# Patient Record
Sex: FEMALE | Race: ASIAN | NOT HISPANIC OR LATINO | Employment: OTHER | URBAN - METROPOLITAN AREA
[De-identification: names, ages, dates, MRNs, and addresses within clinical notes are randomized per-mention and may not be internally consistent; named-entity substitution may affect disease eponyms.]

---

## 2017-09-10 ENCOUNTER — HOSPITAL ENCOUNTER (OUTPATIENT)
Facility: MEDICAL CENTER | Age: 72
End: 2017-09-11
Attending: EMERGENCY MEDICINE | Admitting: INTERNAL MEDICINE
Payer: MEDICARE

## 2017-09-10 ENCOUNTER — RESOLUTE PROFESSIONAL BILLING HOSPITAL PROF FEE (OUTPATIENT)
Dept: HOSPITALIST | Facility: MEDICAL CENTER | Age: 72
End: 2017-09-10
Payer: MEDICARE

## 2017-09-10 ENCOUNTER — APPOINTMENT (OUTPATIENT)
Dept: RADIOLOGY | Facility: MEDICAL CENTER | Age: 72
End: 2017-09-10
Attending: EMERGENCY MEDICINE
Payer: MEDICARE

## 2017-09-10 DIAGNOSIS — N28.9 RENAL INSUFFICIENCY: ICD-10-CM

## 2017-09-10 DIAGNOSIS — R51.9 HEADACHE, UNSPECIFIED HEADACHE TYPE: ICD-10-CM

## 2017-09-10 PROBLEM — I69.359 HISTORY OF HEMORRHAGIC STROKE WITH RESIDUAL HEMIPARESIS (HCC): Status: ACTIVE | Noted: 2017-09-10

## 2017-09-10 PROBLEM — R51 HEADACHE(784.0): Status: ACTIVE | Noted: 2017-09-10

## 2017-09-10 LAB
ALBUMIN SERPL BCP-MCNC: 4.1 G/DL (ref 3.2–4.9)
ALBUMIN/GLOB SERPL: 1.3 G/DL
ALP SERPL-CCNC: 55 U/L (ref 30–99)
ALT SERPL-CCNC: 9 U/L (ref 2–50)
ANION GAP SERPL CALC-SCNC: 12 MMOL/L (ref 0–11.9)
AST SERPL-CCNC: 14 U/L (ref 12–45)
BASOPHILS # BLD AUTO: 1 % (ref 0–1.8)
BASOPHILS # BLD: 0.09 K/UL (ref 0–0.12)
BILIRUB SERPL-MCNC: 0.6 MG/DL (ref 0.1–1.5)
BUN SERPL-MCNC: 37 MG/DL (ref 8–22)
CALCIUM SERPL-MCNC: 9.6 MG/DL (ref 8.5–10.5)
CHLORIDE SERPL-SCNC: 106 MMOL/L (ref 96–112)
CO2 SERPL-SCNC: 17 MMOL/L (ref 20–33)
CREAT SERPL-MCNC: 1.65 MG/DL (ref 0.5–1.4)
EOSINOPHIL # BLD AUTO: 0.36 K/UL (ref 0–0.51)
EOSINOPHIL NFR BLD: 3.8 % (ref 0–6.9)
ERYTHROCYTE [DISTWIDTH] IN BLOOD BY AUTOMATED COUNT: 46.9 FL (ref 35.9–50)
GFR SERPL CREATININE-BSD FRML MDRD: 31 ML/MIN/1.73 M 2
GLOBULIN SER CALC-MCNC: 3.1 G/DL (ref 1.9–3.5)
GLUCOSE SERPL-MCNC: 122 MG/DL (ref 65–99)
HCT VFR BLD AUTO: 35.6 % (ref 37–47)
HGB BLD-MCNC: 12.1 G/DL (ref 12–16)
IMM GRANULOCYTES # BLD AUTO: 0.06 K/UL (ref 0–0.11)
IMM GRANULOCYTES NFR BLD AUTO: 0.6 % (ref 0–0.9)
LYMPHOCYTES # BLD AUTO: 2.93 K/UL (ref 1–4.8)
LYMPHOCYTES NFR BLD: 31.1 % (ref 22–41)
MCH RBC QN AUTO: 33.4 PG (ref 27–33)
MCHC RBC AUTO-ENTMCNC: 34 G/DL (ref 33.6–35)
MCV RBC AUTO: 98.3 FL (ref 81.4–97.8)
MONOCYTES # BLD AUTO: 1.04 K/UL (ref 0–0.85)
MONOCYTES NFR BLD AUTO: 11 % (ref 0–13.4)
NEUTROPHILS # BLD AUTO: 4.95 K/UL (ref 2–7.15)
NEUTROPHILS NFR BLD: 52.5 % (ref 44–72)
NRBC # BLD AUTO: 0 K/UL
NRBC BLD AUTO-RTO: 0 /100 WBC
PLATELET # BLD AUTO: 211 K/UL (ref 164–446)
PMV BLD AUTO: 10.2 FL (ref 9–12.9)
POTASSIUM SERPL-SCNC: 4.1 MMOL/L (ref 3.6–5.5)
PROT SERPL-MCNC: 7.2 G/DL (ref 6–8.2)
RBC # BLD AUTO: 3.62 M/UL (ref 4.2–5.4)
SODIUM SERPL-SCNC: 135 MMOL/L (ref 135–145)
TROPONIN I SERPL-MCNC: <0.01 NG/ML (ref 0–0.04)
WBC # BLD AUTO: 9.4 K/UL (ref 4.8–10.8)

## 2017-09-10 PROCEDURE — 84484 ASSAY OF TROPONIN QUANT: CPT

## 2017-09-10 PROCEDURE — 96361 HYDRATE IV INFUSION ADD-ON: CPT

## 2017-09-10 PROCEDURE — 70450 CT HEAD/BRAIN W/O DYE: CPT

## 2017-09-10 PROCEDURE — 700111 HCHG RX REV CODE 636 W/ 250 OVERRIDE (IP): Performed by: EMERGENCY MEDICINE

## 2017-09-10 PROCEDURE — 96375 TX/PRO/DX INJ NEW DRUG ADDON: CPT

## 2017-09-10 PROCEDURE — 80053 COMPREHEN METABOLIC PANEL: CPT

## 2017-09-10 PROCEDURE — 36415 COLL VENOUS BLD VENIPUNCTURE: CPT

## 2017-09-10 PROCEDURE — 94760 N-INVAS EAR/PLS OXIMETRY 1: CPT

## 2017-09-10 PROCEDURE — 99285 EMERGENCY DEPT VISIT HI MDM: CPT

## 2017-09-10 PROCEDURE — G0378 HOSPITAL OBSERVATION PER HR: HCPCS

## 2017-09-10 PROCEDURE — 99220 PR INITIAL OBSERVATION CARE,LEVL III: CPT | Performed by: INTERNAL MEDICINE

## 2017-09-10 PROCEDURE — 85025 COMPLETE CBC W/AUTO DIFF WBC: CPT

## 2017-09-10 PROCEDURE — 700105 HCHG RX REV CODE 258: Performed by: EMERGENCY MEDICINE

## 2017-09-10 PROCEDURE — 96376 TX/PRO/DX INJ SAME DRUG ADON: CPT

## 2017-09-10 PROCEDURE — 96374 THER/PROPH/DIAG INJ IV PUSH: CPT

## 2017-09-10 RX ORDER — OMEPRAZOLE 20 MG/1
20 CAPSULE, DELAYED RELEASE ORAL DAILY
COMMUNITY

## 2017-09-10 RX ORDER — ISOSORBIDE DINITRATE 30 MG/1
30 TABLET ORAL 2 TIMES DAILY
COMMUNITY
End: 2018-01-22 | Stop reason: SDUPTHER

## 2017-09-10 RX ORDER — ONDANSETRON 2 MG/ML
4 INJECTION INTRAMUSCULAR; INTRAVENOUS ONCE
Status: COMPLETED | OUTPATIENT
Start: 2017-09-10 | End: 2017-09-10

## 2017-09-10 RX ORDER — SODIUM CHLORIDE 9 MG/ML
1000 INJECTION, SOLUTION INTRAVENOUS ONCE
Status: COMPLETED | OUTPATIENT
Start: 2017-09-10 | End: 2017-09-11

## 2017-09-10 RX ADMIN — FENTANYL CITRATE 50 MCG: 50 INJECTION, SOLUTION INTRAMUSCULAR; INTRAVENOUS at 23:11

## 2017-09-10 RX ADMIN — SODIUM CHLORIDE 1000 ML: 9 INJECTION, SOLUTION INTRAVENOUS at 23:00

## 2017-09-10 RX ADMIN — ONDANSETRON 4 MG: 2 INJECTION INTRAMUSCULAR; INTRAVENOUS at 21:52

## 2017-09-10 RX ADMIN — FENTANYL CITRATE 50 MCG: 50 INJECTION, SOLUTION INTRAMUSCULAR; INTRAVENOUS at 21:52

## 2017-09-10 ASSESSMENT — LIFESTYLE VARIABLES
EVER HAD A DRINK FIRST THING IN THE MORNING TO STEADY YOUR NERVES TO GET RID OF A HANGOVER: NO
TOTAL SCORE: 0
EVER_SMOKED: NEVER
ALCOHOL_USE: NO
HAVE PEOPLE ANNOYED YOU BY CRITICIZING YOUR DRINKING: NO
HAVE YOU EVER FELT YOU SHOULD CUT DOWN ON YOUR DRINKING: NO
CONSUMPTION TOTAL: NEGATIVE
AVERAGE NUMBER OF DAYS PER WEEK YOU HAVE A DRINK CONTAINING ALCOHOL: 0
EVER FELT BAD OR GUILTY ABOUT YOUR DRINKING: NO
TOTAL SCORE: 0
HOW MANY TIMES IN THE PAST YEAR HAVE YOU HAD 5 OR MORE DRINKS IN A DAY: 0
ON A TYPICAL DAY WHEN YOU DRINK ALCOHOL HOW MANY DRINKS DO YOU HAVE: 0
TOTAL SCORE: 0

## 2017-09-10 ASSESSMENT — PATIENT HEALTH QUESTIONNAIRE - PHQ9
SUM OF ALL RESPONSES TO PHQ9 QUESTIONS 1 AND 2: 0
SUM OF ALL RESPONSES TO PHQ QUESTIONS 1-9: 0
1. LITTLE INTEREST OR PLEASURE IN DOING THINGS: NOT AT ALL
2. FEELING DOWN, DEPRESSED, IRRITABLE, OR HOPELESS: NOT AT ALL

## 2017-09-10 ASSESSMENT — PAIN SCALES - GENERAL: PAINLEVEL_OUTOF10: 10

## 2017-09-11 ENCOUNTER — PATIENT OUTREACH (OUTPATIENT)
Dept: HEALTH INFORMATION MANAGEMENT | Facility: OTHER | Age: 72
End: 2017-09-11

## 2017-09-11 ENCOUNTER — APPOINTMENT (OUTPATIENT)
Dept: RADIOLOGY | Facility: MEDICAL CENTER | Age: 72
End: 2017-09-11
Attending: INTERNAL MEDICINE
Payer: MEDICARE

## 2017-09-11 VITALS
HEART RATE: 77 BPM | OXYGEN SATURATION: 92 % | SYSTOLIC BLOOD PRESSURE: 140 MMHG | DIASTOLIC BLOOD PRESSURE: 67 MMHG | TEMPERATURE: 98.1 F | WEIGHT: 150.02 LBS | RESPIRATION RATE: 16 BRPM | HEIGHT: 62 IN | BODY MASS INDEX: 27.61 KG/M2

## 2017-09-11 PROBLEM — R51 HEADACHE(784.0): Status: RESOLVED | Noted: 2017-09-10 | Resolved: 2017-09-11

## 2017-09-11 LAB
ANION GAP SERPL CALC-SCNC: 10 MMOL/L (ref 0–11.9)
BASOPHILS # BLD AUTO: 1.1 % (ref 0–1.8)
BASOPHILS # BLD: 0.08 K/UL (ref 0–0.12)
BUN SERPL-MCNC: 36 MG/DL (ref 8–22)
CALCIUM SERPL-MCNC: 8.8 MG/DL (ref 8.5–10.5)
CHLORIDE SERPL-SCNC: 109 MMOL/L (ref 96–112)
CO2 SERPL-SCNC: 19 MMOL/L (ref 20–33)
CREAT SERPL-MCNC: 1.52 MG/DL (ref 0.5–1.4)
EOSINOPHIL # BLD AUTO: 0.32 K/UL (ref 0–0.51)
EOSINOPHIL NFR BLD: 4.2 % (ref 0–6.9)
ERYTHROCYTE [DISTWIDTH] IN BLOOD BY AUTOMATED COUNT: 47.9 FL (ref 35.9–50)
GFR SERPL CREATININE-BSD FRML MDRD: 34 ML/MIN/1.73 M 2
GLUCOSE BLD-MCNC: 121 MG/DL (ref 65–99)
GLUCOSE BLD-MCNC: 133 MG/DL (ref 65–99)
GLUCOSE BLD-MCNC: 153 MG/DL (ref 65–99)
GLUCOSE BLD-MCNC: 183 MG/DL (ref 65–99)
GLUCOSE SERPL-MCNC: 163 MG/DL (ref 65–99)
HCT VFR BLD AUTO: 32.4 % (ref 37–47)
HGB BLD-MCNC: 10.8 G/DL (ref 12–16)
IMM GRANULOCYTES # BLD AUTO: 0.05 K/UL (ref 0–0.11)
IMM GRANULOCYTES NFR BLD AUTO: 0.7 % (ref 0–0.9)
LYMPHOCYTES # BLD AUTO: 2.31 K/UL (ref 1–4.8)
LYMPHOCYTES NFR BLD: 30.4 % (ref 22–41)
MCH RBC QN AUTO: 33 PG (ref 27–33)
MCHC RBC AUTO-ENTMCNC: 33.3 G/DL (ref 33.6–35)
MCV RBC AUTO: 99.1 FL (ref 81.4–97.8)
MONOCYTES # BLD AUTO: 0.79 K/UL (ref 0–0.85)
MONOCYTES NFR BLD AUTO: 10.4 % (ref 0–13.4)
NEUTROPHILS # BLD AUTO: 4.04 K/UL (ref 2–7.15)
NEUTROPHILS NFR BLD: 53.2 % (ref 44–72)
NRBC # BLD AUTO: 0 K/UL
NRBC BLD AUTO-RTO: 0 /100 WBC
PLATELET # BLD AUTO: 188 K/UL (ref 164–446)
PMV BLD AUTO: 10.1 FL (ref 9–12.9)
POTASSIUM SERPL-SCNC: 4.1 MMOL/L (ref 3.6–5.5)
RBC # BLD AUTO: 3.27 M/UL (ref 4.2–5.4)
SODIUM SERPL-SCNC: 138 MMOL/L (ref 135–145)
WBC # BLD AUTO: 7.6 K/UL (ref 4.8–10.8)

## 2017-09-11 PROCEDURE — G8980 MOBILITY D/C STATUS: HCPCS | Mod: CM

## 2017-09-11 PROCEDURE — G8979 MOBILITY GOAL STATUS: HCPCS | Mod: CM

## 2017-09-11 PROCEDURE — 85025 COMPLETE CBC W/AUTO DIFF WBC: CPT

## 2017-09-11 PROCEDURE — 96361 HYDRATE IV INFUSION ADD-ON: CPT

## 2017-09-11 PROCEDURE — 99217 PR OBSERVATION CARE DISCHARGE: CPT | Performed by: HOSPITALIST

## 2017-09-11 PROCEDURE — 700105 HCHG RX REV CODE 258: Performed by: INTERNAL MEDICINE

## 2017-09-11 PROCEDURE — 96372 THER/PROPH/DIAG INJ SC/IM: CPT

## 2017-09-11 PROCEDURE — 36415 COLL VENOUS BLD VENIPUNCTURE: CPT

## 2017-09-11 PROCEDURE — G0378 HOSPITAL OBSERVATION PER HR: HCPCS

## 2017-09-11 PROCEDURE — 80048 BASIC METABOLIC PNL TOTAL CA: CPT

## 2017-09-11 PROCEDURE — 306588 SLEEVE,VASO CALF MED: Performed by: INTERNAL MEDICINE

## 2017-09-11 PROCEDURE — G8978 MOBILITY CURRENT STATUS: HCPCS | Mod: CM

## 2017-09-11 PROCEDURE — 97161 PT EVAL LOW COMPLEX 20 MIN: CPT

## 2017-09-11 PROCEDURE — 82962 GLUCOSE BLOOD TEST: CPT

## 2017-09-11 PROCEDURE — 302255 BARRIER CREAM MOISTURE BAZA PROTECT (ZINC) 5OZ: Performed by: INTERNAL MEDICINE

## 2017-09-11 PROCEDURE — A9270 NON-COVERED ITEM OR SERVICE: HCPCS | Performed by: INTERNAL MEDICINE

## 2017-09-11 PROCEDURE — 70551 MRI BRAIN STEM W/O DYE: CPT

## 2017-09-11 PROCEDURE — 700102 HCHG RX REV CODE 250 W/ 637 OVERRIDE(OP): Performed by: INTERNAL MEDICINE

## 2017-09-11 RX ORDER — ALLOPURINOL 100 MG/1
100 TABLET ORAL DAILY
Status: DISCONTINUED | OUTPATIENT
Start: 2017-09-11 | End: 2017-09-11 | Stop reason: HOSPADM

## 2017-09-11 RX ORDER — POLYETHYLENE GLYCOL 3350 17 G/17G
1 POWDER, FOR SOLUTION ORAL
Status: DISCONTINUED | OUTPATIENT
Start: 2017-09-11 | End: 2017-09-11 | Stop reason: HOSPADM

## 2017-09-11 RX ORDER — DOXAZOSIN 2 MG/1
4 TABLET ORAL
Status: DISCONTINUED | OUTPATIENT
Start: 2017-09-11 | End: 2017-09-11 | Stop reason: HOSPADM

## 2017-09-11 RX ORDER — AMOXICILLIN 250 MG
2 CAPSULE ORAL 2 TIMES DAILY
Status: DISCONTINUED | OUTPATIENT
Start: 2017-09-11 | End: 2017-09-11 | Stop reason: HOSPADM

## 2017-09-11 RX ORDER — BISACODYL 10 MG
10 SUPPOSITORY, RECTAL RECTAL
Status: DISCONTINUED | OUTPATIENT
Start: 2017-09-11 | End: 2017-09-11 | Stop reason: HOSPADM

## 2017-09-11 RX ORDER — MORPHINE SULFATE 4 MG/ML
2 INJECTION, SOLUTION INTRAMUSCULAR; INTRAVENOUS
Status: DISCONTINUED | OUTPATIENT
Start: 2017-09-11 | End: 2017-09-11 | Stop reason: HOSPADM

## 2017-09-11 RX ORDER — SODIUM CHLORIDE 9 MG/ML
1000 INJECTION, SOLUTION INTRAVENOUS CONTINUOUS
Status: DISCONTINUED | OUTPATIENT
Start: 2017-09-11 | End: 2017-09-11 | Stop reason: HOSPADM

## 2017-09-11 RX ORDER — ACETAMINOPHEN 325 MG/1
650 TABLET ORAL EVERY 6 HOURS PRN
Status: DISCONTINUED | OUTPATIENT
Start: 2017-09-11 | End: 2017-09-11 | Stop reason: HOSPADM

## 2017-09-11 RX ORDER — ONDANSETRON 2 MG/ML
4 INJECTION INTRAMUSCULAR; INTRAVENOUS EVERY 4 HOURS PRN
Status: DISCONTINUED | OUTPATIENT
Start: 2017-09-11 | End: 2017-09-11 | Stop reason: HOSPADM

## 2017-09-11 RX ORDER — LABETALOL HYDROCHLORIDE 5 MG/ML
5 INJECTION, SOLUTION INTRAVENOUS EVERY 4 HOURS PRN
Status: DISCONTINUED | OUTPATIENT
Start: 2017-09-11 | End: 2017-09-11 | Stop reason: HOSPADM

## 2017-09-11 RX ORDER — DEXTROSE MONOHYDRATE 25 G/50ML
25 INJECTION, SOLUTION INTRAVENOUS
Status: DISCONTINUED | OUTPATIENT
Start: 2017-09-11 | End: 2017-09-11 | Stop reason: HOSPADM

## 2017-09-11 RX ORDER — OXYCODONE HYDROCHLORIDE 5 MG/1
2.5 TABLET ORAL
Status: DISCONTINUED | OUTPATIENT
Start: 2017-09-11 | End: 2017-09-11 | Stop reason: HOSPADM

## 2017-09-11 RX ORDER — BUTALBITAL, ACETAMINOPHEN AND CAFFEINE 50; 325; 40 MG/1; MG/1; MG/1
1 TABLET ORAL EVERY 6 HOURS PRN
Qty: 30 TAB | Refills: 0 | Status: SHIPPED | OUTPATIENT
Start: 2017-09-11 | End: 2019-01-01

## 2017-09-11 RX ORDER — INSULIN GLARGINE 100 [IU]/ML
16 INJECTION, SOLUTION SUBCUTANEOUS 2 TIMES DAILY
Status: DISCONTINUED | OUTPATIENT
Start: 2017-09-11 | End: 2017-09-11 | Stop reason: HOSPADM

## 2017-09-11 RX ORDER — HYDRALAZINE HYDROCHLORIDE 25 MG/1
50 TABLET, FILM COATED ORAL 3 TIMES DAILY PRN
Status: DISCONTINUED | OUTPATIENT
Start: 2017-09-11 | End: 2017-09-11 | Stop reason: HOSPADM

## 2017-09-11 RX ORDER — GEMFIBROZIL 600 MG/1
600 TABLET, FILM COATED ORAL 2 TIMES DAILY
Status: DISCONTINUED | OUTPATIENT
Start: 2017-09-11 | End: 2017-09-11

## 2017-09-11 RX ORDER — LABETALOL 200 MG/1
200 TABLET, FILM COATED ORAL 2 TIMES DAILY
Status: DISCONTINUED | OUTPATIENT
Start: 2017-09-11 | End: 2017-09-11 | Stop reason: HOSPADM

## 2017-09-11 RX ORDER — ISOSORBIDE DINITRATE 30 MG/1
30 TABLET ORAL 2 TIMES DAILY
Status: DISCONTINUED | OUTPATIENT
Start: 2017-09-11 | End: 2017-09-11 | Stop reason: HOSPADM

## 2017-09-11 RX ORDER — ACETAMINOPHEN 325 MG/1
650 TABLET ORAL EVERY 6 HOURS PRN
Qty: 30 TAB | Refills: 0 | COMMUNITY
Start: 2017-09-11

## 2017-09-11 RX ORDER — ONDANSETRON 4 MG/1
4 TABLET, ORALLY DISINTEGRATING ORAL EVERY 4 HOURS PRN
Status: DISCONTINUED | OUTPATIENT
Start: 2017-09-11 | End: 2017-09-11 | Stop reason: HOSPADM

## 2017-09-11 RX ORDER — OMEPRAZOLE 20 MG/1
20 CAPSULE, DELAYED RELEASE ORAL DAILY
Status: DISCONTINUED | OUTPATIENT
Start: 2017-09-11 | End: 2017-09-11 | Stop reason: HOSPADM

## 2017-09-11 RX ORDER — OXYCODONE HYDROCHLORIDE 5 MG/1
5 TABLET ORAL
Status: DISCONTINUED | OUTPATIENT
Start: 2017-09-11 | End: 2017-09-11 | Stop reason: HOSPADM

## 2017-09-11 RX ORDER — BUTALBITAL, ACETAMINOPHEN AND CAFFEINE 50; 325; 40 MG/1; MG/1; MG/1
1 TABLET ORAL EVERY 6 HOURS PRN
Status: DISCONTINUED | OUTPATIENT
Start: 2017-09-11 | End: 2017-09-11 | Stop reason: HOSPADM

## 2017-09-11 RX ADMIN — BUTALBITAL, ACETAMINOPHEN, AND CAFFEINE 1 TABLET: 50; 325; 40 TABLET ORAL at 02:41

## 2017-09-11 RX ADMIN — ALLOPURINOL 100 MG: 100 TABLET ORAL at 10:00

## 2017-09-11 RX ADMIN — ISOSORBIDE DINITRATE 30 MG: 30 TABLET ORAL at 08:40

## 2017-09-11 RX ADMIN — DOXAZOSIN 4 MG: 2 TABLET ORAL at 02:41

## 2017-09-11 RX ADMIN — LABETALOL HCL 200 MG: 200 TABLET, FILM COATED ORAL at 08:40

## 2017-09-11 RX ADMIN — INSULIN GLARGINE 16 UNITS: 100 INJECTION, SOLUTION SUBCUTANEOUS at 10:01

## 2017-09-11 RX ADMIN — INSULIN LISPRO 2 UNITS: 100 INJECTION, SOLUTION INTRAVENOUS; SUBCUTANEOUS at 12:53

## 2017-09-11 RX ADMIN — OMEPRAZOLE 20 MG: 20 CAPSULE, DELAYED RELEASE ORAL at 09:00

## 2017-09-11 RX ADMIN — SODIUM CHLORIDE 1000 ML: 9 INJECTION, SOLUTION INTRAVENOUS at 03:38

## 2017-09-11 RX ADMIN — OXYCODONE HYDROCHLORIDE 2.5 MG: 5 TABLET ORAL at 00:51

## 2017-09-11 ASSESSMENT — COGNITIVE AND FUNCTIONAL STATUS - GENERAL
TURNING FROM BACK TO SIDE WHILE IN FLAT BAD: A LOT
CLIMB 3 TO 5 STEPS WITH RAILING: TOTAL
MOVING TO AND FROM BED TO CHAIR: A LOT
WALKING IN HOSPITAL ROOM: TOTAL
SUGGESTED CMS G CODE MODIFIER MOBILITY: CM
MOVING FROM LYING ON BACK TO SITTING ON SIDE OF FLAT BED: UNABLE
STANDING UP FROM CHAIR USING ARMS: TOTAL
MOBILITY SCORE: 8

## 2017-09-11 ASSESSMENT — LIFESTYLE VARIABLES
DO YOU DRINK ALCOHOL: YES
TOTAL SCORE: 0
EVER FELT BAD OR GUILTY ABOUT YOUR DRINKING: NO
HOW MANY TIMES IN THE PAST YEAR HAVE YOU HAD 5 OR MORE DRINKS IN A DAY: 0
EVER HAD A DRINK FIRST THING IN THE MORNING TO STEADY YOUR NERVES TO GET RID OF A HANGOVER: NO
CONSUMPTION TOTAL: NEGATIVE
TOTAL SCORE: 0
AVERAGE NUMBER OF DAYS PER WEEK YOU HAVE A DRINK CONTAINING ALCOHOL: 0
EVER_SMOKED: NEVER
ON A TYPICAL DAY WHEN YOU DRINK ALCOHOL HOW MANY DRINKS DO YOU HAVE: 0
HAVE YOU EVER FELT YOU SHOULD CUT DOWN ON YOUR DRINKING: NO
TOTAL SCORE: 0
HAVE PEOPLE ANNOYED YOU BY CRITICIZING YOUR DRINKING: NO

## 2017-09-11 ASSESSMENT — PAIN SCALES - GENERAL: PAINLEVEL_OUTOF10: 0

## 2017-09-11 ASSESSMENT — GAIT ASSESSMENTS: GAIT LEVEL OF ASSIST: UNABLE TO PARTICIPATE

## 2017-09-11 NOTE — DISCHARGE SUMMARY
Hospital Medicine Discharge Note     Patient ID:  Cassandra Guzmán  [id number]  72 y.o.female  1945    Admit Date:  9/10/2017       Discharge Date:   9/11/2017    Primary Care Provider: ALONSO Maciel M.D.    Admitting Physician: Holly Turcios M.D.  Discharging Physician: Saad Calhoun MD    Chief Complaint: headache    Discharge Diagnoses:     Headache, resolved    Chronic Medical Problems:  Past Medical History:   Diagnosis Date   • UTI (urinary tract infection) 6/1/2016   • ICH (intracerebral hemorrhage) (CMS-[facility]) 4/6/2016   • DM (diabetes mellitus) type II uncontrolled with renal manifestation 4/26/2014   • Arthritis    • Breath shortness    • Diabetes    • GERD (gastroesophageal reflux disease)    • GOUT    • Heart burn    • Hypertension    • Indigestion    • Other and unspecified angina pectoris     hospitalized 8/13 chest pain   • Unspecified cataract     right eye        CKD    Code Status: Full Code    Hospital Summary:       Please refer to H&P by Dr Turcios on 9/10/2017 for full details.      In brief, Cassandra Guzmán is a 72 y.o. female who was admitted 9/10/2017 for Headache, with history of headaches are usually relieved by over-the-counter Tylenol or sinus medications. However, the patient has had a headache over the last few days, like her previous ones, though they do not resolve with OTC medications. Described as frontal 6-8 out of 10, intermittent, sharp and some tingling pain. She denied vision changes, sensory deficits, weakness beyond her baseline of residual CVA deficits. The patient was placed under observation status.    The patient was observed overnight on telemetry without reported event. The patient underwent serial neurologic checks, that remains stable. She does have residual left-sided weakness secondary to her prior stroke. The patient's pain was treated, with resolution of her headache. With her headache she denied light noise sensitivity, nausea or vomiting, or vision  changes. The patient underwent neurologic workup with MRI, that was stable from prior examination. She was seen by PT, with no needs identified.    Today, the patient denies headache. She is neurologically at baseline, with residual left-sided weakness. She is functioning at her baseline. She denies sensory changes, vision changes, continued headache, or other neurologic complaints. The patient's pain has been controlled with Fioricet, that she has been given a prescription for outpatient use. She will follow-up with her PCP.    Therefore, she is discharged in good and stable condition with close outpatient follow-up.    Consultants:      None    Studies:    Imaging/ Testing:      MR-BRAIN-W/O   Final Result      1.  No acute abnormality.   2.  Chronic hemorrhage in the right basal ganglia extending into the thalamus and right cerebral peduncle.   3.  Wallerian degeneration along the right corticospinal tract.   4.  Chronic microhemorrhages in the left basal ganglia.   5.  Minimal chronic ventricular hemorrhage.   6.  Moderate chronic microvascular ischemic disease.   7.  Moderate cerebral atrophy.   8.  There has been no significant interval change.      CT-HEAD W/O   Final Result      1.  No acute intracranial findings.      2.  Diffuse atrophy and periventricular white matter changes are consistent with chronic small vessel disease.      3.  Encephalomalacia in the right thalamus               Laboratory:   Recent Labs      09/10/17   2155  09/11/17   0215   WBC  9.4  7.6   RBC  3.62*  3.27*   HEMOGLOBIN  12.1  10.8*   HEMATOCRIT  35.6*  32.4*   MCV  98.3*  99.1*   MCH  33.4*  33.0   MCHC  34.0  33.3*   RDW  46.9  47.9   PLATELETCT  211  188   MPV  10.2  10.1       Recent Labs      09/10/17   2155  09/11/17   0215   SODIUM  135  138   POTASSIUM  4.1  4.1   CHLORIDE  106  109   CO2  17*  19*   GLUCOSE  122*  163*   BUN  37*  36*   CREATININE  1.65*  1.52*   CALCIUM  9.6  8.8       Recent Labs      09/10/17   2155   09/11/17   0215   ALTSGPT  9   --    ASTSGOT  14   --    ALKPHOSPHAT  55   --    TBILIRUBIN  0.6   --    GLUCOSE  122*  163*                    Recent Labs      09/10/17   2155   TROPONINI  <0.01       Procedures/Surgeries:        None    Disposition:  Discharge home    Condition:  Stable    Instructions:   Activity: As tolerated.  Diet: regular  Followup:   -PCP 1 week  Instructions:  -Given instructions to return to the ER if any new or worsening symptoms, worsening condition, or failure to improve  -Call PCP for followup  -No smoking, no alcohol, no caffeine  -Encourage risk factor reduction with tobacco and alcohol abstinence, diet changes, weight loss, and exercise.     Follow-Up  ALONSO Maciel M.D.  [address] [place] [id number]  [phone]    Go on 9/27/2017  Please arrive at 11:15am for your appointment. Thank you      Discharge Medications:           Medication List      START taking these medications      Instructions   acetaminophen 325 MG Tabs  Commonly known as:  TYLENOL   Take 2 Tabs by mouth every 6 hours as needed (Mild Pain; (Pain scale 1-3); Temp greater than 100.5 F).  Dose:  650 mg     acetaminophen/caffeine/butalbital 325-40-50 mg -40 MG Tabs  Commonly known as:  FIORICET   Take 1 Tab by mouth every 6 hours as needed for Headache or Migraine.  Dose:  1 Tab        CONTINUE taking these medications      Instructions   allopurinol 100 MG Tabs  Commonly known as:  ZYLOPRIM   Take 100 mg by mouth every day.  Dose:  100 mg     docusate sodium 100 MG Caps  Commonly known as:  COLACE   Take 100 mg by mouth 2 times a day.  Dose:  100 mg     doxazosin 4 MG Tabs  Commonly known as:  CARDURA   Take 4 mg by mouth every bedtime.  Dose:  4 mg     Esomeprazole Magnesium 20 MG Tbec   Take 20 mg by mouth every morning.  Dose:  20 mg     gemfibrozil 600 MG Tabs  Commonly known as:  LOPID   Take 600 mg by mouth 2 times a day.  Dose:  600 mg     hydrALAZINE 50 MG Tabs  Commonly known as:   APRESOLINE   Doctor's comments:  Clarified with Kamilah on 10/24  Take 50 mg by mouth 3 times a day. give 1 tablet 2-3 times daily  Dose:  50 mg     insulin detemir 100 UNIT/ML Soln  Commonly known as:  LEVEMIR   Inject 16 Units as instructed 2 times a day.  Dose:  16 Units     insulin lispro 100 UNIT/ML Soln  Commonly known as:  HUMALOG   Doctor's comments:  Sliding scale: 150-200 = 2u, 201-250 = 4u, 251-300 = 6u, 301-350 = 8, 351-400 = 10u, 400+ call doctor  Inject 0-10 Units as instructed 3 times a day before meals.  Dose:  0-10 Units     isosorbide dinitrate 30 MG Tabs  Commonly known as:  ISORDIL   Take 30 mg by mouth 2 Times a Day.  Dose:  30 mg     labetalol 200 MG Tabs  Commonly known as:  NORMODYNE   Take 1 Tab by mouth 2 times a day.  Dose:  200 mg     omeprazole 20 MG delayed-release capsule  Commonly known as:  PRILOSEC   Take 20 mg by mouth every day.  Dose:  20 mg     potassium chloride SA 20 MEQ Tbcr  Commonly known as:  Kdur   Take 1 Tab by mouth every day.  Dose:  20 mEq     vitamin D3 5000 units Caps   Take 1 Cap by mouth every day.  Dose:  1 Cap             Opioid prescription history checked: yes    Please CC the above physicians    MIKE Padilla  9/11/2017  11:39 AM

## 2017-09-11 NOTE — ED PROVIDER NOTES
"ED Provider Note    CHIEF COMPLAINT  Chief Complaint   Patient presents with   • Headache     Intermittently X 4 days. Pt describes a 10/10 \"heavey pressure\" like pain above pt's left orbit.       LORENE Guzmán is a 72 y.o. female here for evaluation of headache, and dizziness.  The pt history is provided from her daughter, who states the pt has a long history of headaches, that are usually relieved with tylenol or sinus medications.  She has had a headache over the last few days, much like her previous ones.  However, this headache is lasting longer than usual.  She has no fever, no vomiting, and baseline weakness to the left side of her body.  She has no cp, no sob.     PAST MEDICAL HISTORY   has a past medical history of Arthritis; Breath shortness; Diabetes; DM (diabetes mellitus) type II uncontrolled with renal manifestation (4/26/2014); GERD (gastroesophageal reflux disease); GOUT; Heart burn; Hypertension; ICH (intracerebral hemorrhage) (CMS-HCC) (4/6/2016); Indigestion; Other and unspecified angina pectoris; Unspecified cataract; and UTI (urinary tract infection) (6/1/2016).    SOCIAL HISTORY  Social History     Social History Main Topics   • Smoking status: Never Smoker   • Smokeless tobacco: Never Used   • Alcohol use Yes      Comment: Rare   • Drug use: No   • Sexual activity: Not on file       SURGICAL HISTORY   has a past surgical history that includes tubal ligation (1977); cristin by laparoscopy (10/5/2013); cataract extraction with iol; ventral hernia repair (4/9/2014); and gastroscopy-endo (6/14/2016).    CURRENT MEDICATIONS  Home Medications     Reviewed by Olivier Noble R.N. (Registered Nurse) on 09/10/17 at 9508  Med List Status: Complete   Medication Last Dose Status   allopurinol (ZYLOPRIM) 100 MG Tab 9/10/2017 Active   Cholecalciferol (VITAMIN D3) 5000 units Cap 9/10/2017 Active   docusate sodium (COLACE) 100 MG Cap prn Active   doxazosin (CARDURA) 4 MG Tab 9/10/2017 Active " "  Esomeprazole Magnesium 20 MG Tablet Delayed Response Unknown Active   gemfibrozil (LOPID) 600 MG Tab 9/10/2017 Active   hydrALAZINE (APRESOLINE) 50 MG Tab PRN Active   insulin detemir (LEVEMIR) 100 UNIT/ML Solution 9/10/2017 Active   insulin lispro (HUMALOG) 100 UNIT/ML Solution 9/10/2017 Active   isosorbide dinitrate (ISORDIL) 30 MG Tab 9/10/2017 Active   labetalol (NORMODYNE) 200 MG Tab 9/10/2017 Active   loratadine (CLARITIN) 10 MG Tab prn Active   morphine (ROXANOL) 20 MG/ML Solution prn Active   omeprazole (PRILOSEC OTC) 20 MG tablet 9/10/2017 Active   potassium chloride SA (K-DUR) 20 MEQ Tab CR 9/10/2017 Active                ALLERGIES  Allergies   Allergen Reactions   • Amlodipine Cough       REVIEW OF SYSTEMS  See HPI for further details. Review of systems as above, otherwise all other systems are negative.     PHYSICAL EXAM  VITAL SIGNS: /72   Pulse 82   Temp 36.9 °C (98.4 °F)   Resp 16   Ht 1.575 m (5' 2\")   Wt 68 kg (150 lb)   SpO2 97%   BMI 27.44 kg/m²     Constitutional: Well appearing patient.  Not toxic, nor ill in appearance.  HEENT: NC/AT.  Extra Ocular Muscles Intact. Posterior pharynx clear, and without exudate. No uvula edema.  Neck: Full range of motion; non tender.   Cardiovascular: Regular heart rate and rhythm.  No murmurs, rubs, nor gallop appreciated.   Back:  Non tender midline.  No obvious step off or deformity.  Thorax & Lungs: Lungs are clear to auscultation with good air movement bilaterally.  No wheeze, rhonchi, nor rales.   Abdomen: Soft, with no tenderness, rebound nor guarding.  No mass, pulsatile mass, nor hepatosplenomegaly appreciated.  Skin: No purpura nor petechia noted.  No rash.  Extremities/Musculoskeletal: No sign of trauma.    Musculoskeletal: Range of motion is intact in all major joints.  Neurologic: Alert & oriented x 3.  CN II-XII grossly intact.   Strength and sensation is intact. Left side deficits at baseline.   appropriate, " cooperative.  Psychiatric: Normal affect appropriate for the clinical situation.    Results for orders placed or performed during the hospital encounter of 09/10/17   CBC WITH DIFFERENTIAL   Result Value Ref Range    WBC 9.4 4.8 - 10.8 K/uL    RBC 3.62 (L) 4.20 - 5.40 M/uL    Hemoglobin 12.1 12.0 - 16.0 g/dL    Hematocrit 35.6 (L) 37.0 - 47.0 %    MCV 98.3 (H) 81.4 - 97.8 fL    MCH 33.4 (H) 27.0 - 33.0 pg    MCHC 34.0 33.6 - 35.0 g/dL    RDW 46.9 35.9 - 50.0 fL    Platelet Count 211 164 - 446 K/uL    MPV 10.2 9.0 - 12.9 fL    Neutrophils-Polys 52.50 44.00 - 72.00 %    Lymphocytes 31.10 22.00 - 41.00 %    Monocytes 11.00 0.00 - 13.40 %    Eosinophils 3.80 0.00 - 6.90 %    Basophils 1.00 0.00 - 1.80 %    Immature Granulocytes 0.60 0.00 - 0.90 %    Nucleated RBC 0.00 /100 WBC    Neutrophils (Absolute) 4.95 2.00 - 7.15 K/uL    Lymphs (Absolute) 2.93 1.00 - 4.80 K/uL    Monos (Absolute) 1.04 (H) 0.00 - 0.85 K/uL    Eos (Absolute) 0.36 0.00 - 0.51 K/uL    Baso (Absolute) 0.09 0.00 - 0.12 K/uL    Immature Granulocytes (abs) 0.06 0.00 - 0.11 K/uL    NRBC (Absolute) 0.00 K/uL   COMP METABOLIC PANEL   Result Value Ref Range    Sodium 135 135 - 145 mmol/L    Potassium 4.1 3.6 - 5.5 mmol/L    Chloride 106 96 - 112 mmol/L    Co2 17 (L) 20 - 33 mmol/L    Anion Gap 12.0 (H) 0.0 - 11.9    Glucose 122 (H) 65 - 99 mg/dL    Bun 37 (H) 8 - 22 mg/dL    Creatinine 1.65 (H) 0.50 - 1.40 mg/dL    Calcium 9.6 8.5 - 10.5 mg/dL    AST(SGOT) 14 12 - 45 U/L    ALT(SGPT) 9 2 - 50 U/L    Alkaline Phosphatase 55 30 - 99 U/L    Total Bilirubin 0.6 0.1 - 1.5 mg/dL    Albumin 4.1 3.2 - 4.9 g/dL    Total Protein 7.2 6.0 - 8.2 g/dL    Globulin 3.1 1.9 - 3.5 g/dL    A-G Ratio 1.3 g/dL   TROPONIN   Result Value Ref Range    Troponin I <0.01 0.00 - 0.04 ng/mL   ESTIMATED GFR   Result Value Ref Range    GFR If  37 (A) >60 mL/min/1.73 m 2    GFR If Non  31 (A) >60 mL/min/1.73 m 2     CT-HEAD W/O   Final Result      1.  No  acute intracranial findings.      2.  Diffuse atrophy and periventricular white matter changes are consistent with chronic small vessel disease.      3.  Encephalomalacia in the right thalamus               PROCEDURES     MEDICAL RECORD  I have reviewed patient's medical record and pertinent results are listed above.    COURSE & MEDICAL DECISION MAKING  I have reviewed any medical record information, laboratory studies and radiographic results as noted above.    11:15 PM  The pt has an elevated creat, and is mildly dehydrated.  She had some relief of her headache after fentanyl, but it is returning.  She has had an extensive workup in the past, with CT scans and MRI.  I will admit her for iv fluids, and pain control.   Dr. Turcios will admit.       FINAL IMPRESSION  1. Headache, unspecified headache type    2. Renal insufficiency            Electronically signed by: Hardik Patino, 9/10/2017 11:12 PM

## 2017-09-11 NOTE — PROGRESS NOTES
Pt dc'd home. IV and monitor removed. Pt left unit via wheelchair with Family, refused hospital escort. Personal belongings with pt when leaving unit. Pt given discharge instructions prior to leaving unit including prescription and when to visit with physician; verbalizes understanding. Copy of discharge instructions with pt and in the chart.

## 2017-09-11 NOTE — ED NOTES
Unable to give urine sample at this time. All labs and CT scan resulted. Chart up for MD to re-eval.

## 2017-09-11 NOTE — H&P
HOSPITAL MEDICINE HISTORY/ PHYSICAL    Date & Time note created:    9/10/2017   11:42 PM       Date & Time Patient was seen:   9/10/2019    Patient ID:   Name: Cassandra Guzmán  . YOB: 1945. Age: 72 y.o. female. MRN: [id number]    Admitting Attending:  Holly Turcios     PCP : ALONSO Maciel M.D.        Chief Complaint:       Headache    History of Present Illness:    Roberto Pond is a 72 y.o. female w/h/o intracranial hemorrhage, diabetes, chronic headache, GERD, gout, hypertension, who presents with complaint of uncontrolled headache. The pt history is provided from her daughter, who states the pt has a long history of headaches, that are usually relieved with tylenol or sinus medications.  She has had a headache over the last few days, much like her previous ones.  However, this headache is lasting longer than usual. Patient's pain is local, 6-8/10, intermittent and does not radiate to other location, sharp and with some tingling. Can be controlled by pain meds. Patient otherwise denies fever, chills, nausea, vomiting, adb pain, SOB, CP, constipation, diarrhea, cough, or sputum.    Review of Systems:      per HPI otherwise 14 points reviewed 12 systems neg per AMA/CMS criteria.        Past Medical/ Family / Social history (PFSH):   Past Medical History:   Diagnosis Date   • UTI (urinary tract infection) 6/1/2016   • ICH (intracerebral hemorrhage) (CMS-HCC) 4/6/2016   • DM (diabetes mellitus) type II uncontrolled with renal manifestation 4/26/2014   • Arthritis    • Breath shortness    • Diabetes    • GERD (gastroesophageal reflux disease)    • GOUT    • Heart burn    • Hypertension    • Indigestion    • Other and unspecified angina pectoris     hospitalized 8/13 chest pain   • Unspecified cataract     right eye     Past Surgical History:   Procedure Laterality Date   • GASTROSCOPY-ENDO  6/14/2016    Procedure: GASTROSCOPY-ENDO;  Surgeon: Oliver Macedo M.D.;  [facility]: ENDOSCOPY [facility];   Service:    • VENTRAL HERNIA REPAIR  4/9/2014    Performed by Trinity Bryan M.D. at SURGERY [facility]   • VITA BY LAPAROSCOPY  10/5/2013    Performed by Trinity Bryan M.D. at SURGERY [facility]   • TUBAL LIGATION  1977   • CATARACT EXTRACTION WITH IOL      left eye     Current Outpatient Medications:  No current facility-administered medications on file prior to encounter.      Current Outpatient Prescriptions on File Prior to Encounter   Medication Sig Dispense Refill   • loratadine (CLARITIN) 10 MG Tab Take 10 mg by mouth every day.     • hydrALAZINE (APRESOLINE) 50 MG Tab Take 50 mg by mouth 3 times a day. give 1 tablet 2-3 times daily     • omeprazole (PRILOSEC OTC) 20 MG tablet Take 20 mg by mouth every day.     • Esomeprazole Magnesium 20 MG Tablet Delayed Response Take 20 mg by mouth every morning.     • docusate sodium (COLACE) 100 MG Cap Take 100 mg by mouth 2 times a day.     • morphine (ROXANOL) 20 MG/ML Solution Take 5 mg by mouth every four hours as needed. 5-10mg every 4 hours as needed for pain: ordered from rehab     • insulin lispro (HUMALOG) 100 UNIT/ML Solution Inject 0-10 Units as instructed 3 times a day before meals. 10 mL 0   • insulin detemir (LEVEMIR) 100 UNIT/ML Solution Inject 16 Units as instructed 2 times a day. 10 mL 0   • potassium chloride SA (K-DUR) 20 MEQ Tab CR Take 1 Tab by mouth every day. 30 Tab 0   • labetalol (NORMODYNE) 200 MG Tab Take 1 Tab by mouth 2 times a day.     • allopurinol (ZYLOPRIM) 100 MG Tab Take 100 mg by mouth every day.     • doxazosin (CARDURA) 4 MG Tab Take 4 mg by mouth every bedtime.     • gemfibrozil (LOPID) 600 MG Tab Take 600 mg by mouth 2 times a day.       Medication Allergy/Sensitivities:  Allergies   Allergen Reactions   • Amlodipine Cough     Family History:  Family History   Problem Relation Age of Onset   • Diabetes Mother    • Diabetes Father       reviewed and felt non pertinent to this encounter     Social History:  Social History  "  Substance Use Topics   • Smoking status: Never Smoker   • Smokeless tobacco: Never Used   • Alcohol use Yes      Comment: Rare     #################################################################  Physical Exam:   Vitals/ General Appearance:   Weight/BMI: Body mass index is 27.44 kg/m².  Blood pressure 155/72, pulse 82, temperature 36.9 °C (98.4 °F), resp. rate 16, height 1.575 m (5' 2\"), weight 68 kg (150 lb), SpO2 97 %.   Vitals:    09/10/17 2130 09/10/17 2200 09/10/17 2230 09/10/17 2300   BP:       Pulse: 90 87 82 82   Resp:       Temp:       SpO2: 94% 97% 96% 97%   Weight:       Height:        Oxygen Therapy:  Pulse Oximetry: 97 %    Constitutional:  well developed, well nourished, non-toxic, no acute distress  HENMT: Normocephalic, atraumatic, b/l ears normal, nose normal  Eyes:  EOMI, conjunctiva normal, no discharge  Neck: no tracheal deviation, supple  Cardiovascular: normal heart rate, normal rhythm, no murmurs, no rubs or gallops; no cyanosis, clubbing or edema  Lungs: Respiratory effort is normal, normal breath sounds, breath sounds clear to auscultation b/l, no rales, rhonchi or wheezing  Abdomen: soft, non-tender, no guarding or rebound, active BS, no mass  Skin: warm, dry, no erythema, no rash  Neurologic: Alert and oriented, strength 3/5 on the left side and 4/5 on the right side, CN II-XII normal  Psychiatric: No anxiety or depression  Lymph node: No lymphadenopathy appreciated in the neck groin and axillary area.   Extremities: Bilateral lower extremities no pitting edema, bilateral pulses symmetric  #################################################################  Lab Data Review:    Objective   Recent Results (from the past 24 hour(s))   CBC WITH DIFFERENTIAL    Collection Time: 09/10/17  9:55 PM   Result Value Ref Range    WBC 9.4 4.8 - 10.8 K/uL    RBC 3.62 (L) 4.20 - 5.40 M/uL    Hemoglobin 12.1 12.0 - 16.0 g/dL    Hematocrit 35.6 (L) 37.0 - 47.0 %    MCV 98.3 (H) 81.4 - 97.8 fL    MCH 33.4 " (H) 27.0 - 33.0 pg    MCHC 34.0 33.6 - 35.0 g/dL    RDW 46.9 35.9 - 50.0 fL    Platelet Count 211 164 - 446 K/uL    MPV 10.2 9.0 - 12.9 fL    Neutrophils-Polys 52.50 44.00 - 72.00 %    Lymphocytes 31.10 22.00 - 41.00 %    Monocytes 11.00 0.00 - 13.40 %    Eosinophils 3.80 0.00 - 6.90 %    Basophils 1.00 0.00 - 1.80 %    Immature Granulocytes 0.60 0.00 - 0.90 %    Nucleated RBC 0.00 /100 WBC    Neutrophils (Absolute) 4.95 2.00 - 7.15 K/uL    Lymphs (Absolute) 2.93 1.00 - 4.80 K/uL    Monos (Absolute) 1.04 (H) 0.00 - 0.85 K/uL    Eos (Absolute) 0.36 0.00 - 0.51 K/uL    Baso (Absolute) 0.09 0.00 - 0.12 K/uL    Immature Granulocytes (abs) 0.06 0.00 - 0.11 K/uL    NRBC (Absolute) 0.00 K/uL   COMP METABOLIC PANEL    Collection Time: 09/10/17  9:55 PM   Result Value Ref Range    Sodium 135 135 - 145 mmol/L    Potassium 4.1 3.6 - 5.5 mmol/L    Chloride 106 96 - 112 mmol/L    Co2 17 (L) 20 - 33 mmol/L    Anion Gap 12.0 (H) 0.0 - 11.9    Glucose 122 (H) 65 - 99 mg/dL    Bun 37 (H) 8 - 22 mg/dL    Creatinine 1.65 (H) 0.50 - 1.40 mg/dL    Calcium 9.6 8.5 - 10.5 mg/dL    AST(SGOT) 14 12 - 45 U/L    ALT(SGPT) 9 2 - 50 U/L    Alkaline Phosphatase 55 30 - 99 U/L    Total Bilirubin 0.6 0.1 - 1.5 mg/dL    Albumin 4.1 3.2 - 4.9 g/dL    Total Protein 7.2 6.0 - 8.2 g/dL    Globulin 3.1 1.9 - 3.5 g/dL    A-G Ratio 1.3 g/dL   TROPONIN    Collection Time: 09/10/17  9:55 PM   Result Value Ref Range    Troponin I <0.01 0.00 - 0.04 ng/mL   ESTIMATED GFR    Collection Time: 09/10/17  9:55 PM   Result Value Ref Range    GFR If  37 (A) >60 mL/min/1.73 m 2    GFR If Non  31 (A) >60 mL/min/1.73 m 2       (click the triangle to expand results)    Imaging/Procedures Review:    CT-HEAD W/O   Final Result      1.  No acute intracranial findings.      2.  Diffuse atrophy and periventricular white matter changes are consistent with chronic small vessel disease.      3.  Encephalomalacia in the right thalamus              Assessment and Plan:      * Headache- (present on admission)   Assessment & Plan    Chronic  Can not controlled by tylenol currenlty  CT (-)  Need MRI  obs  Neuro check        HTN (hypertension)- (present on admission)   Assessment & Plan    Blood pressure controlled  Continue home medications  When necessary labetalol        History of hemorrhagic stroke with residual hemiparesis (CMS-HCC)- (present on admission)   Assessment & Plan    No new deficit  CT head (-)  Admit to neuro floor for close monitoring        Gout- (present on admission)   Assessment & Plan    Stable  Cont allopurinol        Obesity (BMI 30-39.9)- (present on admission)   Assessment & Plan    Weight reduction medication provided        Controlled type 2 diabetes mellitus with chronic kidney disease (CMS-[facility])- (present on admission)   Assessment & Plan    Continue Lantus  Diabetic diet  Using sliding scale to cover            1. Prophylaxis: SCDs  2. Code: Full code per patient   3. Dispo: she will be admitted to observation for management that is expected to take less than 2 midnights   I have discussed patient admission status with  in the ER.    I spent 73 minutes evaluating Ms. Guzmán, reviewing the chart, vitals, labs and imaging, discussing the case with ED physician, medication reconciliation, placing orders and enacting the plan above.    This dictation was created using voice recognition software. The accuracy of the dictation is limited to the abilities of the software. Although every efforts have been used to decrease the error, I expect there may be some errors of grammar and possibly content.

## 2017-09-11 NOTE — ED NOTES
"Cassandra [place]ond  72 y.o. female  Chief Complaint   Patient presents with   • Headache     Intermittently X 4 days. Pt describes a 10/10 \"heavey pressure\" like pain above pt's left orbit.       Pt to triage via wheelchair for above complaint. 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 Pt denies blurred vision but c/o of associated dizziness.   Pt is alert and oriented, speaking in full sentences, follows commands and responds appropriately to questions. NAD. Resp are even and unlabored.  Pt placed in lobby. Pt educated on triage process. Pt encouraged to alert staff for any changes.  '  "

## 2017-09-11 NOTE — PROGRESS NOTES
Bedside report received 2358. POC discussed with pt and family; Pt declines HA at this time, No overnight events; Pending MRI results;  all questions answered at this time.

## 2017-09-11 NOTE — DISCHARGE INSTRUCTIONS
Discharge Instructions    Discharged to home by car with relative. Discharged via wheelchair, hospital escort: Yes.  Special equipment needed: Not Applicable    Be sure to schedule a follow-up appointment with your primary care doctor or any specialists as instructed.     Discharge Plan:   Activity Level: Discussed  Confirmed Follow up Appointment: Patient to Call and Schedule Appointment  Medication Reconciliation Updated: Yes  Influenza Vaccine Indication: Patient Refuses    I understand that a diet low in cholesterol, fat, and sodium is recommended for good health. Unless I have been given specific instructions below for another diet, I accept this instruction as my diet prescription.   Other diet: Heart Healthy    Special Instructions: None    · Is patient discharged on Warfarin / Coumadin?   No     · Is patient Post Blood Transfusion?  No    Depression / Suicide Risk    As you are discharged from this [facility] facility, it is important to learn how to keep safe from harming yourself.    Recognize the warning signs:  · Abrupt changes in personality, positive or negative- including increase in energy   · Giving away possessions  · Change in eating patterns- significant weight changes-  positive or negative  · Change in sleeping patterns- unable to sleep or sleeping all the time   · Unwillingness or inability to communicate  · Depression  · Unusual sadness, discouragement and loneliness  · Talk of wanting to die  · Neglect of personal appearance   · Rebelliousness- reckless behavior  · Withdrawal from people/activities they love  · Confusion- inability to concentrate     If you or a loved one observes any of these behaviors or has concerns about self-harm, here's what you can do:  · Talk about it- your feelings and reasons for harming yourself  · Remove any means that you might use to hurt yourself (examples: pills, rope, extension cords, firearm)  · Get professional help from the community (Mental Health,  "Substance Abuse, psychological counseling)  · Do not be alone:Call your Safe Contact- someone whom you trust who will be there for you.  · Call your local CRISIS HOTLINE 111-5994 or 849-951-1977  · Call your local Children's Mobile Crisis Response Team [place] (704) 568-8744 or www.PrismaStar  · Call the toll free National Suicide Prevention Hotlines   · National Suicide Prevention Lifeline 459-273-QYQG (9292)  · [place] Hope Line Network 800-SUICIDE (596-7035)    Headaches, Frequently Asked Questions  MIGRAINE HEADACHES  Q: What is migraine? What causes it? How can I treat it?  A: Generally, migraine headaches begin as a dull ache. Then they develop into a constant, throbbing, and pulsating pain. You may experience pain at the temples. You may experience pain at the front or back of one or both sides of the head. The pain is usually accompanied by a combination of:  · Nausea.   · Vomiting.   · Sensitivity to light and noise.   Some people (about 15%) experience an aura (see below) before an attack. The cause of migraine is believed to be chemical reactions in the brain. Treatment for migraine may include over-the-counter or prescription medications. It may also include self-help techniques. These include relaxation training and biofeedback.   Q: What is an aura?  A: About 15% of people with migraine get an \"aura\". This is a sign of neurological symptoms that occur before a migraine headache. You may see wavy or jagged lines, dots, or flashing lights. You might experience tunnel vision or blind spots in one or both eyes. The aura can include visual or auditory hallucinations (something imagined). It may include disruptions in smell (such as strange odors), taste or touch. Other symptoms include:  · Numbness.   · A \"pins and needles\" sensation.   · Difficulty in recalling or speaking the correct word.   These neurological events may last as long as 60 minutes. These symptoms will fade as the headache " "begins.  Q: What is a trigger?  A: Certain physical or environmental factors can lead to or \"trigger\" a migraine. These include:  · Foods.   · Hormonal changes.   · Weather.   · Stress.   It is important to remember that triggers are different for everyone. To help prevent migraine attacks, you need to figure out which triggers affect you. Keep a headache diary. This is a good way to track triggers. The diary will help you talk to your healthcare professional about your condition.  Q: Does weather affect migraines?  A: Bright sunshine, hot, humid conditions, and drastic changes in barometric pressure may lead to, or \"trigger,\" a migraine attack in some people. But studies have shown that weather does not act as a trigger for everyone with migraines.  Q: What is the link between migraine and hormones?  A: Hormones start and regulate many of your body's functions. Hormones keep your body in balance within a constantly changing environment. The levels of hormones in your body are unbalanced at times. Examples are during menstruation, pregnancy, or menopause. That can lead to a migraine attack. In fact, about three quarters of all women with migraine report that their attacks are related to the menstrual cycle.   Q: Is there an increased risk of stroke for migraine sufferers?  A: The likelihood of a migraine attack causing a stroke is very remote. That is not to say that migraine sufferers cannot have a stroke associated with their migraines. In persons under age 40, the most common associated factor for stroke is migraine headache. But over the course of a person's normal life span, the occurrence of migraine headache may actually be associated with a reduced risk of dying from cerebrovascular disease due to stroke.   Q: What are acute medications for migraine?  A: Acute medications are used to treat the pain of the headache after it has started. Examples over-the-counter medications, NSAIDs, ergots, and triptans.   Q: " "What are the triptans?  A: Triptans are the newest class of abortive medications. They are specifically targeted to treat migraine. Triptans are vasoconstrictors. They moderate some chemical reactions in the brain. The triptans work on receptors in your brain. Triptans help to restore the balance of a neurotransmitter called serotonin. Fluctuations in levels of serotonin are thought to be a main cause of migraine.   Q: Are over-the-counter medications for migraine effective?  A: Over-the-counter, or \"OTC,\" medications may be effective in relieving mild to moderate pain and associated symptoms of migraine. But you should see your caregiver before beginning any treatment regimen for migraine.   Q: What are preventive medications for migraine?  A: Preventive medications for migraine are sometimes referred to as \"prophylactic\" treatments. They are used to reduce the frequency, severity, and length of migraine attacks. Examples of preventive medications include antiepileptic medications, antidepressants, beta-blockers, calcium channel blockers, and NSAIDs (nonsteroidal anti-inflammatory drugs).  Q: Why are anticonvulsants used to treat migraine?  A: During the past few years, there has been an increased interest in antiepileptic drugs for the prevention of migraine. They are sometimes referred to as \"anticonvulsants\". Both epilepsy and migraine may be caused by similar reactions in the brain.   Q: Why are antidepressants used to treat migraine?  A: Antidepressants are typically used to treat people with depression. They may reduce migraine frequency by regulating chemical levels, such as serotonin, in the brain.   Q: What alternative therapies are used to treat migraine?  A: The term \"alternative therapies\" is often used to describe treatments considered outside the scope of conventional Western medicine. Examples of alternative therapy include acupuncture, acupressure, and yoga. Another common alternative treatment is " "herbal therapy. Some herbs are believed to relieve headache pain. Always discuss alternative therapies with your caregiver before proceeding. Some herbal products contain arsenic and other toxins.  TENSION HEADACHES  Q: What is a tension-type headache? What causes it? How can I treat it?  A: Tension-type headaches occur randomly. They are often the result of temporary stress, anxiety, fatigue, or anger. Symptoms include soreness in your temples, a tightening band-like sensation around your head (a \"vice-like\" ache). Symptoms can also include a pulling feeling, pressure sensations, and barry head and neck muscles. The headache begins in your forehead, temples, or the back of your head and neck. Treatment for tension-type headache may include over-the-counter or prescription medications. Treatment may also include self-help techniques such as relaxation training and biofeedback.  CLUSTER HEADACHES  Q: What is a cluster headache? What causes it? How can I treat it?  A: Cluster headache gets its name because the attacks come in groups. The pain arrives with little, if any, warning. It is usually on one side of the head. A tearing or bloodshot eye and a runny nose on the same side of the headache may also accompany the pain. Cluster headaches are believed to be caused by chemical reactions in the brain. They have been described as the most severe and intense of any headache type. Treatment for cluster headache includes prescription medication and oxygen.  SINUS HEADACHES  Q: What is a sinus headache? What causes it? How can I treat it?  A: When a cavity in the bones of the face and skull (a sinus) becomes inflamed, the inflammation will cause localized pain. This condition is usually the result of an allergic reaction, a tumor, or an infection. If your headache is caused by a sinus blockage, such as an infection, you will probably have a fever. An x-ray will confirm a sinus blockage. Your caregiver's treatment " "might include antibiotics for the infection, as well as antihistamines or decongestants.   REBOUND HEADACHES  Q: What is a rebound headache? What causes it? How can I treat it?  A: A pattern of taking acute headache medications too often can lead to a condition known as \"rebound headache.\" A pattern of taking too much headache medication includes taking it more than 2 days per week or in excessive amounts. That means more than the label or a caregiver advises. With rebound headaches, your medications not only stop relieving pain, they actually begin to cause headaches. Doctors treat rebound headache by tapering the medication that is being overused. Sometimes your caregiver will gradually substitute a different type of treatment or medication. Stopping may be a challenge. Regularly overusing a medication increases the potential for serious side effects. Consult a caregiver if you regularly use headache medications more than 2 days per week or more than the label advises.  ADDITIONAL QUESTIONS AND ANSWERS  Q: What is biofeedback?  A: Biofeedback is a self-help treatment. Biofeedback uses special equipment to monitor your body's involuntary physical responses. Biofeedback monitors:  · Breathing.   · Pulse.   · Heart rate.   · Temperature.   · Muscle tension.   · Brain activity.   Biofeedback helps you refine and perfect your relaxation exercises. You learn to control the physical responses that are related to stress. Once the technique has been mastered, you do not need the equipment any more.  Q: Are headaches hereditary?  A: Four out of five (80%) of people that suffer report a family history of migraine. Scientists are not sure if this is genetic or a family predisposition. Despite the uncertainty, a child has a 50% chance of having migraine if one parent suffers. The child has a 75% chance if both parents suffer.   Q: Can children get headaches?  A: By the time they reach high school, most young people have " experienced some type of headache. Many safe and effective approaches or medications can prevent a headache from occurring or stop it after it has begun.   Q: What type of doctor should I see to diagnose and treat my headache?  A: Start with your primary caregiver. Discuss his or her experience and approach to headaches. Discuss methods of classification, diagnosis, and treatment. Your caregiver may decide to recommend you to a headache specialist, depending upon your symptoms or other physical conditions. Having diabetes, allergies, etc., may require a more comprehensive and inclusive approach to your headache. The National Headache Foundation will provide, upon request, a list of NHF physician members in your state.  Document Released: 03/09/2005 Document Revised: 03/11/2013 Document Reviewed: 08/17/2009  [employer]® Patient Information ©2013 [employer].      Migraine Headache  A migraine headache is an intense, throbbing pain on one or both sides of your head. A migraine can last for 30 minutes to several hours.  CAUSES   The exact cause of a migraine headache is not always known. However, a migraine may be caused when nerves in the brain become irritated and release chemicals that cause inflammation. This causes pain.  Certain things may also trigger migraines, such as:  · Alcohol.  · Smoking.  · Stress.  · Menstruation.  · Aged cheeses.  · Foods or drinks that contain nitrates, glutamate, aspartame, or tyramine.  · Lack of sleep.  · Chocolate.  · Caffeine.  · Hunger.  · Physical exertion.  · Fatigue.  · Medicines used to treat chest pain (nitroglycerine), birth control pills, estrogen, and some blood pressure medicines.  SIGNS AND SYMPTOMS  · Pain on one or both sides of your head.  · Pulsating or throbbing pain.  · Severe pain that prevents daily activities.  · Pain that is aggravated by any physical activity.  · Nausea, vomiting, or both.  · Dizziness.  · Pain with exposure to bright lights, loud noises, or  activity.  · General sensitivity to bright lights, loud noises, or smells.  Before you get a migraine, you may get warning signs that a migraine is coming (aura). An aura may include:  · Seeing flashing lights.  · Seeing bright spots, halos, or zigzag lines.  · Having tunnel vision or blurred vision.  · Having feelings of numbness or tingling.  · Having trouble talking.  · Having muscle weakness.  DIAGNOSIS   A migraine headache is often diagnosed based on:  · Symptoms.  · Physical exam.  · A CT scan or MRI of your head. These imaging tests cannot diagnose migraines, but they can help rule out other causes of headaches.  TREATMENT  Medicines may be given for pain and nausea. Medicines can also be given to help prevent recurrent migraines.   HOME CARE INSTRUCTIONS  · Only take over-the-counter or prescription medicines for pain or discomfort as directed by your health care provider. The use of long-term narcotics is not recommended.  · Lie down in a dark, quiet room when you have a migraine.  · Keep a journal to find out what may trigger your migraine headaches. For example, write down:  ¨ What you eat and drink.  ¨ How much sleep you get.  ¨ Any change to your diet or medicines.  · Limit alcohol consumption.  · Quit smoking if you smoke.  · Get 7-9 hours of sleep, or as recommended by your health care provider.  · Limit stress.  · Keep lights dim if bright lights bother you and make your migraines worse.  SEEK IMMEDIATE MEDICAL CARE IF:   · Your migraine becomes severe.  · You have a fever.  · You have a stiff neck.  · You have vision loss.  · You have muscular weakness or loss of muscle control.  · You start losing your balance or have trouble walking.  · You feel faint or pass out.  · You have severe symptoms that are different from your first symptoms.  MAKE SURE YOU:   · Understand these instructions.  · Will watch your condition.  · Will get help right away if you are not doing well or get worse.     This  information is not intended to replace advice given to you by your health care provider. Make sure you discuss any questions you have with your health care provider.     Document Released: 12/18/2006 Document Revised: 01/08/2016 Document Reviewed: 08/25/2014  [employer] Interactive Patient Education ©2016 [employer]

## 2017-09-11 NOTE — PROGRESS NOTES
Resting quietly on bed with eyes closed and even, unlabored respirations.  NAD noted at this time.  Family at bedside

## 2017-09-11 NOTE — ED NOTES
Assessment made. Chart up for MD to see. Patient c/o headache ( left side ) x 4 days. Hx of CVA ( left side weakness / paralyze ).

## 2017-09-11 NOTE — THERAPY
"73 y/o female adm for c/o HA, hx of right cva with left hemiplegia. Pt reports that she has been requiring assist form her dtr and son with bed mobility and transfers.  Pt at baseline at this present time. No further acute PT services required at this time.    Physical Therapy Evaluation completed.   Bed Mobility:  Supine to Sit: Maximal Assist  Transfers: Sit to Stand: Unable to Participate  Gait: Level Of Assist: Unable to Participate with No Equipment Needed       Plan of Care: Patient with no further skilled PT needs in the acute care setting at this time  Discharge Recommendations: Equipment: No Equipment Needed. Post-acute therapy Currently anticipate no further skilled therapy needs once patient is discharged from the inpatient setting.    See \"Rehab Therapy-Acute\" Patient Summary Report for complete documentation.     "

## 2017-09-11 NOTE — PROGRESS NOTES
Checked the pt diaper, did suleman care, changed her diaper with urine incontinence, and put Miranda cream on.

## 2017-09-11 NOTE — ED NOTES
Med rec updated and complete.  Allergies reviewed.  All AM doses taken.  No antibiotic use in last 30 days.

## 2017-09-12 ENCOUNTER — PATIENT OUTREACH (OUTPATIENT)
Dept: HEALTH INFORMATION MANAGEMENT | Facility: OTHER | Age: 72
End: 2017-09-12

## 2017-10-04 PROBLEM — R51.9 HEADACHE: Status: RESOLVED | Noted: 2017-09-10 | Resolved: 2017-09-11

## 2018-04-24 PROBLEM — R10.12 INTERMITTENT LEFT UPPER QUADRANT ABDOMINAL PAIN: Status: ACTIVE | Noted: 2018-04-24

## 2018-06-29 PROBLEM — R10.12 INTERMITTENT LEFT UPPER QUADRANT ABDOMINAL PAIN: Status: RESOLVED | Noted: 2018-04-24 | Resolved: 2018-06-29

## 2018-06-29 PROBLEM — N18.9 ACUTE KIDNEY INJURY SUPERIMPOSED ON CHRONIC KIDNEY DISEASE (HCC): Status: ACTIVE | Noted: 2018-06-29

## 2018-06-29 PROBLEM — N30.00 ACUTE CYSTITIS: Status: ACTIVE | Noted: 2018-06-29

## 2018-06-29 PROBLEM — N30.00 ACUTE CYSTITIS: Status: RESOLVED | Noted: 2018-06-29 | Resolved: 2018-06-29

## 2018-06-29 PROBLEM — R47.81 SLURRED SPEECH: Status: ACTIVE | Noted: 2018-06-29

## 2018-06-29 PROBLEM — N17.9 ACUTE KIDNEY INJURY SUPERIMPOSED ON CHRONIC KIDNEY DISEASE (HCC): Status: ACTIVE | Noted: 2018-06-29

## 2018-07-01 PROBLEM — R47.81 SLURRED SPEECH: Status: RESOLVED | Noted: 2018-06-29 | Resolved: 2018-07-01

## 2018-07-10 PROBLEM — Z74.09 IMPAIRED MOBILITY: Status: ACTIVE | Noted: 2018-07-10

## 2018-07-10 PROBLEM — R26.2 DIFFICULTY WALKING: Status: ACTIVE | Noted: 2018-07-10

## 2018-07-23 PROBLEM — I69.322 DYSARTHRIA AS LATE EFFECT OF CEREBROVASCULAR ACCIDENT (CVA): Status: ACTIVE | Noted: 2018-07-23

## 2019-09-18 PROBLEM — R49.0 DYSPHONIA: Status: ACTIVE | Noted: 2019-01-01

## 2019-10-17 PROBLEM — R10.32 LLQ ABDOMINAL PAIN: Status: ACTIVE | Noted: 2019-01-01

## 2019-10-17 PROBLEM — K59.00 CONSTIPATION: Status: ACTIVE | Noted: 2019-01-01

## 2019-11-14 PROBLEM — N18.9 ACUTE KIDNEY INJURY SUPERIMPOSED ON CHRONIC KIDNEY DISEASE (HCC): Status: RESOLVED | Noted: 2018-06-29 | Resolved: 2019-01-01

## 2019-11-14 PROBLEM — N17.9 ACUTE KIDNEY INJURY SUPERIMPOSED ON CHRONIC KIDNEY DISEASE (HCC): Status: RESOLVED | Noted: 2018-06-29 | Resolved: 2019-01-01

## 2020-01-01 ENCOUNTER — APPOINTMENT (OUTPATIENT)
Dept: RADIOLOGY | Facility: MEDICAL CENTER | Age: 75
DRG: 004 | End: 2020-01-01
Attending: INTERNAL MEDICINE
Payer: MEDICARE

## 2020-01-01 ENCOUNTER — APPOINTMENT (OUTPATIENT)
Dept: RADIOLOGY | Facility: MEDICAL CENTER | Age: 75
DRG: 004 | End: 2020-01-01
Attending: HOSPITALIST
Payer: MEDICARE

## 2020-01-01 ENCOUNTER — HOSPITAL ENCOUNTER (OUTPATIENT)
Dept: RADIOLOGY | Facility: MEDICAL CENTER | Age: 75
End: 2020-01-16

## 2020-01-01 ENCOUNTER — APPOINTMENT (OUTPATIENT)
Dept: RADIOLOGY | Facility: MEDICAL CENTER | Age: 75
DRG: 004 | End: 2020-01-01
Attending: NURSE PRACTITIONER
Payer: MEDICARE

## 2020-01-01 ENCOUNTER — ANESTHESIA EVENT (OUTPATIENT)
Dept: SURGERY | Facility: MEDICAL CENTER | Age: 75
DRG: 004 | End: 2020-01-01
Payer: MEDICARE

## 2020-01-01 ENCOUNTER — HOSPITAL ENCOUNTER (OUTPATIENT)
Dept: RADIOLOGY | Facility: MEDICAL CENTER | Age: 75
End: 2020-01-17

## 2020-01-01 ENCOUNTER — HOSPITAL ENCOUNTER (INPATIENT)
Facility: MEDICAL CENTER | Age: 75
LOS: 83 days | DRG: 004 | End: 2020-04-08
Attending: INTERNAL MEDICINE | Admitting: HOSPITALIST
Payer: MEDICARE

## 2020-01-01 ENCOUNTER — HOSPITAL ENCOUNTER (OUTPATIENT)
Dept: RADIOLOGY | Facility: MEDICAL CENTER | Age: 75
End: 2020-02-01
Attending: INTERNAL MEDICINE

## 2020-01-01 ENCOUNTER — APPOINTMENT (OUTPATIENT)
Dept: RADIOLOGY | Facility: MEDICAL CENTER | Age: 75
DRG: 004 | End: 2020-01-01
Attending: PSYCHIATRY & NEUROLOGY
Payer: MEDICARE

## 2020-01-01 ENCOUNTER — HOSPITAL ENCOUNTER (OUTPATIENT)
Facility: MEDICAL CENTER | Age: 75
End: 2020-01-01
Payer: MEDICARE

## 2020-01-01 ENCOUNTER — HOSPITAL ENCOUNTER (OUTPATIENT)
Facility: MEDICAL CENTER | Age: 75
DRG: 004 | End: 2020-01-01
Admitting: INTERNAL MEDICINE
Payer: MEDICARE

## 2020-01-01 ENCOUNTER — APPOINTMENT (OUTPATIENT)
Dept: CARDIOLOGY | Facility: MEDICAL CENTER | Age: 75
DRG: 004 | End: 2020-01-01
Attending: INTERNAL MEDICINE
Payer: MEDICARE

## 2020-01-01 ENCOUNTER — ANESTHESIA (OUTPATIENT)
Dept: SURGERY | Facility: MEDICAL CENTER | Age: 75
DRG: 004 | End: 2020-01-01
Payer: MEDICARE

## 2020-01-01 VITALS
DIASTOLIC BLOOD PRESSURE: 39 MMHG | WEIGHT: 120.15 LBS | TEMPERATURE: 97.7 F | SYSTOLIC BLOOD PRESSURE: 122 MMHG | BODY MASS INDEX: 20.02 KG/M2 | HEIGHT: 65 IN

## 2020-01-01 DIAGNOSIS — Z71.89 GOALS OF CARE, COUNSELING/DISCUSSION: ICD-10-CM

## 2020-01-01 DIAGNOSIS — G40.901 STATUS EPILEPTICUS (HCC): ICD-10-CM

## 2020-01-01 DIAGNOSIS — R57.9 SHOCK (HCC): ICD-10-CM

## 2020-01-01 DIAGNOSIS — Z99.2 END STAGE RENAL FAILURE ON DIALYSIS (HCC): ICD-10-CM

## 2020-01-01 DIAGNOSIS — N18.6 END STAGE RENAL FAILURE ON DIALYSIS (HCC): ICD-10-CM

## 2020-01-01 DIAGNOSIS — J96.01 ACUTE RESPIRATORY FAILURE WITH HYPOXIA (HCC): ICD-10-CM

## 2020-01-01 DIAGNOSIS — E43 SEVERE PROTEIN-CALORIE MALNUTRITION (HCC): ICD-10-CM

## 2020-01-01 LAB
25(OH)D3 SERPL-MCNC: 53 NG/ML (ref 30–100)
ABO GROUP BLD: NORMAL
ACHR BIND AB SER-SCNC: 0 NMOL/L (ref 0–0.4)
ACHR BLOCK AB/ACHR TOTAL SFR SER: 0 % (ref 0–26)
ACTION RANGE TRIGGERED IACRT: NO
ALBUMIN SERPL BCP-MCNC: 2 G/DL (ref 3.2–4.9)
ALBUMIN SERPL BCP-MCNC: 2.1 G/DL (ref 3.2–4.9)
ALBUMIN SERPL BCP-MCNC: 2.2 G/DL (ref 3.2–4.9)
ALBUMIN SERPL BCP-MCNC: 2.4 G/DL (ref 3.2–4.9)
ALBUMIN SERPL BCP-MCNC: 2.5 G/DL (ref 3.2–4.9)
ALBUMIN SERPL BCP-MCNC: 2.6 G/DL (ref 3.2–4.9)
ALBUMIN SERPL BCP-MCNC: 2.7 G/DL (ref 3.2–4.9)
ALBUMIN SERPL BCP-MCNC: 2.9 G/DL (ref 3.2–4.9)
ALBUMIN SERPL BCP-MCNC: 2.9 G/DL (ref 3.2–4.9)
ALBUMIN SERPL BCP-MCNC: 3 G/DL (ref 3.2–4.9)
ALBUMIN SERPL BCP-MCNC: 3.2 G/DL (ref 3.2–4.9)
ALBUMIN SERPL BCP-MCNC: 3.7 G/DL (ref 3.2–4.9)
ALBUMIN/GLOB SERPL: 0.7 G/DL
ALBUMIN/GLOB SERPL: 0.8 G/DL
ALBUMIN/GLOB SERPL: 0.9 G/DL
ALBUMIN/GLOB SERPL: 1 G/DL
ALBUMIN/GLOB SERPL: 1.1 G/DL
ALP SERPL-CCNC: 124 U/L (ref 30–99)
ALP SERPL-CCNC: 136 U/L (ref 30–99)
ALP SERPL-CCNC: 36 U/L (ref 30–99)
ALP SERPL-CCNC: 49 U/L (ref 30–99)
ALP SERPL-CCNC: 54 U/L (ref 30–99)
ALP SERPL-CCNC: 55 U/L (ref 30–99)
ALP SERPL-CCNC: 60 U/L (ref 30–99)
ALP SERPL-CCNC: 62 U/L (ref 30–99)
ALP SERPL-CCNC: 70 U/L (ref 30–99)
ALP SERPL-CCNC: 72 U/L (ref 30–99)
ALP SERPL-CCNC: 77 U/L (ref 30–99)
ALP SERPL-CCNC: 86 U/L (ref 30–99)
ALP SERPL-CCNC: 89 U/L (ref 30–99)
ALP SERPL-CCNC: 93 U/L (ref 30–99)
ALP SERPL-CCNC: 94 U/L (ref 30–99)
ALT SERPL-CCNC: 12 U/L (ref 2–50)
ALT SERPL-CCNC: 14 U/L (ref 2–50)
ALT SERPL-CCNC: 15 U/L (ref 2–50)
ALT SERPL-CCNC: 17 U/L (ref 2–50)
ALT SERPL-CCNC: 21 U/L (ref 2–50)
ALT SERPL-CCNC: 22 U/L (ref 2–50)
ALT SERPL-CCNC: 24 U/L (ref 2–50)
ALT SERPL-CCNC: 25 U/L (ref 2–50)
ALT SERPL-CCNC: 29 U/L (ref 2–50)
ALT SERPL-CCNC: 31 U/L (ref 2–50)
ALT SERPL-CCNC: 33 U/L (ref 2–50)
ALT SERPL-CCNC: 38 U/L (ref 2–50)
ALT SERPL-CCNC: 7 U/L (ref 2–50)
AMMONIA PLAS-SCNC: 34 UMOL/L (ref 11–45)
AMMONIA PLAS-SCNC: 39 UMOL/L (ref 11–45)
ANION GAP SERPL CALC-SCNC: 10 MMOL/L (ref 0–11.9)
ANION GAP SERPL CALC-SCNC: 10 MMOL/L (ref 7–16)
ANION GAP SERPL CALC-SCNC: 11 MMOL/L (ref 0–11.9)
ANION GAP SERPL CALC-SCNC: 12 MMOL/L (ref 0–11.9)
ANION GAP SERPL CALC-SCNC: 12 MMOL/L (ref 0–11.9)
ANION GAP SERPL CALC-SCNC: 13 MMOL/L (ref 7–16)
ANION GAP SERPL CALC-SCNC: 14 MMOL/L (ref 7–16)
ANION GAP SERPL CALC-SCNC: 14 MMOL/L (ref 7–16)
ANION GAP SERPL CALC-SCNC: 3 MMOL/L (ref 0–11.9)
ANION GAP SERPL CALC-SCNC: 3 MMOL/L (ref 0–11.9)
ANION GAP SERPL CALC-SCNC: 4 MMOL/L (ref 0–11.9)
ANION GAP SERPL CALC-SCNC: 4 MMOL/L (ref 0–11.9)
ANION GAP SERPL CALC-SCNC: 5 MMOL/L (ref 0–11.9)
ANION GAP SERPL CALC-SCNC: 6 MMOL/L (ref 0–11.9)
ANION GAP SERPL CALC-SCNC: 7 MMOL/L (ref 0–11.9)
ANION GAP SERPL CALC-SCNC: 8 MMOL/L (ref 0–11.9)
ANION GAP SERPL CALC-SCNC: 9 MMOL/L (ref 0–11.9)
ANISOCYTOSIS BLD QL SMEAR: ABNORMAL
APPEARANCE UR: ABNORMAL
AST SERPL-CCNC: 21 U/L (ref 12–45)
AST SERPL-CCNC: 23 U/L (ref 12–45)
AST SERPL-CCNC: 27 U/L (ref 12–45)
AST SERPL-CCNC: 32 U/L (ref 12–45)
AST SERPL-CCNC: 32 U/L (ref 12–45)
AST SERPL-CCNC: 33 U/L (ref 12–45)
AST SERPL-CCNC: 33 U/L (ref 12–45)
AST SERPL-CCNC: 39 U/L (ref 12–45)
AST SERPL-CCNC: 42 U/L (ref 12–45)
AST SERPL-CCNC: 42 U/L (ref 12–45)
AST SERPL-CCNC: 43 U/L (ref 12–45)
AST SERPL-CCNC: 44 U/L (ref 12–45)
AST SERPL-CCNC: 45 U/L (ref 12–45)
AST SERPL-CCNC: 51 U/L (ref 12–45)
AST SERPL-CCNC: 55 U/L (ref 12–45)
BACTERIA #/AREA URNS HPF: ABNORMAL /HPF
BACTERIA CSF CULT: NORMAL
BACTERIA FLD AEROBE CULT: NORMAL
BACTERIA WND AEROBE CULT: ABNORMAL
BACTERIA WND AEROBE CULT: ABNORMAL
BARCODED ABORH UBTYP: 6200
BARCODED PRD CODE UBPRD: NORMAL
BARCODED UNIT NUM UBUNT: NORMAL
BASE EXCESS BLDA CALC-SCNC: -1 MMOL/L (ref -4–3)
BASE EXCESS BLDA CALC-SCNC: -2 MMOL/L (ref -4–3)
BASE EXCESS BLDA CALC-SCNC: -3 MMOL/L (ref -4–3)
BASE EXCESS BLDA CALC-SCNC: -4 MMOL/L (ref -4–3)
BASE EXCESS BLDA CALC-SCNC: 3 MMOL/L (ref -4–3)
BASE EXCESS BLDA CALC-SCNC: 4 MMOL/L (ref -4–3)
BASE EXCESS BLDA CALC-SCNC: 4 MMOL/L (ref -4–3)
BASOPHILS # BLD AUTO: 0.1 % (ref 0–1.8)
BASOPHILS # BLD AUTO: 0.3 % (ref 0–1.8)
BASOPHILS # BLD AUTO: 0.4 % (ref 0–1.8)
BASOPHILS # BLD AUTO: 0.5 % (ref 0–1.8)
BASOPHILS # BLD AUTO: 0.6 % (ref 0–1.8)
BASOPHILS # BLD AUTO: 0.7 % (ref 0–1.8)
BASOPHILS # BLD AUTO: 0.7 % (ref 0–1.8)
BASOPHILS # BLD AUTO: 0.8 % (ref 0–1.8)
BASOPHILS # BLD AUTO: 0.9 % (ref 0–1.8)
BASOPHILS # BLD AUTO: 1 % (ref 0–1.8)
BASOPHILS # BLD AUTO: 1.8 % (ref 0–1.8)
BASOPHILS # BLD: 0.01 K/UL (ref 0–0.12)
BASOPHILS # BLD: 0.03 K/UL (ref 0–0.12)
BASOPHILS # BLD: 0.04 K/UL (ref 0–0.12)
BASOPHILS # BLD: 0.05 K/UL (ref 0–0.12)
BASOPHILS # BLD: 0.06 K/UL (ref 0–0.12)
BASOPHILS # BLD: 0.07 K/UL (ref 0–0.12)
BASOPHILS # BLD: 0.08 K/UL (ref 0–0.12)
BASOPHILS # BLD: 0.09 K/UL (ref 0–0.12)
BASOPHILS # BLD: 0.09 K/UL (ref 0–0.12)
BASOPHILS # BLD: 0.1 K/UL (ref 0–0.12)
BASOPHILS # BLD: 0.11 K/UL (ref 0–0.12)
BASOPHILS # BLD: 0.12 K/UL (ref 0–0.12)
BASOPHILS # BLD: 0.14 K/UL (ref 0–0.12)
BILIRUB CONJ SERPL-MCNC: 0.2 MG/DL (ref 0.1–0.5)
BILIRUB INDIRECT SERPL-MCNC: 0.2 MG/DL (ref 0–1)
BILIRUB SERPL-MCNC: 0.2 MG/DL (ref 0.1–1.5)
BILIRUB SERPL-MCNC: 0.2 MG/DL (ref 0.1–1.5)
BILIRUB SERPL-MCNC: 0.3 MG/DL (ref 0.1–1.5)
BILIRUB SERPL-MCNC: 0.4 MG/DL (ref 0.1–1.5)
BILIRUB SERPL-MCNC: 0.7 MG/DL (ref 0.1–1.5)
BILIRUB UR QL STRIP.AUTO: NEGATIVE
BLD GP AB SCN SERPL QL: NORMAL
BODY TEMPERATURE: ABNORMAL DEGREES
BUN SERPL-MCNC: 10 MG/DL (ref 8–22)
BUN SERPL-MCNC: 106 MG/DL (ref 8–22)
BUN SERPL-MCNC: 108 MG/DL (ref 8–22)
BUN SERPL-MCNC: 108 MG/DL (ref 8–22)
BUN SERPL-MCNC: 13 MG/DL (ref 8–22)
BUN SERPL-MCNC: 15 MG/DL (ref 8–22)
BUN SERPL-MCNC: 16 MG/DL (ref 8–22)
BUN SERPL-MCNC: 17 MG/DL (ref 8–22)
BUN SERPL-MCNC: 17 MG/DL (ref 8–22)
BUN SERPL-MCNC: 20 MG/DL (ref 8–22)
BUN SERPL-MCNC: 21 MG/DL (ref 8–22)
BUN SERPL-MCNC: 21 MG/DL (ref 8–22)
BUN SERPL-MCNC: 23 MG/DL (ref 8–22)
BUN SERPL-MCNC: 27 MG/DL (ref 8–22)
BUN SERPL-MCNC: 27 MG/DL (ref 8–22)
BUN SERPL-MCNC: 28 MG/DL (ref 8–22)
BUN SERPL-MCNC: 30 MG/DL (ref 8–22)
BUN SERPL-MCNC: 35 MG/DL (ref 8–22)
BUN SERPL-MCNC: 38 MG/DL (ref 8–22)
BUN SERPL-MCNC: 38 MG/DL (ref 8–22)
BUN SERPL-MCNC: 41 MG/DL (ref 8–22)
BUN SERPL-MCNC: 41 MG/DL (ref 8–22)
BUN SERPL-MCNC: 43 MG/DL (ref 8–22)
BUN SERPL-MCNC: 44 MG/DL (ref 8–22)
BUN SERPL-MCNC: 46 MG/DL (ref 8–22)
BUN SERPL-MCNC: 48 MG/DL (ref 8–22)
BUN SERPL-MCNC: 51 MG/DL (ref 8–22)
BUN SERPL-MCNC: 53 MG/DL (ref 8–22)
BUN SERPL-MCNC: 55 MG/DL (ref 8–22)
BUN SERPL-MCNC: 60 MG/DL (ref 8–22)
BUN SERPL-MCNC: 64 MG/DL (ref 8–22)
BUN SERPL-MCNC: 65 MG/DL (ref 8–22)
BUN SERPL-MCNC: 67 MG/DL (ref 8–22)
BUN SERPL-MCNC: 67 MG/DL (ref 8–22)
BUN SERPL-MCNC: 69 MG/DL (ref 8–22)
BUN SERPL-MCNC: 73 MG/DL (ref 8–22)
BUN SERPL-MCNC: 73 MG/DL (ref 8–22)
BUN SERPL-MCNC: 76 MG/DL (ref 8–22)
BUN SERPL-MCNC: 77 MG/DL (ref 8–22)
BUN SERPL-MCNC: 77 MG/DL (ref 8–22)
BUN SERPL-MCNC: 78 MG/DL (ref 8–22)
BUN SERPL-MCNC: 79 MG/DL (ref 8–22)
BUN SERPL-MCNC: 80 MG/DL (ref 8–22)
BUN SERPL-MCNC: 86 MG/DL (ref 8–22)
BUN SERPL-MCNC: 90 MG/DL (ref 8–22)
BUN SERPL-MCNC: 93 MG/DL (ref 8–22)
BUN SERPL-MCNC: 94 MG/DL (ref 8–22)
BUN SERPL-MCNC: 97 MG/DL (ref 8–22)
BURR CELLS BLD QL SMEAR: NORMAL
BURR CELLS/RBC NFR CSF MANUAL: 0 %
C DIFF DNA SPEC QL NAA+PROBE: NEGATIVE
C DIFF TOX GENS STL QL NAA+PROBE: NEGATIVE
C GATTII+NEOFOR DNA CSF QL NAA+NON-PROBE: NOT DETECTED
CA-I SERPL-SCNC: 1 MMOL/L (ref 1.1–1.3)
CA-I SERPL-SCNC: 1.2 MMOL/L (ref 1.1–1.3)
CALCIUM SERPL-MCNC: 7.1 MG/DL (ref 8.5–10.5)
CALCIUM SERPL-MCNC: 7.2 MG/DL (ref 8.5–10.5)
CALCIUM SERPL-MCNC: 7.3 MG/DL (ref 8.5–10.5)
CALCIUM SERPL-MCNC: 7.3 MG/DL (ref 8.5–10.5)
CALCIUM SERPL-MCNC: 7.4 MG/DL (ref 8.5–10.5)
CALCIUM SERPL-MCNC: 7.7 MG/DL (ref 8.5–10.5)
CALCIUM SERPL-MCNC: 8 MG/DL (ref 8.5–10.5)
CALCIUM SERPL-MCNC: 8.1 MG/DL (ref 8.5–10.5)
CALCIUM SERPL-MCNC: 8.3 MG/DL (ref 8.5–10.5)
CALCIUM SERPL-MCNC: 8.4 MG/DL (ref 8.5–10.5)
CALCIUM SERPL-MCNC: 8.5 MG/DL (ref 8.5–10.5)
CALCIUM SERPL-MCNC: 8.6 MG/DL (ref 8.5–10.5)
CALCIUM SERPL-MCNC: 8.7 MG/DL (ref 8.5–10.5)
CALCIUM SERPL-MCNC: 8.7 MG/DL (ref 8.5–10.5)
CALCIUM SERPL-MCNC: 8.8 MG/DL (ref 8.5–10.5)
CALCIUM SERPL-MCNC: 8.8 MG/DL (ref 8.5–10.5)
CALCIUM SERPL-MCNC: 8.9 MG/DL (ref 8.5–10.5)
CALCIUM SERPL-MCNC: 9 MG/DL (ref 8.5–10.5)
CALCIUM SERPL-MCNC: 9.1 MG/DL (ref 8.5–10.5)
CALCIUM SERPL-MCNC: 9.2 MG/DL (ref 8.5–10.5)
CALCIUM SERPL-MCNC: 9.3 MG/DL (ref 8.5–10.5)
CALCIUM SERPL-MCNC: 9.3 MG/DL (ref 8.5–10.5)
CALCIUM SERPL-MCNC: 9.5 MG/DL (ref 8.5–10.5)
CALCIUM SERPL-MCNC: 9.5 MG/DL (ref 8.5–10.5)
CALCIUM SERPL-MCNC: 9.6 MG/DL (ref 8.5–10.5)
CFT BLD TEG: 6.1 MIN (ref 5–10)
CFT BLD TEG: 8.2 MIN (ref 5–10)
CFT P HPASE BLD TEG: 7.2 MIN (ref 5–10)
CHLORIDE SERPL-SCNC: 100 MMOL/L (ref 96–112)
CHLORIDE SERPL-SCNC: 101 MMOL/L (ref 96–112)
CHLORIDE SERPL-SCNC: 102 MMOL/L (ref 96–112)
CHLORIDE SERPL-SCNC: 103 MMOL/L (ref 96–112)
CHLORIDE SERPL-SCNC: 103 MMOL/L (ref 96–112)
CHLORIDE SERPL-SCNC: 104 MMOL/L (ref 96–112)
CHLORIDE SERPL-SCNC: 105 MMOL/L (ref 96–112)
CHLORIDE SERPL-SCNC: 106 MMOL/L (ref 96–112)
CHLORIDE SERPL-SCNC: 90 MMOL/L (ref 96–112)
CHLORIDE SERPL-SCNC: 90 MMOL/L (ref 96–112)
CHLORIDE SERPL-SCNC: 92 MMOL/L (ref 96–112)
CHLORIDE SERPL-SCNC: 92 MMOL/L (ref 96–112)
CHLORIDE SERPL-SCNC: 93 MMOL/L (ref 96–112)
CHLORIDE SERPL-SCNC: 93 MMOL/L (ref 96–112)
CHLORIDE SERPL-SCNC: 94 MMOL/L (ref 96–112)
CHLORIDE SERPL-SCNC: 96 MMOL/L (ref 96–112)
CHLORIDE SERPL-SCNC: 96 MMOL/L (ref 96–112)
CHLORIDE SERPL-SCNC: 97 MMOL/L (ref 96–112)
CHLORIDE SERPL-SCNC: 98 MMOL/L (ref 96–112)
CHLORIDE SERPL-SCNC: 99 MMOL/L (ref 96–112)
CLARITY CSF: ABNORMAL
CLOT ANGLE BLD TEG: 58.4 DEGREES (ref 53–72)
CLOT ANGLE BLD TEG: 72.7 DEGREES (ref 53–72)
CLOT ANGLE P HPASE BLD TEG: 62 DEGREES (ref 53–72)
CLOT INIT P HPASE BLD TEG: 1.8 MIN (ref 1–3)
CLOT LYSIS 30M P MA LENFR BLD TEG: 0 % (ref 0–8)
CLOT LYSIS 30M P MA LENFR BLD TEG: 1.2 % (ref 0–8)
CLOT LYSIS 30M P MA LENFR BLD TEG: 2.1 % (ref 0–8)
CMV DNA CSF QL NAA+NON-PROBE: NOT DETECTED
CO2 BLDA-SCNC: 23 MMOL/L (ref 20–33)
CO2 BLDA-SCNC: 24 MMOL/L (ref 20–33)
CO2 BLDA-SCNC: 28 MMOL/L (ref 20–33)
CO2 BLDA-SCNC: 29 MMOL/L (ref 20–33)
CO2 BLDA-SCNC: 29 MMOL/L (ref 20–33)
CO2 SERPL-SCNC: 20 MMOL/L (ref 20–33)
CO2 SERPL-SCNC: 22 MMOL/L (ref 20–33)
CO2 SERPL-SCNC: 24 MMOL/L (ref 20–33)
CO2 SERPL-SCNC: 24 MMOL/L (ref 20–33)
CO2 SERPL-SCNC: 25 MMOL/L (ref 20–33)
CO2 SERPL-SCNC: 25 MMOL/L (ref 20–33)
CO2 SERPL-SCNC: 26 MMOL/L (ref 20–33)
CO2 SERPL-SCNC: 27 MMOL/L (ref 20–33)
CO2 SERPL-SCNC: 28 MMOL/L (ref 20–33)
CO2 SERPL-SCNC: 29 MMOL/L (ref 20–33)
CO2 SERPL-SCNC: 30 MMOL/L (ref 20–33)
CO2 SERPL-SCNC: 30 MMOL/L (ref 20–33)
CO2 SERPL-SCNC: 31 MMOL/L (ref 20–33)
COLLECT DURATION TIME UR: 24 HR
COLLECT DURATION TIME UR: 24 HR
COLOR CSF: ABNORMAL
COLOR SPUN CSF: COLORLESS
COLOR UR: YELLOW
COMMENT 1642: NORMAL
COMPONENT R 8504R: NORMAL
CREAT CL/1.73 SQ M ?TM UR+SERPL-ARVRAT: 4 ML/MIN (ref 88–128)
CREAT CL/1.73 SQ M ?TM UR+SERPL-ARVRAT: 6 ML/MIN (ref 88–128)
CREAT SERPL-MCNC: 0.69 MG/DL (ref 0.5–1.4)
CREAT SERPL-MCNC: 0.72 MG/DL (ref 0.5–1.4)
CREAT SERPL-MCNC: 0.73 MG/DL (ref 0.5–1.4)
CREAT SERPL-MCNC: 0.76 MG/DL (ref 0.5–1.4)
CREAT SERPL-MCNC: 0.82 MG/DL (ref 0.5–1.4)
CREAT SERPL-MCNC: 0.89 MG/DL (ref 0.5–1.4)
CREAT SERPL-MCNC: 0.92 MG/DL (ref 0.5–1.4)
CREAT SERPL-MCNC: 0.92 MG/DL (ref 0.5–1.4)
CREAT SERPL-MCNC: 0.94 MG/DL (ref 0.5–1.4)
CREAT SERPL-MCNC: 0.97 MG/DL (ref 0.5–1.4)
CREAT SERPL-MCNC: 1.01 MG/DL (ref 0.5–1.4)
CREAT SERPL-MCNC: 1.03 MG/DL (ref 0.5–1.4)
CREAT SERPL-MCNC: 1.04 MG/DL (ref 0.5–1.4)
CREAT SERPL-MCNC: 1.06 MG/DL (ref 0.5–1.4)
CREAT SERPL-MCNC: 1.08 MG/DL (ref 0.5–1.4)
CREAT SERPL-MCNC: 1.08 MG/DL (ref 0.5–1.4)
CREAT SERPL-MCNC: 1.1 MG/DL (ref 0.5–1.4)
CREAT SERPL-MCNC: 1.1 MG/DL (ref 0.5–1.4)
CREAT SERPL-MCNC: 1.11 MG/DL (ref 0.5–1.4)
CREAT SERPL-MCNC: 1.12 MG/DL (ref 0.5–1.4)
CREAT SERPL-MCNC: 1.13 MG/DL (ref 0.5–1.4)
CREAT SERPL-MCNC: 1.15 MG/DL (ref 0.5–1.4)
CREAT SERPL-MCNC: 1.18 MG/DL (ref 0.5–1.4)
CREAT SERPL-MCNC: 1.22 MG/DL (ref 0.5–1.4)
CREAT SERPL-MCNC: 1.24 MG/DL (ref 0.5–1.4)
CREAT SERPL-MCNC: 1.26 MG/DL (ref 0.5–1.4)
CREAT SERPL-MCNC: 1.26 MG/DL (ref 0.5–1.4)
CREAT SERPL-MCNC: 1.27 MG/DL (ref 0.5–1.4)
CREAT SERPL-MCNC: 1.28 MG/DL (ref 0.5–1.4)
CREAT SERPL-MCNC: 1.28 MG/DL (ref 0.5–1.4)
CREAT SERPL-MCNC: 1.29 MG/DL (ref 0.5–1.4)
CREAT SERPL-MCNC: 1.35 MG/DL (ref 0.5–1.4)
CREAT SERPL-MCNC: 1.36 MG/DL (ref 0.5–1.4)
CREAT SERPL-MCNC: 1.37 MG/DL (ref 0.5–1.4)
CREAT SERPL-MCNC: 1.39 MG/DL (ref 0.5–1.4)
CREAT SERPL-MCNC: 1.48 MG/DL (ref 0.5–1.4)
CREAT SERPL-MCNC: 1.55 MG/DL (ref 0.5–1.4)
CREAT SERPL-MCNC: 1.56 MG/DL (ref 0.5–1.4)
CREAT SERPL-MCNC: 1.57 MG/DL (ref 0.5–1.4)
CREAT SERPL-MCNC: 1.58 MG/DL (ref 0.5–1.4)
CREAT SERPL-MCNC: 1.6 MG/DL (ref 0.5–1.4)
CREAT SERPL-MCNC: 1.65 MG/DL (ref 0.5–1.4)
CREAT SERPL-MCNC: 1.67 MG/DL (ref 0.5–1.4)
CREAT SERPL-MCNC: 1.69 MG/DL (ref 0.5–1.4)
CREAT SERPL-MCNC: 1.73 MG/DL (ref 0.5–1.4)
CREAT SERPL-MCNC: 1.77 MG/DL (ref 0.5–1.4)
CREAT SERPL-MCNC: 1.8 MG/DL (ref 0.5–1.4)
CREAT SERPL-MCNC: 1.82 MG/DL (ref 0.5–1.4)
CREAT SERPL-MCNC: 1.84 MG/DL (ref 0.5–1.4)
CREAT SERPL-MCNC: 1.98 MG/DL (ref 0.5–1.4)
CREAT UR-MCNC: 44.4 MG/DL
CREAT UR-MCNC: 48.1 MG/DL
CRP SERPL HS-MCNC: 3.4 MG/DL (ref 0–0.75)
CRP SERPL HS-MCNC: 4.13 MG/DL (ref 0–0.75)
CRP SERPL HS-MCNC: 4.17 MG/DL (ref 0–0.75)
CRP SERPL HS-MCNC: 4.28 MG/DL (ref 0–0.75)
CRP SERPL HS-MCNC: 4.86 MG/DL (ref 0–0.75)
CRP SERPL HS-MCNC: 7.23 MG/DL (ref 0–0.75)
CRP SERPL HS-MCNC: 7.65 MG/DL (ref 0–0.75)
CRP SERPL HS-MCNC: 7.77 MG/DL (ref 0–0.75)
CRP SERPL HS-MCNC: 9.13 MG/DL (ref 0–0.75)
CRP SERPL HS-MCNC: 9.17 MG/DL (ref 0–0.75)
CSF COMMENTS 1658: ABNORMAL
CT.EXTRINSIC BLD ROTEM: 1.2 MIN (ref 1–3)
CT.EXTRINSIC BLD ROTEM: 2.2 MIN (ref 1–3)
CYTOLOGY REG CYTOL: NORMAL
CYTOLOGY REG CYTOL: NORMAL
DACRYOCYTES BLD QL SMEAR: NORMAL
DIAGNOSTIC IMP SPEC-IMP: NOT DETECTED
E COLI K1 DNA CSF QL NAA+NON-PROBE: NOT DETECTED
EBV DNA # SPEC NAA+PROBE: <390 CPY/ML
EBV DNA SPEC NAA+PROBE-LOG#: <2.6 LOG
EKG IMPRESSION: NORMAL
EOSINOPHIL # BLD AUTO: 0 K/UL (ref 0–0.51)
EOSINOPHIL # BLD AUTO: 0.11 K/UL (ref 0–0.51)
EOSINOPHIL # BLD AUTO: 0.13 K/UL (ref 0–0.51)
EOSINOPHIL # BLD AUTO: 0.14 K/UL (ref 0–0.51)
EOSINOPHIL # BLD AUTO: 0.14 K/UL (ref 0–0.51)
EOSINOPHIL # BLD AUTO: 0.15 K/UL (ref 0–0.51)
EOSINOPHIL # BLD AUTO: 0.16 K/UL (ref 0–0.51)
EOSINOPHIL # BLD AUTO: 0.17 K/UL (ref 0–0.51)
EOSINOPHIL # BLD AUTO: 0.18 K/UL (ref 0–0.51)
EOSINOPHIL # BLD AUTO: 0.19 K/UL (ref 0–0.51)
EOSINOPHIL # BLD AUTO: 0.19 K/UL (ref 0–0.51)
EOSINOPHIL # BLD AUTO: 0.2 K/UL (ref 0–0.51)
EOSINOPHIL # BLD AUTO: 0.22 K/UL (ref 0–0.51)
EOSINOPHIL # BLD AUTO: 0.22 K/UL (ref 0–0.51)
EOSINOPHIL # BLD AUTO: 0.23 K/UL (ref 0–0.51)
EOSINOPHIL # BLD AUTO: 0.23 K/UL (ref 0–0.51)
EOSINOPHIL # BLD AUTO: 0.25 K/UL (ref 0–0.51)
EOSINOPHIL # BLD AUTO: 0.25 K/UL (ref 0–0.51)
EOSINOPHIL # BLD AUTO: 0.27 K/UL (ref 0–0.51)
EOSINOPHIL # BLD AUTO: 0.28 K/UL (ref 0–0.51)
EOSINOPHIL # BLD AUTO: 0.29 K/UL (ref 0–0.51)
EOSINOPHIL # BLD AUTO: 0.29 K/UL (ref 0–0.51)
EOSINOPHIL # BLD AUTO: 0.3 K/UL (ref 0–0.51)
EOSINOPHIL # BLD AUTO: 0.3 K/UL (ref 0–0.51)
EOSINOPHIL # BLD AUTO: 0.32 K/UL (ref 0–0.51)
EOSINOPHIL # BLD AUTO: 0.32 K/UL (ref 0–0.51)
EOSINOPHIL # BLD AUTO: 0.33 K/UL (ref 0–0.51)
EOSINOPHIL # BLD AUTO: 0.37 K/UL (ref 0–0.51)
EOSINOPHIL # BLD AUTO: 0.39 K/UL (ref 0–0.51)
EOSINOPHIL # BLD AUTO: 0.4 K/UL (ref 0–0.51)
EOSINOPHIL # BLD AUTO: 0.41 K/UL (ref 0–0.51)
EOSINOPHIL # BLD AUTO: 0.41 K/UL (ref 0–0.51)
EOSINOPHIL # BLD AUTO: 0.44 K/UL (ref 0–0.51)
EOSINOPHIL # BLD AUTO: 0.46 K/UL (ref 0–0.51)
EOSINOPHIL # BLD AUTO: 0.48 K/UL (ref 0–0.51)
EOSINOPHIL # BLD AUTO: 0.5 K/UL (ref 0–0.51)
EOSINOPHIL NFR BLD: 0 % (ref 0–6.9)
EOSINOPHIL NFR BLD: 0.9 % (ref 0–6.9)
EOSINOPHIL NFR BLD: 1.1 % (ref 0–6.9)
EOSINOPHIL NFR BLD: 1.1 % (ref 0–6.9)
EOSINOPHIL NFR BLD: 1.3 % (ref 0–6.9)
EOSINOPHIL NFR BLD: 1.3 % (ref 0–6.9)
EOSINOPHIL NFR BLD: 1.4 % (ref 0–6.9)
EOSINOPHIL NFR BLD: 1.4 % (ref 0–6.9)
EOSINOPHIL NFR BLD: 1.5 % (ref 0–6.9)
EOSINOPHIL NFR BLD: 1.6 % (ref 0–6.9)
EOSINOPHIL NFR BLD: 1.7 % (ref 0–6.9)
EOSINOPHIL NFR BLD: 1.8 % (ref 0–6.9)
EOSINOPHIL NFR BLD: 1.8 % (ref 0–6.9)
EOSINOPHIL NFR BLD: 1.9 % (ref 0–6.9)
EOSINOPHIL NFR BLD: 2 % (ref 0–6.9)
EOSINOPHIL NFR BLD: 2.1 % (ref 0–6.9)
EOSINOPHIL NFR BLD: 2.2 % (ref 0–6.9)
EOSINOPHIL NFR BLD: 2.2 % (ref 0–6.9)
EOSINOPHIL NFR BLD: 2.3 % (ref 0–6.9)
EOSINOPHIL NFR BLD: 2.7 % (ref 0–6.9)
EOSINOPHIL NFR BLD: 2.7 % (ref 0–6.9)
EOSINOPHIL NFR BLD: 2.8 % (ref 0–6.9)
EOSINOPHIL NFR BLD: 2.9 % (ref 0–6.9)
EOSINOPHIL NFR BLD: 3 % (ref 0–6.9)
EOSINOPHIL NFR BLD: 3.2 % (ref 0–6.9)
EOSINOPHIL NFR BLD: 3.6 % (ref 0–6.9)
EOSINOPHIL NFR BLD: 3.8 % (ref 0–6.9)
EOSINOPHIL NFR BLD: 3.8 % (ref 0–6.9)
EPI CELLS #/AREA URNS HPF: ABNORMAL /HPF
ERYTHROCYTE [DISTWIDTH] IN BLOOD BY AUTOMATED COUNT: 57.1 FL (ref 35.9–50)
ERYTHROCYTE [DISTWIDTH] IN BLOOD BY AUTOMATED COUNT: 57.3 FL (ref 35.9–50)
ERYTHROCYTE [DISTWIDTH] IN BLOOD BY AUTOMATED COUNT: 57.7 FL (ref 35.9–50)
ERYTHROCYTE [DISTWIDTH] IN BLOOD BY AUTOMATED COUNT: 58.7 FL (ref 35.9–50)
ERYTHROCYTE [DISTWIDTH] IN BLOOD BY AUTOMATED COUNT: 59.3 FL (ref 35.9–50)
ERYTHROCYTE [DISTWIDTH] IN BLOOD BY AUTOMATED COUNT: 59.4 FL (ref 35.9–50)
ERYTHROCYTE [DISTWIDTH] IN BLOOD BY AUTOMATED COUNT: 59.5 FL (ref 35.9–50)
ERYTHROCYTE [DISTWIDTH] IN BLOOD BY AUTOMATED COUNT: 59.7 FL (ref 35.9–50)
ERYTHROCYTE [DISTWIDTH] IN BLOOD BY AUTOMATED COUNT: 59.7 FL (ref 35.9–50)
ERYTHROCYTE [DISTWIDTH] IN BLOOD BY AUTOMATED COUNT: 59.8 FL (ref 35.9–50)
ERYTHROCYTE [DISTWIDTH] IN BLOOD BY AUTOMATED COUNT: 60.2 FL (ref 35.9–50)
ERYTHROCYTE [DISTWIDTH] IN BLOOD BY AUTOMATED COUNT: 60.3 FL (ref 35.9–50)
ERYTHROCYTE [DISTWIDTH] IN BLOOD BY AUTOMATED COUNT: 60.5 FL (ref 35.9–50)
ERYTHROCYTE [DISTWIDTH] IN BLOOD BY AUTOMATED COUNT: 61 FL (ref 35.9–50)
ERYTHROCYTE [DISTWIDTH] IN BLOOD BY AUTOMATED COUNT: 61.2 FL (ref 35.9–50)
ERYTHROCYTE [DISTWIDTH] IN BLOOD BY AUTOMATED COUNT: 61.3 FL (ref 35.9–50)
ERYTHROCYTE [DISTWIDTH] IN BLOOD BY AUTOMATED COUNT: 61.4 FL (ref 35.9–50)
ERYTHROCYTE [DISTWIDTH] IN BLOOD BY AUTOMATED COUNT: 61.5 FL (ref 35.9–50)
ERYTHROCYTE [DISTWIDTH] IN BLOOD BY AUTOMATED COUNT: 61.9 FL (ref 35.9–50)
ERYTHROCYTE [DISTWIDTH] IN BLOOD BY AUTOMATED COUNT: 62.3 FL (ref 35.9–50)
ERYTHROCYTE [DISTWIDTH] IN BLOOD BY AUTOMATED COUNT: 62.7 FL (ref 35.9–50)
ERYTHROCYTE [DISTWIDTH] IN BLOOD BY AUTOMATED COUNT: 62.8 FL (ref 35.9–50)
ERYTHROCYTE [DISTWIDTH] IN BLOOD BY AUTOMATED COUNT: 63.4 FL (ref 35.9–50)
ERYTHROCYTE [DISTWIDTH] IN BLOOD BY AUTOMATED COUNT: 63.5 FL (ref 35.9–50)
ERYTHROCYTE [DISTWIDTH] IN BLOOD BY AUTOMATED COUNT: 63.5 FL (ref 35.9–50)
ERYTHROCYTE [DISTWIDTH] IN BLOOD BY AUTOMATED COUNT: 64.5 FL (ref 35.9–50)
ERYTHROCYTE [DISTWIDTH] IN BLOOD BY AUTOMATED COUNT: 65.7 FL (ref 35.9–50)
ERYTHROCYTE [DISTWIDTH] IN BLOOD BY AUTOMATED COUNT: 65.7 FL (ref 35.9–50)
ERYTHROCYTE [DISTWIDTH] IN BLOOD BY AUTOMATED COUNT: 65.8 FL (ref 35.9–50)
ERYTHROCYTE [DISTWIDTH] IN BLOOD BY AUTOMATED COUNT: 66.1 FL (ref 35.9–50)
ERYTHROCYTE [DISTWIDTH] IN BLOOD BY AUTOMATED COUNT: 66.1 FL (ref 35.9–50)
ERYTHROCYTE [DISTWIDTH] IN BLOOD BY AUTOMATED COUNT: 66.5 FL (ref 35.9–50)
ERYTHROCYTE [DISTWIDTH] IN BLOOD BY AUTOMATED COUNT: 66.9 FL (ref 35.9–50)
ERYTHROCYTE [DISTWIDTH] IN BLOOD BY AUTOMATED COUNT: 66.9 FL (ref 35.9–50)
ERYTHROCYTE [DISTWIDTH] IN BLOOD BY AUTOMATED COUNT: 67 FL (ref 35.9–50)
ERYTHROCYTE [DISTWIDTH] IN BLOOD BY AUTOMATED COUNT: 67.3 FL (ref 35.9–50)
ERYTHROCYTE [DISTWIDTH] IN BLOOD BY AUTOMATED COUNT: 67.3 FL (ref 35.9–50)
ERYTHROCYTE [DISTWIDTH] IN BLOOD BY AUTOMATED COUNT: 67.8 FL (ref 35.9–50)
ERYTHROCYTE [DISTWIDTH] IN BLOOD BY AUTOMATED COUNT: 68.2 FL (ref 35.9–50)
ERYTHROCYTE [DISTWIDTH] IN BLOOD BY AUTOMATED COUNT: 68.2 FL (ref 35.9–50)
ERYTHROCYTE [DISTWIDTH] IN BLOOD BY AUTOMATED COUNT: 68.3 FL (ref 35.9–50)
ERYTHROCYTE [DISTWIDTH] IN BLOOD BY AUTOMATED COUNT: 68.3 FL (ref 35.9–50)
ERYTHROCYTE [DISTWIDTH] IN BLOOD BY AUTOMATED COUNT: 68.5 FL (ref 35.9–50)
ERYTHROCYTE [DISTWIDTH] IN BLOOD BY AUTOMATED COUNT: 68.6 FL (ref 35.9–50)
ERYTHROCYTE [DISTWIDTH] IN BLOOD BY AUTOMATED COUNT: 69 FL (ref 35.9–50)
ERYTHROCYTE [DISTWIDTH] IN BLOOD BY AUTOMATED COUNT: 69.3 FL (ref 35.9–50)
ERYTHROCYTE [DISTWIDTH] IN BLOOD BY AUTOMATED COUNT: 69.8 FL (ref 35.9–50)
ERYTHROCYTE [DISTWIDTH] IN BLOOD BY AUTOMATED COUNT: 70.2 FL (ref 35.9–50)
ERYTHROCYTE [DISTWIDTH] IN BLOOD BY AUTOMATED COUNT: 73.8 FL (ref 35.9–50)
EV RNA CSF QL NAA+NON-PROBE: NOT DETECTED
FIBRINOGEN PPP-MCNC: 585 MG/DL (ref 215–460)
FUNGUS SPEC CULT: NORMAL
GAMMA INTERFERON BACKGROUND BLD IA-ACNC: 0.07 IU/ML
GLOBULIN SER CALC-MCNC: 2.6 G/DL (ref 1.9–3.5)
GLOBULIN SER CALC-MCNC: 2.9 G/DL (ref 1.9–3.5)
GLOBULIN SER CALC-MCNC: 3 G/DL (ref 1.9–3.5)
GLOBULIN SER CALC-MCNC: 3.1 G/DL (ref 1.9–3.5)
GLOBULIN SER CALC-MCNC: 3.1 G/DL (ref 1.9–3.5)
GLOBULIN SER CALC-MCNC: 3.2 G/DL (ref 1.9–3.5)
GLOBULIN SER CALC-MCNC: 3.3 G/DL (ref 1.9–3.5)
GLOBULIN SER CALC-MCNC: 3.4 G/DL (ref 1.9–3.5)
GLOBULIN SER CALC-MCNC: 3.8 G/DL (ref 1.9–3.5)
GLOBULIN SER CALC-MCNC: 3.8 G/DL (ref 1.9–3.5)
GLUCOSE BLD-MCNC: 100 MG/DL (ref 65–99)
GLUCOSE BLD-MCNC: 100 MG/DL (ref 65–99)
GLUCOSE BLD-MCNC: 102 MG/DL (ref 65–99)
GLUCOSE BLD-MCNC: 103 MG/DL (ref 65–99)
GLUCOSE BLD-MCNC: 107 MG/DL (ref 65–99)
GLUCOSE BLD-MCNC: 108 MG/DL (ref 65–99)
GLUCOSE BLD-MCNC: 108 MG/DL (ref 65–99)
GLUCOSE BLD-MCNC: 110 MG/DL (ref 65–99)
GLUCOSE BLD-MCNC: 111 MG/DL (ref 65–99)
GLUCOSE BLD-MCNC: 113 MG/DL (ref 65–99)
GLUCOSE BLD-MCNC: 115 MG/DL (ref 65–99)
GLUCOSE BLD-MCNC: 118 MG/DL (ref 65–99)
GLUCOSE BLD-MCNC: 118 MG/DL (ref 65–99)
GLUCOSE BLD-MCNC: 119 MG/DL (ref 65–99)
GLUCOSE BLD-MCNC: 119 MG/DL (ref 65–99)
GLUCOSE BLD-MCNC: 121 MG/DL (ref 65–99)
GLUCOSE BLD-MCNC: 122 MG/DL (ref 65–99)
GLUCOSE BLD-MCNC: 123 MG/DL (ref 65–99)
GLUCOSE BLD-MCNC: 125 MG/DL (ref 65–99)
GLUCOSE BLD-MCNC: 127 MG/DL (ref 65–99)
GLUCOSE BLD-MCNC: 127 MG/DL (ref 65–99)
GLUCOSE BLD-MCNC: 129 MG/DL (ref 65–99)
GLUCOSE BLD-MCNC: 130 MG/DL (ref 65–99)
GLUCOSE BLD-MCNC: 133 MG/DL (ref 65–99)
GLUCOSE BLD-MCNC: 134 MG/DL (ref 65–99)
GLUCOSE BLD-MCNC: 136 MG/DL (ref 65–99)
GLUCOSE BLD-MCNC: 137 MG/DL (ref 65–99)
GLUCOSE BLD-MCNC: 137 MG/DL (ref 65–99)
GLUCOSE BLD-MCNC: 139 MG/DL (ref 65–99)
GLUCOSE BLD-MCNC: 140 MG/DL (ref 65–99)
GLUCOSE BLD-MCNC: 140 MG/DL (ref 65–99)
GLUCOSE BLD-MCNC: 143 MG/DL (ref 65–99)
GLUCOSE BLD-MCNC: 144 MG/DL (ref 65–99)
GLUCOSE BLD-MCNC: 144 MG/DL (ref 65–99)
GLUCOSE BLD-MCNC: 146 MG/DL (ref 65–99)
GLUCOSE BLD-MCNC: 146 MG/DL (ref 65–99)
GLUCOSE BLD-MCNC: 148 MG/DL (ref 65–99)
GLUCOSE BLD-MCNC: 148 MG/DL (ref 65–99)
GLUCOSE BLD-MCNC: 149 MG/DL (ref 65–99)
GLUCOSE BLD-MCNC: 149 MG/DL (ref 65–99)
GLUCOSE BLD-MCNC: 150 MG/DL (ref 65–99)
GLUCOSE BLD-MCNC: 152 MG/DL (ref 65–99)
GLUCOSE BLD-MCNC: 153 MG/DL (ref 65–99)
GLUCOSE BLD-MCNC: 154 MG/DL (ref 65–99)
GLUCOSE BLD-MCNC: 155 MG/DL (ref 65–99)
GLUCOSE BLD-MCNC: 156 MG/DL (ref 65–99)
GLUCOSE BLD-MCNC: 156 MG/DL (ref 65–99)
GLUCOSE BLD-MCNC: 157 MG/DL (ref 65–99)
GLUCOSE BLD-MCNC: 158 MG/DL (ref 65–99)
GLUCOSE BLD-MCNC: 158 MG/DL (ref 65–99)
GLUCOSE BLD-MCNC: 159 MG/DL (ref 65–99)
GLUCOSE BLD-MCNC: 162 MG/DL (ref 65–99)
GLUCOSE BLD-MCNC: 163 MG/DL (ref 65–99)
GLUCOSE BLD-MCNC: 164 MG/DL (ref 65–99)
GLUCOSE BLD-MCNC: 165 MG/DL (ref 65–99)
GLUCOSE BLD-MCNC: 166 MG/DL (ref 65–99)
GLUCOSE BLD-MCNC: 168 MG/DL (ref 65–99)
GLUCOSE BLD-MCNC: 169 MG/DL (ref 65–99)
GLUCOSE BLD-MCNC: 169 MG/DL (ref 65–99)
GLUCOSE BLD-MCNC: 170 MG/DL (ref 65–99)
GLUCOSE BLD-MCNC: 171 MG/DL (ref 65–99)
GLUCOSE BLD-MCNC: 171 MG/DL (ref 65–99)
GLUCOSE BLD-MCNC: 172 MG/DL (ref 65–99)
GLUCOSE BLD-MCNC: 173 MG/DL (ref 65–99)
GLUCOSE BLD-MCNC: 174 MG/DL (ref 65–99)
GLUCOSE BLD-MCNC: 175 MG/DL (ref 65–99)
GLUCOSE BLD-MCNC: 175 MG/DL (ref 65–99)
GLUCOSE BLD-MCNC: 176 MG/DL (ref 65–99)
GLUCOSE BLD-MCNC: 176 MG/DL (ref 65–99)
GLUCOSE BLD-MCNC: 177 MG/DL (ref 65–99)
GLUCOSE BLD-MCNC: 177 MG/DL (ref 65–99)
GLUCOSE BLD-MCNC: 180 MG/DL (ref 65–99)
GLUCOSE BLD-MCNC: 181 MG/DL (ref 65–99)
GLUCOSE BLD-MCNC: 182 MG/DL (ref 65–99)
GLUCOSE BLD-MCNC: 182 MG/DL (ref 65–99)
GLUCOSE BLD-MCNC: 184 MG/DL (ref 65–99)
GLUCOSE BLD-MCNC: 184 MG/DL (ref 65–99)
GLUCOSE BLD-MCNC: 185 MG/DL (ref 65–99)
GLUCOSE BLD-MCNC: 186 MG/DL (ref 65–99)
GLUCOSE BLD-MCNC: 187 MG/DL (ref 65–99)
GLUCOSE BLD-MCNC: 189 MG/DL (ref 65–99)
GLUCOSE BLD-MCNC: 191 MG/DL (ref 65–99)
GLUCOSE BLD-MCNC: 191 MG/DL (ref 65–99)
GLUCOSE BLD-MCNC: 192 MG/DL (ref 65–99)
GLUCOSE BLD-MCNC: 192 MG/DL (ref 65–99)
GLUCOSE BLD-MCNC: 193 MG/DL (ref 65–99)
GLUCOSE BLD-MCNC: 194 MG/DL (ref 65–99)
GLUCOSE BLD-MCNC: 195 MG/DL (ref 65–99)
GLUCOSE BLD-MCNC: 196 MG/DL (ref 65–99)
GLUCOSE BLD-MCNC: 196 MG/DL (ref 65–99)
GLUCOSE BLD-MCNC: 197 MG/DL (ref 65–99)
GLUCOSE BLD-MCNC: 200 MG/DL (ref 65–99)
GLUCOSE BLD-MCNC: 201 MG/DL (ref 65–99)
GLUCOSE BLD-MCNC: 202 MG/DL (ref 65–99)
GLUCOSE BLD-MCNC: 202 MG/DL (ref 65–99)
GLUCOSE BLD-MCNC: 205 MG/DL (ref 65–99)
GLUCOSE BLD-MCNC: 207 MG/DL (ref 65–99)
GLUCOSE BLD-MCNC: 212 MG/DL (ref 65–99)
GLUCOSE BLD-MCNC: 213 MG/DL (ref 65–99)
GLUCOSE BLD-MCNC: 216 MG/DL (ref 65–99)
GLUCOSE BLD-MCNC: 220 MG/DL (ref 65–99)
GLUCOSE BLD-MCNC: 221 MG/DL (ref 65–99)
GLUCOSE BLD-MCNC: 221 MG/DL (ref 65–99)
GLUCOSE BLD-MCNC: 230 MG/DL (ref 65–99)
GLUCOSE BLD-MCNC: 233 MG/DL (ref 65–99)
GLUCOSE BLD-MCNC: 234 MG/DL (ref 65–99)
GLUCOSE BLD-MCNC: 53 MG/DL (ref 65–99)
GLUCOSE BLD-MCNC: 58 MG/DL (ref 65–99)
GLUCOSE BLD-MCNC: 63 MG/DL (ref 65–99)
GLUCOSE BLD-MCNC: 90 MG/DL (ref 65–99)
GLUCOSE BLD-MCNC: 94 MG/DL (ref 65–99)
GLUCOSE BLD-MCNC: 95 MG/DL (ref 65–99)
GLUCOSE CSF-MCNC: 73 MG/DL (ref 40–80)
GLUCOSE SERPL-MCNC: 103 MG/DL (ref 65–99)
GLUCOSE SERPL-MCNC: 106 MG/DL (ref 65–99)
GLUCOSE SERPL-MCNC: 108 MG/DL (ref 65–99)
GLUCOSE SERPL-MCNC: 109 MG/DL (ref 65–99)
GLUCOSE SERPL-MCNC: 115 MG/DL (ref 65–99)
GLUCOSE SERPL-MCNC: 116 MG/DL (ref 65–99)
GLUCOSE SERPL-MCNC: 126 MG/DL (ref 65–99)
GLUCOSE SERPL-MCNC: 127 MG/DL (ref 65–99)
GLUCOSE SERPL-MCNC: 129 MG/DL (ref 65–99)
GLUCOSE SERPL-MCNC: 133 MG/DL (ref 65–99)
GLUCOSE SERPL-MCNC: 136 MG/DL (ref 65–99)
GLUCOSE SERPL-MCNC: 137 MG/DL (ref 65–99)
GLUCOSE SERPL-MCNC: 140 MG/DL (ref 65–99)
GLUCOSE SERPL-MCNC: 141 MG/DL (ref 65–99)
GLUCOSE SERPL-MCNC: 144 MG/DL (ref 65–99)
GLUCOSE SERPL-MCNC: 147 MG/DL (ref 65–99)
GLUCOSE SERPL-MCNC: 151 MG/DL (ref 65–99)
GLUCOSE SERPL-MCNC: 152 MG/DL (ref 65–99)
GLUCOSE SERPL-MCNC: 153 MG/DL (ref 65–99)
GLUCOSE SERPL-MCNC: 157 MG/DL (ref 65–99)
GLUCOSE SERPL-MCNC: 159 MG/DL (ref 65–99)
GLUCOSE SERPL-MCNC: 160 MG/DL (ref 65–99)
GLUCOSE SERPL-MCNC: 161 MG/DL (ref 65–99)
GLUCOSE SERPL-MCNC: 162 MG/DL (ref 65–99)
GLUCOSE SERPL-MCNC: 162 MG/DL (ref 65–99)
GLUCOSE SERPL-MCNC: 169 MG/DL (ref 65–99)
GLUCOSE SERPL-MCNC: 171 MG/DL (ref 65–99)
GLUCOSE SERPL-MCNC: 176 MG/DL (ref 65–99)
GLUCOSE SERPL-MCNC: 179 MG/DL (ref 65–99)
GLUCOSE SERPL-MCNC: 180 MG/DL (ref 65–99)
GLUCOSE SERPL-MCNC: 181 MG/DL (ref 65–99)
GLUCOSE SERPL-MCNC: 183 MG/DL (ref 65–99)
GLUCOSE SERPL-MCNC: 184 MG/DL (ref 65–99)
GLUCOSE SERPL-MCNC: 191 MG/DL (ref 65–99)
GLUCOSE SERPL-MCNC: 192 MG/DL (ref 65–99)
GLUCOSE SERPL-MCNC: 197 MG/DL (ref 65–99)
GLUCOSE SERPL-MCNC: 200 MG/DL (ref 65–99)
GLUCOSE SERPL-MCNC: 208 MG/DL (ref 65–99)
GLUCOSE SERPL-MCNC: 211 MG/DL (ref 65–99)
GLUCOSE SERPL-MCNC: 218 MG/DL (ref 65–99)
GLUCOSE SERPL-MCNC: 218 MG/DL (ref 65–99)
GLUCOSE SERPL-MCNC: 224 MG/DL (ref 65–99)
GLUCOSE SERPL-MCNC: 236 MG/DL (ref 65–99)
GLUCOSE SERPL-MCNC: 246 MG/DL (ref 65–99)
GLUCOSE SERPL-MCNC: 78 MG/DL (ref 65–99)
GLUCOSE SERPL-MCNC: 89 MG/DL (ref 65–99)
GLUCOSE SERPL-MCNC: 95 MG/DL (ref 65–99)
GLUCOSE SERPL-MCNC: 99 MG/DL (ref 65–99)
GLUCOSE UR STRIP.AUTO-MCNC: NEGATIVE MG/DL
GP B STREP DNA CSF QL NAA+NON-PROBE: NOT DETECTED
GRAM STN SPEC: ABNORMAL
GRAM STN SPEC: NORMAL
HAEM INFLU DNA CSF QL NAA+NON-PROBE: NOT DETECTED
HAV IGM SERPL QL IA: NEGATIVE
HAV IGM SERPL QL IA: NEGATIVE
HBV CORE IGM SER QL: NEGATIVE
HBV CORE IGM SER QL: NEGATIVE
HBV SURFACE AB SERPL IA-ACNC: 14.02 MIU/ML (ref 0–10)
HBV SURFACE AB SERPL IA-ACNC: 6.5 MIU/ML (ref 0–10)
HBV SURFACE AG SER QL: NEGATIVE
HBV SURFACE AG SER QL: NEGATIVE
HCO3 BLDA-SCNC: 21.6 MMOL/L (ref 17–25)
HCO3 BLDA-SCNC: 22.5 MMOL/L (ref 17–25)
HCO3 BLDA-SCNC: 22.5 MMOL/L (ref 17–25)
HCO3 BLDA-SCNC: 23.3 MMOL/L (ref 17–25)
HCO3 BLDA-SCNC: 26.8 MMOL/L (ref 17–25)
HCO3 BLDA-SCNC: 27.7 MMOL/L (ref 17–25)
HCO3 BLDA-SCNC: 27.7 MMOL/L (ref 17–25)
HCT VFR BLD AUTO: 22 % (ref 37–47)
HCT VFR BLD AUTO: 22.5 % (ref 37–47)
HCT VFR BLD AUTO: 22.6 % (ref 37–47)
HCT VFR BLD AUTO: 23.1 % (ref 37–47)
HCT VFR BLD AUTO: 24 % (ref 37–47)
HCT VFR BLD AUTO: 24.1 % (ref 37–47)
HCT VFR BLD AUTO: 24.7 % (ref 37–47)
HCT VFR BLD AUTO: 24.7 % (ref 37–47)
HCT VFR BLD AUTO: 25 % (ref 37–47)
HCT VFR BLD AUTO: 25.2 % (ref 37–47)
HCT VFR BLD AUTO: 25.7 % (ref 37–47)
HCT VFR BLD AUTO: 25.7 % (ref 37–47)
HCT VFR BLD AUTO: 25.8 % (ref 37–47)
HCT VFR BLD AUTO: 25.8 % (ref 37–47)
HCT VFR BLD AUTO: 26.2 % (ref 37–47)
HCT VFR BLD AUTO: 26.2 % (ref 37–47)
HCT VFR BLD AUTO: 26.3 % (ref 37–47)
HCT VFR BLD AUTO: 26.5 % (ref 37–47)
HCT VFR BLD AUTO: 26.6 % (ref 37–47)
HCT VFR BLD AUTO: 26.8 % (ref 37–47)
HCT VFR BLD AUTO: 26.9 % (ref 37–47)
HCT VFR BLD AUTO: 27 % (ref 37–47)
HCT VFR BLD AUTO: 27.1 % (ref 37–47)
HCT VFR BLD AUTO: 27.5 % (ref 37–47)
HCT VFR BLD AUTO: 27.6 % (ref 37–47)
HCT VFR BLD AUTO: 27.7 % (ref 37–47)
HCT VFR BLD AUTO: 27.8 % (ref 37–47)
HCT VFR BLD AUTO: 27.9 % (ref 37–47)
HCT VFR BLD AUTO: 27.9 % (ref 37–47)
HCT VFR BLD AUTO: 28.1 % (ref 37–47)
HCT VFR BLD AUTO: 28.1 % (ref 37–47)
HCT VFR BLD AUTO: 28.2 % (ref 37–47)
HCT VFR BLD AUTO: 28.3 % (ref 37–47)
HCT VFR BLD AUTO: 28.3 % (ref 37–47)
HCT VFR BLD AUTO: 28.7 % (ref 37–47)
HCT VFR BLD AUTO: 28.9 % (ref 37–47)
HCT VFR BLD AUTO: 28.9 % (ref 37–47)
HCT VFR BLD AUTO: 29 % (ref 37–47)
HCT VFR BLD AUTO: 29.1 % (ref 37–47)
HCT VFR BLD AUTO: 29.1 % (ref 37–47)
HCT VFR BLD AUTO: 29.4 % (ref 37–47)
HCT VFR BLD AUTO: 29.7 % (ref 37–47)
HCT VFR BLD AUTO: 29.8 % (ref 37–47)
HCT VFR BLD AUTO: 29.9 % (ref 37–47)
HCT VFR BLD AUTO: 31.1 % (ref 37–47)
HCT VFR BLD AUTO: 32.1 % (ref 37–47)
HCV AB SER QL: NEGATIVE
HCV AB SER QL: NEGATIVE
HEMOCCULT STL QL: NEGATIVE
HGB BLD-MCNC: 6.8 G/DL (ref 12–16)
HGB BLD-MCNC: 7.1 G/DL (ref 12–16)
HGB BLD-MCNC: 7.2 G/DL (ref 12–16)
HGB BLD-MCNC: 7.3 G/DL (ref 12–16)
HGB BLD-MCNC: 7.5 G/DL (ref 12–16)
HGB BLD-MCNC: 7.6 G/DL (ref 12–16)
HGB BLD-MCNC: 7.8 G/DL (ref 12–16)
HGB BLD-MCNC: 7.9 G/DL (ref 12–16)
HGB BLD-MCNC: 8 G/DL (ref 12–16)
HGB BLD-MCNC: 8.1 G/DL (ref 12–16)
HGB BLD-MCNC: 8.2 G/DL (ref 12–16)
HGB BLD-MCNC: 8.3 G/DL (ref 12–16)
HGB BLD-MCNC: 8.4 G/DL (ref 12–16)
HGB BLD-MCNC: 8.5 G/DL (ref 12–16)
HGB BLD-MCNC: 8.6 G/DL (ref 12–16)
HGB BLD-MCNC: 8.6 G/DL (ref 12–16)
HGB BLD-MCNC: 8.7 G/DL (ref 12–16)
HGB BLD-MCNC: 8.7 G/DL (ref 12–16)
HGB BLD-MCNC: 8.8 G/DL (ref 12–16)
HGB BLD-MCNC: 8.9 G/DL (ref 12–16)
HGB BLD-MCNC: 9 G/DL (ref 12–16)
HGB BLD-MCNC: 9.1 G/DL (ref 12–16)
HGB BLD-MCNC: 9.2 G/DL (ref 12–16)
HGB BLD-MCNC: 9.3 G/DL (ref 12–16)
HGB BLD-MCNC: 9.7 G/DL (ref 12–16)
HGB BLD-MCNC: 9.8 G/DL (ref 12–16)
HHV6 DNA CSF QL NAA+NON-PROBE: NOT DETECTED
HIV 1+2 AB+HIV1 P24 AG SERPL QL IA: NON REACTIVE
HOROWITZ INDEX BLDA+IHG-RTO: 293 MM[HG]
HOROWITZ INDEX BLDA+IHG-RTO: 310 MM[HG]
HOROWITZ INDEX BLDA+IHG-RTO: 333 MM[HG]
HOROWITZ INDEX BLDA+IHG-RTO: 333 MM[HG]
HOROWITZ INDEX BLDA+IHG-RTO: 337 MM[HG]
HOROWITZ INDEX BLDA+IHG-RTO: 343 MM[HG]
HOROWITZ INDEX BLDA+IHG-RTO: 383 MM[HG]
HSV1 DNA CSF QL NAA+NON-PROBE: NOT DETECTED
HSV1 GG IGG CSF-ACNC: 0.52 IV
HSV2 DNA CSF QL NAA+NON-PROBE: NOT DETECTED
HSV2 GG IGG CSF-ACNC: 0.29 IV
HYALINE CASTS #/AREA URNS LPF: ABNORMAL /LPF
HYPOCHROMIA BLD QL SMEAR: ABNORMAL
IMM GRANULOCYTES # BLD AUTO: 0.03 K/UL (ref 0–0.11)
IMM GRANULOCYTES # BLD AUTO: 0.04 K/UL (ref 0–0.11)
IMM GRANULOCYTES # BLD AUTO: 0.05 K/UL (ref 0–0.11)
IMM GRANULOCYTES # BLD AUTO: 0.06 K/UL (ref 0–0.11)
IMM GRANULOCYTES # BLD AUTO: 0.07 K/UL (ref 0–0.11)
IMM GRANULOCYTES # BLD AUTO: 0.1 K/UL (ref 0–0.11)
IMM GRANULOCYTES # BLD AUTO: 0.11 K/UL (ref 0–0.11)
IMM GRANULOCYTES # BLD AUTO: 0.12 K/UL (ref 0–0.11)
IMM GRANULOCYTES # BLD AUTO: 0.12 K/UL (ref 0–0.11)
IMM GRANULOCYTES # BLD AUTO: 0.16 K/UL (ref 0–0.11)
IMM GRANULOCYTES # BLD AUTO: 0.16 K/UL (ref 0–0.11)
IMM GRANULOCYTES # BLD AUTO: 0.17 K/UL (ref 0–0.11)
IMM GRANULOCYTES # BLD AUTO: 0.19 K/UL (ref 0–0.11)
IMM GRANULOCYTES # BLD AUTO: 0.19 K/UL (ref 0–0.11)
IMM GRANULOCYTES # BLD AUTO: 0.2 K/UL (ref 0–0.11)
IMM GRANULOCYTES # BLD AUTO: 0.21 K/UL (ref 0–0.11)
IMM GRANULOCYTES # BLD AUTO: 0.22 K/UL (ref 0–0.11)
IMM GRANULOCYTES # BLD AUTO: 0.23 K/UL (ref 0–0.11)
IMM GRANULOCYTES # BLD AUTO: 0.24 K/UL (ref 0–0.11)
IMM GRANULOCYTES # BLD AUTO: 0.25 K/UL (ref 0–0.11)
IMM GRANULOCYTES # BLD AUTO: 0.25 K/UL (ref 0–0.11)
IMM GRANULOCYTES # BLD AUTO: 0.27 K/UL (ref 0–0.11)
IMM GRANULOCYTES # BLD AUTO: 0.29 K/UL (ref 0–0.11)
IMM GRANULOCYTES # BLD AUTO: 0.31 K/UL (ref 0–0.11)
IMM GRANULOCYTES # BLD AUTO: 0.31 K/UL (ref 0–0.11)
IMM GRANULOCYTES # BLD AUTO: 0.33 K/UL (ref 0–0.11)
IMM GRANULOCYTES # BLD AUTO: 0.33 K/UL (ref 0–0.11)
IMM GRANULOCYTES # BLD AUTO: 0.38 K/UL (ref 0–0.11)
IMM GRANULOCYTES # BLD AUTO: 0.4 K/UL (ref 0–0.11)
IMM GRANULOCYTES NFR BLD AUTO: 0.4 % (ref 0–0.9)
IMM GRANULOCYTES NFR BLD AUTO: 0.4 % (ref 0–0.9)
IMM GRANULOCYTES NFR BLD AUTO: 0.5 % (ref 0–0.9)
IMM GRANULOCYTES NFR BLD AUTO: 0.6 % (ref 0–0.9)
IMM GRANULOCYTES NFR BLD AUTO: 0.6 % (ref 0–0.9)
IMM GRANULOCYTES NFR BLD AUTO: 0.7 % (ref 0–0.9)
IMM GRANULOCYTES NFR BLD AUTO: 0.8 % (ref 0–0.9)
IMM GRANULOCYTES NFR BLD AUTO: 1 % (ref 0–0.9)
IMM GRANULOCYTES NFR BLD AUTO: 1.1 % (ref 0–0.9)
IMM GRANULOCYTES NFR BLD AUTO: 1.2 % (ref 0–0.9)
IMM GRANULOCYTES NFR BLD AUTO: 1.3 % (ref 0–0.9)
IMM GRANULOCYTES NFR BLD AUTO: 1.4 % (ref 0–0.9)
IMM GRANULOCYTES NFR BLD AUTO: 1.5 % (ref 0–0.9)
IMM GRANULOCYTES NFR BLD AUTO: 1.5 % (ref 0–0.9)
IMM GRANULOCYTES NFR BLD AUTO: 1.6 % (ref 0–0.9)
IMM GRANULOCYTES NFR BLD AUTO: 1.6 % (ref 0–0.9)
IMM GRANULOCYTES NFR BLD AUTO: 1.7 % (ref 0–0.9)
IMM GRANULOCYTES NFR BLD AUTO: 2.1 % (ref 0–0.9)
IMM GRANULOCYTES NFR BLD AUTO: 2.2 % (ref 0–0.9)
IMM GRANULOCYTES NFR BLD AUTO: 2.5 % (ref 0–0.9)
IMM GRANULOCYTES NFR BLD AUTO: 2.6 % (ref 0–0.9)
IMM GRANULOCYTES NFR BLD AUTO: 3 % (ref 0–0.9)
IMM GRANULOCYTES NFR BLD AUTO: 3.1 % (ref 0–0.9)
INDIA INK PREP CSF: NORMAL
INR PPP: 1.11 (ref 0.87–1.13)
INR PPP: 1.2 (ref 0.87–1.13)
INST. QUALIFIED PATIENT IIQPT: YES
KETONES UR STRIP.AUTO-MCNC: NEGATIVE MG/DL
L MONOCYTOG DNA CSF QL NAA+NON-PROBE: NOT DETECTED
LDH SERPL L TO P-CCNC: 118 U/L (ref 107–266)
LEUKOCYTE ESTERASE UR QL STRIP.AUTO: ABNORMAL
LIPASE SERPL-CCNC: 24 U/L (ref 11–82)
LV EJECT FRACT  99904: 55
LV EJECT FRACT MOD 2C 99903: 58.41
LV EJECT FRACT MOD 4C 99902: 47.1
LV EJECT FRACT MOD BP 99901: 52.81
LYMPHOCYTES # BLD AUTO: 1.37 K/UL (ref 1–4.8)
LYMPHOCYTES # BLD AUTO: 1.61 K/UL (ref 1–4.8)
LYMPHOCYTES # BLD AUTO: 2.16 K/UL (ref 1–4.8)
LYMPHOCYTES # BLD AUTO: 2.17 K/UL (ref 1–4.8)
LYMPHOCYTES # BLD AUTO: 2.2 K/UL (ref 1–4.8)
LYMPHOCYTES # BLD AUTO: 2.29 K/UL (ref 1–4.8)
LYMPHOCYTES # BLD AUTO: 2.29 K/UL (ref 1–4.8)
LYMPHOCYTES # BLD AUTO: 2.3 K/UL (ref 1–4.8)
LYMPHOCYTES # BLD AUTO: 2.31 K/UL (ref 1–4.8)
LYMPHOCYTES # BLD AUTO: 2.4 K/UL (ref 1–4.8)
LYMPHOCYTES # BLD AUTO: 2.42 K/UL (ref 1–4.8)
LYMPHOCYTES # BLD AUTO: 2.5 K/UL (ref 1–4.8)
LYMPHOCYTES # BLD AUTO: 2.5 K/UL (ref 1–4.8)
LYMPHOCYTES # BLD AUTO: 2.56 K/UL (ref 1–4.8)
LYMPHOCYTES # BLD AUTO: 2.56 K/UL (ref 1–4.8)
LYMPHOCYTES # BLD AUTO: 2.61 K/UL (ref 1–4.8)
LYMPHOCYTES # BLD AUTO: 2.64 K/UL (ref 1–4.8)
LYMPHOCYTES # BLD AUTO: 2.7 K/UL (ref 1–4.8)
LYMPHOCYTES # BLD AUTO: 2.72 K/UL (ref 1–4.8)
LYMPHOCYTES # BLD AUTO: 2.78 K/UL (ref 1–4.8)
LYMPHOCYTES # BLD AUTO: 2.8 K/UL (ref 1–4.8)
LYMPHOCYTES # BLD AUTO: 2.84 K/UL (ref 1–4.8)
LYMPHOCYTES # BLD AUTO: 2.92 K/UL (ref 1–4.8)
LYMPHOCYTES # BLD AUTO: 2.93 K/UL (ref 1–4.8)
LYMPHOCYTES # BLD AUTO: 2.93 K/UL (ref 1–4.8)
LYMPHOCYTES # BLD AUTO: 2.99 K/UL (ref 1–4.8)
LYMPHOCYTES # BLD AUTO: 3.08 K/UL (ref 1–4.8)
LYMPHOCYTES # BLD AUTO: 3.15 K/UL (ref 1–4.8)
LYMPHOCYTES # BLD AUTO: 3.17 K/UL (ref 1–4.8)
LYMPHOCYTES # BLD AUTO: 3.25 K/UL (ref 1–4.8)
LYMPHOCYTES # BLD AUTO: 3.27 K/UL (ref 1–4.8)
LYMPHOCYTES # BLD AUTO: 3.27 K/UL (ref 1–4.8)
LYMPHOCYTES # BLD AUTO: 3.28 K/UL (ref 1–4.8)
LYMPHOCYTES # BLD AUTO: 3.57 K/UL (ref 1–4.8)
LYMPHOCYTES # BLD AUTO: 3.62 K/UL (ref 1–4.8)
LYMPHOCYTES # BLD AUTO: 3.64 K/UL (ref 1–4.8)
LYMPHOCYTES # BLD AUTO: 3.7 K/UL (ref 1–4.8)
LYMPHOCYTES # BLD AUTO: 3.77 K/UL (ref 1–4.8)
LYMPHOCYTES NFR BLD: 13 % (ref 22–41)
LYMPHOCYTES NFR BLD: 13.7 % (ref 22–41)
LYMPHOCYTES NFR BLD: 14.4 % (ref 22–41)
LYMPHOCYTES NFR BLD: 14.8 % (ref 22–41)
LYMPHOCYTES NFR BLD: 14.9 % (ref 22–41)
LYMPHOCYTES NFR BLD: 15.6 % (ref 22–41)
LYMPHOCYTES NFR BLD: 16.1 % (ref 22–41)
LYMPHOCYTES NFR BLD: 17 % (ref 22–41)
LYMPHOCYTES NFR BLD: 17.2 % (ref 22–41)
LYMPHOCYTES NFR BLD: 17.3 % (ref 22–41)
LYMPHOCYTES NFR BLD: 17.6 % (ref 22–41)
LYMPHOCYTES NFR BLD: 18.2 % (ref 22–41)
LYMPHOCYTES NFR BLD: 18.9 % (ref 22–41)
LYMPHOCYTES NFR BLD: 19.5 % (ref 22–41)
LYMPHOCYTES NFR BLD: 19.6 % (ref 22–41)
LYMPHOCYTES NFR BLD: 20.1 % (ref 22–41)
LYMPHOCYTES NFR BLD: 20.7 % (ref 22–41)
LYMPHOCYTES NFR BLD: 21.4 % (ref 22–41)
LYMPHOCYTES NFR BLD: 22 % (ref 22–41)
LYMPHOCYTES NFR BLD: 22.2 % (ref 22–41)
LYMPHOCYTES NFR BLD: 22.7 % (ref 22–41)
LYMPHOCYTES NFR BLD: 23.6 % (ref 22–41)
LYMPHOCYTES NFR BLD: 23.7 % (ref 22–41)
LYMPHOCYTES NFR BLD: 23.8 % (ref 22–41)
LYMPHOCYTES NFR BLD: 24.7 % (ref 22–41)
LYMPHOCYTES NFR BLD: 25.3 % (ref 22–41)
LYMPHOCYTES NFR BLD: 25.6 % (ref 22–41)
LYMPHOCYTES NFR BLD: 25.8 % (ref 22–41)
LYMPHOCYTES NFR BLD: 26.1 % (ref 22–41)
LYMPHOCYTES NFR BLD: 26.5 % (ref 22–41)
LYMPHOCYTES NFR BLD: 26.6 % (ref 22–41)
LYMPHOCYTES NFR BLD: 26.6 % (ref 22–41)
LYMPHOCYTES NFR BLD: 26.9 % (ref 22–41)
LYMPHOCYTES NFR BLD: 28.1 % (ref 22–41)
LYMPHOCYTES NFR BLD: 28.3 % (ref 22–41)
LYMPHOCYTES NFR BLD: 28.7 % (ref 22–41)
LYMPHOCYTES NFR BLD: 30.6 % (ref 22–41)
LYMPHOCYTES NFR BLD: 30.7 % (ref 22–41)
LYMPHOCYTES NFR BLD: 31.1 % (ref 22–41)
LYMPHOCYTES NFR BLD: 34.6 % (ref 22–41)
LYMPHOCYTES NFR CSF: 17 %
M TB IFN-G BLD-IMP: POSITIVE
M TB IFN-G CD4+ BCKGRND COR BLD-ACNC: 6.69 IU/ML
MACROCYTES BLD QL SMEAR: ABNORMAL
MAGNESIUM SERPL-MCNC: 1.6 MG/DL (ref 1.5–2.5)
MAGNESIUM SERPL-MCNC: 1.6 MG/DL (ref 1.5–2.5)
MAGNESIUM SERPL-MCNC: 1.7 MG/DL (ref 1.5–2.5)
MAGNESIUM SERPL-MCNC: 1.8 MG/DL (ref 1.5–2.5)
MAGNESIUM SERPL-MCNC: 1.9 MG/DL (ref 1.5–2.5)
MAGNESIUM SERPL-MCNC: 2.2 MG/DL (ref 1.5–2.5)
MANUAL DIFF BLD: NORMAL
MCF BLD TEG: 56.3 MM (ref 50–70)
MCF BLD TEG: 68.2 MM (ref 50–70)
MCF P HPASE BLD TEG: 59.6 MM (ref 50–70)
MCH RBC QN AUTO: 30.9 PG (ref 27–33)
MCH RBC QN AUTO: 31.2 PG (ref 27–33)
MCH RBC QN AUTO: 31.3 PG (ref 27–33)
MCH RBC QN AUTO: 31.5 PG (ref 27–33)
MCH RBC QN AUTO: 31.6 PG (ref 27–33)
MCH RBC QN AUTO: 31.7 PG (ref 27–33)
MCH RBC QN AUTO: 31.8 PG (ref 27–33)
MCH RBC QN AUTO: 31.9 PG (ref 27–33)
MCH RBC QN AUTO: 31.9 PG (ref 27–33)
MCH RBC QN AUTO: 32.5 PG (ref 27–33)
MCH RBC QN AUTO: 32.5 PG (ref 27–33)
MCH RBC QN AUTO: 32.6 PG (ref 27–33)
MCH RBC QN AUTO: 32.7 PG (ref 27–33)
MCH RBC QN AUTO: 32.7 PG (ref 27–33)
MCH RBC QN AUTO: 32.9 PG (ref 27–33)
MCH RBC QN AUTO: 33.2 PG (ref 27–33)
MCH RBC QN AUTO: 33.3 PG (ref 27–33)
MCH RBC QN AUTO: 33.5 PG (ref 27–33)
MCH RBC QN AUTO: 33.6 PG (ref 27–33)
MCH RBC QN AUTO: 33.6 PG (ref 27–33)
MCH RBC QN AUTO: 33.8 PG (ref 27–33)
MCH RBC QN AUTO: 34 PG (ref 27–33)
MCH RBC QN AUTO: 34.1 PG (ref 27–33)
MCH RBC QN AUTO: 34.1 PG (ref 27–33)
MCH RBC QN AUTO: 34.2 PG (ref 27–33)
MCH RBC QN AUTO: 34.3 PG (ref 27–33)
MCH RBC QN AUTO: 34.3 PG (ref 27–33)
MCH RBC QN AUTO: 34.4 PG (ref 27–33)
MCH RBC QN AUTO: 34.5 PG (ref 27–33)
MCHC RBC AUTO-ENTMCNC: 30.1 G/DL (ref 33.6–35)
MCHC RBC AUTO-ENTMCNC: 30.3 G/DL (ref 33.6–35)
MCHC RBC AUTO-ENTMCNC: 30.5 G/DL (ref 33.6–35)
MCHC RBC AUTO-ENTMCNC: 30.7 G/DL (ref 33.6–35)
MCHC RBC AUTO-ENTMCNC: 30.8 G/DL (ref 33.6–35)
MCHC RBC AUTO-ENTMCNC: 30.9 G/DL (ref 33.6–35)
MCHC RBC AUTO-ENTMCNC: 31.1 G/DL (ref 33.6–35)
MCHC RBC AUTO-ENTMCNC: 31.2 G/DL (ref 33.6–35)
MCHC RBC AUTO-ENTMCNC: 31.3 G/DL (ref 33.6–35)
MCHC RBC AUTO-ENTMCNC: 31.4 G/DL (ref 33.6–35)
MCHC RBC AUTO-ENTMCNC: 31.5 G/DL (ref 33.6–35)
MCHC RBC AUTO-ENTMCNC: 31.5 G/DL (ref 33.6–35)
MCHC RBC AUTO-ENTMCNC: 31.6 G/DL (ref 33.6–35)
MCHC RBC AUTO-ENTMCNC: 31.7 G/DL (ref 33.6–35)
MCHC RBC AUTO-ENTMCNC: 31.7 G/DL (ref 33.6–35)
MCHC RBC AUTO-ENTMCNC: 31.8 G/DL (ref 33.6–35)
MCHC RBC AUTO-ENTMCNC: 31.8 G/DL (ref 33.6–35)
MCHC RBC AUTO-ENTMCNC: 31.9 G/DL (ref 33.6–35)
MCHC RBC AUTO-ENTMCNC: 31.9 G/DL (ref 33.6–35)
MCHC RBC AUTO-ENTMCNC: 32 G/DL (ref 33.6–35)
MCHC RBC AUTO-ENTMCNC: 32 G/DL (ref 33.6–35)
MCHC RBC AUTO-ENTMCNC: 32.1 G/DL (ref 33.6–35)
MCHC RBC AUTO-ENTMCNC: 32.2 G/DL (ref 33.6–35)
MCHC RBC AUTO-ENTMCNC: 32.5 G/DL (ref 33.6–35)
MCHC RBC AUTO-ENTMCNC: 32.5 G/DL (ref 33.6–35)
MCHC RBC AUTO-ENTMCNC: 32.6 G/DL (ref 33.6–35)
MCV RBC AUTO: 100 FL (ref 81.4–97.8)
MCV RBC AUTO: 100.4 FL (ref 81.4–97.8)
MCV RBC AUTO: 100.7 FL (ref 81.4–97.8)
MCV RBC AUTO: 101.2 FL (ref 81.4–97.8)
MCV RBC AUTO: 101.5 FL (ref 81.4–97.8)
MCV RBC AUTO: 101.7 FL (ref 81.4–97.8)
MCV RBC AUTO: 101.9 FL (ref 81.4–97.8)
MCV RBC AUTO: 103.7 FL (ref 81.4–97.8)
MCV RBC AUTO: 103.8 FL (ref 81.4–97.8)
MCV RBC AUTO: 104.1 FL (ref 81.4–97.8)
MCV RBC AUTO: 104.4 FL (ref 81.4–97.8)
MCV RBC AUTO: 105.6 FL (ref 81.4–97.8)
MCV RBC AUTO: 105.9 FL (ref 81.4–97.8)
MCV RBC AUTO: 105.9 FL (ref 81.4–97.8)
MCV RBC AUTO: 106.4 FL (ref 81.4–97.8)
MCV RBC AUTO: 106.5 FL (ref 81.4–97.8)
MCV RBC AUTO: 106.7 FL (ref 81.4–97.8)
MCV RBC AUTO: 106.9 FL (ref 81.4–97.8)
MCV RBC AUTO: 107 FL (ref 81.4–97.8)
MCV RBC AUTO: 107.1 FL (ref 81.4–97.8)
MCV RBC AUTO: 107.3 FL (ref 81.4–97.8)
MCV RBC AUTO: 107.3 FL (ref 81.4–97.8)
MCV RBC AUTO: 107.7 FL (ref 81.4–97.8)
MCV RBC AUTO: 107.8 FL (ref 81.4–97.8)
MCV RBC AUTO: 107.8 FL (ref 81.4–97.8)
MCV RBC AUTO: 107.9 FL (ref 81.4–97.8)
MCV RBC AUTO: 108.1 FL (ref 81.4–97.8)
MCV RBC AUTO: 108.2 FL (ref 81.4–97.8)
MCV RBC AUTO: 108.3 FL (ref 81.4–97.8)
MCV RBC AUTO: 108.3 FL (ref 81.4–97.8)
MCV RBC AUTO: 108.4 FL (ref 81.4–97.8)
MCV RBC AUTO: 108.8 FL (ref 81.4–97.8)
MCV RBC AUTO: 108.9 FL (ref 81.4–97.8)
MCV RBC AUTO: 98.6 FL (ref 81.4–97.8)
MCV RBC AUTO: 99.2 FL (ref 81.4–97.8)
MICRO URNS: ABNORMAL
MICROCYTES BLD QL SMEAR: ABNORMAL
MITOGEN IGNF BCKGRD COR BLD-ACNC: >10 IU/ML
MONOCYTES # BLD AUTO: 0.42 K/UL (ref 0–0.85)
MONOCYTES # BLD AUTO: 0.45 K/UL (ref 0–0.85)
MONOCYTES # BLD AUTO: 0.7 K/UL (ref 0–0.85)
MONOCYTES # BLD AUTO: 0.74 K/UL (ref 0–0.85)
MONOCYTES # BLD AUTO: 0.92 K/UL (ref 0–0.85)
MONOCYTES # BLD AUTO: 0.97 K/UL (ref 0–0.85)
MONOCYTES # BLD AUTO: 1.02 K/UL (ref 0–0.85)
MONOCYTES # BLD AUTO: 1.07 K/UL (ref 0–0.85)
MONOCYTES # BLD AUTO: 1.12 K/UL (ref 0–0.85)
MONOCYTES # BLD AUTO: 1.18 K/UL (ref 0–0.85)
MONOCYTES # BLD AUTO: 1.18 K/UL (ref 0–0.85)
MONOCYTES # BLD AUTO: 1.36 K/UL (ref 0–0.85)
MONOCYTES # BLD AUTO: 1.39 K/UL (ref 0–0.85)
MONOCYTES # BLD AUTO: 1.44 K/UL (ref 0–0.85)
MONOCYTES # BLD AUTO: 1.45 K/UL (ref 0–0.85)
MONOCYTES # BLD AUTO: 1.53 K/UL (ref 0–0.85)
MONOCYTES # BLD AUTO: 1.58 K/UL (ref 0–0.85)
MONOCYTES # BLD AUTO: 1.61 K/UL (ref 0–0.85)
MONOCYTES # BLD AUTO: 1.62 K/UL (ref 0–0.85)
MONOCYTES # BLD AUTO: 1.63 K/UL (ref 0–0.85)
MONOCYTES # BLD AUTO: 1.63 K/UL (ref 0–0.85)
MONOCYTES # BLD AUTO: 1.64 K/UL (ref 0–0.85)
MONOCYTES # BLD AUTO: 1.67 K/UL (ref 0–0.85)
MONOCYTES # BLD AUTO: 1.68 K/UL (ref 0–0.85)
MONOCYTES # BLD AUTO: 1.7 K/UL (ref 0–0.85)
MONOCYTES # BLD AUTO: 1.72 K/UL (ref 0–0.85)
MONOCYTES # BLD AUTO: 1.73 K/UL (ref 0–0.85)
MONOCYTES # BLD AUTO: 1.74 K/UL (ref 0–0.85)
MONOCYTES # BLD AUTO: 1.77 K/UL (ref 0–0.85)
MONOCYTES # BLD AUTO: 1.82 K/UL (ref 0–0.85)
MONOCYTES # BLD AUTO: 1.83 K/UL (ref 0–0.85)
MONOCYTES # BLD AUTO: 1.89 K/UL (ref 0–0.85)
MONOCYTES # BLD AUTO: 1.89 K/UL (ref 0–0.85)
MONOCYTES # BLD AUTO: 1.91 K/UL (ref 0–0.85)
MONOCYTES # BLD AUTO: 2 K/UL (ref 0–0.85)
MONOCYTES # BLD AUTO: 2.06 K/UL (ref 0–0.85)
MONOCYTES # BLD AUTO: 2.1 K/UL (ref 0–0.85)
MONOCYTES # BLD AUTO: 2.13 K/UL (ref 0–0.85)
MONOCYTES # BLD AUTO: 2.29 K/UL (ref 0–0.85)
MONOCYTES # BLD AUTO: 2.31 K/UL (ref 0–0.85)
MONOCYTES NFR BLD AUTO: 10 % (ref 0–13.4)
MONOCYTES NFR BLD AUTO: 10.1 % (ref 0–13.4)
MONOCYTES NFR BLD AUTO: 10.6 % (ref 0–13.4)
MONOCYTES NFR BLD AUTO: 11.1 % (ref 0–13.4)
MONOCYTES NFR BLD AUTO: 11.2 % (ref 0–13.4)
MONOCYTES NFR BLD AUTO: 11.4 % (ref 0–13.4)
MONOCYTES NFR BLD AUTO: 11.5 % (ref 0–13.4)
MONOCYTES NFR BLD AUTO: 11.6 % (ref 0–13.4)
MONOCYTES NFR BLD AUTO: 11.7 % (ref 0–13.4)
MONOCYTES NFR BLD AUTO: 12 % (ref 0–13.4)
MONOCYTES NFR BLD AUTO: 12.1 % (ref 0–13.4)
MONOCYTES NFR BLD AUTO: 12.1 % (ref 0–13.4)
MONOCYTES NFR BLD AUTO: 12.2 % (ref 0–13.4)
MONOCYTES NFR BLD AUTO: 12.3 % (ref 0–13.4)
MONOCYTES NFR BLD AUTO: 12.3 % (ref 0–13.4)
MONOCYTES NFR BLD AUTO: 12.9 % (ref 0–13.4)
MONOCYTES NFR BLD AUTO: 13 % (ref 0–13.4)
MONOCYTES NFR BLD AUTO: 13.2 % (ref 0–13.4)
MONOCYTES NFR BLD AUTO: 13.6 % (ref 0–13.4)
MONOCYTES NFR BLD AUTO: 13.6 % (ref 0–13.4)
MONOCYTES NFR BLD AUTO: 13.8 % (ref 0–13.4)
MONOCYTES NFR BLD AUTO: 13.9 % (ref 0–13.4)
MONOCYTES NFR BLD AUTO: 13.9 % (ref 0–13.4)
MONOCYTES NFR BLD AUTO: 14.4 % (ref 0–13.4)
MONOCYTES NFR BLD AUTO: 14.9 % (ref 0–13.4)
MONOCYTES NFR BLD AUTO: 15 % (ref 0–13.4)
MONOCYTES NFR BLD AUTO: 15.2 % (ref 0–13.4)
MONOCYTES NFR BLD AUTO: 15.3 % (ref 0–13.4)
MONOCYTES NFR BLD AUTO: 15.7 % (ref 0–13.4)
MONOCYTES NFR BLD AUTO: 16.4 % (ref 0–13.4)
MONOCYTES NFR BLD AUTO: 3.5 % (ref 0–13.4)
MONOCYTES NFR BLD AUTO: 5.4 % (ref 0–13.4)
MONOCYTES NFR BLD AUTO: 7 % (ref 0–13.4)
MONOCYTES NFR BLD AUTO: 7.8 % (ref 0–13.4)
MONOCYTES NFR BLD AUTO: 8.2 % (ref 0–13.4)
MONOCYTES NFR BLD AUTO: 9.5 % (ref 0–13.4)
MONOCYTES NFR BLD AUTO: 9.7 % (ref 0–13.4)
MONOCYTES NFR BLD AUTO: 9.7 % (ref 0–13.4)
MONONUC CELLS NFR CSF: 7 %
MORPHOLOGY BLD-IMP: NORMAL
MYCOBACTERIUM SPEC CULT: NORMAL
MYELOCYTES NFR BLD MANUAL: 0.9 %
MYELOCYTES NFR BLD MANUAL: 1.8 %
N MEN DNA CSF QL NAA+NON-PROBE: NOT DETECTED
NEUTROPHILS # BLD AUTO: 10.09 K/UL (ref 2–7.15)
NEUTROPHILS # BLD AUTO: 10.89 K/UL (ref 2–7.15)
NEUTROPHILS # BLD AUTO: 11.13 K/UL (ref 2–7.15)
NEUTROPHILS # BLD AUTO: 11.5 K/UL (ref 2–7.15)
NEUTROPHILS # BLD AUTO: 11.85 K/UL (ref 2–7.15)
NEUTROPHILS # BLD AUTO: 11.98 K/UL (ref 2–7.15)
NEUTROPHILS # BLD AUTO: 12.17 K/UL (ref 2–7.15)
NEUTROPHILS # BLD AUTO: 12.22 K/UL (ref 2–7.15)
NEUTROPHILS # BLD AUTO: 3.88 K/UL (ref 2–7.15)
NEUTROPHILS # BLD AUTO: 4.08 K/UL (ref 2–7.15)
NEUTROPHILS # BLD AUTO: 4.18 K/UL (ref 2–7.15)
NEUTROPHILS # BLD AUTO: 4.82 K/UL (ref 2–7.15)
NEUTROPHILS # BLD AUTO: 5.34 K/UL (ref 2–7.15)
NEUTROPHILS # BLD AUTO: 5.5 K/UL (ref 2–7.15)
NEUTROPHILS # BLD AUTO: 5.63 K/UL (ref 2–7.15)
NEUTROPHILS # BLD AUTO: 6.09 K/UL (ref 2–7.15)
NEUTROPHILS # BLD AUTO: 6.36 K/UL (ref 2–7.15)
NEUTROPHILS # BLD AUTO: 6.63 K/UL (ref 2–7.15)
NEUTROPHILS # BLD AUTO: 6.85 K/UL (ref 2–7.15)
NEUTROPHILS # BLD AUTO: 7.18 K/UL (ref 2–7.15)
NEUTROPHILS # BLD AUTO: 7.24 K/UL (ref 2–7.15)
NEUTROPHILS # BLD AUTO: 7.24 K/UL (ref 2–7.15)
NEUTROPHILS # BLD AUTO: 7.27 K/UL (ref 2–7.15)
NEUTROPHILS # BLD AUTO: 7.33 K/UL (ref 2–7.15)
NEUTROPHILS # BLD AUTO: 7.49 K/UL (ref 2–7.15)
NEUTROPHILS # BLD AUTO: 7.52 K/UL (ref 2–7.15)
NEUTROPHILS # BLD AUTO: 7.55 K/UL (ref 2–7.15)
NEUTROPHILS # BLD AUTO: 7.62 K/UL (ref 2–7.15)
NEUTROPHILS # BLD AUTO: 7.95 K/UL (ref 2–7.15)
NEUTROPHILS # BLD AUTO: 8.02 K/UL (ref 2–7.15)
NEUTROPHILS # BLD AUTO: 8.13 K/UL (ref 2–7.15)
NEUTROPHILS # BLD AUTO: 8.25 K/UL (ref 2–7.15)
NEUTROPHILS # BLD AUTO: 8.29 K/UL (ref 2–7.15)
NEUTROPHILS # BLD AUTO: 8.29 K/UL (ref 2–7.15)
NEUTROPHILS # BLD AUTO: 8.31 K/UL (ref 2–7.15)
NEUTROPHILS # BLD AUTO: 8.37 K/UL (ref 2–7.15)
NEUTROPHILS # BLD AUTO: 8.55 K/UL (ref 2–7.15)
NEUTROPHILS # BLD AUTO: 8.63 K/UL (ref 2–7.15)
NEUTROPHILS # BLD AUTO: 8.98 K/UL (ref 2–7.15)
NEUTROPHILS # BLD AUTO: 9.39 K/UL (ref 2–7.15)
NEUTROPHILS NFR BLD: 49.4 % (ref 44–72)
NEUTROPHILS NFR BLD: 50.5 % (ref 44–72)
NEUTROPHILS NFR BLD: 51.6 % (ref 44–72)
NEUTROPHILS NFR BLD: 51.7 % (ref 44–72)
NEUTROPHILS NFR BLD: 53.7 % (ref 44–72)
NEUTROPHILS NFR BLD: 54 % (ref 44–72)
NEUTROPHILS NFR BLD: 54.2 % (ref 44–72)
NEUTROPHILS NFR BLD: 54.3 % (ref 44–72)
NEUTROPHILS NFR BLD: 56.1 % (ref 44–72)
NEUTROPHILS NFR BLD: 56.4 % (ref 44–72)
NEUTROPHILS NFR BLD: 56.8 % (ref 44–72)
NEUTROPHILS NFR BLD: 56.8 % (ref 44–72)
NEUTROPHILS NFR BLD: 57.1 % (ref 44–72)
NEUTROPHILS NFR BLD: 57.1 % (ref 44–72)
NEUTROPHILS NFR BLD: 57.5 % (ref 44–72)
NEUTROPHILS NFR BLD: 58.1 % (ref 44–72)
NEUTROPHILS NFR BLD: 59.4 % (ref 44–72)
NEUTROPHILS NFR BLD: 59.7 % (ref 44–72)
NEUTROPHILS NFR BLD: 60 % (ref 44–72)
NEUTROPHILS NFR BLD: 61.1 % (ref 44–72)
NEUTROPHILS NFR BLD: 62.4 % (ref 44–72)
NEUTROPHILS NFR BLD: 62.8 % (ref 44–72)
NEUTROPHILS NFR BLD: 62.9 % (ref 44–72)
NEUTROPHILS NFR BLD: 63.4 % (ref 44–72)
NEUTROPHILS NFR BLD: 63.5 % (ref 44–72)
NEUTROPHILS NFR BLD: 64.3 % (ref 44–72)
NEUTROPHILS NFR BLD: 64.4 % (ref 44–72)
NEUTROPHILS NFR BLD: 65.4 % (ref 44–72)
NEUTROPHILS NFR BLD: 65.9 % (ref 44–72)
NEUTROPHILS NFR BLD: 67.8 % (ref 44–72)
NEUTROPHILS NFR BLD: 67.8 % (ref 44–72)
NEUTROPHILS NFR BLD: 68.5 % (ref 44–72)
NEUTROPHILS NFR BLD: 69.4 % (ref 44–72)
NEUTROPHILS NFR BLD: 69.9 % (ref 44–72)
NEUTROPHILS NFR BLD: 70.3 % (ref 44–72)
NEUTROPHILS NFR BLD: 70.7 % (ref 44–72)
NEUTROPHILS NFR BLD: 71.4 % (ref 44–72)
NEUTROPHILS NFR BLD: 71.5 % (ref 44–72)
NEUTROPHILS NFR BLD: 72.2 % (ref 44–72)
NEUTROPHILS NFR BLD: 77.4 % (ref 44–72)
NEUTROPHILS NFR CSF: 76 %
NITRITE UR QL STRIP.AUTO: NEGATIVE
NRBC # BLD AUTO: 0 K/UL
NRBC # BLD AUTO: 0.02 K/UL
NRBC BLD-RTO: 0 /100 WBC
NRBC BLD-RTO: 0.2 /100 WBC
O2/TOTAL GAS SETTING VFR VENT: 30 %
OVALOCYTES BLD QL SMEAR: NORMAL
PA AA BLD-ACNC: 0 %
PA AA BLD-ACNC: 7.2 %
PA ADP BLD-ACNC: 0 %
PA ADP BLD-ACNC: 1.6 %
PARECHOVIRUS A RNA CSF QL NAA+NON-PROBE: NOT DETECTED
PCO2 BLDA: 35 MMHG (ref 26–37)
PCO2 BLDA: 36.4 MMHG (ref 26–37)
PCO2 BLDA: 36.9 MMHG (ref 26–37)
PCO2 BLDA: 37.8 MMHG (ref 26–37)
PCO2 BLDA: 38.7 MMHG (ref 26–37)
PCO2 BLDA: 39.4 MMHG (ref 26–37)
PCO2 BLDA: 39.8 MMHG (ref 26–37)
PCO2 TEMP ADJ BLDA: 36.7 MMHG (ref 26–37)
PCO2 TEMP ADJ BLDA: 37.1 MMHG (ref 26–37)
PCO2 TEMP ADJ BLDA: 37.3 MMHG (ref 26–37)
PCO2 TEMP ADJ BLDA: 38.3 MMHG (ref 26–37)
PCO2 TEMP ADJ BLDA: 39.6 MMHG (ref 26–37)
PH BLDA: 7.35 [PH] (ref 7.4–7.5)
PH BLDA: 7.36 [PH] (ref 7.4–7.5)
PH BLDA: 7.41 [PH] (ref 7.4–7.5)
PH BLDA: 7.42 [PH] (ref 7.4–7.5)
PH BLDA: 7.45 [PH] (ref 7.4–7.5)
PH BLDA: 7.47 [PH] (ref 7.4–7.5)
PH BLDA: 7.48 [PH] (ref 7.4–7.5)
PH TEMP ADJ BLDA: 7.36 [PH] (ref 7.4–7.5)
PH TEMP ADJ BLDA: 7.37 [PH] (ref 7.4–7.5)
PH TEMP ADJ BLDA: 7.45 [PH] (ref 7.4–7.5)
PH TEMP ADJ BLDA: 7.48 [PH] (ref 7.4–7.5)
PH TEMP ADJ BLDA: 7.49 [PH] (ref 7.4–7.5)
PH UR STRIP.AUTO: 8.5 [PH] (ref 5–8)
PHENYTOIN SERPL-MCNC: 6.9 UG/ML (ref 10–20)
PHOSPHATE SERPL-MCNC: 1.2 MG/DL (ref 2.5–4.5)
PHOSPHATE SERPL-MCNC: 1.5 MG/DL (ref 2.5–4.5)
PHOSPHATE SERPL-MCNC: 1.7 MG/DL (ref 2.5–4.5)
PHOSPHATE SERPL-MCNC: 1.9 MG/DL (ref 2.5–4.5)
PHOSPHATE SERPL-MCNC: 2.2 MG/DL (ref 2.5–4.5)
PHOSPHATE SERPL-MCNC: 2.3 MG/DL (ref 2.5–4.5)
PHOSPHATE SERPL-MCNC: 2.4 MG/DL (ref 2.5–4.5)
PHOSPHATE SERPL-MCNC: 2.5 MG/DL (ref 2.5–4.5)
PHOSPHATE SERPL-MCNC: 2.5 MG/DL (ref 2.5–4.5)
PHOSPHATE SERPL-MCNC: 2.8 MG/DL (ref 2.5–4.5)
PHOSPHATE SERPL-MCNC: 2.9 MG/DL (ref 2.5–4.5)
PHOSPHATE SERPL-MCNC: 3 MG/DL (ref 2.5–4.5)
PHOSPHATE SERPL-MCNC: 3.1 MG/DL (ref 2.5–4.5)
PHOSPHATE SERPL-MCNC: 3.2 MG/DL (ref 2.5–4.5)
PHOSPHATE SERPL-MCNC: 3.6 MG/DL (ref 2.5–4.5)
PHOSPHATE SERPL-MCNC: 3.7 MG/DL (ref 2.5–4.5)
PHOSPHATE SERPL-MCNC: 3.8 MG/DL (ref 2.5–4.5)
PHOSPHATE SERPL-MCNC: 3.8 MG/DL (ref 2.5–4.5)
PHOSPHATE SERPL-MCNC: 3.9 MG/DL (ref 2.5–4.5)
PHOSPHATE SERPL-MCNC: 4 MG/DL (ref 2.5–4.5)
PHOSPHATE SERPL-MCNC: 4.3 MG/DL (ref 2.5–4.5)
PHOSPHATE SERPL-MCNC: 4.5 MG/DL (ref 2.5–4.5)
PLATELET # BLD AUTO: 109 K/UL (ref 164–446)
PLATELET # BLD AUTO: 119 K/UL (ref 164–446)
PLATELET # BLD AUTO: 127 K/UL (ref 164–446)
PLATELET # BLD AUTO: 130 K/UL (ref 164–446)
PLATELET # BLD AUTO: 136 K/UL (ref 164–446)
PLATELET # BLD AUTO: 137 K/UL (ref 164–446)
PLATELET # BLD AUTO: 139 K/UL (ref 164–446)
PLATELET # BLD AUTO: 143 K/UL (ref 164–446)
PLATELET # BLD AUTO: 144 K/UL (ref 164–446)
PLATELET # BLD AUTO: 145 K/UL (ref 164–446)
PLATELET # BLD AUTO: 145 K/UL (ref 164–446)
PLATELET # BLD AUTO: 148 K/UL (ref 164–446)
PLATELET # BLD AUTO: 150 K/UL (ref 164–446)
PLATELET # BLD AUTO: 154 K/UL (ref 164–446)
PLATELET # BLD AUTO: 157 K/UL (ref 164–446)
PLATELET # BLD AUTO: 158 K/UL (ref 164–446)
PLATELET # BLD AUTO: 160 K/UL (ref 164–446)
PLATELET # BLD AUTO: 164 K/UL (ref 164–446)
PLATELET # BLD AUTO: 165 K/UL (ref 164–446)
PLATELET # BLD AUTO: 167 K/UL (ref 164–446)
PLATELET # BLD AUTO: 177 K/UL (ref 164–446)
PLATELET # BLD AUTO: 178 K/UL (ref 164–446)
PLATELET # BLD AUTO: 187 K/UL (ref 164–446)
PLATELET # BLD AUTO: 188 K/UL (ref 164–446)
PLATELET # BLD AUTO: 193 K/UL (ref 164–446)
PLATELET # BLD AUTO: 193 K/UL (ref 164–446)
PLATELET # BLD AUTO: 195 K/UL (ref 164–446)
PLATELET # BLD AUTO: 197 K/UL (ref 164–446)
PLATELET # BLD AUTO: 198 K/UL (ref 164–446)
PLATELET # BLD AUTO: 199 K/UL (ref 164–446)
PLATELET # BLD AUTO: 202 K/UL (ref 164–446)
PLATELET # BLD AUTO: 203 K/UL (ref 164–446)
PLATELET # BLD AUTO: 208 K/UL (ref 164–446)
PLATELET # BLD AUTO: 210 K/UL (ref 164–446)
PLATELET # BLD AUTO: 217 K/UL (ref 164–446)
PLATELET # BLD AUTO: 217 K/UL (ref 164–446)
PLATELET # BLD AUTO: 225 K/UL (ref 164–446)
PLATELET # BLD AUTO: 232 K/UL (ref 164–446)
PLATELET # BLD AUTO: 233 K/UL (ref 164–446)
PLATELET # BLD AUTO: 238 K/UL (ref 164–446)
PLATELET # BLD AUTO: 252 K/UL (ref 164–446)
PLATELET # BLD AUTO: 265 K/UL (ref 164–446)
PLATELET # BLD AUTO: 277 K/UL (ref 164–446)
PLATELET # BLD AUTO: 287 K/UL (ref 164–446)
PLATELET # BLD AUTO: 293 K/UL (ref 164–446)
PLATELET # BLD AUTO: 296 K/UL (ref 164–446)
PLATELET # BLD AUTO: 315 K/UL (ref 164–446)
PLATELET BLD QL SMEAR: NORMAL
PMV BLD AUTO: 10 FL (ref 9–12.9)
PMV BLD AUTO: 10.1 FL (ref 9–12.9)
PMV BLD AUTO: 10.6 FL (ref 9–12.9)
PMV BLD AUTO: 10.8 FL (ref 9–12.9)
PMV BLD AUTO: 8.9 FL (ref 9–12.9)
PMV BLD AUTO: 9 FL (ref 9–12.9)
PMV BLD AUTO: 9.1 FL (ref 9–12.9)
PMV BLD AUTO: 9.1 FL (ref 9–12.9)
PMV BLD AUTO: 9.2 FL (ref 9–12.9)
PMV BLD AUTO: 9.3 FL (ref 9–12.9)
PMV BLD AUTO: 9.4 FL (ref 9–12.9)
PMV BLD AUTO: 9.4 FL (ref 9–12.9)
PMV BLD AUTO: 9.5 FL (ref 9–12.9)
PMV BLD AUTO: 9.6 FL (ref 9–12.9)
PMV BLD AUTO: 9.7 FL (ref 9–12.9)
PMV BLD AUTO: 9.8 FL (ref 9–12.9)
PMV BLD AUTO: 9.9 FL (ref 9–12.9)
PO2 BLDA: 100 MMHG (ref 64–87)
PO2 BLDA: 100 MMHG (ref 64–87)
PO2 BLDA: 101 MMHG (ref 64–87)
PO2 BLDA: 103 MMHG (ref 64–87)
PO2 BLDA: 115 MMHG (ref 64–87)
PO2 BLDA: 88 MMHG (ref 64–87)
PO2 BLDA: 93 MMHG (ref 64–87)
PO2 TEMP ADJ BLDA: 100 MMHG (ref 64–87)
PO2 TEMP ADJ BLDA: 87 MMHG (ref 64–87)
PO2 TEMP ADJ BLDA: 96 MMHG (ref 64–87)
PO2 TEMP ADJ BLDA: 98 MMHG (ref 64–87)
PO2 TEMP ADJ BLDA: 99 MMHG (ref 64–87)
POIKILOCYTOSIS BLD QL SMEAR: NORMAL
POLYCHROMASIA BLD QL SMEAR: NORMAL
POTASSIUM SERPL-SCNC: 3.2 MMOL/L (ref 3.6–5.5)
POTASSIUM SERPL-SCNC: 3.3 MMOL/L (ref 3.6–5.5)
POTASSIUM SERPL-SCNC: 3.5 MMOL/L (ref 3.6–5.5)
POTASSIUM SERPL-SCNC: 3.6 MMOL/L (ref 3.6–5.5)
POTASSIUM SERPL-SCNC: 3.6 MMOL/L (ref 3.6–5.5)
POTASSIUM SERPL-SCNC: 3.7 MMOL/L (ref 3.6–5.5)
POTASSIUM SERPL-SCNC: 3.8 MMOL/L (ref 3.6–5.5)
POTASSIUM SERPL-SCNC: 3.9 MMOL/L (ref 3.6–5.5)
POTASSIUM SERPL-SCNC: 4 MMOL/L (ref 3.6–5.5)
POTASSIUM SERPL-SCNC: 4 MMOL/L (ref 3.6–5.5)
POTASSIUM SERPL-SCNC: 4.1 MMOL/L (ref 3.6–5.5)
POTASSIUM SERPL-SCNC: 4.2 MMOL/L (ref 3.6–5.5)
POTASSIUM SERPL-SCNC: 4.3 MMOL/L (ref 3.6–5.5)
POTASSIUM SERPL-SCNC: 4.3 MMOL/L (ref 3.6–5.5)
POTASSIUM SERPL-SCNC: 4.4 MMOL/L (ref 3.6–5.5)
POTASSIUM SERPL-SCNC: 4.5 MMOL/L (ref 3.6–5.5)
POTASSIUM SERPL-SCNC: 4.5 MMOL/L (ref 3.6–5.5)
POTASSIUM SERPL-SCNC: 4.6 MMOL/L (ref 3.6–5.5)
POTASSIUM SERPL-SCNC: 4.6 MMOL/L (ref 3.6–5.5)
POTASSIUM SERPL-SCNC: 4.7 MMOL/L (ref 3.6–5.5)
POTASSIUM SERPL-SCNC: 4.8 MMOL/L (ref 3.6–5.5)
POTASSIUM SERPL-SCNC: 5.1 MMOL/L (ref 3.6–5.5)
PREALB SERPL-MCNC: 11 MG/DL (ref 18–38)
PREALB SERPL-MCNC: 13 MG/DL (ref 18–38)
PREALB SERPL-MCNC: 14 MG/DL (ref 18–38)
PREALB SERPL-MCNC: 15 MG/DL (ref 18–38)
PREALB SERPL-MCNC: 15 MG/DL (ref 18–38)
PREALB SERPL-MCNC: 16 MG/DL (ref 18–38)
PREALB SERPL-MCNC: 17 MG/DL (ref 18–38)
PREALB SERPL-MCNC: 18.7 MG/DL (ref 18–38)
PREALB SERPL-MCNC: 19.2 MG/DL (ref 18–38)
PREALB SERPL-MCNC: 20.2 MG/DL (ref 18–38)
PREALB SERPL-MCNC: 8 MG/DL (ref 18–38)
PROCALCITONIN SERPL-MCNC: 0.35 NG/ML
PRODUCT TYPE UPROD: NORMAL
PROT CSF-MCNC: 107 MG/DL (ref 15–45)
PROT SERPL-MCNC: 4.6 G/DL (ref 6–8.2)
PROT SERPL-MCNC: 5.5 G/DL (ref 6–8.2)
PROT SERPL-MCNC: 5.5 G/DL (ref 6–8.2)
PROT SERPL-MCNC: 5.6 G/DL (ref 6–8.2)
PROT SERPL-MCNC: 5.7 G/DL (ref 6–8.2)
PROT SERPL-MCNC: 5.9 G/DL (ref 6–8.2)
PROT SERPL-MCNC: 6 G/DL (ref 6–8.2)
PROT SERPL-MCNC: 6.1 G/DL (ref 6–8.2)
PROT SERPL-MCNC: 6.1 G/DL (ref 6–8.2)
PROT SERPL-MCNC: 6.2 G/DL (ref 6–8.2)
PROT SERPL-MCNC: 6.3 G/DL (ref 6–8.2)
PROT SERPL-MCNC: 6.4 G/DL (ref 6–8.2)
PROT SERPL-MCNC: 6.9 G/DL (ref 6–8.2)
PROT UR QL STRIP: 100 MG/DL
PROTHROMBIN TIME: 14.5 SEC (ref 12–14.6)
PROTHROMBIN TIME: 15.5 SEC (ref 12–14.6)
PTH-INTACT SERPL-MCNC: 26.2 PG/ML (ref 14–72)
QFT TB2 - NIL TBQ2: 6.25 IU/ML
RBC # BLD AUTO: 2.03 M/UL (ref 4.2–5.4)
RBC # BLD AUTO: 2.09 M/UL (ref 4.2–5.4)
RBC # BLD AUTO: 2.09 M/UL (ref 4.2–5.4)
RBC # BLD AUTO: 2.14 M/UL (ref 4.2–5.4)
RBC # BLD AUTO: 2.25 M/UL (ref 4.2–5.4)
RBC # BLD AUTO: 2.25 M/UL (ref 4.2–5.4)
RBC # BLD AUTO: 2.27 M/UL (ref 4.2–5.4)
RBC # BLD AUTO: 2.28 M/UL (ref 4.2–5.4)
RBC # BLD AUTO: 2.33 M/UL (ref 4.2–5.4)
RBC # BLD AUTO: 2.36 M/UL (ref 4.2–5.4)
RBC # BLD AUTO: 2.38 M/UL (ref 4.2–5.4)
RBC # BLD AUTO: 2.45 M/UL (ref 4.2–5.4)
RBC # BLD AUTO: 2.45 M/UL (ref 4.2–5.4)
RBC # BLD AUTO: 2.47 M/UL (ref 4.2–5.4)
RBC # BLD AUTO: 2.55 M/UL (ref 4.2–5.4)
RBC # BLD AUTO: 2.56 M/UL (ref 4.2–5.4)
RBC # BLD AUTO: 2.57 M/UL (ref 4.2–5.4)
RBC # BLD AUTO: 2.58 M/UL (ref 4.2–5.4)
RBC # BLD AUTO: 2.58 M/UL (ref 4.2–5.4)
RBC # BLD AUTO: 2.6 M/UL (ref 4.2–5.4)
RBC # BLD AUTO: 2.61 M/UL (ref 4.2–5.4)
RBC # BLD AUTO: 2.63 M/UL (ref 4.2–5.4)
RBC # BLD AUTO: 2.63 M/UL (ref 4.2–5.4)
RBC # BLD AUTO: 2.64 M/UL (ref 4.2–5.4)
RBC # BLD AUTO: 2.65 M/UL (ref 4.2–5.4)
RBC # BLD AUTO: 2.67 M/UL (ref 4.2–5.4)
RBC # BLD AUTO: 2.68 M/UL (ref 4.2–5.4)
RBC # BLD AUTO: 2.7 M/UL (ref 4.2–5.4)
RBC # BLD AUTO: 2.71 M/UL (ref 4.2–5.4)
RBC # BLD AUTO: 2.73 M/UL (ref 4.2–5.4)
RBC # BLD AUTO: 2.73 M/UL (ref 4.2–5.4)
RBC # BLD AUTO: 2.75 M/UL (ref 4.2–5.4)
RBC # BLD AUTO: 2.75 M/UL (ref 4.2–5.4)
RBC # BLD AUTO: 2.76 M/UL (ref 4.2–5.4)
RBC # BLD AUTO: 2.77 M/UL (ref 4.2–5.4)
RBC # BLD AUTO: 2.8 M/UL (ref 4.2–5.4)
RBC # BLD AUTO: 2.82 M/UL (ref 4.2–5.4)
RBC # BLD AUTO: 2.86 M/UL (ref 4.2–5.4)
RBC # BLD AUTO: 2.88 M/UL (ref 4.2–5.4)
RBC # BLD AUTO: 2.89 M/UL (ref 4.2–5.4)
RBC # BLD AUTO: 2.89 M/UL (ref 4.2–5.4)
RBC # BLD AUTO: 2.92 M/UL (ref 4.2–5.4)
RBC # BLD AUTO: 3 M/UL (ref 4.2–5.4)
RBC # CSF: ABNORMAL CELLS/UL
RBC # URNS HPF: ABNORMAL /HPF
RBC BLD AUTO: PRESENT
RBC UR QL AUTO: ABNORMAL
RH BLD: NORMAL
RHODAMINE-AURAMINE STN SPEC: NORMAL
S PNEUM DNA CSF QL NAA+NON-PROBE: NOT DETECTED
SAO2 % BLDA: 96 % (ref 93–99)
SAO2 % BLDA: 97 % (ref 93–99)
SAO2 % BLDA: 98 % (ref 93–99)
SAO2 % BLDA: 99 % (ref 93–99)
SIGNIFICANT IND 70042: ABNORMAL
SIGNIFICANT IND 70042: NORMAL
SITE SITE: ABNORMAL
SITE SITE: NORMAL
SODIUM SERPL-SCNC: 129 MMOL/L (ref 135–145)
SODIUM SERPL-SCNC: 129 MMOL/L (ref 135–145)
SODIUM SERPL-SCNC: 130 MMOL/L (ref 135–145)
SODIUM SERPL-SCNC: 131 MMOL/L (ref 135–145)
SODIUM SERPL-SCNC: 131 MMOL/L (ref 135–145)
SODIUM SERPL-SCNC: 132 MMOL/L (ref 135–145)
SODIUM SERPL-SCNC: 133 MMOL/L (ref 135–145)
SODIUM SERPL-SCNC: 134 MMOL/L (ref 135–145)
SODIUM SERPL-SCNC: 135 MMOL/L (ref 135–145)
SODIUM SERPL-SCNC: 136 MMOL/L (ref 135–145)
SODIUM SERPL-SCNC: 137 MMOL/L (ref 135–145)
SODIUM SERPL-SCNC: 138 MMOL/L (ref 135–145)
SODIUM SERPL-SCNC: 139 MMOL/L (ref 135–145)
SOURCE SOURCE: ABNORMAL
SOURCE SOURCE: NORMAL
SP GR UR STRIP.AUTO: 1.01
SPECIMEN DRAWN FROM PATIENT: ABNORMAL
SPECIMEN SOURCE: NORMAL
SPECIMEN VOL CSF: 15.5 ML
SPECIMEN VOL UR: 200 ML
SPECIMEN VOL UR: 212 ML
SPHEROCYTES BLD QL SMEAR: NORMAL
STOMATOCYTES BLD QL SMEAR: NORMAL
TEG ALGORITHM TGALG: ABNORMAL
TEG ALGORITHM TGALG: NORMAL
TEST NAME 95000: NORMAL
TREPONEMA PALLIDUM IGG+IGM AB [PRESENCE] IN SERUM OR PLASMA BY IMMUNOASSAY: NON REACTIVE
TRIGL SERPL-MCNC: 108 MG/DL (ref 0–149)
TRIGL SERPL-MCNC: 124 MG/DL (ref 0–149)
TRIGL SERPL-MCNC: 127 MG/DL (ref 0–149)
TRIGL SERPL-MCNC: 95 MG/DL (ref 0–149)
TRIGL SERPL-MCNC: 98 MG/DL (ref 0–149)
TSH SERPL DL<=0.005 MIU/L-ACNC: 3.24 UIU/ML (ref 0.38–5.33)
TSH SERPL DL<=0.005 MIU/L-ACNC: 7.17 UIU/ML (ref 0.38–5.33)
TUBE # CSF: 3
TUBE # CSF: 4
UNIT STATUS USTAT: NORMAL
URINE CREATININE EXCRETED 1125: 102 MG/24 HR
URINE CREATININE EXCRETED 1125: 89 MG/24 HR
UROBILINOGEN UR STRIP.AUTO-MCNC: 0.2 MG/DL
VDRL CSF QL: NON REACTIVE
VIT B12 SERPL-MCNC: 600 PG/ML (ref 211–911)
VZV DNA CSF QL NAA+NON-PROBE: NOT DETECTED
WBC # BLD AUTO: 10.1 K/UL (ref 4.8–10.8)
WBC # BLD AUTO: 10.5 K/UL (ref 4.8–10.8)
WBC # BLD AUTO: 10.6 K/UL (ref 4.8–10.8)
WBC # BLD AUTO: 11 K/UL (ref 4.8–10.8)
WBC # BLD AUTO: 11.2 K/UL (ref 4.8–10.8)
WBC # BLD AUTO: 11.5 K/UL (ref 4.8–10.8)
WBC # BLD AUTO: 11.8 K/UL (ref 4.8–10.8)
WBC # BLD AUTO: 11.8 K/UL (ref 4.8–10.8)
WBC # BLD AUTO: 12 K/UL (ref 4.8–10.8)
WBC # BLD AUTO: 12.2 K/UL (ref 4.8–10.8)
WBC # BLD AUTO: 12.3 K/UL (ref 4.8–10.8)
WBC # BLD AUTO: 12.7 K/UL (ref 4.8–10.8)
WBC # BLD AUTO: 12.8 K/UL (ref 4.8–10.8)
WBC # BLD AUTO: 12.9 K/UL (ref 4.8–10.8)
WBC # BLD AUTO: 13 K/UL (ref 4.8–10.8)
WBC # BLD AUTO: 13.1 K/UL (ref 4.8–10.8)
WBC # BLD AUTO: 13.2 K/UL (ref 4.8–10.8)
WBC # BLD AUTO: 13.3 K/UL (ref 4.8–10.8)
WBC # BLD AUTO: 13.4 K/UL (ref 4.8–10.8)
WBC # BLD AUTO: 13.5 K/UL (ref 4.8–10.8)
WBC # BLD AUTO: 13.7 K/UL (ref 4.8–10.8)
WBC # BLD AUTO: 13.7 K/UL (ref 4.8–10.8)
WBC # BLD AUTO: 13.8 K/UL (ref 4.8–10.8)
WBC # BLD AUTO: 13.8 K/UL (ref 4.8–10.8)
WBC # BLD AUTO: 14 K/UL (ref 4.8–10.8)
WBC # BLD AUTO: 14 K/UL (ref 4.8–10.8)
WBC # BLD AUTO: 14.2 K/UL (ref 4.8–10.8)
WBC # BLD AUTO: 14.4 K/UL (ref 4.8–10.8)
WBC # BLD AUTO: 14.5 K/UL (ref 4.8–10.8)
WBC # BLD AUTO: 14.5 K/UL (ref 4.8–10.8)
WBC # BLD AUTO: 14.7 K/UL (ref 4.8–10.8)
WBC # BLD AUTO: 14.9 K/UL (ref 4.8–10.8)
WBC # BLD AUTO: 15.6 K/UL (ref 4.8–10.8)
WBC # BLD AUTO: 15.9 K/UL (ref 4.8–10.8)
WBC # BLD AUTO: 16.1 K/UL (ref 4.8–10.8)
WBC # BLD AUTO: 16.8 K/UL (ref 4.8–10.8)
WBC # BLD AUTO: 16.9 K/UL (ref 4.8–10.8)
WBC # BLD AUTO: 17.1 K/UL (ref 4.8–10.8)
WBC # BLD AUTO: 17.2 K/UL (ref 4.8–10.8)
WBC # BLD AUTO: 17.9 K/UL (ref 4.8–10.8)
WBC # BLD AUTO: 7.4 K/UL (ref 4.8–10.8)
WBC # BLD AUTO: 7.7 K/UL (ref 4.8–10.8)
WBC # BLD AUTO: 7.8 K/UL (ref 4.8–10.8)
WBC # BLD AUTO: 7.9 K/UL (ref 4.8–10.8)
WBC # BLD AUTO: 8.5 K/UL (ref 4.8–10.8)
WBC # CSF: 39 CELLS/UL (ref 0–10)
WBC #/AREA URNS HPF: ABNORMAL /HPF

## 2020-01-01 PROCEDURE — 700101 HCHG RX REV CODE 250: Performed by: PSYCHIATRY & NEUROLOGY

## 2020-01-01 PROCEDURE — 302098 PASTE RING (FLAT): Performed by: HOSPITALIST

## 2020-01-01 PROCEDURE — 700102 HCHG RX REV CODE 250 W/ 637 OVERRIDE(OP): Performed by: HOSPITALIST

## 2020-01-01 PROCEDURE — A9270 NON-COVERED ITEM OR SERVICE: HCPCS | Performed by: HOSPITALIST

## 2020-01-01 PROCEDURE — 700101 HCHG RX REV CODE 250: Performed by: HOSPITALIST

## 2020-01-01 PROCEDURE — 700111 HCHG RX REV CODE 636 W/ 250 OVERRIDE (IP): Mod: JG

## 2020-01-01 PROCEDURE — 99232 SBSQ HOSP IP/OBS MODERATE 35: CPT | Performed by: HOSPITALIST

## 2020-01-01 PROCEDURE — 700111 HCHG RX REV CODE 636 W/ 250 OVERRIDE (IP): Mod: JG | Performed by: INTERNAL MEDICINE

## 2020-01-01 PROCEDURE — 93306 TTE W/DOPPLER COMPLETE: CPT

## 2020-01-01 PROCEDURE — 160202 HCHG ENDO MINUTES - 1ST 30 MINS LEVEL 3: Performed by: INTERNAL MEDICINE

## 2020-01-01 PROCEDURE — 99233 SBSQ HOSP IP/OBS HIGH 50: CPT | Mod: 25 | Performed by: NURSE PRACTITIONER

## 2020-01-01 PROCEDURE — 84478 ASSAY OF TRIGLYCERIDES: CPT

## 2020-01-01 PROCEDURE — 97605 NEG PRS WND THER DME<=50SQCM: CPT

## 2020-01-01 PROCEDURE — 94640 AIRWAY INHALATION TREATMENT: CPT

## 2020-01-01 PROCEDURE — 700102 HCHG RX REV CODE 250 W/ 637 OVERRIDE(OP): Performed by: INTERNAL MEDICINE

## 2020-01-01 PROCEDURE — 700102 HCHG RX REV CODE 250 W/ 637 OVERRIDE(OP): Performed by: PSYCHIATRY & NEUROLOGY

## 2020-01-01 PROCEDURE — B548ZZA ULTRASONOGRAPHY OF SUPERIOR VENA CAVA, GUIDANCE: ICD-10-PCS | Performed by: INTERNAL MEDICINE

## 2020-01-01 PROCEDURE — 31600 PLANNED TRACHEOSTOMY: CPT

## 2020-01-01 PROCEDURE — 80048 BASIC METABOLIC PNL TOTAL CA: CPT

## 2020-01-01 PROCEDURE — 99232 SBSQ HOSP IP/OBS MODERATE 35: CPT | Performed by: INTERNAL MEDICINE

## 2020-01-01 PROCEDURE — 700111 HCHG RX REV CODE 636 W/ 250 OVERRIDE (IP): Performed by: PSYCHIATRY & NEUROLOGY

## 2020-01-01 PROCEDURE — A9270 NON-COVERED ITEM OR SERVICE: HCPCS | Performed by: INTERNAL MEDICINE

## 2020-01-01 PROCEDURE — 99232 SBSQ HOSP IP/OBS MODERATE 35: CPT | Mod: 25 | Performed by: HOSPITALIST

## 2020-01-01 PROCEDURE — C9254 INJECTION, LACOSAMIDE: HCPCS | Performed by: PSYCHIATRY & NEUROLOGY

## 2020-01-01 PROCEDURE — 85025 COMPLETE CBC W/AUTO DIFF WBC: CPT

## 2020-01-01 PROCEDURE — 86140 C-REACTIVE PROTEIN: CPT

## 2020-01-01 PROCEDURE — 770001 HCHG ROOM/CARE - MED/SURG/GYN PRIV*

## 2020-01-01 PROCEDURE — 700111 HCHG RX REV CODE 636 W/ 250 OVERRIDE (IP): Performed by: INTERNAL MEDICINE

## 2020-01-01 PROCEDURE — P9047 ALBUMIN (HUMAN), 25%, 50ML: HCPCS | Mod: JG | Performed by: INTERNAL MEDICINE

## 2020-01-01 PROCEDURE — A9270 NON-COVERED ITEM OR SERVICE: HCPCS | Performed by: PSYCHIATRY & NEUROLOGY

## 2020-01-01 PROCEDURE — 770006 HCHG ROOM/CARE - MED/SURG/GYN SEMI*

## 2020-01-01 PROCEDURE — 70553 MRI BRAIN STEM W/O & W/DYE: CPT

## 2020-01-01 PROCEDURE — 82962 GLUCOSE BLOOD TEST: CPT

## 2020-01-01 PROCEDURE — 71045 X-RAY EXAM CHEST 1 VIEW: CPT

## 2020-01-01 PROCEDURE — 36600 WITHDRAWAL OF ARTERIAL BLOOD: CPT

## 2020-01-01 PROCEDURE — 160002 HCHG RECOVERY MINUTES (STAT): Performed by: INTERNAL MEDICINE

## 2020-01-01 PROCEDURE — 97606 NEG PRS WND THER DME>50 SQCM: CPT

## 2020-01-01 PROCEDURE — 94003 VENT MGMT INPAT SUBQ DAY: CPT

## 2020-01-01 PROCEDURE — 700111 HCHG RX REV CODE 636 W/ 250 OVERRIDE (IP)

## 2020-01-01 PROCEDURE — 5A1D70Z PERFORMANCE OF URINARY FILTRATION, INTERMITTENT, LESS THAN 6 HOURS PER DAY: ICD-10-PCS | Performed by: INTERNAL MEDICINE

## 2020-01-01 PROCEDURE — 700102 HCHG RX REV CODE 250 W/ 637 OVERRIDE(OP): Performed by: NURSE PRACTITIONER

## 2020-01-01 PROCEDURE — 700105 HCHG RX REV CODE 258: Performed by: ANESTHESIOLOGY

## 2020-01-01 PROCEDURE — 700112 HCHG RX REV CODE 229: Performed by: HOSPITALIST

## 2020-01-01 PROCEDURE — 99232 SBSQ HOSP IP/OBS MODERATE 35: CPT | Performed by: FAMILY MEDICINE

## 2020-01-01 PROCEDURE — 95714 VEEG EA 12-26 HR UNMNTR: CPT | Performed by: PSYCHIATRY & NEUROLOGY

## 2020-01-01 PROCEDURE — 99291 CRITICAL CARE FIRST HOUR: CPT | Performed by: INTERNAL MEDICINE

## 2020-01-01 PROCEDURE — 83735 ASSAY OF MAGNESIUM: CPT

## 2020-01-01 PROCEDURE — 90935 HEMODIALYSIS ONE EVALUATION: CPT

## 2020-01-01 PROCEDURE — 84134 ASSAY OF PREALBUMIN: CPT

## 2020-01-01 PROCEDURE — 80053 COMPREHEN METABOLIC PANEL: CPT

## 2020-01-01 PROCEDURE — 99231 SBSQ HOSP IP/OBS SF/LOW 25: CPT | Performed by: FAMILY MEDICINE

## 2020-01-01 PROCEDURE — 700101 HCHG RX REV CODE 250

## 2020-01-01 PROCEDURE — 770022 HCHG ROOM/CARE - ICU (200)

## 2020-01-01 PROCEDURE — 0B968ZX DRAINAGE OF RIGHT LOWER LOBE BRONCHUS, VIA NATURAL OR ARTIFICIAL OPENING ENDOSCOPIC, DIAGNOSTIC: ICD-10-PCS | Performed by: INTERNAL MEDICINE

## 2020-01-01 PROCEDURE — 84100 ASSAY OF PHOSPHORUS: CPT

## 2020-01-01 PROCEDURE — 82962 GLUCOSE BLOOD TEST: CPT | Mod: 91

## 2020-01-01 PROCEDURE — C1751 CATH, INF, PER/CENT/MIDLINE: HCPCS

## 2020-01-01 PROCEDURE — 99232 SBSQ HOSP IP/OBS MODERATE 35: CPT | Mod: 25 | Performed by: PSYCHIATRY & NEUROLOGY

## 2020-01-01 PROCEDURE — 82803 BLOOD GASES ANY COMBINATION: CPT

## 2020-01-01 PROCEDURE — 4A10X4Z MONITORING OF CENTRAL NERVOUS ELECTRICAL ACTIVITY, EXTERNAL APPROACH: ICD-10-PCS | Performed by: PSYCHIATRY & NEUROLOGY

## 2020-01-01 PROCEDURE — 83690 ASSAY OF LIPASE: CPT

## 2020-01-01 PROCEDURE — 82575 CREATININE CLEARANCE TEST: CPT

## 2020-01-01 PROCEDURE — 87070 CULTURE OTHR SPECIMN AEROBIC: CPT

## 2020-01-01 PROCEDURE — 700105 HCHG RX REV CODE 258: Performed by: INTERNAL MEDICINE

## 2020-01-01 PROCEDURE — 99233 SBSQ HOSP IP/OBS HIGH 50: CPT | Mod: 25 | Performed by: PSYCHIATRY & NEUROLOGY

## 2020-01-01 PROCEDURE — 87015 SPECIMEN INFECT AGNT CONCNTJ: CPT

## 2020-01-01 PROCEDURE — 700101 HCHG RX REV CODE 250: Performed by: INTERNAL MEDICINE

## 2020-01-01 PROCEDURE — 87116 MYCOBACTERIA CULTURE: CPT | Mod: 91

## 2020-01-01 PROCEDURE — 0DH68UZ INSERTION OF FEEDING DEVICE INTO STOMACH, VIA NATURAL OR ARTIFICIAL OPENING ENDOSCOPIC: ICD-10-PCS | Performed by: INTERNAL MEDICINE

## 2020-01-01 PROCEDURE — 94770 HCHG CO2 EXPIRED GAS DETERMINATION: CPT

## 2020-01-01 PROCEDURE — 4410569 US-THORACENTESIS PUNCTURE

## 2020-01-01 PROCEDURE — 99231 SBSQ HOSP IP/OBS SF/LOW 25: CPT | Performed by: INTERNAL MEDICINE

## 2020-01-01 PROCEDURE — 94760 N-INVAS EAR/PLS OXIMETRY 1: CPT

## 2020-01-01 PROCEDURE — 700102 HCHG RX REV CODE 250 W/ 637 OVERRIDE(OP): Performed by: FAMILY MEDICINE

## 2020-01-01 PROCEDURE — A9270 NON-COVERED ITEM OR SERVICE: HCPCS | Performed by: FAMILY MEDICINE

## 2020-01-01 PROCEDURE — 86592 SYPHILIS TEST NON-TREP QUAL: CPT

## 2020-01-01 PROCEDURE — 82945 GLUCOSE OTHER FLUID: CPT

## 2020-01-01 PROCEDURE — 700111 HCHG RX REV CODE 636 W/ 250 OVERRIDE (IP): Performed by: HOSPITALIST

## 2020-01-01 PROCEDURE — 700111 HCHG RX REV CODE 636 W/ 250 OVERRIDE (IP): Mod: JG | Performed by: HOSPITALIST

## 2020-01-01 PROCEDURE — 95720 EEG PHY/QHP EA INCR W/VEEG: CPT | Performed by: PSYCHIATRY & NEUROLOGY

## 2020-01-01 PROCEDURE — 86696 HERPES SIMPLEX TYPE 2 TEST: CPT

## 2020-01-01 PROCEDURE — 99292 CRITICAL CARE ADDL 30 MIN: CPT | Performed by: INTERNAL MEDICINE

## 2020-01-01 PROCEDURE — 87205 SMEAR GRAM STAIN: CPT

## 2020-01-01 PROCEDURE — A9270 NON-COVERED ITEM OR SERVICE: HCPCS | Performed by: NURSE PRACTITIONER

## 2020-01-01 PROCEDURE — 5A1955Z RESPIRATORY VENTILATION, GREATER THAN 96 CONSECUTIVE HOURS: ICD-10-PCS | Performed by: INTERNAL MEDICINE

## 2020-01-01 PROCEDURE — 700105 HCHG RX REV CODE 258: Performed by: PSYCHIATRY & NEUROLOGY

## 2020-01-01 PROCEDURE — 83615 LACTATE (LD) (LDH) ENZYME: CPT

## 2020-01-01 PROCEDURE — 99291 CRITICAL CARE FIRST HOUR: CPT | Mod: 25 | Performed by: INTERNAL MEDICINE

## 2020-01-01 PROCEDURE — 86706 HEP B SURFACE ANTIBODY: CPT

## 2020-01-01 PROCEDURE — 94150 VITAL CAPACITY TEST: CPT

## 2020-01-01 PROCEDURE — 82330 ASSAY OF CALCIUM: CPT

## 2020-01-01 PROCEDURE — 160035 HCHG PACU - 1ST 60 MINS PHASE I: Performed by: INTERNAL MEDICINE

## 2020-01-01 PROCEDURE — 770021 HCHG ROOM/CARE - ISO PRIVATE

## 2020-01-01 PROCEDURE — 306802 SYSTEM FECAL MANAGEMENT CATHETER KIT FLEXI - SEAL: Performed by: HOSPITALIST

## 2020-01-01 PROCEDURE — 97597 DBRDMT OPN WND 1ST 20 CM/<: CPT

## 2020-01-01 PROCEDURE — 85610 PROTHROMBIN TIME: CPT

## 2020-01-01 PROCEDURE — 93306 TTE W/DOPPLER COMPLETE: CPT | Mod: 26 | Performed by: INTERNAL MEDICINE

## 2020-01-01 PROCEDURE — 02HV33Z INSERTION OF INFUSION DEVICE INTO SUPERIOR VENA CAVA, PERCUTANEOUS APPROACH: ICD-10-PCS | Performed by: INTERNAL MEDICINE

## 2020-01-01 PROCEDURE — 05H633Z INSERTION OF INFUSION DEVICE INTO LEFT SUBCLAVIAN VEIN, PERCUTANEOUS APPROACH: ICD-10-PCS | Performed by: INTERNAL MEDICINE

## 2020-01-01 PROCEDURE — 0W9B3ZZ DRAINAGE OF LEFT PLEURAL CAVITY, PERCUTANEOUS APPROACH: ICD-10-PCS | Performed by: RADIOLOGY

## 2020-01-01 PROCEDURE — 85347 COAGULATION TIME ACTIVATED: CPT

## 2020-01-01 PROCEDURE — 302307 BAG FECAL MANAGEMENT TEMPORARY COLLECTION WITH FILTER - SEAL SIGNAL FMS: Performed by: HOSPITALIST

## 2020-01-01 PROCEDURE — 85027 COMPLETE CBC AUTOMATED: CPT

## 2020-01-01 PROCEDURE — 87483 CNS DNA AMP PROBE TYPE 12-25: CPT

## 2020-01-01 PROCEDURE — A6213 FOAM DRG >16<=48 SQ IN W/BDR: HCPCS | Performed by: HOSPITALIST

## 2020-01-01 PROCEDURE — 99233 SBSQ HOSP IP/OBS HIGH 50: CPT | Performed by: HOSPITALIST

## 2020-01-01 PROCEDURE — 94690 O2 UPTK REST INDIRECT: CPT

## 2020-01-01 PROCEDURE — 87206 SMEAR FLUORESCENT/ACID STAI: CPT

## 2020-01-01 PROCEDURE — 307059 PAD,EAR PROTECTOR: Performed by: INTERNAL MEDICINE

## 2020-01-01 PROCEDURE — 306372 DRESSING,VAC SIMPLACE MED: Performed by: FAMILY MEDICINE

## 2020-01-01 PROCEDURE — 99152 MOD SED SAME PHYS/QHP 5/>YRS: CPT

## 2020-01-01 PROCEDURE — 82306 VITAMIN D 25 HYDROXY: CPT

## 2020-01-01 PROCEDURE — 99231 SBSQ HOSP IP/OBS SF/LOW 25: CPT | Performed by: HOSPITALIST

## 2020-01-01 PROCEDURE — 85007 BL SMEAR W/DIFF WBC COUNT: CPT

## 2020-01-01 PROCEDURE — 83519 RIA NONANTIBODY: CPT

## 2020-01-01 PROCEDURE — 85018 HEMOGLOBIN: CPT

## 2020-01-01 PROCEDURE — 700105 HCHG RX REV CODE 258: Performed by: HOSPITALIST

## 2020-01-01 PROCEDURE — 80069 RENAL FUNCTION PANEL: CPT

## 2020-01-01 PROCEDURE — 87799 DETECT AGENT NOS DNA QUANT: CPT

## 2020-01-01 PROCEDURE — 306263 VAC CANNISTER W/GEL 500ML: Performed by: HOSPITALIST

## 2020-01-01 PROCEDURE — C9113 INJ PANTOPRAZOLE SODIUM, VIA: HCPCS | Performed by: INTERNAL MEDICINE

## 2020-01-01 PROCEDURE — 80185 ASSAY OF PHENYTOIN TOTAL: CPT

## 2020-01-01 PROCEDURE — 99497 ADVNCD CARE PLAN 30 MIN: CPT | Performed by: INTERNAL MEDICINE

## 2020-01-01 PROCEDURE — 85576 BLOOD PLATELET AGGREGATION: CPT | Mod: 91

## 2020-01-01 PROCEDURE — C9254 INJECTION, LACOSAMIDE: HCPCS | Performed by: INTERNAL MEDICINE

## 2020-01-01 PROCEDURE — 82607 VITAMIN B-12: CPT

## 2020-01-01 PROCEDURE — 503421 DRESSING VAC VERAFLO CLEANSE CHOICE: Performed by: HOSPITALIST

## 2020-01-01 PROCEDURE — 74018 RADEX ABDOMEN 1 VIEW: CPT

## 2020-01-01 PROCEDURE — P9047 ALBUMIN (HUMAN), 25%, 50ML: HCPCS | Mod: JG | Performed by: HOSPITALIST

## 2020-01-01 PROCEDURE — 88305 TISSUE EXAM BY PATHOLOGIST: CPT

## 2020-01-01 PROCEDURE — 99232 SBSQ HOSP IP/OBS MODERATE 35: CPT | Performed by: NURSE PRACTITIONER

## 2020-01-01 PROCEDURE — 31502 CHANGE OF WINDPIPE AIRWAY: CPT

## 2020-01-01 PROCEDURE — A4314 CATH W/DRAINAGE 2-WAY LATEX: HCPCS | Performed by: INTERNAL MEDICINE

## 2020-01-01 PROCEDURE — 307059 PAD,EAR PROTECTOR: Performed by: HOSPITALIST

## 2020-01-01 PROCEDURE — 88112 CYTOPATH CELL ENHANCE TECH: CPT

## 2020-01-01 PROCEDURE — 36556 INSERT NON-TUNNEL CV CATH: CPT

## 2020-01-01 PROCEDURE — 99233 SBSQ HOSP IP/OBS HIGH 50: CPT | Performed by: INTERNAL MEDICINE

## 2020-01-01 PROCEDURE — P9016 RBC LEUKOCYTES REDUCED: HCPCS

## 2020-01-01 PROCEDURE — 770027 HCHG ROOM/CARE - NEGATIVE PRESSURE ISOLATION

## 2020-01-01 PROCEDURE — 99223 1ST HOSP IP/OBS HIGH 75: CPT | Performed by: INTERNAL MEDICINE

## 2020-01-01 PROCEDURE — 84443 ASSAY THYROID STIM HORMONE: CPT

## 2020-01-01 PROCEDURE — 86901 BLOOD TYPING SEROLOGIC RH(D): CPT

## 2020-01-01 PROCEDURE — 87015 SPECIMEN INFECT AGNT CONCNTJ: CPT | Mod: 91

## 2020-01-01 PROCEDURE — 36556 INSERT NON-TUNNEL CV CATH: CPT | Mod: LT | Performed by: INTERNAL MEDICINE

## 2020-01-01 PROCEDURE — 84145 PROCALCITONIN (PCT): CPT

## 2020-01-01 PROCEDURE — P9047 ALBUMIN (HUMAN), 25%, 50ML: HCPCS | Mod: JG

## 2020-01-01 PROCEDURE — 306802 SYSTEM FECAL MANAGEMENT CATHETER KIT FLEXI - SEAL: Performed by: INTERNAL MEDICINE

## 2020-01-01 PROCEDURE — 009U3ZX DRAINAGE OF SPINAL CANAL, PERCUTANEOUS APPROACH, DIAGNOSTIC: ICD-10-PCS | Performed by: INTERNAL MEDICINE

## 2020-01-01 PROCEDURE — 86780 TREPONEMA PALLIDUM: CPT

## 2020-01-01 PROCEDURE — 302234 TRACHEOTOMY TRAY (STANDARD): Performed by: HOSPITALIST

## 2020-01-01 PROCEDURE — 89051 BODY FLUID CELL COUNT: CPT

## 2020-01-01 PROCEDURE — 500066 HCHG BITE BLOCK, ECT: Performed by: INTERNAL MEDICINE

## 2020-01-01 PROCEDURE — 99497 ADVNCD CARE PLAN 30 MIN: CPT | Performed by: HOSPITALIST

## 2020-01-01 PROCEDURE — 94002 VENT MGMT INPAT INIT DAY: CPT

## 2020-01-01 PROCEDURE — 700111 HCHG RX REV CODE 636 W/ 250 OVERRIDE (IP): Mod: TB | Performed by: HOSPITALIST

## 2020-01-01 PROCEDURE — 87206 SMEAR FLUORESCENT/ACID STAI: CPT | Mod: 91

## 2020-01-01 PROCEDURE — 80076 HEPATIC FUNCTION PANEL: CPT

## 2020-01-01 PROCEDURE — 31600 PLANNED TRACHEOSTOMY: CPT | Performed by: SURGERY

## 2020-01-01 PROCEDURE — 86480 TB TEST CELL IMMUN MEASURE: CPT

## 2020-01-01 PROCEDURE — 82272 OCCULT BLD FECES 1-3 TESTS: CPT

## 2020-01-01 PROCEDURE — 87116 MYCOBACTERIA CULTURE: CPT

## 2020-01-01 PROCEDURE — 83520 IMMUNOASSAY QUANT NOS NONAB: CPT

## 2020-01-01 PROCEDURE — 700101 HCHG RX REV CODE 250: Performed by: SURGERY

## 2020-01-01 PROCEDURE — 83516 IMMUNOASSAY NONANTIBODY: CPT

## 2020-01-01 PROCEDURE — 700111 HCHG RX REV CODE 636 W/ 250 OVERRIDE (IP): Performed by: SURGERY

## 2020-01-01 PROCEDURE — 83970 ASSAY OF PARATHORMONE: CPT

## 2020-01-01 PROCEDURE — 302146: Performed by: HOSPITALIST

## 2020-01-01 PROCEDURE — 0B9B8ZX DRAINAGE OF LEFT LOWER LOBE BRONCHUS, VIA NATURAL OR ARTIFICIAL OPENING ENDOSCOPIC, DIAGNOSTIC: ICD-10-PCS | Performed by: INTERNAL MEDICINE

## 2020-01-01 PROCEDURE — 99231 SBSQ HOSP IP/OBS SF/LOW 25: CPT | Mod: GC | Performed by: INTERNAL MEDICINE

## 2020-01-01 PROCEDURE — 160048 HCHG OR STATISTICAL LEVEL 1-5: Performed by: INTERNAL MEDICINE

## 2020-01-01 PROCEDURE — 86695 HERPES SIMPLEX TYPE 1 TEST: CPT

## 2020-01-01 PROCEDURE — 99223 1ST HOSP IP/OBS HIGH 75: CPT | Mod: 25 | Performed by: PSYCHIATRY & NEUROLOGY

## 2020-01-01 PROCEDURE — 36415 COLL VENOUS BLD VENIPUNCTURE: CPT

## 2020-01-01 PROCEDURE — 80074 ACUTE HEPATITIS PANEL: CPT

## 2020-01-01 PROCEDURE — 302307 BAG FECAL MANAGEMENT TEMPORARY COLLECTION WITH FILTER - SEAL SIGNAL FMS: Performed by: FAMILY MEDICINE

## 2020-01-01 PROCEDURE — 95700 EEG CONT REC W/VID EEG TECH: CPT | Performed by: PSYCHIATRY & NEUROLOGY

## 2020-01-01 PROCEDURE — 86850 RBC ANTIBODY SCREEN: CPT

## 2020-01-01 PROCEDURE — 84157 ASSAY OF PROTEIN OTHER: CPT

## 2020-01-01 PROCEDURE — 0B114F4 BYPASS TRACHEA TO CUTANEOUS WITH TRACHEOSTOMY DEVICE, PERCUTANEOUS ENDOSCOPIC APPROACH: ICD-10-PCS | Performed by: SURGERY

## 2020-01-01 PROCEDURE — 62270 DX LMBR SPI PNXR: CPT

## 2020-01-01 PROCEDURE — 93010 ELECTROCARDIOGRAM REPORT: CPT | Performed by: INTERNAL MEDICINE

## 2020-01-01 PROCEDURE — 31645 BRNCHSC W/THER ASPIR 1ST: CPT | Performed by: INTERNAL MEDICINE

## 2020-01-01 PROCEDURE — G0475 HIV COMBINATION ASSAY: HCPCS

## 2020-01-01 PROCEDURE — 81001 URINALYSIS AUTO W/SCOPE: CPT

## 2020-01-01 PROCEDURE — 97535 SELF CARE MNGMENT TRAINING: CPT

## 2020-01-01 PROCEDURE — A9576 INJ PROHANCE MULTIPACK: HCPCS | Performed by: PSYCHIATRY & NEUROLOGY

## 2020-01-01 PROCEDURE — 86256 FLUORESCENT ANTIBODY TITER: CPT

## 2020-01-01 PROCEDURE — 85384 FIBRINOGEN ACTIVITY: CPT

## 2020-01-01 PROCEDURE — 0035U NEURO CSF PRION PRTN QUAL: CPT

## 2020-01-01 PROCEDURE — 31624 DX BRONCHOSCOPE/LAVAGE: CPT | Performed by: INTERNAL MEDICINE

## 2020-01-01 PROCEDURE — 99223 1ST HOSP IP/OBS HIGH 75: CPT | Mod: AI | Performed by: HOSPITALIST

## 2020-01-01 PROCEDURE — 76705 ECHO EXAM OF ABDOMEN: CPT

## 2020-01-01 PROCEDURE — 110372 HCHG SHELL REV 278: Performed by: INTERNAL MEDICINE

## 2020-01-01 PROCEDURE — 71250 CT THORAX DX C-: CPT

## 2020-01-01 PROCEDURE — 99233 SBSQ HOSP IP/OBS HIGH 50: CPT | Performed by: PSYCHIATRY & NEUROLOGY

## 2020-01-01 PROCEDURE — 88108 CYTOPATH CONCENTRATE TECH: CPT

## 2020-01-01 PROCEDURE — 87493 C DIFF AMPLIFIED PROBE: CPT

## 2020-01-01 PROCEDURE — 86923 COMPATIBILITY TEST ELECTRIC: CPT

## 2020-01-01 PROCEDURE — 62270 DX LMBR SPI PNXR: CPT | Performed by: INTERNAL MEDICINE

## 2020-01-01 PROCEDURE — 95718 EEG PHYS/QHP 2-12 HR W/VEEG: CPT | Performed by: PSYCHIATRY & NEUROLOGY

## 2020-01-01 PROCEDURE — 85576 BLOOD PLATELET AGGREGATION: CPT

## 2020-01-01 PROCEDURE — 160009 HCHG ANES TIME/MIN: Performed by: INTERNAL MEDICINE

## 2020-01-01 PROCEDURE — 700117 HCHG RX CONTRAST REV CODE 255: Performed by: PSYCHIATRY & NEUROLOGY

## 2020-01-01 PROCEDURE — 82140 ASSAY OF AMMONIA: CPT

## 2020-01-01 PROCEDURE — 86900 BLOOD TYPING SEROLOGIC ABO: CPT

## 2020-01-01 PROCEDURE — 87102 FUNGUS ISOLATION CULTURE: CPT

## 2020-01-01 PROCEDURE — 700111 HCHG RX REV CODE 636 W/ 250 OVERRIDE (IP): Performed by: ANESTHESIOLOGY

## 2020-01-01 PROCEDURE — 36430 TRANSFUSION BLD/BLD COMPNT: CPT

## 2020-01-01 PROCEDURE — 302978 HCHG BRONCHOSCOPY-DIAGNOSTIC

## 2020-01-01 PROCEDURE — 93005 ELECTROCARDIOGRAM TRACING: CPT | Performed by: INTERNAL MEDICINE

## 2020-01-01 DEVICE — KIT 20 FRENCH PULL SAFETY PEG: Type: IMPLANTABLE DEVICE | Status: FUNCTIONAL

## 2020-01-01 RX ORDER — CLONAZEPAM 0.5 MG/1
1 TABLET ORAL 2 TIMES DAILY
Status: DISCONTINUED | OUTPATIENT
Start: 2020-01-01 | End: 2020-01-01

## 2020-01-01 RX ORDER — HEPARIN SODIUM 1000 [USP'U]/ML
INJECTION, SOLUTION INTRAVENOUS; SUBCUTANEOUS
Status: COMPLETED
Start: 2020-01-01 | End: 2020-01-01

## 2020-01-01 RX ORDER — CARVEDILOL 6.25 MG/1
25 TABLET ORAL 2 TIMES DAILY
Status: DISCONTINUED | OUTPATIENT
Start: 2020-01-01 | End: 2020-01-01

## 2020-01-01 RX ORDER — SODIUM CHLORIDE 9 MG/ML
INJECTION, SOLUTION INTRAVENOUS
Status: DISCONTINUED | OUTPATIENT
Start: 2020-01-01 | End: 2020-01-01 | Stop reason: SURG

## 2020-01-01 RX ORDER — AMOXICILLIN 250 MG
2 CAPSULE ORAL 2 TIMES DAILY
Status: DISCONTINUED | OUTPATIENT
Start: 2020-01-01 | End: 2020-01-01

## 2020-01-01 RX ORDER — ALBUMIN (HUMAN) 12.5 G/50ML
SOLUTION INTRAVENOUS
Status: COMPLETED
Start: 2020-01-01 | End: 2020-01-01

## 2020-01-01 RX ORDER — DOXAZOSIN 2 MG/1
8 TABLET ORAL
Status: DISCONTINUED | OUTPATIENT
Start: 2020-01-01 | End: 2020-01-01

## 2020-01-01 RX ORDER — FAMOTIDINE 20 MG/1
20 TABLET, FILM COATED ORAL DAILY
Status: DISCONTINUED | OUTPATIENT
Start: 2020-01-01 | End: 2020-01-01

## 2020-01-01 RX ORDER — POLYETHYLENE GLYCOL 3350 17 G/17G
1 POWDER, FOR SOLUTION ORAL
Status: DISCONTINUED | OUTPATIENT
Start: 2020-01-01 | End: 2020-01-01

## 2020-01-01 RX ORDER — MIDAZOLAM HYDROCHLORIDE 1 MG/ML
INJECTION INTRAMUSCULAR; INTRAVENOUS
Status: COMPLETED
Start: 2020-01-01 | End: 2020-01-01

## 2020-01-01 RX ORDER — ASPIRIN 81 MG/1
81 TABLET, CHEWABLE ORAL DAILY
Status: DISCONTINUED | OUTPATIENT
Start: 2020-01-01 | End: 2020-01-01

## 2020-01-01 RX ORDER — ONDANSETRON 2 MG/ML
4 INJECTION INTRAMUSCULAR; INTRAVENOUS
Status: DISCONTINUED | OUTPATIENT
Start: 2020-01-01 | End: 2020-01-01 | Stop reason: HOSPADM

## 2020-01-01 RX ORDER — ALBUMIN (HUMAN) 12.5 G/50ML
12.5 SOLUTION INTRAVENOUS
Status: COMPLETED | OUTPATIENT
Start: 2020-01-01 | End: 2020-01-01

## 2020-01-01 RX ORDER — TOPIRAMATE 100 MG/1
200 TABLET, FILM COATED ORAL EVERY 12 HOURS
Status: DISCONTINUED | OUTPATIENT
Start: 2020-01-01 | End: 2020-01-01

## 2020-01-01 RX ORDER — IPRATROPIUM BROMIDE AND ALBUTEROL SULFATE 2.5; .5 MG/3ML; MG/3ML
3 SOLUTION RESPIRATORY (INHALATION)
Status: DISCONTINUED | OUTPATIENT
Start: 2020-01-01 | End: 2020-01-01

## 2020-01-01 RX ORDER — SODIUM CHLORIDE AND POTASSIUM CHLORIDE 150; 900 MG/100ML; MG/100ML
INJECTION, SOLUTION INTRAVENOUS CONTINUOUS
Status: DISCONTINUED | OUTPATIENT
Start: 2020-01-01 | End: 2020-01-01

## 2020-01-01 RX ORDER — LEVETIRACETAM 500 MG/1
1000 TABLET ORAL 2 TIMES DAILY
Status: DISCONTINUED | OUTPATIENT
Start: 2020-01-01 | End: 2020-01-01

## 2020-01-01 RX ORDER — LACOSAMIDE 100 MG/1
300 TABLET ORAL 2 TIMES DAILY
Status: DISCONTINUED | OUTPATIENT
Start: 2020-01-01 | End: 2020-01-01

## 2020-01-01 RX ORDER — POTASSIUM CHLORIDE 20 MEQ/1
40 TABLET, EXTENDED RELEASE ORAL ONCE
Status: COMPLETED | OUTPATIENT
Start: 2020-01-01 | End: 2020-01-01

## 2020-01-01 RX ORDER — TOPIRAMATE 100 MG/1
200 TABLET, FILM COATED ORAL EVERY 12 HOURS
Status: DISCONTINUED | OUTPATIENT
Start: 2020-01-01 | End: 2020-01-01 | Stop reason: HOSPADM

## 2020-01-01 RX ORDER — SCOLOPAMINE TRANSDERMAL SYSTEM 1 MG/1
1 PATCH, EXTENDED RELEASE TRANSDERMAL
Status: DISCONTINUED | OUTPATIENT
Start: 2020-01-01 | End: 2020-01-01 | Stop reason: HOSPADM

## 2020-01-01 RX ORDER — ALLOPURINOL 100 MG/1
100 TABLET ORAL DAILY
Status: DISCONTINUED | OUTPATIENT
Start: 2020-01-01 | End: 2020-01-01

## 2020-01-01 RX ORDER — ATROPINE SULFATE 10 MG/ML
2 SOLUTION/ DROPS OPHTHALMIC EVERY 4 HOURS PRN
Status: DISCONTINUED | OUTPATIENT
Start: 2020-01-01 | End: 2020-01-01 | Stop reason: HOSPADM

## 2020-01-01 RX ORDER — POTASSIUM CHLORIDE 20 MEQ/1
20 TABLET, EXTENDED RELEASE ORAL ONCE
Status: DISCONTINUED | OUTPATIENT
Start: 2020-01-01 | End: 2020-01-01

## 2020-01-01 RX ORDER — HEPARIN SODIUM 1000 [USP'U]/ML
INJECTION, SOLUTION INTRAVENOUS; SUBCUTANEOUS
Status: DISPENSED
Start: 2020-01-01 | End: 2020-01-01

## 2020-01-01 RX ORDER — ONDANSETRON 4 MG/1
4 TABLET, ORALLY DISINTEGRATING ORAL EVERY 4 HOURS PRN
Status: DISCONTINUED | OUTPATIENT
Start: 2020-01-01 | End: 2020-01-01

## 2020-01-01 RX ORDER — NYSTATIN 100000 [USP'U]/G
POWDER TOPICAL 2 TIMES DAILY
Status: DISCONTINUED | OUTPATIENT
Start: 2020-01-01 | End: 2020-01-01

## 2020-01-01 RX ORDER — SODIUM CHLORIDE 9 MG/ML
INJECTION, SOLUTION INTRAVENOUS CONTINUOUS
Status: DISCONTINUED | OUTPATIENT
Start: 2020-01-01 | End: 2020-01-01

## 2020-01-01 RX ORDER — FAMOTIDINE 20 MG/1
20 TABLET, FILM COATED ORAL EVERY 12 HOURS
Status: DISCONTINUED | OUTPATIENT
Start: 2020-01-01 | End: 2020-01-01

## 2020-01-01 RX ORDER — ROCURONIUM BROMIDE 10 MG/ML
50 INJECTION, SOLUTION INTRAVENOUS ONCE
Status: DISCONTINUED | OUTPATIENT
Start: 2020-01-01 | End: 2020-01-01

## 2020-01-01 RX ORDER — MIDODRINE HYDROCHLORIDE 5 MG/1
10 TABLET ORAL PRN
Status: DISCONTINUED | OUTPATIENT
Start: 2020-01-01 | End: 2020-01-01

## 2020-01-01 RX ORDER — LEVETIRACETAM 500 MG/1
750 TABLET ORAL 2 TIMES DAILY
Status: DISCONTINUED | OUTPATIENT
Start: 2020-01-01 | End: 2020-01-01

## 2020-01-01 RX ORDER — FUROSEMIDE 10 MG/ML
20 INJECTION INTRAMUSCULAR; INTRAVENOUS EVERY 12 HOURS
Status: DISCONTINUED | OUTPATIENT
Start: 2020-01-01 | End: 2020-01-01

## 2020-01-01 RX ORDER — ALBUMIN (HUMAN) 12.5 G/50ML
25 SOLUTION INTRAVENOUS
Status: DISCONTINUED | OUTPATIENT
Start: 2020-01-01 | End: 2020-01-01

## 2020-01-01 RX ORDER — OXYCODONE HCL 5 MG/5 ML
5 SOLUTION, ORAL ORAL
Status: DISCONTINUED | OUTPATIENT
Start: 2020-01-01 | End: 2020-01-01 | Stop reason: HOSPADM

## 2020-01-01 RX ORDER — BISACODYL 10 MG
10 SUPPOSITORY, RECTAL RECTAL
Status: DISCONTINUED | OUTPATIENT
Start: 2020-01-01 | End: 2020-01-01

## 2020-01-01 RX ORDER — ALBUMIN (HUMAN) 12.5 G/50ML
25 SOLUTION INTRAVENOUS
Status: COMPLETED | OUTPATIENT
Start: 2020-01-01 | End: 2020-01-01

## 2020-01-01 RX ORDER — HALOPERIDOL 5 MG/ML
1 INJECTION INTRAMUSCULAR
Status: DISCONTINUED | OUTPATIENT
Start: 2020-01-01 | End: 2020-01-01 | Stop reason: HOSPADM

## 2020-01-01 RX ORDER — POTASSIUM CHLORIDE 20 MEQ/1
20 TABLET, EXTENDED RELEASE ORAL ONCE
Status: COMPLETED | OUTPATIENT
Start: 2020-01-01 | End: 2020-01-01

## 2020-01-01 RX ORDER — CARVEDILOL 25 MG/1
25 TABLET ORAL 2 TIMES DAILY
Status: DISCONTINUED | OUTPATIENT
Start: 2020-01-01 | End: 2020-01-01

## 2020-01-01 RX ORDER — M-VIT,TX,IRON,MINS/CALC/FOLIC 27MG-0.4MG
1 TABLET ORAL DAILY
Status: DISCONTINUED | OUTPATIENT
Start: 2020-01-01 | End: 2020-01-01

## 2020-01-01 RX ORDER — HYDRALAZINE HYDROCHLORIDE 50 MG/1
100 TABLET, FILM COATED ORAL 3 TIMES DAILY
Status: DISCONTINUED | OUTPATIENT
Start: 2020-01-01 | End: 2020-01-01

## 2020-01-01 RX ORDER — HYDRALAZINE HYDROCHLORIDE 20 MG/ML
10 INJECTION INTRAMUSCULAR; INTRAVENOUS EVERY 4 HOURS PRN
Status: DISCONTINUED | OUTPATIENT
Start: 2020-01-01 | End: 2020-01-01

## 2020-01-01 RX ORDER — ATORVASTATIN CALCIUM 10 MG/1
10 TABLET, FILM COATED ORAL DAILY
Status: DISCONTINUED | OUTPATIENT
Start: 2020-01-01 | End: 2020-01-01

## 2020-01-01 RX ORDER — DEXTROSE MONOHYDRATE 100 MG/ML
INJECTION, SOLUTION INTRAVENOUS CONTINUOUS
Status: DISCONTINUED | OUTPATIENT
Start: 2020-01-01 | End: 2020-01-01

## 2020-01-01 RX ORDER — MICONAZOLE NITRATE 20 MG/G
CREAM TOPICAL 2 TIMES DAILY
Status: COMPLETED | OUTPATIENT
Start: 2020-01-01 | End: 2020-01-01

## 2020-01-01 RX ORDER — HEPARIN SODIUM 5000 [USP'U]/ML
5000 INJECTION, SOLUTION INTRAVENOUS; SUBCUTANEOUS EVERY 8 HOURS
Status: DISCONTINUED | OUTPATIENT
Start: 2020-01-01 | End: 2020-01-01

## 2020-01-01 RX ORDER — ASPIRIN 325 MG
325 TABLET ORAL DAILY
Status: DISCONTINUED | OUTPATIENT
Start: 2020-01-01 | End: 2020-01-01

## 2020-01-01 RX ORDER — LORAZEPAM 2 MG/ML
1 INJECTION INTRAMUSCULAR
Status: DISCONTINUED | OUTPATIENT
Start: 2020-01-01 | End: 2020-01-01

## 2020-01-01 RX ORDER — FUROSEMIDE 10 MG/ML
80 INJECTION INTRAMUSCULAR; INTRAVENOUS ONCE
Status: COMPLETED | OUTPATIENT
Start: 2020-01-01 | End: 2020-01-01

## 2020-01-01 RX ORDER — MIDODRINE HYDROCHLORIDE 5 MG/1
5 TABLET ORAL ONCE
Status: COMPLETED | OUTPATIENT
Start: 2020-01-01 | End: 2020-01-01

## 2020-01-01 RX ORDER — HEPARIN SODIUM 5000 [USP'U]/ML
5000 INJECTION, SOLUTION INTRAVENOUS; SUBCUTANEOUS EVERY 12 HOURS
Status: DISCONTINUED | OUTPATIENT
Start: 2020-01-01 | End: 2020-01-01

## 2020-01-01 RX ORDER — MAGNESIUM SULFATE HEPTAHYDRATE 40 MG/ML
2 INJECTION, SOLUTION INTRAVENOUS ONCE
Status: COMPLETED | OUTPATIENT
Start: 2020-01-01 | End: 2020-01-01

## 2020-01-01 RX ORDER — ALLOPURINOL 100 MG/1
200 TABLET ORAL DAILY
Status: DISCONTINUED | OUTPATIENT
Start: 2020-01-01 | End: 2020-01-01

## 2020-01-01 RX ORDER — CARBOXYMETHYLCELLULOSE SODIUM 5 MG/ML
1 SOLUTION/ DROPS OPHTHALMIC
Status: DISCONTINUED | OUTPATIENT
Start: 2020-01-01 | End: 2020-01-01 | Stop reason: HOSPADM

## 2020-01-01 RX ORDER — CARVEDILOL 6.25 MG/1
25 TABLET ORAL 2 TIMES DAILY
COMMUNITY

## 2020-01-01 RX ORDER — VITAMIN C
1 TAB ORAL DAILY
Status: DISCONTINUED | OUTPATIENT
Start: 2020-01-01 | End: 2020-01-01

## 2020-01-01 RX ORDER — HEPARIN SODIUM 1000 [USP'U]/ML
3300 INJECTION, SOLUTION INTRAVENOUS; SUBCUTANEOUS PRN
Status: DISCONTINUED | OUTPATIENT
Start: 2020-01-01 | End: 2020-01-01

## 2020-01-01 RX ORDER — MAGNESIUM SULFATE HEPTAHYDRATE 40 MG/ML
2 INJECTION, SOLUTION INTRAVENOUS ONCE
Status: DISCONTINUED | OUTPATIENT
Start: 2020-01-01 | End: 2020-01-01

## 2020-01-01 RX ORDER — POLYVINYL ALCOHOL 14 MG/ML
2 SOLUTION/ DROPS OPHTHALMIC EVERY 6 HOURS PRN
Status: DISCONTINUED | OUTPATIENT
Start: 2020-01-01 | End: 2020-01-01 | Stop reason: HOSPADM

## 2020-01-01 RX ORDER — MIDAZOLAM HYDROCHLORIDE 1 MG/ML
5 INJECTION INTRAMUSCULAR; INTRAVENOUS ONCE
Status: COMPLETED | OUTPATIENT
Start: 2020-01-01 | End: 2020-01-01

## 2020-01-01 RX ORDER — SODIUM CHLORIDE 9 MG/ML
INJECTION, SOLUTION INTRAVENOUS CONTINUOUS
Status: DISCONTINUED | OUTPATIENT
Start: 2020-01-01 | End: 2020-01-01 | Stop reason: HOSPADM

## 2020-01-01 RX ORDER — DIVALPROEX SODIUM 125 MG/1
500 CAPSULE, COATED PELLETS ORAL EVERY 12 HOURS
Status: DISCONTINUED | OUTPATIENT
Start: 2020-01-01 | End: 2020-01-01

## 2020-01-01 RX ORDER — ONDANSETRON 4 MG/1
8 TABLET, ORALLY DISINTEGRATING ORAL EVERY 8 HOURS PRN
Status: DISCONTINUED | OUTPATIENT
Start: 2020-01-01 | End: 2020-01-01 | Stop reason: HOSPADM

## 2020-01-01 RX ORDER — MIDODRINE HYDROCHLORIDE 5 MG/1
5 TABLET ORAL PRN
Status: DISCONTINUED | OUTPATIENT
Start: 2020-01-01 | End: 2020-01-01

## 2020-01-01 RX ORDER — ACETAMINOPHEN 325 MG/1
650 TABLET ORAL EVERY 6 HOURS PRN
Status: DISCONTINUED | OUTPATIENT
Start: 2020-01-01 | End: 2020-01-01

## 2020-01-01 RX ORDER — OXYCODONE HCL 20 MG/ML
5 CONCENTRATE, ORAL ORAL
Status: DISCONTINUED | OUTPATIENT
Start: 2020-01-01 | End: 2020-01-01

## 2020-01-01 RX ORDER — DOCUSATE SODIUM 100 MG/1
100 CAPSULE, LIQUID FILLED ORAL EVERY 12 HOURS
Status: DISCONTINUED | OUTPATIENT
Start: 2020-01-01 | End: 2020-01-01

## 2020-01-01 RX ORDER — HYDRALAZINE HYDROCHLORIDE 25 MG/1
100 TABLET, FILM COATED ORAL 3 TIMES DAILY
Status: DISCONTINUED | OUTPATIENT
Start: 2020-01-01 | End: 2020-01-01

## 2020-01-01 RX ORDER — LACOSAMIDE 100 MG/1
300 TABLET ORAL 2 TIMES DAILY
Status: DISCONTINUED | OUTPATIENT
Start: 2020-01-01 | End: 2020-01-01 | Stop reason: HOSPADM

## 2020-01-01 RX ORDER — LISINOPRIL 5 MG/1
5 TABLET ORAL
Status: DISCONTINUED | OUTPATIENT
Start: 2020-01-01 | End: 2020-01-01

## 2020-01-01 RX ORDER — CLONAZEPAM 0.5 MG/1
0.5 TABLET ORAL 2 TIMES DAILY
Status: DISCONTINUED | OUTPATIENT
Start: 2020-01-01 | End: 2020-01-01

## 2020-01-01 RX ORDER — DOCUSATE SODIUM 50 MG/5ML
100 LIQUID ORAL 2 TIMES DAILY
Status: DISCONTINUED | OUTPATIENT
Start: 2020-01-01 | End: 2020-01-01 | Stop reason: HOSPADM

## 2020-01-01 RX ORDER — TOPIRAMATE 100 MG/1
100 TABLET, FILM COATED ORAL EVERY 12 HOURS
Status: DISCONTINUED | OUTPATIENT
Start: 2020-01-01 | End: 2020-01-01

## 2020-01-01 RX ORDER — ALBUMIN (HUMAN) 12.5 G/50ML
SOLUTION INTRAVENOUS
Status: DISPENSED
Start: 2020-01-01 | End: 2020-01-01

## 2020-01-01 RX ORDER — ROCURONIUM BROMIDE 10 MG/ML
50 INJECTION, SOLUTION INTRAVENOUS ONCE
Status: COMPLETED | OUTPATIENT
Start: 2020-01-01 | End: 2020-01-01

## 2020-01-01 RX ORDER — ISOSORBIDE DINITRATE 10 MG/1
10 TABLET ORAL 3 TIMES DAILY
Status: DISCONTINUED | OUTPATIENT
Start: 2020-01-01 | End: 2020-01-01

## 2020-01-01 RX ORDER — LORAZEPAM 2 MG/ML
1 INJECTION INTRAMUSCULAR
Status: DISCONTINUED | OUTPATIENT
Start: 2020-01-01 | End: 2020-01-01 | Stop reason: HOSPADM

## 2020-01-01 RX ORDER — ASCORBIC ACID 500 MG
500 TABLET ORAL DAILY
Status: DISCONTINUED | OUTPATIENT
Start: 2020-01-01 | End: 2020-01-01

## 2020-01-01 RX ORDER — LEVETIRACETAM 500 MG/1
500 TABLET ORAL 2 TIMES DAILY
Status: DISCONTINUED | OUTPATIENT
Start: 2020-01-01 | End: 2020-01-01

## 2020-01-01 RX ORDER — ATORVASTATIN CALCIUM 40 MG/1
40 TABLET, FILM COATED ORAL DAILY
Status: DISCONTINUED | OUTPATIENT
Start: 2020-01-01 | End: 2020-01-01

## 2020-01-01 RX ORDER — ONDANSETRON 2 MG/ML
4 INJECTION INTRAMUSCULAR; INTRAVENOUS EVERY 4 HOURS PRN
Status: DISCONTINUED | OUTPATIENT
Start: 2020-01-01 | End: 2020-01-01

## 2020-01-01 RX ORDER — MORPHINE SULFATE 100 MG/5ML
10 SOLUTION ORAL
Status: DISCONTINUED | OUTPATIENT
Start: 2020-01-01 | End: 2020-01-01 | Stop reason: HOSPADM

## 2020-01-01 RX ORDER — FENOFIBRATE 134 MG/1
134 CAPSULE ORAL DAILY
Status: DISCONTINUED | OUTPATIENT
Start: 2020-01-01 | End: 2020-01-01

## 2020-01-01 RX ORDER — MORPHINE SULFATE 10 MG/ML
10 INJECTION, SOLUTION INTRAMUSCULAR; INTRAVENOUS
Status: DISCONTINUED | OUTPATIENT
Start: 2020-01-01 | End: 2020-01-01 | Stop reason: HOSPADM

## 2020-01-01 RX ORDER — DIVALPROEX SODIUM 125 MG/1
500 CAPSULE, COATED PELLETS ORAL EVERY 12 HOURS
Status: DISCONTINUED | OUTPATIENT
Start: 2020-01-01 | End: 2020-01-01 | Stop reason: HOSPADM

## 2020-01-01 RX ORDER — SODIUM HYPOCHLORITE 1.25 MG/ML
SOLUTION TOPICAL 2 TIMES DAILY
Status: DISCONTINUED | OUTPATIENT
Start: 2020-01-01 | End: 2020-01-01 | Stop reason: ALTCHOICE

## 2020-01-01 RX ORDER — MICONAZOLE NITRATE 20 MG/G
CREAM TOPICAL 2 TIMES DAILY
Status: DISPENSED | OUTPATIENT
Start: 2020-01-01 | End: 2020-01-01

## 2020-01-01 RX ORDER — ETOMIDATE 2 MG/ML
20 INJECTION INTRAVENOUS ONCE
Status: DISCONTINUED | OUTPATIENT
Start: 2020-01-01 | End: 2020-01-01

## 2020-01-01 RX ORDER — HYDRALAZINE HYDROCHLORIDE 50 MG/1
25 TABLET, FILM COATED ORAL 3 TIMES DAILY
Status: DISCONTINUED | OUTPATIENT
Start: 2020-01-01 | End: 2020-01-01 | Stop reason: HOSPADM

## 2020-01-01 RX ORDER — ONDANSETRON 2 MG/ML
8 INJECTION INTRAMUSCULAR; INTRAVENOUS EVERY 8 HOURS PRN
Status: DISCONTINUED | OUTPATIENT
Start: 2020-01-01 | End: 2020-01-01 | Stop reason: HOSPADM

## 2020-01-01 RX ORDER — PANTOPRAZOLE SODIUM 40 MG/10ML
40 INJECTION, POWDER, LYOPHILIZED, FOR SOLUTION INTRAVENOUS 2 TIMES DAILY
Status: DISCONTINUED | OUTPATIENT
Start: 2020-01-01 | End: 2020-01-01

## 2020-01-01 RX ORDER — FOLIC ACID 1 MG/1
1 TABLET ORAL DAILY
Status: DISCONTINUED | OUTPATIENT
Start: 2020-01-01 | End: 2020-01-01

## 2020-01-01 RX ORDER — CEFAZOLIN SODIUM 1 G/3ML
INJECTION, POWDER, FOR SOLUTION INTRAMUSCULAR; INTRAVENOUS PRN
Status: DISCONTINUED | OUTPATIENT
Start: 2020-01-01 | End: 2020-01-01 | Stop reason: SURG

## 2020-01-01 RX ORDER — ACETAMINOPHEN 325 MG/1
650 TABLET ORAL EVERY 6 HOURS PRN
Status: DISCONTINUED | OUTPATIENT
Start: 2020-01-01 | End: 2020-01-01 | Stop reason: HOSPADM

## 2020-01-01 RX ORDER — SODIUM CHLORIDE 9 MG/ML
500 INJECTION, SOLUTION INTRAVENOUS ONCE
Status: COMPLETED | OUTPATIENT
Start: 2020-01-01 | End: 2020-01-01

## 2020-01-01 RX ORDER — DOXAZOSIN 2 MG/1
6 TABLET ORAL
Status: DISCONTINUED | OUTPATIENT
Start: 2020-01-01 | End: 2020-01-01

## 2020-01-01 RX ORDER — PHENYLEPHRINE HCL IN 0.9% NACL 0.5 MG/5ML
SYRINGE (ML) INTRAVENOUS
Status: ACTIVE
Start: 2020-01-01 | End: 2020-01-01

## 2020-01-01 RX ORDER — LABETALOL HYDROCHLORIDE 5 MG/ML
10 INJECTION, SOLUTION INTRAVENOUS EVERY 4 HOURS PRN
Status: DISCONTINUED | OUTPATIENT
Start: 2020-01-01 | End: 2020-01-01

## 2020-01-01 RX ORDER — NOREPINEPHRINE BITARTRATE 1 MG/ML
INJECTION, SOLUTION INTRAVENOUS
Status: COMPLETED
Start: 2020-01-01 | End: 2020-01-01

## 2020-01-01 RX ORDER — LABETALOL 300 MG/1
300 TABLET, FILM COATED ORAL 2 TIMES DAILY
Status: DISCONTINUED | OUTPATIENT
Start: 2020-01-01 | End: 2020-01-01

## 2020-01-01 RX ORDER — LORAZEPAM 2 MG/ML
1 CONCENTRATE ORAL
Status: DISCONTINUED | OUTPATIENT
Start: 2020-01-01 | End: 2020-01-01 | Stop reason: HOSPADM

## 2020-01-01 RX ADMIN — LACOSAMIDE 300 MG: 100 TABLET, FILM COATED ORAL at 17:24

## 2020-01-01 RX ADMIN — LACOSAMIDE 300 MG: 100 TABLET, FILM COATED ORAL at 04:25

## 2020-01-01 RX ADMIN — CARVEDILOL 25 MG: 25 TABLET, FILM COATED ORAL at 05:39

## 2020-01-01 RX ADMIN — HEPARIN SODIUM 3300 UNITS: 1000 INJECTION, SOLUTION INTRAVENOUS; SUBCUTANEOUS at 10:49

## 2020-01-01 RX ADMIN — VALPROIC ACID 500 MG: 250 SOLUTION ORAL at 18:30

## 2020-01-01 RX ADMIN — OMEPRAZOLE 40 MG: KIT at 06:35

## 2020-01-01 RX ADMIN — ASPIRIN 81 MG 81 MG: 81 TABLET ORAL at 04:14

## 2020-01-01 RX ADMIN — HEPARIN SODIUM 5000 UNITS: 5000 INJECTION, SOLUTION INTRAVENOUS; SUBCUTANEOUS at 05:01

## 2020-01-01 RX ADMIN — OMEPRAZOLE 40 MG: KIT at 05:45

## 2020-01-01 RX ADMIN — INSULIN HUMAN 1 UNITS: 100 INJECTION, SOLUTION PARENTERAL at 06:02

## 2020-01-01 RX ADMIN — EPOETIN ALFA 4000 UNITS: 4000 SOLUTION INTRAVENOUS; SUBCUTANEOUS at 12:16

## 2020-01-01 RX ADMIN — ASPIRIN 81 MG 81 MG: 81 TABLET ORAL at 04:27

## 2020-01-01 RX ADMIN — MULTIPLE VITAMINS W/ MINERALS TAB 1 TABLET: TAB at 04:20

## 2020-01-01 RX ADMIN — INSULIN HUMAN 1 UNITS: 100 INJECTION, SOLUTION PARENTERAL at 00:02

## 2020-01-01 RX ADMIN — VITAMIN C 1 TABLET: TAB at 06:02

## 2020-01-01 RX ADMIN — NYSTATIN: 100000 POWDER TOPICAL at 16:02

## 2020-01-01 RX ADMIN — FAMOTIDINE 20 MG: 20 TABLET ORAL at 05:48

## 2020-01-01 RX ADMIN — FUROSEMIDE 80 MG: 10 INJECTION, SOLUTION INTRAMUSCULAR; INTRAVENOUS at 11:37

## 2020-01-01 RX ADMIN — NYSTATIN: 100000 POWDER TOPICAL at 18:01

## 2020-01-01 RX ADMIN — FOLIC ACID 1 MG: 1 TABLET ORAL at 05:38

## 2020-01-01 RX ADMIN — VALPROIC ACID 500 MG: 250 SOLUTION ORAL at 18:52

## 2020-01-01 RX ADMIN — FOLIC ACID 1 MG: 1 TABLET ORAL at 04:21

## 2020-01-01 RX ADMIN — DOXAZOSIN 6 MG: 2 TABLET ORAL at 21:31

## 2020-01-01 RX ADMIN — CARVEDILOL 25 MG: 25 TABLET, FILM COATED ORAL at 04:38

## 2020-01-01 RX ADMIN — OMEPRAZOLE 40 MG: KIT at 18:24

## 2020-01-01 RX ADMIN — INSULIN HUMAN 2 UNITS: 100 INJECTION, SOLUTION PARENTERAL at 23:46

## 2020-01-01 RX ADMIN — HEPARIN SODIUM 5000 UNITS: 5000 INJECTION, SOLUTION INTRAVENOUS; SUBCUTANEOUS at 23:40

## 2020-01-01 RX ADMIN — INSULIN HUMAN 2 UNITS: 100 INJECTION, SOLUTION PARENTERAL at 17:04

## 2020-01-01 RX ADMIN — INSULIN HUMAN 1 UNITS: 100 INJECTION, SOLUTION PARENTERAL at 00:24

## 2020-01-01 RX ADMIN — TOPIRAMATE 200 MG: 100 TABLET, FILM COATED ORAL at 04:16

## 2020-01-01 RX ADMIN — HEPARIN SODIUM 3300 UNITS: 1000 INJECTION, SOLUTION INTRAVENOUS; SUBCUTANEOUS at 13:16

## 2020-01-01 RX ADMIN — DIVALPROEX SODIUM 500 MG: 125 CAPSULE, COATED PELLETS ORAL at 17:34

## 2020-01-01 RX ADMIN — NYSTATIN: 100000 POWDER TOPICAL at 17:21

## 2020-01-01 RX ADMIN — HEPARIN SODIUM 3300 UNITS: 1000 INJECTION, SOLUTION INTRAVENOUS; SUBCUTANEOUS at 20:16

## 2020-01-01 RX ADMIN — VALPROIC ACID 500 MG: 250 SOLUTION ORAL at 17:46

## 2020-01-01 RX ADMIN — HYDRALAZINE HYDROCHLORIDE 100 MG: 25 TABLET, FILM COATED ORAL at 05:02

## 2020-01-01 RX ADMIN — TOPIRAMATE 200 MG: 100 TABLET, FILM COATED ORAL at 17:21

## 2020-01-01 RX ADMIN — TOPIRAMATE 200 MG: 100 TABLET, FILM COATED ORAL at 04:33

## 2020-01-01 RX ADMIN — ALLOPURINOL 200 MG: 100 TABLET ORAL at 05:50

## 2020-01-01 RX ADMIN — OMEPRAZOLE 40 MG: KIT at 18:25

## 2020-01-01 RX ADMIN — FOLIC ACID 1 MG: 1 TABLET ORAL at 04:14

## 2020-01-01 RX ADMIN — FENTANYL CITRATE 100 MCG: 0.05 INJECTION, SOLUTION INTRAMUSCULAR; INTRAVENOUS at 14:36

## 2020-01-01 RX ADMIN — NYSTATIN: 100000 POWDER TOPICAL at 17:33

## 2020-01-01 RX ADMIN — NOREPINEPHRINE BITARTRATE 2 MCG/MIN: 1 INJECTION INTRAVENOUS at 14:30

## 2020-01-01 RX ADMIN — HYDRALAZINE HYDROCHLORIDE 100 MG: 25 TABLET, FILM COATED ORAL at 17:31

## 2020-01-01 RX ADMIN — DIVALPROEX SODIUM 500 MG: 125 CAPSULE, COATED PELLETS ORAL at 04:40

## 2020-01-01 RX ADMIN — TOPIRAMATE 200 MG: 100 TABLET, FILM COATED ORAL at 04:11

## 2020-01-01 RX ADMIN — HEPARIN SODIUM 3300 UNITS: 1000 INJECTION, SOLUTION INTRAVENOUS; SUBCUTANEOUS at 12:01

## 2020-01-01 RX ADMIN — ALLOPURINOL 100 MG: 100 TABLET ORAL at 04:23

## 2020-01-01 RX ADMIN — HEPARIN SODIUM 5000 UNITS: 5000 INJECTION, SOLUTION INTRAVENOUS; SUBCUTANEOUS at 05:29

## 2020-01-01 RX ADMIN — VALPROIC ACID 500 MG: 250 SOLUTION ORAL at 05:19

## 2020-01-01 RX ADMIN — PROPOFOL 30 MCG/KG/MIN: 10 INJECTION, EMULSION INTRAVENOUS at 00:02

## 2020-01-01 RX ADMIN — TOPIRAMATE 200 MG: 100 TABLET, FILM COATED ORAL at 04:27

## 2020-01-01 RX ADMIN — HEPARIN SODIUM 3300 UNITS: 1000 INJECTION, SOLUTION INTRAVENOUS; SUBCUTANEOUS at 16:16

## 2020-01-01 RX ADMIN — LABETALOL HYDROCHLORIDE 10 MG: 5 INJECTION INTRAVENOUS at 11:17

## 2020-01-01 RX ADMIN — ATORVASTATIN CALCIUM 40 MG: 40 TABLET, FILM COATED ORAL at 04:22

## 2020-01-01 RX ADMIN — ALLOPURINOL 100 MG: 100 TABLET ORAL at 04:37

## 2020-01-01 RX ADMIN — VITAMIN C 1 TABLET: TAB at 04:36

## 2020-01-01 RX ADMIN — DOCUSATE SODIUM 100 MG: 50 LIQUID ORAL at 04:16

## 2020-01-01 RX ADMIN — OMEPRAZOLE 40 MG: KIT at 04:28

## 2020-01-01 RX ADMIN — LACOSAMIDE 300 MG: 100 TABLET, FILM COATED ORAL at 23:10

## 2020-01-01 RX ADMIN — ALLOPURINOL 100 MG: 100 TABLET ORAL at 04:12

## 2020-01-01 RX ADMIN — DIVALPROEX SODIUM 500 MG: 125 CAPSULE, COATED PELLETS ORAL at 05:37

## 2020-01-01 RX ADMIN — TOPIRAMATE 200 MG: 100 TABLET, FILM COATED ORAL at 04:26

## 2020-01-01 RX ADMIN — LACOSAMIDE 300 MG: 100 TABLET, FILM COATED ORAL at 21:27

## 2020-01-01 RX ADMIN — VITAMIN C 1 TABLET: TAB at 06:34

## 2020-01-01 RX ADMIN — TOPIRAMATE 200 MG: 100 TABLET, FILM COATED ORAL at 18:25

## 2020-01-01 RX ADMIN — PROPOFOL 50 MCG/KG/MIN: 10 INJECTION, EMULSION INTRAVENOUS at 17:19

## 2020-01-01 RX ADMIN — PROPOFOL 50 MCG/KG/MIN: 10 INJECTION, EMULSION INTRAVENOUS at 13:25

## 2020-01-01 RX ADMIN — MULTIPLE VITAMINS W/ MINERALS TAB 1 TABLET: TAB at 05:02

## 2020-01-01 RX ADMIN — HEPARIN SODIUM 5000 UNITS: 5000 INJECTION, SOLUTION INTRAVENOUS; SUBCUTANEOUS at 05:21

## 2020-01-01 RX ADMIN — VALPROIC ACID 500 MG: 250 SOLUTION ORAL at 05:01

## 2020-01-01 RX ADMIN — FOLIC ACID 1 MG: 1 TABLET ORAL at 04:52

## 2020-01-01 RX ADMIN — TOPIRAMATE 200 MG: 100 TABLET, FILM COATED ORAL at 17:42

## 2020-01-01 RX ADMIN — PROPOFOL 50 MCG/KG/MIN: 10 INJECTION, EMULSION INTRAVENOUS at 03:43

## 2020-01-01 RX ADMIN — ATORVASTATIN CALCIUM 40 MG: 40 TABLET, FILM COATED ORAL at 04:27

## 2020-01-01 RX ADMIN — DIVALPROEX SODIUM 500 MG: 125 CAPSULE, COATED PELLETS ORAL at 17:58

## 2020-01-01 RX ADMIN — LACOSAMIDE 300 MG: 100 TABLET, FILM COATED ORAL at 16:01

## 2020-01-01 RX ADMIN — NYSTATIN: 100000 POWDER TOPICAL at 18:32

## 2020-01-01 RX ADMIN — VITAMIN C 1 TABLET: TAB at 04:11

## 2020-01-01 RX ADMIN — DOCUSATE SODIUM 100 MG: 50 LIQUID ORAL at 06:07

## 2020-01-01 RX ADMIN — CARVEDILOL 25 MG: 25 TABLET, FILM COATED ORAL at 04:14

## 2020-01-01 RX ADMIN — LACOSAMIDE 300 MG: 100 TABLET, FILM COATED ORAL at 07:50

## 2020-01-01 RX ADMIN — ALBUMIN (HUMAN) 12.5 G: 5 SOLUTION INTRAVENOUS at 14:45

## 2020-01-01 RX ADMIN — HYDRALAZINE HYDROCHLORIDE 100 MG: 25 TABLET, FILM COATED ORAL at 18:00

## 2020-01-01 RX ADMIN — CARVEDILOL 25 MG: 25 TABLET, FILM COATED ORAL at 04:13

## 2020-01-01 RX ADMIN — ALLOPURINOL 100 MG: 100 TABLET ORAL at 05:28

## 2020-01-01 RX ADMIN — MULTIPLE VITAMINS W/ MINERALS TAB 1 TABLET: TAB at 06:05

## 2020-01-01 RX ADMIN — HYDRALAZINE HYDROCHLORIDE 100 MG: 25 TABLET, FILM COATED ORAL at 04:41

## 2020-01-01 RX ADMIN — ASPIRIN 81 MG 81 MG: 81 TABLET ORAL at 05:33

## 2020-01-01 RX ADMIN — NYSTATIN: 100000 POWDER TOPICAL at 16:53

## 2020-01-01 RX ADMIN — VALPROIC ACID 500 MG: 250 SOLUTION ORAL at 06:07

## 2020-01-01 RX ADMIN — INSULIN HUMAN 1 UNITS: 100 INJECTION, SOLUTION PARENTERAL at 05:33

## 2020-01-01 RX ADMIN — SENNOSIDES AND DOCUSATE SODIUM 2 TABLET: 8.6; 5 TABLET ORAL at 17:39

## 2020-01-01 RX ADMIN — TOPIRAMATE 200 MG: 100 TABLET, FILM COATED ORAL at 17:32

## 2020-01-01 RX ADMIN — TOPIRAMATE 200 MG: 100 TABLET, FILM COATED ORAL at 17:06

## 2020-01-01 RX ADMIN — OMEPRAZOLE 40 MG: KIT at 17:58

## 2020-01-01 RX ADMIN — HEPARIN SODIUM 5000 UNITS: 5000 INJECTION, SOLUTION INTRAVENOUS; SUBCUTANEOUS at 16:02

## 2020-01-01 RX ADMIN — HEPARIN SODIUM 3300 UNITS: 1000 INJECTION, SOLUTION INTRAVENOUS; SUBCUTANEOUS at 16:15

## 2020-01-01 RX ADMIN — HYDRALAZINE HYDROCHLORIDE 100 MG: 25 TABLET, FILM COATED ORAL at 17:01

## 2020-01-01 RX ADMIN — TOPIRAMATE 200 MG: 100 TABLET, FILM COATED ORAL at 18:33

## 2020-01-01 RX ADMIN — MIDAZOLAM 8 MG/HR: 5 INJECTION INTRAMUSCULAR; INTRAVENOUS at 08:54

## 2020-01-01 RX ADMIN — SODIUM CHLORIDE 500 MG: 9 INJECTION, SOLUTION INTRAVENOUS at 18:23

## 2020-01-01 RX ADMIN — MULTIPLE VITAMINS W/ MINERALS TAB 1 TABLET: TAB at 04:18

## 2020-01-01 RX ADMIN — VALPROIC ACID 500 MG: 250 SOLUTION ORAL at 04:43

## 2020-01-01 RX ADMIN — DIVALPROEX SODIUM 500 MG: 125 CAPSULE, COATED PELLETS ORAL at 05:45

## 2020-01-01 RX ADMIN — LACOSAMIDE 300 MG: 100 TABLET, FILM COATED ORAL at 18:06

## 2020-01-01 RX ADMIN — FOLIC ACID 1 MG: 1 TABLET ORAL at 04:29

## 2020-01-01 RX ADMIN — HEPARIN SODIUM 5000 UNITS: 5000 INJECTION, SOLUTION INTRAVENOUS; SUBCUTANEOUS at 14:01

## 2020-01-01 RX ADMIN — ASPIRIN 81 MG 81 MG: 81 TABLET ORAL at 05:29

## 2020-01-01 RX ADMIN — FOLIC ACID 1 MG: 1 TABLET ORAL at 04:27

## 2020-01-01 RX ADMIN — HEPARIN SODIUM 5000 UNITS: 5000 INJECTION, SOLUTION INTRAVENOUS; SUBCUTANEOUS at 17:21

## 2020-01-01 RX ADMIN — ATORVASTATIN CALCIUM 40 MG: 40 TABLET, FILM COATED ORAL at 05:38

## 2020-01-01 RX ADMIN — ATORVASTATIN CALCIUM 40 MG: 40 TABLET, FILM COATED ORAL at 04:28

## 2020-01-01 RX ADMIN — DIVALPROEX SODIUM 500 MG: 125 CAPSULE, COATED PELLETS ORAL at 22:52

## 2020-01-01 RX ADMIN — EPOETIN ALFA 4000 UNITS: 4000 SOLUTION INTRAVENOUS; SUBCUTANEOUS at 09:51

## 2020-01-01 RX ADMIN — SODIUM HYPOCHLORITE 1 ML: 1.25 SOLUTION TOPICAL at 18:15

## 2020-01-01 RX ADMIN — ATORVASTATIN CALCIUM 40 MG: 40 TABLET, FILM COATED ORAL at 05:25

## 2020-01-01 RX ADMIN — ALBUMIN (HUMAN) 25 G: 5 SOLUTION INTRAVENOUS at 09:09

## 2020-01-01 RX ADMIN — VITAMIN C 1 TABLET: TAB at 05:40

## 2020-01-01 RX ADMIN — LACOSAMIDE 300 MG: 100 TABLET, FILM COATED ORAL at 04:15

## 2020-01-01 RX ADMIN — FOLIC ACID 1 MG: 1 TABLET ORAL at 05:37

## 2020-01-01 RX ADMIN — FENOFIBRATE 134 MG: 134 CAPSULE ORAL at 05:34

## 2020-01-01 RX ADMIN — OMEPRAZOLE 40 MG: KIT at 06:16

## 2020-01-01 RX ADMIN — LORAZEPAM 1 MG: 2 INJECTION INTRAMUSCULAR; INTRAVENOUS at 17:40

## 2020-01-01 RX ADMIN — LACOSAMIDE 300 MG: 100 TABLET, FILM COATED ORAL at 05:29

## 2020-01-01 RX ADMIN — SODIUM CHLORIDE 100 MG PE: 9 INJECTION, SOLUTION INTRAVENOUS at 05:29

## 2020-01-01 RX ADMIN — MICONAZOLE NITRATE: 20 CREAM TOPICAL at 04:37

## 2020-01-01 RX ADMIN — OMEPRAZOLE 40 MG: KIT at 04:29

## 2020-01-01 RX ADMIN — SENNOSIDES AND DOCUSATE SODIUM 2 TABLET: 8.6; 5 TABLET ORAL at 21:13

## 2020-01-01 RX ADMIN — ALBUMIN (HUMAN) 25 G: 5 SOLUTION INTRAVENOUS at 12:45

## 2020-01-01 RX ADMIN — DIVALPROEX SODIUM 500 MG: 125 CAPSULE, COATED PELLETS ORAL at 17:16

## 2020-01-01 RX ADMIN — ACETAMINOPHEN 650 MG: 325 TABLET, FILM COATED ORAL at 12:57

## 2020-01-01 RX ADMIN — HEPARIN SODIUM 5000 UNITS: 5000 INJECTION, SOLUTION INTRAVENOUS; SUBCUTANEOUS at 18:00

## 2020-01-01 RX ADMIN — DIVALPROEX SODIUM 500 MG: 125 CAPSULE, COATED PELLETS ORAL at 17:02

## 2020-01-01 RX ADMIN — CARBOXYMETHYLCELLULOSE SODIUM 1 DROP: 5 SOLUTION/ DROPS OPHTHALMIC at 05:46

## 2020-01-01 RX ADMIN — ASPIRIN 81 MG 81 MG: 81 TABLET ORAL at 04:41

## 2020-01-01 RX ADMIN — TOPIRAMATE 200 MG: 100 TABLET, FILM COATED ORAL at 05:28

## 2020-01-01 RX ADMIN — HEPARIN SODIUM 5000 UNITS: 5000 INJECTION, SOLUTION INTRAVENOUS; SUBCUTANEOUS at 06:35

## 2020-01-01 RX ADMIN — OMEPRAZOLE 40 MG: KIT at 06:01

## 2020-01-01 RX ADMIN — HYDRALAZINE HYDROCHLORIDE 10 MG: 20 INJECTION INTRAMUSCULAR; INTRAVENOUS at 14:06

## 2020-01-01 RX ADMIN — FOLIC ACID 1 MG: 1 TABLET ORAL at 05:39

## 2020-01-01 RX ADMIN — LACOSAMIDE 300 MG: 100 TABLET, FILM COATED ORAL at 18:19

## 2020-01-01 RX ADMIN — FOLIC ACID 1 MG: 1 TABLET ORAL at 04:28

## 2020-01-01 RX ADMIN — INSULIN HUMAN 1 UNITS: 100 INJECTION, SOLUTION PARENTERAL at 11:43

## 2020-01-01 RX ADMIN — HYDRALAZINE HYDROCHLORIDE 100 MG: 25 TABLET, FILM COATED ORAL at 04:22

## 2020-01-01 RX ADMIN — LACOSAMIDE 300 MG: 100 TABLET, FILM COATED ORAL at 18:27

## 2020-01-01 RX ADMIN — SENNOSIDES AND DOCUSATE SODIUM 2 TABLET: 8.6; 5 TABLET ORAL at 17:26

## 2020-01-01 RX ADMIN — DIVALPROEX SODIUM 500 MG: 125 CAPSULE ORAL at 04:56

## 2020-01-01 RX ADMIN — LACOSAMIDE 300 MG: 100 TABLET, FILM COATED ORAL at 04:40

## 2020-01-01 RX ADMIN — TOPIRAMATE 200 MG: 100 TABLET, FILM COATED ORAL at 05:56

## 2020-01-01 RX ADMIN — TOPIRAMATE 200 MG: 100 TABLET, FILM COATED ORAL at 05:02

## 2020-01-01 RX ADMIN — VALPROIC ACID 500 MG: 250 SOLUTION ORAL at 05:25

## 2020-01-01 RX ADMIN — NYSTATIN: 100000 POWDER TOPICAL at 05:43

## 2020-01-01 RX ADMIN — FAMOTIDINE 20 MG: 20 TABLET ORAL at 05:12

## 2020-01-01 RX ADMIN — FENOFIBRATE 134 MG: 134 CAPSULE ORAL at 05:29

## 2020-01-01 RX ADMIN — HEPARIN SODIUM 5000 UNITS: 5000 INJECTION, SOLUTION INTRAVENOUS; SUBCUTANEOUS at 05:30

## 2020-01-01 RX ADMIN — ATORVASTATIN CALCIUM 40 MG: 40 TABLET, FILM COATED ORAL at 06:04

## 2020-01-01 RX ADMIN — TOPIRAMATE 100 MG: 100 TABLET, FILM COATED ORAL at 09:45

## 2020-01-01 RX ADMIN — MULTIPLE VITAMINS W/ MINERALS TAB 1 TABLET: TAB at 05:45

## 2020-01-01 RX ADMIN — ASPIRIN 81 MG 81 MG: 81 TABLET ORAL at 05:28

## 2020-01-01 RX ADMIN — ASPIRIN 81 MG 81 MG: 81 TABLET ORAL at 05:21

## 2020-01-01 RX ADMIN — INSULIN HUMAN 1 UNITS: 100 INJECTION, SOLUTION PARENTERAL at 17:52

## 2020-01-01 RX ADMIN — TOPIRAMATE 200 MG: 100 TABLET, FILM COATED ORAL at 17:50

## 2020-01-01 RX ADMIN — INSULIN HUMAN 1 UNITS: 100 INJECTION, SOLUTION PARENTERAL at 12:17

## 2020-01-01 RX ADMIN — HEPARIN SODIUM 5000 UNITS: 5000 INJECTION, SOLUTION INTRAVENOUS; SUBCUTANEOUS at 17:57

## 2020-01-01 RX ADMIN — FOLIC ACID 1 MG: 1 TABLET ORAL at 04:38

## 2020-01-01 RX ADMIN — PANTOPRAZOLE SODIUM 40 MG: 40 INJECTION, POWDER, LYOPHILIZED, FOR SOLUTION INTRAVENOUS at 05:42

## 2020-01-01 RX ADMIN — HEPARIN SODIUM 5000 UNITS: 5000 INJECTION, SOLUTION INTRAVENOUS; SUBCUTANEOUS at 04:46

## 2020-01-01 RX ADMIN — VALPROIC ACID 500 MG: 250 SOLUTION ORAL at 21:13

## 2020-01-01 RX ADMIN — MULTIPLE VITAMINS W/ MINERALS TAB 1 TABLET: TAB at 13:27

## 2020-01-01 RX ADMIN — HYDRALAZINE HYDROCHLORIDE 100 MG: 25 TABLET, FILM COATED ORAL at 12:35

## 2020-01-01 RX ADMIN — SODIUM CHLORIDE 300 MG: 9 INJECTION, SOLUTION INTRAVENOUS at 05:04

## 2020-01-01 RX ADMIN — CARVEDILOL 25 MG: 25 TABLET, FILM COATED ORAL at 04:46

## 2020-01-01 RX ADMIN — VALPROIC ACID 500 MG: 250 SOLUTION ORAL at 05:28

## 2020-01-01 RX ADMIN — HEPARIN SODIUM 5000 UNITS: 5000 INJECTION, SOLUTION INTRAVENOUS; SUBCUTANEOUS at 05:45

## 2020-01-01 RX ADMIN — SENNOSIDES AND DOCUSATE SODIUM 2 TABLET: 8.6; 5 TABLET ORAL at 05:12

## 2020-01-01 RX ADMIN — FOLIC ACID 1 MG: 1 TABLET ORAL at 14:30

## 2020-01-01 RX ADMIN — TOPIRAMATE 200 MG: 100 TABLET, FILM COATED ORAL at 05:25

## 2020-01-01 RX ADMIN — ACETAMINOPHEN 650 MG: 325 TABLET, FILM COATED ORAL at 06:03

## 2020-01-01 RX ADMIN — TOPIRAMATE 200 MG: 100 TABLET, FILM COATED ORAL at 17:02

## 2020-01-01 RX ADMIN — ALLOPURINOL 100 MG: 100 TABLET ORAL at 04:41

## 2020-01-01 RX ADMIN — TOPIRAMATE 200 MG: 100 TABLET, FILM COATED ORAL at 05:54

## 2020-01-01 RX ADMIN — EPOETIN ALFA 4000 UNITS: 4000 SOLUTION INTRAVENOUS; SUBCUTANEOUS at 08:52

## 2020-01-01 RX ADMIN — TOPIRAMATE 200 MG: 100 TABLET, FILM COATED ORAL at 06:02

## 2020-01-01 RX ADMIN — OMEPRAZOLE 40 MG: KIT at 17:55

## 2020-01-01 RX ADMIN — ATORVASTATIN CALCIUM 40 MG: 40 TABLET, FILM COATED ORAL at 04:40

## 2020-01-01 RX ADMIN — OMEPRAZOLE 40 MG: KIT at 17:33

## 2020-01-01 RX ADMIN — HEPARIN SODIUM 3300 UNITS: 1000 INJECTION, SOLUTION INTRAVENOUS; SUBCUTANEOUS at 12:41

## 2020-01-01 RX ADMIN — LACOSAMIDE 300 MG: 100 TABLET, FILM COATED ORAL at 17:20

## 2020-01-01 RX ADMIN — TOPIRAMATE 200 MG: 100 TABLET, FILM COATED ORAL at 05:39

## 2020-01-01 RX ADMIN — NYSTATIN: 100000 POWDER TOPICAL at 17:57

## 2020-01-01 RX ADMIN — DOXAZOSIN 6 MG: 2 TABLET ORAL at 20:33

## 2020-01-01 RX ADMIN — NYSTATIN: 100000 POWDER TOPICAL at 04:20

## 2020-01-01 RX ADMIN — FENTANYL CITRATE 100 MCG: 0.05 INJECTION, SOLUTION INTRAMUSCULAR; INTRAVENOUS at 01:16

## 2020-01-01 RX ADMIN — TOPIRAMATE 200 MG: 100 TABLET, FILM COATED ORAL at 04:25

## 2020-01-01 RX ADMIN — NYSTATIN: 100000 POWDER TOPICAL at 04:24

## 2020-01-01 RX ADMIN — MICONAZOLE NITRATE: 20 CREAM TOPICAL at 05:37

## 2020-01-01 RX ADMIN — HYDRALAZINE HYDROCHLORIDE 25 MG: 50 TABLET ORAL at 04:20

## 2020-01-01 RX ADMIN — LABETALOL HYDROCHLORIDE 10 MG: 5 INJECTION INTRAVENOUS at 10:27

## 2020-01-01 RX ADMIN — ASPIRIN 81 MG 81 MG: 81 TABLET ORAL at 04:58

## 2020-01-01 RX ADMIN — OMEPRAZOLE 40 MG: KIT at 17:25

## 2020-01-01 RX ADMIN — DIVALPROEX SODIUM 500 MG: 125 CAPSULE, COATED PELLETS ORAL at 05:39

## 2020-01-01 RX ADMIN — ASPIRIN 81 MG 81 MG: 81 TABLET ORAL at 04:15

## 2020-01-01 RX ADMIN — ATORVASTATIN CALCIUM 40 MG: 40 TABLET, FILM COATED ORAL at 04:43

## 2020-01-01 RX ADMIN — OMEPRAZOLE 40 MG: KIT at 05:28

## 2020-01-01 RX ADMIN — SODIUM CHLORIDE 100 MG: 9 INJECTION, SOLUTION INTRAVENOUS at 12:51

## 2020-01-01 RX ADMIN — ASPIRIN 81 MG 81 MG: 81 TABLET ORAL at 04:26

## 2020-01-01 RX ADMIN — EPOETIN ALFA 4000 UNITS: 4000 SOLUTION INTRAVENOUS; SUBCUTANEOUS at 10:56

## 2020-01-01 RX ADMIN — PROPOFOL 50 MCG/KG/MIN: 10 INJECTION, EMULSION INTRAVENOUS at 22:13

## 2020-01-01 RX ADMIN — TOPIRAMATE 200 MG: 100 TABLET, FILM COATED ORAL at 18:12

## 2020-01-01 RX ADMIN — ASPIRIN 81 MG 81 MG: 81 TABLET ORAL at 16:54

## 2020-01-01 RX ADMIN — HEPARIN SODIUM 5000 UNITS: 5000 INJECTION, SOLUTION INTRAVENOUS; SUBCUTANEOUS at 18:29

## 2020-01-01 RX ADMIN — ALLOPURINOL 200 MG: 100 TABLET ORAL at 05:33

## 2020-01-01 RX ADMIN — LACOSAMIDE 300 MG: 100 TABLET, FILM COATED ORAL at 04:28

## 2020-01-01 RX ADMIN — HYDRALAZINE HYDROCHLORIDE 100 MG: 25 TABLET, FILM COATED ORAL at 17:24

## 2020-01-01 RX ADMIN — INSULIN HUMAN 1 UNITS: 100 INJECTION, SOLUTION PARENTERAL at 05:47

## 2020-01-01 RX ADMIN — ALLOPURINOL 100 MG: 100 TABLET ORAL at 04:27

## 2020-01-01 RX ADMIN — HEPARIN SODIUM 5000 UNITS: 5000 INJECTION, SOLUTION INTRAVENOUS; SUBCUTANEOUS at 05:24

## 2020-01-01 RX ADMIN — NYSTATIN: 100000 POWDER TOPICAL at 05:18

## 2020-01-01 RX ADMIN — ASPIRIN 81 MG 81 MG: 81 TABLET ORAL at 04:19

## 2020-01-01 RX ADMIN — SENNOSIDES AND DOCUSATE SODIUM 2 TABLET: 8.6; 5 TABLET ORAL at 20:47

## 2020-01-01 RX ADMIN — OMEPRAZOLE 40 MG: KIT at 05:24

## 2020-01-01 RX ADMIN — NYSTATIN: 100000 POWDER TOPICAL at 18:27

## 2020-01-01 RX ADMIN — LACOSAMIDE 300 MG: 100 TABLET, FILM COATED ORAL at 17:15

## 2020-01-01 RX ADMIN — HEPARIN SODIUM 5000 UNITS: 5000 INJECTION, SOLUTION INTRAVENOUS; SUBCUTANEOUS at 13:09

## 2020-01-01 RX ADMIN — ASPIRIN 81 MG 81 MG: 81 TABLET ORAL at 04:45

## 2020-01-01 RX ADMIN — ALLOPURINOL 100 MG: 100 TABLET ORAL at 04:25

## 2020-01-01 RX ADMIN — INSULIN HUMAN 1 UNITS: 100 INJECTION, SOLUTION PARENTERAL at 00:09

## 2020-01-01 RX ADMIN — OXYCODONE HYDROCHLORIDE AND ACETAMINOPHEN 500 MG: 500 TABLET ORAL at 04:15

## 2020-01-01 RX ADMIN — HYDRALAZINE HYDROCHLORIDE 10 MG: 20 INJECTION INTRAMUSCULAR; INTRAVENOUS at 15:02

## 2020-01-01 RX ADMIN — LACOSAMIDE 300 MG: 100 TABLET, FILM COATED ORAL at 05:44

## 2020-01-01 RX ADMIN — HYDRALAZINE HYDROCHLORIDE 25 MG: 50 TABLET ORAL at 12:35

## 2020-01-01 RX ADMIN — MICONAZOLE NITRATE: 20 CREAM TOPICAL at 17:10

## 2020-01-01 RX ADMIN — HEPARIN SODIUM 3300 UNITS: 1000 INJECTION, SOLUTION INTRAVENOUS; SUBCUTANEOUS at 17:05

## 2020-01-01 RX ADMIN — TOPIRAMATE 200 MG: 100 TABLET, FILM COATED ORAL at 04:28

## 2020-01-01 RX ADMIN — INSULIN HUMAN 2 UNITS: 100 INJECTION, SOLUTION PARENTERAL at 13:16

## 2020-01-01 RX ADMIN — EPOETIN ALFA 4000 UNITS: 4000 SOLUTION INTRAVENOUS; SUBCUTANEOUS at 08:22

## 2020-01-01 RX ADMIN — VITAMIN C 1 TABLET: TAB at 05:46

## 2020-01-01 RX ADMIN — ASPIRIN 81 MG 81 MG: 81 TABLET ORAL at 05:20

## 2020-01-01 RX ADMIN — MIDODRINE HYDROCHLORIDE 5 MG: 5 TABLET ORAL at 15:37

## 2020-01-01 RX ADMIN — HYDRALAZINE HYDROCHLORIDE 10 MG: 20 INJECTION INTRAMUSCULAR; INTRAVENOUS at 21:00

## 2020-01-01 RX ADMIN — VALPROIC ACID 500 MG: 250 SOLUTION ORAL at 05:40

## 2020-01-01 RX ADMIN — MICONAZOLE NITRATE: 20 CREAM TOPICAL at 18:24

## 2020-01-01 RX ADMIN — HEPARIN SODIUM 5000 UNITS: 5000 INJECTION, SOLUTION INTRAVENOUS; SUBCUTANEOUS at 17:56

## 2020-01-01 RX ADMIN — MULTIPLE VITAMINS W/ MINERALS TAB 1 TABLET: TAB at 05:17

## 2020-01-01 RX ADMIN — HEPARIN SODIUM 3300 UNITS: 1000 INJECTION, SOLUTION INTRAVENOUS; SUBCUTANEOUS at 16:05

## 2020-01-01 RX ADMIN — INSULIN HUMAN 1 UNITS: 100 INJECTION, SOLUTION PARENTERAL at 11:07

## 2020-01-01 RX ADMIN — HEPARIN SODIUM 5000 UNITS: 5000 INJECTION, SOLUTION INTRAVENOUS; SUBCUTANEOUS at 22:19

## 2020-01-01 RX ADMIN — DIVALPROEX SODIUM 500 MG: 125 CAPSULE, COATED PELLETS ORAL at 17:21

## 2020-01-01 RX ADMIN — LACOSAMIDE 300 MG: 100 TABLET, FILM COATED ORAL at 05:02

## 2020-01-01 RX ADMIN — HYDRALAZINE HYDROCHLORIDE 25 MG: 50 TABLET ORAL at 06:03

## 2020-01-01 RX ADMIN — LACOSAMIDE 300 MG: 100 TABLET, FILM COATED ORAL at 18:24

## 2020-01-01 RX ADMIN — ASPIRIN 81 MG 81 MG: 81 TABLET ORAL at 04:12

## 2020-01-01 RX ADMIN — LABETALOL HYDROCHLORIDE 300 MG: 300 TABLET, FILM COATED ORAL at 10:37

## 2020-01-01 RX ADMIN — FENTANYL CITRATE 50 MCG: 0.05 INJECTION, SOLUTION INTRAMUSCULAR; INTRAVENOUS at 20:24

## 2020-01-01 RX ADMIN — ALLOPURINOL 100 MG: 100 TABLET ORAL at 05:21

## 2020-01-01 RX ADMIN — ASPIRIN 81 MG 81 MG: 81 TABLET ORAL at 04:44

## 2020-01-01 RX ADMIN — MIDAZOLAM 8 MG/HR: 5 INJECTION INTRAMUSCULAR; INTRAVENOUS at 04:26

## 2020-01-01 RX ADMIN — FENTANYL CITRATE 50 MCG: 0.05 INJECTION, SOLUTION INTRAMUSCULAR; INTRAVENOUS at 15:55

## 2020-01-01 RX ADMIN — DIVALPROEX SODIUM 500 MG: 125 CAPSULE ORAL at 17:23

## 2020-01-01 RX ADMIN — ATORVASTATIN CALCIUM 40 MG: 40 TABLET, FILM COATED ORAL at 04:48

## 2020-01-01 RX ADMIN — HYDRALAZINE HYDROCHLORIDE 100 MG: 25 TABLET, FILM COATED ORAL at 18:31

## 2020-01-01 RX ADMIN — LACOSAMIDE 300 MG: 100 TABLET, FILM COATED ORAL at 04:22

## 2020-01-01 RX ADMIN — HEPARIN SODIUM 5000 UNITS: 5000 INJECTION, SOLUTION INTRAVENOUS; SUBCUTANEOUS at 21:51

## 2020-01-01 RX ADMIN — SODIUM CHLORIDE 300 MG: 9 INJECTION, SOLUTION INTRAVENOUS at 04:58

## 2020-01-01 RX ADMIN — VALPROIC ACID 500 MG: 250 SOLUTION ORAL at 17:48

## 2020-01-01 RX ADMIN — OXYCODONE HYDROCHLORIDE 5 MG: 5 SOLUTION ORAL at 09:01

## 2020-01-01 RX ADMIN — ASPIRIN 81 MG 81 MG: 81 TABLET ORAL at 05:39

## 2020-01-01 RX ADMIN — ALBUMIN (HUMAN) 12.5 G: 12.5 SOLUTION INTRAVENOUS at 09:05

## 2020-01-01 RX ADMIN — OMEPRAZOLE 40 MG: KIT at 21:15

## 2020-01-01 RX ADMIN — MULTIPLE VITAMINS W/ MINERALS TAB 1 TABLET: TAB at 04:15

## 2020-01-01 RX ADMIN — NYSTATIN: 100000 POWDER TOPICAL at 17:46

## 2020-01-01 RX ADMIN — ASPIRIN 81 MG 81 MG: 81 TABLET ORAL at 11:00

## 2020-01-01 RX ADMIN — EPOETIN ALFA 4000 UNITS: 4000 SOLUTION INTRAVENOUS; SUBCUTANEOUS at 14:21

## 2020-01-01 RX ADMIN — OXYCODONE HYDROCHLORIDE 5 MG: 5 SOLUTION ORAL at 16:45

## 2020-01-01 RX ADMIN — NYSTATIN: 100000 POWDER TOPICAL at 04:43

## 2020-01-01 RX ADMIN — FAMOTIDINE 20 MG: 20 TABLET ORAL at 05:20

## 2020-01-01 RX ADMIN — LACOSAMIDE 300 MG: 100 TABLET, FILM COATED ORAL at 20:48

## 2020-01-01 RX ADMIN — CARVEDILOL 25 MG: 25 TABLET, FILM COATED ORAL at 17:44

## 2020-01-01 RX ADMIN — LACOSAMIDE 300 MG: 100 TABLET, FILM COATED ORAL at 16:53

## 2020-01-01 RX ADMIN — ALLOPURINOL 200 MG: 100 TABLET ORAL at 05:01

## 2020-01-01 RX ADMIN — LACOSAMIDE 300 MG: 100 TABLET, FILM COATED ORAL at 17:25

## 2020-01-01 RX ADMIN — LACOSAMIDE 300 MG: 100 TABLET, FILM COATED ORAL at 21:12

## 2020-01-01 RX ADMIN — HEPARIN SODIUM 5000 UNITS: 5000 INJECTION, SOLUTION INTRAVENOUS; SUBCUTANEOUS at 13:20

## 2020-01-01 RX ADMIN — ALLOPURINOL 100 MG: 100 TABLET ORAL at 05:05

## 2020-01-01 RX ADMIN — ALLOPURINOL 100 MG: 100 TABLET ORAL at 05:25

## 2020-01-01 RX ADMIN — FOLIC ACID 1 MG: 1 TABLET ORAL at 06:03

## 2020-01-01 RX ADMIN — OMEPRAZOLE 40 MG: KIT at 18:10

## 2020-01-01 RX ADMIN — CARVEDILOL 25 MG: 25 TABLET, FILM COATED ORAL at 04:26

## 2020-01-01 RX ADMIN — FOLIC ACID 1 MG: 1 TABLET ORAL at 05:45

## 2020-01-01 RX ADMIN — MIDODRINE HYDROCHLORIDE 5 MG: 5 TABLET ORAL at 13:04

## 2020-01-01 RX ADMIN — EPOETIN ALFA 4000 UNITS: 4000 SOLUTION INTRAVENOUS; SUBCUTANEOUS at 09:28

## 2020-01-01 RX ADMIN — ALLOPURINOL 100 MG: 100 TABLET ORAL at 04:26

## 2020-01-01 RX ADMIN — NYSTATIN: 100000 POWDER TOPICAL at 05:45

## 2020-01-01 RX ADMIN — VALPROIC ACID 500 MG: 250 SOLUTION ORAL at 04:47

## 2020-01-01 RX ADMIN — HEPARIN SODIUM 5000 UNITS: 5000 INJECTION, SOLUTION INTRAVENOUS; SUBCUTANEOUS at 05:33

## 2020-01-01 RX ADMIN — VALPROIC ACID 500 MG: 250 SOLUTION ORAL at 16:01

## 2020-01-01 RX ADMIN — SODIUM CHLORIDE 300 MG: 9 INJECTION, SOLUTION INTRAVENOUS at 06:20

## 2020-01-01 RX ADMIN — TOPIRAMATE 200 MG: 100 TABLET, FILM COATED ORAL at 05:34

## 2020-01-01 RX ADMIN — OMEPRAZOLE 40 MG: KIT at 04:32

## 2020-01-01 RX ADMIN — ATORVASTATIN CALCIUM 40 MG: 40 TABLET, FILM COATED ORAL at 05:54

## 2020-01-01 RX ADMIN — FOLIC ACID 1 MG: 1 TABLET ORAL at 05:33

## 2020-01-01 RX ADMIN — HEPARIN SODIUM 5000 UNITS: 5000 INJECTION, SOLUTION INTRAVENOUS; SUBCUTANEOUS at 18:33

## 2020-01-01 RX ADMIN — DOXAZOSIN 6 MG: 2 TABLET ORAL at 20:08

## 2020-01-01 RX ADMIN — HEPARIN SODIUM 5000 UNITS: 5000 INJECTION, SOLUTION INTRAVENOUS; SUBCUTANEOUS at 18:11

## 2020-01-01 RX ADMIN — HEPARIN SODIUM 5000 UNITS: 5000 INJECTION, SOLUTION INTRAVENOUS; SUBCUTANEOUS at 16:51

## 2020-01-01 RX ADMIN — HEPARIN SODIUM 5000 UNITS: 5000 INJECTION, SOLUTION INTRAVENOUS; SUBCUTANEOUS at 17:58

## 2020-01-01 RX ADMIN — FOLIC ACID 1 MG: 1 TABLET ORAL at 04:49

## 2020-01-01 RX ADMIN — CARVEDILOL 25 MG: 25 TABLET, FILM COATED ORAL at 17:31

## 2020-01-01 RX ADMIN — SENNOSIDES AND DOCUSATE SODIUM 2 TABLET: 8.6; 5 TABLET ORAL at 16:58

## 2020-01-01 RX ADMIN — CARVEDILOL 25 MG: 25 TABLET, FILM COATED ORAL at 16:46

## 2020-01-01 RX ADMIN — OMEPRAZOLE 40 MG: KIT at 05:34

## 2020-01-01 RX ADMIN — LACOSAMIDE 300 MG: 100 TABLET, FILM COATED ORAL at 04:14

## 2020-01-01 RX ADMIN — VALPROIC ACID 500 MG: 250 SOLUTION ORAL at 05:32

## 2020-01-01 RX ADMIN — ALLOPURINOL 100 MG: 100 TABLET ORAL at 04:18

## 2020-01-01 RX ADMIN — INSULIN HUMAN 1 UNITS: 100 INJECTION, SOLUTION PARENTERAL at 18:04

## 2020-01-01 RX ADMIN — INSULIN HUMAN 1 UNITS: 100 INJECTION, SOLUTION PARENTERAL at 18:25

## 2020-01-01 RX ADMIN — HEPARIN SODIUM 5000 UNITS: 5000 INJECTION, SOLUTION INTRAVENOUS; SUBCUTANEOUS at 21:44

## 2020-01-01 RX ADMIN — ACETAMINOPHEN 650 MG: 325 TABLET, FILM COATED ORAL at 12:43

## 2020-01-01 RX ADMIN — ALLOPURINOL 100 MG: 100 TABLET ORAL at 04:46

## 2020-01-01 RX ADMIN — SENNOSIDES AND DOCUSATE SODIUM 2 TABLET: 8.6; 5 TABLET ORAL at 18:14

## 2020-01-01 RX ADMIN — NYSTATIN: 100000 POWDER TOPICAL at 17:25

## 2020-01-01 RX ADMIN — HYDRALAZINE HYDROCHLORIDE 10 MG: 20 INJECTION INTRAMUSCULAR; INTRAVENOUS at 22:14

## 2020-01-01 RX ADMIN — DIVALPROEX SODIUM 500 MG: 125 CAPSULE ORAL at 17:58

## 2020-01-01 RX ADMIN — DIVALPROEX SODIUM 500 MG: 125 CAPSULE, COATED PELLETS ORAL at 17:50

## 2020-01-01 RX ADMIN — HYDRALAZINE HYDROCHLORIDE 100 MG: 25 TABLET, FILM COATED ORAL at 14:29

## 2020-01-01 RX ADMIN — HEPARIN SODIUM 3300 UNITS: 1000 INJECTION, SOLUTION INTRAVENOUS; SUBCUTANEOUS at 17:31

## 2020-01-01 RX ADMIN — MULTIPLE VITAMINS W/ MINERALS TAB 1 TABLET: TAB at 05:29

## 2020-01-01 RX ADMIN — ACETAMINOPHEN 650 MG: 325 TABLET, FILM COATED ORAL at 15:47

## 2020-01-01 RX ADMIN — HEPARIN SODIUM 5000 UNITS: 5000 INJECTION, SOLUTION INTRAVENOUS; SUBCUTANEOUS at 22:52

## 2020-01-01 RX ADMIN — FENOFIBRATE 134 MG: 134 CAPSULE ORAL at 05:33

## 2020-01-01 RX ADMIN — HYDRALAZINE HYDROCHLORIDE 10 MG: 20 INJECTION INTRAMUSCULAR; INTRAVENOUS at 12:18

## 2020-01-01 RX ADMIN — ONDANSETRON 4 MG: 2 INJECTION INTRAMUSCULAR; INTRAVENOUS at 18:36

## 2020-01-01 RX ADMIN — TOPIRAMATE 200 MG: 100 TABLET, FILM COATED ORAL at 05:20

## 2020-01-01 RX ADMIN — NYSTATIN: 100000 POWDER TOPICAL at 17:48

## 2020-01-01 RX ADMIN — SODIUM CHLORIDE 500 MG: 9 INJECTION, SOLUTION INTRAVENOUS at 05:33

## 2020-01-01 RX ADMIN — SENNOSIDES AND DOCUSATE SODIUM 2 TABLET: 8.6; 5 TABLET ORAL at 04:36

## 2020-01-01 RX ADMIN — OXYCODONE HYDROCHLORIDE AND ACETAMINOPHEN 500 MG: 500 TABLET ORAL at 05:29

## 2020-01-01 RX ADMIN — VITAMIN C 1 TABLET: TAB at 04:35

## 2020-01-01 RX ADMIN — TOPIRAMATE 200 MG: 100 TABLET, FILM COATED ORAL at 17:31

## 2020-01-01 RX ADMIN — LISINOPRIL 5 MG: 5 TABLET ORAL at 04:23

## 2020-01-01 RX ADMIN — LACOSAMIDE 300 MG: 100 TABLET, FILM COATED ORAL at 04:26

## 2020-01-01 RX ADMIN — INSULIN HUMAN 1 UNITS: 100 INJECTION, SOLUTION PARENTERAL at 18:11

## 2020-01-01 RX ADMIN — VALPROIC ACID 500 MG: 250 SOLUTION ORAL at 18:32

## 2020-01-01 RX ADMIN — HEPARIN SODIUM 5000 UNITS: 5000 INJECTION, SOLUTION INTRAVENOUS; SUBCUTANEOUS at 20:38

## 2020-01-01 RX ADMIN — LACOSAMIDE 300 MG: 100 TABLET, FILM COATED ORAL at 04:16

## 2020-01-01 RX ADMIN — VALPROIC ACID 500 MG: 250 SOLUTION ORAL at 05:42

## 2020-01-01 RX ADMIN — ALLOPURINOL 100 MG: 100 TABLET ORAL at 05:07

## 2020-01-01 RX ADMIN — INSULIN HUMAN 2 UNITS: 100 INJECTION, SOLUTION PARENTERAL at 06:26

## 2020-01-01 RX ADMIN — MIDAZOLAM 4 MG/HR: 5 INJECTION INTRAMUSCULAR; INTRAVENOUS at 12:17

## 2020-01-01 RX ADMIN — ATORVASTATIN CALCIUM 40 MG: 40 TABLET, FILM COATED ORAL at 05:24

## 2020-01-01 RX ADMIN — TOPIRAMATE 200 MG: 100 TABLET, FILM COATED ORAL at 18:11

## 2020-01-01 RX ADMIN — SENNOSIDES AND DOCUSATE SODIUM 2 TABLET: 8.6; 5 TABLET ORAL at 04:09

## 2020-01-01 RX ADMIN — ACETAMINOPHEN 650 MG: 325 TABLET, FILM COATED ORAL at 16:15

## 2020-01-01 RX ADMIN — FENTANYL CITRATE 100 MCG: 0.05 INJECTION, SOLUTION INTRAMUSCULAR; INTRAVENOUS at 17:48

## 2020-01-01 RX ADMIN — FOLIC ACID 1 MG: 1 TABLET ORAL at 04:25

## 2020-01-01 RX ADMIN — TOPIRAMATE 200 MG: 100 TABLET, FILM COATED ORAL at 17:43

## 2020-01-01 RX ADMIN — OMEPRAZOLE 40 MG: KIT at 17:42

## 2020-01-01 RX ADMIN — VITAMIN C 1 TABLET: TAB at 04:22

## 2020-01-01 RX ADMIN — INSULIN HUMAN 1 UNITS: 100 INJECTION, SOLUTION PARENTERAL at 17:43

## 2020-01-01 RX ADMIN — INSULIN HUMAN 1 UNITS: 100 INJECTION, SOLUTION PARENTERAL at 05:50

## 2020-01-01 RX ADMIN — INSULIN HUMAN 1 UNITS: 100 INJECTION, SOLUTION PARENTERAL at 06:22

## 2020-01-01 RX ADMIN — HEPARIN SODIUM 3300 UNITS: 1000 INJECTION, SOLUTION INTRAVENOUS; SUBCUTANEOUS at 19:22

## 2020-01-01 RX ADMIN — OMEPRAZOLE 40 MG: KIT at 06:05

## 2020-01-01 RX ADMIN — ALTEPLASE 2 MG: 2.2 INJECTION, POWDER, LYOPHILIZED, FOR SOLUTION INTRAVENOUS at 14:21

## 2020-01-01 RX ADMIN — HEPARIN SODIUM 5000 UNITS: 5000 INJECTION, SOLUTION INTRAVENOUS; SUBCUTANEOUS at 17:40

## 2020-01-01 RX ADMIN — TOPIRAMATE 200 MG: 100 TABLET, FILM COATED ORAL at 18:14

## 2020-01-01 RX ADMIN — NYSTATIN: 100000 POWDER TOPICAL at 16:58

## 2020-01-01 RX ADMIN — MULTIPLE VITAMINS W/ MINERALS TAB 1 TABLET: TAB at 05:55

## 2020-01-01 RX ADMIN — TOPIRAMATE 200 MG: 100 TABLET, FILM COATED ORAL at 18:06

## 2020-01-01 RX ADMIN — HEPARIN SODIUM 3300 UNITS: 1000 INJECTION, SOLUTION INTRAVENOUS; SUBCUTANEOUS at 15:40

## 2020-01-01 RX ADMIN — NYSTATIN: 100000 POWDER TOPICAL at 18:25

## 2020-01-01 RX ADMIN — LACOSAMIDE 300 MG: 100 TABLET, FILM COATED ORAL at 06:03

## 2020-01-01 RX ADMIN — DIVALPROEX SODIUM 500 MG: 125 CAPSULE, COATED PELLETS ORAL at 04:12

## 2020-01-01 RX ADMIN — LACOSAMIDE 300 MG: 100 TABLET, FILM COATED ORAL at 04:11

## 2020-01-01 RX ADMIN — EPOETIN ALFA: 4000 SOLUTION INTRAVENOUS; SUBCUTANEOUS at 15:53

## 2020-01-01 RX ADMIN — ATORVASTATIN CALCIUM 40 MG: 40 TABLET, FILM COATED ORAL at 04:59

## 2020-01-01 RX ADMIN — TOPIRAMATE 200 MG: 100 TABLET, FILM COATED ORAL at 17:47

## 2020-01-01 RX ADMIN — SODIUM CHLORIDE 750 MG: 9 INJECTION, SOLUTION INTRAVENOUS at 05:49

## 2020-01-01 RX ADMIN — SODIUM HYPOCHLORITE 1 ML: 1.25 SOLUTION TOPICAL at 08:56

## 2020-01-01 RX ADMIN — HEPARIN SODIUM 3300 UNITS: 1000 INJECTION, SOLUTION INTRAVENOUS; SUBCUTANEOUS at 15:11

## 2020-01-01 RX ADMIN — DIVALPROEX SODIUM 500 MG: 125 CAPSULE, COATED PELLETS ORAL at 17:31

## 2020-01-01 RX ADMIN — HYDRALAZINE HYDROCHLORIDE 100 MG: 25 TABLET, FILM COATED ORAL at 04:13

## 2020-01-01 RX ADMIN — ATORVASTATIN CALCIUM 40 MG: 40 TABLET, FILM COATED ORAL at 04:10

## 2020-01-01 RX ADMIN — FUROSEMIDE 20 MG: 10 INJECTION, SOLUTION INTRAMUSCULAR; INTRAVENOUS at 19:18

## 2020-01-01 RX ADMIN — TOPIRAMATE 200 MG: 100 TABLET, FILM COATED ORAL at 17:58

## 2020-01-01 RX ADMIN — SODIUM CHLORIDE: 9 INJECTION, SOLUTION INTRAVENOUS at 22:04

## 2020-01-01 RX ADMIN — CARVEDILOL 25 MG: 25 TABLET, FILM COATED ORAL at 04:29

## 2020-01-01 RX ADMIN — SENNOSIDES AND DOCUSATE SODIUM 2 TABLET: 8.6; 5 TABLET ORAL at 04:38

## 2020-01-01 RX ADMIN — CARBOXYMETHYLCELLULOSE SODIUM 1 DROP: 5 SOLUTION/ DROPS OPHTHALMIC at 05:01

## 2020-01-01 RX ADMIN — ONDANSETRON 4 MG: 2 INJECTION INTRAMUSCULAR; INTRAVENOUS at 16:50

## 2020-01-01 RX ADMIN — LACOSAMIDE 300 MG: 100 TABLET, FILM COATED ORAL at 04:59

## 2020-01-01 RX ADMIN — HYDRALAZINE HYDROCHLORIDE 10 MG: 20 INJECTION INTRAMUSCULAR; INTRAVENOUS at 18:39

## 2020-01-01 RX ADMIN — VALPROIC ACID 500 MG: 250 SOLUTION ORAL at 06:48

## 2020-01-01 RX ADMIN — HEPARIN SODIUM 5000 UNITS: 5000 INJECTION, SOLUTION INTRAVENOUS; SUBCUTANEOUS at 04:57

## 2020-01-01 RX ADMIN — CARVEDILOL 25 MG: 25 TABLET, FILM COATED ORAL at 19:54

## 2020-01-01 RX ADMIN — HEPARIN SODIUM 3300 UNITS: 1000 INJECTION, SOLUTION INTRAVENOUS; SUBCUTANEOUS at 19:16

## 2020-01-01 RX ADMIN — LACOSAMIDE 300 MG: 100 TABLET, FILM COATED ORAL at 05:39

## 2020-01-01 RX ADMIN — OMEPRAZOLE 40 MG: KIT at 19:01

## 2020-01-01 RX ADMIN — CARVEDILOL 25 MG: 25 TABLET, FILM COATED ORAL at 17:45

## 2020-01-01 RX ADMIN — ACETAMINOPHEN 650 MG: 325 TABLET, FILM COATED ORAL at 13:45

## 2020-01-01 RX ADMIN — INSULIN HUMAN 1 UNITS: 100 INJECTION, SOLUTION PARENTERAL at 12:42

## 2020-01-01 RX ADMIN — TOPIRAMATE 200 MG: 100 TABLET, FILM COATED ORAL at 04:53

## 2020-01-01 RX ADMIN — DIVALPROEX SODIUM 500 MG: 125 CAPSULE ORAL at 17:43

## 2020-01-01 RX ADMIN — FAMOTIDINE 20 MG: 20 TABLET ORAL at 05:34

## 2020-01-01 RX ADMIN — LACOSAMIDE 300 MG: 100 TABLET, FILM COATED ORAL at 05:13

## 2020-01-01 RX ADMIN — LACOSAMIDE 300 MG: 100 TABLET, FILM COATED ORAL at 17:58

## 2020-01-01 RX ADMIN — DIVALPROEX SODIUM 500 MG: 125 CAPSULE, COATED PELLETS ORAL at 18:11

## 2020-01-01 RX ADMIN — INSULIN HUMAN 1 UNITS: 100 INJECTION, SOLUTION PARENTERAL at 01:34

## 2020-01-01 RX ADMIN — FENTANYL CITRATE 100 MCG: 0.05 INJECTION, SOLUTION INTRAMUSCULAR; INTRAVENOUS at 20:10

## 2020-01-01 RX ADMIN — HYDRALAZINE HYDROCHLORIDE 100 MG: 25 TABLET, FILM COATED ORAL at 19:53

## 2020-01-01 RX ADMIN — HEPARIN SODIUM 3300 UNITS: 1000 INJECTION, SOLUTION INTRAVENOUS; SUBCUTANEOUS at 10:32

## 2020-01-01 RX ADMIN — DIVALPROEX SODIUM 500 MG: 125 CAPSULE, COATED PELLETS ORAL at 04:27

## 2020-01-01 RX ADMIN — LACOSAMIDE 300 MG: 100 TABLET, FILM COATED ORAL at 17:05

## 2020-01-01 RX ADMIN — HYDRALAZINE HYDROCHLORIDE 10 MG: 20 INJECTION INTRAMUSCULAR; INTRAVENOUS at 16:18

## 2020-01-01 RX ADMIN — HYDRALAZINE HYDROCHLORIDE 100 MG: 25 TABLET, FILM COATED ORAL at 06:34

## 2020-01-01 RX ADMIN — HEPARIN SODIUM 3300 UNITS: 1000 INJECTION, SOLUTION INTRAVENOUS; SUBCUTANEOUS at 16:20

## 2020-01-01 RX ADMIN — PANTOPRAZOLE SODIUM 40 MG: 40 INJECTION, POWDER, LYOPHILIZED, FOR SOLUTION INTRAVENOUS at 05:47

## 2020-01-01 RX ADMIN — FENTANYL CITRATE 100 MCG: 0.05 INJECTION, SOLUTION INTRAMUSCULAR; INTRAVENOUS at 02:51

## 2020-01-01 RX ADMIN — HYDRALAZINE HYDROCHLORIDE 10 MG: 20 INJECTION INTRAMUSCULAR; INTRAVENOUS at 15:24

## 2020-01-01 RX ADMIN — NYSTATIN: 100000 POWDER TOPICAL at 19:58

## 2020-01-01 RX ADMIN — INSULIN HUMAN 1 UNITS: 100 INJECTION, SOLUTION PARENTERAL at 00:29

## 2020-01-01 RX ADMIN — NYSTATIN: 100000 POWDER TOPICAL at 17:49

## 2020-01-01 RX ADMIN — VITAMIN C 1 TABLET: TAB at 05:21

## 2020-01-01 RX ADMIN — DIVALPROEX SODIUM 500 MG: 125 CAPSULE, COATED PELLETS ORAL at 16:45

## 2020-01-01 RX ADMIN — HYDRALAZINE HYDROCHLORIDE 100 MG: 25 TABLET, FILM COATED ORAL at 17:20

## 2020-01-01 RX ADMIN — LACOSAMIDE 300 MG: 100 TABLET, FILM COATED ORAL at 23:51

## 2020-01-01 RX ADMIN — EPOETIN ALFA 4000 UNITS: 4000 SOLUTION INTRAVENOUS; SUBCUTANEOUS at 11:54

## 2020-01-01 RX ADMIN — LISINOPRIL 5 MG: 5 TABLET ORAL at 05:20

## 2020-01-01 RX ADMIN — MULTIPLE VITAMINS W/ MINERALS TAB 1 TABLET: TAB at 04:28

## 2020-01-01 RX ADMIN — OXYCODONE HYDROCHLORIDE AND ACETAMINOPHEN 500 MG: 500 TABLET ORAL at 04:21

## 2020-01-01 RX ADMIN — HEPARIN SODIUM 5000 UNITS: 5000 INJECTION, SOLUTION INTRAVENOUS; SUBCUTANEOUS at 13:50

## 2020-01-01 RX ADMIN — OMEPRAZOLE 40 MG: KIT at 05:17

## 2020-01-01 RX ADMIN — ALLOPURINOL 100 MG: 100 TABLET ORAL at 04:15

## 2020-01-01 RX ADMIN — CLONAZEPAM 1 MG: 0.5 TABLET ORAL at 16:59

## 2020-01-01 RX ADMIN — HEPARIN SODIUM 5000 UNITS: 5000 INJECTION, SOLUTION INTRAVENOUS; SUBCUTANEOUS at 05:22

## 2020-01-01 RX ADMIN — FOLIC ACID 1 MG: 1 TABLET ORAL at 04:34

## 2020-01-01 RX ADMIN — PROPOFOL 40 MCG/KG/MIN: 10 INJECTION, EMULSION INTRAVENOUS at 12:05

## 2020-01-01 RX ADMIN — ATORVASTATIN CALCIUM 40 MG: 40 TABLET, FILM COATED ORAL at 05:33

## 2020-01-01 RX ADMIN — LACOSAMIDE 300 MG: 100 TABLET, FILM COATED ORAL at 04:37

## 2020-01-01 RX ADMIN — ATORVASTATIN CALCIUM 40 MG: 40 TABLET, FILM COATED ORAL at 05:20

## 2020-01-01 RX ADMIN — DIVALPROEX SODIUM 500 MG: 125 CAPSULE, COATED PELLETS ORAL at 05:42

## 2020-01-01 RX ADMIN — TOPIRAMATE 200 MG: 100 TABLET, FILM COATED ORAL at 05:38

## 2020-01-01 RX ADMIN — ALLOPURINOL 100 MG: 100 TABLET ORAL at 04:36

## 2020-01-01 RX ADMIN — OMEPRAZOLE 40 MG: KIT at 18:30

## 2020-01-01 RX ADMIN — DOCUSATE SODIUM 100 MG: 50 LIQUID ORAL at 17:08

## 2020-01-01 RX ADMIN — HYDRALAZINE HYDROCHLORIDE 25 MG: 50 TABLET ORAL at 05:29

## 2020-01-01 RX ADMIN — SODIUM CHLORIDE 200 MG: 9 INJECTION, SOLUTION INTRAVENOUS at 17:59

## 2020-01-01 RX ADMIN — CARVEDILOL 25 MG: 25 TABLET, FILM COATED ORAL at 17:50

## 2020-01-01 RX ADMIN — HEPARIN SODIUM 5000 UNITS: 5000 INJECTION, SOLUTION INTRAVENOUS; SUBCUTANEOUS at 04:26

## 2020-01-01 RX ADMIN — SENNOSIDES AND DOCUSATE SODIUM 2 TABLET: 8.6; 5 TABLET ORAL at 05:49

## 2020-01-01 RX ADMIN — HEPARIN SODIUM 5000 UNITS: 5000 INJECTION, SOLUTION INTRAVENOUS; SUBCUTANEOUS at 05:52

## 2020-01-01 RX ADMIN — CARBOXYMETHYLCELLULOSE SODIUM 1 DROP: 5 SOLUTION/ DROPS OPHTHALMIC at 04:00

## 2020-01-01 RX ADMIN — ALLOPURINOL 200 MG: 100 TABLET ORAL at 05:29

## 2020-01-01 RX ADMIN — HEPARIN SODIUM 5000 UNITS: 5000 INJECTION, SOLUTION INTRAVENOUS; SUBCUTANEOUS at 05:17

## 2020-01-01 RX ADMIN — HYDRALAZINE HYDROCHLORIDE 10 MG: 20 INJECTION INTRAMUSCULAR; INTRAVENOUS at 05:54

## 2020-01-01 RX ADMIN — HEPARIN SODIUM 5000 UNITS: 5000 INJECTION, SOLUTION INTRAVENOUS; SUBCUTANEOUS at 15:21

## 2020-01-01 RX ADMIN — ALLOPURINOL 100 MG: 100 TABLET ORAL at 05:36

## 2020-01-01 RX ADMIN — HYDRALAZINE HYDROCHLORIDE 100 MG: 25 TABLET, FILM COATED ORAL at 05:24

## 2020-01-01 RX ADMIN — EPOETIN ALFA 4000 UNITS: 4000 SOLUTION INTRAVENOUS; SUBCUTANEOUS at 09:44

## 2020-01-01 RX ADMIN — ATORVASTATIN CALCIUM 10 MG: 10 TABLET, FILM COATED ORAL at 06:11

## 2020-01-01 RX ADMIN — PROPOFOL 40 MCG/KG/MIN: 10 INJECTION, EMULSION INTRAVENOUS at 19:18

## 2020-01-01 RX ADMIN — HEPARIN SODIUM 5000 UNITS: 5000 INJECTION, SOLUTION INTRAVENOUS; SUBCUTANEOUS at 17:46

## 2020-01-01 RX ADMIN — HYDRALAZINE HYDROCHLORIDE 25 MG: 50 TABLET ORAL at 04:17

## 2020-01-01 RX ADMIN — ASPIRIN 81 MG 81 MG: 81 TABLET ORAL at 05:55

## 2020-01-01 RX ADMIN — FAMOTIDINE 20 MG: 10 INJECTION INTRAVENOUS at 17:48

## 2020-01-01 RX ADMIN — FENTANYL CITRATE 100 MCG: 50 INJECTION, SOLUTION INTRAMUSCULAR; INTRAVENOUS at 13:49

## 2020-01-01 RX ADMIN — MICONAZOLE NITRATE: 20 CREAM TOPICAL at 04:28

## 2020-01-01 RX ADMIN — ATORVASTATIN CALCIUM 40 MG: 40 TABLET, FILM COATED ORAL at 05:29

## 2020-01-01 RX ADMIN — ATORVASTATIN CALCIUM 40 MG: 40 TABLET, FILM COATED ORAL at 04:11

## 2020-01-01 RX ADMIN — LACOSAMIDE 300 MG: 100 TABLET, FILM COATED ORAL at 17:43

## 2020-01-01 RX ADMIN — SODIUM CHLORIDE 300 MG: 9 INJECTION, SOLUTION INTRAVENOUS at 05:54

## 2020-01-01 RX ADMIN — LACOSAMIDE 300 MG: 100 TABLET, FILM COATED ORAL at 18:14

## 2020-01-01 RX ADMIN — HEPARIN SODIUM 5000 UNITS: 5000 INJECTION, SOLUTION INTRAVENOUS; SUBCUTANEOUS at 17:47

## 2020-01-01 RX ADMIN — INSULIN HUMAN 2 UNITS: 100 INJECTION, SOLUTION PARENTERAL at 12:50

## 2020-01-01 RX ADMIN — TOPIRAMATE 200 MG: 100 TABLET, FILM COATED ORAL at 17:13

## 2020-01-01 RX ADMIN — SENNOSIDES AND DOCUSATE SODIUM 2 TABLET: 8.6; 5 TABLET ORAL at 20:04

## 2020-01-01 RX ADMIN — DIVALPROEX SODIUM 500 MG: 125 CAPSULE, COATED PELLETS ORAL at 18:04

## 2020-01-01 RX ADMIN — TOPIRAMATE 200 MG: 100 TABLET, FILM COATED ORAL at 04:20

## 2020-01-01 RX ADMIN — VALPROIC ACID 500 MG: 250 SOLUTION ORAL at 16:02

## 2020-01-01 RX ADMIN — ALLOPURINOL 100 MG: 100 TABLET ORAL at 05:38

## 2020-01-01 RX ADMIN — CARVEDILOL 25 MG: 25 TABLET, FILM COATED ORAL at 17:16

## 2020-01-01 RX ADMIN — ALLOPURINOL 200 MG: 100 TABLET ORAL at 05:31

## 2020-01-01 RX ADMIN — VALPROIC ACID 500 MG: 250 SOLUTION ORAL at 18:14

## 2020-01-01 RX ADMIN — ISOSORBIDE DINITRATE 10 MG: 10 TABLET ORAL at 13:50

## 2020-01-01 RX ADMIN — DOXAZOSIN 6 MG: 2 TABLET ORAL at 20:12

## 2020-01-01 RX ADMIN — FENOFIBRATE 134 MG: 134 CAPSULE ORAL at 06:10

## 2020-01-01 RX ADMIN — TOPIRAMATE 200 MG: 100 TABLET, FILM COATED ORAL at 04:19

## 2020-01-01 RX ADMIN — DOCUSATE SODIUM 100 MG: 50 LIQUID ORAL at 05:29

## 2020-01-01 RX ADMIN — NOREPINEPHRINE BITARTRATE 15 MCG/MIN: 1 INJECTION INTRAVENOUS at 18:46

## 2020-01-01 RX ADMIN — OMEPRAZOLE 40 MG: KIT at 04:14

## 2020-01-01 RX ADMIN — HEPARIN SODIUM 5000 UNITS: 5000 INJECTION, SOLUTION INTRAVENOUS; SUBCUTANEOUS at 06:03

## 2020-01-01 RX ADMIN — SODIUM CHLORIDE 300 MG: 9 INJECTION, SOLUTION INTRAVENOUS at 17:29

## 2020-01-01 RX ADMIN — OMEPRAZOLE 40 MG: KIT at 17:21

## 2020-01-01 RX ADMIN — HYDRALAZINE HYDROCHLORIDE 100 MG: 25 TABLET, FILM COATED ORAL at 04:37

## 2020-01-01 RX ADMIN — CARVEDILOL 25 MG: 25 TABLET, FILM COATED ORAL at 04:24

## 2020-01-01 RX ADMIN — MICONAZOLE NITRATE: 20 CREAM TOPICAL at 16:46

## 2020-01-01 RX ADMIN — MORPHINE SULFATE 10 MG: 10 INJECTION INTRAVENOUS at 09:02

## 2020-01-01 RX ADMIN — SENNOSIDES AND DOCUSATE SODIUM 2 TABLET: 8.6; 5 TABLET ORAL at 05:47

## 2020-01-01 RX ADMIN — SODIUM CHLORIDE: 9 INJECTION, SOLUTION INTRAVENOUS at 08:33

## 2020-01-01 RX ADMIN — TOPIRAMATE 200 MG: 100 TABLET, FILM COATED ORAL at 17:20

## 2020-01-01 RX ADMIN — CARVEDILOL 25 MG: 25 TABLET, FILM COATED ORAL at 17:24

## 2020-01-01 RX ADMIN — SENNOSIDES AND DOCUSATE SODIUM 2 TABLET: 8.6; 5 TABLET ORAL at 18:19

## 2020-01-01 RX ADMIN — ASPIRIN 81 MG 81 MG: 81 TABLET ORAL at 04:33

## 2020-01-01 RX ADMIN — OMEPRAZOLE 40 MG: KIT at 22:48

## 2020-01-01 RX ADMIN — FENTANYL CITRATE 100 MCG: 0.05 INJECTION, SOLUTION INTRAMUSCULAR; INTRAVENOUS at 17:45

## 2020-01-01 RX ADMIN — HEPARIN SODIUM 5000 UNITS: 5000 INJECTION, SOLUTION INTRAVENOUS; SUBCUTANEOUS at 05:26

## 2020-01-01 RX ADMIN — SENNOSIDES AND DOCUSATE SODIUM 2 TABLET: 8.6; 5 TABLET ORAL at 06:01

## 2020-01-01 RX ADMIN — DOXAZOSIN 6 MG: 2 TABLET ORAL at 22:20

## 2020-01-01 RX ADMIN — ASPIRIN 81 MG 81 MG: 81 TABLET ORAL at 06:02

## 2020-01-01 RX ADMIN — SODIUM CHLORIDE 750 MG: 9 INJECTION, SOLUTION INTRAVENOUS at 18:36

## 2020-01-01 RX ADMIN — SODIUM CHLORIDE 500 ML: 9 INJECTION, SOLUTION INTRAVENOUS at 09:05

## 2020-01-01 RX ADMIN — INSULIN HUMAN 1 UNITS: 100 INJECTION, SOLUTION PARENTERAL at 17:15

## 2020-01-01 RX ADMIN — OMEPRAZOLE 40 MG: KIT at 17:01

## 2020-01-01 RX ADMIN — FENOFIBRATE 134 MG: 134 CAPSULE ORAL at 05:50

## 2020-01-01 RX ADMIN — NYSTATIN: 100000 POWDER TOPICAL at 04:29

## 2020-01-01 RX ADMIN — SCOPALAMINE 1 PATCH: 1 PATCH, EXTENDED RELEASE TRANSDERMAL at 12:05

## 2020-01-01 RX ADMIN — LACOSAMIDE 300 MG: 100 TABLET, FILM COATED ORAL at 04:10

## 2020-01-01 RX ADMIN — INSULIN HUMAN 1 UNITS: 100 INJECTION, SOLUTION PARENTERAL at 05:53

## 2020-01-01 RX ADMIN — ATORVASTATIN CALCIUM 40 MG: 40 TABLET, FILM COATED ORAL at 04:26

## 2020-01-01 RX ADMIN — DOXAZOSIN 6 MG: 2 TABLET ORAL at 20:27

## 2020-01-01 RX ADMIN — HEPARIN SODIUM 5000 UNITS: 5000 INJECTION, SOLUTION INTRAVENOUS; SUBCUTANEOUS at 14:06

## 2020-01-01 RX ADMIN — FOLIC ACID 1 MG: 1 TABLET ORAL at 05:42

## 2020-01-01 RX ADMIN — NYSTATIN: 100000 POWDER TOPICAL at 18:00

## 2020-01-01 RX ADMIN — FOLIC ACID 1 MG: 1 TABLET ORAL at 06:02

## 2020-01-01 RX ADMIN — TOPIRAMATE 200 MG: 100 TABLET, FILM COATED ORAL at 05:44

## 2020-01-01 RX ADMIN — TOPIRAMATE 200 MG: 100 TABLET, FILM COATED ORAL at 06:04

## 2020-01-01 RX ADMIN — DIVALPROEX SODIUM 500 MG: 125 CAPSULE, COATED PELLETS ORAL at 17:44

## 2020-01-01 RX ADMIN — TOPIRAMATE 200 MG: 100 TABLET, FILM COATED ORAL at 18:29

## 2020-01-01 RX ADMIN — INSULIN HUMAN 1 UNITS: 100 INJECTION, SOLUTION PARENTERAL at 05:42

## 2020-01-01 RX ADMIN — EPOETIN ALFA 4000 UNITS: 4000 SOLUTION INTRAVENOUS; SUBCUTANEOUS at 13:57

## 2020-01-01 RX ADMIN — SENNOSIDES AND DOCUSATE SODIUM 2 TABLET: 8.6; 5 TABLET ORAL at 16:45

## 2020-01-01 RX ADMIN — ALLOPURINOL 100 MG: 100 TABLET ORAL at 05:54

## 2020-01-01 RX ADMIN — INSULIN HUMAN 1 UNITS: 100 INJECTION, SOLUTION PARENTERAL at 05:21

## 2020-01-01 RX ADMIN — HEPARIN SODIUM 3300 UNITS: 1000 INJECTION, SOLUTION INTRAVENOUS; SUBCUTANEOUS at 16:28

## 2020-01-01 RX ADMIN — ALBUMIN (HUMAN) 25 G: 5 SOLUTION INTRAVENOUS at 09:37

## 2020-01-01 RX ADMIN — ATORVASTATIN CALCIUM 40 MG: 40 TABLET, FILM COATED ORAL at 04:15

## 2020-01-01 RX ADMIN — VALPROIC ACID 500 MG: 250 SOLUTION ORAL at 06:27

## 2020-01-01 RX ADMIN — HEPARIN SODIUM 5000 UNITS: 5000 INJECTION, SOLUTION INTRAVENOUS; SUBCUTANEOUS at 04:11

## 2020-01-01 RX ADMIN — ALLOPURINOL 100 MG: 100 TABLET ORAL at 05:39

## 2020-01-01 RX ADMIN — SODIUM CHLORIDE 100 MG PE: 9 INJECTION, SOLUTION INTRAVENOUS at 06:20

## 2020-01-01 RX ADMIN — SENNOSIDES AND DOCUSATE SODIUM 2 TABLET: 8.6; 5 TABLET ORAL at 18:13

## 2020-01-01 RX ADMIN — HEPARIN SODIUM 5000 UNITS: 5000 INJECTION, SOLUTION INTRAVENOUS; SUBCUTANEOUS at 04:14

## 2020-01-01 RX ADMIN — HYDRALAZINE HYDROCHLORIDE 25 MG: 50 TABLET ORAL at 05:00

## 2020-01-01 RX ADMIN — LISINOPRIL 5 MG: 5 TABLET ORAL at 05:39

## 2020-01-01 RX ADMIN — HEPARIN SODIUM 3300 UNITS: 1000 INJECTION, SOLUTION INTRAVENOUS; SUBCUTANEOUS at 09:15

## 2020-01-01 RX ADMIN — LACOSAMIDE 300 MG: 100 TABLET, FILM COATED ORAL at 07:18

## 2020-01-01 RX ADMIN — MULTIPLE VITAMINS W/ MINERALS TAB 1 TABLET: TAB at 05:40

## 2020-01-01 RX ADMIN — ALLOPURINOL 100 MG: 100 TABLET ORAL at 04:48

## 2020-01-01 RX ADMIN — FENTANYL CITRATE 25 MCG: 0.05 INJECTION, SOLUTION INTRAMUSCULAR; INTRAVENOUS at 23:10

## 2020-01-01 RX ADMIN — FOLIC ACID 1 MG: 1 TABLET ORAL at 04:58

## 2020-01-01 RX ADMIN — TOPIRAMATE 200 MG: 100 TABLET, FILM COATED ORAL at 04:22

## 2020-01-01 RX ADMIN — VITAMIN C 1 TABLET: TAB at 16:11

## 2020-01-01 RX ADMIN — FUROSEMIDE 20 MG: 10 INJECTION, SOLUTION INTRAMUSCULAR; INTRAVENOUS at 05:30

## 2020-01-01 RX ADMIN — HEPARIN SODIUM 3300 UNITS: 1000 INJECTION, SOLUTION INTRAVENOUS; SUBCUTANEOUS at 18:46

## 2020-01-01 RX ADMIN — DIVALPROEX SODIUM 500 MG: 125 CAPSULE ORAL at 04:29

## 2020-01-01 RX ADMIN — MICONAZOLE NITRATE: 20 CREAM TOPICAL at 05:22

## 2020-01-01 RX ADMIN — FENTANYL CITRATE 100 MCG: 0.05 INJECTION, SOLUTION INTRAMUSCULAR; INTRAVENOUS at 13:29

## 2020-01-01 RX ADMIN — INSULIN HUMAN 1 UNITS: 100 INJECTION, SOLUTION PARENTERAL at 11:45

## 2020-01-01 RX ADMIN — VALPROATE SODIUM 500 MG: 100 INJECTION, SOLUTION INTRAVENOUS at 05:05

## 2020-01-01 RX ADMIN — CARVEDILOL 25 MG: 25 TABLET, FILM COATED ORAL at 20:30

## 2020-01-01 RX ADMIN — DOXAZOSIN 6 MG: 2 TABLET ORAL at 22:27

## 2020-01-01 RX ADMIN — FAMOTIDINE 20 MG: 20 TABLET ORAL at 05:44

## 2020-01-01 RX ADMIN — NYSTATIN: 100000 POWDER TOPICAL at 17:27

## 2020-01-01 RX ADMIN — HEPARIN SODIUM 5000 UNITS: 5000 INJECTION, SOLUTION INTRAVENOUS; SUBCUTANEOUS at 17:34

## 2020-01-01 RX ADMIN — ASPIRIN 81 MG 81 MG: 81 TABLET ORAL at 05:25

## 2020-01-01 RX ADMIN — NOREPINEPHRINE BITARTRATE 8 MG: 1 INJECTION INTRAVENOUS at 07:55

## 2020-01-01 RX ADMIN — ALBUMIN (HUMAN) 25 G: 5 SOLUTION INTRAVENOUS at 11:22

## 2020-01-01 RX ADMIN — MICONAZOLE NITRATE: 20 CREAM TOPICAL at 17:25

## 2020-01-01 RX ADMIN — HEPARIN SODIUM 5000 UNITS: 5000 INJECTION, SOLUTION INTRAVENOUS; SUBCUTANEOUS at 13:23

## 2020-01-01 RX ADMIN — SODIUM CHLORIDE 750 MG: 9 INJECTION, SOLUTION INTRAVENOUS at 17:29

## 2020-01-01 RX ADMIN — CARVEDILOL 25 MG: 25 TABLET, FILM COATED ORAL at 04:40

## 2020-01-01 RX ADMIN — CARVEDILOL 25 MG: 25 TABLET, FILM COATED ORAL at 04:22

## 2020-01-01 RX ADMIN — ATORVASTATIN CALCIUM 40 MG: 40 TABLET, FILM COATED ORAL at 05:34

## 2020-01-01 RX ADMIN — MICONAZOLE NITRATE: 20 CREAM TOPICAL at 18:20

## 2020-01-01 RX ADMIN — MULTIPLE VITAMINS W/ MINERALS TAB 1 TABLET: TAB at 05:38

## 2020-01-01 RX ADMIN — OXYCODONE HYDROCHLORIDE AND ACETAMINOPHEN 500 MG: 500 TABLET ORAL at 05:20

## 2020-01-01 RX ADMIN — DIVALPROEX SODIUM 500 MG: 125 CAPSULE, COATED PELLETS ORAL at 17:25

## 2020-01-01 RX ADMIN — ALBUMIN (HUMAN) 12.5 G: 5 SOLUTION INTRAVENOUS at 08:15

## 2020-01-01 RX ADMIN — ALLOPURINOL 100 MG: 100 TABLET ORAL at 05:55

## 2020-01-01 RX ADMIN — SENNOSIDES AND DOCUSATE SODIUM 2 TABLET: 8.6; 5 TABLET ORAL at 04:25

## 2020-01-01 RX ADMIN — HYDRALAZINE HYDROCHLORIDE 100 MG: 25 TABLET, FILM COATED ORAL at 13:07

## 2020-01-01 RX ADMIN — DOCUSATE SODIUM 100 MG: 50 LIQUID ORAL at 16:56

## 2020-01-01 RX ADMIN — DOXAZOSIN 6 MG: 2 TABLET ORAL at 21:00

## 2020-01-01 RX ADMIN — LACOSAMIDE 300 MG: 100 TABLET, FILM COATED ORAL at 16:59

## 2020-01-01 RX ADMIN — TOPIRAMATE 200 MG: 100 TABLET, FILM COATED ORAL at 04:29

## 2020-01-01 RX ADMIN — LACOSAMIDE 300 MG: 100 TABLET, FILM COATED ORAL at 16:15

## 2020-01-01 RX ADMIN — HEPARIN SODIUM 5000 UNITS: 5000 INJECTION, SOLUTION INTRAVENOUS; SUBCUTANEOUS at 18:12

## 2020-01-01 RX ADMIN — NYSTATIN: 100000 POWDER TOPICAL at 18:13

## 2020-01-01 RX ADMIN — HYDRALAZINE HYDROCHLORIDE 100 MG: 25 TABLET, FILM COATED ORAL at 11:46

## 2020-01-01 RX ADMIN — ATORVASTATIN CALCIUM 40 MG: 40 TABLET, FILM COATED ORAL at 05:45

## 2020-01-01 RX ADMIN — SENNOSIDES AND DOCUSATE SODIUM 2 TABLET: 8.6; 5 TABLET ORAL at 17:56

## 2020-01-01 RX ADMIN — NYSTATIN: 100000 POWDER TOPICAL at 05:42

## 2020-01-01 RX ADMIN — MIDAZOLAM 8 MG/HR: 5 INJECTION INTRAMUSCULAR; INTRAVENOUS at 13:09

## 2020-01-01 RX ADMIN — VITAMIN C 1 TABLET: TAB at 04:29

## 2020-01-01 RX ADMIN — LACOSAMIDE 300 MG: 100 TABLET, FILM COATED ORAL at 17:55

## 2020-01-01 RX ADMIN — NYSTATIN: 100000 POWDER TOPICAL at 04:21

## 2020-01-01 RX ADMIN — SODIUM CHLORIDE 300 MG: 9 INJECTION, SOLUTION INTRAVENOUS at 17:24

## 2020-01-01 RX ADMIN — ATORVASTATIN CALCIUM 40 MG: 40 TABLET, FILM COATED ORAL at 04:14

## 2020-01-01 RX ADMIN — FENTANYL CITRATE 100 MCG: 0.05 INJECTION, SOLUTION INTRAMUSCULAR; INTRAVENOUS at 00:01

## 2020-01-01 RX ADMIN — HYDRALAZINE HYDROCHLORIDE 100 MG: 25 TABLET, FILM COATED ORAL at 18:11

## 2020-01-01 RX ADMIN — HYDRALAZINE HYDROCHLORIDE 10 MG: 20 INJECTION INTRAMUSCULAR; INTRAVENOUS at 17:58

## 2020-01-01 RX ADMIN — TOPIRAMATE 200 MG: 100 TABLET, FILM COATED ORAL at 04:23

## 2020-01-01 RX ADMIN — MICONAZOLE NITRATE: 20 CREAM TOPICAL at 05:24

## 2020-01-01 RX ADMIN — OMEPRAZOLE 40 MG: KIT at 16:55

## 2020-01-01 RX ADMIN — TOPIRAMATE 200 MG: 100 TABLET, FILM COATED ORAL at 20:45

## 2020-01-01 RX ADMIN — INSULIN HUMAN 1 UNITS: 100 INJECTION, SOLUTION PARENTERAL at 11:50

## 2020-01-01 RX ADMIN — TOPIRAMATE 200 MG: 100 TABLET, FILM COATED ORAL at 16:20

## 2020-01-01 RX ADMIN — TOPIRAMATE 200 MG: 100 TABLET, FILM COATED ORAL at 16:01

## 2020-01-01 RX ADMIN — MICONAZOLE NITRATE: 20 CREAM TOPICAL at 06:10

## 2020-01-01 RX ADMIN — NYSTATIN: 100000 POWDER TOPICAL at 05:05

## 2020-01-01 RX ADMIN — HEPARIN SODIUM 5000 UNITS: 5000 INJECTION, SOLUTION INTRAVENOUS; SUBCUTANEOUS at 04:48

## 2020-01-01 RX ADMIN — HYDRALAZINE HYDROCHLORIDE 25 MG: 50 TABLET ORAL at 12:43

## 2020-01-01 RX ADMIN — HEPARIN SODIUM 3300 UNITS: 1000 INJECTION, SOLUTION INTRAVENOUS; SUBCUTANEOUS at 12:37

## 2020-01-01 RX ADMIN — HEPARIN SODIUM 5000 UNITS: 5000 INJECTION, SOLUTION INTRAVENOUS; SUBCUTANEOUS at 05:38

## 2020-01-01 RX ADMIN — CARVEDILOL 25 MG: 25 TABLET, FILM COATED ORAL at 04:20

## 2020-01-01 RX ADMIN — SODIUM HYPOCHLORITE 473 ML: 1.25 SOLUTION TOPICAL at 16:02

## 2020-01-01 RX ADMIN — FOLIC ACID 1 MG: 1 TABLET ORAL at 05:02

## 2020-01-01 RX ADMIN — OXYCODONE HYDROCHLORIDE 5 MG: 5 SOLUTION ORAL at 21:43

## 2020-01-01 RX ADMIN — ALLOPURINOL 100 MG: 100 TABLET ORAL at 05:49

## 2020-01-01 RX ADMIN — OMEPRAZOLE 40 MG: KIT at 22:03

## 2020-01-01 RX ADMIN — DIVALPROEX SODIUM 500 MG: 125 CAPSULE, COATED PELLETS ORAL at 04:44

## 2020-01-01 RX ADMIN — OMEPRAZOLE 40 MG: KIT at 17:45

## 2020-01-01 RX ADMIN — LACOSAMIDE 300 MG: 100 TABLET, FILM COATED ORAL at 20:27

## 2020-01-01 RX ADMIN — INSULIN HUMAN 1 UNITS: 100 INJECTION, SOLUTION PARENTERAL at 23:31

## 2020-01-01 RX ADMIN — PROPOFOL 50 MCG/KG/MIN: 10 INJECTION, EMULSION INTRAVENOUS at 03:20

## 2020-01-01 RX ADMIN — VITAMIN C 1 TABLET: TAB at 06:31

## 2020-01-01 RX ADMIN — TOPIRAMATE 200 MG: 100 TABLET, FILM COATED ORAL at 18:26

## 2020-01-01 RX ADMIN — OMEPRAZOLE 40 MG: KIT at 17:30

## 2020-01-01 RX ADMIN — VITAMIN C 1 TABLET: TAB at 05:01

## 2020-01-01 RX ADMIN — ATORVASTATIN CALCIUM 40 MG: 40 TABLET, FILM COATED ORAL at 04:58

## 2020-01-01 RX ADMIN — FOLIC ACID 1 MG: 1 TABLET ORAL at 05:49

## 2020-01-01 RX ADMIN — LACOSAMIDE 300 MG: 100 TABLET, FILM COATED ORAL at 16:20

## 2020-01-01 RX ADMIN — VALPROIC ACID 500 MG: 250 SOLUTION ORAL at 05:45

## 2020-01-01 RX ADMIN — ALBUMIN (HUMAN) 25 G: 5 SOLUTION INTRAVENOUS at 13:51

## 2020-01-01 RX ADMIN — ATORVASTATIN CALCIUM 40 MG: 40 TABLET, FILM COATED ORAL at 04:23

## 2020-01-01 RX ADMIN — INSULIN HUMAN 1 UNITS: 100 INJECTION, SOLUTION PARENTERAL at 12:05

## 2020-01-01 RX ADMIN — OXYCODONE HYDROCHLORIDE AND ACETAMINOPHEN 500 MG: 500 TABLET ORAL at 06:04

## 2020-01-01 RX ADMIN — FAMOTIDINE 20 MG: 20 TABLET ORAL at 06:02

## 2020-01-01 RX ADMIN — ATORVASTATIN CALCIUM 40 MG: 40 TABLET, FILM COATED ORAL at 05:28

## 2020-01-01 RX ADMIN — ATORVASTATIN CALCIUM 40 MG: 40 TABLET, FILM COATED ORAL at 05:49

## 2020-01-01 RX ADMIN — HEPARIN SODIUM 5000 UNITS: 5000 INJECTION, SOLUTION INTRAVENOUS; SUBCUTANEOUS at 21:57

## 2020-01-01 RX ADMIN — HYDRALAZINE HYDROCHLORIDE 10 MG: 20 INJECTION INTRAMUSCULAR; INTRAVENOUS at 23:50

## 2020-01-01 RX ADMIN — NYSTATIN: 100000 POWDER TOPICAL at 16:15

## 2020-01-01 RX ADMIN — ASPIRIN 81 MG 81 MG: 81 TABLET ORAL at 04:38

## 2020-01-01 RX ADMIN — PROPOFOL 30 MCG/KG/MIN: 10 INJECTION, EMULSION INTRAVENOUS at 08:26

## 2020-01-01 RX ADMIN — VITAMIN C 1 TABLET: TAB at 04:23

## 2020-01-01 RX ADMIN — NYSTATIN: 100000 POWDER TOPICAL at 17:59

## 2020-01-01 RX ADMIN — ALBUMIN (HUMAN) 12.5 G: 5 SOLUTION INTRAVENOUS at 09:05

## 2020-01-01 RX ADMIN — SENNOSIDES AND DOCUSATE SODIUM 2 TABLET: 8.6; 5 TABLET ORAL at 04:14

## 2020-01-01 RX ADMIN — MICONAZOLE NITRATE: 20 CREAM TOPICAL at 18:36

## 2020-01-01 RX ADMIN — INSULIN HUMAN 2 UNITS: 100 INJECTION, SOLUTION PARENTERAL at 05:14

## 2020-01-01 RX ADMIN — HEPARIN SODIUM 5000 UNITS: 5000 INJECTION, SOLUTION INTRAVENOUS; SUBCUTANEOUS at 18:14

## 2020-01-01 RX ADMIN — LACOSAMIDE 300 MG: 100 TABLET, FILM COATED ORAL at 09:05

## 2020-01-01 RX ADMIN — VITAMIN C 1 TABLET: TAB at 05:26

## 2020-01-01 RX ADMIN — LACOSAMIDE 300 MG: 100 TABLET, FILM COATED ORAL at 04:52

## 2020-01-01 RX ADMIN — NYSTATIN: 100000 POWDER TOPICAL at 04:14

## 2020-01-01 RX ADMIN — HEPARIN SODIUM 5000 UNITS: 5000 INJECTION, SOLUTION INTRAVENOUS; SUBCUTANEOUS at 04:15

## 2020-01-01 RX ADMIN — TOPIRAMATE 200 MG: 100 TABLET, FILM COATED ORAL at 05:36

## 2020-01-01 RX ADMIN — VALPROIC ACID 500 MG: 250 SOLUTION ORAL at 18:26

## 2020-01-01 RX ADMIN — TOPIRAMATE 200 MG: 100 TABLET, FILM COATED ORAL at 04:43

## 2020-01-01 RX ADMIN — LEVETIRACETAM 1000 MG: 500 TABLET ORAL at 18:20

## 2020-01-01 RX ADMIN — TOPIRAMATE 200 MG: 100 TABLET, FILM COATED ORAL at 05:05

## 2020-01-01 RX ADMIN — LACOSAMIDE 300 MG: 100 TABLET, FILM COATED ORAL at 17:23

## 2020-01-01 RX ADMIN — OMEPRAZOLE 40 MG: KIT at 04:35

## 2020-01-01 RX ADMIN — TOPIRAMATE 200 MG: 100 TABLET, FILM COATED ORAL at 06:08

## 2020-01-01 RX ADMIN — HYDRALAZINE HYDROCHLORIDE 100 MG: 25 TABLET, FILM COATED ORAL at 13:01

## 2020-01-01 RX ADMIN — FOLIC ACID 1 MG: 1 TABLET ORAL at 04:11

## 2020-01-01 RX ADMIN — CEFAZOLIN 1 G: 330 INJECTION, POWDER, FOR SOLUTION INTRAMUSCULAR; INTRAVENOUS at 13:40

## 2020-01-01 RX ADMIN — VITAMIN C 1 TABLET: TAB at 05:42

## 2020-01-01 RX ADMIN — HEPARIN SODIUM 5000 UNITS: 5000 INJECTION, SOLUTION INTRAVENOUS; SUBCUTANEOUS at 04:09

## 2020-01-01 RX ADMIN — ALLOPURINOL 100 MG: 100 TABLET ORAL at 04:11

## 2020-01-01 RX ADMIN — LACOSAMIDE 300 MG: 100 TABLET, FILM COATED ORAL at 05:37

## 2020-01-01 RX ADMIN — HEPARIN SODIUM 3300 UNITS: 1000 INJECTION, SOLUTION INTRAVENOUS; SUBCUTANEOUS at 17:13

## 2020-01-01 RX ADMIN — TOPIRAMATE 200 MG: 100 TABLET, FILM COATED ORAL at 04:37

## 2020-01-01 RX ADMIN — ALLOPURINOL 200 MG: 100 TABLET ORAL at 05:34

## 2020-01-01 RX ADMIN — PROPOFOL 50 MCG/KG/MIN: 10 INJECTION, EMULSION INTRAVENOUS at 07:57

## 2020-01-01 RX ADMIN — NYSTATIN: 100000 POWDER TOPICAL at 22:54

## 2020-01-01 RX ADMIN — TOPIRAMATE 200 MG: 100 TABLET, FILM COATED ORAL at 04:18

## 2020-01-01 RX ADMIN — EPOETIN ALFA-EPBX 4000 UNITS: 4000 INJECTION, SOLUTION INTRAVENOUS; SUBCUTANEOUS at 15:53

## 2020-01-01 RX ADMIN — EPOETIN ALFA 4000 UNITS: 4000 SOLUTION INTRAVENOUS; SUBCUTANEOUS at 10:12

## 2020-01-01 RX ADMIN — NYSTATIN: 100000 POWDER TOPICAL at 04:26

## 2020-01-01 RX ADMIN — OMEPRAZOLE 40 MG: KIT at 05:39

## 2020-01-01 RX ADMIN — HYDRALAZINE HYDROCHLORIDE 25 MG: 50 TABLET ORAL at 17:00

## 2020-01-01 RX ADMIN — LACOSAMIDE 300 MG: 100 TABLET, FILM COATED ORAL at 17:50

## 2020-01-01 RX ADMIN — HEPARIN SODIUM 5000 UNITS: 5000 INJECTION, SOLUTION INTRAVENOUS; SUBCUTANEOUS at 05:57

## 2020-01-01 RX ADMIN — HEPARIN SODIUM 3300 UNITS: 1000 INJECTION, SOLUTION INTRAVENOUS; SUBCUTANEOUS at 11:35

## 2020-01-01 RX ADMIN — FOLIC ACID 1 MG: 1 TABLET ORAL at 05:44

## 2020-01-01 RX ADMIN — VALPROATE SODIUM 1220 MG: 100 INJECTION, SOLUTION INTRAVENOUS at 09:09

## 2020-01-01 RX ADMIN — VITAMIN C 1 TABLET: TAB at 05:44

## 2020-01-01 RX ADMIN — INSULIN HUMAN 1 UNITS: 100 INJECTION, SOLUTION PARENTERAL at 11:44

## 2020-01-01 RX ADMIN — LACOSAMIDE 300 MG: 100 TABLET, FILM COATED ORAL at 04:36

## 2020-01-01 RX ADMIN — SODIUM CHLORIDE 300 MG: 9 INJECTION, SOLUTION INTRAVENOUS at 06:10

## 2020-01-01 RX ADMIN — INSULIN HUMAN 1 UNITS: 100 INJECTION, SOLUTION PARENTERAL at 06:23

## 2020-01-01 RX ADMIN — OMEPRAZOLE 40 MG: KIT at 04:47

## 2020-01-01 RX ADMIN — FOLIC ACID 1 MG: 1 TABLET ORAL at 05:24

## 2020-01-01 RX ADMIN — HEPARIN SODIUM 5000 UNITS: 5000 INJECTION, SOLUTION INTRAVENOUS; SUBCUTANEOUS at 18:06

## 2020-01-01 RX ADMIN — VALPROATE SODIUM 500 MG: 100 INJECTION, SOLUTION INTRAVENOUS at 05:45

## 2020-01-01 RX ADMIN — PROPOFOL 35 MCG/KG/MIN: 10 INJECTION, EMULSION INTRAVENOUS at 23:15

## 2020-01-01 RX ADMIN — SENNOSIDES AND DOCUSATE SODIUM 2 TABLET: 8.6; 5 TABLET ORAL at 05:33

## 2020-01-01 RX ADMIN — OXYCODONE HYDROCHLORIDE AND ACETAMINOPHEN 500 MG: 500 TABLET ORAL at 05:40

## 2020-01-01 RX ADMIN — TOPIRAMATE 200 MG: 100 TABLET, FILM COATED ORAL at 17:57

## 2020-01-01 RX ADMIN — ALLOPURINOL 100 MG: 100 TABLET ORAL at 05:29

## 2020-01-01 RX ADMIN — FAMOTIDINE 20 MG: 20 TABLET ORAL at 06:01

## 2020-01-01 RX ADMIN — FENTANYL CITRATE 200 MCG: 0.05 INJECTION, SOLUTION INTRAMUSCULAR; INTRAVENOUS at 05:46

## 2020-01-01 RX ADMIN — FOLIC ACID 1 MG: 1 TABLET ORAL at 05:36

## 2020-01-01 RX ADMIN — LACOSAMIDE 300 MG: 100 TABLET, FILM COATED ORAL at 04:24

## 2020-01-01 RX ADMIN — LACOSAMIDE 300 MG: 100 TABLET, FILM COATED ORAL at 12:15

## 2020-01-01 RX ADMIN — ATORVASTATIN CALCIUM 40 MG: 40 TABLET, FILM COATED ORAL at 05:56

## 2020-01-01 RX ADMIN — SENNOSIDES AND DOCUSATE SODIUM 2 TABLET: 8.6; 5 TABLET ORAL at 06:00

## 2020-01-01 RX ADMIN — TOPIRAMATE 200 MG: 100 TABLET, FILM COATED ORAL at 05:45

## 2020-01-01 RX ADMIN — MIDAZOLAM 2 MG/HR: 5 INJECTION INTRAMUSCULAR; INTRAVENOUS at 08:00

## 2020-01-01 RX ADMIN — VALPROIC ACID 500 MG: 250 SOLUTION ORAL at 17:58

## 2020-01-01 RX ADMIN — MULTIPLE VITAMINS W/ MINERALS TAB 1 TABLET: TAB at 05:28

## 2020-01-01 RX ADMIN — LABETALOL HYDROCHLORIDE 10 MG: 5 INJECTION INTRAVENOUS at 18:25

## 2020-01-01 RX ADMIN — INSULIN HUMAN 1 UNITS: 100 INJECTION, SOLUTION PARENTERAL at 17:22

## 2020-01-01 RX ADMIN — INSULIN HUMAN 2 UNITS: 100 INJECTION, SOLUTION PARENTERAL at 13:31

## 2020-01-01 RX ADMIN — NYSTATIN: 100000 POWDER TOPICAL at 17:51

## 2020-01-01 RX ADMIN — HYDRALAZINE HYDROCHLORIDE 10 MG: 20 INJECTION INTRAMUSCULAR; INTRAVENOUS at 04:24

## 2020-01-01 RX ADMIN — CARVEDILOL 25 MG: 25 TABLET, FILM COATED ORAL at 04:27

## 2020-01-01 RX ADMIN — DOCUSATE SODIUM 100 MG: 50 LIQUID ORAL at 04:55

## 2020-01-01 RX ADMIN — HEPARIN SODIUM 5000 UNITS: 5000 INJECTION, SOLUTION INTRAVENOUS; SUBCUTANEOUS at 21:55

## 2020-01-01 RX ADMIN — DIVALPROEX SODIUM 500 MG: 125 CAPSULE, COATED PELLETS ORAL at 18:17

## 2020-01-01 RX ADMIN — CARVEDILOL 25 MG: 25 TABLET, FILM COATED ORAL at 17:46

## 2020-01-01 RX ADMIN — NYSTATIN: 100000 POWDER TOPICAL at 18:36

## 2020-01-01 RX ADMIN — HEPARIN SODIUM 5000 UNITS: 5000 INJECTION, SOLUTION INTRAVENOUS; SUBCUTANEOUS at 17:31

## 2020-01-01 RX ADMIN — POTASSIUM CHLORIDE 20 MEQ: 1500 TABLET, EXTENDED RELEASE ORAL at 11:28

## 2020-01-01 RX ADMIN — ASPIRIN 81 MG 81 MG: 81 TABLET ORAL at 05:12

## 2020-01-01 RX ADMIN — ACETAMINOPHEN 650 MG: 325 TABLET, FILM COATED ORAL at 21:58

## 2020-01-01 RX ADMIN — LACOSAMIDE 300 MG: 100 TABLET, FILM COATED ORAL at 04:41

## 2020-01-01 RX ADMIN — NYSTATIN: 100000 POWDER TOPICAL at 05:39

## 2020-01-01 RX ADMIN — VALPROIC ACID 500 MG: 250 SOLUTION ORAL at 05:30

## 2020-01-01 RX ADMIN — SODIUM CHLORIDE 200 MG: 9 INJECTION, SOLUTION INTRAVENOUS at 18:15

## 2020-01-01 RX ADMIN — DOCUSATE SODIUM 100 MG: 50 LIQUID ORAL at 04:40

## 2020-01-01 RX ADMIN — VALPROIC ACID 500 MG: 250 SOLUTION ORAL at 17:45

## 2020-01-01 RX ADMIN — ALLOPURINOL 100 MG: 100 TABLET ORAL at 05:33

## 2020-01-01 RX ADMIN — HYDRALAZINE HYDROCHLORIDE 100 MG: 25 TABLET, FILM COATED ORAL at 18:29

## 2020-01-01 RX ADMIN — DIVALPROEX SODIUM 500 MG: 125 CAPSULE, COATED PELLETS ORAL at 18:35

## 2020-01-01 RX ADMIN — OMEPRAZOLE 40 MG: KIT at 04:43

## 2020-01-01 RX ADMIN — HEPARIN SODIUM 3300 UNITS: 1000 INJECTION, SOLUTION INTRAVENOUS; SUBCUTANEOUS at 09:56

## 2020-01-01 RX ADMIN — OMEPRAZOLE 40 MG: KIT at 05:37

## 2020-01-01 RX ADMIN — LACOSAMIDE 300 MG: 100 TABLET, FILM COATED ORAL at 18:11

## 2020-01-01 RX ADMIN — EPOETIN ALFA 4000 UNITS: 4000 SOLUTION INTRAVENOUS; SUBCUTANEOUS at 09:27

## 2020-01-01 RX ADMIN — INSULIN HUMAN 1 UNITS: 100 INJECTION, SOLUTION PARENTERAL at 11:56

## 2020-01-01 RX ADMIN — ASPIRIN 81 MG 81 MG: 81 TABLET ORAL at 05:44

## 2020-01-01 RX ADMIN — HYDRALAZINE HYDROCHLORIDE 25 MG: 50 TABLET ORAL at 16:56

## 2020-01-01 RX ADMIN — ASPIRIN 81 MG 81 MG: 81 TABLET ORAL at 04:29

## 2020-01-01 RX ADMIN — LORAZEPAM 1 MG: 2 INJECTION INTRAMUSCULAR; INTRAVENOUS at 16:05

## 2020-01-01 RX ADMIN — EPOETIN ALFA-EPBX 4000 UNITS: 4000 INJECTION, SOLUTION INTRAVENOUS; SUBCUTANEOUS at 09:51

## 2020-01-01 RX ADMIN — OMEPRAZOLE 40 MG: KIT at 04:09

## 2020-01-01 RX ADMIN — FOLIC ACID 1 MG: 1 TABLET ORAL at 04:26

## 2020-01-01 RX ADMIN — EPOETIN ALFA 4000 UNITS: 4000 SOLUTION INTRAVENOUS; SUBCUTANEOUS at 10:08

## 2020-01-01 RX ADMIN — HEPARIN SODIUM 5000 UNITS: 5000 INJECTION, SOLUTION INTRAVENOUS; SUBCUTANEOUS at 05:34

## 2020-01-01 RX ADMIN — SODIUM CHLORIDE 200 MG: 9 INJECTION, SOLUTION INTRAVENOUS at 05:08

## 2020-01-01 RX ADMIN — MIDAZOLAM 8 MG/HR: 5 INJECTION INTRAMUSCULAR; INTRAVENOUS at 00:02

## 2020-01-01 RX ADMIN — FAMOTIDINE 20 MG: 20 TABLET ORAL at 05:22

## 2020-01-01 RX ADMIN — PANTOPRAZOLE SODIUM 40 MG: 40 INJECTION, POWDER, LYOPHILIZED, FOR SOLUTION INTRAVENOUS at 23:48

## 2020-01-01 RX ADMIN — SODIUM CHLORIDE 200 MG: 9 INJECTION, SOLUTION INTRAVENOUS at 05:40

## 2020-01-01 RX ADMIN — SENNOSIDES AND DOCUSATE SODIUM 2 TABLET: 8.6; 5 TABLET ORAL at 18:11

## 2020-01-01 RX ADMIN — SENNOSIDES AND DOCUSATE SODIUM 2 TABLET: 8.6; 5 TABLET ORAL at 05:37

## 2020-01-01 RX ADMIN — LACOSAMIDE 300 MG: 100 TABLET, FILM COATED ORAL at 04:09

## 2020-01-01 RX ADMIN — HEPARIN SODIUM 5000 UNITS: 5000 INJECTION, SOLUTION INTRAVENOUS; SUBCUTANEOUS at 04:34

## 2020-01-01 RX ADMIN — TOPIRAMATE 200 MG: 100 TABLET, FILM COATED ORAL at 17:51

## 2020-01-01 RX ADMIN — ASPIRIN 81 MG 81 MG: 81 TABLET ORAL at 05:38

## 2020-01-01 RX ADMIN — LABETALOL HYDROCHLORIDE 10 MG: 5 INJECTION INTRAVENOUS at 11:08

## 2020-01-01 RX ADMIN — VITAMIN C 1 TABLET: TAB at 05:39

## 2020-01-01 RX ADMIN — TOPIRAMATE 200 MG: 100 TABLET, FILM COATED ORAL at 18:00

## 2020-01-01 RX ADMIN — HYDRALAZINE HYDROCHLORIDE 100 MG: 25 TABLET, FILM COATED ORAL at 04:29

## 2020-01-01 RX ADMIN — HEPARIN SODIUM 5000 UNITS: 5000 INJECTION, SOLUTION INTRAVENOUS; SUBCUTANEOUS at 17:20

## 2020-01-01 RX ADMIN — ATORVASTATIN CALCIUM 40 MG: 40 TABLET, FILM COATED ORAL at 04:19

## 2020-01-01 RX ADMIN — VITAMIN C 1 TABLET: TAB at 05:43

## 2020-01-01 RX ADMIN — HYDRALAZINE HYDROCHLORIDE 100 MG: 25 TABLET, FILM COATED ORAL at 17:17

## 2020-01-01 RX ADMIN — ALLOPURINOL 100 MG: 100 TABLET ORAL at 04:44

## 2020-01-01 RX ADMIN — OMEPRAZOLE 40 MG: KIT at 17:46

## 2020-01-01 RX ADMIN — SODIUM CHLORIDE 500 MG: 9 INJECTION, SOLUTION INTRAVENOUS at 04:58

## 2020-01-01 RX ADMIN — ATORVASTATIN CALCIUM 40 MG: 40 TABLET, FILM COATED ORAL at 05:07

## 2020-01-01 RX ADMIN — LABETALOL HYDROCHLORIDE 10 MG: 5 INJECTION INTRAVENOUS at 12:39

## 2020-01-01 RX ADMIN — LACOSAMIDE 300 MG: 100 TABLET, FILM COATED ORAL at 23:43

## 2020-01-01 RX ADMIN — FAMOTIDINE 20 MG: 10 INJECTION INTRAVENOUS at 23:40

## 2020-01-01 RX ADMIN — FENTANYL CITRATE 100 MCG: 0.05 INJECTION, SOLUTION INTRAMUSCULAR; INTRAVENOUS at 14:20

## 2020-01-01 RX ADMIN — ATORVASTATIN CALCIUM 40 MG: 40 TABLET, FILM COATED ORAL at 04:36

## 2020-01-01 RX ADMIN — EPOETIN ALFA-EPBX 4000 UNITS: 4000 INJECTION, SOLUTION INTRAVENOUS; SUBCUTANEOUS at 09:49

## 2020-01-01 RX ADMIN — DIVALPROEX SODIUM 500 MG: 125 CAPSULE, COATED PELLETS ORAL at 17:59

## 2020-01-01 RX ADMIN — INSULIN HUMAN 1 UNITS: 100 INJECTION, SOLUTION PARENTERAL at 06:27

## 2020-01-01 RX ADMIN — NYSTATIN: 100000 POWDER TOPICAL at 04:28

## 2020-01-01 RX ADMIN — NYSTATIN: 100000 POWDER TOPICAL at 06:00

## 2020-01-01 RX ADMIN — DIVALPROEX SODIUM 500 MG: 125 CAPSULE, COATED PELLETS ORAL at 06:02

## 2020-01-01 RX ADMIN — OXYCODONE HYDROCHLORIDE AND ACETAMINOPHEN 500 MG: 500 TABLET ORAL at 05:38

## 2020-01-01 RX ADMIN — TOPIRAMATE 200 MG: 100 TABLET, FILM COATED ORAL at 16:45

## 2020-01-01 RX ADMIN — ALLOPURINOL 100 MG: 100 TABLET ORAL at 05:20

## 2020-01-01 RX ADMIN — NYSTATIN: 100000 POWDER TOPICAL at 05:50

## 2020-01-01 RX ADMIN — NYSTATIN: 100000 POWDER TOPICAL at 17:32

## 2020-01-01 RX ADMIN — SODIUM CHLORIDE: 9 INJECTION, SOLUTION INTRAVENOUS at 21:38

## 2020-01-01 RX ADMIN — DOXAZOSIN 6 MG: 2 TABLET ORAL at 23:05

## 2020-01-01 RX ADMIN — TOPIRAMATE 200 MG: 100 TABLET, FILM COATED ORAL at 17:49

## 2020-01-01 RX ADMIN — LACOSAMIDE 300 MG: 100 TABLET, FILM COATED ORAL at 05:38

## 2020-01-01 RX ADMIN — OMEPRAZOLE 40 MG: KIT at 04:42

## 2020-01-01 RX ADMIN — MICONAZOLE NITRATE: 20 CREAM TOPICAL at 17:51

## 2020-01-01 RX ADMIN — LABETALOL HYDROCHLORIDE 10 MG: 5 INJECTION INTRAVENOUS at 19:51

## 2020-01-01 RX ADMIN — CARBOXYMETHYLCELLULOSE SODIUM 1 DROP: 5 SOLUTION/ DROPS OPHTHALMIC at 04:34

## 2020-01-01 RX ADMIN — TOPIRAMATE 200 MG: 100 TABLET, FILM COATED ORAL at 05:21

## 2020-01-01 RX ADMIN — HEPARIN SODIUM 5000 UNITS: 5000 INJECTION, SOLUTION INTRAVENOUS; SUBCUTANEOUS at 04:10

## 2020-01-01 RX ADMIN — HYDRALAZINE HYDROCHLORIDE 100 MG: 25 TABLET, FILM COATED ORAL at 04:10

## 2020-01-01 RX ADMIN — TOPIRAMATE 200 MG: 100 TABLET, FILM COATED ORAL at 16:54

## 2020-01-01 RX ADMIN — NYSTATIN: 100000 POWDER TOPICAL at 05:21

## 2020-01-01 RX ADMIN — HYDRALAZINE HYDROCHLORIDE 100 MG: 25 TABLET, FILM COATED ORAL at 12:54

## 2020-01-01 RX ADMIN — ASPIRIN 81 MG 81 MG: 81 TABLET ORAL at 04:23

## 2020-01-01 RX ADMIN — TOPIRAMATE 200 MG: 100 TABLET, FILM COATED ORAL at 05:33

## 2020-01-01 RX ADMIN — TOPIRAMATE 200 MG: 100 TABLET, FILM COATED ORAL at 05:29

## 2020-01-01 RX ADMIN — MICONAZOLE NITRATE: 20 CREAM TOPICAL at 06:00

## 2020-01-01 RX ADMIN — TOPIRAMATE 200 MG: 100 TABLET, FILM COATED ORAL at 17:44

## 2020-01-01 RX ADMIN — VITAMIN C 1 TABLET: TAB at 04:28

## 2020-01-01 RX ADMIN — HEPARIN SODIUM 5000 UNITS: 5000 INJECTION, SOLUTION INTRAVENOUS; SUBCUTANEOUS at 16:55

## 2020-01-01 RX ADMIN — ATORVASTATIN CALCIUM 40 MG: 40 TABLET, FILM COATED ORAL at 05:39

## 2020-01-01 RX ADMIN — HYDRALAZINE HYDROCHLORIDE 100 MG: 25 TABLET, FILM COATED ORAL at 05:37

## 2020-01-01 RX ADMIN — ATORVASTATIN CALCIUM 40 MG: 40 TABLET, FILM COATED ORAL at 04:34

## 2020-01-01 RX ADMIN — NYSTATIN: 100000 POWDER TOPICAL at 06:10

## 2020-01-01 RX ADMIN — FOLIC ACID 1 MG: 1 TABLET ORAL at 04:22

## 2020-01-01 RX ADMIN — SODIUM HYPOCHLORITE 1 ML: 1.25 SOLUTION TOPICAL at 04:21

## 2020-01-01 RX ADMIN — ALLOPURINOL 100 MG: 100 TABLET ORAL at 05:17

## 2020-01-01 RX ADMIN — HEPARIN SODIUM 5000 UNITS: 5000 INJECTION, SOLUTION INTRAVENOUS; SUBCUTANEOUS at 04:24

## 2020-01-01 RX ADMIN — OMEPRAZOLE 40 MG: KIT at 16:02

## 2020-01-01 RX ADMIN — SODIUM HYPOCHLORITE 473 ML: 1.25 SOLUTION TOPICAL at 18:01

## 2020-01-01 RX ADMIN — HYDRALAZINE HYDROCHLORIDE 25 MG: 50 TABLET ORAL at 17:13

## 2020-01-01 RX ADMIN — LABETALOL HYDROCHLORIDE 10 MG: 5 INJECTION INTRAVENOUS at 13:16

## 2020-01-01 RX ADMIN — VITAMIN C 1 TABLET: TAB at 04:12

## 2020-01-01 RX ADMIN — EPOETIN ALFA 4000 UNITS: 4000 SOLUTION INTRAVENOUS; SUBCUTANEOUS at 14:05

## 2020-01-01 RX ADMIN — ATORVASTATIN CALCIUM 40 MG: 40 TABLET, FILM COATED ORAL at 05:21

## 2020-01-01 RX ADMIN — LACOSAMIDE 300 MG: 100 TABLET, FILM COATED ORAL at 19:54

## 2020-01-01 RX ADMIN — NYSTATIN: 100000 POWDER TOPICAL at 18:15

## 2020-01-01 RX ADMIN — ALLOPURINOL 100 MG: 100 TABLET ORAL at 04:53

## 2020-01-01 RX ADMIN — DIVALPROEX SODIUM 500 MG: 125 CAPSULE, COATED PELLETS ORAL at 04:36

## 2020-01-01 RX ADMIN — ALLOPURINOL 100 MG: 100 TABLET ORAL at 04:24

## 2020-01-01 RX ADMIN — DOCUSATE SODIUM 100 MG: 50 LIQUID ORAL at 04:28

## 2020-01-01 RX ADMIN — SODIUM HYPOCHLORITE 1 ML: 1.25 SOLUTION TOPICAL at 05:42

## 2020-01-01 RX ADMIN — SENNOSIDES AND DOCUSATE SODIUM 2 TABLET: 8.6; 5 TABLET ORAL at 17:48

## 2020-01-01 RX ADMIN — NYSTATIN: 100000 POWDER TOPICAL at 05:46

## 2020-01-01 RX ADMIN — TOPIRAMATE 200 MG: 100 TABLET, FILM COATED ORAL at 18:36

## 2020-01-01 RX ADMIN — FOLIC ACID 1 MG: 1 TABLET ORAL at 05:12

## 2020-01-01 RX ADMIN — FOLIC ACID 1 MG: 1 TABLET ORAL at 04:10

## 2020-01-01 RX ADMIN — FAMOTIDINE 20 MG: 20 TABLET ORAL at 05:07

## 2020-01-01 RX ADMIN — INSULIN HUMAN 1 UNITS: 100 INJECTION, SOLUTION PARENTERAL at 00:14

## 2020-01-01 RX ADMIN — ASPIRIN 81 MG 81 MG: 81 TABLET ORAL at 06:34

## 2020-01-01 RX ADMIN — CARVEDILOL 25 MG: 25 TABLET, FILM COATED ORAL at 17:59

## 2020-01-01 RX ADMIN — PROPOFOL 40 MCG/KG/MIN: 10 INJECTION, EMULSION INTRAVENOUS at 00:44

## 2020-01-01 RX ADMIN — FOLIC ACID 1 MG: 1 TABLET ORAL at 04:20

## 2020-01-01 RX ADMIN — SODIUM CHLORIDE 300 MG: 9 INJECTION, SOLUTION INTRAVENOUS at 09:01

## 2020-01-01 RX ADMIN — DOXAZOSIN 6 MG: 2 TABLET ORAL at 20:00

## 2020-01-01 RX ADMIN — SODIUM CHLORIDE 1000 MG: 9 INJECTION, SOLUTION INTRAVENOUS at 13:45

## 2020-01-01 RX ADMIN — OXYCODONE HYDROCHLORIDE AND ACETAMINOPHEN 500 MG: 500 TABLET ORAL at 04:18

## 2020-01-01 RX ADMIN — DOXAZOSIN 6 MG: 2 TABLET ORAL at 21:45

## 2020-01-01 RX ADMIN — TOPIRAMATE 200 MG: 100 TABLET, FILM COATED ORAL at 05:43

## 2020-01-01 RX ADMIN — SENNOSIDES AND DOCUSATE SODIUM 2 TABLET: 8.6; 5 TABLET ORAL at 17:57

## 2020-01-01 RX ADMIN — DEXTROSE MONOHYDRATE 250 ML: 100 INJECTION, SOLUTION INTRAVENOUS at 00:26

## 2020-01-01 RX ADMIN — ASPIRIN 81 MG 81 MG: 81 TABLET ORAL at 04:48

## 2020-01-01 RX ADMIN — INSULIN HUMAN 1 UNITS: 100 INJECTION, SOLUTION PARENTERAL at 06:11

## 2020-01-01 RX ADMIN — VALPROIC ACID 500 MG: 250 SOLUTION ORAL at 04:12

## 2020-01-01 RX ADMIN — DOXAZOSIN 6 MG: 2 TABLET ORAL at 20:14

## 2020-01-01 RX ADMIN — ONDANSETRON 4 MG: 2 INJECTION INTRAMUSCULAR; INTRAVENOUS at 19:16

## 2020-01-01 RX ADMIN — TOPIRAMATE 200 MG: 100 TABLET, FILM COATED ORAL at 04:59

## 2020-01-01 RX ADMIN — TOPIRAMATE 200 MG: 100 TABLET, FILM COATED ORAL at 06:01

## 2020-01-01 RX ADMIN — VALPROIC ACID 500 MG: 250 SOLUTION ORAL at 18:00

## 2020-01-01 RX ADMIN — NYSTATIN: 100000 POWDER TOPICAL at 04:15

## 2020-01-01 RX ADMIN — TOPIRAMATE 200 MG: 100 TABLET, FILM COATED ORAL at 17:12

## 2020-01-01 RX ADMIN — SENNOSIDES AND DOCUSATE SODIUM 2 TABLET: 8.6; 5 TABLET ORAL at 18:18

## 2020-01-01 RX ADMIN — NYSTATIN: 100000 POWDER TOPICAL at 05:56

## 2020-01-01 RX ADMIN — NYSTATIN: 100000 POWDER TOPICAL at 05:36

## 2020-01-01 RX ADMIN — HYDRALAZINE HYDROCHLORIDE 10 MG: 20 INJECTION INTRAMUSCULAR; INTRAVENOUS at 18:47

## 2020-01-01 RX ADMIN — OMEPRAZOLE 40 MG: KIT at 18:13

## 2020-01-01 RX ADMIN — LACOSAMIDE 300 MG: 100 TABLET, FILM COATED ORAL at 05:41

## 2020-01-01 RX ADMIN — HEPARIN SODIUM 5000 UNITS: 5000 INJECTION, SOLUTION INTRAVENOUS; SUBCUTANEOUS at 04:28

## 2020-01-01 RX ADMIN — ALLOPURINOL 100 MG: 100 TABLET ORAL at 06:02

## 2020-01-01 RX ADMIN — INSULIN HUMAN 1 UNITS: 100 INJECTION, SOLUTION PARENTERAL at 12:18

## 2020-01-01 RX ADMIN — HYDRALAZINE HYDROCHLORIDE 100 MG: 25 TABLET, FILM COATED ORAL at 04:24

## 2020-01-01 RX ADMIN — NYSTATIN: 100000 POWDER TOPICAL at 04:23

## 2020-01-01 RX ADMIN — MICONAZOLE NITRATE: 20 CREAM TOPICAL at 05:07

## 2020-01-01 RX ADMIN — SODIUM CHLORIDE 300 MG: 9 INJECTION, SOLUTION INTRAVENOUS at 05:34

## 2020-01-01 RX ADMIN — VALPROIC ACID 500 MG: 250 SOLUTION ORAL at 17:00

## 2020-01-01 RX ADMIN — HYDRALAZINE HYDROCHLORIDE 25 MG: 50 TABLET ORAL at 17:45

## 2020-01-01 RX ADMIN — SENNOSIDES AND DOCUSATE SODIUM 2 TABLET: 8.6; 5 TABLET ORAL at 05:45

## 2020-01-01 RX ADMIN — MIDAZOLAM 8 MG/HR: 5 INJECTION INTRAMUSCULAR; INTRAVENOUS at 04:45

## 2020-01-01 RX ADMIN — HYDRALAZINE HYDROCHLORIDE 100 MG: 25 TABLET, FILM COATED ORAL at 04:52

## 2020-01-01 RX ADMIN — VALPROATE SODIUM 500 MG: 100 INJECTION, SOLUTION INTRAVENOUS at 17:24

## 2020-01-01 RX ADMIN — HEPARIN SODIUM 5000 UNITS: 5000 INJECTION, SOLUTION INTRAVENOUS; SUBCUTANEOUS at 04:20

## 2020-01-01 RX ADMIN — SENNOSIDES AND DOCUSATE SODIUM 2 TABLET: 8.6; 5 TABLET ORAL at 04:44

## 2020-01-01 RX ADMIN — LACOSAMIDE 300 MG: 100 TABLET, FILM COATED ORAL at 17:57

## 2020-01-01 RX ADMIN — OMEPRAZOLE 40 MG: KIT at 04:46

## 2020-01-01 RX ADMIN — HYDRALAZINE HYDROCHLORIDE 100 MG: 25 TABLET, FILM COATED ORAL at 18:26

## 2020-01-01 RX ADMIN — EPOETIN ALFA 4000 UNITS: 4000 SOLUTION INTRAVENOUS; SUBCUTANEOUS at 12:52

## 2020-01-01 RX ADMIN — ASPIRIN 81 MG 81 MG: 81 TABLET ORAL at 05:48

## 2020-01-01 RX ADMIN — CARVEDILOL 25 MG: 25 TABLET, FILM COATED ORAL at 17:39

## 2020-01-01 RX ADMIN — TOPIRAMATE 200 MG: 100 TABLET, FILM COATED ORAL at 17:48

## 2020-01-01 RX ADMIN — LACOSAMIDE 300 MG: 100 TABLET, FILM COATED ORAL at 17:30

## 2020-01-01 RX ADMIN — OMEPRAZOLE 40 MG: KIT at 06:25

## 2020-01-01 RX ADMIN — SODIUM CHLORIDE 100 MG PE: 9 INJECTION, SOLUTION INTRAVENOUS at 17:39

## 2020-01-01 RX ADMIN — HEPARIN SODIUM 5000 UNITS: 5000 INJECTION, SOLUTION INTRAVENOUS; SUBCUTANEOUS at 14:30

## 2020-01-01 RX ADMIN — HYDRALAZINE HYDROCHLORIDE 100 MG: 25 TABLET, FILM COATED ORAL at 13:54

## 2020-01-01 RX ADMIN — SODIUM HYPOCHLORITE 473 ML: 1.25 SOLUTION TOPICAL at 18:30

## 2020-01-01 RX ADMIN — ASPIRIN 81 MG 81 MG: 81 TABLET ORAL at 04:36

## 2020-01-01 RX ADMIN — TOPIRAMATE 200 MG: 100 TABLET, FILM COATED ORAL at 18:01

## 2020-01-01 RX ADMIN — CARVEDILOL 25 MG: 25 TABLET, FILM COATED ORAL at 18:33

## 2020-01-01 RX ADMIN — VITAMIN C 1 TABLET: TAB at 04:21

## 2020-01-01 RX ADMIN — HYDRALAZINE HYDROCHLORIDE 100 MG: 25 TABLET, FILM COATED ORAL at 22:51

## 2020-01-01 RX ADMIN — HYDRALAZINE HYDROCHLORIDE 10 MG: 20 INJECTION INTRAMUSCULAR; INTRAVENOUS at 08:48

## 2020-01-01 RX ADMIN — HYDRALAZINE HYDROCHLORIDE 100 MG: 25 TABLET, FILM COATED ORAL at 11:45

## 2020-01-01 RX ADMIN — OXYCODONE HYDROCHLORIDE AND ACETAMINOPHEN 500 MG: 500 TABLET ORAL at 05:45

## 2020-01-01 RX ADMIN — HYDRALAZINE HYDROCHLORIDE 100 MG: 25 TABLET, FILM COATED ORAL at 12:38

## 2020-01-01 RX ADMIN — ATORVASTATIN CALCIUM 40 MG: 40 TABLET, FILM COATED ORAL at 06:35

## 2020-01-01 RX ADMIN — TOPIRAMATE 200 MG: 100 TABLET, FILM COATED ORAL at 05:07

## 2020-01-01 RX ADMIN — INSULIN HUMAN 1 UNITS: 100 INJECTION, SOLUTION PARENTERAL at 05:35

## 2020-01-01 RX ADMIN — HEPARIN SODIUM 3300 UNITS: 1000 INJECTION, SOLUTION INTRAVENOUS; SUBCUTANEOUS at 10:16

## 2020-01-01 RX ADMIN — POLYETHYLENE GLYCOL 3350 1 PACKET: 17 POWDER, FOR SOLUTION ORAL at 00:18

## 2020-01-01 RX ADMIN — FOLIC ACID 1 MG: 1 TABLET ORAL at 05:21

## 2020-01-01 RX ADMIN — SENNOSIDES AND DOCUSATE SODIUM 2 TABLET: 8.6; 5 TABLET ORAL at 22:52

## 2020-01-01 RX ADMIN — INSULIN HUMAN 1 UNITS: 100 INJECTION, SOLUTION PARENTERAL at 00:27

## 2020-01-01 RX ADMIN — PROPOFOL 30 MCG/KG/MIN: 10 INJECTION, EMULSION INTRAVENOUS at 07:39

## 2020-01-01 RX ADMIN — PROPOFOL 50 MCG/KG/MIN: 10 INJECTION, EMULSION INTRAVENOUS at 12:38

## 2020-01-01 RX ADMIN — HEPARIN SODIUM 5000 UNITS: 5000 INJECTION, SOLUTION INTRAVENOUS; SUBCUTANEOUS at 05:37

## 2020-01-01 RX ADMIN — FOLIC ACID 1 MG: 1 TABLET ORAL at 04:36

## 2020-01-01 RX ADMIN — LACOSAMIDE 300 MG: 100 TABLET, FILM COATED ORAL at 18:08

## 2020-01-01 RX ADMIN — INSULIN HUMAN 1 UNITS: 100 INJECTION, SOLUTION PARENTERAL at 05:41

## 2020-01-01 RX ADMIN — VALPROIC ACID 500 MG: 250 SOLUTION ORAL at 16:20

## 2020-01-01 RX ADMIN — PROPOFOL 20 MCG/KG/MIN: 10 INJECTION, EMULSION INTRAVENOUS at 18:39

## 2020-01-01 RX ADMIN — ALLOPURINOL 100 MG: 100 TABLET ORAL at 05:45

## 2020-01-01 RX ADMIN — TOPIRAMATE 200 MG: 100 TABLET, FILM COATED ORAL at 16:59

## 2020-01-01 RX ADMIN — ATORVASTATIN CALCIUM 10 MG: 10 TABLET, FILM COATED ORAL at 05:31

## 2020-01-01 RX ADMIN — EPOETIN ALFA 4000 UNITS: 4000 SOLUTION INTRAVENOUS; SUBCUTANEOUS at 10:27

## 2020-01-01 RX ADMIN — LACOSAMIDE 300 MG: 100 TABLET, FILM COATED ORAL at 04:34

## 2020-01-01 RX ADMIN — FOLIC ACID 1 MG: 1 TABLET ORAL at 04:57

## 2020-01-01 RX ADMIN — VITAMIN C 1 TABLET: TAB at 04:42

## 2020-01-01 RX ADMIN — ALBUMIN (HUMAN) 12.5 G: 5 SOLUTION INTRAVENOUS at 08:45

## 2020-01-01 RX ADMIN — LACOSAMIDE 300 MG: 100 TABLET, FILM COATED ORAL at 17:46

## 2020-01-01 RX ADMIN — ALLOPURINOL 100 MG: 100 TABLET ORAL at 05:24

## 2020-01-01 RX ADMIN — FAMOTIDINE 20 MG: 20 TABLET ORAL at 05:33

## 2020-01-01 RX ADMIN — NYSTATIN: 100000 POWDER TOPICAL at 04:40

## 2020-01-01 RX ADMIN — FAMOTIDINE 20 MG: 20 TABLET ORAL at 04:59

## 2020-01-01 RX ADMIN — MAGNESIUM SULFATE 2 G: 2 INJECTION INTRAVENOUS at 14:05

## 2020-01-01 RX ADMIN — ASPIRIN 81 MG 81 MG: 81 TABLET ORAL at 05:40

## 2020-01-01 RX ADMIN — INSULIN HUMAN 2 UNITS: 100 INJECTION, SOLUTION PARENTERAL at 05:57

## 2020-01-01 RX ADMIN — SODIUM CHLORIDE 300 MG: 9 INJECTION, SOLUTION INTRAVENOUS at 17:49

## 2020-01-01 RX ADMIN — EPOETIN ALFA 4000 UNITS: 4000 SOLUTION INTRAVENOUS; SUBCUTANEOUS at 12:18

## 2020-01-01 RX ADMIN — NYSTATIN: 100000 POWDER TOPICAL at 17:41

## 2020-01-01 RX ADMIN — OMEPRAZOLE 40 MG: KIT at 17:40

## 2020-01-01 RX ADMIN — DOXAZOSIN 6 MG: 2 TABLET ORAL at 22:53

## 2020-01-01 RX ADMIN — NYSTATIN: 100000 POWDER TOPICAL at 17:52

## 2020-01-01 RX ADMIN — HYDRALAZINE HYDROCHLORIDE 25 MG: 50 TABLET ORAL at 12:05

## 2020-01-01 RX ADMIN — OMEPRAZOLE 40 MG: KIT at 05:43

## 2020-01-01 RX ADMIN — LISINOPRIL 5 MG: 5 TABLET ORAL at 14:25

## 2020-01-01 RX ADMIN — EPOETIN ALFA: 4000 SOLUTION INTRAVENOUS; SUBCUTANEOUS at 09:49

## 2020-01-01 RX ADMIN — TOPIRAMATE 200 MG: 100 TABLET, FILM COATED ORAL at 18:59

## 2020-01-01 RX ADMIN — NYSTATIN: 100000 POWDER TOPICAL at 18:05

## 2020-01-01 RX ADMIN — CARVEDILOL 25 MG: 25 TABLET, FILM COATED ORAL at 18:58

## 2020-01-01 RX ADMIN — TOPIRAMATE 200 MG: 100 TABLET, FILM COATED ORAL at 17:46

## 2020-01-01 RX ADMIN — OMEPRAZOLE 40 MG: KIT at 18:00

## 2020-01-01 RX ADMIN — LACOSAMIDE 300 MG: 100 TABLET, FILM COATED ORAL at 17:47

## 2020-01-01 RX ADMIN — HYDRALAZINE HYDROCHLORIDE 100 MG: 25 TABLET, FILM COATED ORAL at 10:53

## 2020-01-01 RX ADMIN — FAMOTIDINE 20 MG: 20 TABLET ORAL at 05:45

## 2020-01-01 RX ADMIN — ATORVASTATIN CALCIUM 40 MG: 40 TABLET, FILM COATED ORAL at 05:02

## 2020-01-01 RX ADMIN — INSULIN HUMAN 1 UNITS: 100 INJECTION, SOLUTION PARENTERAL at 01:21

## 2020-01-01 RX ADMIN — HEPARIN SODIUM 5000 UNITS: 5000 INJECTION, SOLUTION INTRAVENOUS; SUBCUTANEOUS at 17:15

## 2020-01-01 RX ADMIN — NYSTATIN: 100000 POWDER TOPICAL at 18:21

## 2020-01-01 RX ADMIN — Medication 50 MCG/HR: at 15:05

## 2020-01-01 RX ADMIN — HEPARIN SODIUM 5000 UNITS: 5000 INJECTION, SOLUTION INTRAVENOUS; SUBCUTANEOUS at 17:17

## 2020-01-01 RX ADMIN — TOPIRAMATE 200 MG: 100 TABLET, FILM COATED ORAL at 18:18

## 2020-01-01 RX ADMIN — ALLOPURINOL 100 MG: 100 TABLET ORAL at 04:34

## 2020-01-01 RX ADMIN — DIVALPROEX SODIUM 500 MG: 125 CAPSULE, COATED PELLETS ORAL at 04:25

## 2020-01-01 RX ADMIN — HEPARIN SODIUM 3300 UNITS: 1000 INJECTION, SOLUTION INTRAVENOUS; SUBCUTANEOUS at 16:30

## 2020-01-01 RX ADMIN — OMEPRAZOLE 40 MG: KIT at 20:27

## 2020-01-01 RX ADMIN — LACOSAMIDE 300 MG: 100 TABLET, FILM COATED ORAL at 17:07

## 2020-01-01 RX ADMIN — LACOSAMIDE 300 MG: 100 TABLET, FILM COATED ORAL at 17:54

## 2020-01-01 RX ADMIN — ALLOPURINOL 100 MG: 100 TABLET ORAL at 04:28

## 2020-01-01 RX ADMIN — OXYCODONE HYDROCHLORIDE AND ACETAMINOPHEN 500 MG: 500 TABLET ORAL at 04:58

## 2020-01-01 RX ADMIN — LACOSAMIDE 300 MG: 100 TABLET, FILM COATED ORAL at 16:45

## 2020-01-01 RX ADMIN — SODIUM CHLORIDE 200 MG: 9 INJECTION, SOLUTION INTRAVENOUS at 20:10

## 2020-01-01 RX ADMIN — DOCUSATE SODIUM 100 MG: 50 LIQUID ORAL at 05:00

## 2020-01-01 RX ADMIN — MIDODRINE HYDROCHLORIDE 5 MG: 5 TABLET ORAL at 11:21

## 2020-01-01 RX ADMIN — HEPARIN SODIUM 5000 UNITS: 5000 INJECTION, SOLUTION INTRAVENOUS; SUBCUTANEOUS at 04:38

## 2020-01-01 RX ADMIN — LACOSAMIDE 300 MG: 100 TABLET, FILM COATED ORAL at 17:13

## 2020-01-01 RX ADMIN — VITAMIN C 1 TABLET: TAB at 05:05

## 2020-01-01 RX ADMIN — TOPIRAMATE 200 MG: 100 TABLET, FILM COATED ORAL at 06:34

## 2020-01-01 RX ADMIN — OMEPRAZOLE 40 MG: KIT at 05:32

## 2020-01-01 RX ADMIN — TOPIRAMATE 200 MG: 100 TABLET, FILM COATED ORAL at 18:27

## 2020-01-01 RX ADMIN — DOXAZOSIN 6 MG: 2 TABLET ORAL at 20:18

## 2020-01-01 RX ADMIN — LACOSAMIDE 300 MG: 100 TABLET, FILM COATED ORAL at 18:01

## 2020-01-01 RX ADMIN — HYDRALAZINE HYDROCHLORIDE 100 MG: 25 TABLET, FILM COATED ORAL at 17:46

## 2020-01-01 RX ADMIN — OMEPRAZOLE 40 MG: KIT at 05:42

## 2020-01-01 RX ADMIN — LISINOPRIL 5 MG: 5 TABLET ORAL at 17:59

## 2020-01-01 RX ADMIN — HYDRALAZINE HYDROCHLORIDE 100 MG: 25 TABLET, FILM COATED ORAL at 12:11

## 2020-01-01 RX ADMIN — TOPIRAMATE 200 MG: 100 TABLET, FILM COATED ORAL at 05:42

## 2020-01-01 RX ADMIN — SODIUM CHLORIDE 300 MG: 9 INJECTION, SOLUTION INTRAVENOUS at 17:54

## 2020-01-01 RX ADMIN — FOLIC ACID 1 MG: 1 TABLET ORAL at 05:20

## 2020-01-01 RX ADMIN — SENNOSIDES AND DOCUSATE SODIUM 2 TABLET: 8.6; 5 TABLET ORAL at 17:50

## 2020-01-01 RX ADMIN — DIVALPROEX SODIUM 500 MG: 125 CAPSULE, COATED PELLETS ORAL at 17:45

## 2020-01-01 RX ADMIN — OMEPRAZOLE 40 MG: KIT at 17:57

## 2020-01-01 RX ADMIN — HEPARIN SODIUM 5000 UNITS: 5000 INJECTION, SOLUTION INTRAVENOUS; SUBCUTANEOUS at 17:01

## 2020-01-01 RX ADMIN — ASPIRIN 81 MG 81 MG: 81 TABLET ORAL at 04:59

## 2020-01-01 RX ADMIN — OMEPRAZOLE 40 MG: KIT at 07:50

## 2020-01-01 RX ADMIN — VITAMIN C 1 TABLET: TAB at 04:14

## 2020-01-01 RX ADMIN — NYSTATIN: 100000 POWDER TOPICAL at 17:47

## 2020-01-01 RX ADMIN — ASPIRIN 81 MG 81 MG: 81 TABLET ORAL at 06:01

## 2020-01-01 RX ADMIN — LACOSAMIDE 300 MG: 100 TABLET, FILM COATED ORAL at 04:19

## 2020-01-01 RX ADMIN — VALPROIC ACID 500 MG: 250 SOLUTION ORAL at 19:01

## 2020-01-01 RX ADMIN — LACOSAMIDE 300 MG: 100 TABLET, FILM COATED ORAL at 17:59

## 2020-01-01 RX ADMIN — TOPIRAMATE 200 MG: 100 TABLET, FILM COATED ORAL at 17:25

## 2020-01-01 RX ADMIN — DOXAZOSIN 6 MG: 2 TABLET ORAL at 21:13

## 2020-01-01 RX ADMIN — LACOSAMIDE 300 MG: 100 TABLET, FILM COATED ORAL at 05:42

## 2020-01-01 RX ADMIN — HYDRALAZINE HYDROCHLORIDE 25 MG: 50 TABLET ORAL at 04:56

## 2020-01-01 RX ADMIN — ACETAMINOPHEN 650 MG: 325 TABLET, FILM COATED ORAL at 13:23

## 2020-01-01 RX ADMIN — DIVALPROEX SODIUM 500 MG: 125 CAPSULE, COATED PELLETS ORAL at 17:56

## 2020-01-01 RX ADMIN — NYSTATIN: 100000 POWDER TOPICAL at 17:10

## 2020-01-01 RX ADMIN — INSULIN HUMAN 2 UNITS: 100 INJECTION, SOLUTION PARENTERAL at 11:47

## 2020-01-01 RX ADMIN — HYDRALAZINE HYDROCHLORIDE 100 MG: 25 TABLET, FILM COATED ORAL at 05:39

## 2020-01-01 RX ADMIN — LEVETIRACETAM 1000 MG: 500 TABLET ORAL at 05:22

## 2020-01-01 RX ADMIN — ALLOPURINOL 100 MG: 100 TABLET ORAL at 04:10

## 2020-01-01 RX ADMIN — NYSTATIN: 100000 POWDER TOPICAL at 05:24

## 2020-01-01 RX ADMIN — LACOSAMIDE 300 MG: 100 TABLET, FILM COATED ORAL at 18:13

## 2020-01-01 RX ADMIN — VALPROIC ACID 500 MG: 250 SOLUTION ORAL at 04:16

## 2020-01-01 RX ADMIN — HEPARIN SODIUM 5000 UNITS: 5000 INJECTION, SOLUTION INTRAVENOUS; SUBCUTANEOUS at 18:02

## 2020-01-01 RX ADMIN — INSULIN HUMAN 2 UNITS: 100 INJECTION, SOLUTION PARENTERAL at 06:00

## 2020-01-01 RX ADMIN — LACOSAMIDE 300 MG: 100 TABLET, FILM COATED ORAL at 05:28

## 2020-01-01 RX ADMIN — CLONAZEPAM 1 MG: 0.5 TABLET ORAL at 09:44

## 2020-01-01 RX ADMIN — TOPIRAMATE 200 MG: 100 TABLET, FILM COATED ORAL at 04:56

## 2020-01-01 RX ADMIN — VALPROIC ACID 500 MG: 250 SOLUTION ORAL at 18:10

## 2020-01-01 RX ADMIN — LACOSAMIDE 300 MG: 100 TABLET, FILM COATED ORAL at 04:27

## 2020-01-01 RX ADMIN — OMEPRAZOLE 40 MG: KIT at 21:00

## 2020-01-01 RX ADMIN — MULTIPLE VITAMINS W/ MINERALS TAB 1 TABLET: TAB at 05:20

## 2020-01-01 RX ADMIN — LACOSAMIDE 300 MG: 100 TABLET, FILM COATED ORAL at 22:52

## 2020-01-01 RX ADMIN — SODIUM CHLORIDE 300 MG: 9 INJECTION, SOLUTION INTRAVENOUS at 05:06

## 2020-01-01 RX ADMIN — HEPARIN SODIUM 5000 UNITS: 5000 INJECTION, SOLUTION INTRAVENOUS; SUBCUTANEOUS at 05:06

## 2020-01-01 RX ADMIN — OXYCODONE HYDROCHLORIDE 5 MG: 5 SOLUTION ORAL at 08:13

## 2020-01-01 RX ADMIN — VITAMIN C 1 TABLET: TAB at 05:24

## 2020-01-01 RX ADMIN — NYSTATIN: 100000 POWDER TOPICAL at 18:02

## 2020-01-01 RX ADMIN — HEPARIN SODIUM 5000 UNITS: 5000 INJECTION, SOLUTION INTRAVENOUS; SUBCUTANEOUS at 22:04

## 2020-01-01 RX ADMIN — NYSTATIN: 100000 POWDER TOPICAL at 04:49

## 2020-01-01 RX ADMIN — HEPARIN SODIUM 5000 UNITS: 5000 INJECTION, SOLUTION INTRAVENOUS; SUBCUTANEOUS at 04:58

## 2020-01-01 RX ADMIN — ASPIRIN 81 MG 81 MG: 81 TABLET ORAL at 05:02

## 2020-01-01 RX ADMIN — DOXAZOSIN 6 MG: 2 TABLET ORAL at 21:03

## 2020-01-01 RX ADMIN — TOPIRAMATE 200 MG: 100 TABLET, FILM COATED ORAL at 04:36

## 2020-01-01 RX ADMIN — CARVEDILOL 25 MG: 25 TABLET, FILM COATED ORAL at 04:36

## 2020-01-01 RX ADMIN — CARVEDILOL 25 MG: 25 TABLET, FILM COATED ORAL at 04:28

## 2020-01-01 RX ADMIN — ASPIRIN 81 MG 81 MG: 81 TABLET ORAL at 05:36

## 2020-01-01 RX ADMIN — SODIUM CHLORIDE 750 MG: 9 INJECTION, SOLUTION INTRAVENOUS at 17:49

## 2020-01-01 RX ADMIN — DIVALPROEX SODIUM 500 MG: 125 CAPSULE, COATED PELLETS ORAL at 04:26

## 2020-01-01 RX ADMIN — NYSTATIN: 100000 POWDER TOPICAL at 18:59

## 2020-01-01 RX ADMIN — LACOSAMIDE 300 MG: 100 TABLET, FILM COATED ORAL at 18:04

## 2020-01-01 RX ADMIN — CARVEDILOL 25 MG: 25 TABLET, FILM COATED ORAL at 17:21

## 2020-01-01 RX ADMIN — NYSTATIN: 100000 POWDER TOPICAL at 16:47

## 2020-01-01 RX ADMIN — SENNOSIDES AND DOCUSATE SODIUM 2 TABLET: 8.6; 5 TABLET ORAL at 04:34

## 2020-01-01 RX ADMIN — OXYCODONE HYDROCHLORIDE AND ACETAMINOPHEN 500 MG: 500 TABLET ORAL at 13:27

## 2020-01-01 RX ADMIN — ATORVASTATIN CALCIUM 40 MG: 40 TABLET, FILM COATED ORAL at 05:05

## 2020-01-01 RX ADMIN — HYDRALAZINE HYDROCHLORIDE 25 MG: 50 TABLET ORAL at 12:00

## 2020-01-01 RX ADMIN — TOPIRAMATE 200 MG: 100 TABLET, FILM COATED ORAL at 18:24

## 2020-01-01 RX ADMIN — FENOFIBRATE 134 MG: 134 CAPSULE ORAL at 05:01

## 2020-01-01 RX ADMIN — ATORVASTATIN CALCIUM 40 MG: 40 TABLET, FILM COATED ORAL at 05:17

## 2020-01-01 RX ADMIN — SODIUM CHLORIDE 750 MG: 9 INJECTION, SOLUTION INTRAVENOUS at 05:08

## 2020-01-01 RX ADMIN — HEPARIN SODIUM 5000 UNITS: 5000 INJECTION, SOLUTION INTRAVENOUS; SUBCUTANEOUS at 13:28

## 2020-01-01 RX ADMIN — INSULIN HUMAN 1 UNITS: 100 INJECTION, SOLUTION PARENTERAL at 17:36

## 2020-01-01 RX ADMIN — INSULIN HUMAN 1 UNITS: 100 INJECTION, SOLUTION PARENTERAL at 05:40

## 2020-01-01 RX ADMIN — DIVALPROEX SODIUM 500 MG: 125 CAPSULE, COATED PELLETS ORAL at 05:49

## 2020-01-01 RX ADMIN — HEPARIN SODIUM 5000 UNITS: 5000 INJECTION, SOLUTION INTRAVENOUS; SUBCUTANEOUS at 14:39

## 2020-01-01 RX ADMIN — ATORVASTATIN CALCIUM 40 MG: 40 TABLET, FILM COATED ORAL at 04:38

## 2020-01-01 RX ADMIN — HYDRALAZINE HYDROCHLORIDE 100 MG: 25 TABLET, FILM COATED ORAL at 13:27

## 2020-01-01 RX ADMIN — NYSTATIN: 100000 POWDER TOPICAL at 06:16

## 2020-01-01 RX ADMIN — FENOFIBRATE 134 MG: 134 CAPSULE ORAL at 05:31

## 2020-01-01 RX ADMIN — VALPROATE SODIUM 500 MG: 100 INJECTION, SOLUTION INTRAVENOUS at 05:21

## 2020-01-01 RX ADMIN — VITAMIN C 1 TABLET: TAB at 04:27

## 2020-01-01 RX ADMIN — HYDRALAZINE HYDROCHLORIDE 25 MG: 50 TABLET ORAL at 11:02

## 2020-01-01 RX ADMIN — HYDRALAZINE HYDROCHLORIDE 100 MG: 25 TABLET, FILM COATED ORAL at 16:01

## 2020-01-01 RX ADMIN — PROPOFOL 20 MCG/KG/MIN: 10 INJECTION, EMULSION INTRAVENOUS at 21:44

## 2020-01-01 RX ADMIN — OXYCODONE HYDROCHLORIDE AND ACETAMINOPHEN 500 MG: 500 TABLET ORAL at 05:55

## 2020-01-01 RX ADMIN — SODIUM CHLORIDE 500 MG: 9 INJECTION, SOLUTION INTRAVENOUS at 17:17

## 2020-01-01 RX ADMIN — VALPROIC ACID 500 MG: 250 SOLUTION ORAL at 18:13

## 2020-01-01 RX ADMIN — LACOSAMIDE 300 MG: 100 TABLET, FILM COATED ORAL at 05:05

## 2020-01-01 RX ADMIN — HYDRALAZINE HYDROCHLORIDE 100 MG: 25 TABLET, FILM COATED ORAL at 17:50

## 2020-01-01 RX ADMIN — SODIUM CHLORIDE 200 MG: 9 INJECTION, SOLUTION INTRAVENOUS at 06:08

## 2020-01-01 RX ADMIN — DIVALPROEX SODIUM 500 MG: 125 CAPSULE ORAL at 16:59

## 2020-01-01 RX ADMIN — SENNOSIDES AND DOCUSATE SODIUM 2 TABLET: 8.6; 5 TABLET ORAL at 04:42

## 2020-01-01 RX ADMIN — HYDRALAZINE HYDROCHLORIDE 100 MG: 25 TABLET, FILM COATED ORAL at 17:34

## 2020-01-01 RX ADMIN — TOPIRAMATE 200 MG: 100 TABLET, FILM COATED ORAL at 17:07

## 2020-01-01 RX ADMIN — POTASSIUM CHLORIDE AND SODIUM CHLORIDE: 900; 150 INJECTION, SOLUTION INTRAVENOUS at 22:51

## 2020-01-01 RX ADMIN — NYSTATIN: 100000 POWDER TOPICAL at 17:00

## 2020-01-01 RX ADMIN — CARVEDILOL 25 MG: 25 TABLET, FILM COATED ORAL at 18:11

## 2020-01-01 RX ADMIN — LACOSAMIDE 300 MG: 100 TABLET, FILM COATED ORAL at 05:57

## 2020-01-01 RX ADMIN — OMEPRAZOLE 40 MG: KIT at 22:54

## 2020-01-01 RX ADMIN — TOPIRAMATE 200 MG: 100 TABLET, FILM COATED ORAL at 17:00

## 2020-01-01 RX ADMIN — CARVEDILOL 25 MG: 25 TABLET, FILM COATED ORAL at 05:02

## 2020-01-01 RX ADMIN — NYSTATIN: 100000 POWDER TOPICAL at 04:47

## 2020-01-01 RX ADMIN — VITAMIN C 1 TABLET: TAB at 06:07

## 2020-01-01 RX ADMIN — NYSTATIN: 100000 POWDER TOPICAL at 05:55

## 2020-01-01 RX ADMIN — LACOSAMIDE 300 MG: 100 TABLET, FILM COATED ORAL at 05:25

## 2020-01-01 RX ADMIN — CARVEDILOL 25 MG: 25 TABLET, FILM COATED ORAL at 05:30

## 2020-01-01 RX ADMIN — CARBOXYMETHYLCELLULOSE SODIUM 1 DROP: 5 SOLUTION/ DROPS OPHTHALMIC at 00:00

## 2020-01-01 RX ADMIN — ASPIRIN 81 MG 81 MG: 81 TABLET ORAL at 06:04

## 2020-01-01 RX ADMIN — INSULIN HUMAN 1 UNITS: 100 INJECTION, SOLUTION PARENTERAL at 06:04

## 2020-01-01 RX ADMIN — VITAMIN C 1 TABLET: TAB at 04:26

## 2020-01-01 RX ADMIN — EPOETIN ALFA 4000 UNITS: 4000 SOLUTION INTRAVENOUS; SUBCUTANEOUS at 12:01

## 2020-01-01 RX ADMIN — FAMOTIDINE 20 MG: 10 INJECTION INTRAVENOUS at 05:30

## 2020-01-01 RX ADMIN — DIVALPROEX SODIUM 500 MG: 125 CAPSULE, COATED PELLETS ORAL at 19:52

## 2020-01-01 RX ADMIN — LACOSAMIDE 300 MG: 100 TABLET, FILM COATED ORAL at 10:55

## 2020-01-01 RX ADMIN — SENNOSIDES AND DOCUSATE SODIUM 2 TABLET: 8.6; 5 TABLET ORAL at 17:33

## 2020-01-01 RX ADMIN — ALLOPURINOL 100 MG: 100 TABLET ORAL at 05:44

## 2020-01-01 RX ADMIN — HEPARIN SODIUM 5000 UNITS: 5000 INJECTION, SOLUTION INTRAVENOUS; SUBCUTANEOUS at 22:24

## 2020-01-01 RX ADMIN — SENNOSIDES AND DOCUSATE SODIUM 2 TABLET: 8.6; 5 TABLET ORAL at 06:34

## 2020-01-01 RX ADMIN — CARVEDILOL 25 MG: 25 TABLET, FILM COATED ORAL at 04:12

## 2020-01-01 RX ADMIN — FAMOTIDINE 20 MG: 10 INJECTION INTRAVENOUS at 06:11

## 2020-01-01 RX ADMIN — HEPARIN SODIUM 5000 UNITS: 5000 INJECTION, SOLUTION INTRAVENOUS; SUBCUTANEOUS at 18:25

## 2020-01-01 RX ADMIN — FOLIC ACID 1 MG: 1 TABLET ORAL at 05:05

## 2020-01-01 RX ADMIN — TOPIRAMATE 200 MG: 100 TABLET, FILM COATED ORAL at 17:15

## 2020-01-01 RX ADMIN — HEPARIN SODIUM 5000 UNITS: 5000 INJECTION, SOLUTION INTRAVENOUS; SUBCUTANEOUS at 13:00

## 2020-01-01 RX ADMIN — SODIUM CHLORIDE 300 MG: 9 INJECTION, SOLUTION INTRAVENOUS at 18:21

## 2020-01-01 RX ADMIN — OMEPRAZOLE 40 MG: KIT at 17:34

## 2020-01-01 RX ADMIN — VITAMIN C 1 TABLET: TAB at 05:45

## 2020-01-01 RX ADMIN — SCOPALAMINE 1 PATCH: 1 PATCH, EXTENDED RELEASE TRANSDERMAL at 11:32

## 2020-01-01 RX ADMIN — CARVEDILOL 25 MG: 25 TABLET, FILM COATED ORAL at 18:13

## 2020-01-01 RX ADMIN — OMEPRAZOLE 40 MG: KIT at 21:29

## 2020-01-01 RX ADMIN — HYDRALAZINE HYDROCHLORIDE 25 MG: 50 TABLET ORAL at 17:23

## 2020-01-01 RX ADMIN — LEVETIRACETAM 500 MG: 500 TABLET ORAL at 17:05

## 2020-01-01 RX ADMIN — DOXAZOSIN 6 MG: 2 TABLET ORAL at 23:03

## 2020-01-01 RX ADMIN — DIVALPROEX SODIUM 500 MG: 125 CAPSULE ORAL at 16:45

## 2020-01-01 RX ADMIN — DIVALPROEX SODIUM 500 MG: 125 CAPSULE, COATED PELLETS ORAL at 16:54

## 2020-01-01 RX ADMIN — TOPIRAMATE 200 MG: 100 TABLET, FILM COATED ORAL at 17:24

## 2020-01-01 RX ADMIN — CARVEDILOL 25 MG: 6.25 TABLET, FILM COATED ORAL at 04:45

## 2020-01-01 RX ADMIN — OMEPRAZOLE 40 MG: KIT at 20:48

## 2020-01-01 RX ADMIN — MICONAZOLE NITRATE: 20 CREAM TOPICAL at 17:49

## 2020-01-01 RX ADMIN — HYDRALAZINE HYDROCHLORIDE 100 MG: 25 TABLET, FILM COATED ORAL at 16:54

## 2020-01-01 RX ADMIN — TOPIRAMATE 200 MG: 100 TABLET, FILM COATED ORAL at 18:20

## 2020-01-01 RX ADMIN — OXYCODONE HYDROCHLORIDE AND ACETAMINOPHEN 500 MG: 500 TABLET ORAL at 05:17

## 2020-01-01 RX ADMIN — HYDRALAZINE HYDROCHLORIDE 100 MG: 25 TABLET, FILM COATED ORAL at 04:46

## 2020-01-01 RX ADMIN — HYDRALAZINE HYDROCHLORIDE 10 MG: 20 INJECTION INTRAMUSCULAR; INTRAVENOUS at 08:16

## 2020-01-01 RX ADMIN — MIDODRINE HYDROCHLORIDE 5 MG: 5 TABLET ORAL at 07:51

## 2020-01-01 RX ADMIN — PROPOFOL 50 MCG/KG/MIN: 10 INJECTION, EMULSION INTRAVENOUS at 23:03

## 2020-01-01 RX ADMIN — DIVALPROEX SODIUM 500 MG: 125 CAPSULE, COATED PELLETS ORAL at 20:46

## 2020-01-01 RX ADMIN — HEPARIN SODIUM 3300 UNITS: 1000 INJECTION, SOLUTION INTRAVENOUS; SUBCUTANEOUS at 13:10

## 2020-01-01 RX ADMIN — CARVEDILOL 25 MG: 25 TABLET, FILM COATED ORAL at 17:42

## 2020-01-01 RX ADMIN — ATORVASTATIN CALCIUM 40 MG: 40 TABLET, FILM COATED ORAL at 05:44

## 2020-01-01 RX ADMIN — DOXAZOSIN 6 MG: 2 TABLET ORAL at 21:57

## 2020-01-01 RX ADMIN — HEPARIN SODIUM 5000 UNITS: 5000 INJECTION, SOLUTION INTRAVENOUS; SUBCUTANEOUS at 05:48

## 2020-01-01 RX ADMIN — LACOSAMIDE 300 MG: 100 TABLET, FILM COATED ORAL at 17:19

## 2020-01-01 RX ADMIN — NYSTATIN: 100000 POWDER TOPICAL at 18:20

## 2020-01-01 RX ADMIN — TOPIRAMATE 200 MG: 100 TABLET, FILM COATED ORAL at 17:54

## 2020-01-01 RX ADMIN — NYSTATIN: 100000 POWDER TOPICAL at 17:22

## 2020-01-01 RX ADMIN — ACETAMINOPHEN 650 MG: 325 TABLET, FILM COATED ORAL at 05:12

## 2020-01-01 RX ADMIN — DIVALPROEX SODIUM 500 MG: 125 CAPSULE ORAL at 04:16

## 2020-01-01 RX ADMIN — HYDRALAZINE HYDROCHLORIDE 100 MG: 25 TABLET, FILM COATED ORAL at 18:13

## 2020-01-01 RX ADMIN — HEPARIN SODIUM 5000 UNITS: 5000 INJECTION, SOLUTION INTRAVENOUS; SUBCUTANEOUS at 05:07

## 2020-01-01 RX ADMIN — INSULIN HUMAN 1 UNITS: 100 INJECTION, SOLUTION PARENTERAL at 12:30

## 2020-01-01 RX ADMIN — DIVALPROEX SODIUM 500 MG: 125 CAPSULE ORAL at 17:07

## 2020-01-01 RX ADMIN — OXYCODONE HYDROCHLORIDE 5 MG: 5 SOLUTION ORAL at 08:16

## 2020-01-01 RX ADMIN — LACOSAMIDE 300 MG: 100 TABLET, FILM COATED ORAL at 06:50

## 2020-01-01 RX ADMIN — MICONAZOLE NITRATE: 20 CREAM TOPICAL at 17:31

## 2020-01-01 RX ADMIN — HEPARIN SODIUM 3300 UNITS: 1000 INJECTION, SOLUTION INTRAVENOUS; SUBCUTANEOUS at 19:58

## 2020-01-01 RX ADMIN — HYDRALAZINE HYDROCHLORIDE 100 MG: 25 TABLET, FILM COATED ORAL at 11:41

## 2020-01-01 RX ADMIN — ASPIRIN 81 MG 81 MG: 81 TABLET ORAL at 05:17

## 2020-01-01 RX ADMIN — EPOETIN ALFA 4000 UNITS: 4000 SOLUTION INTRAVENOUS; SUBCUTANEOUS at 12:24

## 2020-01-01 RX ADMIN — VITAMIN C 1 TABLET: TAB at 05:56

## 2020-01-01 RX ADMIN — DIVALPROEX SODIUM 500 MG: 125 CAPSULE ORAL at 06:05

## 2020-01-01 RX ADMIN — VALPROATE SODIUM 500 MG: 100 INJECTION, SOLUTION INTRAVENOUS at 17:44

## 2020-01-01 RX ADMIN — TOPIRAMATE 200 MG: 100 TABLET, FILM COATED ORAL at 21:12

## 2020-01-01 RX ADMIN — SODIUM CHLORIDE 200 MG: 9 INJECTION, SOLUTION INTRAVENOUS at 00:41

## 2020-01-01 RX ADMIN — ALLOPURINOL 100 MG: 100 TABLET ORAL at 05:47

## 2020-01-01 RX ADMIN — TOPIRAMATE 200 MG: 100 TABLET, FILM COATED ORAL at 05:24

## 2020-01-01 RX ADMIN — FAMOTIDINE 20 MG: 20 TABLET ORAL at 05:29

## 2020-01-01 RX ADMIN — DOXAZOSIN 8 MG: 2 TABLET ORAL at 20:09

## 2020-01-01 RX ADMIN — ALLOPURINOL 100 MG: 100 TABLET ORAL at 05:12

## 2020-01-01 RX ADMIN — ATORVASTATIN CALCIUM 40 MG: 40 TABLET, FILM COATED ORAL at 05:48

## 2020-01-01 RX ADMIN — LORAZEPAM 1 MG: 2 INJECTION INTRAMUSCULAR; INTRAVENOUS at 13:00

## 2020-01-01 RX ADMIN — OMEPRAZOLE 40 MG: KIT at 04:25

## 2020-01-01 RX ADMIN — HYDRALAZINE HYDROCHLORIDE 25 MG: 50 TABLET ORAL at 13:34

## 2020-01-01 RX ADMIN — DIVALPROEX SODIUM 500 MG: 125 CAPSULE ORAL at 04:59

## 2020-01-01 RX ADMIN — HEPARIN SODIUM 5000 UNITS: 5000 INJECTION, SOLUTION INTRAVENOUS; SUBCUTANEOUS at 05:23

## 2020-01-01 RX ADMIN — SENNOSIDES AND DOCUSATE SODIUM 2 TABLET: 8.6; 5 TABLET ORAL at 04:29

## 2020-01-01 RX ADMIN — ATORVASTATIN CALCIUM 40 MG: 40 TABLET, FILM COATED ORAL at 04:53

## 2020-01-01 RX ADMIN — DOXAZOSIN 6 MG: 2 TABLET ORAL at 22:41

## 2020-01-01 RX ADMIN — ACETAMINOPHEN 650 MG: 325 TABLET, FILM COATED ORAL at 05:05

## 2020-01-01 RX ADMIN — DIVALPROEX SODIUM 500 MG: 125 CAPSULE, COATED PELLETS ORAL at 04:33

## 2020-01-01 RX ADMIN — TOPIRAMATE 200 MG: 100 TABLET, FILM COATED ORAL at 04:45

## 2020-01-01 RX ADMIN — VITAMIN C 1 TABLET: TAB at 05:17

## 2020-01-01 RX ADMIN — SENNOSIDES AND DOCUSATE SODIUM 2 TABLET: 8.6; 5 TABLET ORAL at 16:01

## 2020-01-01 RX ADMIN — TOPIRAMATE 200 MG: 100 TABLET, FILM COATED ORAL at 16:49

## 2020-01-01 RX ADMIN — SENNOSIDES AND DOCUSATE SODIUM 2 TABLET: 8.6; 5 TABLET ORAL at 16:54

## 2020-01-01 RX ADMIN — HEPARIN SODIUM 5000 UNITS: 5000 INJECTION, SOLUTION INTRAVENOUS; SUBCUTANEOUS at 21:03

## 2020-01-01 RX ADMIN — MICONAZOLE NITRATE: 20 CREAM TOPICAL at 04:36

## 2020-01-01 RX ADMIN — LACOSAMIDE 300 MG: 100 TABLET, FILM COATED ORAL at 04:13

## 2020-01-01 RX ADMIN — FAMOTIDINE 20 MG: 20 TABLET ORAL at 05:05

## 2020-01-01 RX ADMIN — VITAMIN C 1 TABLET: TAB at 04:43

## 2020-01-01 RX ADMIN — TOPIRAMATE 200 MG: 100 TABLET, FILM COATED ORAL at 22:53

## 2020-01-01 RX ADMIN — FOLIC ACID 1 MG: 1 TABLET ORAL at 04:43

## 2020-01-01 RX ADMIN — DIVALPROEX SODIUM 500 MG: 125 CAPSULE, COATED PELLETS ORAL at 17:39

## 2020-01-01 RX ADMIN — FOLIC ACID 1 MG: 1 TABLET ORAL at 05:25

## 2020-01-01 RX ADMIN — HEPARIN SODIUM 5000 UNITS: 5000 INJECTION, SOLUTION INTRAVENOUS; SUBCUTANEOUS at 04:21

## 2020-01-01 RX ADMIN — TOPIRAMATE 200 MG: 100 TABLET, FILM COATED ORAL at 05:17

## 2020-01-01 RX ADMIN — MULTIPLE VITAMINS W/ MINERALS TAB 1 TABLET: TAB at 04:13

## 2020-01-01 RX ADMIN — CARVEDILOL 25 MG: 25 TABLET, FILM COATED ORAL at 16:54

## 2020-01-01 RX ADMIN — INSULIN HUMAN 1 UNITS: 100 INJECTION, SOLUTION PARENTERAL at 12:27

## 2020-01-01 RX ADMIN — NOREPINEPHRINE BITARTRATE 2 MCG/MIN: 1 INJECTION INTRAVENOUS at 06:00

## 2020-01-01 RX ADMIN — HEPARIN SODIUM 5000 UNITS: 5000 INJECTION, SOLUTION INTRAVENOUS; SUBCUTANEOUS at 17:00

## 2020-01-01 RX ADMIN — LACOSAMIDE 300 MG: 100 TABLET, FILM COATED ORAL at 16:56

## 2020-01-01 RX ADMIN — DIVALPROEX SODIUM 500 MG: 125 CAPSULE, COATED PELLETS ORAL at 06:34

## 2020-01-01 RX ADMIN — HEPARIN SODIUM 5000 UNITS: 5000 INJECTION, SOLUTION INTRAVENOUS; SUBCUTANEOUS at 04:23

## 2020-01-01 RX ADMIN — NYSTATIN: 100000 POWDER TOPICAL at 05:48

## 2020-01-01 RX ADMIN — PANTOPRAZOLE SODIUM 40 MG: 40 INJECTION, POWDER, LYOPHILIZED, FOR SOLUTION INTRAVENOUS at 18:01

## 2020-01-01 RX ADMIN — LACOSAMIDE 300 MG: 100 TABLET, FILM COATED ORAL at 05:45

## 2020-01-01 RX ADMIN — HYDRALAZINE HYDROCHLORIDE 100 MG: 25 TABLET, FILM COATED ORAL at 04:20

## 2020-01-01 RX ADMIN — SODIUM CHLORIDE 750 MG: 9 INJECTION, SOLUTION INTRAVENOUS at 06:00

## 2020-01-01 RX ADMIN — ALLOPURINOL 100 MG: 100 TABLET ORAL at 04:14

## 2020-01-01 RX ADMIN — INSULIN HUMAN 2 UNITS: 100 INJECTION, SOLUTION PARENTERAL at 16:03

## 2020-01-01 RX ADMIN — FENTANYL CITRATE 100 MCG: 0.05 INJECTION, SOLUTION INTRAMUSCULAR; INTRAVENOUS at 10:02

## 2020-01-01 RX ADMIN — EPOETIN ALFA 4000 UNITS: 4000 SOLUTION INTRAVENOUS; SUBCUTANEOUS at 14:46

## 2020-01-01 RX ADMIN — OMEPRAZOLE 40 MG: KIT at 04:18

## 2020-01-01 RX ADMIN — VITAMIN C 1 TABLET: TAB at 05:49

## 2020-01-01 RX ADMIN — ROCURONIUM BROMIDE 50 MG: 10 INJECTION, SOLUTION INTRAVENOUS at 11:36

## 2020-01-01 RX ADMIN — NYSTATIN: 100000 POWDER TOPICAL at 04:37

## 2020-01-01 RX ADMIN — HEPARIN SODIUM 5000 UNITS: 5000 INJECTION, SOLUTION INTRAVENOUS; SUBCUTANEOUS at 13:45

## 2020-01-01 RX ADMIN — ATORVASTATIN CALCIUM 40 MG: 40 TABLET, FILM COATED ORAL at 05:23

## 2020-01-01 RX ADMIN — DOCUSATE SODIUM 100 MG: 50 LIQUID ORAL at 04:37

## 2020-01-01 RX ADMIN — DEXTROSE MONOHYDRATE: 100 INJECTION, SOLUTION INTRAVENOUS at 01:47

## 2020-01-01 RX ADMIN — MIDAZOLAM HYDROCHLORIDE 4 MG: 1 INJECTION, SOLUTION INTRAMUSCULAR; INTRAVENOUS at 18:36

## 2020-01-01 RX ADMIN — LACOSAMIDE 300 MG: 100 TABLET, FILM COATED ORAL at 17:01

## 2020-01-01 RX ADMIN — DIVALPROEX SODIUM 500 MG: 125 CAPSULE, COATED PELLETS ORAL at 17:32

## 2020-01-01 RX ADMIN — OXYCODONE HYDROCHLORIDE AND ACETAMINOPHEN 500 MG: 500 TABLET ORAL at 04:38

## 2020-01-01 RX ADMIN — VALPROIC ACID 500 MG: 250 SOLUTION ORAL at 05:43

## 2020-01-01 RX ADMIN — SENNOSIDES AND DOCUSATE SODIUM 2 TABLET: 8.6; 5 TABLET ORAL at 05:23

## 2020-01-01 RX ADMIN — NYSTATIN: 100000 POWDER TOPICAL at 17:01

## 2020-01-01 RX ADMIN — NYSTATIN: 100000 POWDER TOPICAL at 04:48

## 2020-01-01 RX ADMIN — SODIUM CHLORIDE 300 MG: 9 INJECTION, SOLUTION INTRAVENOUS at 05:33

## 2020-01-01 RX ADMIN — FENTANYL CITRATE 50 MCG: 0.05 INJECTION, SOLUTION INTRAMUSCULAR; INTRAVENOUS at 04:44

## 2020-01-01 RX ADMIN — HYDRALAZINE HYDROCHLORIDE 100 MG: 25 TABLET, FILM COATED ORAL at 17:45

## 2020-01-01 RX ADMIN — NYSTATIN: 100000 POWDER TOPICAL at 06:50

## 2020-01-01 RX ADMIN — CARVEDILOL 25 MG: 25 TABLET, FILM COATED ORAL at 05:24

## 2020-01-01 RX ADMIN — INSULIN HUMAN 1 UNITS: 100 INJECTION, SOLUTION PARENTERAL at 14:26

## 2020-01-01 RX ADMIN — LACOSAMIDE 300 MG: 100 TABLET, FILM COATED ORAL at 05:22

## 2020-01-01 RX ADMIN — NOREPINEPHRINE BITARTRATE 8 MG: 1 INJECTION INTRAVENOUS at 14:30

## 2020-01-01 RX ADMIN — HYDRALAZINE HYDROCHLORIDE 100 MG: 25 TABLET, FILM COATED ORAL at 05:45

## 2020-01-01 RX ADMIN — NYSTATIN: 100000 POWDER TOPICAL at 18:24

## 2020-01-01 RX ADMIN — EPOETIN ALFA 4000 UNITS: 4000 SOLUTION INTRAVENOUS; SUBCUTANEOUS at 09:46

## 2020-01-01 RX ADMIN — DIVALPROEX SODIUM 500 MG: 125 CAPSULE, COATED PELLETS ORAL at 17:24

## 2020-01-01 RX ADMIN — LACOSAMIDE 300 MG: 100 TABLET, FILM COATED ORAL at 16:58

## 2020-01-01 RX ADMIN — ASPIRIN 325 MG: 325 TABLET, FILM COATED ORAL at 06:16

## 2020-01-01 RX ADMIN — ASPIRIN 81 MG 81 MG: 81 TABLET ORAL at 05:08

## 2020-01-01 RX ADMIN — HYDRALAZINE HYDROCHLORIDE 10 MG: 20 INJECTION INTRAMUSCULAR; INTRAVENOUS at 04:01

## 2020-01-01 RX ADMIN — GADOTERIDOL 12 ML: 279.3 INJECTION, SOLUTION INTRAVENOUS at 15:11

## 2020-01-01 RX ADMIN — SODIUM HYPOCHLORITE 1 ML: 1.25 SOLUTION TOPICAL at 05:23

## 2020-01-01 RX ADMIN — ALLOPURINOL 100 MG: 100 TABLET ORAL at 06:35

## 2020-01-01 RX ADMIN — HEPARIN SODIUM 3300 UNITS: 1000 INJECTION, SOLUTION INTRAVENOUS; SUBCUTANEOUS at 11:17

## 2020-01-01 RX ADMIN — OXYCODONE HYDROCHLORIDE AND ACETAMINOPHEN 500 MG: 500 TABLET ORAL at 05:28

## 2020-01-01 RX ADMIN — TOPIRAMATE 200 MG: 100 TABLET, FILM COATED ORAL at 04:40

## 2020-01-01 RX ADMIN — VALPROIC ACID 500 MG: 250 SOLUTION ORAL at 05:39

## 2020-01-01 RX ADMIN — INSULIN HUMAN 2 UNITS: 100 INJECTION, SOLUTION PARENTERAL at 05:31

## 2020-01-01 RX ADMIN — FOLIC ACID 1 MG: 1 TABLET ORAL at 05:28

## 2020-01-01 RX ADMIN — BRIVARACETAM 100 MG: 50 INJECTION, SUSPENSION INTRAVENOUS at 20:41

## 2020-01-01 RX ADMIN — VALPROIC ACID 500 MG: 250 SOLUTION ORAL at 04:28

## 2020-01-01 RX ADMIN — DOXAZOSIN 8 MG: 2 TABLET ORAL at 20:43

## 2020-01-01 RX ADMIN — OMEPRAZOLE 40 MG: KIT at 18:27

## 2020-01-01 RX ADMIN — VALPROIC ACID 500 MG: 250 SOLUTION ORAL at 04:18

## 2020-01-01 RX ADMIN — HEPARIN SODIUM 5000 UNITS: 5000 INJECTION, SOLUTION INTRAVENOUS; SUBCUTANEOUS at 18:26

## 2020-01-01 RX ADMIN — ACETAMINOPHEN 650 MG: 325 TABLET, FILM COATED ORAL at 19:57

## 2020-01-01 RX ADMIN — OMEPRAZOLE 40 MG: KIT at 04:24

## 2020-01-01 RX ADMIN — ACETAMINOPHEN 650 MG: 325 TABLET, FILM COATED ORAL at 19:28

## 2020-01-01 RX ADMIN — EPOETIN ALFA 4000 UNITS: 4000 SOLUTION INTRAVENOUS; SUBCUTANEOUS at 14:11

## 2020-01-01 RX ADMIN — NYSTATIN: 100000 POWDER TOPICAL at 05:38

## 2020-01-01 RX ADMIN — NYSTATIN: 100000 POWDER TOPICAL at 04:56

## 2020-01-01 RX ADMIN — NYSTATIN: 100000 POWDER TOPICAL at 17:20

## 2020-01-01 RX ADMIN — SODIUM CHLORIDE 100 MG: 9 INJECTION, SOLUTION INTRAVENOUS at 00:05

## 2020-01-01 RX ADMIN — ACETAMINOPHEN 650 MG: 325 TABLET, FILM COATED ORAL at 16:58

## 2020-01-01 RX ADMIN — OMEPRAZOLE 40 MG: KIT at 17:17

## 2020-01-01 RX ADMIN — INSULIN HUMAN 1 UNITS: 100 INJECTION, SOLUTION PARENTERAL at 01:04

## 2020-01-01 RX ADMIN — CARVEDILOL 25 MG: 25 TABLET, FILM COATED ORAL at 17:01

## 2020-01-01 RX ADMIN — NYSTATIN: 100000 POWDER TOPICAL at 18:52

## 2020-01-01 RX ADMIN — TOPIRAMATE 200 MG: 100 TABLET, FILM COATED ORAL at 04:58

## 2020-01-01 RX ADMIN — HYDRALAZINE HYDROCHLORIDE 10 MG: 20 INJECTION INTRAMUSCULAR; INTRAVENOUS at 05:56

## 2020-01-01 RX ADMIN — ASPIRIN 81 MG 81 MG: 81 TABLET ORAL at 04:11

## 2020-01-01 RX ADMIN — TOPIRAMATE 200 MG: 100 TABLET, FILM COATED ORAL at 18:10

## 2020-01-01 RX ADMIN — INSULIN HUMAN 1 UNITS: 100 INJECTION, SOLUTION PARENTERAL at 07:32

## 2020-01-01 RX ADMIN — CARVEDILOL 25 MG: 25 TABLET, FILM COATED ORAL at 04:37

## 2020-01-01 RX ADMIN — DOCUSATE SODIUM 100 MG: 50 LIQUID ORAL at 04:20

## 2020-01-01 RX ADMIN — MULTIPLE VITAMINS W/ MINERALS TAB 1 TABLET: TAB at 04:37

## 2020-01-01 RX ADMIN — LACOSAMIDE 300 MG: 100 TABLET, FILM COATED ORAL at 18:25

## 2020-01-01 RX ADMIN — LISINOPRIL 5 MG: 5 TABLET ORAL at 04:28

## 2020-01-01 RX ADMIN — HEPARIN SODIUM 5000 UNITS: 5000 INJECTION, SOLUTION INTRAVENOUS; SUBCUTANEOUS at 15:13

## 2020-01-01 RX ADMIN — NYSTATIN: 100000 POWDER TOPICAL at 05:33

## 2020-01-01 RX ADMIN — SODIUM HYPOCHLORITE 1 ML: 1.25 SOLUTION TOPICAL at 18:44

## 2020-01-01 RX ADMIN — FOLIC ACID 1 MG: 1 TABLET ORAL at 04:40

## 2020-01-01 RX ADMIN — OMEPRAZOLE 40 MG: KIT at 17:50

## 2020-01-01 RX ADMIN — SODIUM CHLORIDE 750 MG: 9 INJECTION, SOLUTION INTRAVENOUS at 05:54

## 2020-01-01 RX ADMIN — FAMOTIDINE 20 MG: 20 TABLET ORAL at 05:21

## 2020-01-01 RX ADMIN — DOXAZOSIN 6 MG: 2 TABLET ORAL at 00:15

## 2020-01-01 RX ADMIN — HYDRALAZINE HYDROCHLORIDE 10 MG: 20 INJECTION INTRAMUSCULAR; INTRAVENOUS at 22:29

## 2020-01-01 RX ADMIN — SODIUM CHLORIDE 300 MG: 9 INJECTION, SOLUTION INTRAVENOUS at 18:14

## 2020-01-01 RX ADMIN — SENNOSIDES AND DOCUSATE SODIUM 2 TABLET: 8.6; 5 TABLET ORAL at 17:02

## 2020-01-01 RX ADMIN — ACETAMINOPHEN 650 MG: 325 TABLET, FILM COATED ORAL at 11:45

## 2020-01-01 RX ADMIN — DEXTROSE MONOHYDRATE 250 ML: 100 INJECTION, SOLUTION INTRAVENOUS at 00:24

## 2020-01-01 RX ADMIN — SODIUM CHLORIDE 300 MG: 9 INJECTION, SOLUTION INTRAVENOUS at 18:00

## 2020-01-01 RX ADMIN — EPOETIN ALFA 4000 UNITS: 4000 SOLUTION INTRAVENOUS; SUBCUTANEOUS at 12:20

## 2020-01-01 RX ADMIN — SODIUM PHOSPHATE, MONOBASIC, MONOHYDRATE AND SODIUM PHOSPHATE, DIBASIC, ANHYDROUS 30 MMOL: 276; 142 INJECTION, SOLUTION INTRAVENOUS at 11:38

## 2020-01-01 RX ADMIN — DIVALPROEX SODIUM 500 MG: 125 CAPSULE, COATED PELLETS ORAL at 16:58

## 2020-01-01 RX ADMIN — EPOETIN ALFA 4000 UNITS: 4000 SOLUTION INTRAVENOUS; SUBCUTANEOUS at 11:28

## 2020-01-01 RX ADMIN — SODIUM CHLORIDE 500 MG: 9 INJECTION, SOLUTION INTRAVENOUS at 17:49

## 2020-01-01 RX ADMIN — HEPARIN SODIUM 3300 UNITS: 1000 INJECTION, SOLUTION INTRAVENOUS; SUBCUTANEOUS at 13:15

## 2020-01-01 RX ADMIN — SENNOSIDES AND DOCUSATE SODIUM 2 TABLET: 8.6; 5 TABLET ORAL at 04:10

## 2020-01-01 RX ADMIN — INSULIN HUMAN 1 UNITS: 100 INJECTION, SOLUTION PARENTERAL at 18:26

## 2020-01-01 RX ADMIN — VALPROIC ACID 500 MG: 250 SOLUTION ORAL at 05:38

## 2020-01-01 RX ADMIN — HYDRALAZINE HYDROCHLORIDE 10 MG: 20 INJECTION INTRAMUSCULAR; INTRAVENOUS at 20:49

## 2020-01-01 RX ADMIN — INSULIN HUMAN 2 UNITS: 100 INJECTION, SOLUTION PARENTERAL at 13:01

## 2020-01-01 RX ADMIN — INSULIN HUMAN 1 UNITS: 100 INJECTION, SOLUTION PARENTERAL at 21:01

## 2020-01-01 RX ADMIN — LACOSAMIDE 300 MG: 100 TABLET, FILM COATED ORAL at 17:44

## 2020-01-01 RX ADMIN — HEPARIN SODIUM 5000 UNITS: 5000 INJECTION, SOLUTION INTRAVENOUS; SUBCUTANEOUS at 05:00

## 2020-01-01 RX ADMIN — SODIUM CHLORIDE 500 MG: 9 INJECTION, SOLUTION INTRAVENOUS at 06:06

## 2020-01-01 RX ADMIN — ATORVASTATIN CALCIUM 40 MG: 40 TABLET, FILM COATED ORAL at 06:03

## 2020-01-01 RX ADMIN — LACOSAMIDE 300 MG: 100 TABLET, FILM COATED ORAL at 17:42

## 2020-01-01 RX ADMIN — NYSTATIN: 100000 POWDER TOPICAL at 21:02

## 2020-01-01 RX ADMIN — NYSTATIN: 100000 POWDER TOPICAL at 05:58

## 2020-01-01 RX ADMIN — ACETAMINOPHEN 650 MG: 325 TABLET, FILM COATED ORAL at 13:27

## 2020-01-01 RX ADMIN — CARVEDILOL 25 MG: 25 TABLET, FILM COATED ORAL at 16:01

## 2020-01-01 RX ADMIN — TOPIRAMATE 200 MG: 100 TABLET, FILM COATED ORAL at 05:46

## 2020-01-01 RX ADMIN — OXYCODONE HYDROCHLORIDE 5 MG: 5 SOLUTION ORAL at 04:07

## 2020-01-01 RX ADMIN — DIVALPROEX SODIUM 500 MG: 125 CAPSULE ORAL at 17:14

## 2020-01-01 RX ADMIN — LACOSAMIDE 300 MG: 100 TABLET, FILM COATED ORAL at 06:07

## 2020-01-01 RX ADMIN — TOPIRAMATE 200 MG: 100 TABLET, FILM COATED ORAL at 04:41

## 2020-01-01 RX ADMIN — OMEPRAZOLE 40 MG: KIT at 04:23

## 2020-01-01 RX ADMIN — DOXAZOSIN 8 MG: 2 TABLET ORAL at 21:03

## 2020-01-01 RX ADMIN — OMEPRAZOLE 40 MG: KIT at 04:21

## 2020-01-01 RX ADMIN — DOXAZOSIN 6 MG: 2 TABLET ORAL at 23:20

## 2020-01-01 RX ADMIN — DIVALPROEX SODIUM 500 MG: 125 CAPSULE, COATED PELLETS ORAL at 17:19

## 2020-01-01 RX ADMIN — LABETALOL HYDROCHLORIDE 10 MG: 5 INJECTION INTRAVENOUS at 00:26

## 2020-01-01 RX ADMIN — INSULIN HUMAN 2 UNITS: 100 INJECTION, SOLUTION PARENTERAL at 12:22

## 2020-01-01 RX ADMIN — SENNOSIDES AND DOCUSATE SODIUM 2 TABLET: 8.6; 5 TABLET ORAL at 05:02

## 2020-01-01 RX ADMIN — VITAMIN C 1 TABLET: TAB at 04:58

## 2020-01-01 RX ADMIN — INSULIN HUMAN 1 UNITS: 100 INJECTION, SOLUTION PARENTERAL at 17:47

## 2020-01-01 RX ADMIN — OXYCODONE HYDROCHLORIDE AND ACETAMINOPHEN 500 MG: 500 TABLET ORAL at 04:25

## 2020-01-01 RX ADMIN — INSULIN HUMAN 1 UNITS: 100 INJECTION, SOLUTION PARENTERAL at 00:10

## 2020-01-01 RX ADMIN — CARVEDILOL 25 MG: 25 TABLET, FILM COATED ORAL at 05:44

## 2020-01-01 RX ADMIN — FOLIC ACID 1 MG: 1 TABLET ORAL at 05:54

## 2020-01-01 RX ADMIN — SENNOSIDES AND DOCUSATE SODIUM 2 TABLET: 8.6; 5 TABLET ORAL at 05:39

## 2020-01-01 RX ADMIN — TOPIRAMATE 200 MG: 100 TABLET, FILM COATED ORAL at 17:56

## 2020-01-01 RX ADMIN — HYDRALAZINE HYDROCHLORIDE 100 MG: 25 TABLET, FILM COATED ORAL at 17:57

## 2020-01-01 RX ADMIN — DIVALPROEX SODIUM 500 MG: 125 CAPSULE, COATED PELLETS ORAL at 05:38

## 2020-01-01 RX ADMIN — HYDRALAZINE HYDROCHLORIDE 10 MG: 20 INJECTION INTRAMUSCULAR; INTRAVENOUS at 04:15

## 2020-01-01 RX ADMIN — OXYCODONE HYDROCHLORIDE 5 MG: 5 SOLUTION ORAL at 04:39

## 2020-01-01 RX ADMIN — DOCUSATE SODIUM 100 MG: 50 LIQUID ORAL at 18:27

## 2020-01-01 RX ADMIN — NYSTATIN: 100000 POWDER TOPICAL at 05:22

## 2020-01-01 RX ADMIN — SODIUM CHLORIDE 500 MG: 9 INJECTION, SOLUTION INTRAVENOUS at 04:45

## 2020-01-01 RX ADMIN — OXYCODONE HYDROCHLORIDE AND ACETAMINOPHEN 500 MG: 500 TABLET ORAL at 05:25

## 2020-01-01 RX ADMIN — ALLOPURINOL 100 MG: 100 TABLET ORAL at 04:29

## 2020-01-01 RX ADMIN — NYSTATIN: 100000 POWDER TOPICAL at 05:28

## 2020-01-01 RX ADMIN — FOLIC ACID 1 MG: 1 TABLET ORAL at 04:15

## 2020-01-01 RX ADMIN — OMEPRAZOLE 40 MG: KIT at 17:32

## 2020-01-01 RX ADMIN — LEVETIRACETAM 500 MG: 500 TABLET ORAL at 05:21

## 2020-01-01 RX ADMIN — INSULIN HUMAN 1 UNITS: 100 INJECTION, SOLUTION PARENTERAL at 12:24

## 2020-01-01 RX ADMIN — VALPROIC ACID 500 MG: 250 SOLUTION ORAL at 18:16

## 2020-01-01 RX ADMIN — POTASSIUM CHLORIDE 40 MEQ: 20 TABLET, EXTENDED RELEASE ORAL at 05:30

## 2020-01-01 RX ADMIN — SENNOSIDES AND DOCUSATE SODIUM 2 TABLET: 8.6; 5 TABLET ORAL at 04:27

## 2020-01-01 RX ADMIN — ASPIRIN 81 MG 81 MG: 81 TABLET ORAL at 04:28

## 2020-01-01 RX ADMIN — HYDRALAZINE HYDROCHLORIDE 100 MG: 25 TABLET, FILM COATED ORAL at 04:36

## 2020-01-01 RX ADMIN — EPOETIN ALFA 4000 UNITS: 4000 SOLUTION INTRAVENOUS; SUBCUTANEOUS at 10:11

## 2020-01-01 RX ADMIN — ASPIRIN 81 MG 81 MG: 81 TABLET ORAL at 04:57

## 2020-01-01 RX ADMIN — ATORVASTATIN CALCIUM 40 MG: 40 TABLET, FILM COATED ORAL at 04:12

## 2020-01-01 RX ADMIN — EPOETIN ALFA-EPBX 4000 UNITS: 4000 INJECTION, SOLUTION INTRAVENOUS; SUBCUTANEOUS at 09:12

## 2020-01-01 RX ADMIN — CARVEDILOL 25 MG: 25 TABLET, FILM COATED ORAL at 04:34

## 2020-01-01 RX ADMIN — VALPROIC ACID 500 MG: 250 SOLUTION ORAL at 04:40

## 2020-01-01 RX ADMIN — NYSTATIN: 100000 POWDER TOPICAL at 05:49

## 2020-01-01 RX ADMIN — ASPIRIN 81 MG 81 MG: 81 TABLET ORAL at 05:45

## 2020-01-01 RX ADMIN — TOPIRAMATE 200 MG: 100 TABLET, FILM COATED ORAL at 19:53

## 2020-01-01 RX ADMIN — MULTIPLE VITAMINS W/ MINERALS TAB 1 TABLET: TAB at 04:49

## 2020-01-01 RX ADMIN — SENNOSIDES AND DOCUSATE SODIUM 2 TABLET: 8.6; 5 TABLET ORAL at 04:28

## 2020-01-01 RX ADMIN — NYSTATIN: 100000 POWDER TOPICAL at 18:12

## 2020-01-01 RX ADMIN — INSULIN HUMAN 2 UNITS: 100 INJECTION, SOLUTION PARENTERAL at 18:31

## 2020-01-01 RX ADMIN — VALPROIC ACID 500 MG: 250 SOLUTION ORAL at 16:54

## 2020-01-01 RX ADMIN — HYDRALAZINE HYDROCHLORIDE 100 MG: 25 TABLET, FILM COATED ORAL at 13:26

## 2020-01-01 RX ADMIN — OMEPRAZOLE 40 MG: KIT at 04:37

## 2020-01-01 RX ADMIN — MIDODRINE HYDROCHLORIDE 5 MG: 5 TABLET ORAL at 09:02

## 2020-01-01 RX ADMIN — ASPIRIN 81 MG 81 MG: 81 TABLET ORAL at 05:24

## 2020-01-01 RX ADMIN — CARVEDILOL 25 MG: 25 TABLET, FILM COATED ORAL at 06:34

## 2020-01-01 RX ADMIN — DOXAZOSIN 6 MG: 2 TABLET ORAL at 20:31

## 2020-01-01 RX ADMIN — SODIUM CHLORIDE 300 MG: 9 INJECTION, SOLUTION INTRAVENOUS at 18:35

## 2020-01-01 RX ADMIN — SCOPALAMINE 1 PATCH: 1 PATCH, EXTENDED RELEASE TRANSDERMAL at 15:04

## 2020-01-01 RX ADMIN — FOLIC ACID 1 MG: 1 TABLET ORAL at 06:34

## 2020-01-01 RX ADMIN — VALPROIC ACID 500 MG: 250 SOLUTION ORAL at 16:15

## 2020-01-01 RX ADMIN — PROPOFOL 20 MCG/KG/MIN: 10 INJECTION, EMULSION INTRAVENOUS at 05:41

## 2020-01-01 RX ADMIN — DIVALPROEX SODIUM 500 MG: 125 CAPSULE, COATED PELLETS ORAL at 04:29

## 2020-01-01 RX ADMIN — CARVEDILOL 25 MG: 25 TABLET, FILM COATED ORAL at 18:26

## 2020-01-01 RX ADMIN — TOPIRAMATE 200 MG: 100 TABLET, FILM COATED ORAL at 05:12

## 2020-01-01 RX ADMIN — TOPIRAMATE 200 MG: 100 TABLET, FILM COATED ORAL at 18:23

## 2020-01-01 RX ADMIN — SODIUM HYPOCHLORITE 1 ML: 1.25 SOLUTION TOPICAL at 17:54

## 2020-01-01 RX ADMIN — CARVEDILOL 25 MG: 25 TABLET, FILM COATED ORAL at 17:34

## 2020-01-01 RX ADMIN — LACOSAMIDE 300 MG: 100 TABLET, FILM COATED ORAL at 06:35

## 2020-01-01 RX ADMIN — ALLOPURINOL 200 MG: 100 TABLET ORAL at 06:10

## 2020-01-01 RX ADMIN — DIVALPROEX SODIUM 500 MG: 125 CAPSULE ORAL at 16:56

## 2020-01-01 RX ADMIN — TOPIRAMATE 200 MG: 100 TABLET, FILM COATED ORAL at 04:49

## 2020-01-01 RX ADMIN — SENNOSIDES AND DOCUSATE SODIUM 2 TABLET: 8.6; 5 TABLET ORAL at 05:43

## 2020-01-01 RX ADMIN — VITAMIN C 1 TABLET: TAB at 05:37

## 2020-01-01 RX ADMIN — DIVALPROEX SODIUM 500 MG: 125 CAPSULE, COATED PELLETS ORAL at 04:22

## 2020-01-01 RX ADMIN — LABETALOL HYDROCHLORIDE 10 MG: 5 INJECTION INTRAVENOUS at 23:20

## 2020-01-01 RX ADMIN — LACOSAMIDE 300 MG: 100 TABLET, FILM COATED ORAL at 22:30

## 2020-01-01 RX ADMIN — DIVALPROEX SODIUM 500 MG: 125 CAPSULE ORAL at 04:20

## 2020-01-01 RX ADMIN — SENNOSIDES AND DOCUSATE SODIUM 2 TABLET: 8.6; 5 TABLET ORAL at 17:20

## 2020-01-01 RX ADMIN — NYSTATIN: 100000 POWDER TOPICAL at 21:13

## 2020-01-01 RX ADMIN — TOPIRAMATE 200 MG: 100 TABLET, FILM COATED ORAL at 17:39

## 2020-01-01 RX ADMIN — OMEPRAZOLE 40 MG: KIT at 20:34

## 2020-01-01 RX ADMIN — LACOSAMIDE 300 MG: 100 TABLET, FILM COATED ORAL at 18:20

## 2020-01-01 RX ADMIN — HEPARIN SODIUM 3300 UNITS: 1000 INJECTION, SOLUTION INTRAVENOUS; SUBCUTANEOUS at 11:56

## 2020-01-01 RX ADMIN — VITAMIN C 1 TABLET: TAB at 05:07

## 2020-01-01 RX ADMIN — NYSTATIN: 100000 POWDER TOPICAL at 06:08

## 2020-01-01 RX ADMIN — HEPARIN SODIUM 3300 UNITS: 1000 INJECTION, SOLUTION INTRAVENOUS; SUBCUTANEOUS at 16:43

## 2020-01-01 RX ADMIN — HYDRALAZINE HYDROCHLORIDE 25 MG: 50 TABLET ORAL at 17:08

## 2020-01-01 RX ADMIN — EPOETIN ALFA 4000 UNITS: 4000 SOLUTION INTRAVENOUS; SUBCUTANEOUS at 10:38

## 2020-01-01 RX ADMIN — ALLOPURINOL 100 MG: 100 TABLET ORAL at 05:46

## 2020-01-01 RX ADMIN — DIVALPROEX SODIUM 500 MG: 125 CAPSULE, COATED PELLETS ORAL at 05:02

## 2020-01-01 RX ADMIN — HEPARIN SODIUM 5000 UNITS: 5000 INJECTION, SOLUTION INTRAVENOUS; SUBCUTANEOUS at 11:45

## 2020-01-01 RX ADMIN — SODIUM CHLORIDE 200 MG: 9 INJECTION, SOLUTION INTRAVENOUS at 05:32

## 2020-01-01 RX ADMIN — INSULIN HUMAN 2 UNITS: 100 INJECTION, SOLUTION PARENTERAL at 18:08

## 2020-01-01 RX ADMIN — HEPARIN SODIUM 5000 UNITS: 5000 INJECTION, SOLUTION INTRAVENOUS; SUBCUTANEOUS at 21:50

## 2020-01-01 RX ADMIN — ALLOPURINOL 100 MG: 100 TABLET ORAL at 04:58

## 2020-01-01 RX ADMIN — DOCUSATE SODIUM 100 MG: 50 LIQUID ORAL at 17:44

## 2020-01-01 RX ADMIN — MORPHINE SULFATE 5 MG: 10 INJECTION INTRAVENOUS at 02:46

## 2020-01-01 RX ADMIN — INSULIN HUMAN 1 UNITS: 100 INJECTION, SOLUTION PARENTERAL at 18:08

## 2020-01-01 RX ADMIN — FENTANYL CITRATE 100 MCG: 0.05 INJECTION, SOLUTION INTRAMUSCULAR; INTRAVENOUS at 01:41

## 2020-01-01 RX ADMIN — VITAMIN C 1 TABLET: TAB at 04:32

## 2020-01-01 RX ADMIN — OXYCODONE HYDROCHLORIDE AND ACETAMINOPHEN 500 MG: 500 TABLET ORAL at 04:14

## 2020-01-01 RX ADMIN — INSULIN HUMAN 1 UNITS: 100 INJECTION, SOLUTION PARENTERAL at 12:28

## 2020-01-01 RX ADMIN — HYDRALAZINE HYDROCHLORIDE 10 MG: 20 INJECTION INTRAMUSCULAR; INTRAVENOUS at 17:25

## 2020-01-01 RX ADMIN — ACETAMINOPHEN 650 MG: 325 TABLET, FILM COATED ORAL at 13:11

## 2020-01-01 RX ADMIN — VALPROATE SODIUM 500 MG: 100 INJECTION, SOLUTION INTRAVENOUS at 18:24

## 2020-01-01 RX ADMIN — LACOSAMIDE 300 MG: 100 TABLET, FILM COATED ORAL at 08:30

## 2020-01-01 RX ADMIN — HYDRALAZINE HYDROCHLORIDE 100 MG: 25 TABLET, FILM COATED ORAL at 04:28

## 2020-01-01 RX ADMIN — OMEPRAZOLE 40 MG: KIT at 16:46

## 2020-01-01 RX ADMIN — INSULIN HUMAN 1 UNITS: 100 INJECTION, SOLUTION PARENTERAL at 16:57

## 2020-01-01 RX ADMIN — NYSTATIN: 100000 POWDER TOPICAL at 05:40

## 2020-01-01 RX ADMIN — DEXTROSE MONOHYDRATE 250 ML: 100 INJECTION, SOLUTION INTRAVENOUS at 17:32

## 2020-01-01 RX ADMIN — DOXAZOSIN 6 MG: 2 TABLET ORAL at 20:59

## 2020-01-01 RX ADMIN — HEPARIN SODIUM 5000 UNITS: 5000 INJECTION, SOLUTION INTRAVENOUS; SUBCUTANEOUS at 18:24

## 2020-01-01 RX ADMIN — ATORVASTATIN CALCIUM 40 MG: 40 TABLET, FILM COATED ORAL at 05:12

## 2020-01-01 RX ADMIN — ASPIRIN 81 MG 81 MG: 81 TABLET ORAL at 04:37

## 2020-01-01 RX ADMIN — TOPIRAMATE 200 MG: 100 TABLET, FILM COATED ORAL at 17:59

## 2020-01-01 RX ADMIN — FAMOTIDINE 20 MG: 20 TABLET ORAL at 04:45

## 2020-01-01 RX ADMIN — OMEPRAZOLE 40 MG: KIT at 05:46

## 2020-01-01 RX ADMIN — HEPARIN SODIUM 5000 UNITS: 5000 INJECTION, SOLUTION INTRAVENOUS; SUBCUTANEOUS at 21:02

## 2020-01-01 RX ADMIN — ATORVASTATIN CALCIUM 40 MG: 40 TABLET, FILM COATED ORAL at 04:45

## 2020-01-01 RX ADMIN — SENNOSIDES AND DOCUSATE SODIUM 2 TABLET: 8.6; 5 TABLET ORAL at 17:25

## 2020-01-01 RX ADMIN — INSULIN HUMAN 2 UNITS: 100 INJECTION, SOLUTION PARENTERAL at 18:12

## 2020-01-01 RX ADMIN — DIVALPROEX SODIUM 500 MG: 125 CAPSULE, COATED PELLETS ORAL at 04:11

## 2020-01-01 RX ADMIN — HYDRALAZINE HYDROCHLORIDE 100 MG: 25 TABLET, FILM COATED ORAL at 04:33

## 2020-01-01 RX ADMIN — OXYCODONE HYDROCHLORIDE AND ACETAMINOPHEN 500 MG: 500 TABLET ORAL at 04:48

## 2020-01-01 RX ADMIN — TOPIRAMATE 200 MG: 100 TABLET, FILM COATED ORAL at 17:16

## 2020-01-01 RX ADMIN — INSULIN HUMAN 2 UNITS: 100 INJECTION, SOLUTION PARENTERAL at 06:01

## 2020-01-01 RX ADMIN — HYDRALAZINE HYDROCHLORIDE 100 MG: 25 TABLET, FILM COATED ORAL at 16:45

## 2020-01-01 RX ADMIN — TOPIRAMATE 200 MG: 100 TABLET, FILM COATED ORAL at 05:49

## 2020-01-01 RX ADMIN — ASPIRIN 81 MG 81 MG: 81 TABLET ORAL at 04:20

## 2020-01-01 RX ADMIN — HEPARIN SODIUM 5000 UNITS: 5000 INJECTION, SOLUTION INTRAVENOUS; SUBCUTANEOUS at 14:41

## 2020-01-01 RX ADMIN — DIVALPROEX SODIUM 500 MG: 125 CAPSULE, COATED PELLETS ORAL at 17:57

## 2020-01-01 RX ADMIN — LACOSAMIDE 300 MG: 100 TABLET, FILM COATED ORAL at 09:29

## 2020-01-01 RX ADMIN — LACOSAMIDE 300 MG: 100 TABLET, FILM COATED ORAL at 17:34

## 2020-01-01 RX ADMIN — LACOSAMIDE 300 MG: 100 TABLET, FILM COATED ORAL at 05:20

## 2020-01-01 RX ADMIN — ATORVASTATIN CALCIUM 40 MG: 40 TABLET, FILM COATED ORAL at 05:42

## 2020-01-01 RX ADMIN — SODIUM CHLORIDE: 9 INJECTION, SOLUTION INTRAVENOUS at 13:40

## 2020-01-01 RX ADMIN — LACOSAMIDE 300 MG: 100 TABLET, FILM COATED ORAL at 22:59

## 2020-01-01 RX ADMIN — PROPOFOL 10 MCG/KG/MIN: 10 INJECTION, EMULSION INTRAVENOUS at 11:38

## 2020-01-01 RX ADMIN — FOLIC ACID 1 MG: 1 TABLET ORAL at 05:06

## 2020-01-01 RX ADMIN — ATORVASTATIN CALCIUM 40 MG: 40 TABLET, FILM COATED ORAL at 05:46

## 2020-01-01 RX ADMIN — ASPIRIN 81 MG 81 MG: 81 TABLET ORAL at 05:23

## 2020-01-01 RX ADMIN — SENNOSIDES AND DOCUSATE SODIUM 2 TABLET: 8.6; 5 TABLET ORAL at 18:30

## 2020-01-01 RX ADMIN — INSULIN HUMAN 1 UNITS: 100 INJECTION, SOLUTION PARENTERAL at 00:28

## 2020-01-01 RX ADMIN — FAMOTIDINE 20 MG: 20 TABLET ORAL at 05:54

## 2020-01-01 RX ADMIN — SODIUM HYPOCHLORITE 473 ML: 1.25 SOLUTION TOPICAL at 05:55

## 2020-01-01 RX ADMIN — MIDODRINE HYDROCHLORIDE 5 MG: 5 TABLET ORAL at 08:39

## 2020-01-01 RX ADMIN — HEPARIN SODIUM 5000 UNITS: 5000 INJECTION, SOLUTION INTRAVENOUS; SUBCUTANEOUS at 04:37

## 2020-01-01 RX ADMIN — TOPIRAMATE 200 MG: 100 TABLET, FILM COATED ORAL at 16:56

## 2020-01-01 RX ADMIN — LACOSAMIDE 300 MG: 100 TABLET, FILM COATED ORAL at 18:29

## 2020-01-01 RX ADMIN — DOXAZOSIN 6 MG: 2 TABLET ORAL at 19:25

## 2020-01-01 RX ADMIN — OMEPRAZOLE 40 MG: KIT at 04:52

## 2020-01-01 RX ADMIN — CARVEDILOL 25 MG: 6.25 TABLET, FILM COATED ORAL at 21:03

## 2020-01-01 RX ADMIN — ALLOPURINOL 100 MG: 100 TABLET ORAL at 04:57

## 2020-01-01 RX ADMIN — VITAMIN C 1 TABLET: TAB at 05:33

## 2020-01-01 RX ADMIN — SCOPALAMINE 1 PATCH: 1 PATCH, EXTENDED RELEASE TRANSDERMAL at 12:33

## 2020-01-01 RX ADMIN — OMEPRAZOLE 40 MG: KIT at 05:01

## 2020-01-01 RX ADMIN — HYDRALAZINE HYDROCHLORIDE 25 MG: 50 TABLET ORAL at 17:58

## 2020-01-01 RX ADMIN — ALLOPURINOL 100 MG: 100 TABLET ORAL at 05:42

## 2020-01-01 RX ADMIN — HYDRALAZINE HYDROCHLORIDE 10 MG: 20 INJECTION INTRAMUSCULAR; INTRAVENOUS at 12:20

## 2020-01-01 RX ADMIN — INSULIN HUMAN 1 UNITS: 100 INJECTION, SOLUTION PARENTERAL at 06:03

## 2020-01-01 RX ADMIN — NYSTATIN: 100000 POWDER TOPICAL at 17:31

## 2020-01-01 RX ADMIN — CARVEDILOL 25 MG: 25 TABLET, FILM COATED ORAL at 22:53

## 2020-01-01 RX ADMIN — NYSTATIN: 100000 POWDER TOPICAL at 18:30

## 2020-01-01 RX ADMIN — TOPIRAMATE 200 MG: 100 TABLET, FILM COATED ORAL at 04:14

## 2020-01-01 RX ADMIN — LACOSAMIDE 300 MG: 100 TABLET, FILM COATED ORAL at 08:08

## 2020-01-01 RX ADMIN — SODIUM CHLORIDE 500 MG: 9 INJECTION, SOLUTION INTRAVENOUS at 18:14

## 2020-01-01 RX ADMIN — DIVALPROEX SODIUM 500 MG: 125 CAPSULE ORAL at 05:29

## 2020-01-01 RX ADMIN — LACOSAMIDE 300 MG: 100 TABLET, FILM COATED ORAL at 05:24

## 2020-01-01 RX ADMIN — LACOSAMIDE 300 MG: 100 TABLET, FILM COATED ORAL at 04:56

## 2020-01-01 RX ADMIN — VALPROIC ACID 500 MG: 250 SOLUTION ORAL at 04:22

## 2020-01-01 RX ADMIN — HYDRALAZINE HYDROCHLORIDE 100 MG: 25 TABLET, FILM COATED ORAL at 17:59

## 2020-01-01 RX ADMIN — OXYCODONE HYDROCHLORIDE AND ACETAMINOPHEN 500 MG: 500 TABLET ORAL at 04:29

## 2020-01-01 RX ADMIN — HYDRALAZINE HYDROCHLORIDE 100 MG: 25 TABLET, FILM COATED ORAL at 04:27

## 2020-01-01 RX ADMIN — SENNOSIDES AND DOCUSATE SODIUM 2 TABLET: 8.6; 5 TABLET ORAL at 18:24

## 2020-01-01 RX ADMIN — SENNOSIDES AND DOCUSATE SODIUM 2 TABLET: 8.6; 5 TABLET ORAL at 17:05

## 2020-01-01 RX ADMIN — TOPIRAMATE 200 MG: 100 TABLET, FILM COATED ORAL at 18:13

## 2020-01-01 RX ADMIN — PROPOFOL 50 MCG/KG/MIN: 10 INJECTION, EMULSION INTRAVENOUS at 07:09

## 2020-01-01 RX ADMIN — MICONAZOLE NITRATE: 20 CREAM TOPICAL at 05:45

## 2020-01-01 RX ADMIN — SODIUM CHLORIDE 100 MG PE: 9 INJECTION, SOLUTION INTRAVENOUS at 12:08

## 2020-01-01 RX ADMIN — LABETALOL HYDROCHLORIDE 10 MG: 5 INJECTION INTRAVENOUS at 06:53

## 2020-01-01 RX ADMIN — TOPIRAMATE 200 MG: 100 TABLET, FILM COATED ORAL at 17:23

## 2020-01-01 RX ADMIN — HEPARIN SODIUM 5000 UNITS: 5000 INJECTION, SOLUTION INTRAVENOUS; SUBCUTANEOUS at 04:42

## 2020-01-01 RX ADMIN — SODIUM CHLORIDE 500 MG: 9 INJECTION, SOLUTION INTRAVENOUS at 08:06

## 2020-01-01 RX ADMIN — PROPOFOL 50 MCG/KG/MIN: 10 INJECTION, EMULSION INTRAVENOUS at 17:35

## 2020-01-01 RX ADMIN — ATORVASTATIN CALCIUM 40 MG: 40 TABLET, FILM COATED ORAL at 06:02

## 2020-01-01 RX ADMIN — DOXAZOSIN 6 MG: 2 TABLET ORAL at 21:19

## 2020-01-01 RX ADMIN — MICONAZOLE NITRATE: 20 CREAM TOPICAL at 17:32

## 2020-01-01 RX ADMIN — SENNOSIDES AND DOCUSATE SODIUM 2 TABLET: 8.6; 5 TABLET ORAL at 17:31

## 2020-01-01 RX ADMIN — FOLIC ACID 1 MG: 1 TABLET ORAL at 05:17

## 2020-01-01 RX ADMIN — LACOSAMIDE 300 MG: 100 TABLET, FILM COATED ORAL at 04:20

## 2020-01-01 RX ADMIN — OMEPRAZOLE 40 MG: KIT at 04:26

## 2020-01-01 RX ADMIN — FOLIC ACID 1 MG: 1 TABLET ORAL at 05:22

## 2020-01-01 RX ADMIN — FOLIC ACID 1 MG: 1 TABLET ORAL at 05:55

## 2020-01-01 RX ADMIN — DOCUSATE SODIUM 100 MG: 50 LIQUID ORAL at 17:13

## 2020-01-01 RX ADMIN — VALPROIC ACID 500 MG: 250 SOLUTION ORAL at 04:24

## 2020-01-01 RX ADMIN — VALPROIC ACID 500 MG: 250 SOLUTION ORAL at 18:24

## 2020-01-01 RX ADMIN — MULTIPLE VITAMINS W/ MINERALS TAB 1 TABLET: TAB at 04:24

## 2020-01-01 RX ADMIN — TOPIRAMATE 100 MG: 100 TABLET, FILM COATED ORAL at 16:59

## 2020-01-01 RX ADMIN — HEPARIN SODIUM 5000 UNITS: 5000 INJECTION, SOLUTION INTRAVENOUS; SUBCUTANEOUS at 13:11

## 2020-01-01 RX ADMIN — DOXAZOSIN 6 MG: 2 TABLET ORAL at 21:26

## 2020-01-01 RX ADMIN — INSULIN HUMAN 1 UNITS: 100 INJECTION, SOLUTION PARENTERAL at 17:04

## 2020-01-01 RX ADMIN — CARVEDILOL 25 MG: 25 TABLET, FILM COATED ORAL at 18:29

## 2020-01-01 RX ADMIN — NYSTATIN: 100000 POWDER TOPICAL at 17:55

## 2020-01-01 RX ADMIN — ONDANSETRON 4 MG: 2 INJECTION INTRAMUSCULAR; INTRAVENOUS at 00:00

## 2020-01-01 RX ADMIN — INSULIN HUMAN 1 UNITS: 100 INJECTION, SOLUTION PARENTERAL at 17:58

## 2020-01-01 RX ADMIN — POTASSIUM CHLORIDE AND SODIUM CHLORIDE: 900; 150 INJECTION, SOLUTION INTRAVENOUS at 23:17

## 2020-01-01 RX ADMIN — PROPOFOL 5 MCG/KG/MIN: 10 INJECTION, EMULSION INTRAVENOUS at 03:21

## 2020-01-01 RX ADMIN — MICONAZOLE NITRATE: 20 CREAM TOPICAL at 04:59

## 2020-01-01 RX ADMIN — OMEPRAZOLE 40 MG: KIT at 19:54

## 2020-01-01 RX ADMIN — DIVALPROEX SODIUM 500 MG: 125 CAPSULE, COATED PELLETS ORAL at 05:24

## 2020-01-01 RX ADMIN — FENTANYL CITRATE 50 MCG: 0.05 INJECTION, SOLUTION INTRAMUSCULAR; INTRAVENOUS at 18:12

## 2020-01-01 RX ADMIN — OMEPRAZOLE 40 MG: KIT at 04:10

## 2020-01-01 RX ADMIN — HYDRALAZINE HYDROCHLORIDE 100 MG: 25 TABLET, FILM COATED ORAL at 18:01

## 2020-01-01 RX ADMIN — ALLOPURINOL 100 MG: 100 TABLET ORAL at 06:04

## 2020-01-01 RX ADMIN — NYSTATIN: 100000 POWDER TOPICAL at 05:07

## 2020-01-01 RX ADMIN — HEPARIN SODIUM 5000 UNITS: 5000 INJECTION, SOLUTION INTRAVENOUS; SUBCUTANEOUS at 18:10

## 2020-01-01 RX ADMIN — BRIVARACETAM 100 MG: 50 INJECTION, SUSPENSION INTRAVENOUS at 05:48

## 2020-01-01 RX ADMIN — MIDAZOLAM HYDROCHLORIDE 5 MG: 1 INJECTION, SOLUTION INTRAMUSCULAR; INTRAVENOUS at 11:35

## 2020-01-01 RX ADMIN — LORAZEPAM 1 MG: 2 INJECTION INTRAMUSCULAR; INTRAVENOUS at 05:58

## 2020-01-01 RX ADMIN — OXYCODONE HYDROCHLORIDE AND ACETAMINOPHEN 500 MG: 500 TABLET ORAL at 04:11

## 2020-01-01 RX ADMIN — POTASSIUM CHLORIDE 20 MEQ: 1500 TABLET, EXTENDED RELEASE ORAL at 08:01

## 2020-01-01 RX ADMIN — FAMOTIDINE 20 MG: 10 INJECTION INTRAVENOUS at 05:08

## 2020-01-01 RX ADMIN — FOLIC ACID 1 MG: 1 TABLET ORAL at 05:40

## 2020-01-01 RX ADMIN — HYDRALAZINE HYDROCHLORIDE 100 MG: 25 TABLET, FILM COATED ORAL at 20:47

## 2020-01-01 RX ADMIN — LACOSAMIDE 300 MG: 100 TABLET, FILM COATED ORAL at 04:48

## 2020-01-01 RX ADMIN — HYDRALAZINE HYDROCHLORIDE 100 MG: 25 TABLET, FILM COATED ORAL at 12:34

## 2020-01-01 RX ADMIN — HYDRALAZINE HYDROCHLORIDE 10 MG: 20 INJECTION INTRAMUSCULAR; INTRAVENOUS at 17:01

## 2020-01-01 RX ADMIN — ASPIRIN 81 MG 81 MG: 81 TABLET ORAL at 05:34

## 2020-01-01 RX ADMIN — HYDRALAZINE HYDROCHLORIDE 25 MG: 50 TABLET ORAL at 12:34

## 2020-01-01 RX ADMIN — MICONAZOLE NITRATE: 20 CREAM TOPICAL at 04:40

## 2020-01-01 RX ADMIN — LACOSAMIDE 300 MG: 100 TABLET, FILM COATED ORAL at 21:19

## 2020-01-01 RX ADMIN — ONDANSETRON 4 MG: 2 INJECTION INTRAMUSCULAR; INTRAVENOUS at 12:14

## 2020-01-01 RX ADMIN — HYDRALAZINE HYDROCHLORIDE 100 MG: 25 TABLET, FILM COATED ORAL at 05:44

## 2020-01-01 RX ADMIN — TOPIRAMATE 200 MG: 100 TABLET, FILM COATED ORAL at 04:13

## 2020-01-01 RX ADMIN — HYDRALAZINE HYDROCHLORIDE 10 MG: 20 INJECTION INTRAMUSCULAR; INTRAVENOUS at 01:45

## 2020-01-01 RX ADMIN — DIVALPROEX SODIUM 500 MG: 125 CAPSULE ORAL at 04:37

## 2020-01-01 RX ADMIN — HYDRALAZINE HYDROCHLORIDE 100 MG: 25 TABLET, FILM COATED ORAL at 05:51

## 2020-01-01 RX ADMIN — INSULIN HUMAN 2 UNITS: 100 INJECTION, SOLUTION PARENTERAL at 05:44

## 2020-01-01 RX ADMIN — OMEPRAZOLE 40 MG: KIT at 17:19

## 2020-01-01 RX ADMIN — HYDRALAZINE HYDROCHLORIDE 10 MG: 20 INJECTION INTRAMUSCULAR; INTRAVENOUS at 15:05

## 2020-01-01 RX ADMIN — LISINOPRIL 5 MG: 5 TABLET ORAL at 04:11

## 2020-01-01 RX ADMIN — OMEPRAZOLE 40 MG: KIT at 18:11

## 2020-01-01 RX ADMIN — VITAMIN C 1 TABLET: TAB at 04:54

## 2020-01-01 RX ADMIN — FENTANYL CITRATE 100 MCG: 0.05 INJECTION, SOLUTION INTRAMUSCULAR; INTRAVENOUS at 09:42

## 2020-01-01 RX ADMIN — HEPARIN SODIUM 5000 UNITS: 5000 INJECTION, SOLUTION INTRAVENOUS; SUBCUTANEOUS at 22:30

## 2020-01-01 RX ADMIN — MULTIPLE VITAMINS W/ MINERALS TAB 1 TABLET: TAB at 04:12

## 2020-01-01 RX ADMIN — OMEPRAZOLE 40 MG: KIT at 04:13

## 2020-01-01 RX ADMIN — MICONAZOLE NITRATE: 20 CREAM TOPICAL at 05:55

## 2020-01-01 RX ADMIN — TOPIRAMATE 200 MG: 100 TABLET, FILM COATED ORAL at 16:15

## 2020-01-01 RX ADMIN — CARVEDILOL 25 MG: 25 TABLET, FILM COATED ORAL at 17:57

## 2020-01-01 RX ADMIN — OMEPRAZOLE 40 MG: KIT at 05:23

## 2020-01-01 RX ADMIN — HEPARIN SODIUM 5000 UNITS: 5000 INJECTION, SOLUTION INTRAVENOUS; SUBCUTANEOUS at 16:15

## 2020-01-01 RX ADMIN — INSULIN HUMAN 2 UNITS: 100 INJECTION, SOLUTION PARENTERAL at 07:09

## 2020-01-01 RX ADMIN — INSULIN HUMAN 1 UNITS: 100 INJECTION, SOLUTION PARENTERAL at 04:51

## 2020-01-01 RX ADMIN — FENTANYL CITRATE 100 MCG: 0.05 INJECTION, SOLUTION INTRAMUSCULAR; INTRAVENOUS at 22:22

## 2020-01-01 RX ADMIN — ASPIRIN 325 MG: 325 TABLET, FILM COATED ORAL at 05:31

## 2020-01-01 RX ADMIN — FOLIC ACID 1 MG: 1 TABLET ORAL at 05:29

## 2020-01-01 RX ADMIN — NYSTATIN: 100000 POWDER TOPICAL at 05:01

## 2020-01-01 RX ADMIN — VITAMIN C 1 TABLET: TAB at 04:37

## 2020-01-01 RX ADMIN — INSULIN HUMAN 1 UNITS: 100 INJECTION, SOLUTION PARENTERAL at 00:07

## 2020-01-01 RX ADMIN — SENNOSIDES AND DOCUSATE SODIUM 2 TABLET: 8.6; 5 TABLET ORAL at 04:22

## 2020-01-01 RX ADMIN — LACOSAMIDE 300 MG: 100 TABLET, FILM COATED ORAL at 16:54

## 2020-01-01 RX ADMIN — SODIUM HYPOCHLORITE 10 ML: 1.25 SOLUTION TOPICAL at 18:00

## 2020-01-01 RX ADMIN — HEPARIN SODIUM 3300 UNITS: 1000 INJECTION, SOLUTION INTRAVENOUS; SUBCUTANEOUS at 10:22

## 2020-01-01 RX ADMIN — ALLOPURINOL 100 MG: 100 TABLET ORAL at 05:06

## 2020-01-01 RX ADMIN — HYDRALAZINE HYDROCHLORIDE 100 MG: 25 TABLET, FILM COATED ORAL at 11:43

## 2020-01-01 RX ADMIN — HYDRALAZINE HYDROCHLORIDE 10 MG: 20 INJECTION INTRAMUSCULAR; INTRAVENOUS at 19:57

## 2020-01-01 RX ADMIN — HYDRALAZINE HYDROCHLORIDE 100 MG: 25 TABLET, FILM COATED ORAL at 11:42

## 2020-01-01 RX ADMIN — VITAMIN C 1 TABLET: TAB at 05:12

## 2020-01-01 RX ADMIN — BRIVARACETAM 200 MG: 50 INJECTION, SUSPENSION INTRAVENOUS at 11:04

## 2020-01-01 RX ADMIN — INSULIN HUMAN 1 UNITS: 100 INJECTION, SOLUTION PARENTERAL at 12:15

## 2020-01-01 RX ADMIN — FOLIC ACID 1 MG: 1 TABLET ORAL at 04:24

## 2020-01-01 RX ADMIN — FENTANYL CITRATE 25 MCG: 0.05 INJECTION, SOLUTION INTRAMUSCULAR; INTRAVENOUS at 07:29

## 2020-01-01 RX ADMIN — INSULIN HUMAN 1 UNITS: 100 INJECTION, SOLUTION PARENTERAL at 00:35

## 2020-01-01 RX ADMIN — TOPIRAMATE 200 MG: 100 TABLET, FILM COATED ORAL at 17:55

## 2020-01-01 RX ADMIN — ATORVASTATIN CALCIUM 40 MG: 40 TABLET, FILM COATED ORAL at 05:36

## 2020-01-01 RX ADMIN — NYSTATIN: 100000 POWDER TOPICAL at 18:44

## 2020-01-01 RX ADMIN — TOPIRAMATE 200 MG: 100 TABLET, FILM COATED ORAL at 06:49

## 2020-01-01 RX ADMIN — ALLOPURINOL 100 MG: 100 TABLET ORAL at 04:21

## 2020-01-01 RX ADMIN — LACOSAMIDE 300 MG: 100 TABLET, FILM COATED ORAL at 05:17

## 2020-01-01 RX ADMIN — SODIUM CHLORIDE 750 MG: 9 INJECTION, SOLUTION INTRAVENOUS at 17:24

## 2020-01-01 RX ADMIN — OMEPRAZOLE 40 MG: KIT at 17:00

## 2020-01-01 RX ADMIN — INSULIN HUMAN 1 UNITS: 100 INJECTION, SOLUTION PARENTERAL at 00:19

## 2020-01-01 RX ADMIN — NYSTATIN: 100000 POWDER TOPICAL at 04:45

## 2020-01-01 RX ADMIN — NOREPINEPHRINE BITARTRATE 2 MCG/MIN: 1 INJECTION INTRAVENOUS at 10:56

## 2020-01-01 RX ADMIN — LACOSAMIDE 300 MG: 100 TABLET, FILM COATED ORAL at 20:30

## 2020-01-01 RX ADMIN — NYSTATIN: 100000 POWDER TOPICAL at 08:01

## 2020-01-01 RX ADMIN — VALPROIC ACID 500 MG: 250 SOLUTION ORAL at 18:27

## 2020-01-01 RX ADMIN — ATORVASTATIN CALCIUM 40 MG: 40 TABLET, FILM COATED ORAL at 04:20

## 2020-01-01 RX ADMIN — FAMOTIDINE 20 MG: 20 TABLET ORAL at 04:58

## 2020-01-01 RX ADMIN — VALPROIC ACID 500 MG: 250 SOLUTION ORAL at 17:15

## 2020-01-01 RX ADMIN — SODIUM CHLORIDE 200 MG: 9 INJECTION, SOLUTION INTRAVENOUS at 18:00

## 2020-01-01 RX ADMIN — HYDRALAZINE HYDROCHLORIDE 25 MG: 50 TABLET ORAL at 16:45

## 2020-01-01 RX ADMIN — HYDRALAZINE HYDROCHLORIDE 25 MG: 50 TABLET ORAL at 04:28

## 2020-01-01 RX ADMIN — NYSTATIN: 100000 POWDER TOPICAL at 17:56

## 2020-01-01 RX ADMIN — SENNOSIDES AND DOCUSATE SODIUM 2 TABLET: 8.6; 5 TABLET ORAL at 04:20

## 2020-01-01 RX ADMIN — LACOSAMIDE 300 MG: 100 TABLET, FILM COATED ORAL at 17:17

## 2020-01-01 RX ADMIN — VITAMIN C 1 TABLET: TAB at 05:38

## 2020-01-01 RX ADMIN — VALPROIC ACID 500 MG: 250 SOLUTION ORAL at 08:35

## 2020-01-01 RX ADMIN — INSULIN HUMAN 3 UNITS: 100 INJECTION, SOLUTION PARENTERAL at 12:10

## 2020-01-01 RX ADMIN — HEPARIN SODIUM 5000 UNITS: 5000 INJECTION, SOLUTION INTRAVENOUS; SUBCUTANEOUS at 06:11

## 2020-01-01 RX ADMIN — HYDRALAZINE HYDROCHLORIDE 10 MG: 20 INJECTION INTRAMUSCULAR; INTRAVENOUS at 12:35

## 2020-01-01 RX ADMIN — LACOSAMIDE 300 MG: 100 TABLET, FILM COATED ORAL at 05:49

## 2020-01-01 ASSESSMENT — PAIN SCALES - PAIN ASSESSMENT IN ADVANCED DEMENTIA (PAINAD)
CONSOLABILITY: NO NEED TO CONSOLE
TOTALSCORE: 0
CONSOLABILITY: NO NEED TO CONSOLE
TOTALSCORE: 3
BREATHING: NORMAL
FACIALEXPRESSION: SMILING OR INEXPRESSIVE
FACIALEXPRESSION: SMILING OR INEXPRESSIVE
TOTALSCORE: 0
BREATHING: NORMAL
CONSOLABILITY: NO NEED TO CONSOLE
BODYLANGUAGE: RELAXED
FACIALEXPRESSION: SMILING OR INEXPRESSIVE
CONSOLABILITY: NO NEED TO CONSOLE
BREATHING: NORMAL
BREATHING: NORMAL
BODYLANGUAGE: RELAXED
CONSOLABILITY: NO NEED TO CONSOLE
TOTALSCORE: 0
BODYLANGUAGE: RELAXED
FACIALEXPRESSION: SMILING OR INEXPRESSIVE
FACIALEXPRESSION: SMILING OR INEXPRESSIVE
TOTALSCORE: 0
TOTALSCORE: 0
CONSOLABILITY: NO NEED TO CONSOLE
CONSOLABILITY: NO NEED TO CONSOLE
BODYLANGUAGE: RELAXED
TOTALSCORE: 0
CONSOLABILITY: NO NEED TO CONSOLE
TOTALSCORE: 0
FACIALEXPRESSION: SMILING OR INEXPRESSIVE
TOTALSCORE: 0
BODYLANGUAGE: RELAXED
TOTALSCORE: 0
FACIALEXPRESSION: SMILING OR INEXPRESSIVE
CONSOLABILITY: NO NEED TO CONSOLE
FACIALEXPRESSION: SMILING OR INEXPRESSIVE
TOTALSCORE: 0
BREATHING: NORMAL
BREATHING: NORMAL
CONSOLABILITY: NO NEED TO CONSOLE
BREATHING: NORMAL
TOTALSCORE: 0
FACIALEXPRESSION: SMILING OR INEXPRESSIVE
FACIALEXPRESSION: SMILING OR INEXPRESSIVE
TOTALSCORE: 0
BREATHING: NORMAL
FACIALEXPRESSION: SMILING OR INEXPRESSIVE
TOTALSCORE: 0
BREATHING: NORMAL
BODYLANGUAGE: RELAXED
CONSOLABILITY: NO NEED TO CONSOLE
FACIALEXPRESSION: SMILING OR INEXPRESSIVE
TOTALSCORE: 3
BODYLANGUAGE: RELAXED
CONSOLABILITY: NO NEED TO CONSOLE
BREATHING: NORMAL
CONSOLABILITY: NO NEED TO CONSOLE
BREATHING: OCCASIONAL LABORED BREATHING, SHORT PERIOD OF HYPERVENTILATION
CONSOLABILITY: NO NEED TO CONSOLE
BODYLANGUAGE: RELAXED
TOTALSCORE: 0
BODYLANGUAGE: RELAXED
FACIALEXPRESSION: FACIAL GRIMACING
BODYLANGUAGE: RELAXED
FACIALEXPRESSION: SMILING OR INEXPRESSIVE
BREATHING: NORMAL
BODYLANGUAGE: RELAXED
CONSOLABILITY: NO NEED TO CONSOLE
FACIALEXPRESSION: SMILING OR INEXPRESSIVE
BODYLANGUAGE: RELAXED
TOTALSCORE: 0
FACIALEXPRESSION: SMILING OR INEXPRESSIVE
BODYLANGUAGE: RELAXED
FACIALEXPRESSION: SMILING OR INEXPRESSIVE
FACIALEXPRESSION: FACIAL GRIMACING
CONSOLABILITY: NO NEED TO CONSOLE
BREATHING: NORMAL
BREATHING: NORMAL
BODYLANGUAGE: RELAXED
CONSOLABILITY: NO NEED TO CONSOLE
FACIALEXPRESSION: SMILING OR INEXPRESSIVE
BREATHING: NORMAL
BODYLANGUAGE: RELAXED
TOTALSCORE: 0
CONSOLABILITY: NO NEED TO CONSOLE
CONSOLABILITY: NO NEED TO CONSOLE
BREATHING: NORMAL
BREATHING: NORMAL
TOTALSCORE: 0
BODYLANGUAGE: RELAXED
BREATHING: NORMAL
TOTALSCORE: 0
TOTALSCORE: 0
CONSOLABILITY: NO NEED TO CONSOLE
BREATHING: NORMAL
CONSOLABILITY: NO NEED TO CONSOLE
BODYLANGUAGE: RELAXED
BODYLANGUAGE: RELAXED
FACIALEXPRESSION: SMILING OR INEXPRESSIVE
TOTALSCORE: 0
FACIALEXPRESSION: SMILING OR INEXPRESSIVE
BODYLANGUAGE: RELAXED
TOTALSCORE: 0
CONSOLABILITY: DISTRACTED OR REASSURED BY VOICE/TOUCH
CONSOLABILITY: NO NEED TO CONSOLE
BODYLANGUAGE: RELAXED
TOTALSCORE: 0
TOTALSCORE: 0
BODYLANGUAGE: RELAXED
BREATHING: NORMAL
CONSOLABILITY: NO NEED TO CONSOLE
FACIALEXPRESSION: SMILING OR INEXPRESSIVE
CONSOLABILITY: NO NEED TO CONSOLE
FACIALEXPRESSION: SMILING OR INEXPRESSIVE
FACIALEXPRESSION: SMILING OR INEXPRESSIVE
CONSOLABILITY: NO NEED TO CONSOLE
BODYLANGUAGE: RELAXED
BREATHING: NORMAL
FACIALEXPRESSION: SMILING OR INEXPRESSIVE
CONSOLABILITY: NO NEED TO CONSOLE
BODYLANGUAGE: RELAXED
BREATHING: NORMAL
BREATHING: NORMAL
TOTALSCORE: 0
CONSOLABILITY: NO NEED TO CONSOLE
TOTALSCORE: 0
BREATHING: NORMAL
BODYLANGUAGE: RELAXED
BREATHING: NORMAL
CONSOLABILITY: NO NEED TO CONSOLE
FACIALEXPRESSION: SMILING OR INEXPRESSIVE
FACIALEXPRESSION: SMILING OR INEXPRESSIVE
BREATHING: NORMAL
BODYLANGUAGE: RELAXED
FACIALEXPRESSION: SMILING OR INEXPRESSIVE
BREATHING: NORMAL
CONSOLABILITY: NO NEED TO CONSOLE
CONSOLABILITY: NO NEED TO CONSOLE
FACIALEXPRESSION: SMILING OR INEXPRESSIVE
BODYLANGUAGE: RELAXED
BREATHING: NORMAL
BREATHING: NORMAL
BODYLANGUAGE: RELAXED
CONSOLABILITY: NO NEED TO CONSOLE
CONSOLABILITY: NO NEED TO CONSOLE
BREATHING: NORMAL
TOTALSCORE: 0
BREATHING: NORMAL
FACIALEXPRESSION: SMILING OR INEXPRESSIVE
BREATHING: NORMAL
BODYLANGUAGE: RELAXED
FACIALEXPRESSION: SMILING OR INEXPRESSIVE
BODYLANGUAGE: RELAXED
BODYLANGUAGE: RELAXED
BREATHING: NORMAL
BODYLANGUAGE: RELAXED
TOTALSCORE: 0
BREATHING: NORMAL
CONSOLABILITY: NO NEED TO CONSOLE
TOTALSCORE: 0
BREATHING: NORMAL
TOTALSCORE: 0
FACIALEXPRESSION: SMILING OR INEXPRESSIVE
FACIALEXPRESSION: SMILING OR INEXPRESSIVE
BODYLANGUAGE: RELAXED
BODYLANGUAGE: RELAXED
TOTALSCORE: 0
CONSOLABILITY: NO NEED TO CONSOLE
TOTALSCORE: 0

## 2020-01-01 ASSESSMENT — COGNITIVE AND FUNCTIONAL STATUS - GENERAL
DRESSING REGULAR UPPER BODY CLOTHING: TOTAL
CLIMB 3 TO 5 STEPS WITH RAILING: TOTAL
TURNING FROM BACK TO SIDE WHILE IN FLAT BAD: UNABLE
DRESSING REGULAR LOWER BODY CLOTHING: TOTAL
HELP NEEDED FOR BATHING: TOTAL
HELP NEEDED FOR BATHING: TOTAL
EATING MEALS: TOTAL
SUGGESTED CMS G CODE MODIFIER DAILY ACTIVITY: CN
MOVING TO AND FROM BED TO CHAIR: UNABLE
MOVING TO AND FROM BED TO CHAIR: UNABLE
DRESSING REGULAR UPPER BODY CLOTHING: TOTAL
PERSONAL GROOMING: TOTAL
DRESSING REGULAR LOWER BODY CLOTHING: TOTAL
TOILETING: TOTAL
STANDING UP FROM CHAIR USING ARMS: TOTAL
SUGGESTED CMS G CODE MODIFIER DAILY ACTIVITY: CN
STANDING UP FROM CHAIR USING ARMS: TOTAL
CLIMB 3 TO 5 STEPS WITH RAILING: TOTAL
MOBILITY SCORE: 6
DAILY ACTIVITIY SCORE: 6
EATING MEALS: TOTAL
WALKING IN HOSPITAL ROOM: TOTAL
MOVING FROM LYING ON BACK TO SITTING ON SIDE OF FLAT BED: UNABLE
TURNING FROM BACK TO SIDE WHILE IN FLAT BAD: UNABLE
PERSONAL GROOMING: TOTAL
WALKING IN HOSPITAL ROOM: TOTAL
DAILY ACTIVITIY SCORE: 6
SUGGESTED CMS G CODE MODIFIER MOBILITY: CN
SUGGESTED CMS G CODE MODIFIER MOBILITY: CN
MOVING FROM LYING ON BACK TO SITTING ON SIDE OF FLAT BED: UNABLE
TOILETING: TOTAL
MOBILITY SCORE: 6

## 2020-01-01 ASSESSMENT — LIFESTYLE VARIABLES
CONSUMPTION TOTAL: NEGATIVE
EVER_SMOKED: NEVER
TOTAL SCORE: 0
ALCOHOL_USE: NO
HAVE PEOPLE ANNOYED YOU BY CRITICIZING YOUR DRINKING: NO
DOES PATIENT WANT TO STOP DRINKING: CANNOT ASSESS
AVERAGE NUMBER OF DAYS PER WEEK YOU HAVE A DRINK CONTAINING ALCOHOL: .5
HOW MANY TIMES IN THE PAST YEAR HAVE YOU HAD 5 OR MORE DRINKS IN A DAY: 0
HAVE YOU EVER FELT YOU SHOULD CUT DOWN ON YOUR DRINKING: NO
TOTAL SCORE: 0
EVER FELT BAD OR GUILTY ABOUT YOUR DRINKING: NO
TOTAL SCORE: 0
ON A TYPICAL DAY WHEN YOU DRINK ALCOHOL HOW MANY DRINKS DO YOU HAVE: 1
EVER HAD A DRINK FIRST THING IN THE MORNING TO STEADY YOUR NERVES TO GET RID OF A HANGOVER: NO

## 2020-01-01 ASSESSMENT — PATIENT HEALTH QUESTIONNAIRE - PHQ9
2. FEELING DOWN, DEPRESSED, IRRITABLE, OR HOPELESS: NOT AT ALL
SUM OF ALL RESPONSES TO PHQ9 QUESTIONS 1 AND 2: 0
SUM OF ALL RESPONSES TO PHQ9 QUESTIONS 1 AND 2: 0
1. LITTLE INTEREST OR PLEASURE IN DOING THINGS: NOT AT ALL
2. FEELING DOWN, DEPRESSED, IRRITABLE, OR HOPELESS: NOT AT ALL
1. LITTLE INTEREST OR PLEASURE IN DOING THINGS: NOT AT ALL

## 2020-01-01 ASSESSMENT — ENCOUNTER SYMPTOMS
NAUSEA: 1
FEVER: 0
NAUSEA: 1
NAUSEA: 1

## 2020-01-01 ASSESSMENT — PAIN SCALES - GENERAL: PAIN_LEVEL: 0

## 2020-01-01 ASSESSMENT — PAIN SCALES - WONG BAKER
WONGBAKER_NUMERICALRESPONSE: DOESN'T HURT AT ALL

## 2020-01-01 ASSESSMENT — PULMONARY FUNCTION TESTS: FVC: 1100

## 2020-01-16 PROBLEM — E43 SEVERE PROTEIN-CALORIE MALNUTRITION (HCC): Status: ACTIVE | Noted: 2020-01-01

## 2020-01-16 PROBLEM — I63.411 CEREBROVASCULAR ACCIDENT (CVA) DUE TO EMBOLISM OF RIGHT MIDDLE CEREBRAL ARTERY (HCC): Status: ACTIVE | Noted: 2020-01-01

## 2020-01-16 PROBLEM — E83.42 HYPOMAGNESEMIA: Status: ACTIVE | Noted: 2020-01-01

## 2020-01-16 PROBLEM — R56.9 SEIZURE (HCC): Status: ACTIVE | Noted: 2020-01-01

## 2020-01-16 PROBLEM — N18.6 END STAGE RENAL FAILURE ON DIALYSIS (HCC): Status: ACTIVE | Noted: 2020-01-01

## 2020-01-16 PROBLEM — Z99.2 END STAGE RENAL FAILURE ON DIALYSIS (HCC): Status: ACTIVE | Noted: 2020-01-01

## 2020-01-16 PROBLEM — J96.01 ACUTE RESPIRATORY FAILURE WITH HYPOXIA (HCC): Status: ACTIVE | Noted: 2020-01-01

## 2020-01-17 NOTE — ASSESSMENT & PLAN NOTE
Nutritionist consultation  Encourage behavioral and dietary modification once extubated for weight loss

## 2020-01-17 NOTE — PROCEDURES
Central Line Insertion  Date/Time: 1/17/2020 6:26 AM  Performed by: Sergei Uriarte M.D.  Authorized by: Sergei Uriarte M.D.     Consent:     Consent obtained:  Verbal    Consent given by:  Healthcare agent    Risks discussed:  Arterial puncture, incorrect placement, nerve damage, pneumothorax, infection and bleeding    Alternatives discussed:  Delayed treatment  Pre-procedure details:     Hand hygiene: Hand hygiene performed prior to insertion      Sterile barrier technique: All elements of maximal sterile technique followed      Skin preparation:  ChloraPrep    Skin preparation agent: Skin preparation agent completely dried prior to procedure    Sedation:     Sedation type:  Moderate (conscious) sedation  Anesthesia:     Anesthesia method:  None  Procedure details:     Location:  L subclavian    Patient position:  Flat    Procedural supplies:  Triple lumen    Catheter size:  7.5 Fr    Landmarks identified: yes      Number of attempts:  3    Successful placement: yes    Post-procedure details:     Post-procedure:  Dressing applied and line sutured    Assessment:  Blood return through all ports and free fluid flow    Patient tolerance of procedure:  Tolerated well, no immediate complications  Comments:      Indication: pressors, multiple infusions

## 2020-01-17 NOTE — ASSESSMENT & PLAN NOTE
Likely anemia of chronic kidney disease.  FOBT is negative.   - transfuse if drops below 7  Hemoglobin stable

## 2020-01-17 NOTE — PROGRESS NOTES
Notified by primary RN pt has midline from outlying facility. Dressing changed and skin assessed. Slight redness noted at hub. Redressed. VAT will continue to follow.      Per PICC team at [facility] 20g 10cm midline placed 1/15/2020. LDA documentation updated.

## 2020-01-17 NOTE — PROGRESS NOTES
ICU brief progress note    Patient became hypotensive during the night and pressors were started. I placed a central line.  On review of the chart examing the patient I appreciate the patient has multiple organ failure and dysfuction.  She is on chronic dialysis, she has multiple strokes she has other severe comorbidities such as diastolic heart failure and now she is poor level responsiveness and status seizures.  At age 74 years old with multiple comorbidities and multiple organ failures insufficiencies her prognosis for survival is leave the hospital is poor. Her prognosis to return to independent function is essentiallyzero.  I recommend further discussions about goals of care with the family, involving palliative care tea,.  I think this patient be an excellent candidate for hospice or at least a focus on end-of-life care including a compassionate extubation

## 2020-01-17 NOTE — PROGRESS NOTES
Around 1100 this RN assessed focal seizure like activity after pausing the propofol per Dr. You. The patient's eyes and right side of the mouth are twitching.  Dr. You updated, will have cont. eeg reviewed and call back.

## 2020-01-17 NOTE — CARE PLAN
Problem: Ventilation Defect:  Goal: Ability to achieve and maintain unassisted ventilation or tolerate decreased levels of ventilator support  1/17/2020 0414 by Mag Oneal, RRT  Outcome: PROGRESSING AS EXPECTED  Adult Ventilation Update    Total Vent Days: 4      Patient Lines/Drains/Airways Status      Active Airway       Name: Placement date: Placement time: Site: Days:    Airway ETT Oral 7.5  01/14/20   --   Oral  3                  Plateau Pressure (Q Shift): 16 (01/17/20 0158)  Static Compliance (ml / cm H2O): 38 (01/17/20 0158)    Patient failed trials because of Barriers to Wean: Other (Comments)(SEIZURES ) (01/17/20 0158     Sputum Amount: Moderate (01/17/20 0158)  Sputum Color: Clear;Tan;Yellow (01/17/20 0158)  Sputum Consistency: Thick (01/17/20 0158)    Events/Summary/Plan: pt accepted from EMS transfer on vent  (01/16/20 0977)

## 2020-01-17 NOTE — PROGRESS NOTES
2 RN skin check complete with [place].     Devices in place: ETT with commercial securing device, right wrist restraint, bilateral calf SCDs, right chest dialysis catheter, 3 peripheral IVs, upper arm BP cuff.    Skin assessed under devices.    Pressure ulcer with open wound to sacrum/coccyx. Photo taken and wound consult placed. Area cleansed and mepilex placed for protection. Bruising to bilateral forearms. Edema to extremities.     The following interventions in place: q2 hour turns with pillows to float heels and for repositioning. Frequent assessment under devices.

## 2020-01-17 NOTE — PROGRESS NOTES
Transfer Center:    Received Inpatient to Direct Admit transfer request from [facility] @ [phone]  Sending Physician:  Dr. Molina   Specialist consulted:  Dr. You, Neurology   Diagnosis:  Seizures   Patient accepted by:  Dr. Gonda     Patient coming via:  [facility]  ETA:  TBD room availability   Medical records from sending facility scanned into Media tab.  Nursing to notify Direct Admit On-Call hospitalist when patient arrives.     Plan:  TC to assist if needed.

## 2020-01-17 NOTE — CONSULTS
"Hospital Neurology Consult:    Referring Physician: Jeremy M Gonda, M.D.    Reason for consultation: Seizures    HPI: Cassandra Guzmán is a 74 y.o. female with history of CHF, R parietal stroke, DM2, GERD, ESRD on dialysis, GERD, GI bleed, HTN, hx of ICH presenting as a transfer for seizures. Patient was admitted at [facility] where seizure like activity was noted. She was given Ativan and subsequently intubated. She is noted to have dysarthria/difficulty w/ speech at baseline. Unable to obtain further hx as the patient is intubated, but reportedly received a Keppra 500mg IV load prior to transfer. She has no previous hx of seizures. EEG in [facility] reportedly showed \"seizure activity\", but report is unavailable for review. Currently intubated on Propofol.     ROS:     Unable to obtain. Intubated.     Past Medical History:    has a past medical history of Arthritis, Chronic diastolic heart failure ([facility]) (6/1/2016), Clostridium difficile diarrhea (6/2/2016), CVA (cerebral vascular accident) ([facility]), DM (diabetes mellitus) type II uncontrolled with renal manifestation (4/26/2014), GERD (gastroesophageal reflux disease), GIB (gastrointestinal bleeding) (6/13/2016), GOUT, Hypertension, ICH (intracerebral hemorrhage) ([facility]) (4/6/2016), Indigestion, Kidney failure, RALF (obstructive sleep apnea), RALF (obstructive sleep apnea), Other and unspecified angina pectoris, Unspecified cataract, and UTI (urinary tract infection) (6/1/2016). She also has no past medical history of Anesthesia, Arrhythmia, Back pain, Bronchitis, Cancer ([facility]), Cold, Congestive heart failure (HCC), COPD (chronic obstructive pulmonary disease) ([facility]), Dental disorder, Dialysis patient ([facility]), Fall, Glaucoma, Heart valve disease, Hepatitis A, Hepatitis B, Hepatitis C, Jaundice, Myocardial infarct (HCC), Other emphysema (HCC), Other specified symptom associated with female genital organs, Pacemaker, Personal history of venous thrombosis and embolism, " Psychiatric problem, Rheumatic fever, Seizure (HCC), Unspecified asthma(493.90), Unspecified hemorrhagic conditions, or Unspecified urinary incontinence.    FHx:  family history includes Cancer in her father; Diabetes in her father and mother.    SHx:   reports that she has never smoked. She has never used smokeless tobacco. She reports that she does not drink alcohol or use drugs.    Allergies:  Allergies   Allergen Reactions   • Amlodipine Cough       Medications:    Current Facility-Administered Medications:   •  MD Alert...PROPOFOL CRITICAL CARE PROTOCOL, , Other, PRN, Jeremy M Gonda, M.D.  •  propofol (DIPRIVAN) injection, 0-80 mcg/kg/min, Intravenous, Continuous **AND** Triglycerides Starting now and then Every 3 Days, , , Every 3 Days (0300), Jeremy M Gonda, M.D.  •  Respiratory Care per Protocol, , Nebulization, Continuous RT, Jeremy M Gonda, M.D.  •  ipratropium-albuterol (DUONEB) nebulizer solution, 3 mL, Nebulization, Q2HRS PRN (RT), Jeremy M Gonda, M.D.  •  famotidine (PEPCID) tablet 20 mg, 20 mg, Enteral Tube, Q12HRS **OR** famotidine (PEPCID) injection 20 mg, 20 mg, Intravenous, Q12HRS, Jeremy M Gonda, M.D.  •  senna-docusate (PERICOLACE or SENOKOT S) 8.6-50 MG per tablet 2 Tab, 2 Tab, Enteral Tube, BID **AND** polyethylene glycol/lytes (MIRALAX) PACKET 1 Packet, 1 Packet, Enteral Tube, QDAY PRN **AND** magnesium hydroxide (MILK OF MAGNESIA) suspension 30 mL, 30 mL, Enteral Tube, QDAY PRN **AND** bisacodyl (DULCOLAX) suppository 10 mg, 10 mg, Rectal, QDAY PRN, Jeremy M Gonda, M.D.  •  MD Alert...ICU Electrolyte Replacement per Pharmacy, , Other, PHARMACY TO DOSE, Jeremy M Gonda, M.D.  •  lidocaine (XYLOCAINE) 1 % injection 1-2 mL, 1-2 mL, Tracheal Tube, Q30 MIN PRN, Jeremy M Gonda, M.D.  •  Pharmacy Consult: Enteral tube insertion - review meds/change route/product selection, 1 Each, Other, PHARMACY TO DOSE, Jeremy M Gonda, M.D.  •  MD Alert...PROPOFOL CRITICAL CARE PROTOCOL 1 Each, 1 Each, Other, PRN,  Jeremy M Gonda, M.D.  •  fentaNYL (SUBLIMAZE) injection 25 mcg, 25 mcg, Intravenous, Q HOUR PRN **OR** fentaNYL (SUBLIMAZE) injection 50 mcg, 50 mcg, Intravenous, Q HOUR PRN **OR** fentaNYL (SUBLIMAZE) injection 100 mcg, 100 mcg, Intravenous, Q HOUR PRN, Jeremy M Gonda, M.D.  •  [START ON 1/17/2020] enoxaparin (LOVENOX) inj 40 mg, 40 mg, Subcutaneous, DAILY, Jeremy M Gonda, M.D.  •  levETIRAcetam (KEPPRA) 500 mg in  mL IVPB, 500 mg, Intravenous, Q12HRS, Jeremy M Gonda, M.D.    Vitals:   Vitals:    01/16/20 2200 01/16/20 2230 01/16/20 2236   BP:   119/59   SpO2:  100%    Weight: 59 kg (130 lb 1.1 oz)  59 kg (130 lb 1.1 oz)       Labs:  Lab Results   Component Value Date/Time    PROTHROMBTM 14.4 (H) 11/17/2019 10:40 AM    INR 1.38 11/17/2019 10:40 AM      Lab Results   Component Value Date/Time    WBC 17.1 (H) 11/21/2019 05:35 AM    RBC 2.96 (L) 11/21/2019 05:35 AM    HEMOGLOBIN 9.1 (L) 11/21/2019 05:35 AM    HEMATOCRIT 28.4 (L) 11/21/2019 05:35 AM    MCV 95.9 11/21/2019 05:35 AM    MCH 30.7 11/21/2019 05:35 AM    MCHC 32.0 (L) 11/21/2019 05:35 AM    MPV 9.5 11/21/2019 05:35 AM    NEUTSPOLYS 74 (H) 11/19/2019 05:05 AM    LYMPHOCYTES 15 (L) 11/19/2019 05:05 AM    MONOCYTES 7 11/19/2019 05:05 AM    EOSINOPHILS 3 11/19/2019 05:05 AM    BASOPHILS 1 01/06/2018 03:20 AM    ANISOCYTOSIS 1+ 06/12/2016 10:24 AM      Lab Results   Component Value Date/Time    SODIUM 134 (L) 11/21/2019 05:35 AM    POTASSIUM 3.8 11/21/2019 05:35 AM    CHLORIDE 103 11/21/2019 05:35 AM    CO2 13 (L) 11/21/2019 05:35 AM    GLUCOSE 67 (LL) 11/21/2019 05:35 AM    BUN 68 (H) 11/21/2019 05:35 AM    CREATININE 7.2 (HH) 11/21/2019 05:35 AM      Lab Results   Component Value Date/Time    CHOLSTRLTOT 79 02/07/2019 01:40 PM    LDL 34 02/07/2019 01:40 PM    HDL 23 (L) 02/07/2019 01:40 PM    TRIGLYCERIDE 248 (H) 02/07/2019 01:40 PM       Lab Results   Component Value Date/Time    ALKPHOSPHAT 23 (L) 11/21/2019 05:35 AM    ASTSGOT 33 11/21/2019 05:35 AM     ALTSGPT 10 (L) 11/21/2019 05:35 AM    TBILIRUBIN 1.2 11/21/2019 05:35 AM      No results found for: TSH  Lab Results   Component Value Date/Time    FREET4 1.95 11/14/2019 03:42 AM    FREET4 1.17 02/07/2019 01:40 PM     Lab Results   Component Value Date/Time    FREET3 2.4 02/07/2019 01:40 PM       Imaging/Testing:  CT Head W/O CST on 8/23/19 personally reviewed w/ evidence of L parietal infarct.     MR Brain on 5/7/19 reviewed in chart.     Physical Exam:     General: 73 y/o female intubated in bed in NAD  Cardio: Normal S1/S2. +1 pitting edema  Pulm: CTAX2. No respiratory distress.   Skin: Warm, dry, no rashes or lesions   Psychiatric: Appropriate affect. No active psychosis.  HEENT: Atraumatic head, normal sclera and conjunctiva, moist oral mucosa. No lid lag.  Abdomen: Soft, non tender. No masses or hepatosplenomegaly.    Neurologic:  Mental Status: GCS 6 T (E1, M4, V1T)  Cranial Nerves:  Pupils pinpoint, unreactive to light. Face symmetric. Occulocephalic reflex absent.   Motor: Normal muscle tone, decreased bulk. Withdraws to pain symmetrically. Observed several events of predominantly R lower mouth twitching + bilateral eyebrow/eyelid twitching which were self limited. No other spread of this activity was noted.   Reflexes: Mute plantar responses X2.   Coordination: Unable to assess  Sensation: Withdraws to nox stim throughout  Gait/Station: Deferred/unable to assess      Assessment/Plan:    Cassandra Guzmán is a 74 y.o. female with history of CHF, R parietal stroke, DM2, GERD, ESRD on dialysis, GERD, GI bleed, HTN, hx of ICH presenting as a transfer for seizures. Patient has a complex medical history, however has hx of L parietal stroke which may be a seizure generator (observed events of R sided face twitching). At this time she is intubated on propofol. We will proceed with cEEG recording to establish seizure activity and monitor response to treatment.     Plan:  -Initiate cEEG  -D/C Keppra 500mg  BID.  -Load Vimpat 200mg IV, 100mg BID maintenance.   -Continue Dilantin 100mg TID  -Continue propofol. Patient hypotensive. Consider Ketamine for sedation due to hypotension.  -Further AEDs to consider: Eslicarbazepine, Fycompa, Valproic Acid, Topriamate  -Outside CSF non inflammatory (protein 45, WBC1, RBC <10)  -Further recommendations/plan to be updated once EEG is available.   -Plan discussed with consulting physician and patient's nurse.       Emigdio Lynn M.D., Diplomat of the American Board of Psychiatry and Neurology  Diplomat of [employer]BP[employer] Epilepsy Subspecialty   Assistant Clinical Professor, [facility] Neurology Consultant

## 2020-01-17 NOTE — H&P
Hospital Medicine History & Physical Note    Date of Service  1/16/2020    Primary Care Physician  YORDY Maciel M.D.    Consultants  Neurology, nephrology, critical care    Code Status  Full code    Chief Complaint  Altered mental status    History of Presenting Illness  74 y.o. female who presented 1/16/2020 with altered mental status who was sent to us from an outside facility in [place].  The patient apparently lived at an assisted living facility where she was found to have seizure activity and thus brought to the local emergency room.  She has a history of stroke in the past.  The patient also has a history of coronary artery disease and diabetes.  The patient was thus evaluated for possible stroke that was ruled out but patient does have old lacunar strokes.  Patient also has extensive cerebral atrophy.  The patient did have seizure-like activity and EEG does show the the seizure-like activity thus there was recommended patient go for higher level of care for continuous EEG monitoring.  Continue at this point with ventilator management and sedation.  Critical care has been consulted.  Continue at this point with tube feeds  Continue at this point with optimization of diabetic management and hypertensive management.    Review of Systems  Review of Systems   Unable to perform ROS: Acuity of condition       Past Medical History   has a past medical history of Arthritis, Chronic diastolic heart failure ([facility]) (6/1/2016), Clostridium difficile diarrhea (6/2/2016), CVA (cerebral vascular accident) ([facility]), DM (diabetes mellitus) type II uncontrolled with renal manifestation (4/26/2014), GERD (gastroesophageal reflux disease), GIB (gastrointestinal bleeding) (6/13/2016), GOUT, Hypertension, ICH (intracerebral hemorrhage) ([facility]) (4/6/2016), Indigestion, Kidney failure, RALF (obstructive sleep apnea), RALF (obstructive sleep apnea), Other and unspecified angina pectoris, Seizure ([facility]) (1/16/2020), Unspecified  cataract, and UTI (urinary tract infection) (2016). She also has no past medical history of Anesthesia, Arrhythmia, Back pain, Bronchitis, Cancer (HCC), Cold, Congestive heart failure (HCC), COPD (chronic obstructive pulmonary disease) (HCC), Dental disorder, Dialysis patient (HCC), Fall, Glaucoma, Heart valve disease, Hepatitis A, Hepatitis B, Hepatitis C, Jaundice, Myocardial infarct (HCC), Other emphysema (HCC), Other specified symptom associated with female genital organs, Pacemaker, Personal history of venous thrombosis and embolism, Psychiatric problem, Rheumatic fever, Unspecified asthma(493.90), Unspecified hemorrhagic conditions, or Unspecified urinary incontinence.    Surgical History   has a past surgical history that includes tubal ligation (); cristin by laparoscopy (10/5/2013); cataract extraction with iol; ventral hernia repair (2014); and gastroscopy-endo (2016).     Family History  family history includes Cancer in her father; Diabetes in her father and mother.     Social History   reports that she has never smoked. She has never used smokeless tobacco. She reports that she does not drink alcohol or use drugs.    Allergies  Allergies   Allergen Reactions   • Amlodipine Cough       Medications  Prior to Admission Medications   Prescriptions Last Dose Informant Patient Reported? Taking?   BD PEN NEEDLE SHEILA U/F   Yes No   Si INJECTION BELOW SKIN 6 TIMES A DAY AS DIRECTED   Cholecalciferol (VITAMIN D3) 5000 units Cap  Family Member Yes No   Sig: Take 1 Cap by mouth every day.   Magnesium 250 MG Tab   Yes No   Sig: Take 400 mg by mouth every day. Indications: taken in the afternoon   NOVOLOG FLEXPEN 100 UNIT/ML Solution Pen-injector solution for injection   Yes No   Sig: INJECT UP TO 20 UNITS SUBCUTANEOUSLY 3 TIMES DAILY BEFORE MEALS   acetaminophen (TYLENOL) 325 MG Tab   Yes No   Sig: Take 2 Tabs by mouth every 6 hours as needed (Mild Pain; (Pain scale 1-3); Temp greater than 100.5  F).   allopurinol (ZYLOPRIM) 100 MG Tab  Patient's Home Pharmacy Yes No   Sig: Take 200 mg by mouth every day.   ascorbic acid (ASCORBIC ACID) 500 MG Tab   Yes No   Sig: Take 500 mg by mouth every day.   atorvastatin (LIPITOR) 10 MG Tab   Yes No   Sig: Take 10 mg by mouth every day.   cyanocobalamin (VITAMIN B-12) 100 MCG Tab   Yes No   Sig: Take 100 mcg by mouth every day.   docusate sodium (COLACE) 100 MG Cap   No No   Sig: Take 1 Cap by mouth every day.   doxazosin (CARDURA) 4 MG Tab  Family Member Yes No   Sig: Take 8 mg by mouth every bedtime.   fenofibrate (TRIGLIDE) 160 MG tablet   Yes No   Sig: Take 160 mg by mouth every day.   formulation r (PREPARATION H) 0.25-3-14-71.9 % Ointment   Yes No   Sig: Insert  in rectum as needed.   furosemide (LASIX) 20 MG Tab   Yes No   Sig: Take 20 mg by mouth 2 times a day.   furosemide (LASIX) 20 MG Tab   No No   Sig: Take 1.5 Tabs by mouth every day.   hydrALAZINE (APRESOLINE) 50 MG Tab   Yes No   Sig: Take 100 mg by mouth 3 times a day. If BP pver 160 systolic takes a second pill daily   insulin detemir (LEVEMIR) 100 UNIT/ML Solution  Family Member No No   Sig: Inject 16 Units as instructed 2 times a day.   Patient taking differently: Inject 10 Units as instructed every day.   isosorbide dinitrate (ISORDIL) 10 MG Tab   No No   Sig: Take 1 Tab by mouth 3 times a day.   Patient taking differently: Take 20 mg by mouth 2 Times a Day.   labetalol (NORMODYNE) 300 MG Tab  Patient's Home Pharmacy Yes No   Sig: Take 300 mg by mouth 2 times a day.   omeprazole (PRILOSEC) 20 MG delayed-release capsule  Family Member Yes No   Sig: Take 20 mg by mouth every day.   ondansetron (ZOFRAN) 4 MG Tab tablet   No No   Sig: Take 1 Tab by mouth every four hours as needed for Nausea/Vomiting.   potassium chloride SA (K-DUR) 20 MEQ Tab CR  Family Member No No   Sig: Take 1 Tab by mouth every day.   Patient taking differently: Take 15 mEq by mouth every day.   vitamin A & D (A&D OINTMENT) Ointment    No No   Sig: Apply to affected areas of buttock, twice daily and as needed. Available OTC.   zolpidem (AMBIEN) 5 MG Tab   Yes No   Sig: Take 5 mg by mouth.      Facility-Administered Medications: None       Physical Exam  BP: (119)/(59) 119/59  SpO2:  [100 %] 100 %    Physical Exam  Vitals signs and nursing note reviewed. Exam conducted with a chaperone present.   Constitutional:       General: She is not in acute distress.     Appearance: She is normal weight. She is not ill-appearing or diaphoretic.      Interventions: She is sedated and intubated.   HENT:      Head: Normocephalic.      Right Ear: Tympanic membrane and external ear normal.      Left Ear: Tympanic membrane normal.      Nose: Nose normal. No rhinorrhea.      Mouth/Throat:      Mouth: Mucous membranes are moist.      Pharynx: Oropharynx is clear.   Eyes:      Conjunctiva/sclera: Conjunctivae normal.      Pupils: Pupils are equal.      Right eye: Pupil is reactive.      Left eye: Pupil is reactive.      Comments: Pupils bilaterally pinpoint   Neck:      Musculoskeletal: Normal range of motion and neck supple. No neck rigidity or muscular tenderness.      Vascular: No carotid bruit.   Cardiovascular:      Rate and Rhythm: Normal rate.      Heart sounds: No murmur.   Pulmonary:      Effort: Pulmonary effort is normal. She is intubated.      Breath sounds: Normal breath sounds.   Abdominal:      General: Abdomen is flat. Bowel sounds are normal.      Palpations: Abdomen is soft.      Tenderness: There is no guarding.      Hernia: No hernia is present.   Lymphadenopathy:      Cervical: No cervical adenopathy.   Skin:     General: Skin is warm and dry.      Capillary Refill: Capillary refill takes 2 to 3 seconds.   Neurological:      Mental Status: She is disoriented and unresponsive.      GCS: GCS eye subscore is 1. GCS verbal subscore is 1. GCS motor subscore is 4.   Psychiatric:         Speech: She is noncommunicative.         Behavior: Behavior is  uncooperative.         Cognition and Memory: Cognition is impaired. Memory is impaired.         Laboratory:          No results for input(s): ALTSGPT, ASTSGOT, ALKPHOSPHAT, TBILIRUBIN, DBILIRUBIN, GAMMAGT, AMYLASE, LIPASE, ALB, PREALBUMIN, GLUCOSE in the last 72 hours.      No results for input(s): NTPROBNP in the last 72 hours.      No results for input(s): TROPONINT in the last 72 hours.    Urinalysis:    No results found     Imaging:  [id number] FILM X-RAY   Final Result      OUTSIDE IMAGES-DX CHEST   Final Result      DX-CHEST-PORTABLE (1 VIEW)    (Results Pending)   DX-CHEST-PORTABLE (1 VIEW)    (Results Pending)         Assessment/Plan:  I anticipate this patient will require at least two midnights for appropriate medical management, necessitating inpatient admission.    * Seizure (HCC)  Assessment & Plan  Patient apparently was having seizures in the assisted living facility where she lives.  The patient was brought into the [place] emergency room where she again had seizures.  She was admitted to the hospital there.  The patient was intubated for airway protection.  EEG was done which apparently showed seizure activity and thus the patient was recommended to transfer for higher level of facility for continuous EEG monitoring.  The patient at this point will be placed on continuous EEG monitoring patient was placed on propofol and Keppra for seizure management.  Neurology at this point has seen the patient and they will be managing the antiseizure management.    Acute respiratory failure with hypoxia (HCC)  Assessment & Plan  Patient remains on a ventilator.  Patient needed to be intubated for airway protection.  Critical care has been consulted for ventilator management.    Normocytic anemia- (present on admission)  Assessment & Plan  Monitor H&H if drops of 7 or 21 transfuse    Hypertension  Assessment & Plan  Optimize blood pressure management.  Keep systolic blood pressure less than 140  diastolic under 90.    DM (diabetes mellitus) ([facility])- (present on admission)  Assessment & Plan  Patient currently is not eating.  Continue at this point with Accu-Cheks with sliding scale coverage every 6 hours    End stage renal failure on dialysis ([facility])  Assessment & Plan  Will need to consult nephrology in the morning for optimization of hemodialysis.    Dysphagia as late effect of cerebrovascular accident (CVA)- (present on admission)  Assessment & Plan  Patient has had a previous stroke.  The patient currently is also with a feeding tube.  Continue tube feeds per the feeding tube    Gout- (present on admission)  Assessment & Plan  Continue with allopurinol per the feeding tube    Obesity (BMI 30-39.9)- (present on admission)  Assessment & Plan  Body mass index is 23.04 kg/m².  Optimize at this point outpatient weight loss management      VTE prophylaxis: SCDs, heparin

## 2020-01-17 NOTE — ASSESSMENT & PLAN NOTE
Avoid nephrotoxins  Patient having dialysis Monday Wednesday Friday long-term, no anticipation renal return to renal renal function  Aggressive volume removal

## 2020-01-17 NOTE — ASSESSMENT & PLAN NOTE
Intubated date: 1/14/2020 s/p trach 2/2 Dr Devine  Goal saturation > 92%  Respiratory treatments: prn    Continue to volume removal with dialysis  Tolerating t piece trial for over 36 hrs

## 2020-01-17 NOTE — ASSESSMENT & PLAN NOTE
On admission, she was intubated for airway protection, now trach since 2/2/20. No evidence of seizure activity.  Appears now to have a severe, intractable encephalopathy  - continuing regimen of vimpat, topamax, depakote  - repeat EEG showed no seizures  Stable.  Continue antiseizure medications, seizure precautions.  No more seizures

## 2020-01-17 NOTE — PROGRESS NOTES
Report received from MIC Kong at [facility]. Patient to be transported by ground and arrive at [facility] at approximately 2100.

## 2020-01-17 NOTE — PROGRESS NOTES
Cortrak Placement    Tube Team verified patient name and medical record number prior to tube placement.  Cortrak tube 10 [place] placed at 75 cm in right nare.  Per Cortrak picture, tube appears to be in the small bowel.  Nursing Instructions: Awaiting KUB to confirm placement before use for medications or feeding. Once placement confirmed, flush tube with 30 ml of water, and then remove and save stylet, in patient medication drawer.

## 2020-01-17 NOTE — PROGRESS NOTES
Critical Care Progress Note    Date of admission  1/16/2020    Chief Complaint  74 y.o. female admitted 1/16/2020 with status epilepticus and respiratory failure    Hospital Course    74 y.o. female who presented 1/16/2020 with a past medical history significant for end-stage renal disease on hemodialysis since November 2019 M/W/F, hypertension, hyperlipidemia and coronary artery disease.  She was recently admitted to [facility] for an extended period of time followed by placement in a SNF.  She has been very frail requiring a hoist to get her in and out of the dialysis chair with failure to thrive.  On January 14 while at the SNF patient had a witnessed first-time seizure.  She was transferred to the emergency department where she had a second seizure with prolonged postictal state.  She was intubated for airway protection and started on a propofol drip in the emergency department on January 14 at [facility].  She was admitted to the intensive care unit where she underwent an MRI of her brain showing an acute right parietal lacunar infarct with overall brain parenchymal loss.  Neurology was consulted and patient was loaded with Keppra and continued on the propofol drip.  Despite this, on spot EEGs patient continued to have evidence of focal seizure and Dilantin was started yesterday.  She underwent a lumbar puncture today with unremarkable CSF and was being treated empirically with acyclovir, ampicillin, Rocephin, vancomycin and Decadron.  Given her persistent abnormal EEGs, the neurologist at the outside hospital recommend patient be transferred to a facility that can provide continuous EEG monitoring.  For this reason she was transferred to [facility] and I was consulted for her critical care management.  She was remained on a ventilator since her admission and is receiving dialysis as needed and is being followed by Dr. English.  She is oliguric.  Per documentation there  is been some difficulty with patient's daughter when patient was at SNF and some discussion with family while at [facility] regarding goals of care.  They apparently did not want to talk about changing CODE STATUS nor transitioning to comfort care and patient remains a full code.      Interval Problem Update  Reviewed last 24 hour events:  T-max 96.4  +870 cc over last 24 hours  CXR reviewed    Mag 1.9  Phenytoin level 6.9  ABG 7.4 1/35/115/99 on 30  EEG without clear evidence of seizure    Vimpat  Fosphenytoin    Norepinephrine@5  Propofol@15  NS with 20 K 83    Last BM 1/16    Review of Systems  Review of Systems   Unable to perform ROS: Acuity of condition        Vital Signs for last 24 hours   Temp:  [35.6 °C (96.1 °F)-35.8 °C (96.4 °F)] 35.6 °C (96.1 °F)  Pulse:  [50-59] 53  Resp:  [16] 16  BP: ()/(43-68) 117/53  SpO2:  [99 %-100 %] 100 %    Hemodynamic parameters for last 24 hours       Respiratory Information for the last 24 hours  [place] Vent Mode: APVCMV  Rate (breaths/min): 14  Vt Target (mL): 320  PEEP/CPAP: 8  FiO2: 30  P MEAN: 10  Control VTE (exp VT): 317    Physical Exam   Physical Exam  Vitals signs and nursing note reviewed.   Constitutional:       General: She is in acute distress.      Appearance: She is ill-appearing and toxic-appearing.   HENT:      Head: Normocephalic.   Eyes:      Conjunctiva/sclera: Conjunctivae normal.   Neck:      Musculoskeletal: No neck rigidity.   Cardiovascular:      Rate and Rhythm: Bradycardia present.      Pulses: Normal pulses.   Pulmonary:      Breath sounds: No wheezing or rales.   Abdominal:      General: There is no distension.      Palpations: Abdomen is soft.      Tenderness: There is no tenderness.   Musculoskeletal:         General: Swelling present.   Skin:     General: Skin is warm and dry.      Findings: Bruising present.   Neurological:      Comments: Baseline left-sided deficits from previous CVA  Currently unresponsive on  propofol   Psychiatric:      Comments: Sedate         Medications  Current Facility-Administered Medications   Medication Dose Route Frequency Provider Last Rate Last Dose   • fosphenytoin (CEREBYX) 100 mg PE in  mL IVPB  100 mg PE Intravenous TID Emigdio Lynn M.D.   Stopped at 01/17/20 0635   • norepinephrine (LEVOPHED) 8 mg in  mL Infusion  0-30 mcg/min Intravenous Continuous Sergei Uriarte M.D. 9.4 mL/hr at 01/17/20 0605 5 mcg/min at 01/17/20 0605   • propofol (DIPRIVAN) injection  0-80 mcg/kg/min Intravenous Continuous Jeremy M Gonda, M.D. 5.3 mL/hr at 01/17/20 0507 15 mcg/kg/min at 01/17/20 0507   • Respiratory Care per Protocol   Nebulization Continuous RT Jeremy M Gonda, M.D.       • ipratropium-albuterol (DUONEB) nebulizer solution  3 mL Nebulization Q2HRS PRN (RT) Jeremy M Gonda, M.D.       • famotidine (PEPCID) tablet 20 mg  20 mg Enteral Tube Q12HRS Jeremy M Gonda, M.D.        Or   • famotidine (PEPCID) injection 20 mg  20 mg Intravenous Q12HRS Jeremy M Gonda, M.D.   20 mg at 01/17/20 0611   • senna-docusate (PERICOLACE or SENOKOT S) 8.6-50 MG per tablet 2 Tab  2 Tab Enteral Tube BID Jeremy M Gonda, M.D.   Stopped at 01/16/20 2315    And   • polyethylene glycol/lytes (MIRALAX) PACKET 1 Packet  1 Packet Enteral Tube QDAY PRN Jeremy M Gonda, M.D.        And   • magnesium hydroxide (MILK OF MAGNESIA) suspension 30 mL  30 mL Enteral Tube QDAY PRN Jeremy M Gonda, M.D.        And   • bisacodyl (DULCOLAX) suppository 10 mg  10 mg Rectal QDAY PRN Jeremy M Gonda, M.D.       • MD Alert...ICU Electrolyte Replacement per Pharmacy   Other PHARMACY TO DOSE Jeremy M Gonda, M.D.       • lidocaine (XYLOCAINE) 1 % injection 1-2 mL  1-2 mL Tracheal Tube Q30 MIN PRN Jeremy M Gonda, M.D.       • Pharmacy Consult: Enteral tube insertion - review meds/change route/product selection  1 Each Other PHARMACY TO DOSE Jeremy M Gonda, M.D.       • fentaNYL (SUBLIMAZE) injection 25 mcg  25 mcg Intravenous Q HOUR  PRN Jeremy M Gonda, M.D.   25 mcg at 01/16/20 2310    Or   • fentaNYL (SUBLIMAZE) injection 50 mcg  50 mcg Intravenous Q HOUR PRN Jeremy M Gonda, M.D.        Or   • fentaNYL (SUBLIMAZE) injection 100 mcg  100 mcg Intravenous Q HOUR PRN Jeremy M Gonda, M.D.   200 mcg at 01/17/20 0546   • allopurinol (ZYLOPRIM) tablet 200 mg  200 mg Enteral Tube DAILY Rina Matta M.D.   200 mg at 01/17/20 0610   • atorvastatin (LIPITOR) tablet 10 mg  10 mg Enteral Tube DAILY Rina Matta M.D.   10 mg at 01/17/20 0611   • fenofibrate micronized (LOFIBRA) capsule 134 mg  134 mg Enteral Tube DAILY Rina Matta M.D.   134 mg at 01/17/20 0610   • hydrALAZINE (APRESOLINE) tablet 100 mg  100 mg Enteral Tube TID Rina Matta M.D.   Stopped at 01/16/20 2315   • isosorbide dinitrate (ISORDIL) tablet 10 mg  10 mg Enteral Tube TID Rina Matta M.D.   Stopped at 01/16/20 2315   • labetalol (NORMODYNE) tablet 300 mg  300 mg Enteral Tube BID Rina Matta M.D.   Stopped at 01/16/20 2315   • ondansetron (ZOFRAN) syringe/vial injection 4 mg  4 mg Intravenous Q4HRS PRN Rina Matta M.D.       • 0.9 % NaCl with KCl 20 mEq infusion   Intravenous Continuous Rina Matta M.D. 83 mL/hr at 01/16/20 2317     • heparin injection 5,000 Units  5,000 Units Subcutaneous Q8HRS Rina Matta M.D.   5,000 Units at 01/17/20 0611   • labetalol (NORMODYNE/TRANDATE) injection 10 mg  10 mg Intravenous Q4HRS PRN Rnia Matta M.D.       • insulin regular (HUMULIN R) injection 2-9 Units  2-9 Units Subcutaneous Q6HRS Rina Matta M.D.   2 Units at 01/17/20 0709    And   • glucose 4 g chewable tablet 16 g  16 g Oral Q15 MIN PRN Rina Matta M.D.        And   • DEXTROSE 10% BOLUS 250 mL  250 mL Intravenous Q15 MIN PRN Rina Matta M.D.       • acetaminophen (TYLENOL) tablet 650 mg  650 mg Enteral Tube Q6HRS PRN Jeremy M Gonda, M.D.       • ondansetron (ZOFRAN ODT) dispertab 4 mg  4 mg Enteral Tube Q4HRS PRN Jeremy M Gonda, M.D.       • aspirin (ASA)  tablet 325 mg  325 mg Oral DAILY Jeremy M Gonda, M.D.   325 mg at 01/17/20 0616   • lacosamide (VIMPAT) 100 mg in  mL ivpb  100 mg Intravenous Q12HR Emigdio Lynn M.D.           Fluids    Intake/Output Summary (Last 24 hours) at 1/17/2020 0729  Last data filed at 1/17/2020 0600  Gross per 24 hour   Intake 917.55 ml   Output 45 ml   Net 872.55 ml       Laboratory  Recent Labs     01/16/20  2317 01/17/20  0425   ISTATAPH 7.413 7.417   ISTATAPCO2 36.4 35.0   ISTATAPO2 93* 115*   ISTATATCO2 24 24   FILYSIR4ZIR 97 99   ISTATARTHCO3 23.3 22.5   ISTATARTBE -1 -2   ISTATTEMP see below see below   ISTATFIO2 30 30   ISTATSPEC Arterial Arterial         Recent Labs     01/17/20 0028 01/17/20  0445   SODIUM 132* 130*   POTASSIUM 4.1 4.6   CHLORIDE 99 100   CO2 22 20   BUN 16 17   CREATININE 1.26 1.28   MAGNESIUM  --  1.9   PHOSPHORUS  --  3.8   CALCIUM 7.2* 7.3*     Recent Labs     01/17/20  0028 01/17/20  0445   PREALBUMIN 8.0*  --    GLUCOSE 151* 181*     Recent Labs     01/17/20 0028 01/17/20  0445   WBC 7.4 8.5   NEUTSPOLYS  --  65.90   LYMPHOCYTES  --  25.30   MONOCYTES  --  8.20   EOSINOPHILS  --  0.00   BASOPHILS  --  0.10     Recent Labs     01/17/20  0028 01/17/20  0445   RBC 2.80* 2.75*   HEMOGLOBIN 9.3* 9.0*   HEMATOCRIT 29.8* 28.7*   PLATELETCT 109* 119*       Imaging  X-Ray:  I have personally reviewed the images and compared with prior images.    Assessment/Plan  * Seizure (HCC)  Assessment & Plan  New onset, unclear etiology  Neurology following  Continue Vimpat and fosphenytoin  Continuous EEG  Follow-up CSF from OSH    Cerebrovascular accident (CVA) due to embolism of right middle cerebral artery (HCC)  Assessment & Plan  Continue high intensity statin, aspirin  PT/OT eval  Neurology following  Echocardiogram    End stage renal failure on dialysis (HCC)  Assessment & Plan  HD per nephrology  Renally dose medications  Monitor electrolytes    Acute respiratory failure with hypoxia (HCC)  Assessment  & Plan  Intubated in emergency department 1/14 at outside hospital  Continue full mechanical ventilatory support  I am adjusting ventilator based on ABG and pulmonary mechanics  RT/O2 protocols  Maintain euvolemic balance  Monitor for need of bronchoscopy    Severe protein-calorie malnutrition (HCC)  Assessment & Plan  Nutritionist consultation  Begin tube feeds    Normocytic anemia- (present on admission)  Assessment & Plan  Daily CBC with conservative transfusion strategy, monitor for bleeding    Hypertension  Assessment & Plan  scheduled hydralazine, Isordil, labetalol  PRN IV labetalol, hydralazine for goal SBP less than 160    DM (diabetes mellitus) (HCC)- (present on admission)  Assessment & Plan  Insulin sliding scale  Begin diabetic nutrition via feeding tube    Hypomagnesemia  Assessment & Plan  Replace keep greater than 2    Dysphagia as late effect of cerebrovascular accident (CVA)- (present on admission)  Assessment & Plan  Keep n.p.o. once extubated pending SLP eval    Gout- (present on admission)  Assessment & Plan  Continue allopurinol    Obesity (BMI 30-39.9)- (present on admission)  Assessment & Plan  Nutritionist consultation  Encourage behavioral and dietary modification once extubated for weight loss       VTE:  Heparin  Ulcer: H2 Antagonist  Lines: Central Line  Ongoing indication addressed and River Catheter  Ongoing indication addressed    I have performed a physical exam and reviewed and updated ROS and Plan today (1/17/2020). In review of yesterday's note (1/16/2020), there are no changes except as documented above.     Discussed patient condition and risk of morbidity and/or mortality with RN, RT, Pharmacy and nephrology and neurology  The patient remains critically ill.  Critical care time = 78 minutes in directly providing and coordinating critical care and extensive data review.  No time overlap and excludes procedures.

## 2020-01-17 NOTE — ASSESSMENT & PLAN NOTE
She was on Coreg 25 mg twice a day, Cardura 6 mg daily, hydralazine 100 mg 3 times daily  Blood pressure is been low and all BP meds have been stopped  500 mL IV bolus was ordered on 3/11/2020 due to hypotension  Systolic blood pressure now averaging 130s  Continue to hold her blood pressure medicines

## 2020-01-17 NOTE — PROGRESS NOTES
[facility] Nephrology Daily Progress Note    Date of Service  1/17/2020    Chief Complaint  74 y.o. female admitted to [facility] on 1/14/20 with seizures.She was at a SNF.She had been previously hospitalized at [facility] and diaysis was initiated.  She was doing very poorly then and palliative care was discussed with the family,but they declined.She had very poor functional status and required Jimena lift  to get into the dialysis chair.She was found to have  a CVA on MRI at [facility].Her seizures were not controlled and it was felt she was in status epilepticus.  She was getting HD at [facility].Last HD was on 1/13/20.She was seen by Dr Carcamo at [facility].Creatinine was 1.8 and dialysis was held.Neurology   [place] that the patient needed continuous EEG monitoring and he was transferred to [facility]    Interval Problem Update  01/16/20 - Transferred to [facility].In ICU  01/17/20 - Intubated.Ventilated.On continuous EEG monitotring.On Norepinephrine,enteral feedings and Propofol.UOP 70 mL today.    Review of Systems  Review of Systems   Reason unable to perform ROS: Ventilated.Unresponsive.        Physical Exam  Temp:  [35.6 °C (96.1 °F)-35.8 °C (96.4 °F)] 35.6 °C (96.1 °F)  Pulse:  [50-73] 53  Resp:  [16] 16  BP: ()/(43-68) 111/53  SpO2:  [99 %-100 %] 100 %    Physical Exam  Vitals signs and nursing note reviewed.   Constitutional:       Appearance: She is not ill-appearing or toxic-appearing.      Comments: Ventilated.Unresponsive  Thin   HENT:      Head: Normocephalic and atraumatic.      Right Ear: External ear normal.      Left Ear: External ear normal.      Nose:      Comments: Nasal feeding tube     Mouth/Throat:      Comments: LO  Eyes:      General: No scleral icterus.        Right eye: No discharge.         Left eye: No discharge.      Pupils: Pupils are equal, round, and reactive to light.   Neck:      Musculoskeletal: Normal range of motion and neck supple. No neck rigidity.      Vascular: No carotid bruit.       Comments: [facility]  Cardiovascular:      Rate and Rhythm: Normal rate. Rhythm irregular.      Heart sounds: Normal heart sounds. No murmur. No friction rub.   Pulmonary:      Breath sounds: No wheezing or rales.      Comments: Ventilated  Decreased BS lower fields  Abdominal:      General: Abdomen is flat. There is no distension.      Palpations: Abdomen is soft. There is no mass.      Tenderness: There is no guarding.   Genitourinary:     Comments: River  Musculoskeletal:         General: No swelling or deformity.      Right lower leg: No edema.      Left lower leg: No edema.   Lymphadenopathy:      Cervical: No cervical adenopathy.   Neurological:      Comments: No interaction   Psychiatric:      Comments: Unable to obtain         Fluids    Intake/Output Summary (Last 24 hours) at 1/17/2020 1137  Last data filed at 1/17/2020 1000  Gross per 24 hour   Intake 1507.88 ml   Output 70 ml   Net 1437.88 ml       Laboratory  Recent Labs     01/17/20  0028 01/17/20  0445   WBC 7.4 8.5   RBC 2.80* 2.75*   HEMOGLOBIN 9.3* 9.0*   HEMATOCRIT 29.8* 28.7*   .4* 104.4*   MCH 33.2* 32.7   MCHC 31.2* 31.4*   RDW 68.3* 65.8*   PLATELETCT 109* 119*   MPV 10.6 10.8     Recent Labs     01/17/20  0028 01/17/20  0445   SODIUM 132* 130*   POTASSIUM 4.1 4.6   CHLORIDE 99 100   CO2 22 20   GLUCOSE 151* 181*   BUN 16 17   CREATININE 1.26 1.28   CALCIUM 7.2* 7.3*         No results for input(s): NTPROBNP in the last 72 hours.  Recent Labs     01/17/20  0028   TRIGLYCERIDE 108       PMH/FH/SH reviewed    No new Assessment & Plan notes have been filed under this hospital service since the last note was generated.  Service: Nephrology     IMPRESSION:  # ESRD,previously on HD MWF.Last OP HD on 1/13/20. Had HD at [facility] on 1/15.Creatinine down since yesterday,but has been oliguric.? Element of dilution plus low muscle mass.  # Status epilepticus.Per neurology.  # S/P acute lacunar infarct.Hx of previous CVA with significant deficits.  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  -Reevaluate level of care.Family has unrealistic expectations.Very difficult to deal with.    Critically ill.Discussed with RN and MD  Over 35 min spent in the coordination of care for this patient

## 2020-01-17 NOTE — ASSESSMENT & PLAN NOTE
Neurology following and adjusting epileptics  Vimpat 300mg BID  Topamax 200mg BID  Depakote 500mg BID    Monitor need for EEG  Seizure precautions

## 2020-01-17 NOTE — ASSESSMENT & PLAN NOTE
Daily CBC with conservative transfusion strategy, monitor for bleeding serial monitor and start protonix bid for gi bleed  Restrictive transfusion strategy hg < 7

## 2020-01-17 NOTE — PROGRESS NOTES
Neurology Progress Note  Neurohospitalist Service, [facility]    Referring Physician: Jeremy M Gonda, M.D.    No chief complaint on file.      HPI: Refer to initial documented Neurology H&P, as detailed in the patient's chart.    Interval History January 17, 2020: Overnight the patient has remained on a propofol drip and connected to video EEG monitoring.  This morning, both son and daughter at the bedside.    Review of systems: Given intubation and sedation, patient unable to participate    Past Medical History:    has a past medical history of Arthritis, Chronic diastolic heart failure ([facility]) (6/1/2016), Clostridium difficile diarrhea (6/2/2016), CVA (cerebral vascular accident) ([facility]), DM (diabetes mellitus) type II uncontrolled with renal manifestation (4/26/2014), GERD (gastroesophageal reflux disease), GIB (gastrointestinal bleeding) (6/13/2016), GOUT, Hypertension, ICH (intracerebral hemorrhage) ([facility]) (4/6/2016), Indigestion, Kidney failure, RALF (obstructive sleep apnea), RALF (obstructive sleep apnea), Other and unspecified angina pectoris, Seizure ([facility]) (1/16/2020), Unspecified cataract, and UTI (urinary tract infection) (6/1/2016). She also has no past medical history of Anesthesia, Arrhythmia, Back pain, Bronchitis, Cancer ([facility]), Cold, Congestive heart failure ([facility]), COPD (chronic obstructive pulmonary disease) ([facility]), Dental disorder, Dialysis patient ([facility]), Fall, Glaucoma, Heart valve disease, Hepatitis A, Hepatitis B, Hepatitis C, Jaundice, Myocardial infarct ([facility]), Other emphysema ([facility]), Other specified symptom associated with female genital organs, Pacemaker, Personal history of venous thrombosis and embolism, Psychiatric problem, Rheumatic fever, Unspecified asthma(493.90), Unspecified hemorrhagic conditions, or Unspecified urinary incontinence.    FHx:  family history includes Cancer in her father; Diabetes in her father and mother.    SHx:   reports that she has never smoked.  She has never used smokeless tobacco. She reports that she does not drink alcohol or use drugs.    Medications:    Current Facility-Administered Medications:   •  fosphenytoin (CEREBYX) 100 mg PE in  mL IVPB, 100 mg PE, Intravenous, TID, Emigdio Lynn M.D., Stopped at 01/17/20 0635  •  norepinephrine (LEVOPHED) 8 mg in  mL Infusion, 0-30 mcg/min, Intravenous, Continuous, Sergei Uriarte M.D., Last Rate: 9.4 mL/hr at 01/17/20 0605, 5 mcg/min at 01/17/20 0605  •  propofol (DIPRIVAN) injection, 0-80 mcg/kg/min, Intravenous, Continuous, Last Rate: 5.3 mL/hr at 01/17/20 0507, 15 mcg/kg/min at 01/17/20 0507 **AND** Triglycerides Starting now and then Every 3 Days, , , Every 3 Days (0300), Jeremy M Gonda, M.D.  •  Respiratory Care per Protocol, , Nebulization, Continuous RT, Jeremy M Gonda, M.D.  •  ipratropium-albuterol (DUONEB) nebulizer solution, 3 mL, Nebulization, Q2HRS PRN (RT), Jeremy M Gonda, M.D.  •  famotidine (PEPCID) tablet 20 mg, 20 mg, Enteral Tube, Q12HRS **OR** famotidine (PEPCID) injection 20 mg, 20 mg, Intravenous, Q12HRS, Jeremy M Gonda, M.D., 20 mg at 01/17/20 0611  •  senna-docusate (PERICOLACE or SENOKOT S) 8.6-50 MG per tablet 2 Tab, 2 Tab, Enteral Tube, BID, Stopped at 01/16/20 2315 **AND** polyethylene glycol/lytes (MIRALAX) PACKET 1 Packet, 1 Packet, Enteral Tube, QDAY PRN **AND** magnesium hydroxide (MILK OF MAGNESIA) suspension 30 mL, 30 mL, Enteral Tube, QDAY PRN **AND** bisacodyl (DULCOLAX) suppository 10 mg, 10 mg, Rectal, QDAY PRN, Jeremy M Gonda, M.D.  •  MD Alert...ICU Electrolyte Replacement per Pharmacy, , Other, PHARMACY TO DOSE, Jeremy M Gonda, M.D.  •  lidocaine (XYLOCAINE) 1 % injection 1-2 mL, 1-2 mL, Tracheal Tube, Q30 MIN PRN, Jeremy M Gonda, M.D.  •  Pharmacy Consult: Enteral tube insertion - review meds/change route/product selection, 1 Each, Other, PHARMACY TO DOSE, Jeremy M Gonda, M.D.  •  fentaNYL (SUBLIMAZE) injection 25 mcg, 25 mcg, Intravenous, Q HOUR  PRN, 25 mcg at 01/16/20 2310 **OR** fentaNYL (SUBLIMAZE) injection 50 mcg, 50 mcg, Intravenous, Q HOUR PRN **OR** fentaNYL (SUBLIMAZE) injection 100 mcg, 100 mcg, Intravenous, Q HOUR PRN, Jeremy M Gonda, M.D., 200 mcg at 01/17/20 0546  •  allopurinol (ZYLOPRIM) tablet 200 mg, 200 mg, Enteral Tube, DAILY, Rina Matta M.D., 200 mg at 01/17/20 0610  •  atorvastatin (LIPITOR) tablet 10 mg, 10 mg, Enteral Tube, DAILY, Rina Matta M.D., 10 mg at 01/17/20 0611  •  fenofibrate micronized (LOFIBRA) capsule 134 mg, 134 mg, Enteral Tube, DAILY, Rina Matta M.D., 134 mg at 01/17/20 0610  •  hydrALAZINE (APRESOLINE) tablet 100 mg, 100 mg, Enteral Tube, TID, Rina Matta M.D., Stopped at 01/16/20 2315  •  isosorbide dinitrate (ISORDIL) tablet 10 mg, 10 mg, Enteral Tube, TID, Rina Matta M.D., Stopped at 01/16/20 2315  •  labetalol (NORMODYNE) tablet 300 mg, 300 mg, Enteral Tube, BID, Rina Matta M.D., Stopped at 01/16/20 2315  •  ondansetron (ZOFRAN) syringe/vial injection 4 mg, 4 mg, Intravenous, Q4HRS PRN, Rina Matta M.D.  •  0.9 % NaCl with KCl 20 mEq infusion, , Intravenous, Continuous, Rina Matta M.D., Last Rate: 83 mL/hr at 01/16/20 2317  •  heparin injection 5,000 Units, 5,000 Units, Subcutaneous, Q8HRS, Rina Matta M.D., 5,000 Units at 01/17/20 0611  •  labetalol (NORMODYNE/TRANDATE) injection 10 mg, 10 mg, Intravenous, Q4HRS PRN, Rina Matta M.D.  •  insulin regular (HUMULIN R) injection 2-9 Units, 2-9 Units, Subcutaneous, Q6HRS, 2 Units at 01/17/20 0709 **AND** Accu-Chek Q6 if NPO, , , Q6H **AND** NOTIFY MD and PharmD, , , Once **AND** glucose 4 g chewable tablet 16 g, 16 g, Oral, Q15 MIN PRN **AND** DEXTROSE 10% BOLUS 250 mL, 250 mL, Intravenous, Q15 MIN PRN, Rina Matta M.D.  •  acetaminophen (TYLENOL) tablet 650 mg, 650 mg, Enteral Tube, Q6HRS PRN, Jeremy M Gonda, M.D.  •  ondansetron (ZOFRAN ODT) dispertab 4 mg, 4 mg, Enteral Tube, Q4HRS PRN, Jeremy M Gonda, M.D.  •  aspirin (ASA)  tablet 325 mg, 325 mg, Oral, DAILY, Jeremy M Gonda, M.D., 325 mg at 01/17/20 0616  •  lacosamide (VIMPAT) 100 mg in  mL ivpb, 100 mg, Intravenous, Q12HR, Emigdio Lynn M.D.    Physical Examination:     Vitals:    01/17/20 0800 01/17/20 0815 01/17/20 0830 01/17/20 0951   BP: 115/56 111/53 107/52    Pulse: (!) 52 (!) 52 (!) 52 73   Resp:       Temp:       TempSrc: Bladder      SpO2: 100% 100% 100% 100%   Weight:       Height:           General: Patient is intubated and sedated on propofol  Eyes: examination of optic disks not indicated at this time  CV: RRR    NEUROLOGICAL EXAM:     Mental status: Does not open eyes to noxious stim, does not follow commands  Speech and language: Intubated.  Cranial nerve exam: Pupils are pinpoint bilaterally.  Does not blink to threat bilaterally.  Corneal reflex intact bilaterally, VOR intact.. Face is notable for mild facial droop on the left.  Cough and gag reflex is present   motor exam: Does not participate in formal strength testing, tone is spastic on the left, flaccid on the right.  No abnormal movements were seen on exam.  Sensory exam: Withdrawal to noxious stim right arm, extensor posturing to noxious stim left arm  Deep tendon reflexes:  1+ and symmetric. Toes mute bilaterally.  Coordination: Non-participatory  Gait: deferred due to intubation and ICU status    Objective Data:    Labs:  Lab Results   Component Value Date/Time    PROTHROMBTM 14.4 (H) 11/17/2019 10:40 AM    INR 1.38 11/17/2019 10:40 AM      Lab Results   Component Value Date/Time    WBC 8.5 01/17/2020 04:45 AM    RBC 2.75 (L) 01/17/2020 04:45 AM    HEMOGLOBIN 9.0 (L) 01/17/2020 04:45 AM    HEMATOCRIT 28.7 (L) 01/17/2020 04:45 AM    .4 (H) 01/17/2020 04:45 AM    MCH 32.7 01/17/2020 04:45 AM    MCHC 31.4 (L) 01/17/2020 04:45 AM    MPV 10.8 01/17/2020 04:45 AM    NEUTSPOLYS 65.90 01/17/2020 04:45 AM    LYMPHOCYTES 25.30 01/17/2020 04:45 AM    MONOCYTES 8.20 01/17/2020 04:45 AM     EOSINOPHILS 0.00 01/17/2020 04:45 AM    BASOPHILS 0.10 01/17/2020 04:45 AM    ANISOCYTOSIS 2+ 01/17/2020 04:45 AM      Lab Results   Component Value Date/Time    SODIUM 130 (L) 01/17/2020 04:45 AM    POTASSIUM 4.6 01/17/2020 04:45 AM    CHLORIDE 100 01/17/2020 04:45 AM    CO2 20 01/17/2020 04:45 AM    GLUCOSE 181 (H) 01/17/2020 04:45 AM    BUN 17 01/17/2020 04:45 AM    CREATININE 1.28 01/17/2020 04:45 AM      Lab Results   Component Value Date/Time    CHOLSTRLTOT 79 02/07/2019 01:40 PM    LDL 34 02/07/2019 01:40 PM    HDL 23 (L) 02/07/2019 01:40 PM    TRIGLYCERIDE 108 01/17/2020 12:28 AM       Lab Results   Component Value Date/Time    ALKPHOSPHAT 23 (L) 11/21/2019 05:35 AM    ASTSGOT 33 11/21/2019 05:35 AM    ALTSGPT 10 (L) 11/21/2019 05:35 AM    TBILIRUBIN 1.2 11/21/2019 05:35 AM        Imaging/Testing:    I interpreted and/or reviewed the patient's neuroimaging    OUTSIDE IMAGES-CT HEAD   Final Result      EC-ECHOCARDIOGRAM COMPLETE W/O CONT   Final Result      DX-CHEST-FOR LINE PLACEMENT Perform procedure in: Patient's Room   Final Result         1.  Retrocardiac opacity concerning for infiltrate, stable.   2.  Trace left pleural effusion, stable   3.  Cardiomegaly   4.  Atherosclerosis   5.  Perihilar interstitial prominence and bronchial wall cuffing, appearance suggests changes of underlying bronchial inflammation, consider bronchitis.      DX-ABDOMEN FOR TUBE PLACEMENT   Final Result         1.  Nonspecific bowel gas pattern.   2.  Dobbhoff tube tip overlying the expected location of the pylorus or first duodenal segment.   3.  Left lung base atelectasis and/or small effusion      DX-CHEST-PORTABLE (1 VIEW)   Final Result         1.  Retrocardiac opacity concerning for infiltrate.   2.  Trace left pleural effusion, stable   3.  Cardiomegaly   4.  Atherosclerosis      [id number] FILM X-RAY   Final Result      OUTSIDE IMAGES-DX CHEST   Final Result        EEG as ready by Dr. Catracho Bustos 1/17/20: This  is an abnormal extended video EEG recording in a mildly sedated and/or encephalopathic patient. A mild to moderate, toxic / metabolic encephalopathy is suggested. Frequent triphasic waves, intermittently throughout the study, these exhibited a mostly brief periodic pattern, typically with stimulation of the patient, in keeping with SIRPIDs (Stimulus Induced Rhythmic, Periodic or Interictal Discharges). There were events of right facial twitching and chewing which correlated with muscle artifact and periodic triphasic waves. Although no clear suggestion of seizures, discrete seizures cannot be ruled out.  The findings may suggest cortical irritability and may place patient at increased risk for seizures    Assessment and Plan:    Cassandra Guzmán is a 74 y.o. woman with history of CHF, R parietal stroke with residual left-sided weakness, DM2, GERD, ESRD on dialysis, GERD, history of GI bleed, HTN, and hx of ICH presenting as a transfer for seizures. Patient has a complex medical history; L parietal stroke likely serving as a nidus for seizure focus especially given observed R sided face twitching.  EEG notable for triphasic waves and SIRPIDs that correlate with right facial twitching.    Plan:  -Continue video EEG monitoring  -Discontinue propofol  -Use fentanyl if needed for sedation  -Continue Vimpat 100 mg twice daily  -Continue Dilantin 100 mg 3 times daily  -Will return to obtain a neurological exam off sedation  -Seizure precautions  - lorazepam 2mg IV q6hr PRN >3 CPS's in 24 hours, >2 GTC's in 24 hours, and any GTC lasting longer than 5 minutes  -Report to DMV  -Further AED recommendations pending electrographic observation off propofol    The evaluation of the patient, and recommended management, was discussed with Dr. Catracho Bustos, epilepsy specialist, and Dr Crawford, intensivist. I have performed a physical exam and reviewed and updated ROS and Plan today (1/17/2020). In review of yesterday's note  (1/16/2020), there are no changes except as documented above.    Josep You MD  Director, [facility], [facility]  Neurohospitalist, [facility]  Clinical  of Neurology, [employer] [employer] [employer]  t) [phone] (f) [phone]    CRITICAL CARE    Upon my evaluation, this patient had a high probability of imminent or life-threatening deterioration due to cerebrovascular accident which required my direct attention, intervention, and personal management.  I personally provided 31 minutes of total critical care time outside of time spent on separately billable/documented procedures. Time includes: review of laboratory data, review of radiology studies, discussion with consultants, discussion with family/patient, monitoring for potential decompensation.  Interventions were performed as documented in the chart.

## 2020-01-17 NOTE — CARE PLAN
Problem: Ventilation Defect:  Goal: Ability to achieve and maintain unassisted ventilation or tolerate decreased levels of ventilator support  Outcome: PROGRESSING SLOWER THAN EXPECTED   Adult Ventilation Update    Total Vent Days: 4     Patient Lines/Drains/Airways Status      Active Airway       Name: Placement date: Placement time: Site: Days:    Airway ETT Oral 7.5  01/14/20   --   Oral  4                  Patient failed trials because of Barriers to Wean: No Order(cont. eeg) (01/17/20 0610)    Sputum/Suction   Cough: Productive (01/17/20 1200)  Sputum Amount: Scant (01/17/20 1200)  Sputum Color: Clear (01/17/20 1200)  Sputum Consistency: Thick (01/17/20 1200)    Mobility  Activity Performed: Unable to mobilize (01/17/20 0800)  Reason Not Mobilized: Bed rest;Unstable condition (01/17/20 0800)  Mobilization Comments: cont EEG (01/17/20 0800)    Events/Summary/Plan: no vent changes made at this time

## 2020-01-17 NOTE — CONSULTS
Critical Care Consultation    Date of consult: 1/16/2020    Referring Physician  Jeremy M Gonda, M.D.    Reason for Consultation  Status epilepticus, respiratory failure    History of Presenting Illness  74 y.o. female who presented 1/16/2020 with a past medical history significant for end-stage renal disease on hemodialysis since November 2019 M/W/F, hypertension, hyperlipidemia and coronary artery disease.  She was recently admitted to [facility] for an extended period of time followed by placement in a SNF.  She has been very frail requiring a hoist to get her in and out of the dialysis chair with failure to thrive.  On January 14 while at the SNF patient had a witnessed first-time seizure.  She was transferred to the emergency department where she had a second seizure with prolonged postictal state.  She was intubated for airway protection and started on a propofol drip in the emergency department on January 14 at [facility].  She was admitted to the intensive care unit where she underwent an MRI of her brain showing an acute right parietal lacunar infarct with overall brain parenchymal loss.  Neurology was consulted and patient was loaded with Keppra and continued on the propofol drip.  Despite this, on spot EEGs patient continued to have evidence of focal seizure and Dilantin was started yesterday.  She underwent a lumbar puncture today with unremarkable CSF and was being treated empirically with acyclovir, ampicillin, Rocephin, vancomycin and Decadron.  Given her persistent abnormal EEGs, the neurologist at the outside hospital recommend patient be transferred to a facility that can provide continuous EEG monitoring.  For this reason she was transferred to [facility] and I was consulted for her critical care management.  She was remained on a ventilator since her admission and is receiving dialysis as needed and is being followed by Dr. English.  She is oliguric.  Per  documentation there is been some difficulty with patient's daughter when patient was at SNF and some discussion with family while at [facility] regarding goals of care.  They apparently did not want to talk about changing CODE STATUS nor transitioning to comfort care and patient remains a full code.    Code Status  Full Code    Review of Systems  Review of Systems   Unable to perform ROS: Intubated       Past Medical History   has a past medical history of Arthritis, Chronic diastolic heart failure ([facility]) (6/1/2016), Clostridium difficile diarrhea (6/2/2016), CVA (cerebral vascular accident) ([facility]), DM (diabetes mellitus) type II uncontrolled with renal manifestation (4/26/2014), GERD (gastroesophageal reflux disease), GIB (gastrointestinal bleeding) (6/13/2016), GOUT, Hypertension, ICH (intracerebral hemorrhage) ([facility]) (4/6/2016), Indigestion, Kidney failure, RALF (obstructive sleep apnea), RALF (obstructive sleep apnea), Other and unspecified angina pectoris, Unspecified cataract, and UTI (urinary tract infection) (6/1/2016). She also has no past medical history of Anesthesia, Arrhythmia, Back pain, Bronchitis, Cancer (HCC), Cold, Congestive heart failure (HCC), COPD (chronic obstructive pulmonary disease) ([facility]), Dental disorder, Dialysis patient ([facility]), Fall, Glaucoma, Heart valve disease, Hepatitis A, Hepatitis B, Hepatitis C, Jaundice, Myocardial infarct (HCC), Other emphysema (HCC), Other specified symptom associated with female genital organs, Pacemaker, Personal history of venous thrombosis and embolism, Psychiatric problem, Rheumatic fever, Seizure (HCC), Unspecified asthma(493.90), Unspecified hemorrhagic conditions, or Unspecified urinary incontinence.    Surgical History   has a past surgical history that includes tubal ligation (1977); cristin by laparoscopy (10/5/2013); cataract extraction with iol; ventral hernia repair (4/9/2014); and gastroscopy-endo (6/14/2016).    Family History  family history  includes Cancer in her father; Diabetes in her father and mother.    Social History   reports that she has never smoked. She has never used smokeless tobacco. She reports that she does not drink alcohol or use drugs.    Medications  Home Medications     Reviewed by Rina Matta M.D. (Physician) on 01/16/20 at 2252  Med List Status: <None>   Medication Last Dose Status   acetaminophen (TYLENOL) 325 MG Tab  Active   allopurinol (ZYLOPRIM) 100 MG Tab  Active   ascorbic acid (ASCORBIC ACID) 500 MG Tab  Active   atorvastatin (LIPITOR) 10 MG Tab  Active   BD PEN NEEDLE SHEILA U/F  Active   Cholecalciferol (VITAMIN D3) 5000 units Cap  Active   cyanocobalamin (VITAMIN B-12) 100 MCG Tab  Active   docusate sodium (COLACE) 100 MG Cap  Active   doxazosin (CARDURA) 4 MG Tab  Active   fenofibrate (TRIGLIDE) 160 MG tablet  Active   formulation r (PREPARATION H) 0.25-3-14-71.9 % Ointment  Active   furosemide (LASIX) 20 MG Tab  Active   furosemide (LASIX) 20 MG Tab  Active   hydrALAZINE (APRESOLINE) 50 MG Tab  Active   insulin detemir (LEVEMIR) 100 UNIT/ML Solution  Active   isosorbide dinitrate (ISORDIL) 10 MG Tab  Active   labetalol (NORMODYNE) 300 MG Tab  Active   Magnesium 250 MG Tab  Active   NOVOLOG FLEXPEN 100 UNIT/ML Solution Pen-injector solution for injection  Active   omeprazole (PRILOSEC) 20 MG delayed-release capsule  Active   ondansetron (ZOFRAN) 4 MG Tab tablet  Active   potassium chloride SA (K-DUR) 20 MEQ Tab CR  Active   vitamin A & D (A&D OINTMENT) Ointment  Active   zolpidem (AMBIEN) 5 MG Tab  Active              Current Facility-Administered Medications   Medication Dose Route Frequency Provider Last Rate Last Dose   • MD Alert...PROPOFOL CRITICAL CARE PROTOCOL   Other PRN Jeremy M Gonda, M.D.       • propofol (DIPRIVAN) injection  0-80 mcg/kg/min Intravenous Continuous Jeremy M Gonda, M.D.       • Respiratory Care per Protocol   Nebulization Continuous RT Jeremy M Gonda, M.D.       • ipratropium-albuterol  (DUONEB) nebulizer solution  3 mL Nebulization Q2HRS PRN (RT) Jeremy M Gonda, M.D.       • famotidine (PEPCID) tablet 20 mg  20 mg Enteral Tube Q12HRS Jeremy M Gonda, M.D.        Or   • famotidine (PEPCID) injection 20 mg  20 mg Intravenous Q12HRS Jeremy M Gonda, M.D.       • senna-docusate (PERICOLACE or SENOKOT S) 8.6-50 MG per tablet 2 Tab  2 Tab Enteral Tube BID Jeremy M Gonda, M.D.        And   • polyethylene glycol/lytes (MIRALAX) PACKET 1 Packet  1 Packet Enteral Tube QDAY PRN Jeremy M Gonda, M.D.        And   • magnesium hydroxide (MILK OF MAGNESIA) suspension 30 mL  30 mL Enteral Tube QDAY PRN Jeremy M Gonda, M.D.        And   • bisacodyl (DULCOLAX) suppository 10 mg  10 mg Rectal QDAY PRN Jeremy M Gonda, M.D.       • MD Alert...ICU Electrolyte Replacement per Pharmacy   Other PHARMACY TO DOSE Jeremy M Gonda, M.D.       • lidocaine (XYLOCAINE) 1 % injection 1-2 mL  1-2 mL Tracheal Tube Q30 MIN PRN Jeremy M Gonda, M.D.       • Pharmacy Consult: Enteral tube insertion - review meds/change route/product selection  1 Each Other PHARMACY TO DOSE Jeremy M Gonda, M.D.       • MD Alert...PROPOFOL CRITICAL CARE PROTOCOL 1 Each  1 Each Other PRN Jeremy M Gonda, M.D.       • fentaNYL (SUBLIMAZE) injection 25 mcg  25 mcg Intravenous Q HOUR PRN Jeremy M Gonda, M.D.        Or   • fentaNYL (SUBLIMAZE) injection 50 mcg  50 mcg Intravenous Q HOUR PRN Jeremy M Gonda, M.D.        Or   • fentaNYL (SUBLIMAZE) injection 100 mcg  100 mcg Intravenous Q HOUR PRN Jeremy M Gonda, M.D.       • [START ON 1/17/2020] enoxaparin (LOVENOX) inj 40 mg  40 mg Subcutaneous DAILY Jeremy M Gonda, M.D.       • levETIRAcetam (KEPPRA) 500 mg in  mL IVPB  500 mg Intravenous Q12HRS Jeremy M Gonda, M.D.           Allergies  Allergies   Allergen Reactions   • Amlodipine Cough       Vital Signs last 24 hours  BP: (119)/(59) 119/59  SpO2:  [100 %] 100 %   Heart rate 56  Respiratory rate 16    Physical Exam  Physical Exam  Vitals signs and nursing  note reviewed.   Constitutional:       General: She is awake.      Appearance: She is underweight. She is ill-appearing. She is not toxic-appearing.      Interventions: She is sedated and intubated.      Comments: Elderly, frail, chronically ill-appearing on full mechanical ventilatory support and a propofol drip   HENT:      Head: Normocephalic and atraumatic.      Right Ear: External ear normal.      Left Ear: External ear normal.      Nose: Nose normal. No congestion.      Mouth/Throat:      Mouth: Mucous membranes are moist.      Pharynx: Oropharynx is clear.      Comments: Endotracheal tube and gastric tube in position  Eyes:      General: No scleral icterus.     Conjunctiva/sclera: Conjunctivae normal.      Pupils: Pupils are equal, round, and reactive to light.   Neck:      Musculoskeletal: Neck supple. No neck rigidity.      Comments: No meningismus  Cardiovascular:      Rate and Rhythm: Regular rhythm. Bradycardia present. Occasional extrasystoles are present.     Chest Wall: PMI is displaced.      Pulses: Normal pulses.      Heart sounds: Heart sounds not distant. No murmur.   Pulmonary:      Effort: No tachypnea or accessory muscle usage. She is intubated.      Breath sounds: Examination of the right-lower field reveals rhonchi. Examination of the left-lower field reveals rhonchi and rales. Rhonchi and rales present. No wheezing.      Comments: Good lung compliance on minimal vent support  Abdominal:      General: Bowel sounds are normal. There is no distension.      Palpations: Abdomen is soft.      Tenderness: There is no tenderness.   Genitourinary:     Comments: River catheter in place  Musculoskeletal:         General: No tenderness.      Right lower le+ Pitting Edema present.      Left lower le+ Pitting Edema present.      Comments: Bilateral hand edema as well   Skin:     General: Skin is warm and dry.      Capillary Refill: Capillary refill takes less than 2 seconds.      Findings: No  rash.   Neurological:      Mental Status: She is alert.      Comments: Eyes are open and possibly tracking visually but not following commands, withdraws to pain minimally in all 4 extremities   Psychiatric:         Behavior: Behavior is uncooperative.      Comments: Unable to assess given current clinical condition         Fluids  No intake or output data in the 24 hours ending 01/16/20 8566    Laboratory  No results found for this or any previous visit (from the past 48 hour(s)).   Labs from 1/16 from outside hospital include a CSF with 45 protein, 1 white cell, 2 red cells, clear, colorless, Gram stain without organisms  INR 1.2  ABG 7.55/31/123  Magnesium 1.9  CBC with differential unremarkable except for hemoglobin of 10 and platelets of 104  Chemistry sodium 131, potassium 4, chloride 97, bicarb 22, BUN 8, creatinine 1.18, glucose of 176, LFTs within normal limits, albumin 1.9    Imaging  DX-CHEST-PORTABLE (1 VIEW)   Final Result         1.  Retrocardiac opacity concerning for infiltrate.   2.  Trace left pleural effusion, stable   3.  Cardiomegaly   4.  Atherosclerosis      [id number] FILM X-RAY   Final Result      OUTSIDE IMAGES-DX CHEST   Final Result      DX-CHEST-PORTABLE (1 VIEW)    (Results Pending)    *Personally reviewed chest x-ray showing enlarged cardiac silhouette with low lung volumes, retrocardiac opacification, small left effusion, right lower lobe scarring, trace edema    Assessment/Plan  * Seizure (HCC)  Assessment & Plan  New onset, unclear etiology  Neurology consultation  Continue Keppra, Dilantin, propofol drip  Continues EEG ordered  Check ammonia level  Follow-up on final CSF results from outside hospital    Cerebrovascular accident (CVA) due to embolism of right middle cerebral artery (HCC)  Assessment & Plan  Continue high intensity statin, aspirin  PT/OT eval  Neurology consultation  Echocardiography    End stage renal failure on dialysis (HCC)  Assessment & Plan  Nephrology  consultation in the morning for ongoing dialysis needs, no need for emergent tonight  Monitor electrolytes, urine output, creatinine closely    Acute respiratory failure with hypoxia (HCC)  Assessment & Plan  Intubated in emergency department 1/14 at outside hospital  Continue full mechanical ventilatory support with current ventilator settings, titrate FiO2 to goal SaO2 greater than 92%  Chest x-ray and arterial blood gas upon arrival  Daily chest x-ray  ABCDEF bundle  Aspiration precautions  Continue propofol drip with PRN fentanyl    Severe protein-calorie malnutrition (HCC)  Assessment & Plan  Nutritionist consultation  Begin tube feeds    Normocytic anemia- (present on admission)  Assessment & Plan  Daily CBC with conservative transfusion strategy, monitor for bleeding    Hypertension  Assessment & Plan  scheduled hydralazine, Isordil, labetalol  PRN IV labetalol, hydralazine for goal SBP less than 160    DM (diabetes mellitus) (HCC)- (present on admission)  Assessment & Plan  Insulin sliding scale  Begin diabetic nutrition via feeding tube    Hypomagnesemia  Assessment & Plan  Repletion and monitor closely    Dysphagia as late effect of cerebrovascular accident (CVA)- (present on admission)  Assessment & Plan  Keep n.p.o. once extubated pending SLP eval    Gout- (present on admission)  Assessment & Plan  Continue allopurinol    Obesity (BMI 30-39.9)- (present on admission)  Assessment & Plan  Nutritionist consultation  Encourage behavioral and dietary modification once extubated for weight loss      Discussed patient condition and risk of morbidity and/or mortality with Hospitalist, RN, Charge nurse / hot rounds, Patient, neurology and Hospitalist from [facility] (Dr. Molina).    The patient remains critically ill.  Critical care time = 45 minutes in directly providing and coordinating critical care and extensive data review.  No time overlap and excludes procedures.

## 2020-01-17 NOTE — PROCEDURES
VIDEO ELECTROENCEPHALOGRAM REPORT        Referring provider: Dr. Gonda.      DOS: 1/17/2020 (total recording of 23 hours and 59 minutes).      INDICATION:  Cassandra De Las Pond 74 y.o. female presenting with seizures.      CURRENT ANTIEPILEPTIC REGIMEN: Lacosamide 100 mg q 12 hrs, Phenytoin 100 mg q 8 hrs. Propofol infusion stopped.      TECHNIQUE: 30 channel video electroencephalogram (EEG) was performed in accordance with the international 10-20 system. The study was reviewed in bipolar and referential montages. The recording examined a mildly sedated patient with intermittent arousals.      DESCRIPTION OF THE RECORD:  Mostly mildly sedated and/or asleep eeg. During the wakefulness, the background showed a symmetrical 8 Hz alpha activity posteriorly with amplitude of 70 mV.  During drowsiness, theta/delta frequencies were seen.     During the sleep state, background shows diffuse high-amplitude 4-5 Hz delta activity.       ACTIVATION PROCEDURES:   Not performed.      ICTAL AND/OR INTERICTAL FINDINGS:   Rare triphasic waves, intermittently throughout the study, these exhibited a mostly brief periodic pattern, typically with stimulation of the patient, in keeping with SIRPIDs (Stimulus Induced Rhythmic, Periodic or Interictal Discharges). There were several events of right facial twitching and chewing which correlated with muscle artifact and periodic triphasic waves and rhythmic slowing in the anterior regions. Discrete seizures cannot be ruled out.      EKG: sampling of the EKG recording demonstrated sinus rhythm.      EVENTS:  Right facial twitching.      INTERPRETATION:  This is an abnormal video EEG recording in a mildly sedated, then awake patient. Rare triphasic waves / frontal sharps, intermittently noted throughout the study, rarely these exhibited a brief periodic pattern, typically with stimulation of the patient, in keeping with SIRPIDs (Stimulus Induced Rhythmic, Periodic or Interictal Discharges). There  were several events of right facial twitching and chewing which correlated with muscle artifact and periodic triphasic waves and rhythmic slowing in the anterior. Although we cannot clearly confirm seizures, discrete deep seizures are possible to account for the patient's presentation, particularly given abnormalities on the eeg during the events.  The findings may suggest cortical irritability and may place patient at increased risk for seizures. Clinical and radiological correlation is recommended.     Updates provided to Dr. You.         Catracho Bustos MD   Epilepsy and Neurodiagnostics.   Clinical  of Neurology [facility].   Diplomate in Neurology, Epilepsy, and Electrodiagnostic Medicine.   Office: [phone]  Fax: [phone]

## 2020-01-17 NOTE — DIETARY
"Nutrition Support Assessment   Day 1 of admit.  Cassandra Guzmán is a 74 y.o. female with admitting DX of seizures, ventilatory respiratory failure, acute renal failure, respiratory failure requiring intubation.     Current problem list:  1. Acute respiratory failure with hypoxia  2. Seizure   3. End stage renal failure on dialysis  4. Cerebrovascular accident (CVA) due to embolism of right middle cerebral artery  5. Severe protein-calorie malnutrition  6. Normocytic anemia  7. DM (diabetes mellitus)  8. Hypertension  9. Hypomagnesemia  10. 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 Obesity (BMI 30-39.9)  12. Gout     Assessment:  Estimated Nutritional Needs: based on:   Height: 165.1 cm (5' 5\")  Weight: 60.5 kg (133 lb 6.1 oz)  Weight to Use in Calculations: 60.5 kg (133 lb 6.1 oz) - bed scale from admit  Ideal Body Weight: 56.7 kg (125 lb)  Percent Ideal Body Weight: 106.7  Body mass index is 22.2 kg/m²., BMI classification: normal    Calculation/Equation: MSJ x 1.2 = 1334 kcals/day; RMR per [facility] (vent L/min: 8.9, Tmax/24 hrs: 35.6) = 1042 kcals/day   Total Calories / day: 1334 - 1513 (Calories / k - 25)  Total Grams Protein / day: 73 - 85 (Grams Protein / k.2 - 1.4) - will provide higher end of protein needs as pt receives HD     Evaluation:   1. Admitted to outside facility for seizures.  Transferred from outside facility, pt intubated upon arrival.  2. Hx of ESRD, receives dialysis M/W/F  3. Cortrak placed and verified.   4. Sodium 130, Glucose 181, Pre-Albumin 8.0 (), CRP 9.17 ()  5. Aspirin, Pepcid, SSI, Pericolace on the MAR.  6. Levophed 5 mcg/min, Propofol stopped. RD will monitor propofol and adjust TF as needed.  7. Wt per chart review: 139 lbs 19 (from outside facility). Wt loss of 4% in two months is not significant, but worth noting.  8. Consult pending to wound team for skin breakdown to sacrum. Current tube feeding formula contains 12 gL of arginine and " 100% of RDI for vitamins and minerals to aid in wound healing.  9. Specialized low fiber, carbohydrate controlled formula indicated in pt on pressor support with hx of DM.     Malnutrition Risk: Unable to determine at this time.     Recommendations/Plan:  1. Start Impact Peptide 1.5 @ 25 mL/hr and advance per protocol to goal rate of 40 mL/hr, which provides 1440 kcals, 90 grams of protein, 134 grams of CHO, and 739 mL of free water per day.  2. Fluds per MD.  Tube feeding is low volume. Pt would need ~ 1100 mL of additional free water per day to meet estimated fluid needs.  3. PO diet when safe/appropriate per SLP/MD and pt can adequately consume.    RD following.

## 2020-01-17 NOTE — PROCEDURES
VIDEO ELECTROENCEPHALOGRAM REPORT      Referring provider: Dr. Gonda.     DOS: 1/16/2020 (total recording of 7 hours and 11 minutes).     INDICATION:  Cassandra De Las Pond 74 y.o. female presenting with seizures.     CURRENT ANTIEPILEPTIC REGIMEN: Levetiracetam, Valproic Acid, Propofol infusion.     TECHNIQUE: 30 channel extended video electroencephalogram (EEG) was performed in accordance with the international 10-20 system. The study was reviewed in bipolar and referential montages. The recording examined a mildly sedated patient with intermittent arousals.     DESCRIPTION OF THE RECORD:  Mostly mildly sedated and/or asleep eeg. During the wakefulness, the background showed a symmetrical 6 Hz theta activity posteriorly with amplitude of 70 mV.  uring drowsiness, theta/delta frequencies were seen.    During the sleep state, background shows diffuse high-amplitude 4-5 Hz delta activity.      ACTIVATION PROCEDURES:   Not performed.     ICTAL AND/OR INTERICTAL FINDINGS:   Frequent triphasic waves, intermittently throughout the study, these exhibited a mostly brief periodic pattern, typically with stimulation of the patient, in keeping with SIRPIDs (Stimulus Induced Rhythmic, Periodic or Interictal Discharges). There were events of right facial twitching and chewing which correlated with muscle artifact and periodic triphasic waves. Discrete seizures cannot be ruled out.     EKG: sampling of the EKG recording demonstrated sinus rhythm.     EVENTS:  Right facial twitching.     INTERPRETATION:  This is an abnormal extended video EEG recording in a mildly sedated and/or encephalopathic patient. A mild to moderate, toxic / metabolic encephalopathy is suggested. Frequent triphasic waves, intermittently throughout the study, these exhibited a mostly brief periodic pattern, typically with stimulation of the patient, in keeping with SIRPIDs (Stimulus Induced Rhythmic, Periodic or Interictal Discharges). There were events of  right facial twitching and chewing which correlated with muscle artifact and periodic triphasic waves. Although no clear suggestion of seizures, discrete seizures cannot be ruled out.  The findings may suggest cortical irritability and may place patient at increased risk for seizures. Clinical and radiological correlation is recommended.    Updates provided to Dr. You.       Catracho Bustos MD   Epilepsy and Neurodiagnostics.   Clinical  of Neurology [facility].   Diplomate in Neurology, Epilepsy, and Electrodiagnostic Medicine.   Office: [phone]  Fax: [phone]

## 2020-01-17 NOTE — CARE PLAN
Problem: Safety  Goal: Will remain free from injury  Outcome: PROGRESSING AS EXPECTED     Right soft wrist restraint in place. Frequent reorientation. Bed in low position and locked. Bed alarm on.    Problem: Venous Thromboembolism (VTW)/Deep Vein Thrombosis (DVT) Prevention:  Goal: Patient will participate in Venous Thrombosis (VTE)/Deep Vein Thrombosis (DVT)Prevention Measures  Outcome: PROGRESSING AS EXPECTED     Bilateral calf SCDS. Pharmacologic prophylaxis as ordered.

## 2020-01-17 NOTE — ASSESSMENT & PLAN NOTE
Continue high intensity statin, aspirin  PT/OT eval -> not able to participate   Blood pressure control

## 2020-01-17 NOTE — ASSESSMENT & PLAN NOTE
PRN IV labetalol, hydralazine for goal SBP less than 160    Mild hypotension needing midodrine prior to dialysis continue to monitor need

## 2020-01-18 NOTE — PROGRESS NOTES
Critical Care Progress Note    Date of admission  1/16/2020    Chief Complaint  74 y.o. female admitted 1/16/2020 with status epilepticus and respiratory failure    Hospital Course    74 y.o. female who presented 1/16/2020 with a past medical history significant for end-stage renal disease on hemodialysis since November 2019 M/W/F, hypertension, hyperlipidemia and coronary artery disease.  She was recently admitted to [facility] for an extended period of time followed by placement in a SNF.  She has been very frail requiring a hoist to get her in and out of the dialysis chair with failure to thrive.  On January 14 while at the SNF patient had a witnessed first-time seizure.  She was transferred to the emergency department where she had a second seizure with prolonged postictal state.  She was intubated for airway protection and started on a propofol drip in the emergency department on January 14 at [facility].  She was admitted to the intensive care unit where she underwent an MRI of her brain showing an acute right parietal lacunar infarct with overall brain parenchymal loss.  Neurology was consulted and patient was loaded with Keppra and continued on the propofol drip.  Despite this, on spot EEGs patient continued to have evidence of focal seizure and Dilantin was started yesterday.  She underwent a lumbar puncture today with unremarkable CSF and was being treated empirically with acyclovir, ampicillin, Rocephin, vancomycin and Decadron.  Given her persistent abnormal EEGs, the neurologist at the outside hospital recommend patient be transferred to a facility that can provide continuous EEG monitoring.  For this reason she was transferred to [facility] and I was consulted for her critical care management.  She was remained on a ventilator since her admission and is receiving dialysis as needed and is being followed by Dr. English.  She is oliguric.  Per documentation there  is been some difficulty with patient's daughter when patient was at SNF and some discussion with family while at [facility] regarding goals of care.  They apparently did not want to talk about changing CODE STATUS nor transitioning to comfort care and patient remains a full code.      Interval Problem Update  Reviewed last 24 hour events:  T-max 99.1  + 3.1 L over last 24 hours, +4 L since admit  CXR reviewed and grossly unchanged  Mag 1.8  Ionized calcium 1.0  ABG 7.3 4/39/103/98 on 30  Continuous EEG in process    Vimpat  Fosphenytoin    Norepinephrine@pause  Propofol@pause  NS with 20 K 83    Last BM 1/17    Review of Systems  Review of Systems   Unable to perform ROS: Acuity of condition        Vital Signs for last 24 hours   Pulse:  [51-73] 60  BP: (103-142)/(48-76) 121/59  SpO2:  [99 %-100 %] 100 %    Hemodynamic parameters for last 24 hours       Respiratory Information for the last 24 hours  Zepeda Vent Mode: APVCMV  Rate (breaths/min): 14  Vt Target (mL): 320  PEEP/CPAP: 8  FiO2: 30  P MEAN: 13  Control VTE (exp VT): 492    Physical Exam   Physical Exam  Vitals signs and nursing note reviewed.   Constitutional:       General: She is in acute distress.      Appearance: She is ill-appearing and toxic-appearing.   HENT:      Head: Normocephalic.   Eyes:      Conjunctiva/sclera: Conjunctivae normal.   Neck:      Musculoskeletal: No neck rigidity.   Cardiovascular:      Rate and Rhythm: Normal rate.      Pulses: Normal pulses.   Pulmonary:      Breath sounds: No wheezing or rales.   Abdominal:      General: There is no distension.      Palpations: Abdomen is soft.      Tenderness: There is no tenderness.   Musculoskeletal:         General: Swelling present.   Skin:     General: Skin is warm and dry.      Findings: Bruising present.   Neurological:      Comments: Baseline left-sided deficits from previous CVA  Opening eyes but not following commands   Psychiatric:      Comments: Sedate          Medications  Current Facility-Administered Medications   Medication Dose Route Frequency Provider Last Rate Last Dose   • fosphenytoin (CEREBYX) 100 mg PE in  mL IVPB  100 mg PE Intravenous TID Emigdio Lynn M.D. 400 mL/hr at 01/18/20 0529 100 mg PE at 01/18/20 0529   • norepinephrine (LEVOPHED) 8 mg in  mL Infusion  0-30 mcg/min Intravenous Continuous Sergei Uriarte M.D.   Stopped at 01/18/20 0300   • aspirin (ASA) tablet 325 mg  325 mg Enteral Tube DAILY Fletcher Willis M.D.   325 mg at 01/18/20 0531   • epoetin antoinette (EPOGEN/PROCRIT) injection 4,000 Units  4,000 Units Subcutaneous MO, WE + FR Payam Cavazos M.D.   4,000 Units at 01/17/20 1405   • propofol (DIPRIVAN) injection  0-80 mcg/kg/min Intravenous Continuous Jeremy M Gonda, M.D.   Stopped at 01/17/20 1026   • Respiratory Care per Protocol   Nebulization Continuous RT Jeremy M Gonda, M.D.       • ipratropium-albuterol (DUONEB) nebulizer solution  3 mL Nebulization Q2HRS PRN (RT) Jeremy M Gonda, M.D.       • famotidine (PEPCID) tablet 20 mg  20 mg Enteral Tube Q12HRS Jeremy M Gonda, M.D.        Or   • famotidine (PEPCID) injection 20 mg  20 mg Intravenous Q12HRS Jeremy M Gonda, M.D.   20 mg at 01/18/20 0530   • senna-docusate (PERICOLACE or SENOKOT S) 8.6-50 MG per tablet 2 Tab  2 Tab Enteral Tube BID Jeremy M Gonda, M.D.   Stopped at 01/16/20 2315    And   • polyethylene glycol/lytes (MIRALAX) PACKET 1 Packet  1 Packet Enteral Tube QDAY PRN Jeremy M Gonda, M.D.        And   • magnesium hydroxide (MILK OF MAGNESIA) suspension 30 mL  30 mL Enteral Tube QDAY PRN Jeremy M Gonda, M.D.        And   • bisacodyl (DULCOLAX) suppository 10 mg  10 mg Rectal QDAY PRN Jeremy M Gonda, M.D.       • MD Alert...ICU Electrolyte Replacement per Pharmacy   Other PHARMACY TO DOSE Jeremy M Gonda, M.D.       • lidocaine (XYLOCAINE) 1 % injection 1-2 mL  1-2 mL Tracheal Tube Q30 MIN PRN Jeremy M Gonda, M.D.       • Pharmacy Consult: Enteral tube  insertion - review meds/change route/product selection  1 Each Other PHARMACY TO DOSE Jeremy M Gonda, M.D.       • fentaNYL (SUBLIMAZE) injection 25 mcg  25 mcg Intravenous Q HOUR PRN Jeremy M Gonda, M.D.   25 mcg at 01/16/20 2310    Or   • fentaNYL (SUBLIMAZE) injection 50 mcg  50 mcg Intravenous Q HOUR PRN Jeremy M Gonda, M.D.   50 mcg at 01/17/20 2024    Or   • fentaNYL (SUBLIMAZE) injection 100 mcg  100 mcg Intravenous Q HOUR PRN Jeremy M Gonda, M.D.   100 mcg at 01/18/20 0141   • allopurinol (ZYLOPRIM) tablet 200 mg  200 mg Enteral Tube DAILY Rina Matta M.D.   200 mg at 01/18/20 0531   • atorvastatin (LIPITOR) tablet 10 mg  10 mg Enteral Tube DAILY Rina Matta M.D.   10 mg at 01/18/20 0531   • fenofibrate micronized (LOFIBRA) capsule 134 mg  134 mg Enteral Tube DAILY Rina Matta M.D.   134 mg at 01/18/20 0531   • hydrALAZINE (APRESOLINE) tablet 100 mg  100 mg Enteral Tube TID Rina Matta M.D.   Stopped at 01/16/20 2315   • isosorbide dinitrate (ISORDIL) tablet 10 mg  10 mg Enteral Tube TID Rina Matta M.D.   Stopped at 01/16/20 2315   • labetalol (NORMODYNE) tablet 300 mg  300 mg Enteral Tube BID Rina Matta M.D.   Stopped at 01/16/20 2315   • ondansetron (ZOFRAN) syringe/vial injection 4 mg  4 mg Intravenous Q4HRS PRN Rina Matta M.D.   4 mg at 01/17/20 1650   • 0.9 % NaCl with KCl 20 mEq infusion   Intravenous Continuous Rina Matta M.D. 83 mL/hr at 01/17/20 2251     • heparin injection 5,000 Units  5,000 Units Subcutaneous Q8HRS Rina Matta M.D.   5,000 Units at 01/18/20 0526   • labetalol (NORMODYNE/TRANDATE) injection 10 mg  10 mg Intravenous Q4HRS PRN Rina Matta M.D.       • insulin regular (HUMULIN R) injection 2-9 Units  2-9 Units Subcutaneous Q6HRS Rina Matta M.D.   Stopped at 01/17/20 1800    And   • glucose 4 g chewable tablet 16 g  16 g Oral Q15 MIN PRN Rina Matta M.D.        And   • DEXTROSE 10% BOLUS 250 mL  250 mL Intravenous Q15 MIN PRN Rina Matta M.D.        • acetaminophen (TYLENOL) tablet 650 mg  650 mg Enteral Tube Q6HRS PRN Jeremy M Gonda, M.D.       • ondansetron (ZOFRAN ODT) dispertab 4 mg  4 mg Enteral Tube Q4HRS PRN Jeremy M Gonda, M.D.       • lacosamide (VIMPAT) 100 mg in  mL ivpb  100 mg Intravenous Q12HR Emigdio Lynn M.D.   Stopped at 01/18/20 0105       Fluids    Intake/Output Summary (Last 24 hours) at 1/18/2020 0721  Last data filed at 1/18/2020 0600  Gross per 24 hour   Intake 3376.94 ml   Output 182 ml   Net 3194.94 ml       Laboratory  Recent Labs     01/16/20  2317 01/17/20  0425 01/18/20  0522   ISTATAPH 7.413 7.417 7.347*   ISTATAPCO2 36.4 35.0 39.4*   ISTATAPO2 93* 115* 103*   ISTATATCO2 24 24 23   [id number] 97 99 98   ISTATARTHCO3 23.3 22.5 21.6   ISTATARTBE -1 -2 -4   ISTATTEMP see below see below 96.4 F   ISTATFIO2 30 30 30   ISTATSPEC Arterial Arterial Arterial   ISTATAPHTC  --   --  7.365*   [id number]  --   --  96*         Recent Labs     01/17/20  0028 01/17/20  0445 01/18/20  0453   SODIUM 132* 130* 135   POTASSIUM 4.1 4.6 5.1   CHLORIDE 99 100 106   CO2 22 20 22   BUN 16 17 23*   CREATININE 1.26 1.28 1.56*   MAGNESIUM  --  1.9 1.8   PHOSPHORUS  --  3.8 4.0   CALCIUM 7.2* 7.3* 7.4*     Recent Labs     01/17/20  0028 01/17/20  0445 01/18/20  0453   PREALBUMIN 8.0*  --   --    GLUCOSE 151* 181* 95     Recent Labs     01/17/20  0028 01/17/20  0445 01/18/20  0453   WBC 7.4 8.5 11.8*   NEUTSPOLYS  --  65.90 64.40   LYMPHOCYTES  --  25.30 23.80   MONOCYTES  --  8.20 10.00   EOSINOPHILS  --  0.00 1.10   BASOPHILS  --  0.10 0.30     Recent Labs     01/17/20  0028 01/17/20  0445 01/18/20  0453   RBC 2.80* 2.75* 2.92*   HEMOGLOBIN 9.3* 9.0* 9.7*   HEMATOCRIT 29.8* 28.7* 31.1*   PLATELETCT 109* 119* 144*       Imaging  X-Ray:  I have personally reviewed the images and compared with prior images.    Assessment/Plan  * Seizure (HCC)  Assessment & Plan  New onset, unclear etiology  Neurology following  Continue Vimpat and  fosphenytoin  Continuous EEG  Follow-up CSF from OSH  Plan for MRI today    Cerebrovascular accident (CVA) due to embolism of right middle cerebral artery (HCC)  Assessment & Plan  Continue high intensity statin, aspirin  PT/OT eval  Neurology following  Echocardiogram reviewed    End stage renal failure on dialysis ([facility])  Assessment & Plan  HD per nephrology  Renally dose medications  Monitor electrolytes  Monitor urine output    Acute respiratory failure with hypoxia ([facility])  Assessment & Plan  Intubated in emergency department 1/14 at outside hospital  Continue full mechanical ventilatory support  I am adjusting ventilator based on ABG and pulmonary mechanics  RT/O2 protocols  Maintain euvolemic balance  Monitor for need of bronchoscopy    Severe protein-calorie malnutrition ([facility])  Assessment & Plan  Nutritionist consultation  Begin tube feeds    Normocytic anemia- (present on admission)  Assessment & Plan  Daily CBC with conservative transfusion strategy, monitor for bleeding    Hypertension  Assessment & Plan  scheduled hydralazine, Isordil, labetalol  PRN IV labetalol, hydralazine for goal SBP less than 160    DM (diabetes mellitus) ([facility])- (present on admission)  Assessment & Plan  Insulin sliding scale  Begin diabetic nutrition via feeding tube    Hypomagnesemia  Assessment & Plan  Replace keep greater than 2    Dysphagia as late effect of cerebrovascular accident (CVA)- (present on admission)  Assessment & Plan  Keep n.p.o. once extubated pending SLP eval    Gout- (present on admission)  Assessment & Plan  Continue allopurinol    Obesity (BMI 30-39.9)- (present on admission)  Assessment & Plan  Nutritionist consultation  Encourage behavioral and dietary modification once extubated for weight loss       VTE:  Heparin  Ulcer: H2 Antagonist  Lines: Central Line  Ongoing indication addressed and [facility] Catheter  Ongoing indication addressed    I have performed a physical exam and reviewed and updated ROS and Plan  today (1/18/2020). In review of yesterday's note (1/17/2020), there are no changes except as documented above.     Discussed patient condition and risk of morbidity and/or mortality with RN, RT, Pharmacy and nephrology and neurology  The patient remains critically ill.  Critical care time = 34 minutes in directly providing and coordinating critical care and extensive data review.  No time overlap and excludes procedures.
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
## 2020-01-18 NOTE — CARE PLAN
Problem: Venous Thromboembolism (VTW)/Deep Vein Thrombosis (DVT) Prevention:  Goal: Patient will participate in Venous Thrombosis (VTE)/Deep Vein Thrombosis (DVT)Prevention Measures  Outcome: PROGRESSING AS EXPECTED  SCDs in use, pharmacologic prophylaxis in use.     Problem: Pain Management  Goal: Pain level will decrease to patient's comfort goal  Outcome: PROGRESSING AS EXPECTED  CPOT scores of 0, PRN fentanyl if needed for pain.

## 2020-01-18 NOTE — PROGRESS NOTES
Neurology Progress Note  Neurohospitalist Service, [facility]    Referring Physician: Jeremy M Gonda, M.D.    Chief complaint: Seizures    HPI: Refer to initial documented Neurology H&P, as detailed in the patient's chart.    Interval History 1/18/20: No acute events overnight.  Remains connected to EEG; pending further workup.  Patient unable to participate in interval subjective history given intubation.  No family at bedside today.    Review of systems: In addition to what is detailed in the HPI and/or updated in the interval history, all other systems reviewed and are negative.    Past Medical History:    has a past medical history of Arthritis, Chronic diastolic heart failure ([facility]) (6/1/2016), Clostridium difficile diarrhea (6/2/2016), CVA (cerebral vascular accident) ([facility]), DM (diabetes mellitus) type II uncontrolled with renal manifestation (4/26/2014), GERD (gastroesophageal reflux disease), GIB (gastrointestinal bleeding) (6/13/2016), GOUT, Hypertension, ICH (intracerebral hemorrhage) ([facility]) (4/6/2016), Indigestion, Kidney failure, RALF (obstructive sleep apnea), RALF (obstructive sleep apnea), Other and unspecified angina pectoris, Seizure ([facility]) (1/16/2020), Unspecified cataract, and UTI (urinary tract infection) (6/1/2016). She also has no past medical history of Anesthesia, Arrhythmia, Back pain, Bronchitis, Cancer ([facility]), Cold, Congestive heart failure ([facility]), COPD (chronic obstructive pulmonary disease) ([facility]), Dental disorder, Dialysis patient ([facility]), Fall, Glaucoma, Heart valve disease, Hepatitis A, Hepatitis B, Hepatitis C, Jaundice, Myocardial infarct ([facility]), Other emphysema ([facility]), Other specified symptom associated with female genital organs, Pacemaker, Personal history of venous thrombosis and embolism, Psychiatric problem, Rheumatic fever, Unspecified asthma(493.90), Unspecified hemorrhagic conditions, or Unspecified urinary incontinence.    FHx:  family history includes Cancer  in her father; Diabetes in her father and mother.    SHx:   reports that she has never smoked. She has never used smokeless tobacco. She reports that she does not drink alcohol or use drugs.    Medications:    Current Facility-Administered Medications:   •  lacosamide (VIMPAT) 200 mg in  mL ivpb, 200 mg, Intravenous, BID, Josep You M.D.  •  lacosamide (VIMPAT) 300 mg in  mL ivpb, 300 mg, Intravenous, Once, Josep You M.D.  •  norepinephrine (LEVOPHED) 8 mg in  mL Infusion, 0-30 mcg/min, Intravenous, Continuous, Sergei Uriarte M.D., Stopped at 01/18/20 0300  •  aspirin (ASA) tablet 325 mg, 325 mg, Enteral Tube, DAILY, Fletcher Willis M.D., 325 mg at 01/18/20 0531  •  epoetin antoinette (EPOGEN/PROCRIT) injection 4,000 Units, 4,000 Units, Subcutaneous, MO, WE + FR, Payam Cavazos M.D., 4,000 Units at 01/17/20 1405  •  propofol (DIPRIVAN) injection, 0-80 mcg/kg/min, Intravenous, Continuous, Stopped at 01/17/20 1026 **AND** Triglycerides Starting now and then Every 3 Days, , , Every 3 Days (0300), Jeremy M Gonda, M.D.  •  Respiratory Care per Protocol, , Nebulization, Continuous RT, Jeremy M Gonda, M.D.  •  ipratropium-albuterol (DUONEB) nebulizer solution, 3 mL, Nebulization, Q2HRS PRN (RT), Jeremy M Gonda, M.D.  •  famotidine (PEPCID) tablet 20 mg, 20 mg, Enteral Tube, Q12HRS **OR** famotidine (PEPCID) injection 20 mg, 20 mg, Intravenous, Q12HRS, Jeremy M Gonda, M.D., 20 mg at 01/18/20 0530  •  senna-docusate (PERICOLACE or SENOKOT S) 8.6-50 MG per tablet 2 Tab, 2 Tab, Enteral Tube, BID, Stopped at 01/16/20 6871 **AND** polyethylene glycol/lytes (MIRALAX) PACKET 1 Packet, 1 Packet, Enteral Tube, QDAY PRN **AND** magnesium hydroxide (MILK OF MAGNESIA) suspension 30 mL, 30 mL, Enteral Tube, QDAY PRN **AND** bisacodyl (DULCOLAX) suppository 10 mg, 10 mg, Rectal, QDAY PRN, Jeremy M Gonda, M.D.  •  MD Alert...ICU Electrolyte Replacement per Pharmacy, , Other, PHARMACY TO DOSE, Jeremy M Gonda,  M.D.  •  lidocaine (XYLOCAINE) 1 % injection 1-2 mL, 1-2 mL, Tracheal Tube, Q30 MIN PRN, Jeremy M Gonda, M.D.  •  Pharmacy Consult: Enteral tube insertion - review meds/change route/product selection, 1 Each, Other, PHARMACY TO DOSE, Jeremy M Gonda, M.D.  •  fentaNYL (SUBLIMAZE) injection 25 mcg, 25 mcg, Intravenous, Q HOUR PRN, 25 mcg at 01/16/20 2310 **OR** fentaNYL (SUBLIMAZE) injection 50 mcg, 50 mcg, Intravenous, Q HOUR PRN, 50 mcg at 01/17/20 2024 **OR** fentaNYL (SUBLIMAZE) injection 100 mcg, 100 mcg, Intravenous, Q HOUR PRN, Jeremy M Gonda, M.D., 100 mcg at 01/18/20 0141  •  allopurinol (ZYLOPRIM) tablet 200 mg, 200 mg, Enteral Tube, DAILY, Rina Matta M.D., 200 mg at 01/18/20 0531  •  atorvastatin (LIPITOR) tablet 10 mg, 10 mg, Enteral Tube, DAILY, Rina Matta M.D., 10 mg at 01/18/20 0531  •  fenofibrate micronized (LOFIBRA) capsule 134 mg, 134 mg, Enteral Tube, DAILY, Rina Matta M.D., 134 mg at 01/18/20 0531  •  hydrALAZINE (APRESOLINE) tablet 100 mg, 100 mg, Enteral Tube, TID, Rina Matta M.D., Stopped at 01/16/20 2315  •  isosorbide dinitrate (ISORDIL) tablet 10 mg, 10 mg, Enteral Tube, TID, Rina Matta M.D., Stopped at 01/16/20 2315  •  labetalol (NORMODYNE) tablet 300 mg, 300 mg, Enteral Tube, BID, Rina Matta M.D., Stopped at 01/16/20 2315  •  ondansetron (ZOFRAN) syringe/vial injection 4 mg, 4 mg, Intravenous, Q4HRS PRN, Rina Matta M.D., 4 mg at 01/17/20 1650  •  0.9 % NaCl with KCl 20 mEq infusion, , Intravenous, Continuous, Rina Matta M.D., Last Rate: 83 mL/hr at 01/17/20 2251  •  heparin injection 5,000 Units, 5,000 Units, Subcutaneous, Q8HRS, Rina Matta M.D., 5,000 Units at 01/18/20 0526  •  labetalol (NORMODYNE/TRANDATE) injection 10 mg, 10 mg, Intravenous, Q4HRS PRN, Rina Matta M.D.  •  insulin regular (HUMULIN R) injection 2-9 Units, 2-9 Units, Subcutaneous, Q6HRS, Stopped at 01/17/20 1800 **AND** Accu-Chek Q6 if NPO, , , Q6H **AND** NOTIFY MD and PharmD, , ,  Once **AND** glucose 4 g chewable tablet 16 g, 16 g, Oral, Q15 MIN PRN **AND** DEXTROSE 10% BOLUS 250 mL, 250 mL, Intravenous, Q15 MIN PRN, Rina Matta M.D.  •  acetaminophen (TYLENOL) tablet 650 mg, 650 mg, Enteral Tube, Q6HRS PRN, Jeremy M Gonda, M.D.  •  ondansetron (ZOFRAN ODT) dispertab 4 mg, 4 mg, Enteral Tube, Q4HRS PRN, Jeremy M Gonda, M.D.    Physical Examination:     Vitals:    01/18/20 0545 01/18/20 0600 01/18/20 0615 01/18/20 0630   BP: 138/62 119/58 114/50 121/59   Pulse: 61 60 (!) 57 60   Resp:       Temp:       TempSrc:  Bladder     SpO2: 100% 100% 100% 100%   Weight:       Height:           General: Patient is intubated in the ICU  Eyes: examination of optic disks not indicated at this time  CV: RRR    NEUROLOGICAL EXAM:     Mental status:  Eyes open, does not follow commands  Speech and language: Intubated.  Cranial nerve exam: Pupils are  reactive bilaterally.  Does not blink to threat bilaterally.    Tracks to the right.  Corneal reflex intact bilaterally, VOR intact. Face is notable for mild facial droop on the left.  Cough and gag reflex is present   motor exam: Does not participate in formal strength testing, tone is spastic on the left, and in a flexion position, flaccid on the right.  No abnormal movements were seen on exam.  Sensory exam: Withdrawal to noxious stim right arm, extensor posturing to noxious stim left arm   Deep tendon reflexes:  1+ and symmetric. Toes upgoing on the right and mute on the left  Coordination: Non-participatory  Gait: deferred due to intubation and ICU status    Objective Data:    Labs:  Lab Results   Component Value Date/Time    PROTHROMBTM 14.4 (H) 11/17/2019 10:40 AM    INR 1.38 11/17/2019 10:40 AM      Lab Results   Component Value Date/Time    WBC 11.8 (H) 01/18/2020 04:53 AM    RBC 2.92 (L) 01/18/2020 04:53 AM    HEMOGLOBIN 9.7 (L) 01/18/2020 04:53 AM    HEMATOCRIT 31.1 (L) 01/18/2020 04:53 AM    .5 (H) 01/18/2020 04:53 AM    MCH 33.2 (H)  01/18/2020 04:53 AM    MCHC 31.2 (L) 01/18/2020 04:53 AM    MPV 9.6 01/18/2020 04:53 AM    NEUTSPOLYS 64.40 01/18/2020 04:53 AM    LYMPHOCYTES 23.80 01/18/2020 04:53 AM    MONOCYTES 10.00 01/18/2020 04:53 AM    EOSINOPHILS 1.10 01/18/2020 04:53 AM    BASOPHILS 0.30 01/18/2020 04:53 AM    ANISOCYTOSIS 2+ 01/17/2020 04:45 AM      Lab Results   Component Value Date/Time    SODIUM 135 01/18/2020 04:53 AM    POTASSIUM 5.1 01/18/2020 04:53 AM    CHLORIDE 106 01/18/2020 04:53 AM    CO2 22 01/18/2020 04:53 AM    GLUCOSE 95 01/18/2020 04:53 AM    BUN 23 (H) 01/18/2020 04:53 AM    CREATININE 1.56 (H) 01/18/2020 04:53 AM      Lab Results   Component Value Date/Time    CHOLSTRLTOT 79 02/07/2019 01:40 PM    LDL 34 02/07/2019 01:40 PM    HDL 23 (L) 02/07/2019 01:40 PM    TRIGLYCERIDE 124 01/18/2020 04:53 AM       Lab Results   Component Value Date/Time    ALKPHOSPHAT 23 (L) 11/21/2019 05:35 AM    ASTSGOT 33 11/21/2019 05:35 AM    ALTSGPT 10 (L) 11/21/2019 05:35 AM    TBILIRUBIN 1.2 11/21/2019 05:35 AM        Imaging/Testing:    I interpreted and/or reviewed the patient's neuroimaging    DX-CHEST-PORTABLE (1 VIEW)   Final Result         1. Stable lines and tubes.   2. Unchanged retrocardiac atelectasis versus consolidation.   3. Stable trace left pleural effusion.         OUTSIDE IMAGES-DX CHEST   Final Result      OUTSIDE IMAGES-US VASCULAR   Final Result      OUTSIDE IMAGES-MR BRAIN   Final Result      OUTSIDE IMAGES-DX CHEST   Final Result      OUTSIDE IMAGES-CT HEAD   Final Result      EC-ECHOCARDIOGRAM COMPLETE W/O CONT   Final Result      DX-CHEST-FOR LINE PLACEMENT Perform procedure in: Patient's Room   Final Result         1.  Retrocardiac opacity concerning for infiltrate, stable.   2.  Trace left pleural effusion, stable   3.  Cardiomegaly   4.  Atherosclerosis   5.  Perihilar interstitial prominence and bronchial wall cuffing, appearance suggests changes of underlying bronchial inflammation, consider bronchitis.       DX-ABDOMEN FOR TUBE PLACEMENT   Final Result         1.  Nonspecific bowel gas pattern.   2.  Dobbhoff tube tip overlying the expected location of the pylorus or first duodenal segment.   3.  Left lung base atelectasis and/or small effusion      DX-CHEST-PORTABLE (1 VIEW)   Final Result         1.  Retrocardiac opacity concerning for infiltrate.   2.  Trace left pleural effusion, stable   3.  Cardiomegaly   4.  Atherosclerosis      GS-IUGKVAR-SDXOXFO FILM X-RAY   Final Result      OUTSIDE IMAGES-DX CHEST   Final Result      MR-BRAIN-WITH & W/O    (Results Pending)     cEEG as read by Dr. Bustos 1/17/19: Rare triphasic waves / frontal sharps, intermittently noted throughout the study, rarely these exhibited a brief periodic pattern, typically with stimulation of the patient, in keeping with SIRPIDs (Stimulus Induced Rhythmic, Periodic or Interictal Discharges). There were several events of right facial twitching and chewing which correlated with muscle artifact and periodic triphasic waves and rhythmic slowing in the anterior. Although we cannot clearly confirm seizures, discrete deep seizures are possible to account for the patient's presentation, particularly given abnormalities on the eeg during the events.     Assessment and Plan:    Cassandra Guzmán is a 74 y.o. woman with history of CHF, R parietal stroke with residual left-sided weakness, DM2, GERD, ESRD on dialysis, GERD, history of GI bleed, HTN, and hx of ICH presenting as a transfer for seizures. Currently undergoing workup to determine underlying seizure etiology (ddx mass vs. Paraneoplastic syndrome).     Plan:    -Continue video EEG monitoring; MRI capable leads  -Discontinue all sedation and extubate  -Increase Vimpat to 200 mg twice daily; give 300mg IV dose x1 now  -Discontinue Dilantin  -obtain MRI brain w/ and w/o CST  -serum: paraneoplastic panel  -Seizure precautions  - lorazepam 2mg IV q6hr PRN >3 CPS's in 24 hours, >2 GTC's in 24 hours, and  any GTC lasting longer than 5 minutes  -Report to DMV     The evaluation of the patient, and recommended management, was discussed with Dr. Catracho Bustos. I have performed a physical exam and reviewed and updated ROS and Plan today (1/18/2020). In review of yesterday's note (1/17/2020), there are no changes except as documented above.    Josep You MD  Director, Comprehensive Stroke Center, [facility]  Neurohospitalist, [facility]  Clinical  of Neurology, [facility] [employer]  t) [phone] (f) [phone]

## 2020-01-18 NOTE — CONSULTS
"Reason for PC Consult: Advance Care Planning    Consulted by: Dr. Uriarte; currently Dr. Willis    Assessment:   General: 75 y/o woman tx from outlying facility for higher level of care d/t ongoing seizure activity. Pt resides at an [facility] and was found having a seizure and subsequently sent to the hospital where the presumed seizure activity continued. She was intubated and remains on vent support. PMHx of ICH, CVA, dysphagia with feeding tube, CAD, DM, ESRD on HD MWF, OCA, CHF (EF 55%). Pt currently off sedation and pressors. Seizures ongoing and additional medication added 1/19.     Social: Pt resides at an [facility] ([facility]) following her stroke and hospitalization last year when dialysis was initiated. She has 5 kids, Belen and Erick, plus 3 others in the [facility] who came to visit with her last hospitalization. Cassandra has required total care since her CVA 4 years ago as she is flaccid on her left side.     Consults: neurology, nephrology, PMA,     Dyspnea: Yes- full vent support  Last BM: 01/17/20-    Pain: No-    Depression: Unable to determine-    Dementia: Unable to Determine;       Spiritual:  Is [employer] or spirituality important for coping with this illness? Yes-    Has a  or spiritual provider visit been requested? No    Palliative Performance Scale: 20%    Advance Directive: None-    DPOA: No- NOK are her children; Belen is most involved and lives locally but all are aware.   POLST: No-      Code Status: Full-  Discussed at length and she states her mom always referred to herself as \"a fighter.\" PC RN asked if she would want measures like CPR/shocking in her current condition if she could understand what was going on and she says \"yes, she wasn't one to give up.\"    Outcome:  PC RN met with Belen and Erick at bedside and explained the role of PC. Belen works nights and planning to leave the hospital soon. PC touched bases with Dr. You by phone to have Belen's questions answered and updates on " "MRI. Updates provided from the BS team noting the plan for additional seizure medications to be added, pt off sedation but still not following consistently. They are aware that she is continuing to seize despite current efforts. Belen provided her social and medical history up to this point noting the stroke 4 years ago and initiation of dialysis in late November 2019. Belen recognizes that dialysis \"was hard for her\" but she was managing it.     Explored pt's values, beliefs, and preferences in order to identify GOC. She denied her mom having ever completed and AD, but says she spoke about her wishes. She did not identify any point when Cassandra would say she would not want to continue on, but the children agree that a tracheostomy would not be in line with her goals. They understand that management of the seizures must occur before the team is able to tell how this is going to look for her long term, and that is control is not able to be achieved, this is a non-viable situation. Belen expressed having \"hope.\" PC offered a  to come visit and they were agreeable; order placed.  Belen is going out of town for 4 days, but Erick will be present.    They are aware this RN will continue to follow along and remain available for any questions/needs. While talking to Cassandra's family, PC RN provided therapeutic communication, including open ended questions, reflective listening, and normalization of thought and feelings throughout encounter, as well as reinforced her goals of care. PC contact information provided to Erick and encouraged to call with any questions or concerns.     Updated: MD/RN    Plan: follow and support    Thank you for allowing Palliative Care to support this patient and family. Contact [phone] for additional assistance, change in patient status, or with any questions/concerns.   "

## 2020-01-18 NOTE — PROGRESS NOTES
Patient threw up when arrived to MRI. Fourth time today.  Updated Dr. Willis. New order for OG to low wall continuous suction. Patient's tube feed immediately stopped when patient first threw up.

## 2020-01-18 NOTE — PROGRESS NOTES
Updated patient's daughter regarding patient getting MRI and dialysis following MRI with contrast.

## 2020-01-18 NOTE — PALLIATIVE CARE
Palliative Care follow-up  Consult received and EMR reviewed; case discussed with BS RN noting no family present but that they visit throughout the day. Appreciate BS RN calling PC when family present.      Plan: f/u when family present    Thank you for allowing Palliative Care to support this patient and family. Contact [phone] for additional assistance, change in patient status, or with any questions/concerns.

## 2020-01-18 NOTE — PROCEDURES
VIDEO ELECTROENCEPHALOGRAM REPORT        Referring provider: Dr. You.      DOS: 1/18/2020 (total recording of 23 hours and 35 minutes).      INDICATION:  Cassandra [place] 74 y.o. female presenting with seizures.      CURRENT ANTIEPILEPTIC REGIMEN: Lacosamide increased to 200 mg q 12 hrs. Phenytoin discontinued. Propofol infusion stopped.      TECHNIQUE: 30 channel video electroencephalogram (EEG) was performed in accordance with the international 10-20 system. The study was reviewed in bipolar and referential montages. The recording examined a mildly sedated patient with intermittent arousals.      DESCRIPTION OF THE RECORD:  Mostly mildly sedated and/or asleep eeg. During the wakefulness, the background showed a symmetrical 6 Hz theta activity posteriorly with amplitude of 70 mV.  During drowsiness, theta/delta frequencies were seen.     During the sleep state, background shows diffuse high-amplitude 4-5 Hz delta activity.       ACTIVATION PROCEDURES:   Not performed.      ICTAL AND/OR INTERICTAL FINDINGS:   Frequent triphasic waves, intermittently throughout the study, these exhibited a mostly brief periodic pattern. There were several events of right facial twitching and chewing which correlated with muscle artifact and periodic triphasic waves and rhythmic slowing in the anterior regions. Some of these spells involved subtle twitching of the right hand/arm. Discrete seizures are suggested, with worsening of the study in the last several hours.       EKG: sampling of the EKG recording demonstrated sinus rhythm.      EVENTS:  Right facial twitching.      INTERPRETATION:  This is an abnormal 24 hrs video EEG recording in a mildly sedated, then awake patient.  A mild to moderate, toxic / metabolic encephalopathy is suggested. Frequent triphasic waves, intermittently throughout the study, these exhibited a mostly brief periodic pattern. There were several events of right facial twitching and chewing which  correlated with muscle artifact and periodic triphasic waves and rhythmic slowing in the anterior regions. Some of these spells involved subtle twitching of the right hand/arm. Discrete seizures are suggested, with worsening of the study in the last several hours.  The findings suggest cortical irritability and structural abnormality. Clinical and radiological correlation is recommended.     Updates provided to Dr. You.         Catracho Bustos MD   Epilepsy and Neurodiagnostics.   Clinical  of Neurology [facility].   Diplomate in Neurology, Epilepsy, and Electrodiagnostic Medicine.   Office: [phone]  Fax: [phone]

## 2020-01-18 NOTE — CARE PLAN
Problem: Safety  Goal: Will remain free from injury  Outcome: PROGRESSING AS EXPECTED   Bed in low position and locked. Right wrist restraint in place for airway protection.     Problem: Bowel/Gastric:  Goal: Normal bowel function is maintained or improved  Outcome: PROGRESSING AS EXPECTED     Patient incontinent of loose stool. Stool softeners held.     Problem: Communication  Goal: The ability to communicate needs accurately and effectively will improve  Outcome: PROGRESSING SLOWER THAN EXPECTED     Patient increasingly alert, but unable to follow commands or communicate needs. CPOT used to assess potential pain.

## 2020-01-18 NOTE — PROGRESS NOTES
Per Nephrology okay to get contrast for MRI.  Patient's dialysis will be scheduled for 30 minutes post MRI.  Will coordinate with RT, dialysis and MRI.

## 2020-01-18 NOTE — PROGRESS NOTES
Patient hypertensive, SBP in 150-190. Patient having right facial twitching and not focusing when calling out her name or following.  Given 100 mcg of fentanyl for symptoms. SBP now down to 163 and patient's facial twitching subsided.

## 2020-01-19 PROBLEM — L89.90 PRESSURE ULCER: Status: ACTIVE | Noted: 2020-01-01

## 2020-01-19 NOTE — PROGRESS NOTES
Diet discussed with Dr. Willis this morning during rounds. Okay to start given medications through [facility]. OG to be clamped.  Imaging to be ordered for abdomen.

## 2020-01-19 NOTE — PROCEDURES
VIDEO ELECTROENCEPHALOGRAM REPORT        Referring provider: Dr. You.      DOS: 1/19/2020 (total recording of 23 hours and 33 minutes).      INDICATION:  Cassandra [place] 74 y.o. female presenting with seizures.      CURRENT ANTIEPILEPTIC REGIMEN: Lacosamide 200 mg q 12 hrs, Brivaracetam 100 mg q 12 hrs added. Propofol infusion stopped. Fentanyl prn for sedation. Lorazepam prn.      TECHNIQUE: 30 channel video electroencephalogram (EEG) was performed in accordance with the international 10-20 system. The study was reviewed in bipolar and referential montages. The recording examined a mildly sedated patient with intermittent arousals.      DESCRIPTION OF THE RECORD:  Mostly mildly sedated and/or asleep eeg. During the wakefulness, the background showed a symmetrical 4 Hz delta activity.  During drowsiness, theta/delta frequencies were seen.     During the sleep state, background shows diffuse high-amplitude delta activity.       ACTIVATION PROCEDURES:   Not performed.      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 There were several events of right facial twitching and chewing which correlated with muscle artifact and periodic triphasic waves and rhythmic slowing in the anterior regions. Some of these spells involved subtle twitching and movements of the right hand/arm, and recurrent focal seizures involving the left primary cortex are suggested.      EKG: sampling of the EKG recording demonstrated sinus rhythm.      EVENTS:  Right facial twitching, right arm twitching.      INTERPRETATION:  This is an abnormal 24 hrs video EEG recording in a mildly sedated, and/or encephalopathic patient. A moderate to severe, toxic / metabolic encephalopathy is suggested, which has  worsened. 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 There was resolution of the status epilepticus with Lorazepam dose. There were many, recurrent events of right facial twitching and chewing which correlated with muscle artifact and periodic triphasic waves and rhythmic slowing in the anterior regions. Some of these spells involved subtle twitching and movements of the right hand/arm, and recurrent focal seizures involving the left primary cortex are suggested.  The findings suggest cortical irritability and structural abnormality. Clinical and radiological correlation is recommended.     Updates provided to Dr. You.         Catracho Bustos MD   Epilepsy and Neurodiagnostics.   Clinical  of Neurology [facility].   Diplomate in Neurology, Epilepsy, and Electrodiagnostic Medicine.   Office: [phone]  Fax: [phone]

## 2020-01-19 NOTE — PROGRESS NOTES
Paged Dr. You and updated him on patient's continual facial twitching.  No PRNs at this time and instructed to hold off on sedating patient if possible as it is not a seizure.

## 2020-01-19 NOTE — CARE PLAN
Adult Ventilation Update    Total Vent Days: 5    Patient Lines/Drains/Airways Status      Active Airway       Name: Placement date: Placement time: Site: Days:    Airway ETT Oral 7.5  01/14/20   --   Oral  4                    In the last 24 hours, the patient tolerated SBT for 1 hour on settings of 5/8.    #FVC / Vital Capacity (liters) : 1100 (01/18/20 0800)     Rapid Shallow Breathing Index (RR/VT): 29 (01/18/20 0800)  Plateau Pressure (Q Shift): 18 (01/18/20 1016)  Static Compliance (ml / cm H2O): 27.8 (01/18/20 1016)    Patient failed trials because of Barriers to Wean: Other (Comments)(Continous EEG) (01/18/20 0615)  Barriers to SBT Weaning Trial Stopped due to:: Pt weaned for 1 hour and returned to rest settings per protocol (01/18/20 0800)  Length of Weaning Trial Length of Weaning Trial (Hours): 1 hour (01/18/20 0800)    Cough: Productive (01/18/20 1600)  Sputum Amount: Unable to Evaluate (01/18/20 1600)  Sputum Color: Unable to Evaluate (01/18/20 1600)  Sputum Consistency: Unable to Evaluate (01/18/20 1600)    Mobility  Level of Mobility: Level I (01/18/20 0800)  Activity Performed: Unable to mobilize (01/18/20 0800)  Reason Not Mobilized: Bed rest (01/18/20 0800)  Mobilization Comments: Cont. EEG (01/18/20 0800)    Events/Summary/Plan: Vent check (01/18/20 1016)

## 2020-01-19 NOTE — PROGRESS NOTES
[facility] Progress Note       HD ordered by Dr. Carcamo. Treatment started at 0732 and ended at 1032.    Net UF removed: 2000mL.     Pt tolerated treatment well, on Levo throughout treatment to keep B/P stable with good effect. See paper flow sheet for details. CVC patent, locked with Heparin 1,000 units. No s/s of infection present. Dressing in place, CDI. Report given to JONNIE Bond RN.

## 2020-01-19 NOTE — PROGRESS NOTES
Unable to administer PO BID clonazepam; strict NPO per Dr. Willis as patient has had multiple episodes of emesis. Dr. Lynn with neurology notified.    Dr. Lynn updated on patient facial twitching. Per MD, does not appear to be seizures at this time via continuous EEG, and does not necessitate PRNs.

## 2020-01-19 NOTE — PROGRESS NOTES
Spoke to nurse Patti and to Dr. You.     Due to EEG recommendation was made to start benzodiazepines to see if there is improvement, however per nursing currently she is starting to follow some commands and is starting to open her eyes. She is also NPO due to some episodes of vomiting today. Given that she is clinically improving, and her recent vomiting/NPO status I would defer benzodiazepine treatment until it can more safely walker tolerated PO. Additionally, if she continues this course of improvement then perhaps benzodiazepine trials may not be necessary at all in the morning. Will defer for now.

## 2020-01-19 NOTE — PROGRESS NOTES
Brief Neurology Note    Case discussed w Dr. JAYSON Bustos. After reviewing interval EEG, recommend adding clonazepam 0.5mg BID. Order placed.     ROEL You MD  Neurology
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
## 2020-01-19 NOTE — CARE PLAN
Problem: Communication  Goal: The ability to communicate needs accurately and effectively will improve  Outcome: PROGRESSING AS EXPECTED   Plan of care and interventions provided to family in person and by phone. Collaboration with respiratory therapist and physician.     Problem: Safety  Goal: Will remain free from injury  Outcome: PROGRESSING AS EXPECTED     Bed in low position and locked. Bed alarm on. Right wrist restraint in place for ETT protection.

## 2020-01-19 NOTE — ASSESSMENT & PLAN NOTE
Stage II coccyx -present on arrival  Continue wound care    Discuss with nutrition for possible stool thickening   Doubt like bowel edema from volume overload

## 2020-01-19 NOTE — PROGRESS NOTES
Critical Care Progress Note    Date of admission  1/16/2020    Chief Complaint  74 y.o. female admitted 1/16/2020 with status epilepticus and respiratory failure    Hospital Course    74 y.o. female who presented 1/16/2020 with a past medical history significant for end-stage renal disease on hemodialysis since November 2019 M/W/F, hypertension, hyperlipidemia and coronary artery disease.  She was recently admitted to [facility] for an extended period of time followed by placement in a SNF.  She has been very frail requiring a hoist to get her in and out of the dialysis chair with failure to thrive.  On January 14 while at the SNF patient had a witnessed first-time seizure.  She was transferred to the emergency department where she had a second seizure with prolonged postictal state.  She was intubated for airway protection and started on a propofol drip in the emergency department on January 14 at [facility].  She was admitted to the intensive care unit where she underwent an MRI of her brain showing an acute right parietal lacunar infarct with overall brain parenchymal loss.  Neurology was consulted and patient was loaded with Keppra and continued on the propofol drip.  Despite this, on spot EEGs patient continued to have evidence of focal seizure and Dilantin was started yesterday.  She underwent a lumbar puncture today with unremarkable CSF and was being treated empirically with acyclovir, ampicillin, Rocephin, vancomycin and Decadron.  Given her persistent abnormal EEGs, the neurologist at the outside hospital recommend patient be transferred to a facility that can provide continuous EEG monitoring.  For this reason she was transferred to [facility] and I was consulted for her critical care management.  She was remained on a ventilator since her admission and is receiving dialysis as needed and is being followed by Dr. English.  She is oliguric.  Per documentation there  is been some difficulty with patient's daughter when patient was at SNF and some discussion with family while at [facility] regarding goals of care.  They apparently did not want to talk about changing CODE STATUS nor transitioning to comfort care and patient remains a full code.      Interval Problem Update  Reviewed last 24 hour events:  T-max 98.8  + 273 cc  over last 24 hours, +4.3 L since admit  CXR grossly unchanged  K3.8  Mag 1.7  ABG 7.3 5/39/88/96 on 30  Continuous EEG in process    Fosphenytoin    Norepinephrine@currently positive range 2-5  Propofol@currently paused  NS@83    Last BM 1/17    Review of Systems  Review of Systems   Unable to perform ROS: Acuity of condition        Vital Signs for last 24 hours   Pulse:  [56-88] 85  Resp:  [8-33] 13  BP: ()/() 113/54  SpO2:  [92 %-100 %] 100 %    Hemodynamic parameters for last 24 hours       Respiratory Information for the last 24 hours  Zepeda Vent Mode: APVCMV  Rate (breaths/min): 14  Vt Target (mL): 320  PEEP/CPAP: 8  FiO2: 30  P MEAN: 13  Length of Weaning Trial (Hours): 1 hour  Control VTE (exp VT): 301    Physical Exam   Physical Exam  Vitals signs and nursing note reviewed.   Constitutional:       General: She is in acute distress.      Appearance: She is ill-appearing and toxic-appearing.   HENT:      Head: Normocephalic.   Eyes:      Conjunctiva/sclera: Conjunctivae normal.   Neck:      Musculoskeletal: No neck rigidity.   Cardiovascular:      Rate and Rhythm: Normal rate.      Pulses: Normal pulses.   Pulmonary:      Breath sounds: No wheezing or rales.   Abdominal:      General: There is no distension.      Palpations: Abdomen is soft.      Tenderness: There is no tenderness.   Musculoskeletal:         General: Swelling present.   Skin:     General: Skin is warm and dry.      Findings: Bruising present.   Neurological:      Comments: Baseline left-sided deficits from previous CVA  Opening eyes but not following commands    Psychiatric:      Comments: Sedate         Medications  Current Facility-Administered Medications   Medication Dose Route Frequency Provider Last Rate Last Dose   • lacosamide (VIMPAT) 200 mg in  mL ivpb  200 mg Intravenous BID Josep You M.D.   Stopped at 01/19/20 0608   • NS infusion   Intravenous Continuous Fletcher Willis M.D. 83 mL/hr at 01/18/20 2204     • famotidine (PEPCID) tablet 20 mg  20 mg Enteral Tube DAILY Fletcher Willis M.D.        Or   • famotidine (PEPCID) injection 20 mg  20 mg Intravenous DAILY Fletcher Willis M.D.   20 mg at 01/19/20 0508   • heparin injection 3,300 Units  3,300 Units Intracatheter PRN Lydia Carcamo M.D.   3,300 Units at 01/18/20 1922   • clonazePAM (KLONOPIN) tablet 0.5 mg  0.5 mg Oral BID Josep You M.D.   Stopped at 01/18/20 1915   • norepinephrine (LEVOPHED) 8 mg in  mL Infusion  0-30 mcg/min Intravenous Continuous Sergei Uriarte M.D.   Stopped at 01/19/20 0543   • aspirin (ASA) tablet 325 mg  325 mg Enteral Tube DAILY Fletcher Willis M.D.   Stopped at 01/19/20 0600   • epoetin antoinette (EPOGEN/PROCRIT) injection 4,000 Units  4,000 Units Subcutaneous MO, WE + FR Payam Cavazos M.D.   4,000 Units at 01/17/20 1405   • propofol (DIPRIVAN) injection  0-80 mcg/kg/min Intravenous Continuous Jeremy M Gonda, M.D.   Stopped at 01/19/20 0530   • Respiratory Care per Protocol   Nebulization Continuous RT Jeremy M Gonda, M.D.       • ipratropium-albuterol (DUONEB) nebulizer solution  3 mL Nebulization Q2HRS PRN (RT) Jeremy M Gonda, M.D.       • senna-docusate (PERICOLACE or SENOKOT S) 8.6-50 MG per tablet 2 Tab  2 Tab Enteral Tube BID Jeremy M Gonda, M.D.   Stopped at 01/16/20 2315    And   • polyethylene glycol/lytes (MIRALAX) PACKET 1 Packet  1 Packet Enteral Tube QDAY PRN Jeremy M Gonda, M.D.        And   • magnesium hydroxide (MILK OF MAGNESIA) suspension 30 mL  30 mL Enteral Tube QDAY PRN Jeremy M Gonda, M.D.        And   • bisacodyl (DULCOLAX)  suppository 10 mg  10 mg Rectal QDAY PRN Jeremy M Gonda, M.D.       • lidocaine (XYLOCAINE) 1 % injection 1-2 mL  1-2 mL Tracheal Tube Q30 MIN PRN Jeremy M Gonda, M.D.       • Pharmacy Consult: Enteral tube insertion - review meds/change route/product selection  1 Each Other PHARMACY TO DOSE Jeremy M Gonda, M.D.       • fentaNYL (SUBLIMAZE) injection 25 mcg  25 mcg Intravenous Q HOUR PRN Jeremy M Gonda, M.D.   25 mcg at 01/16/20 2310    Or   • fentaNYL (SUBLIMAZE) injection 50 mcg  50 mcg Intravenous Q HOUR PRN Jeremy M Gonda, M.D.   50 mcg at 01/19/20 0444    Or   • fentaNYL (SUBLIMAZE) injection 100 mcg  100 mcg Intravenous Q HOUR PRN Jeremy M Gonda, M.D.   100 mcg at 01/19/20 0251   • allopurinol (ZYLOPRIM) tablet 200 mg  200 mg Enteral Tube DAILY Rina Matta M.D.   Stopped at 01/19/20 0600   • atorvastatin (LIPITOR) tablet 10 mg  10 mg Enteral Tube DAILY Rina Matta M.D.   Stopped at 01/19/20 0600   • fenofibrate micronized (LOFIBRA) capsule 134 mg  134 mg Enteral Tube DAILY Rina Matta M.D.   Stopped at 01/19/20 0600   • isosorbide dinitrate (ISORDIL) tablet 10 mg  10 mg Enteral Tube TID Rina Matta M.D.   Stopped at 01/18/20 2315   • labetalol (NORMODYNE) tablet 300 mg  300 mg Enteral Tube BID Rina Matta M.D.   Stopped at 01/18/20 2315   • ondansetron (ZOFRAN) syringe/vial injection 4 mg  4 mg Intravenous Q4HRS PRN Rina Matta M.D.   4 mg at 01/18/20 1214   • heparin injection 5,000 Units  5,000 Units Subcutaneous Q8HRS Rina Matta M.D.   5,000 Units at 01/19/20 0506   • labetalol (NORMODYNE/TRANDATE) injection 10 mg  10 mg Intravenous Q4HRS PRN Levente Levai, M.D.       • insulin regular (HUMULIN R) injection 2-9 Units  2-9 Units Subcutaneous Q6HRS Rina Matta M.D.   Stopped at 01/17/20 1800    And   • glucose 4 g chewable tablet 16 g  16 g Oral Q15 MIN PRN Rina Matta M.D.        And   • DEXTROSE 10% BOLUS 250 mL  250 mL Intravenous Q15 MIN PRN Rina Matta M.D.   250 mL at  01/19/20 0024   • acetaminophen (TYLENOL) tablet 650 mg  650 mg Enteral Tube Q6HRS PRN Jeremy M Gonda, M.D.       • ondansetron (ZOFRAN ODT) dispertab 4 mg  4 mg Enteral Tube Q4HRS PRN Jeremy M Gonda, M.D.           Fluids    Intake/Output Summary (Last 24 hours) at 1/19/2020 0720  Last data filed at 1/19/2020 0600  Gross per 24 hour   Intake 3160.84 ml   Output 2887 ml   Net 273.84 ml       Laboratory  Recent Labs     01/17/20  0425 01/18/20  0522 01/19/20  0535   ISTATAPH 7.417 7.347* 7.359*   ISTATAPCO2 35.0 39.4* 39.8*   ISTATAPO2 115* 103* 88*   ISTATATCO2 24 23 24   ABXEMNS4QWJ 99 98 96   ISTATARTHCO3 22.5 21.6 22.5   ISTATARTBE -2 -4 -3   ISTATTEMP see below 96.4 F 98.4 F   ISTATFIO2 30 30 30   ISTATSPEC Arterial Arterial Arterial   ISTATAPHTC  --  7.365* 7.361*   [id number]  --  96* 87         Recent Labs     01/17/20 0445 01/18/20 0453 01/19/20  0237   SODIUM 130* 135 136   POTASSIUM 4.6 5.1 3.8   CHLORIDE 100 106 102   CO2 20 22 22   BUN 17 23* 13   CREATININE 1.28 1.56* 1.10   MAGNESIUM 1.9 1.8 1.7   PHOSPHORUS 3.8 4.0 2.8   CALCIUM 7.3* 7.4* 7.3*     Recent Labs     01/17/20  0028 01/17/20 0445 01/18/20  0453 01/19/20  0237   PREALBUMIN 8.0*  --   --   --    GLUCOSE 151* 181* 95 103*     Recent Labs     01/17/20  0445 01/18/20  0453 01/19/20  0237   WBC 8.5 11.8* 10.5   NEUTSPOLYS 65.90 64.40 77.40*   LYMPHOCYTES 25.30 23.80 13.00*   MONOCYTES 8.20 10.00 7.00   EOSINOPHILS 0.00 1.10 1.70   BASOPHILS 0.10 0.30 0.90     Recent Labs     01/17/20  0445 01/18/20  0453 01/19/20  0237   RBC 2.75* 2.92* 3.00*   HEMOGLOBIN 9.0* 9.7* 9.8*   HEMATOCRIT 28.7* 31.1* 32.1*   PLATELETCT 119* 144* 158*       Imaging  X-Ray:  I have personally reviewed the images and compared with prior images.    Assessment/Plan  * Seizure (HCC)  Assessment & Plan  New onset, unclear etiology  Neurology following  Right ear managed by neurology  Ongoing continuous EEG  Follow-up CSF from OSH  MRI reviewed    Cerebrovascular accident  (CVA) due to embolism of right middle cerebral artery (HCC)  Assessment & Plan  Continue high intensity statin, aspirin  PT/OT eval  Neurology following  Echocardiogram reviewed    End stage renal failure on dialysis ([facility])  Assessment & Plan  HD per nephrology  Renally dose medications  Monitor electrolytes  Monitor urine output    Acute respiratory failure with hypoxia ([facility])  Assessment & Plan  Intubated in emergency department 1/14 at outside hospital  Continue full mechanical ventilatory support  I am adjusting ventilator based on ABG and pulmonary mechanics  RT/O2 protocols  Maintain euvolemic balance  Daily SBT's when clinically appropriate    Severe protein-calorie malnutrition ([facility])  Assessment & Plan  Restart tube feeds   Dietary following    Normocytic anemia- (present on admission)  Assessment & Plan  Daily CBC with conservative transfusion strategy, monitor for bleeding    Hypertension  Assessment & Plan  scheduled hydralazine, Isordil, labetalol  PRN IV labetalol, hydralazine for goal SBP less than 160    DM (diabetes mellitus) ([facility])- (present on admission)  Assessment & Plan  Insulin sliding scale  Hypoglycemia protocol  FS BS    Pressure ulcer  Assessment & Plan  Stage II coccyx -present on arrival  Continue wound care    Hypomagnesemia  Assessment & Plan  Replace keep greater than 2    Dysphagia as late effect of cerebrovascular accident (CVA)- (present on admission)  Assessment & Plan  Keep n.p.o. once extubated pending SLP eval    Gout- (present on admission)  Assessment & Plan  Continue allopurinol    Obesity (BMI 30-39.9)- (present on admission)  Assessment & Plan  Nutritionist consultation  Encourage behavioral and dietary modification once extubated for weight loss       VTE:  Heparin  Ulcer: H2 Antagonist  Lines: Central Line  Ongoing indication addressed and River Catheter  Ongoing indication addressed    I have performed a physical exam and reviewed and updated ROS and Plan today (1/19/2020).  In review of yesterday's note (1/18/2020), there are no changes except as documented above.     Discussed patient condition and risk of morbidity and/or mortality with RN, RT, Pharmacy and nephrology and neurology  The patient remains critically ill.  Critical care time = 32 minutes in directly providing and coordinating critical care and extensive data review.  No time overlap and excludes procedures.

## 2020-01-19 NOTE — CARE PLAN
Problem: Safety  Goal: Will remain free from injury  Outcome: PROGRESSING AS EXPECTED     Problem: Venous Thromboembolism (VTW)/Deep Vein Thrombosis (DVT) Prevention:  Goal: Patient will participate in Venous Thrombosis (VTE)/Deep Vein Thrombosis (DVT)Prevention Measures  Outcome: PROGRESSING AS EXPECTED     Problem: Bowel/Gastric:  Goal: Normal bowel function is maintained or improved  Outcome: PROGRESSING SLOWER THAN EXPECTED

## 2020-01-19 NOTE — PROGRESS NOTES
Hd treatment ordered by Dr Carcamo started at 1621 and ended at 1922 with net uf of 2000 ml as bp tolerated. Blood pressure has been labile, pressor was given as needed for bp support. Right IJ CVC patent with good BFR x 2. See flow sheet for details.

## 2020-01-19 NOTE — PROGRESS NOTES
Neurology Progress Note  Neurohospitalist Service, [facility]    Referring Physician: Jeremy M Gonda, M.D.    Chief complaint: Seizures    HPI: Refer to initial documented Neurology H&P, as detailed in the patient's chart.    Interval History 1/19/20: Patient was unable to receive the clonazepam as prescribed yesterday evening due to episodes of vomiting and n.p.o. status.  Remains intubated this morning and has been receiving sedation.  Unable to obtain subjective history.  Family sleeping at bedside.    Review of systems: In addition to what is detailed in the HPI and/or updated in the interval history, all other systems reviewed and are negative.    Past Medical History:    has a past medical history of Arthritis, Chronic diastolic heart failure ([facility]) (6/1/2016), Clostridium difficile diarrhea (6/2/2016), CVA (cerebral vascular accident) ([facility]), DM (diabetes mellitus) type II uncontrolled with renal manifestation (4/26/2014), GERD (gastroesophageal reflux disease), GIB (gastrointestinal bleeding) (6/13/2016), GOUT, Hypertension, ICH (intracerebral hemorrhage) ([facility]) (4/6/2016), Indigestion, Kidney failure, RALF (obstructive sleep apnea), RALF (obstructive sleep apnea), Other and unspecified angina pectoris, Seizure ([facility]) (1/16/2020), Unspecified cataract, and UTI (urinary tract infection) (6/1/2016). She also has no past medical history of Anesthesia, Arrhythmia, Back pain, Bronchitis, Cancer ([facility]), Cold, Congestive heart failure ([facility]), COPD (chronic obstructive pulmonary disease) ([facility]), Dental disorder, Dialysis patient ([facility]), Fall, Glaucoma, Heart valve disease, Hepatitis A, Hepatitis B, Hepatitis C, Jaundice, Myocardial infarct ([facility]), Other emphysema ([facility]), Other specified symptom associated with female genital organs, Pacemaker, Personal history of venous thrombosis and embolism, Psychiatric problem, Rheumatic fever, Unspecified asthma(493.90), Unspecified hemorrhagic conditions, or  Unspecified urinary incontinence.    FHx:  family history includes Cancer in her father; Diabetes in her father and mother.    SHx:   reports that she has never smoked. She has never used smokeless tobacco. She reports that she does not drink alcohol or use drugs.    Medications:    Current Facility-Administered Medications:   •  brivaracetam (BRIVIACT) injection 100 mg, 100 mg, Intravenous, BID, Josep You M.D.  •  brivaracetam (BRIVIACT) injection 200 mg, 200 mg, Intravenous, Once, Josep You M.D.  •  lacosamide (VIMPAT) 200 mg in  mL ivpb, 200 mg, Intravenous, BID, Josep You M.D., Stopped at 01/19/20 0608  •  NS infusion, , Intravenous, Continuous, Fletcher Willis M.D., Last Rate: 83 mL/hr at 01/18/20 2204  •  famotidine (PEPCID) tablet 20 mg, 20 mg, Enteral Tube, DAILY **OR** famotidine (PEPCID) injection 20 mg, 20 mg, Intravenous, DAILY, Fletcher Willis M.D., 20 mg at 01/19/20 0508  •  heparin injection 3,300 Units, 3,300 Units, Intracatheter, PRN, Lydia Carcamo M.D., 3,300 Units at 01/18/20 1922  •  norepinephrine (LEVOPHED) 8 mg in  mL Infusion, 0-30 mcg/min, Intravenous, Continuous, Sergei Uriarte M.D., Last Rate: 9.4 mL/hr at 01/19/20 0740, 5 mcg/min at 01/19/20 0740  •  aspirin (ASA) tablet 325 mg, 325 mg, Enteral Tube, DAILY, Fletcher Willis M.D., Stopped at 01/19/20 0600  •  epoetin antoinette (EPOGEN/PROCRIT) injection 4,000 Units, 4,000 Units, Subcutaneous, MO, WE + FR, Payam Cavazos M.D., 4,000 Units at 01/17/20 1405  •  propofol (DIPRIVAN) injection, 0-80 mcg/kg/min, Intravenous, Continuous, Stopped at 01/19/20 0530 **AND** Triglycerides Starting now and then Every 3 Days, , , Every 3 Days (0300), Jeremy M Gonda, M.D.  •  Respiratory Care per Protocol, , Nebulization, Continuous RT, Jeremy M Gonda, M.D.  •  ipratropium-albuterol (DUONEB) nebulizer solution, 3 mL, Nebulization, Q2HRS PRN (RT), Jeremy M Gonda, M.D.  •  senna-docusate (PERICOLACE or SENOKOT S) 8.6-50  MG per tablet 2 Tab, 2 Tab, Enteral Tube, BID, Stopped at 01/16/20 2315 **AND** polyethylene glycol/lytes (MIRALAX) PACKET 1 Packet, 1 Packet, Enteral Tube, QDAY PRN **AND** magnesium hydroxide (MILK OF MAGNESIA) suspension 30 mL, 30 mL, Enteral Tube, QDAY PRN **AND** bisacodyl (DULCOLAX) suppository 10 mg, 10 mg, Rectal, QDAY PRN, Jeremy M Gonda, M.D.  •  lidocaine (XYLOCAINE) 1 % injection 1-2 mL, 1-2 mL, Tracheal Tube, Q30 MIN PRN, Jeremy M Gonda, M.D.  •  Pharmacy Consult: Enteral tube insertion - review meds/change route/product selection, 1 Each, Other, PHARMACY TO DOSE, Jeremy M Gonda, M.D.  •  fentaNYL (SUBLIMAZE) injection 25 mcg, 25 mcg, Intravenous, Q HOUR PRN, 25 mcg at 01/16/20 2310 **OR** fentaNYL (SUBLIMAZE) injection 50 mcg, 50 mcg, Intravenous, Q HOUR PRN, 50 mcg at 01/19/20 0444 **OR** fentaNYL (SUBLIMAZE) injection 100 mcg, 100 mcg, Intravenous, Q HOUR PRN, Jeremy M Gonda, M.D., 100 mcg at 01/19/20 0251  •  allopurinol (ZYLOPRIM) tablet 200 mg, 200 mg, Enteral Tube, DAILY, Rina Matta M.D., Stopped at 01/19/20 0600  •  atorvastatin (LIPITOR) tablet 10 mg, 10 mg, Enteral Tube, DAILY, Rina Matta M.D., Stopped at 01/19/20 0600  •  fenofibrate micronized (LOFIBRA) capsule 134 mg, 134 mg, Enteral Tube, DAILY, Rina Matta M.D., Stopped at 01/19/20 0600  •  isosorbide dinitrate (ISORDIL) tablet 10 mg, 10 mg, Enteral Tube, TID, Rina Matta M.D., Stopped at 01/18/20 2315  •  labetalol (NORMODYNE) tablet 300 mg, 300 mg, Enteral Tube, BID, Rina Matta M.D., Stopped at 01/18/20 2315  •  ondansetron (ZOFRAN) syringe/vial injection 4 mg, 4 mg, Intravenous, Q4HRS PRN, Rina Matta M.D., 4 mg at 01/18/20 1214  •  heparin injection 5,000 Units, 5,000 Units, Subcutaneous, Q8HRS, Rina Matta M.D., 5,000 Units at 01/19/20 0506  •  labetalol (NORMODYNE/TRANDATE) injection 10 mg, 10 mg, Intravenous, Q4HRS PRN, Rina Matta M.D.  •  insulin regular (HUMULIN R) injection 2-9 Units, 2-9 Units,  Subcutaneous, Q6HRS, Stopped at 01/17/20 1800 **AND** Accu-Chek Q6 if NPO, , , Q6H **AND** NOTIFY MD and PharmD, , , Once **AND** glucose 4 g chewable tablet 16 g, 16 g, Oral, Q15 MIN PRN **AND** DEXTROSE 10% BOLUS 250 mL, 250 mL, Intravenous, Q15 MIN PRN, Rina Matta M.D., 250 mL at 01/19/20 0024  •  acetaminophen (TYLENOL) tablet 650 mg, 650 mg, Enteral Tube, Q6HRS PRN, Jeremy M Gonda, M.D.  •  ondansetron (ZOFRAN ODT) dispertab 4 mg, 4 mg, Enteral Tube, Q4HRS PRN, Jeremy M Gonda, M.D.    Physical Examination:     Vitals:    01/19/20 0700 01/19/20 0715 01/19/20 0730 01/19/20 0745   BP: 113/54 115/56 109/55 110/63   Pulse: 85      Resp: 13  14    Temp:       TempSrc:       SpO2: 100%      Weight:       Height:           General: Patient is intubated in the ICU  Eyes: examination of optic disks not indicated at this time  CV: RRR    NEUROLOGICAL EXAM:     Mental status:  Eyes open, does not follow commands  Speech and language: Intubated.  Cranial nerve exam: Pupils are  reactive bilaterally.  Does not blink to threat bilaterally.    Tracks to the right.  Corneal reflex intact bilaterally, VOR intact. Face is notable for mild facial droop on the left with associated hemifacial twitching.  Cough and gag reflex is present   motor exam: Does not participate in formal strength testing, tone is spastic on the left, and in a flexion position, flaccid on the right.  No abnormal movements were seen on exam.  Sensory exam: Withdrawal to noxious stim right arm, no response to noxious stimulus in the left  Deep tendon reflexes:  1+ and symmetric. Toes upgoing on the right and mute on the left  Coordination: Non-participatory  Gait: deferred due to intubation and ICU status    Objective Data:    Labs:  Lab Results   Component Value Date/Time    PROTHROMBTM 14.4 (H) 11/17/2019 10:40 AM    INR 1.38 11/17/2019 10:40 AM      Lab Results   Component Value Date/Time    WBC 10.5 01/19/2020 02:37 AM    RBC 3.00 (L) 01/19/2020 02:37  AM    HEMOGLOBIN 9.8 (L) 01/19/2020 02:37 AM    HEMATOCRIT 32.1 (L) 01/19/2020 02:37 AM    .0 (H) 01/19/2020 02:37 AM    MCH 32.7 01/19/2020 02:37 AM    MCHC 30.5 (L) 01/19/2020 02:37 AM    MPV 9.7 01/19/2020 02:37 AM    NEUTSPOLYS 77.40 (H) 01/19/2020 02:37 AM    LYMPHOCYTES 13.00 (L) 01/19/2020 02:37 AM    MONOCYTES 7.00 01/19/2020 02:37 AM    EOSINOPHILS 1.70 01/19/2020 02:37 AM    BASOPHILS 0.90 01/19/2020 02:37 AM    ANISOCYTOSIS 3+ 01/19/2020 02:37 AM      Lab Results   Component Value Date/Time    SODIUM 136 01/19/2020 02:37 AM    POTASSIUM 3.8 01/19/2020 02:37 AM    CHLORIDE 102 01/19/2020 02:37 AM    CO2 22 01/19/2020 02:37 AM    GLUCOSE 103 (H) 01/19/2020 02:37 AM    BUN 13 01/19/2020 02:37 AM    CREATININE 1.10 01/19/2020 02:37 AM      Lab Results   Component Value Date/Time    CHOLSTRLTOT 79 02/07/2019 01:40 PM    LDL 34 02/07/2019 01:40 PM    HDL 23 (L) 02/07/2019 01:40 PM    TRIGLYCERIDE 124 01/18/2020 04:53 AM       Lab Results   Component Value Date/Time    ALKPHOSPHAT 23 (L) 11/21/2019 05:35 AM    ASTSGOT 33 11/21/2019 05:35 AM    ALTSGPT 10 (L) 11/21/2019 05:35 AM    TBILIRUBIN 1.2 11/21/2019 05:35 AM        Imaging/Testing:    I interpreted and/or reviewed the patient's neuroimaging    DX-CHEST-PORTABLE (1 VIEW)   Final Result         1. Lines and tubes as above.   2. Stable retrocardiac atelectasis versus consolidation. Suspected trace left pleural effusion.         MR-BRAIN-WITH & W/O   Final Result      1.  Moderate cerebral atrophy.   2.  Evidence of intraventricular hemorrhage, indeterminate age. Possibly chronic intraventricular hemosiderin deposition.   3.  Extensive encephalomalacic change with hemosiderin deposition involving the right corona radiata, basal ganglia, posterior thalamus, subthalamic region, bordering the right cerebral peduncle. Associated ex vacuo dilatation of the body of the right    lateral ventricle. No change.   4.  Additional foci of old microhemorrhage most  consistent with old hypertensive microhemorrhage or amyloid angiopathy in the left basal ganglia, left thalamus, and midline upper ventral abel.   5.  Punctate focus of acute infarction in the right parietal deep white matter. No change.   6.  Advanced supratentorial white matter disease most consistent with microvascular ischemic change.   7.  Encephalomalacic changes in the abel and right cerebral peduncle consistent with old infarction.   8.  Partially empty sella.   9.  Overall, no new findings and no significant change from 1/14/2020.      DX-CHEST-PORTABLE (1 VIEW)   Final Result         1. Stable lines and tubes.   2. Unchanged retrocardiac atelectasis versus consolidation.   3. Stable trace left pleural effusion.         OUTSIDE IMAGES-DX CHEST   Final Result      OUTSIDE IMAGES-US VASCULAR   Final Result      OUTSIDE IMAGES-MR BRAIN   Final Result      OUTSIDE IMAGES-DX CHEST   Final Result      OUTSIDE IMAGES-CT HEAD   Final Result      EC-ECHOCARDIOGRAM COMPLETE W/O CONT   Final Result      DX-CHEST-FOR LINE PLACEMENT Perform procedure in: Patient's Room   Final Result         1.  Retrocardiac opacity concerning for infiltrate, stable.   2.  Trace left pleural effusion, stable   3.  Cardiomegaly   4.  Atherosclerosis   5.  Perihilar interstitial prominence and bronchial wall cuffing, appearance suggests changes of underlying bronchial inflammation, consider bronchitis.      DX-ABDOMEN FOR TUBE PLACEMENT   Final Result         1.  Nonspecific bowel gas pattern.   2.  Dobbhoff tube tip overlying the expected location of the pylorus or first duodenal segment.   3.  Left lung base atelectasis and/or small effusion      DX-CHEST-PORTABLE (1 VIEW)   Final Result         1.  Retrocardiac opacity concerning for infiltrate.   2.  Trace left pleural effusion, stable   3.  Cardiomegaly   4.  Atherosclerosis      CW-[id number] FILM X-RAY   Final Result      OUTSIDE IMAGES-DX CHEST   Final Result           Assessment and Plan:    Cassandra Guzmán is a 74 y.o. woman with history of CHF, R parietal stroke with residual left-sided weakness, DM2, GERD, ESRD on dialysis, GERD, history of GI bleed, HTN, and hx of ICH presenting as a transfer for seizures.  MRI brain imaging revealing several, bilateral, evidence of both prior infarct and intracerebral hemorrhages.  Low suspicion that the twitching in the right face is epileptic in nature, and may be more consistent with hemifacial spasm.     Plan:    1. seizures   -Continue video EEG monitoring  -Discontinue all sedation and extubate  -Continue Vimpat 200 mg twice daily  -Load with Briviact 200mg IV x1; discussed with pharmacy  -Continue Briviact 100mg BID to start 12hr post loading dose  -Seizure precautions    2. Prior stroke  - antiplatelet therapy is indicated as the benefit of preventing additional infarcts outweighs the risk of hemorrhage given burden as seen on GRE; microhemorrhages likely secondary to vascular risk factors and hypertension most specifically  - statin qhs per SPARCL Trial  - strict glucose control    The evaluation of the patient, and recommended management, was discussed with the resident staff. I have performed a physical exam and reviewed and updated ROS and Plan today (1/19/2020). In review of yesterday's note (1/18/2020), there are no changes except as documented above.    Josep You MD  Director, Comprehensive Stroke Center, [facility]  Neurohospitalist, [facility]  Clinical  of Neurology, [facility] [employer]  (t) [phone] (f) [phone]

## 2020-01-20 PROBLEM — G40.901 STATUS EPILEPTICUS (HCC): Status: ACTIVE | Noted: 2020-01-01

## 2020-01-20 NOTE — PROCEDURES
VIDEO ELECTROENCEPHALOGRAM REPORT        Referring provider: Dr. You.      DOS: 1/20/2020 (total recording of 23 hours and 20 minutes).      INDICATION:  Cassandra [place] 74 y.o. female presenting with seizures.      CURRENT ANTIEPILEPTIC REGIMEN: Lacosamide 200 mg q 12 hrs. Clonazepam 0.5 mg bid, and Topiramate 100 mg bid added, then topiramate increased to 200 mg bid. Brivaracetam stopped. Propofol infusion added. Lorazepam prn.      TECHNIQUE: 30 channel video electroencephalogram (EEG) was performed in accordance with the international 10-20 system. The study was reviewed in bipolar and referential montages. The recording examined an encephalopathic and/or sedated patient.       DESCRIPTION OF THE RECORD:  Generalized 2-4 hz delta activity. Continuous triphasic waves and frontal blunted sharps, throughout the study, frequently becoming either rhythmic or periodic, to suggest recurrent non convulsive seizures and runs of electrographic status epilepticus. Several episodes of facial twitching, sometimes with right arm jerks were captured and correlated on eeg with rhythmic slowing or periodic discharges as above, and are believed to be epileptic in nature. There was a modest improvement with addition of Propofol.      ACTIVATION PROCEDURES:   Not performed.      EKG: sampling of the EKG recording demonstrated sinus rhythm.      EVENTS:  Right facial twitching, right arm twitching.      INTERPRETATION:  This is an abnormal 24 hrs video EEG recording in a sedated, and/or encephalopathic patient. A moderate to severe, toxic / metabolic encephalopathy is suggested, which has worsened. Continuous triphasic waves and frontal blunted sharps, throughout the study, frequently becoming either rhythmic or periodic, to suggest recurrent non convulsive seizures and runs of electrographic status epilepticus. Several episodes of facial twitching, sometimes with right arm jerks were captured and correlated on eeg with  rhythmic slowing or periodic discharges as above, and are believed to be epileptic in nature. There was a modest improvement with addition of Propofol. The findings suggest cortical irritability and structural abnormality. Clinical and radiological correlation is recommended.     Updates provided to Neurology and ICU teams.         Catracho Bustos MD   Epilepsy and Neurodiagnostics.   Clinical  of Neurology [facility].   Diplomate in Neurology, Epilepsy, and Electrodiagnostic Medicine.   Office: [phone]  Fax: [phone]

## 2020-01-20 NOTE — CARE PLAN
Problem: Infection  Goal: Will remain free from infection  Outcome: PROGRESSING AS EXPECTED  Intervention: Implement standard precautions and perform hand washing before and after patient contact  Note:   Hand hygiene performed before and after contact with patient     Problem: Fluid Volume:  Goal: Will maintain balanced intake and output  Outcome: PROGRESSING AS EXPECTED  Intervention: Monitor, educate, and encourage compliance with therapeutic intake of liquids  Note:   Intake and output accurately documented
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
## 2020-01-20 NOTE — CARE PLAN
Problem: Safety  Goal: Will remain free from injury  Outcome: PROGRESSING AS EXPECTED     Problem: Venous Thromboembolism (VTW)/Deep Vein Thrombosis (DVT) Prevention:  Goal: Patient will participate in Venous Thrombosis (VTE)/Deep Vein Thrombosis (DVT)Prevention Measures  Outcome: PROGRESSING AS EXPECTED     Problem: Bowel/Gastric:  Goal: Normal bowel function is maintained or improved  Outcome: PROGRESSING AS EXPECTED

## 2020-01-20 NOTE — PROGRESS NOTES
Critical Care Progress Note    Date of admission  1/16/2020    Chief Complaint  status epilepticus and respiratory failure    Hospital Course    74 y.o. female who presented 1/16/2020 with PMH significant for ESRD on IHD since November 2019 M/W/F, HTN, HPL, CAD, admitted for first time witness seizure at SNF, transferred from [facility]. Had a second seizure with prolonged postictal state. Intubated. MRI brain with an acute right parietal lacunar infarct with overall brain parenchymal loss. Loaded with Keppra dilantin. LP was done with no pleocytosis. Was empirically treated with antibiotics. Code status FULL CODE.      Interval Problem Update  Reviewed last 24 hour events:  Remains on continuous EEG  Not following, withdrawing from right upper and bilateral lower.   On fentanyl gtt at 50mcg/hr.   Off propofol  Ativan 1mg IV x1 due to twitching in eye and arm.   Off fosphenytoin 1/19. Briviact was started 100BID 1/19  On Vimpat    Afebrile, Tm 37.5C  SBP in 120-130s, HR in 70s  Off norepinephrine yesterday am.   Remains on ventilatory support with FiO2 30%, PEEP 8. TV 320cc  Vent day #5. Was on vent x 2 days at OSH.   Had SBT today.   Minimal secretions. Weak cough.     Getting IHD 2 out of 3 after gadolinium exposure  WBC 8.5  Hgb 8.7, plt 145  BG 50-120s. 53 this am  Started D10 gtt at 50cc/hr.   Restarted tube feed and tolerating.     On aspirin 81  Heparin SQ  Statin    Locasamide  Clonidine  Topamax.    Review of Systems  Review of Systems   Unable to perform ROS: Acuity of condition        Vital Signs for last 24 hours   Pulse:  [69-97] 78  Resp:  [9-27] 19  BP: ()/(48-84) 133/62  SpO2:  [99 %-100 %] 100 %    Hemodynamic parameters for last 24 hours       Respiratory Information for the last 24 hours  Zepeda Vent Mode: APVCMV  Rate (breaths/min): 14  Vt Target (mL): 320  PEEP/CPAP: 8  FiO2: 30  P MEAN: 10  Control VTE (exp VT): 339    Physical Exam   Physical Exam  Vitals signs and nursing note  reviewed.   Constitutional:       General: She is not in acute distress.     Appearance: She is ill-appearing. She is not toxic-appearing.      Comments: Intubated, not awake to verbal  Son at bedside   HENT:      Head: Normocephalic.      Mouth/Throat:      Comments: Et tube in place  Eyes:      Conjunctiva/sclera: Conjunctivae normal.   Neck:      Musculoskeletal: No neck rigidity.   Cardiovascular:      Rate and Rhythm: Normal rate and regular rhythm.      Pulses: Normal pulses.      Heart sounds: No murmur.   Pulmonary:      Breath sounds: No wheezing or rales.   Abdominal:      General: There is no distension.      Palpations: Abdomen is soft.      Tenderness: There is no tenderness. There is no guarding.      Comments: Hypoactive bowel sound   Musculoskeletal:         General: No swelling.      Right lower leg: No edema.      Left lower leg: No edema.   Skin:     General: Skin is warm and dry.      Capillary Refill: Capillary refill takes 2 to 3 seconds.      Findings: No bruising.   Neurological:      Comments: Baseline left-sided deficits from previous CVA  Opening eyes but not following commands   Psychiatric:      Comments: Sedate         Medications  Current Facility-Administered Medications   Medication Dose Route Frequency Provider Last Rate Last Dose   • dextrose 10% infusion   Intravenous Continuous Sergei Uriarte M.D. 50 mL/hr at 01/20/20 0147     • brivaracetam (BRIVIACT) injection 100 mg  100 mg Intravenous BID Josep You M.D.   100 mg at 01/19/20 2041   • fentaNYL (SUBLIMAZE) 50 mcg/mL in 50mL (Continuous Infusion)   Intravenous Continuous Fletcher Willis M.D. 1 mL/hr at 01/19/20 1903 50 mcg/hr at 01/19/20 1903   • aspirin (ASA) chewable tab 81 mg  81 mg Enteral Tube DAILY Josep You M.D.   81 mg at 01/19/20 1100   • atorvastatin (LIPITOR) tablet 40 mg  40 mg Enteral Tube DAILY Josep You M.D.       • lacosamide (VIMPAT) 200 mg in  mL ivpb  200 mg Intravenous BID Josep  SELENA You M.D.   Stopped at 01/19/20 1915   • NS infusion   Intravenous Continuous Fletcher Willis M.D. 83 mL/hr at 01/19/20 2138     • famotidine (PEPCID) tablet 20 mg  20 mg Enteral Tube DAILY Fletcher Willis M.D.        Or   • famotidine (PEPCID) injection 20 mg  20 mg Intravenous DAILY Fletcher Willis M.D.   20 mg at 01/19/20 0508   • heparin injection 3,300 Units  3,300 Units Intracatheter PRN Lydia Carcamo M.D.   3,300 Units at 01/19/20 1032   • norepinephrine (LEVOPHED) 8 mg in  mL Infusion  0-30 mcg/min Intravenous Continuous Sergei Uriarte M.D.   Stopped at 01/19/20 1146   • epoetin antoinette (EPOGEN/PROCRIT) injection 4,000 Units  4,000 Units Subcutaneous MO, WE + FR Payam Cavazos M.D.   4,000 Units at 01/17/20 1405   • propofol (DIPRIVAN) injection  0-80 mcg/kg/min Intravenous Continuous Jeremy M Gonda, M.D.   Stopped at 01/19/20 0530   • Respiratory Care per Protocol   Nebulization Continuous RT Jeremy M Gonda, M.D.       • ipratropium-albuterol (DUONEB) nebulizer solution  3 mL Nebulization Q2HRS PRN (RT) Jeremy M Gonda, M.D.       • senna-docusate (PERICOLACE or SENOKOT S) 8.6-50 MG per tablet 2 Tab  2 Tab Enteral Tube BID Jeremy M Gonda, M.D.   2 Tab at 01/19/20 1726    And   • polyethylene glycol/lytes (MIRALAX) PACKET 1 Packet  1 Packet Enteral Tube QDAY PRN Jeremy M Gonda, M.D.        And   • magnesium hydroxide (MILK OF MAGNESIA) suspension 30 mL  30 mL Enteral Tube QDAY PRN Jeremy M Gonda, M.D.        And   • bisacodyl (DULCOLAX) suppository 10 mg  10 mg Rectal QDAY PRN Jeremy M Gonda, M.D.       • lidocaine (XYLOCAINE) 1 % injection 1-2 mL  1-2 mL Tracheal Tube Q30 MIN PRN Jeremy M Gonda, M.D.       • Pharmacy Consult: Enteral tube insertion - review meds/change route/product selection  1 Each Other PHARMACY TO DOSE Jeremy M Gonda, M.D.       • fentaNYL (SUBLIMAZE) injection 25 mcg  25 mcg Intravenous Q HOUR PRN Jeremy M Gonda, M.D.   25 mcg at 01/16/20 2310    Or   • fentaNYL  (SUBLIMAZE) injection 50 mcg  50 mcg Intravenous Q HOUR PRN Jeremy M Gonda, M.D.   50 mcg at 01/19/20 0444    Or   • fentaNYL (SUBLIMAZE) injection 100 mcg  100 mcg Intravenous Q HOUR PRN Jeremy M Gonda, M.D.   100 mcg at 01/19/20 1436   • allopurinol (ZYLOPRIM) tablet 200 mg  200 mg Enteral Tube DAILY Rina Matta M.D.   Stopped at 01/19/20 0600   • fenofibrate micronized (LOFIBRA) capsule 134 mg  134 mg Enteral Tube DAILY Rina Matta M.D.   Stopped at 01/19/20 0600   • ondansetron (ZOFRAN) syringe/vial injection 4 mg  4 mg Intravenous Q4HRS PRN Rina Matta M.D.   4 mg at 01/18/20 1214   • heparin injection 5,000 Units  5,000 Units Subcutaneous Q8HRS Rina Matta M.D.   5,000 Units at 01/19/20 2103   • labetalol (NORMODYNE/TRANDATE) injection 10 mg  10 mg Intravenous Q4HRS PRN Rina Matta M.D.       • insulin regular (HUMULIN R) injection 2-9 Units  2-9 Units Subcutaneous Q6HRS Rina Matta M.D.   Stopped at 01/17/20 1800    And   • DEXTROSE 10% BOLUS 250 mL  250 mL Intravenous Q15 MIN PRN Rina Matta M.D.   250 mL at 01/20/20 0026   • acetaminophen (TYLENOL) tablet 650 mg  650 mg Enteral Tube Q6HRS PRN Jeremy M Gonda, M.D.       • ondansetron (ZOFRAN ODT) dispertab 4 mg  4 mg Enteral Tube Q4HRS PRN Jeremy M Gonda, M.D.           Fluids    Intake/Output Summary (Last 24 hours) at 1/20/2020 0347  Last data filed at 1/20/2020 0200  Gross per 24 hour   Intake 3118.28 ml   Output 2747 ml   Net 371.28 ml       Laboratory  Recent Labs     01/17/20  0425 01/18/20  0522 01/19/20  0535   ISTATAPH 7.417 7.347* 7.359*   ISTATAPCO2 35.0 39.4* 39.8*   ISTATAPO2 115* 103* 88*   ISTATATCO2 24 23 24   AMYVUUN3JJD 99 98 96   ISTATARTHCO3 22.5 21.6 22.5   ISTATARTBE -2 -4 -3   ISTATTEMP see below 96.4 F 98.4 F   ISTATFIO2 30 30 30   ISTATSPEC Arterial Arterial Arterial   ISTATAPHTC  --  7.365* 7.361*   [id number]  --  96* 87         Recent Labs     01/17/20  0445 01/18/20  0453 01/19/20  0237   SODIUM 130* 135 136    POTASSIUM 4.6 5.1 3.8   CHLORIDE 100 106 102   CO2 20 22 22   BUN 17 23* 13   CREATININE 1.28 1.56* 1.10   MAGNESIUM 1.9 1.8 1.7   PHOSPHORUS 3.8 4.0 2.8   CALCIUM 7.3* 7.4* 7.3*     Recent Labs     01/17/20 0445 01/18/20  0453 01/19/20  0237   GLUCOSE 181* 95 103*     Recent Labs     01/17/20 0445 01/18/20  0453 01/19/20  0237   WBC 8.5 11.8* 10.5   NEUTSPOLYS 65.90 64.40 77.40*   LYMPHOCYTES 25.30 23.80 13.00*   MONOCYTES 8.20 10.00 7.00   EOSINOPHILS 0.00 1.10 1.70   BASOPHILS 0.10 0.30 0.90     Recent Labs     01/17/20 0445 01/18/20 0453 01/19/20  0237   RBC 2.75* 2.92* 3.00*   HEMOGLOBIN 9.0* 9.7* 9.8*   HEMATOCRIT 28.7* 31.1* 32.1*   PLATELETCT 119* 144* 158*       Imaging  X-Ray:  I have personally reviewed the images and compared with prior images.    Assessment/Plan  * Status epilepticus (HCC)  Assessment & Plan  New onset, unclear etiology  On continuous EEG  Neurology is following.   Pt currently on vimpat 200 BID, Briviact in the morning but changed to clonazepam and topamax by neurology.  This early evening, pt evidently remains to have active seizures subclinically.   Ativan 1mg IV x1 was given. Versed 4mg IV x1 was also given.   I spoke with Dr. Bustos and discussed regarding additional antiseizure meds. We decided to resume propofol.   Continue vimpat, topamax. Discontinue clonazepam  Follow-up CSF from OSH  MRI reviewed    Acute respiratory failure with hypoxia (HCC)  Assessment & Plan  Intubated in emergency department 1/14 at outside hospital  Continue full mechanical ventilatory support  This am we attempted to keep pt off propofol and fentanyl as much as we could.   At end of day, pt did not show any signs of alertness or awakefulness  SBT was also performed, which she's doing well on PS 5/8 and minimal O2.   Not candidate for mobility   Goal to keep sat >94%    Severe protein-calorie malnutrition (HCC)  Assessment & Plan  Continue tube feeds at goal rate   Dietary  following    Cerebrovascular accident (CVA) due to embolism of right middle cerebral artery (HCC)  Assessment & Plan  Continue high intensity statin, aspirin  PT/OT eval  Neurology following  Echocardiogram reviewed    End stage renal failure on dialysis ([facility])  Assessment & Plan  HD per nephrology  Renally dose medications  Monitor electrolytes  Monitor urine output  Had MRI contrast, dializying for 3 consecutive doses    Normocytic anemia- (present on admission)  Assessment & Plan  Daily CBC with conservative transfusion strategy, monitor for bleeding    Hypertension  Assessment & Plan  scheduled hydralazine, Isordil, labetalol  PRN IV labetalol, hydralazine for goal SBP less than 160    DM (diabetes mellitus) ([facility])- (present on admission)  Assessment & Plan  Insulin sliding scale  Hypoglycemia protocol  FS BS  Goal -180    Goals of care, counseling/discussion  Assessment & Plan  Discussed at length with son regarding patient's critical condition  Son came from [facility]. Pt has daughter who's going to be here on 1/23. She's currently in [place].   Palliative care has been consulted.   Need ongoing goal of care discussion with family     Pressure ulcer  Assessment & Plan  Stage II coccyx -present on arrival  Continue wound care    Hypomagnesemia  Assessment & Plan  Replace keep greater than 2    Gout- (present on admission)  Assessment & Plan  Continue allopurinol    Obesity (BMI 30-39.9)- (present on admission)  Assessment & Plan  Nutritionist consultation  Encourage behavioral and dietary modification once extubated for weight loss       VTE:  Heparin  Ulcer: H2 Antagonist  Lines: Central Line  Ongoing indication addressed and [facility] Catheter  Ongoing indication addressed    I have performed a physical exam and reviewed and updated ROS and Plan today (1/20/2020). In review of yesterday's note (1/19/2020), there are no changes except as documented above.     Discussed patient condition and risk of  morbidity and/or mortality with RN, RT, Pharmacy and nephrology and neurology     I have assessed and reassessed her respiratory status, hemodynamics, cardiovascular and neurological status.  Still actively seizing. Need better control of her seizure. Concern that she will need prolonged mechanical ventilation. Need ongoing goal of care discussion with family.  High risk of deterioration and worsening vital organ dysfunction and death without the above critical care interventions.     The patient remains critically ill.  Critical care time = 85 minutes in directly providing and coordinating critical care and extensive data review.  No time overlap and excludes procedures.

## 2020-01-20 NOTE — DISCHARGE PLANNING
Outpatient Dialysis Note    Confirmed patient is active at:    [address] [address]    Schedule: Monday, Wednesday, Friday  Time: 11:15am    Spoke with Bibiana at facility who confirmed.    Forwarded records for review.    Eve Lopez- Dialysis Coordinator  [facility] # [phone]

## 2020-01-20 NOTE — CARE PLAN
Problem: Nutritional:  Goal: Nutrition support tolerated and meeting greater than 85% of estimated needs  Outcome: MET    TF observed at goal.

## 2020-01-20 NOTE — RESPIRATORY CARE
Conscious Sedation Respiratory Update    FiO2%: 30 % (01/19/20 1600)          Events/Summary/Plan: Pt. placed back on CMV due to RR 45-50.  RN notified and agrees. (01/19/20 8985)

## 2020-01-20 NOTE — PROGRESS NOTES
Neurology Progress Note  Neurohospitalist Service, [facility]    Referring Physician: Anurag Hein D.O.    No chief complaint on file.      HPI: Refer to initial documented Neurology H&P, as detailed in the patient's chart.    Interval History 1/20/2020: Patient remains intubated in the ICU.  Had persistent nonconvulsive status epilepticus overnight.  Medication changed as detailed below.  Patient son is at the bedside.  All questions answered.    Review of systems: In addition to what is detailed in the HPI and/or updated in the interval history, all other systems reviewed and are negative.    Past Medical History:    has a past medical history of Arthritis, Chronic diastolic heart failure ([facility]) (6/1/2016), Clostridium difficile diarrhea (6/2/2016), CVA (cerebral vascular accident) ([facility]), DM (diabetes mellitus) type II uncontrolled with renal manifestation (4/26/2014), GERD (gastroesophageal reflux disease), GIB (gastrointestinal bleeding) (6/13/2016), GOUT, Hypertension, ICH (intracerebral hemorrhage) ([facility]) (4/6/2016), Indigestion, Kidney failure, RALF (obstructive sleep apnea), RALF (obstructive sleep apnea), Other and unspecified angina pectoris, Seizure ([facility]) (1/16/2020), Unspecified cataract, and UTI (urinary tract infection) (6/1/2016). She also has no past medical history of Anesthesia, Arrhythmia, Back pain, Bronchitis, Cancer ([facility]), Cold, Congestive heart failure ([facility]), COPD (chronic obstructive pulmonary disease) ([facility]), Dental disorder, Dialysis patient ([facility]), Fall, Glaucoma, Heart valve disease, Hepatitis A, Hepatitis B, Hepatitis C, Jaundice, Myocardial infarct ([facility]), Other emphysema ([facility]), Other specified symptom associated with female genital organs, Pacemaker, Personal history of venous thrombosis and embolism, Psychiatric problem, Rheumatic fever, Unspecified asthma(493.90), Unspecified hemorrhagic conditions, or Unspecified urinary incontinence.    FHx:  family history includes  Cancer in her father; Diabetes in her father and mother.    SHx:   reports that she has never smoked. She has never used smokeless tobacco. She reports that she does not drink alcohol or use drugs.    Medications:    Current Facility-Administered Medications:   •  dextrose 10% infusion, , Intravenous, Continuous, Sergei Uriarte M.D., Last Rate: 50 mL/hr at 01/20/20 0147  •  LORazepam (ATIVAN) injection 1 mg, 1 mg, Intravenous, Q3HRS PRN, Catracho Bustos M.D., 1 mg at 01/20/20 0558  •  clonazePAM (KLONOPIN) tablet 1 mg, 1 mg, Oral, BID, Josep You M.D.  •  topiramate (TOPAMAX) tablet 100 mg, 100 mg, Oral, Q12HRS, Josep You M.D.  •  fentaNYL (SUBLIMAZE) 50 mcg/mL in 50mL (Continuous Infusion), , Intravenous, Continuous, Fletcher Willis M.D., Last Rate: 1 mL/hr at 01/20/20 0702, 50 mcg/hr at 01/20/20 0702  •  aspirin (ASA) chewable tab 81 mg, 81 mg, Enteral Tube, DAILY, Josep You M.D., 81 mg at 01/20/20 0548  •  atorvastatin (LIPITOR) tablet 40 mg, 40 mg, Enteral Tube, DAILY, Josep You M.D., 40 mg at 01/20/20 0548  •  lacosamide (VIMPAT) 200 mg in  mL ivpb, 200 mg, Intravenous, BID, Josep You M.D., Stopped at 01/20/20 0708  •  famotidine (PEPCID) tablet 20 mg, 20 mg, Enteral Tube, DAILY, 20 mg at 01/20/20 0548 **OR** famotidine (PEPCID) injection 20 mg, 20 mg, Intravenous, DAILY, Fletcher Willis M.D., 20 mg at 01/19/20 0508  •  heparin injection 3,300 Units, 3,300 Units, Intracatheter, PRN, Lydia Carcamo M.D., 3,300 Units at 01/20/20 0915  •  norepinephrine (LEVOPHED) 8 mg in  mL Infusion, 0-30 mcg/min, Intravenous, Continuous, Sergei Uriarte M.D., Stopped at 01/19/20 1146  •  epoetin antoinette (EPOGEN/PROCRIT) injection 4,000 Units, 4,000 Units, Subcutaneous, MO, WE + FR, Payam Cavazos M.D., 4,000 Units at 01/17/20 1405  •  propofol (DIPRIVAN) injection, 0-80 mcg/kg/min, Intravenous, Continuous, Stopped at 01/19/20 0530 **AND** Triglycerides Starting now and then  Every 3 Days, , , Every 3 Days (0300), Jeremy M Gonda, M.D.  •  Respiratory Care per Protocol, , Nebulization, Continuous RT, Jeremy M Gonda, M.D.  •  ipratropium-albuterol (DUONEB) nebulizer solution, 3 mL, Nebulization, Q2HRS PRN (RT), Jeremy M Gonda, M.D.  •  senna-docusate (PERICOLACE or SENOKOT S) 8.6-50 MG per tablet 2 Tab, 2 Tab, Enteral Tube, BID, 2 Tab at 01/20/20 0547 **AND** polyethylene glycol/lytes (MIRALAX) PACKET 1 Packet, 1 Packet, Enteral Tube, QDAY PRN **AND** magnesium hydroxide (MILK OF MAGNESIA) suspension 30 mL, 30 mL, Enteral Tube, QDAY PRN **AND** bisacodyl (DULCOLAX) suppository 10 mg, 10 mg, Rectal, QDAY PRN, Jeremy M Gonda, M.D.  •  lidocaine (XYLOCAINE) 1 % injection 1-2 mL, 1-2 mL, Tracheal Tube, Q30 MIN PRN, Jeremy M Gonda, M.D.  •  Pharmacy Consult: Enteral tube insertion - review meds/change route/product selection, 1 Each, Other, PHARMACY TO DOSE, Jeremy M Gonda, M.D.  •  fentaNYL (SUBLIMAZE) injection 25 mcg, 25 mcg, Intravenous, Q HOUR PRN, 25 mcg at 01/16/20 2310 **OR** fentaNYL (SUBLIMAZE) injection 50 mcg, 50 mcg, Intravenous, Q HOUR PRN, 50 mcg at 01/19/20 0444 **OR** fentaNYL (SUBLIMAZE) injection 100 mcg, 100 mcg, Intravenous, Q HOUR PRN, Jeremy M Gonda, M.D., 100 mcg at 01/19/20 1436  •  allopurinol (ZYLOPRIM) tablet 200 mg, 200 mg, Enteral Tube, DAILY, Rina Matta M.D., 200 mg at 01/20/20 0550  •  fenofibrate micronized (LOFIBRA) capsule 134 mg, 134 mg, Enteral Tube, DAILY, Rina Matta M.D., 134 mg at 01/20/20 0550  •  ondansetron (ZOFRAN) syringe/vial injection 4 mg, 4 mg, Intravenous, Q4HRS PRN, Rina Matta M.D., 4 mg at 01/18/20 1214  •  heparin injection 5,000 Units, 5,000 Units, Subcutaneous, Q8HRS, Rina Matta M.D., 5,000 Units at 01/20/20 0548  •  labetalol (NORMODYNE/TRANDATE) injection 10 mg, 10 mg, Intravenous, Q4HRS PRN, Rina Matta M.D.  •  insulin regular (HUMULIN R) injection 2-9 Units, 2-9 Units, Subcutaneous, Q6HRS, Stopped at 01/17/20 1800  **AND** Accu-Chek Q6 if NPO, , , Q6H **AND** NOTIFY MD and PharmD, , , Once **AND** [DISCONTINUED] glucose 4 g chewable tablet 16 g, 16 g, Oral, Q15 MIN PRN **AND** DEXTROSE 10% BOLUS 250 mL, 250 mL, Intravenous, Q15 MIN PRN, Rina Matta M.D., 250 mL at 01/20/20 0026  •  acetaminophen (TYLENOL) tablet 650 mg, 650 mg, Enteral Tube, Q6HRS PRN, Jeremy M Gonda, M.D.  •  ondansetron (ZOFRAN ODT) dispertab 4 mg, 4 mg, Enteral Tube, Q4HRS PRN, Jeremy M Gonda, M.D.    Physical Examination:     Vitals:    01/20/20 0730 01/20/20 0745 01/20/20 0800 01/20/20 0826   BP: (!) 95/56 140/63 (!) 99/57    Pulse:   77 77   Resp:   14    Temp:       TempSrc:   Bladder    SpO2:   100% 99%   Weight:       Height:           General: Patient is awake and in no acute distress  Eyes: examination of optic disks not indicated at this time  CV: RRR    NEUROLOGICAL EXAM:     Mental status: Does not open eyes to noxious stim, does not follow commands  Speech and language: Intubated.  Cranial nerve exam: Pupils are  reactive bilaterally.  Does not blink to threat bilaterally.    Does not track.  Corneal reflex intact bilaterally, VOR intact.  Face appears symmetric with no twitching noted today 1/20/2020.  Cough and gag reflex is present   motor exam: Does not participate in formal strength testing, tone is spastic on the left, flaccid on the right.  No abnormal movements were seen on exam.  Sensory exam: Withdrawal to noxious stim right arm,  extensor posturing to noxious stimulus on the left arm  Deep tendon reflexes:  1+ and symmetric. Toes upgoing on the right and mute on the left  Coordination: Non-participatory  Gait: deferred due to intubation and ICU status    Objective Data:    Labs:  Lab Results   Component Value Date/Time    PROTHROMBTM 14.4 (H) 11/17/2019 10:40 AM    INR 1.38 11/17/2019 10:40 AM      Lab Results   Component Value Date/Time    WBC 8.5 01/20/2020 05:15 AM    RBC 2.55 (L) 01/20/2020 05:15 AM    HEMOGLOBIN 8.7 (L)  01/20/2020 05:15 AM    HEMATOCRIT 27.0 (L) 01/20/2020 05:15 AM    .9 (H) 01/20/2020 05:15 AM    MCH 34.1 (H) 01/20/2020 05:15 AM    MCHC 32.2 (L) 01/20/2020 05:15 AM    MPV 9.5 01/20/2020 05:15 AM    NEUTSPOLYS 49.40 01/20/2020 05:15 AM    LYMPHOCYTES 34.60 01/20/2020 05:15 AM    MONOCYTES 13.20 01/20/2020 05:15 AM    EOSINOPHILS 1.90 01/20/2020 05:15 AM    BASOPHILS 0.50 01/20/2020 05:15 AM    ANISOCYTOSIS 3+ 01/19/2020 02:37 AM      Lab Results   Component Value Date/Time    SODIUM 133 (L) 01/20/2020 05:15 AM    POTASSIUM 3.5 (L) 01/20/2020 05:15 AM    CHLORIDE 100 01/20/2020 05:15 AM    CO2 26 01/20/2020 05:15 AM    GLUCOSE 115 (H) 01/20/2020 05:15 AM    BUN 10 01/20/2020 05:15 AM    CREATININE 1.04 01/20/2020 05:15 AM      Lab Results   Component Value Date/Time    CHOLSTRLTOT 79 02/07/2019 01:40 PM    LDL 34 02/07/2019 01:40 PM    HDL 23 (L) 02/07/2019 01:40 PM    TRIGLYCERIDE 124 01/18/2020 04:53 AM       Lab Results   Component Value Date/Time    ALKPHOSPHAT 36 01/20/2020 05:15 AM    ASTSGOT 23 01/20/2020 05:15 AM    ALTSGPT 7 01/20/2020 05:15 AM    TBILIRUBIN 0.7 01/20/2020 05:15 AM        Imaging/Testing:    I interpreted and/or reviewed the patient's neuroimaging    DX-CHEST-PORTABLE (1 VIEW)   Final Result         1. Stable lines and tubes..   2. Stable retrocardiac atelectasis versus consolidation with increased left perihilar opacity. Suspected trace left pleural effusion.         US-[id number]-0 VIEW   Final Result      1.  Enteric tube has been placed and the tip projects over the stomach.      2.  Pre-existing feeding tube tip projects at the gastroduodenal junction      DX-CHEST-PORTABLE (1 VIEW)   Final Result         1. Lines and tubes as above.   2. Stable retrocardiac atelectasis versus consolidation. Suspected trace left pleural effusion.         MR-BRAIN-WITH & W/O   Final Result      1.  Moderate cerebral atrophy.   2.  Evidence of intraventricular hemorrhage, indeterminate age. Possibly  chronic intraventricular hemosiderin deposition.   3.  Extensive encephalomalacic change with hemosiderin deposition involving the right corona radiata, basal ganglia, posterior thalamus, subthalamic region, bordering the right cerebral peduncle. Associated ex vacuo dilatation of the body of the right    lateral ventricle. No change.   4.  Additional foci of old microhemorrhage most consistent with old hypertensive microhemorrhage or amyloid angiopathy in the left basal ganglia, left thalamus, and midline upper ventral abel.   5.  Punctate focus of acute infarction in the right parietal deep white matter. No change.   6.  Advanced supratentorial white matter disease most consistent with microvascular ischemic change.   7.  Encephalomalacic changes in the abel and right cerebral peduncle consistent with old infarction.   8.  Partially empty sella.   9.  Overall, no new findings and no significant change from 1/14/2020.      DX-CHEST-PORTABLE (1 VIEW)   Final Result         1. Stable lines and tubes.   2. Unchanged retrocardiac atelectasis versus consolidation.   3. Stable trace left pleural effusion.         OUTSIDE IMAGES-DX CHEST   Final Result      OUTSIDE IMAGES-US VASCULAR   Final Result      OUTSIDE IMAGES-MR BRAIN   Final Result      OUTSIDE IMAGES-DX CHEST   Final Result      OUTSIDE IMAGES-CT HEAD   Final Result      EC-ECHOCARDIOGRAM COMPLETE W/O CONT   Final Result      DX-CHEST-FOR LINE PLACEMENT Perform procedure in: Patient's Room   Final Result         1.  Retrocardiac opacity concerning for infiltrate, stable.   2.  Trace left pleural effusion, stable   3.  Cardiomegaly   4.  Atherosclerosis   5.  Perihilar interstitial prominence and bronchial wall cuffing, appearance suggests changes of underlying bronchial inflammation, consider bronchitis.      DX-ABDOMEN FOR TUBE PLACEMENT   Final Result         1.  Nonspecific bowel gas pattern.   2.  Dobbhoff tube tip overlying the expected location of the  "pylorus or first duodenal segment.   3.  Left lung base atelectasis and/or small effusion      DX-CHEST-PORTABLE (1 VIEW)   Final Result         1.  Retrocardiac opacity concerning for infiltrate.   2.  Trace left pleural effusion, stable   3.  Cardiomegaly   4.  Atherosclerosis      [id number] FILM X-RAY   Final Result      OUTSIDE IMAGES-DX CHEST   Final Result        Video EEG as read and documented by Dr. Bustos 1/19/2020: \"abnormal 24 hrs video EEG recording in a mildly sedated, and/or encephalopathic patient. A moderate to severe, toxic / metabolic encephalopathy is suggested, which has worsened. 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 There was resolution of the status epilepticus with Lorazepam dose. There were many, recurrent events of right facial twitching and chewing which correlated with muscle artifact and periodic triphasic waves and rhythmic slowing in the anterior regions. Some of these spells involved subtle twitching and movements of the right hand/arm, and recurrent focal seizures involving the left primary cortex are suggested.  The findings suggest cortical irritability and structural abnormality.\"    Assessment and Plan:    Cassandra Guzmán is a 74 y.o. woman with history of CHF, R parietal stroke with residual left-sided weakness, DM2, GERD, ESRD on dialysis, GERD, history of GI bleed, HTN, and hx of ICH presenting as a transfer for seizures.  MRI brain imaging revealing several, bilateral, evidence of both prior infarct and intracerebral hemorrhages.  Low suspicion that the twitching in the right face is epileptic in nature, and may be more consistent with hemifacial spasm.    Overnight 1/19/2020 patient was observed to have subclinical status epilepticus as evidenced by the " EEG.  These events were particularly responsive to benzodiazepine administration.     Plan:     1. seizures   -Continue video EEG monitoring  -Discontinue all sedation and extubate  -Continue Vimpat 200 mg twice daily  -Discontinue Briviact  -Add clonazepam 1 mg twice daily  -Add Topamax 100 mg twice daily  -Seizure precautions     2. Prior stroke  - antiplatelet therapy is indicated as the benefit of preventing additional infarcts outweighs the risk of hemorrhage given burden as seen on GRE; microhemorrhages likely secondary to vascular risk factors and hypertension most specifically  - statin qhs per SPARCL Trial  - strict glucose control    The evaluation of the patient, and recommended management, was discussed with the resident staff. I have performed a physical exam and reviewed and updated ROS and Plan today (1/20/2020). In review of yesterday's note (1/19/2020), there are no changes except as documented above.    Josep You MD  Director, [facility], [facility]  Neurohospitalist, [facility]  Clinical  of Neurology, [employer] [facility] [employer]  t) [phone] (f) [phone]

## 2020-01-20 NOTE — FLOWSHEET NOTE
Ventilator Weaning Update    Patient is on vent day 5.  SBT was tolerated for a minimum of 1 hours on settings of 5/8.    Wean parameters for this SBT were:  #FVC / Vital Capacity (liters) : (does not follow commands) (01/20/20 0826)  NIF (cm H2O) : (does not follow) (01/20/20 0826)  Rapid Shallow Breathing Index (RR/VT): 57 (01/20/20 0826)  RR (bpm): 16 (01/20/20 0826)  Spontaneous VE: (!) 4.8 (01/20/20 0826)  Spontaneous VT: 297 (01/20/20 0826)    Weaning Trial Stopped due to:: Pt weaned for 1 hour and returned to rest settings per protocol

## 2020-01-20 NOTE — PROGRESS NOTES
HD treatment today per routine order.Treatment tolerated fairly,didnt require the use of pressor today.Net UF removed 2000 ml.CVC locked with heparin.Report given to primary Rn.

## 2020-01-20 NOTE — WOUND TEAM
[facility]  Inpatient Services  Initial Wound and Skin Care Evaluation    Admission Date: 1/16/2020     Consult Date: 01/17/20   HPI, PMH, SH: Reviewed    Unit where seen by Wound Team: S128/00     WOUND CONSULT RELATED TO:  Coccyx/sacrum      Self Report / Pain Level:  Discomfort with turning        OBJECTIVE:  In bed, mepilex with petrolatum in place.  Chronic wound.      WOUND TYPE, LOCATION, CHARACTERISTICS (Pressure Injuries: location, stage, POA or date identified)     Pressure Injury 01/16/20 Sacrum;Coccyx stage 3 POA  (Active)   Pressure Injury Stage 3    State of Healing Non-healing    Site Assessment Red;Light purple    Lucina-wound Assessment Scar tissue;Denuded    Margins Attached edges    Wound Length (cm) 6 cm    Wound Width (cm) 8 cm    Wound Depth (cm) 0.3 cm    Wound Surface Area (cm^2) 48 cm^2    Tunneling 0 cm    Undermining 0 cm    Closure None    Drainage Amount Small    Drainage Description Serosanguineous    Treatments Cleansed;Site care    Cleansing Normal Saline Irrigation    Periwound Protectant Not Applicable    Dressing Options Petrolatum Gauze (yellow);Mepilex    Dressing Cleansing/Solutions Not Applicable    Dressing Changed New    Dressing Status Clean;Dry;Intact    Dressing Change Frequency Every 48 hrs    NEXT Dressing Change  01/22/20    NEXT Weekly Photo (Inpatient Only) 01/22/20    WOUND NURSE ONLY - Odor None    WOUND NURSE ONLY - Exposed Structures None    WOUND NURSE ONLY - Tissue Type and Percentage 100% red purple boarders     WOUND NURSE ONLY - Time Spent with Patient (mins) 60      Vascular:    Not vascular related     Lab Values:    Lab Results   Component Value Date/Time    WBC 8.5 01/20/2020 05:15 AM    RBC 2.55 (L) 01/20/2020 05:15 AM    HEMOGLOBIN 8.7 (L) 01/20/2020 05:15 AM    HEMATOCRIT 27.0 (L) 01/20/2020 05:15 AM        Results from last 7 days   Lab Units 01/17/20  0028   C REACTIVE PROTEIN 4596 mg/dL 9.17*     Lab Results   Component Value Date/Time     HBA1C 7.5 (H) 02/07/2019 01:20 PM      Culture:   Not indicated at this time     INTERVENTIONS BY WOUND TEAM:  Patient turned to left side, previous dressing removed, cleansed wound with NS and gauze.  Covered with cut piece and folded xeroform gauze and secured with sacral mepilex.  Pillow replaced to left side.      Interdisciplinary consultation: Patient, Bedside RN    EVALUATION: patient seen by wound team back in 2016 for sDTI to coccyx/sacrum.  Patient lives in assisted living facility and brought in after seizures.  Wound is chronic.  Wound team to follow up weekly     Goals: Steady decrease in wound area and depth weekly.    NURSING PLAN OF CARE ORDERS (X):  Dressing changes: See Dressing Care orders: X  Skin care: See Skin Care orders: X  Rectal tube care: See Rectal Tube Care orders:        Other orders:                           RSKIN:   CURRENTLY IN PLACE (X), APPLIED THIS VISIT (A), ORDERED (O):   Q shift Reinier:  X  Q shift pressure point assessments:  X  Pressure redistribution mattress                             Low Airloss                      ICU bed  Bariatric NUBIA                     Bariatric foam                        Heel float boots                   X  Heel Silicone dressing                       X  Float Heels off Bed with Pillows               Barrier wipes         Barrier Cream         Barrier paste          Sacral silicone dressing       X  Silicone O2 tubing         Anchorfast         Cannula fixation Device (Tender )          Gray Foam Ear protectors           Trach with Optifoam split foam                 Waffle cushion        Waffle Overlay         Rectal tube or BMS    Purwick/Condom Cath          Antifungal tx      Interdry          Reposition q 2 hours      X  Up to chair        Ambulate      PT/OT        Dietician        Diabetes Education      PO     TF  X   TPN     NPO   # days   Other        WOUND TEAM PLAN OF CARE (X):   Dressing changes by wound team:          Follow  up 1-2 times weekly:             X  Follow up 3 times weekly:                NPWT change 3 times weekly:     Follow up as needed:       Other (explain):     Anticipated discharge plans (X):   LTACH:        SNF/Rehab:                X will need ongoing wound care post DC  Home Care:           Outpatient Wound Center:            Self Care:            Other:

## 2020-01-20 NOTE — RESPIRATORY CARE
Conscious Sedation Respiratory Update    FiO2%: 30 % (01/19/20 1600)          Events/Summary/Plan: Pt/vent checked.  No pt. distress at this time.  Family in room. (01/19/20 3724)

## 2020-01-20 NOTE — CARE PLAN
Adult Ventilation Update    Total Vent Days: 5    CMV, 14, 320, +8, 30%    Patient Lines/Drains/Airways Status      Active Airway       Name: Placement date: Placement time: Site: Days:    Airway ETT Oral 7.5  01/14/20   --   Oral  7                    #FVC / Vital Capacity (liters) : 1100 (01/18/20 0800)     Rapid Shallow Breathing Index (RR/VT): 29 (01/18/20 0800)  Plateau Pressure (Q Shift): 20 (01/20/20 0204)  Static Compliance (ml / cm H2O): 29 (01/20/20 0204)    Patient failed trials because of Barriers to Wean: Other (Comments)(Continous EEG) (01/18/20 0615)  Barriers to SBT Weaning Trial Stopped due to:: Pt weaned for 1 hour and returned to rest settings per protocol (01/18/20 0800)  Length of Weaning Trial Length of Weaning Trial (Hours): 1 hour (01/18/20 0800)    Sputum/Suction   Cough: Productive (01/20/20 0000)  Sputum Amount: Unable to Evaluate (01/20/20 0000)  Sputum Color: Unable to Evaluate (01/20/20 0000)  Sputum Consistency: Unable to Evaluate (01/20/20 0000)    Mobility  Level of Mobility: Level I (01/19/20 2000)  Activity Performed: Unable to mobilize (01/19/20 2000)  Reason Not Mobilized: Bed rest (01/19/20 2000)  Mobilization Comments: (cont eeg monitoring) (01/19/20 2000)    Events/Summary/Plan: Pt. placed back on CMV due to RR 45-50.  RN notified and agrees. (01/19/20 4742)

## 2020-01-21 NOTE — CARE PLAN
Problem: Bowel/Gastric:  Goal: Normal bowel function is maintained or improved  Outcome: PROGRESSING AS EXPECTED  Goal: Will not experience complications related to bowel motility  Outcome: PROGRESSING AS EXPECTED     Problem: Fluid Volume:  Goal: Will maintain balanced intake and output  Outcome: PROGRESSING AS EXPECTED     Problem: Pain Management  Goal: Pain level will decrease to patient's comfort goal  Outcome: PROGRESSING AS EXPECTED

## 2020-01-21 NOTE — CARE PLAN
Problem: Ventilation Defect:  Goal: Ability to achieve and maintain unassisted ventilation or tolerate decreased levels of ventilator support  Outcome: PROGRESSING AS EXPECTED      Ventilator Daily Summary    Vent Day #6  7.5 @ 22 (LIP)  CMV 14, 320, +8, 30%    Ventilator settings changed this shift: NONE    Weaning trials:  SBT during day/ Rest at night. Patient tolerates SBTs but does not follow for weaning parameters.    Respiratory Procedures:    Plan: Continue current ventilator settings and wean mechanical ventilation as tolerated per physician orders.    Pt on continuous EEG for seizure activity.

## 2020-01-21 NOTE — PROGRESS NOTES
Received phone call from Dr. Bustos. Instructed to give 1 mg of Ativan PRN for active seizures seen on the EEG. Modifications to current medications being made by Dr. Bustos

## 2020-01-21 NOTE — PROGRESS NOTES
Critical Care Progress Note    Date of admission  1/16/2020    Chief Complaint  status epilepticus and respiratory failure    Hospital Course    74 y.o. female who presented 1/16/2020 with PMH significant for ESRD on IHD since November 2019 M/W/F, HTN, HPL, CAD, admitted for first time witness seizure at SNF, transferred from [facility]. Had a second seizure with prolonged postictal state. Intubated. MRI brain with an acute right parietal lacunar infarct with overall brain parenchymal loss. Loaded with Keppra, dilantin. LP was done with no pleocytosis. Was empirically treated with antibiotics. Code status FULL CODE.      Interval Problem Update  Reviewed last 24 hour events:  Remains on continuous EEG  Not following, withdrawing from right upper and bilateral lower.   On fentanyl gtt at 50mcg/hr.   Started on propofol at 20mcg/kg/min due to evidence of active seizure subclinically  On locasamide 200 BID  On topamax 200 BID    Afebrile, Tm 37.0C  SBP in 170-1800s, HR in 70s  Off norepinephrine yesterday am.   Remains on ventilatory support with FiO2 30%, PEEP 8. TV 320cc  Vent day #6. Was on vent x 2 days at OSH.    Was on spontaneous mode for most of day yesterday  Minimal secretions. Weak cough.     Had IHD last night.  WBC 8.5  Hgb 8.7, plt 145  BG in 180s. D10 gtt was discontinued.   Restarted tube feed and tolerating.     Called [facility] [phone] today for update on the LP done on 1/15.   1/15 CSF Gram stain negative, Cx no growth to date (final)  1/15 CSF cell count was WBC 1, RBC 2. Total protein 45. Glucose 79  1/15 CSF HSV PCR negative  1/15 CSF paraneoplastic negative      Review of Systems  Review of Systems   Unable to perform ROS: Acuity of condition        Vital Signs for last 24 hours   Pulse:  [67-84] 81  Resp:  [10-26] 26  BP: ()/(48-89) 184/89  SpO2:  [99 %-100 %] 100 %    Hemodynamic parameters for last 24 hours       Respiratory Information for the last 24  hours  Zepeda Vent Mode: Spont  Rate (breaths/min): 14  Vt Target (mL): 320  PEEP/CPAP: 8  FiO2: 30  P Support: 8  P MEAN: 10  Length of Weaning Trial (Hours): .25  Control VTE (exp VT): 317    Physical Exam   Physical Exam  Vitals signs and nursing note reviewed.   Constitutional:       General: She is not in acute distress.     Appearance: She is ill-appearing. She is not toxic-appearing.      Comments: Intubated, not awake to verbal     HENT:      Head: Normocephalic.      Mouth/Throat:      Comments: Et tube in place  Eyes:      Conjunctiva/sclera: Conjunctivae normal.   Cardiovascular:      Rate and Rhythm: Normal rate and regular rhythm.      Pulses: Normal pulses.      Heart sounds: No murmur.   Pulmonary:      Effort: Pulmonary effort is normal.      Breath sounds: Normal breath sounds. No wheezing or rales.      Comments: Diminished at bases  Abdominal:      General: There is no distension.      Palpations: Abdomen is soft.      Tenderness: There is no tenderness. There is no guarding.      Comments: Hypoactive bowel sound   Musculoskeletal:      Right lower leg: No edema.      Left lower leg: No edema.      Comments: + edema in upper extremities   Skin:     General: Skin is warm and dry.      Capillary Refill: Capillary refill takes 2 to 3 seconds.      Findings: No bruising.   Neurological:      Comments: Baseline left-sided deficits from previous CVA  Opening eyes but not following commands         Medications  Current Facility-Administered Medications   Medication Dose Route Frequency Provider Last Rate Last Dose   • dextrose 10% infusion   Intravenous Continuous Sergei Uriarte M.D.   Stopped at 01/20/20 1113   • LORazepam (ATIVAN) injection 1 mg  1 mg Intravenous Q3HRS PRN Catracho Bustos M.D.   1 mg at 01/20/20 1740   • hydrALAZINE (APRESOLINE) injection 10 mg  10 mg Intravenous Q4HRS PRN Anurag Hein D.O.   10 mg at 01/20/20 1524   • topiramate (TOPAMAX) tablet 200 mg  200 mg Enteral Tube Q12HRS  Catracho Bustos M.D.   200 mg at 01/21/20 0502   • fentaNYL (SUBLIMAZE) 50 mcg/mL in 50mL (Continuous Infusion)   Intravenous Continuous Fletcher Willis M.D.   Stopped at 01/20/20 0958   • aspirin (ASA) chewable tab 81 mg  81 mg Enteral Tube DAILY Josep You M.D.   81 mg at 01/21/20 0459   • atorvastatin (LIPITOR) tablet 40 mg  40 mg Enteral Tube DAILY Josep You M.D.   40 mg at 01/21/20 0459   • lacosamide (VIMPAT) 200 mg in  mL ivpb  200 mg Intravenous BID Josep You M.D.   Stopped at 01/21/20 0640   • famotidine (PEPCID) tablet 20 mg  20 mg Enteral Tube DAILY Fletcher Willis M.D.   20 mg at 01/21/20 0459    Or   • famotidine (PEPCID) injection 20 mg  20 mg Intravenous DAILY Fletcher Willis M.D.   20 mg at 01/19/20 0508   • heparin injection 3,300 Units  3,300 Units Intracatheter PRN Lydia Carcamo M.D.   3,300 Units at 01/20/20 0915   • norepinephrine (LEVOPHED) 8 mg in  mL Infusion  0-30 mcg/min Intravenous Continuous Sergei Uriarte M.D.   Stopped at 01/19/20 1146   • epoetin antoinette (EPOGEN/PROCRIT) injection 4,000 Units  4,000 Units Subcutaneous MO, WE + FR Payam Cavazos M.D.   4,000 Units at 01/20/20 0946   • propofol (DIPRIVAN) injection  0-80 mcg/kg/min Intravenous Continuous Jeremy M Gonda, M.D. 7.1 mL/hr at 01/21/20 0541 20 mcg/kg/min at 01/21/20 0541   • Respiratory Care per Protocol   Nebulization Continuous RT Jeremy M Gonda, M.D.       • ipratropium-albuterol (DUONEB) nebulizer solution  3 mL Nebulization Q2HRS PRN (RT) Jeremy M Gonda, M.D.       • senna-docusate (PERICOLACE or SENOKOT S) 8.6-50 MG per tablet 2 Tab  2 Tab Enteral Tube BID Jeremy M Gonda, M.D.   Stopped at 01/20/20 1800    And   • polyethylene glycol/lytes (MIRALAX) PACKET 1 Packet  1 Packet Enteral Tube QDAY PRN Jeremy M Gonda, M.D.        And   • magnesium hydroxide (MILK OF MAGNESIA) suspension 30 mL  30 mL Enteral Tube QDAY PRN Jeremy M Gonda, M.D.        And   • bisacodyl (DULCOLAX) suppository  10 mg  10 mg Rectal QDAY PRN Jeremy M Gonda, M.D.       • lidocaine (XYLOCAINE) 1 % injection 1-2 mL  1-2 mL Tracheal Tube Q30 MIN PRN Jeremy M Gonda, M.D.       • Pharmacy Consult: Enteral tube insertion - review meds/change route/product selection  1 Each Other PHARMACY TO DOSE Jeremy M Gonda, M.D.       • fentaNYL (SUBLIMAZE) injection 25 mcg  25 mcg Intravenous Q HOUR PRN Jeremy M Gonda, M.D.   25 mcg at 01/16/20 2310    Or   • fentaNYL (SUBLIMAZE) injection 50 mcg  50 mcg Intravenous Q HOUR PRN Jeremy M Gonda, M.D.   50 mcg at 01/20/20 1555    Or   • fentaNYL (SUBLIMAZE) injection 100 mcg  100 mcg Intravenous Q HOUR PRN Jeremy M Gonda, M.D.   100 mcg at 01/20/20 1745   • allopurinol (ZYLOPRIM) tablet 200 mg  200 mg Enteral Tube DAILY Rina Matta M.D.   200 mg at 01/21/20 0501   • fenofibrate micronized (LOFIBRA) capsule 134 mg  134 mg Enteral Tube DAILY Rina Matta M.D.   134 mg at 01/21/20 0501   • ondansetron (ZOFRAN) syringe/vial injection 4 mg  4 mg Intravenous Q4HRS PRN Rina Matta M.D.   4 mg at 01/18/20 1214   • heparin injection 5,000 Units  5,000 Units Subcutaneous Q8HRS Rina Matta M.D.   5,000 Units at 01/21/20 0500   • labetalol (NORMODYNE/TRANDATE) injection 10 mg  10 mg Intravenous Q4HRS PRN Rina Matta M.D.   10 mg at 01/20/20 1239   • insulin regular (HUMULIN R) injection 2-9 Units  2-9 Units Subcutaneous Q6HRS Rina Matta M.D.   2 Units at 01/21/20 0514    And   • DEXTROSE 10% BOLUS 250 mL  250 mL Intravenous Q15 MIN PRN Rina Matta M.D.   250 mL at 01/20/20 0026   • acetaminophen (TYLENOL) tablet 650 mg  650 mg Enteral Tube Q6HRS PRN Jeremy M Gonda, M.D.       • ondansetron (ZOFRAN ODT) dispertab 4 mg  4 mg Enteral Tube Q4HRS PRN Jeremy M Gonda, M.D.           Fluids    Intake/Output Summary (Last 24 hours) at 1/21/2020 0627  Last data filed at 1/21/2020 0600  Gross per 24 hour   Intake 2197.11 ml   Output 2630 ml   Net -432.89 ml       Laboratory  Recent Labs      01/19/20  0535 01/20/20  0707 01/21/20  0457   ISTATAPH 7.359* 7.448 7.484   ISTATAPCO2 39.8* 38.7* 36.9   ISTATAPO2 88* 100* 101*   ISTATATCO2 24 28 29   IBSQSDS1TWB 96 98 98   ISTATARTHCO3 22.5 26.8* 27.7*   ISTATARTBE -3 3 4*   ISTATTEMP 98.4 F 98.2 F 98.4 F   ISTATFIO2 30 30 30   ISTATSPEC Arterial Arterial Arterial   ISTATAPHTC 7.361* 7.451 7.486   PRROARDF9OR 87 99* 100*         Recent Labs     01/19/20 0237 01/20/20  0515 01/21/20  0530   SODIUM 136 133* 134*   POTASSIUM 3.8 3.5* 3.8   CHLORIDE 102 100 101   CO2 22 26 29   BUN 13 10 15   CREATININE 1.10 1.04 0.97   MAGNESIUM 1.7 1.6  --    PHOSPHORUS 2.8  --   --    CALCIUM 7.3* 7.1* 7.7*     Recent Labs     01/19/20 0237 01/20/20  0515 01/21/20  0530   ALTSGPT  --  7  --    ASTSGOT  --  23  --    ALKPHOSPHAT  --  36  --    TBILIRUBIN  --  0.7  --    PREALBUMIN  --   --  11.0*   GLUCOSE 103* 115* 180*     Recent Labs     01/19/20 0237 01/20/20  0515 01/21/20  0530   WBC 10.5 8.5 8.5   NEUTSPOLYS 77.40* 49.40 56.80   LYMPHOCYTES 13.00* 34.60 25.60   MONOCYTES 7.00 13.20 13.90*   EOSINOPHILS 1.70 1.90 1.90   BASOPHILS 0.90 0.50 0.40   ASTSGOT  --  23  --    ALTSGPT  --  7  --    ALKPHOSPHAT  --  36  --    TBILIRUBIN  --  0.7  --      Recent Labs     01/19/20 0237 01/20/20  0515 01/21/20  0530   RBC 3.00* 2.55* 2.67*   HEMOGLOBIN 9.8* 8.7* 9.2*   HEMATOCRIT 32.1* 27.0* 28.9*   PLATELETCT 158* 145* 150*     Called [facility] [phone] today (1/21) for update on the LP done on 1/15.   1/15 CSF Gram stain negative, Cx no growth to date (final)  1/15 CSF cell count was WBC 1, RBC 2. Total protein 45. Glucose 79  1/15 CSF HSV PCR negative  1/15 CSF paraneoplastic negative    Imaging  X-Ray:  I have personally reviewed the images and compared with prior images.    Assessment/Plan  * Status epilepticus (HCC)  Assessment & Plan  New onset, unclear etiology  On continuous EEG  On vimpat 200 BID, topamax 200 BID and propofol   Versed/ativan  prn clinical seizures.   D/w Dr. Bustos, neurology about the EEG overnight and noted some brief seizures.   Will increase propofol to 40 mcg/kg/min  Continue vimpat, topamax. Discontinue clonazepam  Called [facility] [phone] today (1/21) for update on the LP done on 1/15. No pleocytosis or hypoglycorrhachia.  GS/Cultures negative. HSV PCR negative.       Acute respiratory failure with hypoxia (HCC)  Assessment & Plan  Intubated in emergency department 1/14 at outside hospital  Continue full mechanical ventilatory support  On 1/19 we attempted to keep pt off sedation. Off propofol and fentanyl. Antiseizures with vimpat, clonazepam and topamax.   On 1/19, captured evidence of subclinical seizures despite on current antiseizures.   Propofol gtt was started in the evening  On 1/20, spoke with Dr. Bustos, Neurology, and noted brief seizures overnight,  will continue propofol gtt and increased to 40mcg/kg/min    Due to this active subclinical seizure detected on continuous EEG, will not do SBT today.   No SAT and no mobility  Goal to keep sat >94%    Severe protein-calorie malnutrition (HCC)  Assessment & Plan  Continue tube feeds at goal rate   Dietary following    Cerebrovascular accident (CVA) due to embolism of right middle cerebral artery ([facility])  Assessment & Plan  Continue high intensity statin, aspirin  PT/OT eval  Neurology following  Echocardiogram reviewed    End stage renal failure on dialysis ([facility])  Assessment & Plan  HD per nephrology  Renally dose medications  Monitor electrolytes  Monitor urine output  Had MRI contrast, dializying for 3 consecutive doses    Normocytic anemia- (present on admission)  Assessment & Plan  Daily CBC with conservative transfusion strategy, monitor for bleeding    Hypertension  Assessment & Plan  scheduled hydralazine, Isordil, labetalol  PRN IV labetalol, hydralazine for goal SBP less than 160    DM (diabetes mellitus) ([facility])- (present on  admission)  Assessment & Plan  Insulin sliding scale  Hypoglycemia protocol  FS BS  Goal -180    Goals of care, counseling/discussion  Assessment & Plan  Discussed at length with son regarding patient's critical condition on 1/21  Son came from [facility]. Pt has daughter who's going to be here on 1/23. She's currently in [place].   Palliative care is following. Plan to have family meeting on 1/23 with daughter      Pressure ulcer  Assessment & Plan  Stage II coccyx -present on arrival  Continue wound care    Hypomagnesemia  Assessment & Plan  Replace keep greater than 2    Gout- (present on admission)  Assessment & Plan  Continue allopurinol    Obesity (BMI 30-39.9)- (present on admission)  Assessment & Plan  Nutritionist consultation  Encourage behavioral and dietary modification once extubated for weight loss       VTE:  Heparin  Ulcer: H2 Antagonist  Lines: Central Line  Ongoing indication addressed and [facility] Catheter  Ongoing indication addressed    I have performed a physical exam and reviewed and updated ROS and Plan today (1/21/2020). In review of yesterday's note (1/20/2020), there are no changes except as documented above.     Discussed patient condition and risk of morbidity and/or mortality with RN, RT, Pharmacy and nephrology and neurology     I have assessed and reassessed her respiratory status, hemodynamics, cardiovascular and neurological status.  After increasing sedation with propofol, no evidence of seizures noted on cEEG. D/w Dr. Bustos. . Need ongoing goal of care discussion with family.  High risk of deterioration and worsening vital organ dysfunction and death without the above critical care interventions.     The patient remains critically ill.  Critical care time = 80 minutes in directly providing and coordinating critical care and extensive data review.  No time overlap and excludes procedures.

## 2020-01-21 NOTE — ASSESSMENT & PLAN NOTE
Planned for LTAC s/p tracheostomy once on stable antiepileptic  Start ventilator weaning and t piece trials  Prognosis poor for overall independent or meaningful     Discuss timing of PEG

## 2020-01-21 NOTE — RESPIRATORY CARE
Conscious Sedation Respiratory Update    FiO2%: 30 % (01/21/20 0600)          Events/Summary/Plan: Metabolic study done.  Pt tolerated well. (01/21/20 0723)

## 2020-01-21 NOTE — CARE PLAN
Vent Day # 6     Ventilator settings changed this shift: None     Weaning trials: Held    Respiratory Procedures: None     Plan: Continue current ventilator settings and wean mechanical ventilation as tolerated per physician orders.

## 2020-01-21 NOTE — PROCEDURES
VIDEO ELECTROENCEPHALOGRAM REPORT        Referring provider: Dr. You.      DOS: 1/21/2020 (total recording of 23 hours and 42 minutes).      INDICATION:  Cassandra [place] 74 y.o. female presenting with seizures, status epilepticus.      CURRENT ANTIEPILEPTIC REGIMEN: Lacosamide 200 mg q 12 hrs, Topiramate 100 mg bid. Sedated with propofol.       TECHNIQUE: 30 channel video electroencephalogram (EEG) was performed in accordance with the international 10-20 system. The study was reviewed in bipolar and referential montages. The recording examined an encephalopathic and/or sedated patient.       DESCRIPTION OF THE RECORD:  Generalized 2-4 hz delta activity. Frequent triphasic waves and frontal blunted sharps, frequently becoming periodic, although no clear seizures, and there has been improvement with sedative, the study remains significantly abnormal and the patient remains at high risk for seizure recurrence.      ACTIVATION PROCEDURES:   Not performed.      EKG: sampling of the EKG recording demonstrated sinus rhythm.      EVENTS:  None.      INTERPRETATION:  This is an abnormal 24 hrs video EEG recording in a sedated, and/or encephalopathic patient. A moderate to severe, toxic / metabolic encephalopathy is suggested. Frequent triphasic waves and frontal blunted sharps, frequently becoming periodic, particularly in the last few hours of the study, and although no clear seizures captured, and improvement noted with sedative, the study remains significantly abnormal and the patient remains at high risk for seizure recurrence. The findings suggest cortical irritability and structural abnormality. Clinical and radiological correlation is recommended.     Updates provided to Neurology and ICU teams.         Catracho Bustos MD   Epilepsy and Neurodiagnostics.   Clinical  of Neurology [employer].   Diplomate in Neurology, Epilepsy, and Electrodiagnostic  Medicine.   Office: [phone]  Fax: [phone]

## 2020-01-21 NOTE — PALLIATIVE CARE
"Palliative Care follow-up  Call placed to [place] to attempt to arrange meeting when she is back in town. She was uncertain about the need and PC explained the purpose of giving her updates on her mom who remains critically ill in the ICU. She was uncertain of \"my schedule for Friday\" but notes she's coming back to town Wednesday evening. PC suggested a meeting Thursday or Friday at the latest. She was given this RNs number again and will call when a time is decided.    Updated: TAE RN    Plan: f/u when meeting set    Thank you for allowing Palliative Care to support this patient and family. Contact [email or web address] for additional assistance, change in patient status, or with any questions/concerns.   "

## 2020-01-21 NOTE — FLOWSHEET NOTE
Ventilator Weaning Update    Patient is on vent day 6.  SBT was tolerated for a minimum of .25 hours on settings of 8/8 30%.    Flow trigger lowered to lowest setting in order for patient to initiate spontaneous breaths.   PS increased from 5 to 8 cmH2O due to tidal volumes of 200 mLs on PS of 5.        Wean parameters for this SBT were:  #FVC / Vital Capacity (liters) : (does not follow commands) (01/20/20 0826)  NIF (cm H2O) : (does not follow) (01/20/20 0826)  Rapid Shallow Breathing Index (RR/VT): 60 (01/21/20 0518)  RR (bpm): 18 (01/21/20 0518)  Spontaneous VE: 5.1 (01/21/20 0518)  Spontaneous VT: 310 (01/21/20 0518)    Barriers to Wean:   Weaning Trial Stopped due to:: Hemodynamically unstable/new or worsening arrythmia/SBP <80 or >180 mmHg(Increase in SBP by 30%)

## 2020-01-21 NOTE — PROGRESS NOTES
No chief complaint on file.      Problem List Items Addressed This Visit     None      Visit Diagnoses     Shock (HCC)        Relevant Orders    Central Line Insertion (Completed)      Neurology Progress Note    Brief History of present illness:  74 y.o. woman with history of CHF, R parietal stroke with residual left-sided weakness, DM2, GERD, ESRD on dialysis, GERD, history of GI bleed, HTN, and hx of ICH presenting to [facility] on 1/16/20 as a transfer for seizures; found to have subclinical/non convulsive status epilepticus per cEEG on 1/19; responsive to Benzos; currently being treated with Vimpat, Topamax.     Interval, 1/21/20:   Patient remains intubated, sedated with Propofol. Reportedly had several subclinical seizures overnight. No clinical seizures reported.     No changes to HPI as was previously documented.     Past medical history:   Past Medical History:   Diagnosis Date   • Arthritis    • Chronic diastolic heart failure ([facility]) 6/1/2016   • Clostridium difficile diarrhea 6/2/2016   • CVA (cerebral vascular accident) ([facility])     L side weakness   • DM (diabetes mellitus) type II uncontrolled with renal manifestation 4/26/2014   • GERD (gastroesophageal reflux disease)    • GIB (gastrointestinal bleeding) 6/13/2016   • GOUT    • Hypertension    • ICH (intracerebral hemorrhage) ([facility]) 4/6/2016   • Indigestion    • Kidney failure    • RALF (obstructive sleep apnea)     Not compliant with oxygen or CPAP   • RALF (obstructive sleep apnea)    • Other and unspecified angina pectoris     hospitalized 8/13 chest pain   • Seizure ([facility]) 1/16/2020   • Unspecified cataract     right eye   • UTI (urinary tract infection) 6/1/2016       Past surgical history:   Past Surgical History:   Procedure Laterality Date   • GASTROSCOPY-ENDO  6/14/2016    Procedure: GASTROSCOPY-ENDO;  Surgeon: Oliver Macedo M.D.;  Location: ENDOSCOPY [facility];  Service:    • VENTRAL HERNIA REPAIR  4/9/2014    Performed by Trinity LANGFORD  MANUEL Bryan at SURGERY [facility]   • VITA BY LAPAROSCOPY  10/5/2013    Performed by Trinity Bryan M.D. at SURGERY [facility]   • TUBAL LIGATION  1977   • CATARACT EXTRACTION WITH IOL      left eye       Family history:   Family History   Problem Relation Age of Onset   • Diabetes Mother    • Diabetes Father    • Cancer Father         sarcoma       Social history:   Social History     Socioeconomic History   • Marital status:      Spouse name: Not on file   • Number of children: Not on file   • Years of education: Not on file   • Highest education level: Not on file   Occupational History   • Not on file   Social Needs   • Financial resource strain: Not on file   • Food insecurity:     Worry: Not on file     Inability: Not on file   • Transportation needs:     Medical: Not on file     Non-medical: Not on file   Tobacco Use   • Smoking status: Never Smoker   • Smokeless tobacco: Never Used   Substance and Sexual Activity   • Alcohol use: No   • Drug use: No   • Sexual activity: Not on file   Lifestyle   • Physical activity:     Days per week: Not on file     Minutes per session: Not on file   • Stress: Not on file   Relationships   • Social connections:     Talks on phone: Not on file     Gets together: Not on file     Attends [employer] service: Not on file     Active member of club or organization: Not on file     Attends meetings of clubs or organizations: Not on file     Relationship status: Not on file   • Intimate partner violence:     Fear of current or ex partner: Not on file     Emotionally abused: Not on file     Physically abused: Not on file     Forced sexual activity: Not on file   Other Topics Concern   • Not on file   Social History Narrative    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 , buffet host    Lives with her daughter, dependent for most of ADLs after stroke gave L sided weakness       Current medications:   Current Facility-Administered Medications   Medication Dose    • LORazepam (ATIVAN) injection 1 mg  1 mg   • hydrALAZINE (APRESOLINE) injection 10 mg  10 mg   • topiramate (TOPAMAX) tablet 200 mg  200 mg   • fentaNYL (SUBLIMAZE) 50 mcg/mL in 50mL (Continuous Infusion)     • aspirin (ASA) chewable tab 81 mg  81 mg   • atorvastatin (LIPITOR) tablet 40 mg  40 mg   • lacosamide (VIMPAT) 200 mg in  mL ivpb  200 mg   • famotidine (PEPCID) tablet 20 mg  20 mg    Or   • famotidine (PEPCID) injection 20 mg  20 mg   • heparin injection 3,300 Units  3,300 Units   • epoetin antoinette (EPOGEN/PROCRIT) injection 4,000 Units  4,000 Units   • propofol (DIPRIVAN) injection  0-80 mcg/kg/min   • Respiratory Care per Protocol     • ipratropium-albuterol (DUONEB) nebulizer solution  3 mL   • senna-docusate (PERICOLACE or SENOKOT S) 8.6-50 MG per tablet 2 Tab  2 Tab    And   • polyethylene glycol/lytes (MIRALAX) PACKET 1 Packet  1 Packet    And   • magnesium hydroxide (MILK OF MAGNESIA) suspension 30 mL  30 mL    And   • bisacodyl (DULCOLAX) suppository 10 mg  10 mg   • lidocaine (XYLOCAINE) 1 % injection 1-2 mL  1-2 mL   • Pharmacy Consult: Enteral tube insertion - review meds/change route/product selection  1 Each   • fentaNYL (SUBLIMAZE) injection 25 mcg  25 mcg    Or   • fentaNYL (SUBLIMAZE) injection 50 mcg  50 mcg    Or   • fentaNYL (SUBLIMAZE) injection 100 mcg  100 mcg   • allopurinol (ZYLOPRIM) tablet 200 mg  200 mg   • fenofibrate micronized (LOFIBRA) capsule 134 mg  134 mg   • ondansetron (ZOFRAN) syringe/vial injection 4 mg  4 mg   • heparin injection 5,000 Units  5,000 Units   • labetalol (NORMODYNE/TRANDATE) injection 10 mg  10 mg   • insulin regular (HUMULIN R) injection 2-9 Units  2-9 Units    And   • DEXTROSE 10% BOLUS 250 mL  250 mL   • acetaminophen (TYLENOL) tablet 650 mg  650 mg   • ondansetron (ZOFRAN ODT) dispertab 4 mg  4 mg       Medication Allergy:  Allergies   Allergen Reactions   • Amlodipine Besylate Cough   • Amlodipine Cough       Review of systems:   Limited as  patient is intubated/sedated.     Physical examination:   Vitals:    01/21/20 0800 01/21/20 0803 01/21/20 0900 01/21/20 1059   BP: 104/53  (!) 163/71    Pulse: 62 68 70 69   Resp: 13  13    Temp:       TempSrc:       SpO2: 100% 100% 100% 100%   Weight:       Height:         General: Patient in no acute distress.  HEENT: Normocephalic, no signs of acute trauma.   Neck: supple, no meningeal signs or carotid bruits. There is normal range of motion. No tenderness on exam.   Chest: clear to auscultation. No cough.   CV: RRR, no murmurs.   Skin: no signs of acute rashes or trauma.   Musculoskeletal: joints exhibit full range of motion, without any pain to palpation. There are no signs of joint or muscle swelling. There is no tenderness to deep palpation of muscles.   Psychiatric: No hallucinatory behavior.      NEUROLOGICAL EXAM:   Mental status, orientation: Intubated/sedated.   Speech and language: No verbal output, does not follow commands.   Cranial nerve exam: Pupils are 2 mm bilaterally and equally reactive to light. Visual fields are intact by confrontation. No spontaneous eye opening; does not track spontaneously or to command. Face appears symmetric. Sensation in the face is intact to light touch. Tongue is midline and without any signs of tongue biting or fasciculations.Shoulder shrug is intact bilaterally.   Motor/Sensory exam No spontaneous/voluntary movement to BUE/BLE; Withdrawal/grimace to deep nox stim x 4.   Deep tendon reflexes:  2+ throughout. Plantar responses are upgoing bilaterally. There is no clonus.   Coordination: Deferred as patient does not understand.   Gait: Not assessed at this time as patient is currently unable.       ANCILLARY DATA REVIEWED:     Lab Data Review:  Recent Results (from the past 24 hour(s))   ACCU-CHEK GLUCOSE    Collection Time: 01/20/20  5:02 PM   Result Value Ref Range    Glucose - Accu-Ck 176 (H) 65 - 99 mg/dL   ACCU-CHEK GLUCOSE    Collection Time: 01/21/20 12:13 AM    Result Value Ref Range    Glucose - Accu-Ck 149 (H) 65 - 99 mg/dL   ISTAT ARTERIAL BLOOD GAS    Collection Time: 01/21/20  4:57 AM   Result Value Ref Range    Ph 7.484 7.400 - 7.500    Pco2 36.9 26.0 - 37.0 mmHg    Po2 101 (H) 64 - 87 mmHg    Tco2 29 20 - 33 mmol/L    S02 98 93 - 99 %    Hco3 27.7 (H) 17.0 - 25.0 mmol/L    BE 4 (H) -4 - 3 mmol/L    Body Temp 98.4 F degrees    O2 Therapy 30 %    iPF Ratio 337     Ph Temp Evelyn 7.486 7.400 - 7.500    Pco2 Temp Co 36.7 26.0 - 37.0 mmHg    Po2 Temp Cor 100 (H) 64 - 87 mmHg    Specimen Arterial     Action Range Triggered NO     Inst. Qualified Patient YES    ACCU-CHEK GLUCOSE    Collection Time: 01/21/20  4:57 AM   Result Value Ref Range    Glucose - Accu-Ck 159 (H) 65 - 99 mg/dL   CBC with Differential    Collection Time: 01/21/20  5:30 AM   Result Value Ref Range    WBC 8.5 4.8 - 10.8 K/uL    RBC 2.67 (L) 4.20 - 5.40 M/uL    Hemoglobin 9.2 (L) 12.0 - 16.0 g/dL    Hematocrit 28.9 (L) 37.0 - 47.0 %    .2 (H) 81.4 - 97.8 fL    MCH 34.5 (H) 27.0 - 33.0 pg    MCHC 31.8 (L) 33.6 - 35.0 g/dL    RDW 64.5 (H) 35.9 - 50.0 fL    Platelet Count 150 (L) 164 - 446 K/uL    MPV 9.8 9.0 - 12.9 fL    Neutrophils-Polys 56.80 44.00 - 72.00 %    Lymphocytes 25.60 22.00 - 41.00 %    Monocytes 13.90 (H) 0.00 - 13.40 %    Eosinophils 1.90 0.00 - 6.90 %    Basophils 0.40 0.00 - 1.80 %    Immature Granulocytes 1.40 (H) 0.00 - 0.90 %    Nucleated RBC 0.00 /100 WBC    Neutrophils (Absolute) 4.82 2.00 - 7.15 K/uL    Lymphs (Absolute) 2.17 1.00 - 4.80 K/uL    Monos (Absolute) 1.18 (H) 0.00 - 0.85 K/uL    Eos (Absolute) 0.16 0.00 - 0.51 K/uL    Baso (Absolute) 0.03 0.00 - 0.12 K/uL    Immature Granulocytes (abs) 0.12 (H) 0.00 - 0.11 K/uL    NRBC (Absolute) 0.00 K/uL   Basic Metabolic Panel (BMP)    Collection Time: 01/21/20  5:30 AM   Result Value Ref Range    Sodium 134 (L) 135 - 145 mmol/L    Potassium 3.8 3.6 - 5.5 mmol/L    Chloride 101 96 - 112 mmol/L    Co2 29 20 - 33 mmol/L    Glucose  180 (H) 65 - 99 mg/dL    Bun 15 8 - 22 mg/dL    Creatinine 0.97 0.50 - 1.40 mg/dL    Calcium 7.7 (L) 8.5 - 10.5 mg/dL    Anion Gap 4.0 0.0 - 11.9   CRP QUANTITIVE (NON-CARDIAC)    Collection Time: 01/21/20  5:30 AM   Result Value Ref Range    Stat C-Reactive Protein 7.23 (H) 0.00 - 0.75 mg/dL   PREALBUMIN    Collection Time: 01/21/20  5:30 AM   Result Value Ref Range    Pre-Albumin 11.0 (L) 18.0 - 38.0 mg/dL   Triglyceride    Collection Time: 01/21/20  5:30 AM   Result Value Ref Range    Triglycerides 127 0 - 149 mg/dL   ESTIMATED GFR    Collection Time: 01/21/20  5:30 AM   Result Value Ref Range    GFR If African American >60 >60 mL/min/1.73 m 2    GFR If Non  56 (A) >60 mL/min/1.73 m 2       Labs reviewed by me.       Imaging reviewed by me:     DX-CHEST-PORTABLE (1 VIEW)   Final Result         1. Stable lines and tubes..   2. Stable retrocardiac and left perihilar atelectasis versus consolidation. Suspected small left pleural effusion.            DX-CHEST-PORTABLE (1 VIEW)   Final Result         1. Stable lines and tubes..   2. Stable retrocardiac atelectasis versus consolidation with increased left perihilar opacity. Suspected trace left pleural effusion.         ER-[id number] VIEW   Final Result      1.  Enteric tube has been placed and the tip projects over the stomach.      2.  Pre-existing feeding tube tip projects at the gastroduodenal junction      DX-CHEST-PORTABLE (1 VIEW)   Final Result         1. Lines and tubes as above.   2. Stable retrocardiac atelectasis versus consolidation. Suspected trace left pleural effusion.         MR-BRAIN-WITH & W/O   Final Result      1.  Moderate cerebral atrophy.   2.  Evidence of intraventricular hemorrhage, indeterminate age. Possibly chronic intraventricular hemosiderin deposition.   3.  Extensive encephalomalacic change with hemosiderin deposition involving the right corona radiata, basal ganglia, posterior thalamus, subthalamic region, bordering the  right cerebral peduncle. Associated ex vacuo dilatation of the body of the right    lateral ventricle. No change.   4.  Additional foci of old microhemorrhage most consistent with old hypertensive microhemorrhage or amyloid angiopathy in the left basal ganglia, left thalamus, and midline upper ventral abel.   5.  Punctate focus of acute infarction in the right parietal deep white matter. No change.   6.  Advanced supratentorial white matter disease most consistent with microvascular ischemic change.   7.  Encephalomalacic changes in the abel and right cerebral peduncle consistent with old infarction.   8.  Partially empty sella.   9.  Overall, no new findings and no significant change from 1/14/2020.      DX-CHEST-PORTABLE (1 VIEW)   Final Result         1. Stable lines and tubes.   2. Unchanged retrocardiac atelectasis versus consolidation.   3. Stable trace left pleural effusion.         OUTSIDE IMAGES-DX CHEST   Final Result      OUTSIDE IMAGES-US VASCULAR   Final Result      OUTSIDE IMAGES-MR BRAIN   Final Result      OUTSIDE IMAGES-DX CHEST   Final Result      OUTSIDE IMAGES-CT HEAD   Final Result      EC-ECHOCARDIOGRAM COMPLETE W/O CONT   Final Result      DX-CHEST-FOR LINE PLACEMENT Perform procedure in: Patient's Room   Final Result         1.  Retrocardiac opacity concerning for infiltrate, stable.   2.  Trace left pleural effusion, stable   3.  Cardiomegaly   4.  Atherosclerosis   5.  Perihilar interstitial prominence and bronchial wall cuffing, appearance suggests changes of underlying bronchial inflammation, consider bronchitis.      DX-ABDOMEN FOR TUBE PLACEMENT   Final Result         1.  Nonspecific bowel gas pattern.   2.  Dobbhoff tube tip overlying the expected location of the pylorus or first duodenal segment.   3.  Left lung base atelectasis and/or small effusion      DX-CHEST-PORTABLE (1 VIEW)   Final Result         1.  Retrocardiac opacity concerning for infiltrate.   2.  Trace left pleural  effusion, stable   3.  Cardiomegaly   4.  Atherosclerosis      XL-ADUOVAS-QVPNEIF FILM X-RAY   Final Result      OUTSIDE IMAGES-DX CHEST   Final Result          ASSESSMENT:  74 y.o. woman with history of CHF, R parietal stroke with residual left-sided weakness, DM2, GERD, ESRD on dialysis, GERD, history of GI bleed, HTN, and hx of ICH presenting to [facility] on 1/16/20 as a transfer for seizures; found to have subclinical/non convulsive status epilepticus per cEEG on 1/19; responsive to Benzos; currently being treated with Vimpat, Topamax.   Overnight (1/20/20), patient reportedly had multiple subclinical/non convulsive seizures, prompting ICU team to restart Propofol; no clinical seizures overnight.     Recommendations/Plan:     -Continue cEEG monitoring; will plan to re-evaluate EEG tomorrow morning 1/22/20 and provide additional recs accordingly.   -As was noted above, Propofol restarted/continued per ICU team.   -Continue current doses of Vimpat and Topamax.   -Seizure precautions; Ativan 1 mg IV for breakthrough GTCS.   -Palliative medicine is on board to establish goals of care; will follow up with their input as is available.   -All other medical management per primary team.   -DVT PPX: SQ Heparin.     The plan of care above has been discussed with Dr. Anderson.     Mary Lacey MSN, BSN, [facility]-  [place] [employer]

## 2020-01-21 NOTE — CARE PLAN
Problem: Ventilation Defect:  Goal: Ability to achieve and maintain unassisted ventilation or tolerate decreased levels of ventilator support  Outcome: PROGRESSING AS EXPECTED       Adult Ventilation Update    Total Vent Days: 5    Patient Lines/Drains/Airways Status      Active Airway       Name: Placement date: Placement time: Site: Days:    Airway ETT Oral 7.5  01/14/20   --   Oral  6                    In the last 24 hours, the patient tolerated SBT for 5 hours on settings of 5/8.    #FVC / Vital Capacity (liters) : (does not follow commands) (01/20/20 0826)  NIF (cm H2O) : (does not follow) (01/20/20 0826)  Rapid Shallow Breathing Index (RR/VT): 57 (01/20/20 0826)  Plateau Pressure (Q Shift): 20 (01/20/20 1429)  Static Compliance (ml / cm H2O): 57 (01/20/20 1429)    Sputum/Suction :  Cough: Productive (01/20/20 1200)  Sputum Amount: Small (01/20/20 1340)  Sputum Color: White;Clear (01/20/20 1340)  Sputum Consistency: Thick (01/20/20 1340)    Mobility  Level of Mobility: Level I (01/20/20 0800)  Activity Performed: Unable to mobilize (01/20/20 0800)  Reason Not Mobilized: Bed rest (01/20/20 0800)  Mobilization Comments: 24 hour continuous EEG (01/20/20 0800)    Events/Summary/Plan: Placed back on full support after apnea ventilation x 4 after sedation given. Per Dr. Hein, SBT as tolerated, rest at [facility].
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
## 2020-01-21 NOTE — PALLIATIVE CARE
Palliative Care follow-up  PC visited [facility] and received updates from RN noting ongoing seizures despite medication changes/adjustments. Pt placed back on propofol as well. Discussed with son Erick at [facility] and he states his sister will be back to town Thursday. PC will reach out to her to arrange time to meet when she is back. He is in agreement with this plan.     Updated: TAE HAILE/Dr. Hein    Plan: family meeting when Belen is back; hopeful for Thursday    Thank you for allowing Palliative Care to support this patient and family. Contact [phone] for additional assistance, change in patient status, or with any questions/concerns.

## 2020-01-21 NOTE — DIETARY
Nutrition Services: TF update    Pt is currently on vent day 6 now on propofol at 40 mcg (14.2 ml/hr) which provides 375 kcals daily. Noted metabolic cart completed this morning, results not in chart yet. Palliative care family meeting possibly on Thursday.    Recommendations/Plan:  While on propofol, Impact Peptide 1.5 with goal rate 35 ml/hr will provide 1260 kcals + kcals from propofol (27 kcals/kg), 79 gm protein (1.3 gm/kg), and 647 ml fluids.   Once pt off propofol, increase TF Impact Peptide 1.5 to final goal of 40 ml/hr to provide 1440 kcals, 90 gm protein, and 739 ml free water.  Fluids per MD.   Will monitor for Metabolic Cart results.    RD to follow.

## 2020-01-22 NOTE — DIETARY
Nutrition support brief update:  Day 6 of admit.  Cassandra Guzmán is a 74 y.o. female with admitting DX of seizures, ventilatory respiratory failure, acute renal failure, respiratory failure requiring intubation.     Tube feeding initiated on 1/17. Current TF via small bowel cortrak: Impact Peptide 1.5 @ 35 mL/hr, providing 1260 kcals, 79 grams of protein and 647 mL of free water per day.    Evaluation:   1. Tube feeding adjusted for propofol rate 1/20.  2. Current propofol running @ 50 mcg/min (19.7 mL/hr) = 520 kcals/day. RD will monitor propofol rate and adjust TF as needed.  3. Metabolic Cart Study completed: REE = 1470 kcals, strong study as evidenced by RQ = 0.91 and REE Covariance = 2%.  4. Current feeding is exceeding estimated nutrition needs with propofol.  5. Will provide specialized, high protein, CHO controlled tube feeding formula.    Malnutrition risk: No new risk identified.    Recommendations/Plan:  1. Change tube feeding to Peptamen Intense VHP and advance to goal rate of 40 mL/hr (goal rate with propofol). Tube feeding at goal provides 960 kcals, 88 grams of protein, 73 grams of CHO, and 806 mL of free water per day.  2. Off propofol, change tube feeding to Impact Peptide 1.5 @ goal rate of 40 mL/hr, providing 1440 kcals, 90 grams of protein and 739 mL of free water per day.  3. Fluids per MD.    RD following.

## 2020-01-22 NOTE — PALLIATIVE CARE
Spiritual Care Note    Patient Information     Patient's Name: Cassandra Guzmán   MRN: [id number]    YOB: 1945   Age and Gender: 74 y.o. 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   Yes  Would you like to receive a visit from our Spiritual Care team or your own Anabaptist or spiritual leader?   No      Encounter/Request Information  Visited With:      Family  Nature of the Visit:     Initial, On Shift  Continue Visiting:     Yes  Crisis Visit:      Critical Care  Referral From/Origin of Request:   Epic Order (Palliative Care)  Referral To:      [facility] ()      Religous Needs/Values  Judaism Needs:     Prayer      Spiritual Assessment   Observations/Symptoms:    Patient sedated and intubated.  Family at bedside.  Interacton/Conversation:    Family asked me to prayt with them.          They also asked me to arrange for a  to come         and administer the sacrament of anointing.  Assessment:      Distress  Distress:      Anxious, Overwhelmed  Interventions:       Compassionate Presence, Emotional Support, Prayer  Outcomes:      Emotional Release, Progress with Trust, Spiritual Comfort  Plan:       Arrange for a  when one is available.

## 2020-01-22 NOTE — PROGRESS NOTES
No chief complaint on file.      Problem List Items Addressed This Visit     None      Visit Diagnoses     Shock (HCC)        Relevant Orders    Central Line Insertion (Completed)      Neurology Progress Note    Brief History of present illness:  74 y.o. woman with history of CHF, R parietal stroke with residual left-sided weakness, DM2, GERD, ESRD on dialysis, GERD, history of GI bleed, HTN, and hx of ICH presenting to [facility] on 1/16/20 as a transfer for seizures; found to have subclinical/non convulsive status epilepticus per cEEG on 1/19; responsive to Benzos; currently being treated with Vimpat, Topamax.     Interval, 1/22/20:   Patient remains intubated, sedated with Propofol. No clinical seizures overnight.    No changes to HPI as was previously documented.     Past medical history:   Past Medical History:   Diagnosis Date   • Arthritis    • Chronic diastolic heart failure ([facility]) 6/1/2016   • Clostridium difficile diarrhea 6/2/2016   • CVA (cerebral vascular accident) ([facility])     L side weakness   • DM (diabetes mellitus) type II uncontrolled with renal manifestation 4/26/2014   • GERD (gastroesophageal reflux disease)    • GIB (gastrointestinal bleeding) 6/13/2016   • GOUT    • Hypertension    • ICH (intracerebral hemorrhage) ([facility]) 4/6/2016   • Indigestion    • Kidney failure    • RALF (obstructive sleep apnea)     Not compliant with oxygen or CPAP   • RALF (obstructive sleep apnea)    • Other and unspecified angina pectoris     hospitalized 8/13 chest pain   • Seizure ([facility]) 1/16/2020   • Unspecified cataract     right eye   • UTI (urinary tract infection) 6/1/2016       Past surgical history:   Past Surgical History:   Procedure Laterality Date   • GASTROSCOPY-ENDO  6/14/2016    Procedure: GASTROSCOPY-ENDO;  Surgeon: Oliver Macedo M.D.;  Location: ENDOSCOPY [facility];  Service:    • VENTRAL HERNIA REPAIR  4/9/2014    Performed by Trinity Bryan M.D. at SURGERY [facility]   • VITA BY  LAPAROSCOPY  10/5/2013    Performed by Trinity Bryan M.D. at SURGERY [facility]   • TUBAL LIGATION  1977   • CATARACT EXTRACTION WITH IOL      left eye       Family history:   Family History   Problem Relation Age of Onset   • Diabetes Mother    • Diabetes Father    • Cancer Father         sarcoma       Social history:   Social History     Socioeconomic History   • Marital status:      Spouse name: Not on file   • Number of children: Not on file   • Years of education: Not on file   • Highest education level: Not on file   Occupational History   • Not on file   Social Needs   • Financial resource strain: Not on file   • Food insecurity:     Worry: Not on file     Inability: Not on file   • Transportation needs:     Medical: Not on file     Non-medical: Not on file   Tobacco Use   • Smoking status: Never Smoker   • Smokeless tobacco: Never Used   Substance and Sexual Activity   • Alcohol use: No   • Drug use: No   • Sexual activity: Not on file   Lifestyle   • Physical activity:     Days per week: Not on file     Minutes per session: Not on file   • Stress: Not on file   Relationships   • Social connections:     Talks on phone: Not on file     Gets together: Not on file     Attends Cheondoism service: Not on file     Active member of club or organization: Not on file     Attends meetings of clubs or organizations: Not on file     Relationship status: Not on file   • Intimate partner violence:     Fear of current or ex partner: Not on file     Emotionally abused: Not on file     Physically abused: Not on file     Forced sexual activity: Not on file   Other Topics Concern   • Not on file   Social History Narrative    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 , buffet host    Lives with her daughter, dependent for most of ADLs after stroke gave L sided weakness       Current medications:   Current Facility-Administered Medications   Medication Dose   • lacosamide (VIMPAT) 300 mg in  mL ivpb   300 mg   • propofol (DIPRIVAN) injection  0-80 mcg/kg/min   • LORazepam (ATIVAN) injection 1 mg  1 mg   • hydrALAZINE (APRESOLINE) injection 10 mg  10 mg   • topiramate (TOPAMAX) tablet 200 mg  200 mg   • aspirin (ASA) chewable tab 81 mg  81 mg   • atorvastatin (LIPITOR) tablet 40 mg  40 mg   • famotidine (PEPCID) tablet 20 mg  20 mg    Or   • famotidine (PEPCID) injection 20 mg  20 mg   • heparin injection 3,300 Units  3,300 Units   • epoetin antoinette (EPOGEN/PROCRIT) injection 4,000 Units  4,000 Units   • Respiratory Care per Protocol     • ipratropium-albuterol (DUONEB) nebulizer solution  3 mL   • senna-docusate (PERICOLACE or SENOKOT S) 8.6-50 MG per tablet 2 Tab  2 Tab    And   • polyethylene glycol/lytes (MIRALAX) PACKET 1 Packet  1 Packet    And   • magnesium hydroxide (MILK OF MAGNESIA) suspension 30 mL  30 mL    And   • bisacodyl (DULCOLAX) suppository 10 mg  10 mg   • lidocaine (XYLOCAINE) 1 % injection 1-2 mL  1-2 mL   • Pharmacy Consult: Enteral tube insertion - review meds/change route/product selection  1 Each   • fentaNYL (SUBLIMAZE) injection 25 mcg  25 mcg    Or   • fentaNYL (SUBLIMAZE) injection 50 mcg  50 mcg    Or   • fentaNYL (SUBLIMAZE) injection 100 mcg  100 mcg   • allopurinol (ZYLOPRIM) tablet 200 mg  200 mg   • fenofibrate micronized (LOFIBRA) capsule 134 mg  134 mg   • ondansetron (ZOFRAN) syringe/vial injection 4 mg  4 mg   • heparin injection 5,000 Units  5,000 Units   • labetalol (NORMODYNE/TRANDATE) injection 10 mg  10 mg   • insulin regular (HUMULIN R) injection 2-9 Units  2-9 Units    And   • DEXTROSE 10% BOLUS 250 mL  250 mL   • acetaminophen (TYLENOL) tablet 650 mg  650 mg   • ondansetron (ZOFRAN ODT) dispertab 4 mg  4 mg       Medication Allergy:  Allergies   Allergen Reactions   • Amlodipine Besylate Cough   • Amlodipine Cough       Review of systems:   Limited as patient is intubated/sedated.     Physical examination:   Vitals:    01/22/20 0800 01/22/20 0900 01/22/20 1000 01/22/20  1050   BP: 143/66 151/71 151/70    Pulse: 67 69 65 69   Resp: 13 13 15    Temp:       TempSrc: Bladder Bladder Bladder    SpO2: 100% 100% 100% 100%   Weight:       Height:         General: Patient in no acute distress.  HEENT: Normocephalic, no signs of acute trauma.   Neck: supple, no meningeal signs or carotid bruits. There is normal range of motion. No tenderness on exam.   Chest: clear to auscultation. No cough.   CV: RRR, no murmurs.   Skin: no signs of acute rashes or trauma.   Musculoskeletal: joints exhibit full range of motion, without any pain to palpation. There are no signs of joint or muscle swelling. There is no tenderness to deep palpation of muscles.   Psychiatric: No hallucinatory behavior.      NEUROLOGICAL EXAM:   Mental status, orientation: Intubated/sedated.   Speech and language: No verbal output, does not follow commands.   Cranial nerve exam: Pupils are 2 mm bilaterally and equally reactive to light. Visual fields are intact by confrontation. No spontaneous eye opening; does not track spontaneously or to command. Face appears symmetric. Sensation in the face is intact to light touch. Tongue is midline and without any signs of tongue biting or fasciculations.Shoulder shrug is intact bilaterally.   Motor/Sensory exam No spontaneous/voluntary movement to BUE/BLE; Withdrawal/grimace to deep nox stim x 4.   Deep tendon reflexes:  2+ throughout. Plantar responses are upgoing bilaterally. There is no clonus.   Coordination: Deferred as patient does not understand.   Gait: Not assessed at this time as patient is currently unable.     No significant changes to exam as was previously documented on 1/21/20.     ANCILLARY DATA REVIEWED:     Lab Data Review:  Recent Results (from the past 24 hour(s))   ACCU-CHEK GLUCOSE    Collection Time: 01/21/20  2:07 PM   Result Value Ref Range    Glucose - Accu-Ck 155 (H) 65 - 99 mg/dL   ACCU-CHEK GLUCOSE    Collection Time: 01/21/20  6:05 PM   Result Value Ref Range     Glucose - Accu-Ck 154 (H) 65 - 99 mg/dL   ACCU-CHEK GLUCOSE    Collection Time: 01/22/20 12:16 AM   Result Value Ref Range    Glucose - Accu-Ck 150 (H) 65 - 99 mg/dL   ISTAT ARTERIAL BLOOD GAS    Collection Time: 01/22/20  4:09 AM   Result Value Ref Range    Ph 7.473 7.400 - 7.500    Pco2 37.8 (H) 26.0 - 37.0 mmHg    Po2 100 (H) 64 - 87 mmHg    Tco2 29 20 - 33 mmol/L    S02 98 93 - 99 %    Hco3 27.7 (H) 17.0 - 25.0 mmol/L    BE 4 (H) -4 - 3 mmol/L    Body Temp 97.9 F degrees    O2 Therapy 30 %    iPF Ratio 333     Ph Temp Evelyn 7.479 7.400 - 7.500    Pco2 Temp Co 37.1 (H) 26.0 - 37.0 mmHg    Po2 Temp Cor 98 (H) 64 - 87 mmHg    Specimen Arterial     Action Range Triggered NO     Inst. Qualified Patient YES    CBC with Differential    Collection Time: 01/22/20  5:45 AM   Result Value Ref Range    WBC 7.9 4.8 - 10.8 K/uL    RBC 2.76 (L) 4.20 - 5.40 M/uL    Hemoglobin 9.0 (L) 12.0 - 16.0 g/dL    Hematocrit 29.9 (L) 37.0 - 47.0 %    .3 (H) 81.4 - 97.8 fL    MCH 32.6 27.0 - 33.0 pg    MCHC 30.1 (L) 33.6 - 35.0 g/dL    RDW 66.5 (H) 35.9 - 50.0 fL    Platelet Count 160 (L) 164 - 446 K/uL    MPV 10.0 9.0 - 12.9 fL    Neutrophils-Polys 51.60 44.00 - 72.00 %    Lymphocytes 30.70 22.00 - 41.00 %    Monocytes 12.30 0.00 - 13.40 %    Eosinophils 3.80 0.00 - 6.90 %    Basophils 0.80 0.00 - 1.80 %    Immature Granulocytes 0.80 0.00 - 0.90 %    Nucleated RBC 0.00 /100 WBC    Neutrophils (Absolute) 4.08 2.00 - 7.15 K/uL    Lymphs (Absolute) 2.42 1.00 - 4.80 K/uL    Monos (Absolute) 0.97 (H) 0.00 - 0.85 K/uL    Eos (Absolute) 0.30 0.00 - 0.51 K/uL    Baso (Absolute) 0.06 0.00 - 0.12 K/uL    Immature Granulocytes (abs) 0.06 0.00 - 0.11 K/uL    NRBC (Absolute) 0.00 K/uL   Basic Metabolic Panel (BMP)    Collection Time: 01/22/20  5:45 AM   Result Value Ref Range    Sodium 135 135 - 145 mmol/L    Potassium 3.9 3.6 - 5.5 mmol/L    Chloride 104 96 - 112 mmol/L    Co2 28 20 - 33 mmol/L    Glucose 151 (H) 65 - 99 mg/dL    Bun 30 (H)  8 - 22 mg/dL    Creatinine 1.13 0.50 - 1.40 mg/dL    Calcium 8.1 (L) 8.5 - 10.5 mg/dL    Anion Gap 3.0 0.0 - 11.9   Renal Function Panel    Collection Time: 01/22/20  5:45 AM   Result Value Ref Range    Phosphorus 1.9 (L) 2.5 - 4.5 mg/dL    Albumin 2.1 (L) 3.2 - 4.9 g/dL   ESTIMATED GFR    Collection Time: 01/22/20  5:45 AM   Result Value Ref Range    GFR If  57 (A) >60 mL/min/1.73 m 2    GFR If Non  47 (A) >60 mL/min/1.73 m 2       Labs reviewed by me.       Imaging reviewed by me:     DX-CHEST-PORTABLE (1 VIEW)   Final Result         No significant change from prior.               DX-CHEST-PORTABLE (1 VIEW)   Final Result         1. Stable lines and tubes..   2. Stable retrocardiac and left perihilar atelectasis versus consolidation. Suspected small left pleural effusion.            DX-CHEST-PORTABLE (1 VIEW)   Final Result         1. Stable lines and tubes..   2. Stable retrocardiac atelectasis versus consolidation with increased left perihilar opacity. Suspected trace left pleural effusion.         [id number] VIEW   Final Result      1.  Enteric tube has been placed and the tip projects over the stomach.      2.  Pre-existing feeding tube tip projects at the gastroduodenal junction      DX-CHEST-PORTABLE (1 VIEW)   Final Result         1. Lines and tubes as above.   2. Stable retrocardiac atelectasis versus consolidation. Suspected trace left pleural effusion.         MR-BRAIN-WITH & W/O   Final Result      1.  Moderate cerebral atrophy.   2.  Evidence of intraventricular hemorrhage, indeterminate age. Possibly chronic intraventricular hemosiderin deposition.   3.  Extensive encephalomalacic change with hemosiderin deposition involving the right corona radiata, basal ganglia, posterior thalamus, subthalamic region, bordering the right cerebral peduncle. Associated ex vacuo dilatation of the body of the right    lateral ventricle. No change.   4.  Additional foci of old  microhemorrhage most consistent with old hypertensive microhemorrhage or amyloid angiopathy in the left basal ganglia, left thalamus, and midline upper ventral abel.   5.  Punctate focus of acute infarction in the right parietal deep white matter. No change.   6.  Advanced supratentorial white matter disease most consistent with microvascular ischemic change.   7.  Encephalomalacic changes in the abel and right cerebral peduncle consistent with old infarction.   8.  Partially empty sella.   9.  Overall, no new findings and no significant change from 1/14/2020.      DX-CHEST-PORTABLE (1 VIEW)   Final Result         1. Stable lines and tubes.   2. Unchanged retrocardiac atelectasis versus consolidation.   3. Stable trace left pleural effusion.         OUTSIDE IMAGES-DX CHEST   Final Result      OUTSIDE IMAGES-US VASCULAR   Final Result      OUTSIDE IMAGES-MR BRAIN   Final Result      OUTSIDE IMAGES-DX CHEST   Final Result      OUTSIDE IMAGES-CT HEAD   Final Result      EC-ECHOCARDIOGRAM COMPLETE W/O CONT   Final Result      DX-CHEST-FOR LINE PLACEMENT Perform procedure in: Patient's Room   Final Result         1.  Retrocardiac opacity concerning for infiltrate, stable.   2.  Trace left pleural effusion, stable   3.  Cardiomegaly   4.  Atherosclerosis   5.  Perihilar interstitial prominence and bronchial wall cuffing, appearance suggests changes of underlying bronchial inflammation, consider bronchitis.      DX-ABDOMEN FOR TUBE PLACEMENT   Final Result         1.  Nonspecific bowel gas pattern.   2.  Dobbhoff tube tip overlying the expected location of the pylorus or first duodenal segment.   3.  Left lung base atelectasis and/or small effusion      DX-CHEST-PORTABLE (1 VIEW)   Final Result         1.  Retrocardiac opacity concerning for infiltrate.   2.  Trace left pleural effusion, stable   3.  Cardiomegaly   4.  Atherosclerosis      [id number] FILM X-RAY   Final Result      OUTSIDE IMAGES-DX CHEST   Final  Result          ASSESSMENT:  74 y.o. woman with history of CHF, R parietal stroke with residual left-sided weakness, DM2, GERD, ESRD on dialysis, GERD, history of GI bleed, HTN, and hx of ICH presenting to [facility] on 1/16/20 as a transfer for seizures; found to have subclinical/non convulsive status epilepticus per cEEG on 1/19; responsive to Benzos; currently being treated with Vimpat, Topamax.   Overnight (1/21/20), patient again reportedly had multiple subclinical/non convulsive seizures, patient remains on propofol, Vimpat increased; no clinical seizures per nursing.     Recommendations/Plan:     -Continue cEEG monitoring; will plan to re-evaluate EEG tomorrow morning 1/23/20 and provide additional recs accordingly.   -Continue current doses of Vimpat 300 mg q12h and Topamax 200 mg q12h.   -Seizure precautions; Ativan 1 mg IV for breakthrough GTCS.   -Palliative medicine is on board to establish goals of care; will follow up with their input as is available (apparently awaiting daughter who will be arriving on Thursday 1/23/20)  -All other medical management per primary team.   -DVT PPX: SQ Heparin.     The plan of care above has been discussed with Dr. Anderson.     Mary Lacey MSN, BSN, AGNP-C  [facility]

## 2020-01-22 NOTE — CARE PLAN
Problem: Ventilation Defect:  Goal: Ability to achieve and maintain unassisted ventilation or tolerate decreased levels of ventilator support  Outcome: PROGRESSING AS EXPECTED      Ventilator Daily Summary    Vent Day #7  7.5 @ 22 (LIP)  14, 320, +8, 30%    Ventilator settings changed this shift: NONE    Weaning trials: HELD until cleared by MD (Seizures/Propofol)    Respiratory Procedures: NONE    Plan: Continue current ventilator settings and wean mechanical ventilation as tolerated per physician orders.

## 2020-01-22 NOTE — PROGRESS NOTES
Critical Care Progress Note    Date of admission  1/16/2020    Chief Complaint  status epilepticus and respiratory failure    Hospital Course    74 y.o. female who presented 1/16/2020 with PMH significant for ESRD on IHD since November 2019 M/W/F, HTN, HPL, CAD, admitted for first time witness seizure at SNF, transferred from [facility]. Had a second seizure with prolonged postictal state. Intubated. MRI brain with an acute right parietal lacunar infarct with overall brain parenchymal loss. Loaded with Keppra dilantin. LP was done with no pleocytosis. Was empirically treated with antibiotics. Code status FULL CODE.      Interval Problem Update  Reviewed last 24 hour events:  Remains on continuous EEG   Spoke with Dr. Bustos and noted some seizures overnight.   On propofol was at 40 yesterday, increased to 50 this am.   On locasamide 200 BID  On topamax 200 BID    Afebrile, Tm 36.6C  SBP in 170-180s, HR in 70s  Off norepinephrine since 1/20  Remains on ventilatory support with FiO2 30%, PEEP 8. TV 320cc  Vent day #7. Was on vent x 2 days at OSH.    Was on spontaneous mode for most of day yesterday  Minimal secretions. Weak cough.     Had IHD yesterday   WBC 8.5  Hgb 8.7, plt 145  BG in 150s   On tube feed and tolerating.   Daughter will be here tomorrow or Friday.     Review of Systems  Review of Systems   Unable to perform ROS: Acuity of condition        Vital Signs for last 24 hours   Pulse:  [62-72] 72  Resp:  [13-15] 15  BP: (104-176)/(53-85) 153/85  SpO2:  [100 %] 100 %    Hemodynamic parameters for last 24 hours       Respiratory Information for the last 24 hours  Zepeda Vent Mode: APVCMV  Rate (breaths/min): 14  Vt Target (mL): 320  PEEP/CPAP: 8  FiO2: 30  P MEAN: 11  Control VTE (exp VT): 317    Physical Exam   Physical Exam  Vitals signs and nursing note reviewed.   Constitutional:       General: She is not in acute distress.     Appearance: She is ill-appearing. She is not toxic-appearing.       Comments: Intubated, not awake to verbal     HENT:      Head: Normocephalic.      Mouth/Throat:      Comments: Et tube in place  Eyes:      Conjunctiva/sclera: Conjunctivae normal.   Cardiovascular:      Rate and Rhythm: Normal rate and regular rhythm.      Pulses: Normal pulses.      Heart sounds: No murmur.   Pulmonary:      Effort: Pulmonary effort is normal.      Breath sounds: Normal breath sounds. No wheezing or rales.      Comments: Diminished at bases  Abdominal:      General: There is no distension.      Palpations: Abdomen is soft.      Tenderness: There is no tenderness. There is no guarding.      Comments: Hypoactive bowel sound   Musculoskeletal:      Right lower leg: No edema.      Left lower leg: No edema.      Comments: + edema in upper extremities   Skin:     General: Skin is warm and dry.      Capillary Refill: Capillary refill takes 2 to 3 seconds.      Findings: No bruising.   Neurological:      Comments: Baseline left-sided deficits from previous CVA  Opening eyes but not following commands         Medications  Current Facility-Administered Medications   Medication Dose Route Frequency Provider Last Rate Last Dose   • furosemide (LASIX) injection 20 mg  20 mg Intravenous Q12HRS Anurag Hein D.O.   20 mg at 01/22/20 0530   • LORazepam (ATIVAN) injection 1 mg  1 mg Intravenous Q3HRS PRN Catracho Bustos M.D.   1 mg at 01/20/20 1740   • hydrALAZINE (APRESOLINE) injection 10 mg  10 mg Intravenous Q4HRS PRN Anurag Hein D.O.   10 mg at 01/20/20 1524   • topiramate (TOPAMAX) tablet 200 mg  200 mg Enteral Tube Q12HRS Catracho Bustos M.D.   200 mg at 01/22/20 0529   • fentaNYL (SUBLIMAZE) 50 mcg/mL in 50mL (Continuous Infusion)   Intravenous Continuous Fletcher Willis M.D.   Stopped at 01/20/20 0958   • aspirin (ASA) chewable tab 81 mg  81 mg Enteral Tube DAILY Josep You M.D.   81 mg at 01/22/20 0529   • atorvastatin (LIPITOR) tablet 40 mg  40 mg Enteral Tube DAILY Josep You M.D.   40 mg at  01/22/20 0529   • lacosamide (VIMPAT) 200 mg in  mL ivpb  200 mg Intravenous BID Josep You M.D.   Stopped at 01/22/20 0632   • famotidine (PEPCID) tablet 20 mg  20 mg Enteral Tube DAILY Fletcher Willis M.D.   20 mg at 01/22/20 0529    Or   • famotidine (PEPCID) injection 20 mg  20 mg Intravenous DAILY Fletcher Willis M.D.   20 mg at 01/19/20 0508   • heparin injection 3,300 Units  3,300 Units Intracatheter PRN Lydia Carcamo M.D.   3,300 Units at 01/20/20 0915   • epoetin antoinette (EPOGEN/PROCRIT) injection 4,000 Units  4,000 Units Subcutaneous MO, WE + FR Payam Cavazos M.D.   4,000 Units at 01/20/20 0946   • propofol (DIPRIVAN) injection  0-80 mcg/kg/min Intravenous Continuous Jeremy M Gonda, M.D. 14.2 mL/hr at 01/22/20 0044 40 mcg/kg/min at 01/22/20 0044   • Respiratory Care per Protocol   Nebulization Continuous RT Jeremy M Gonda, M.D.       • ipratropium-albuterol (DUONEB) nebulizer solution  3 mL Nebulization Q2HRS PRN (RT) Jeremy M Gonda, M.D.       • senna-docusate (PERICOLACE or SENOKOT S) 8.6-50 MG per tablet 2 Tab  2 Tab Enteral Tube BID Jeremy M Gonda, M.D.   Stopped at 01/20/20 1800    And   • polyethylene glycol/lytes (MIRALAX) PACKET 1 Packet  1 Packet Enteral Tube QDAY PRN Jeremy M Gonda, M.D.        And   • magnesium hydroxide (MILK OF MAGNESIA) suspension 30 mL  30 mL Enteral Tube QDAY PRN Jeremy M Gonda, M.D.        And   • bisacodyl (DULCOLAX) suppository 10 mg  10 mg Rectal QDAY PRN Jeremy M Gonda, M.D.       • lidocaine (XYLOCAINE) 1 % injection 1-2 mL  1-2 mL Tracheal Tube Q30 MIN PRN Jeremy M Gonda, M.D.       • Pharmacy Consult: Enteral tube insertion - review meds/change route/product selection  1 Each Other PHARMACY TO DOSE Jeremy M Gonda, M.D.       • fentaNYL (SUBLIMAZE) injection 25 mcg  25 mcg Intravenous Q HOUR PRN Jeremy M Gonda, M.D.   25 mcg at 01/16/20 2310    Or   • fentaNYL (SUBLIMAZE) injection 50 mcg  50 mcg Intravenous Q HOUR PRN Jeremy M Gonda, M.D.   50 mcg at  01/20/20 1555    Or   • fentaNYL (SUBLIMAZE) injection 100 mcg  100 mcg Intravenous Q HOUR PRN Jeremy M Gonda, M.D.   100 mcg at 01/21/20 1748   • allopurinol (ZYLOPRIM) tablet 200 mg  200 mg Enteral Tube DAILY Rina Matta M.D.   200 mg at 01/22/20 0529   • fenofibrate micronized (LOFIBRA) capsule 134 mg  134 mg Enteral Tube DAILY Rina Matta M.D.   134 mg at 01/22/20 0529   • ondansetron (ZOFRAN) syringe/vial injection 4 mg  4 mg Intravenous Q4HRS PRN Rina Matta M.D.   4 mg at 01/18/20 1214   • heparin injection 5,000 Units  5,000 Units Subcutaneous Q8HRS Rina Matta M.D.   5,000 Units at 01/22/20 0530   • labetalol (NORMODYNE/TRANDATE) injection 10 mg  10 mg Intravenous Q4HRS PRN Rina Matta M.D.   10 mg at 01/20/20 1239   • insulin regular (HUMULIN R) injection 2-9 Units  2-9 Units Subcutaneous Q6HRS Rina Matta M.D.   Stopped at 01/22/20 0000    And   • DEXTROSE 10% BOLUS 250 mL  250 mL Intravenous Q15 MIN PRN Rina Matta M.D.   250 mL at 01/20/20 0026   • acetaminophen (TYLENOL) tablet 650 mg  650 mg Enteral Tube Q6HRS PRN Jeremy M Gonda, M.D.       • ondansetron (ZOFRAN ODT) dispertab 4 mg  4 mg Enteral Tube Q4HRS PRN Jeremy M Gonda, M.D.           Fluids    Intake/Output Summary (Last 24 hours) at 1/22/2020 0625  Last data filed at 1/21/2020 1900  Gross per 24 hour   Intake 775.67 ml   Output 460 ml   Net 315.67 ml       Laboratory  Recent Labs     01/20/20  0707 01/21/20  0457 01/22/20  0409   ISTATAPH 7.448 7.484 7.473   ISTATAPCO2 38.7* 36.9 37.8*   ISTATAPO2 100* 101* 100*   ISTATATCO2 28 29 29   [id number] 98 98 98   ISTATARTHCO3 26.8* 27.7* 27.7*   ISTATARTBE 3 4* 4*   ISTATTEMP 98.2 F 98.4 F 97.9 F   ISTATFIO2 30 30 30   ISTATSPEC Arterial Arterial Arterial   ISTATAPHTC 7.451 7.486 7.479   [id number] 99* 100* 98*         Recent Labs     01/20/20  0515 01/21/20  0530   SODIUM 133* 134*   POTASSIUM 3.5* 3.8   CHLORIDE 100 101   CO2 26 29   BUN 10 15   CREATININE 1.04 0.97    MAGNESIUM 1.6  --    CALCIUM 7.1* 7.7*     Recent Labs     01/20/20  0515 01/21/20  0530   ALTSGPT 7  --    ASTSGOT 23  --    ALKPHOSPHAT 36  --    TBILIRUBIN 0.7  --    PREALBUMIN  --  11.0*   GLUCOSE 115* 180*     Recent Labs     01/20/20  0515 01/21/20  0530   WBC 8.5 8.5   NEUTSPOLYS 49.40 56.80   LYMPHOCYTES 34.60 25.60   MONOCYTES 13.20 13.90*   EOSINOPHILS 1.90 1.90   BASOPHILS 0.50 0.40   ASTSGOT 23  --    ALTSGPT 7  --    ALKPHOSPHAT 36  --    TBILIRUBIN 0.7  --      Recent Labs     01/20/20  0515 01/21/20  0530   RBC 2.55* 2.67*   HEMOGLOBIN 8.7* 9.2*   HEMATOCRIT 27.0* 28.9*   PLATELETCT 145* 150*     Called [facility] [phone] today (1/21) for update on the LP done on 1/15.   1/15 CSF Gram stain negative, Cx no growth to date (final)  1/15 CSF cell count was WBC 1, RBC 2. Total protein 45. Glucose 79  1/15 CSF HSV PCR negative  1/15 CSF paraneoplastic negative    Imaging  X-Ray:  I have personally reviewed the images and compared with prior images.    Assessment/Plan  * Status epilepticus (HCC)  Assessment & Plan  New onset, unclear etiology  On continuous EEG  Propofol gtt is increased to 50mcg/kg/min  Increase vimpat 300 BID.   Continue topamax 200 BID  Versed/ativan prn clinical seizures.   D/w Dr. Bustos, neurology about the EEG overnight and noted some brief seizures.   Called [facility] [phone] today (1/21) for update on the LP done on 1/15. No pleocytosis or hypoglycorrhachia.  GS/Cultures negative. HSV PCR negative.       Acute respiratory failure with hypoxia (HCC)  Assessment & Plan  Intubated in emergency department 1/14 at outside hospital  Continue full mechanical ventilatory support  On 1/19 we attempted to keep pt off sedation. Off propofol and fentanyl. Antiseizures with vimpat, clonazepam and topamax.   On 1/19, captured evidence of subclinical seizures despite on current antiseizures.   Propofol gtt was started in the evening  On  1/20, spoke with Dr. Bustos, Neurology, and noted brief seizures overnight,  will continue propofol gtt and increased to 40mcg/kg/min  On 1/22, Pt still had intermittent brief seizure, propofol increased to 50. Vimpat increased to 300 BID. On topamax 200 BID    Not candidate for SAT, SBT, and mobility    No SAT and no mobility  Goal to keep sat >94%    Severe protein-calorie malnutrition (HCC)  Assessment & Plan  Continue tube feeds at goal rate   Dietary following    Cerebrovascular accident (CVA) due to embolism of right middle cerebral artery (HCC)  Assessment & Plan  Continue high intensity statin, aspirin  PT/OT eval  Neurology following  Echocardiogram reviewed    End stage renal failure on dialysis ([facility])  Assessment & Plan  HD per nephrology  Renally dose medications  Monitor electrolytes  Had MRI contrast, dializying for 3 consecutive doses    Normocytic anemia- (present on admission)  Assessment & Plan  Daily CBC with conservative transfusion strategy, monitor for bleeding    Hypertension  Assessment & Plan  scheduled hydralazine, Isordil, labetalol  PRN IV labetalol, hydralazine for goal SBP less than 160    DM (diabetes mellitus) ([facility])- (present on admission)  Assessment & Plan  Insulin sliding scale  Hypoglycemia protocol  FS BS  Goal -180    Goals of care, counseling/discussion  Assessment & Plan  Discussed at length with son regarding patient's critical condition on 1/21  Son came from [facility]. Pt has daughter who's going to be here on 1/23. She's currently in [place].   Palliative care is following. Plan to have family meeting on 1/23 with daughteraj DPOA      Pressure ulcer  Assessment & Plan  Stage II coccyx -present on arrival  Continue wound care    Hypomagnesemia  Assessment & Plan  Replace keep greater than 2    Gout- (present on admission)  Assessment & Plan  Continue allopurinol    Obesity (BMI 30-39.9)- (present on admission)  Assessment & Plan  Nutritionist  consultation  Encourage behavioral and dietary modification once extubated for weight loss       VTE:  Heparin  Ulcer: H2 Antagonist  Lines: Central Line  Ongoing indication addressed and [facility] Catheter  Ongoing indication addressed    I have performed a physical exam and reviewed and updated ROS and Plan today (1/22/2020). In review of yesterday's note (1/21/2020), there are no changes except as documented above.     Discussed patient condition and risk of morbidity and/or mortality with RN, RT, Pharmacy and nephrology and neurology     I have assessed  her respiratory status, hemodynamics, cardiovascular and neurological status.  Pt remains having seizures, propofol dose was increased. Vimpat was increased. D/w Dr. Bustos. . Need ongoing goal of care discussion with family to discuss about goal of care.  High risk of deterioration and worsening vital organ dysfunction and death without the above critical care interventions.     The patient remains critically ill.  Critical care time = 60 minutes in directly providing and coordinating critical care and extensive data review.  No time overlap and excludes procedures.

## 2020-01-22 NOTE — CARE PLAN
Problem: Urinary Elimination:  Goal: Ability to reestablish a normal urinary elimination pattern will improve  Outcome: PROGRESSING AS EXPECTED  Patient received two doses of lasix - output for NOC RN 1300 mL. Patient to receive dialysis today     Problem: Safety  Goal: Will remain free from injury  Outcome: MET  Goal: Will remain free from falls  Outcome: MET     Problem: Bowel/Gastric:  Goal: Normal bowel function is maintained or improved  Outcome: MET  Patient had large bowel movement this morning per NOC RN

## 2020-01-22 NOTE — PROCEDURES
VIDEO ELECTROENCEPHALOGRAM REPORT        Referring provider: Dr. You.      DOS: 1/22/2020 (total recording of 23 hours and 21 minutes).      INDICATION:  Cassandra [place] 74 y.o. female presenting with seizures, status epilepticus.      CURRENT ANTIEPILEPTIC REGIMEN: Lacosamide increased to 300 mg q 12 hrs, Topiramate increased to 200 mg bid. Sedated with propofol, which was increased.       TECHNIQUE: 30 channel video electroencephalogram (EEG) was performed in accordance with the international 10-20 system. The study was reviewed in bipolar and referential montages. The recording examined an encephalopathic and/or sedated patient.       DESCRIPTION OF THE RECORD:  Generalized 2-4 hz delta activity, frequent overnight arousals with increased in muscle artifact. Frequent triphasic waves and blunted frontal sharps noted, exhibiting frequent runs of periodic generalized discharges (GPDs), and although no clear seizures were captured, the study remains significantly abnormal and the patient remains at high risk for seizure recurrence.      ACTIVATION PROCEDURES:   Not performed.      EKG: sampling of the EKG recording demonstrated sinus rhythm.      EVENTS:  None.      INTERPRETATION:  This is an abnormal 24 hrs video EEG recording in a sedated, and/or encephalopathic patient. A moderate, toxic / metabolic encephalopathy is suggested. Frequent triphasic waves and blunted frontal sharps noted, exhibiting frequent runs of periodic generalized discharges (GPDs), and although no clear seizures were captured, the study remains significantly abnormal and the patient remains at high risk for seizure recurrence. Frequent arousals captured overnight, and the patient appears less sedated overnight. The findings suggest cortical irritability and structural abnormality. Clinical and radiological correlation is recommended.     Updates provided to Neurology and ICU teams.         Catracho Bustos MD   Epilepsy and  Neurodiagnostics.   Clinical  of Neurology [facility].   Diplomate in Neurology, Epilepsy, and Electrodiagnostic Medicine.   Office: [phone]  Fax: [phone]
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
## 2020-01-22 NOTE — PROGRESS NOTES
[facility] Services Progress Note     Hemodialysis treatment ordered today per Dr. Patterson x 3.5 hours.   Treatment initiated at 1334 ended at 1704.      Hypotensive during tx, NS bolus 100mL x1, Albumin 25% 12.5g IV x 1, Dialysate temp at 35 C per Dr. Patterson; see paper flow sheet for details.      Net  mL.      Post tx, CVC flushed with saline then locked with heparin 1000 units/mL per designated amount in each wing then clamped and capped. Aspirate heparin prior to next CVC use.     Report given to Primary RN.

## 2020-01-22 NOTE — CARE PLAN
Vent Day # 7     Ventilator settings changed this shift: None, ABG to PRN     Weaning trials: Held per MD    Respiratory Procedures: None     Plan: Continue current ventilator settings and wean mechanical ventilation as tolerated per physician orders.

## 2020-01-22 NOTE — CARE PLAN
Problem: Communication  Goal: The ability to communicate needs accurately and effectively will improve  Outcome: PROGRESSING AS EXPECTED     Problem: Infection  Goal: Will remain free from infection  Outcome: PROGRESSING AS EXPECTED     Problem: Venous Thromboembolism (VTW)/Deep Vein Thrombosis (DVT) Prevention:  Goal: Patient will participate in Venous Thrombosis (VTE)/Deep Vein Thrombosis (DVT)Prevention Measures  Outcome: PROGRESSING AS EXPECTED     Problem: Bowel/Gastric:  Goal: Normal bowel function is maintained or improved  Outcome: PROGRESSING AS EXPECTED  Goal: Will not experience complications related to bowel motility  Outcome: PROGRESSING AS EXPECTED

## 2020-01-23 NOTE — PROGRESS NOTES
Late Entry:    Called Dr. Hein related to low phosphorous. Patient Receiving dialysis. No new orders at this time.

## 2020-01-23 NOTE — PROGRESS NOTES
Critical Care Progress Note    Date of admission  1/16/2020    Chief Complaint  status epilepticus and respiratory failure    Hospital Course    74 y.o. female who presented 1/16/2020 with PMH significant for ESRD on IHD since November 2019 M/W/F, HTN, HPL, CAD, admitted for first time witness seizure at SNF, transferred from [facility]. Had a second seizure with prolonged postictal state. Intubated. MRI brain with an acute right parietal lacunar infarct with overall brain parenchymal loss. Loaded with Keppra, dilantin. LP was done with no pleocytosis. Was empirically treated with antibiotics. Code status FULL CODE.      Interval Problem Update  Reviewed last 24 hour events:  Remains on continuous EEG   Spoke with Dr. Bustos and noted some seizures overnight.   On propofol was at 50  Add keppra 500 Q12H  On locasamide 300 BID  On topamax 200 BID    Afebrile, Tm 36.2C  SBP in 100-150s, HR in 70s  Off norepinephrine since 1/20  Remains on ventilatory support with FiO2 30%, PEEP 8. TV 320cc  Vent day #8. Was on vent x 2 days at OSH.    Was on spontaneous mode for most of day yesterday  Minimal secretions. Weak cough.     Had IHD yesterday 500cc UF   WBC 8.5  Hgb 8.7, plt 145  BG in 150s   On tube feed and tolerating.   Family meeting tomorrow.     Review of Systems  Review of Systems   Unable to perform ROS: Acuity of condition        Vital Signs for last 24 hours   Pulse:  [59-80] 71  Resp:  [0-15] 14  BP: ()/(53-80) 159/67  SpO2:  [100 %] 100 %    Hemodynamic parameters for last 24 hours       Respiratory Information for the last 24 hours  Zepeda Vent Mode: APVCMV  Rate (breaths/min): 14  Vt Target (mL): 320  PEEP/CPAP: 8  FiO2: 30  P MEAN: 12  Control VTE (exp VT): 310    Physical Exam   Physical Exam  Vitals signs and nursing note reviewed.   Constitutional:       General: She is not in acute distress.     Appearance: She is ill-appearing. She is not toxic-appearing.      Comments: Intubated, not awake  to verbal     HENT:      Head: Normocephalic.      Mouth/Throat:      Comments: Et tube in place  Eyes:      Conjunctiva/sclera: Conjunctivae normal.   Cardiovascular:      Rate and Rhythm: Normal rate and regular rhythm.      Pulses: Normal pulses.      Heart sounds: No murmur.   Pulmonary:      Effort: Pulmonary effort is normal.      Breath sounds: Normal breath sounds. No wheezing or rales.      Comments: Diminished at bases  Abdominal:      General: There is no distension.      Palpations: Abdomen is soft.      Tenderness: There is no tenderness. There is no guarding.      Comments: Hypoactive bowel sound   Musculoskeletal:      Right lower leg: No edema.      Left lower leg: No edema.      Comments: + edema in upper extremities   Skin:     General: Skin is warm and dry.      Capillary Refill: Capillary refill takes 2 to 3 seconds.      Findings: No bruising.   Neurological:      Comments: Baseline left-sided deficits from previous CVA  Opening eyes but not following commands         Medications  Current Facility-Administered Medications   Medication Dose Route Frequency Provider Last Rate Last Dose   • lacosamide (VIMPAT) 300 mg in  mL ivpb  300 mg Intravenous BID Anurag Hein D.O. 130 mL/hr at 01/23/20 0534 300 mg at 01/23/20 0534   • propofol (DIPRIVAN) injection  0-80 mcg/kg/min Intravenous Continuous Anurag Hein D.O. 19.7 mL/hr at 01/23/20 0343 50 mcg/kg/min at 01/23/20 0343   • LORazepam (ATIVAN) injection 1 mg  1 mg Intravenous Q3HRS PRN Catracho Bustos M.D.   1 mg at 01/20/20 1740   • hydrALAZINE (APRESOLINE) injection 10 mg  10 mg Intravenous Q4HRS PRN Anurag Hein D.O.   10 mg at 01/22/20 2214   • topiramate (TOPAMAX) tablet 200 mg  200 mg Enteral Tube Q12HRS Catracho Bustos M.D.   200 mg at 01/23/20 0534   • aspirin (ASA) chewable tab 81 mg  81 mg Enteral Tube DAILY Josep You M.D.   81 mg at 01/23/20 0534   • atorvastatin (LIPITOR) tablet 40 mg  40 mg Enteral Tube DAILY Josep You M.D.    40 mg at 01/23/20 0534   • famotidine (PEPCID) tablet 20 mg  20 mg Enteral Tube DAILY Fletcher Willis M.D.   20 mg at 01/23/20 0534    Or   • famotidine (PEPCID) injection 20 mg  20 mg Intravenous DAILY Fletcher Willis M.D.   20 mg at 01/19/20 0508   • heparin injection 3,300 Units  3,300 Units Intracatheter PRN Lydia Carcamo M.D.   3,300 Units at 01/22/20 1705   • epoetin antoinette (EPOGEN/PROCRIT) injection 4,000 Units  4,000 Units Subcutaneous MO, WE + FR Payam Cavazos M.D.   4,000 Units at 01/22/20 0944   • Respiratory Care per Protocol   Nebulization Continuous RT Jeremy M Gonda, M.D.       • ipratropium-albuterol (DUONEB) nebulizer solution  3 mL Nebulization Q2HRS PRN (RT) Jeremy M Gonda, M.D.       • senna-docusate (PERICOLACE or SENOKOT S) 8.6-50 MG per tablet 2 Tab  2 Tab Enteral Tube BID Jeremy M Gonda, M.D.   Stopped at 01/20/20 1800    And   • polyethylene glycol/lytes (MIRALAX) PACKET 1 Packet  1 Packet Enteral Tube QDAY PRN Jeremy M Gonda, M.D.        And   • magnesium hydroxide (MILK OF MAGNESIA) suspension 30 mL  30 mL Enteral Tube QDAY PRN Jeremy M Gonda, M.D.        And   • bisacodyl (DULCOLAX) suppository 10 mg  10 mg Rectal QDAY PRN Jeremy M Gonda, M.D.       • lidocaine (XYLOCAINE) 1 % injection 1-2 mL  1-2 mL Tracheal Tube Q30 MIN PRN Jeremy M Gonda, M.D.       • Pharmacy Consult: Enteral tube insertion - review meds/change route/product selection  1 Each Other PHARMACY TO DOSE Jeremy M Gonda, M.D.       • fentaNYL (SUBLIMAZE) injection 25 mcg  25 mcg Intravenous Q HOUR PRN Jeremy M Gonda, M.D.   25 mcg at 01/16/20 2310    Or   • fentaNYL (SUBLIMAZE) injection 50 mcg  50 mcg Intravenous Q HOUR PRN Jeremy M Gonda, M.D.   50 mcg at 01/20/20 1555    Or   • fentaNYL (SUBLIMAZE) injection 100 mcg  100 mcg Intravenous Q HOUR PRN Jeremy M Gonda, M.D.   100 mcg at 01/21/20 1748   • allopurinol (ZYLOPRIM) tablet 200 mg  200 mg Enteral Tube DAILY Rina Matta M.D.   200 mg at 01/23/20 0501   •  fenofibrate micronized (LOFIBRA) capsule 134 mg  134 mg Enteral Tube DAILY Rina Matta M.D.   134 mg at 01/23/20 0534   • ondansetron (ZOFRAN) syringe/vial injection 4 mg  4 mg Intravenous Q4HRS PRN Rina Matta M.D.   4 mg at 01/18/20 1214   • heparin injection 5,000 Units  5,000 Units Subcutaneous Q8HRS Rina Matta M.D.   5,000 Units at 01/23/20 0534   • labetalol (NORMODYNE/TRANDATE) injection 10 mg  10 mg Intravenous Q4HRS PRN Rina Matta M.D.   10 mg at 01/20/20 1239   • insulin regular (HUMULIN R) injection 2-9 Units  2-9 Units Subcutaneous Q6HRS Rina Matta M.D.   Stopped at 01/22/20 1800    And   • DEXTROSE 10% BOLUS 250 mL  250 mL Intravenous Q15 MIN PRN Rina Matta M.D.   250 mL at 01/20/20 0026   • acetaminophen (TYLENOL) tablet 650 mg  650 mg Enteral Tube Q6HRS PRN Jeremy M Gonda, M.D.       • ondansetron (ZOFRAN ODT) dispertab 4 mg  4 mg Enteral Tube Q4HRS PRN Jeremy M Gonda, M.D.           Fluids    Intake/Output Summary (Last 24 hours) at 1/23/2020 0645  Last data filed at 1/23/2020 0600  Gross per 24 hour   Intake 2379.34 ml   Output 2610 ml   Net -230.66 ml       Laboratory  Recent Labs     01/20/20  0707 01/21/20  0457 01/22/20  0409   ISTATAPH 7.448 7.484 7.473   ISTATAPCO2 38.7* 36.9 37.8*   ISTATAPO2 100* 101* 100*   ISTATATCO2 28 29 29   BRAVUBA9XBV 98 98 98   ISTATARTHCO3 26.8* 27.7* 27.7*   ISTATARTBE 3 4* 4*   ISTATTEMP 98.2 F 98.4 F 97.9 F   ISTATFIO2 30 30 30   ISTATSPEC Arterial Arterial Arterial   ISTATAPHTC 7.451 7.486 7.479   TMEIFSFP3WN 99* 100* 98*         Recent Labs     01/21/20  0530 01/22/20  0545   SODIUM 134* 135   POTASSIUM 3.8 3.9   CHLORIDE 101 104   CO2 29 28   BUN 15 30*   CREATININE 0.97 1.13   PHOSPHORUS  --  1.9*   CALCIUM 7.7* 8.1*     Recent Labs     01/21/20  0530 01/22/20  0545   PREALBUMIN 11.0*  --    GLUCOSE 180* 151*     Recent Labs     01/21/20  0530 01/22/20  0545 01/23/20  0605   WBC 8.5 7.9 7.7   NEUTSPOLYS 56.80 51.60 50.50   LYMPHOCYTES  25.60 30.70 31.10   MONOCYTES 13.90* 12.30 13.20   EOSINOPHILS 1.90 3.80 3.20   BASOPHILS 0.40 0.80 0.60     Recent Labs     01/21/20  0530 01/22/20  0545 01/23/20  0605   RBC 2.67* 2.76* 2.61*   HEMOGLOBIN 9.2* 9.0* 9.0*   HEMATOCRIT 28.9* 29.9* 28.1*   PLATELETCT 150* 160* 137*     Called [facility] [phone] today (1/21) for update on the LP done on 1/15.   1/15 CSF Gram stain negative, Cx no growth to date (final)  1/15 CSF cell count was WBC 1, RBC 2. Total protein 45. Glucose 79  1/15 CSF HSV PCR negative  1/15 CSF paraneoplastic negative    Imaging  X-Ray:  I have personally reviewed the images and compared with prior images.    Assessment/Plan  * Status epilepticus (HCC)  Assessment & Plan  New onset, unclear etiology  On continuous EEG  Propofol gtt at 50mcg/kg/min  Continue vimpat 300 BID and topamax 200 BID  Add versed gtt and keppra 500 BID  Versed/ativan prn clinical seizures.   D/w Dr. Bustos, neurology about the EEG overnight and noted some brief seizures.   Called [facility] [phone] today (1/21) for update on the LP done on 1/15. No pleocytosis or hypoglycorrhachia.  GS/Cultures negative. HSV PCR negative.   Will plan to repeat LP here  D/w Neurology       Acute respiratory failure with hypoxia (HCC)  Assessment & Plan  Intubated in emergency department 1/14 at outside hospital  Continue full mechanical ventilatory support  On 1/19 we attempted to keep pt off sedation. Off propofol and fentanyl. Antiseizures with vimpat, clonazepam and topamax.   On 1/19, captured evidence of subclinical seizures despite on current antiseizures.   Propofol gtt was started in the evening  On 1/20, spoke with Dr. Bustos, Neurology, and noted brief seizures overnight,  will continue propofol gtt and increased to 40mcg/kg/min  On 1/22, Pt still had intermittent brief seizure, propofol increased to 50. Vimpat increased to 300 BID. On topamax 200 BID  On 1/23, pt still  having seizures. Versed gtt was started at 4mg/hr, propofol 50, vimpat 300 BID and topomax 200BID. Added keppra    Not candidate for SAT, SBT, and mobility    No SAT and no mobility  Goal to keep sat >94%    Severe protein-calorie malnutrition (HCC)  Assessment & Plan  Continue tube feeds at goal rate   Dietary following    Cerebrovascular accident (CVA) due to embolism of right middle cerebral artery (HCC)  Assessment & Plan  Continue high intensity statin, aspirin  PT/OT eval  Neurology following  Echocardiogram reviewed    End stage renal failure on dialysis ([facility])  Assessment & Plan  HD per nephrology  Renally dose medications  Monitor electrolytes  Had MRI contrast, dializying for 3 consecutive doses    Normocytic anemia- (present on admission)  Assessment & Plan  Daily CBC with conservative transfusion strategy, monitor for bleeding    Hypertension  Assessment & Plan  scheduled hydralazine, Isordil, labetalol  PRN IV labetalol, hydralazine for goal SBP less than 160    DM (diabetes mellitus) ([facility])- (present on admission)  Assessment & Plan  Insulin sliding scale  Hypoglycemia protocol  FS BS  Goal -180    Goals of care, counseling/discussion  Assessment & Plan  Discussed at length with son regarding patient's critical condition on 1/21  Son came from [facility]. Pt has daughter who's going to be here on 1/23. She's currently in [place].   Palliative care is following. Plan to have family meeting tomorrow on 1/24 with daughteraj DPOA      Pressure ulcer  Assessment & Plan  Stage II coccyx -present on arrival  Continue wound care    Hypomagnesemia  Assessment & Plan  Replace keep greater than 2    Gout- (present on admission)  Assessment & Plan  Continue allopurinol    Obesity (BMI 30-39.9)- (present on admission)  Assessment & Plan  Nutritionist consultation  Encourage behavioral and dietary modification once extubated for weight loss       VTE:  Heparin  Ulcer: H2 Antagonist  Lines: Central Line   Ongoing indication addressed and River Catheter  Ongoing indication addressed    I have performed a physical exam and reviewed and updated ROS and Plan today (1/23/2020). In review of yesterday's note (1/22/2020), there are no changes except as documented above.     Discussed patient condition and risk of morbidity and/or mortality with RN, RT, Pharmacy and nephrology and neurology     I have assessed  her respiratory status, hemodynamics, cardiovascular and neurological status.  Pt remains having seizures. . Need ongoing goal of care discussion with family to discuss about goal of care.  High risk of deterioration and worsening vital organ dysfunction and death without the above critical care interventions.     The patient remains critically ill.  Critical care time = 50 minutes in directly providing and coordinating critical care and extensive data review.  No time overlap and excludes procedures.

## 2020-01-23 NOTE — CARE PLAN
Adult Ventilation Update    Total Vent Days: 8    Patient Lines/Drains/Airways Status      Active Airway       Name: Placement date: Placement time: Site: Days:    Airway ETT Oral 7.5 @ 22  01/14/20   --   Oral  9                  APV: 14, 320, +8, 30%    SBT's held due to continuous EEG, per MD.

## 2020-01-23 NOTE — PALLIATIVE CARE
Palliative Care follow-up  Appreciate updates from BS RN on current situation. Case discussed with neurology APRN who will be present for the meeting. Call placed to dtr Belen and she is requesting Friday at noon, given she works night shift tonight. Updates to neurology APRN and Dr. Hein regarding meeting time. Belen appreciative of the call and coordination of this meeting.      Updated: PC team; BS RN    Plan: meeting Friday at noon    Thank you for allowing Palliative Care to support this patient and family. Contact [phone] for additional assistance, change in patient status, or with any questions/concerns.

## 2020-01-23 NOTE — CARE PLAN
Problem: Pain Management  Goal: Pain level will decrease to patient's comfort goal  Outcome: PROGRESSING AS EXPECTED  Intervention: Follow pain managment plan developed in collaboration with patient and Interdisciplinary Team  Note:   Utilize CPOT for pain assessment and implement pain interventions     Problem: Skin Integrity  Goal: Risk for impaired skin integrity will decrease  Outcome: PROGRESSING SLOWER THAN EXPECTED  Intervention: Implement precautions to protect skin integrity in collaboration with the interdisciplinary team  Note:   Implement skin breakdown interventions including q2hr turns and repositioning. Ensure tubes and lines are free from skin. Perform range of motion for extremities to alleviate pressure on bony prominences.

## 2020-01-23 NOTE — RESPIRATORY CARE
Adult Ventilation Update    Total Vent Days: 8    Patient Lines/Drains/Airways Status    Active Airway     Name: Placement date: Placement time: Site: Days:    Airway ETT Oral 7.5  01/14/20   no documentation   Oral  8              #FVC / Vital Capacity (liters) : (does not follow commands) (01/20/20 0826)  NIF (cm H2O) : (does not follow) (01/20/20 0826)  Rapid Shallow Breathing Index (RR/VT): 60 (01/21/20 0518)  Plateau Pressure (Q Shift): 19 (01/22/20 0619)  Static Compliance (ml / cm H2O): 22.1 (01/23/20 0121)    Sputum/Suction   Cough: Congested;Moist;Strong (01/23/20 0121)  Sputum Amount: Scant (01/23/20 0121)  Sputum Color: Clear;White (01/23/20 0121)  Sputum Consistency: Thin (01/23/20 0121)    Mobility  Level of Mobility: Level I (01/22/20 0800)  Activity Performed: Unable to mobilize (01/22/20 0800)  Assistance: Assistance of Two or More (01/22/20 0800)  Pt Calls for Assistance: No (01/22/20 0800)  Gait: Unable to Ambulate (01/23/20 0000)  Reason Not Mobilized: Bed rest;Unstable condition (01/22/20 2000)  Mobilization Comments: continuus EEG (01/22/20 0800)

## 2020-01-23 NOTE — PROCEDURES
VIDEO ELECTROENCEPHALOGRAM REPORT        Referring provider: Dr. You.      DOS: 1/23/2020 (total recording of 23 hours and 29 minutes).      INDICATION:  Cassandra [place] 74 y.o. female presenting with seizures, status epilepticus.      CURRENT ANTIEPILEPTIC REGIMEN: Lacosamide 300 mg q 12 hrs, Topiramate 200 mg bid. Levetiracetam 500 mg q 12 hrs added. Sedated with propofol, and Midazolam infusions.       TECHNIQUE: 30 channel video electroencephalogram (EEG) was performed in accordance with the international 10-20 system. The study was reviewed in bipolar and referential montages. The recording examined a sedated patient.       DESCRIPTION OF THE RECORD:  Waxing and waning of the cerebral electrical activity suggestive of the use of sedatives. Rarely seen triphasic waves. No seizures.      ACTIVATION PROCEDURES:   Not performed.      EKG: sampling of the EKG recording demonstrated sinus rhythm.      EVENTS:  None.      INTERPRETATION:  This is an abnormal 24 hrs video EEG recording in a sedated patient. Rare triphasic waves and blunted frontal sharps noted. No seizures captured during the study. The findings suggest cortical irritability and/or structural abnormality. Clinical and radiological correlation is recommended.     Updates provided to Neurology and ICU teams.         Catracho Bustos MD   Epilepsy and Neurodiagnostics.   Clinical  of [facility]rology [employer].   Diplomate in Neurology, Epilepsy, and Electrodiagnostic Medicine.   Office: [phone]  Fax: [phone]

## 2020-01-23 NOTE — PROGRESS NOTES
No chief complaint on file.      Problem List Items Addressed This Visit     None      Visit Diagnoses     Shock (HCC)        Relevant Orders    Central Line Insertion (Completed)      Neurology Progress Note    Brief History of present illness:  74 y.o. woman with history of CHF, R parietal stroke with residual left-sided weakness, DM2, GERD, ESRD on dialysis, GERD, history of GI bleed, HTN, and hx of ICH presenting to [facility] on 1/16/20 as a transfer for seizures; found to have subclinical/non convulsive status epilepticus per cEEG on 1/19; responsive to Benzos; currently being treated with Vimpat, Topamax.     Interval, 1/23/20:   Patient remains intubated, sedated with Propofol, Versed. Family at bedside. No clinical seizures overnight.    No changes to HPI as was previously documented.     Past medical history:   Past Medical History:   Diagnosis Date   • Arthritis    • Chronic diastolic heart failure ([facility]) 6/1/2016   • Clostridium difficile diarrhea 6/2/2016   • CVA (cerebral vascular accident) ([facility])     L side weakness   • DM (diabetes mellitus) type II uncontrolled with renal manifestation 4/26/2014   • GERD (gastroesophageal reflux disease)    • GIB (gastrointestinal bleeding) 6/13/2016   • GOUT    • Hypertension    • ICH (intracerebral hemorrhage) ([facility]) 4/6/2016   • Indigestion    • Kidney failure    • RALF (obstructive sleep apnea)     Not compliant with oxygen or CPAP   • RALF (obstructive sleep apnea)    • Other and unspecified angina pectoris     hospitalized 8/13 chest pain   • Seizure ([facility]) 1/16/2020   • Unspecified cataract     right eye   • UTI (urinary tract infection) 6/1/2016       Past surgical history:   Past Surgical History:   Procedure Laterality Date   • GASTROSCOPY-ENDO  6/14/2016    Procedure: GASTROSCOPY-ENDO;  Surgeon: Oliver Macedo M.D.;  Location: ENDOSCOPY [facility];  Service:    • VENTRAL HERNIA REPAIR  4/9/2014    Performed by Trinity Bryan M.D. at SURGERY  [facility]   • VITA BY LAPAROSCOPY  10/5/2013    Performed by Trinity Bryan M.D. at [facility]   • TUBAL LIGATION  1977   • CATARACT EXTRACTION WITH IOL      left eye       Family history:   Family History   Problem Relation Age of Onset   • Diabetes Mother    • Diabetes Father    • Cancer Father         sarcoma       Social history:   Social History     Socioeconomic History   • Marital status:      Spouse name: Not on file   • Number of children: Not on file   • Years of education: Not on file   • Highest education level: Not on file   Occupational History   • Not on file   Social Needs   • Financial resource strain: Not on file   • Food insecurity:     Worry: Not on file     Inability: Not on file   • Transportation needs:     Medical: Not on file     Non-medical: Not on file   Tobacco Use   • Smoking status: Never Smoker   • Smokeless tobacco: Never Used   Substance and Sexual Activity   • Alcohol use: No   • Drug use: No   • Sexual activity: Not on file   Lifestyle   • Physical activity:     Days per week: Not on file     Minutes per session: Not on file   • Stress: Not on file   Relationships   • Social connections:     Talks on phone: Not on file     Gets together: Not on file     Attends Hindu service: Not on file     Active member of club or organization: Not on file     Attends meetings of clubs or organizations: Not on file     Relationship status: Not on file   • Intimate partner violence:     Fear of current or ex partner: Not on file     Emotionally abused: Not on file     Physically abused: Not on file     Forced sexual activity: Not on file   Other Topics Concern   • Not on file   Social History Narrative    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 , buffet host    Lives with her daughter, dependent for most of ADLs after stroke gave L sided weakness       Current medications:   Current Facility-Administered Medications   Medication Dose   • levETIRAcetam  (KEPPRA) 500 mg in  mL IVPB  500 mg   • midazolam (VERSED) premix 125 mg/125 mL NS  0-10 mg/hr   • lacosamide (VIMPAT) 300 mg in  mL ivpb  300 mg   • propofol (DIPRIVAN) injection  0-80 mcg/kg/min   • LORazepam (ATIVAN) injection 1 mg  1 mg   • hydrALAZINE (APRESOLINE) injection 10 mg  10 mg   • topiramate (TOPAMAX) tablet 200 mg  200 mg   • aspirin (ASA) chewable tab 81 mg  81 mg   • atorvastatin (LIPITOR) tablet 40 mg  40 mg   • famotidine (PEPCID) tablet 20 mg  20 mg    Or   • famotidine (PEPCID) injection 20 mg  20 mg   • heparin injection 3,300 Units  3,300 Units   • epoetin antoinette (EPOGEN/PROCRIT) injection 4,000 Units  4,000 Units   • Respiratory Care per Protocol     • ipratropium-albuterol (DUONEB) nebulizer solution  3 mL   • senna-docusate (PERICOLACE or SENOKOT S) 8.6-50 MG per tablet 2 Tab  2 Tab    And   • polyethylene glycol/lytes (MIRALAX) PACKET 1 Packet  1 Packet    And   • magnesium hydroxide (MILK OF MAGNESIA) suspension 30 mL  30 mL    And   • bisacodyl (DULCOLAX) suppository 10 mg  10 mg   • lidocaine (XYLOCAINE) 1 % injection 1-2 mL  1-2 mL   • Pharmacy Consult: Enteral tube insertion - review meds/change route/product selection  1 Each   • fentaNYL (SUBLIMAZE) injection 25 mcg  25 mcg    Or   • fentaNYL (SUBLIMAZE) injection 50 mcg  50 mcg    Or   • fentaNYL (SUBLIMAZE) injection 100 mcg  100 mcg   • allopurinol (ZYLOPRIM) tablet 200 mg  200 mg   • fenofibrate micronized (LOFIBRA) capsule 134 mg  134 mg   • ondansetron (ZOFRAN) syringe/vial injection 4 mg  4 mg   • heparin injection 5,000 Units  5,000 Units   • labetalol (NORMODYNE/TRANDATE) injection 10 mg  10 mg   • insulin regular (HUMULIN R) injection 2-9 Units  2-9 Units    And   • DEXTROSE 10% BOLUS 250 mL  250 mL   • acetaminophen (TYLENOL) tablet 650 mg  650 mg   • ondansetron (ZOFRAN ODT) dispertab 4 mg  4 mg       Medication Allergy:  Allergies   Allergen Reactions   • Amlodipine Besylate Cough   • Amlodipine Cough        Review of systems:   Limited as patient is intubated/sedated.     Physical examination:   Vitals:    01/23/20 0800 01/23/20 0900 01/23/20 1000 01/23/20 1025   BP: 142/69 123/62 121/61    Pulse: 63 60 (!) 59 (!) 57   Resp: (!) 11 13 14    Temp:       TempSrc: Bladder Bladder     SpO2: 100% 100%  100%   Weight:       Height:         General: Patient in no acute distress.  HEENT: Normocephalic, no signs of acute trauma.   Neck: supple, no meningeal signs or carotid bruits. There is normal range of motion. No tenderness on exam.   Chest: clear to auscultation. No cough.   CV: RRR, no murmurs.   Skin: no signs of acute rashes or trauma.   Musculoskeletal: joints exhibit full range of motion, without any pain to palpation. There are no signs of joint or muscle swelling. There is no tenderness to deep palpation of muscles.   Psychiatric: No hallucinatory behavior.      NEUROLOGICAL EXAM:   Mental status, orientation: Intubated/sedated.   Speech and language: No verbal output, does not follow commands.   Cranial nerve exam: Pupils are 2 mm bilaterally and equally reactive to light. Visual fields are intact by confrontation. No spontaneous eye opening; does not track spontaneously or to command. Face appears symmetric. Sensation in the face is intact to light touch. Tongue is midline and without any signs of tongue biting or fasciculations.Shoulder shrug is intact bilaterally.   Motor/Sensory exam No spontaneous/voluntary movement to BUE/BLE; Withdrawal/grimace to deep nox stim to BLE.    Deep tendon reflexes:  2+ throughout. Plantar responses are upgoing bilaterally. There is no clonus.   Coordination: Deferred as patient does not understand.   Gait: Not assessed at this time as patient is currently unable.     No significant changes to exam as was previously documented on 1/22/20.     ANCILLARY DATA REVIEWED:     Lab Data Review:  Recent Results (from the past 24 hour(s))   ACCU-CHEK GLUCOSE    Collection Time:  01/22/20  1:12 PM   Result Value Ref Range    Glucose - Accu-Ck 153 (H) 65 - 99 mg/dL   ACCU-CHEK GLUCOSE    Collection Time: 01/22/20  5:48 PM   Result Value Ref Range    Glucose - Accu-Ck 118 (H) 65 - 99 mg/dL   ACCU-CHEK GLUCOSE    Collection Time: 01/23/20 12:43 AM   Result Value Ref Range    Glucose - Accu-Ck 119 (H) 65 - 99 mg/dL   ACCU-CHEK GLUCOSE    Collection Time: 01/23/20  6:04 AM   Result Value Ref Range    Glucose - Accu-Ck 121 (H) 65 - 99 mg/dL   CBC with Differential    Collection Time: 01/23/20  6:05 AM   Result Value Ref Range    WBC 7.7 4.8 - 10.8 K/uL    RBC 2.61 (L) 4.20 - 5.40 M/uL    Hemoglobin 9.0 (L) 12.0 - 16.0 g/dL    Hematocrit 28.1 (L) 37.0 - 47.0 %    .7 (H) 81.4 - 97.8 fL    MCH 34.5 (H) 27.0 - 33.0 pg    MCHC 32.0 (L) 33.6 - 35.0 g/dL    RDW 67.3 (H) 35.9 - 50.0 fL    Platelet Count 137 (L) 164 - 446 K/uL    MPV 10.1 9.0 - 12.9 fL    Neutrophils-Polys 50.50 44.00 - 72.00 %    Lymphocytes 31.10 22.00 - 41.00 %    Monocytes 13.20 0.00 - 13.40 %    Eosinophils 3.20 0.00 - 6.90 %    Basophils 0.60 0.00 - 1.80 %    Immature Granulocytes 1.40 (H) 0.00 - 0.90 %    Nucleated RBC 0.00 /100 WBC    Neutrophils (Absolute) 3.88 2.00 - 7.15 K/uL    Lymphs (Absolute) 2.40 1.00 - 4.80 K/uL    Monos (Absolute) 1.02 (H) 0.00 - 0.85 K/uL    Eos (Absolute) 0.25 0.00 - 0.51 K/uL    Baso (Absolute) 0.05 0.00 - 0.12 K/uL    Immature Granulocytes (abs) 0.11 0.00 - 0.11 K/uL    NRBC (Absolute) 0.00 K/uL   Basic Metabolic Panel (BMP)    Collection Time: 01/23/20  6:05 AM   Result Value Ref Range    Sodium 137 135 - 145 mmol/L    Potassium 3.9 3.6 - 5.5 mmol/L    Chloride 103 96 - 112 mmol/L    Co2 31 20 - 33 mmol/L    Glucose 126 (H) 65 - 99 mg/dL    Bun 20 8 - 22 mg/dL    Creatinine 0.72 0.50 - 1.40 mg/dL    Calcium 8.0 (L) 8.5 - 10.5 mg/dL    Anion Gap 3.0 0.0 - 11.9   ESTIMATED GFR    Collection Time: 01/23/20  6:05 AM   Result Value Ref Range    GFR If African American >60 >60 mL/min/1.73 m 2    GFR  If Non African American >60 >60 mL/min/1.73 m 2       Labs reviewed by me.       Imaging reviewed by me:     DX-CHEST-PORTABLE (1 VIEW)   Final Result      Stable chest with retrocardiac opacity from atelectasis and/or pleural fluid favored over consolidation      DX-CHEST-PORTABLE (1 VIEW)   Final Result         No significant change from prior.               DX-CHEST-PORTABLE (1 VIEW)   Final Result         1. Stable lines and tubes..   2. Stable retrocardiac and left perihilar atelectasis versus consolidation. Suspected small left pleural effusion.            DX-CHEST-PORTABLE (1 VIEW)   Final Result         1. Stable lines and tubes..   2. Stable retrocardiac atelectasis versus consolidation with increased left perihilar opacity. Suspected trace left pleural effusion.         [id number] VIEW   Final Result      1.  Enteric tube has been placed and the tip projects over the stomach.      2.  Pre-existing feeding tube tip projects at the gastroduodenal junction      DX-CHEST-PORTABLE (1 VIEW)   Final Result         1. Lines and tubes as above.   2. Stable retrocardiac atelectasis versus consolidation. Suspected trace left pleural effusion.         MR-BRAIN-WITH & W/O   Final Result      1.  Moderate cerebral atrophy.   2.  Evidence of intraventricular hemorrhage, indeterminate age. Possibly chronic intraventricular hemosiderin deposition.   3.  Extensive encephalomalacic change with hemosiderin deposition involving the right corona radiata, basal ganglia, posterior thalamus, subthalamic region, bordering the right cerebral peduncle. Associated ex vacuo dilatation of the body of the right    lateral ventricle. No change.   4.  Additional foci of old microhemorrhage most consistent with old hypertensive microhemorrhage or amyloid angiopathy in the left basal ganglia, left thalamus, and midline upper ventral abel.   5.  Punctate focus of acute infarction in the right parietal deep white matter. No change.   6.   Advanced supratentorial white matter disease most consistent with microvascular ischemic change.   7.  Encephalomalacic changes in the abel and right cerebral peduncle consistent with old infarction.   8.  Partially empty sella.   9.  Overall, no new findings and no significant change from 1/14/2020.      DX-CHEST-PORTABLE (1 VIEW)   Final Result         1. Stable lines and tubes.   2. Unchanged retrocardiac atelectasis versus consolidation.   3. Stable trace left pleural effusion.         OUTSIDE IMAGES-DX CHEST   Final Result      OUTSIDE IMAGES-US VASCULAR   Final Result      OUTSIDE IMAGES-MR BRAIN   Final Result      OUTSIDE IMAGES-DX CHEST   Final Result      OUTSIDE IMAGES-CT HEAD   Final Result      EC-ECHOCARDIOGRAM COMPLETE W/O CONT   Final Result      DX-CHEST-FOR LINE PLACEMENT Perform procedure in: Patient's Room   Final Result         1.  Retrocardiac opacity concerning for infiltrate, stable.   2.  Trace left pleural effusion, stable   3.  Cardiomegaly   4.  Atherosclerosis   5.  Perihilar interstitial prominence and bronchial wall cuffing, appearance suggests changes of underlying bronchial inflammation, consider bronchitis.      DX-ABDOMEN FOR TUBE PLACEMENT   Final Result         1.  Nonspecific bowel gas pattern.   2.  Dobbhoff tube tip overlying the expected location of the pylorus or first duodenal segment.   3.  Left lung base atelectasis and/or small effusion      DX-CHEST-PORTABLE (1 VIEW)   Final Result         1.  Retrocardiac opacity concerning for infiltrate.   2.  Trace left pleural effusion, stable   3.  Cardiomegaly   4.  Atherosclerosis      [id number] FILM X-RAY   Final Result      OUTSIDE IMAGES-DX CHEST   Final Result          ASSESSMENT:  74 y.o. woman with history of CHF, R parietal stroke with residual left-sided weakness, DM2, GERD, ESRD on dialysis, GERD, history of GI bleed, HTN, and hx of ICH presenting to [facility] on 1/16/20 as a transfer for seizures;  found to have subclinical/non convulsive status epilepticus per cEEG on 1/19; responsive to Benzos; currently being treated with Vimpat, Topamax, and Keppra.   Overnight (1/22/20), patient again reportedly had multiple subclinical/non convulsive seizures, patient remains on propofol, Versed gtt started; Keppra started as well. No clinical seizures overnight per nursing.     Recommendations/Plan:     -Continue cEEG monitoring; will plan to re-evaluate EEG tomorrow morning 1/24/20 and provide additional recs accordingly.   -Continue current doses of Vimpat 300 mg q12h, Topamax 200 mg q12h and Keppra 500 mg IV q12h.   -Seizure precautions; Ativan 1 mg IV for breakthrough GTCS.   -Palliative medicine is on board to establish goals of care; will follow up with their input as is available (apparently awaiting daughter who will be arriving on Thursday 1/23/20)-- tentatively planning for family meeting tomorrow, 1/24/20 to further discuss goals of care.   -All other medical management per primary team.   -DVT PPX: SQ Heparin.     The plan of care above has been discussed with Dr. Anderson.     Mary Lacey MSN, BSN, AGNP-C  [facility]

## 2020-01-24 NOTE — PROGRESS NOTES
Dr. Quiroz rounding on unit  Notified that consent was not signed for lumbar puncture planned later today by intensivist  Family awaiting physician to explain procedure and questions regarding and necessity of procedure.  Physician is aware. Will pass along to day team.

## 2020-01-24 NOTE — PROGRESS NOTES
No chief complaint on file.      Problem List Items Addressed This Visit     None      Visit Diagnoses     Shock (HCC)        Relevant Orders    Central Line Insertion (Completed)      Neurology Progress Note    Brief History of present illness:  74 y.o. woman with history of CHF, R parietal stroke with residual left-sided weakness, DM2, GERD, ESRD on dialysis, GERD, history of GI bleed, HTN, and hx of ICH presenting to [facility] on 1/16/20 as a transfer for seizures; found to have subclinical/non convulsive status epilepticus per cEEG on 1/19; responsive to Benzos; currently being treated with Vimpat, Topamax.     Interval, 1/24/20:   Patient remains intubated, sedated with Propofol, Versed.  No clinical seizures or other events overnight.    No changes to HPI as was previously documented.     Past medical history:   Past Medical History:   Diagnosis Date   • Arthritis    • Chronic diastolic heart failure ([facility]) 6/1/2016   • Clostridium difficile diarrhea 6/2/2016   • CVA (cerebral vascular accident) ([facility])     L side weakness   • DM (diabetes mellitus) type II uncontrolled with renal manifestation 4/26/2014   • GERD (gastroesophageal reflux disease)    • GIB (gastrointestinal bleeding) 6/13/2016   • GOUT    • Hypertension    • ICH (intracerebral hemorrhage) ([facility]) 4/6/2016   • Indigestion    • Kidney failure    • RALF (obstructive sleep apnea)     Not compliant with oxygen or CPAP   • RALF (obstructive sleep apnea)    • Other and unspecified angina pectoris     hospitalized 8/13 chest pain   • Seizure ([facility]) 1/16/2020   • Unspecified cataract     right eye   • UTI (urinary tract infection) 6/1/2016       Past surgical history:   Past Surgical History:   Procedure Laterality Date   • GASTROSCOPY-ENDO  6/14/2016    Procedure: GASTROSCOPY-ENDO;  Surgeon: Oliver Macedo M.D.;  Location: ENDOSCOPY [facility];  Service:    • VENTRAL HERNIA REPAIR  4/9/2014    Performed by Trinity Bryan M.D. at [facility] [place]  [facility]   • VITA BY LAPAROSCOPY  10/5/2013    Performed by Trinity Bryan M.D. at [facility] [place] [facility]   • TUBAL LIGATION  1977   • CATARACT EXTRACTION WITH IOL      left eye       Family history:   Family History   Problem Relation Age of Onset   • Diabetes Mother    • Diabetes Father    • Cancer Father         sarcoma       Social history:   Social History     Socioeconomic History   • Marital status:      Spouse name: Not on file   • Number of children: Not on file   • Years of education: Not on file   • Highest education level: Not on file   Occupational History   • Not on file   Social Needs   • Financial resource strain: Not on file   • Food insecurity:     Worry: Not on file     Inability: Not on file   • Transportation needs:     Medical: Not on file     Non-medical: Not on file   Tobacco Use   • Smoking status: Never Smoker   • Smokeless tobacco: Never Used   Substance and Sexual Activity   • Alcohol use: No   • Drug use: No   • Sexual activity: Not on file   Lifestyle   • Physical activity:     Days per week: Not on file     Minutes per session: Not on file   • Stress: Not on file   Relationships   • Social connections:     Talks on phone: Not on file     Gets together: Not on file     Attends Judaism service: Not on file     Active member of club or organization: Not on file     Attends meetings of clubs or organizations: Not on file     Relationship status: Not on file   • Intimate partner violence:     Fear of current or ex partner: Not on file     Emotionally abused: Not on file     Physically abused: Not on file     Forced sexual activity: Not on file   Other Topics Concern   • Not on file   Social History Narrative    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 , buffet host    Lives with her daughter, dependent for most of ADLs after stroke gave L sided weakness       Current medications:   Current Facility-Administered Medications   Medication Dose   • propofol (DIPRIVAN)  injection  0-80 mcg/kg/min   • midodrine (PROAMATINE) tablet 5 mg  5 mg   • [START ON 1/25/2020] allopurinol (ZYLOPRIM) tablet 100 mg  100 mg   • levETIRAcetam (KEPPRA) 500 mg in  mL IVPB  500 mg   • midazolam (VERSED) premix 125 mg/125 mL NS  0-10 mg/hr   • lacosamide (VIMPAT) 300 mg in  mL ivpb  300 mg   • LORazepam (ATIVAN) injection 1 mg  1 mg   • hydrALAZINE (APRESOLINE) injection 10 mg  10 mg   • topiramate (TOPAMAX) tablet 200 mg  200 mg   • aspirin (ASA) chewable tab 81 mg  81 mg   • atorvastatin (LIPITOR) tablet 40 mg  40 mg   • famotidine (PEPCID) tablet 20 mg  20 mg    Or   • famotidine (PEPCID) injection 20 mg  20 mg   • heparin injection 3,300 Units  3,300 Units   • epoetin antoinette (EPOGEN/PROCRIT) injection 4,000 Units  4,000 Units   • Respiratory Care per Protocol     • ipratropium-albuterol (DUONEB) nebulizer solution  3 mL   • senna-docusate (PERICOLACE or SENOKOT S) 8.6-50 MG per tablet 2 Tab  2 Tab    And   • polyethylene glycol/lytes (MIRALAX) PACKET 1 Packet  1 Packet    And   • bisacodyl (DULCOLAX) suppository 10 mg  10 mg   • lidocaine (XYLOCAINE) 1 % injection 1-2 mL  1-2 mL   • Pharmacy Consult: Enteral tube insertion - review meds/change route/product selection  1 Each   • fentaNYL (SUBLIMAZE) injection 25 mcg  25 mcg    Or   • fentaNYL (SUBLIMAZE) injection 50 mcg  50 mcg    Or   • fentaNYL (SUBLIMAZE) injection 100 mcg  100 mcg   • ondansetron (ZOFRAN) syringe/vial injection 4 mg  4 mg   • labetalol (NORMODYNE/TRANDATE) injection 10 mg  10 mg   • insulin regular (HUMULIN R) injection 2-9 Units  2-9 Units    And   • DEXTROSE 10% BOLUS 250 mL  250 mL   • acetaminophen (TYLENOL) tablet 650 mg  650 mg   • ondansetron (ZOFRAN ODT) dispertab 4 mg  4 mg       Medication Allergy:  Allergies   Allergen Reactions   • Amlodipine Besylate Cough   • Amlodipine Cough       Review of systems:   Limited as patient is intubated/sedated.     Physical examination:   Vitals:    01/24/20 0600  01/24/20 0615 01/24/20 0700 01/24/20 1003   BP: 100/52  128/56    Pulse: (!) 51 (!) 50 (!) 52 (!) 59   Resp: (!) 0  13    Temp:       TempSrc:       SpO2: 100% 100% 100% 100%   Weight:       Height:         General: Patient in no acute distress.  HEENT: Normocephalic, no signs of acute trauma.   Neck: supple, no meningeal signs or carotid bruits. There is normal range of motion. No tenderness on exam.   Chest: clear to auscultation. No cough.   CV: RRR, no murmurs.   Skin: no signs of acute rashes or trauma.   Musculoskeletal: joints exhibit full range of motion, without any pain to palpation. There are no signs of joint or muscle swelling. There is no tenderness to deep palpation of muscles.   Psychiatric: No hallucinatory behavior.      NEUROLOGICAL EXAM:   Mental status, orientation: Intubated/sedated.   Speech and language: No verbal output, does not follow commands.   Cranial nerve exam: Pupils are 2 mm bilaterally and equally reactive to light. Visual fields are intact by confrontation. No spontaneous eye opening; does not track spontaneously or to command. Face appears symmetric. Sensation in the face is intact to light touch. Tongue is midline and without any signs of tongue biting or fasciculations.Shoulder shrug is intact bilaterally.   Motor/Sensory exam No spontaneous/voluntary movement to BUE/BLE; Withdrawal/grimace to nox stim to BLE.    Deep tendon reflexes:  2+ throughout. Plantar responses are upgoing bilaterally. There is no clonus.   Coordination: Deferred as patient does not understand.   Gait: Not assessed at this time as patient is currently unable.     No significant changes to exam as was previously documented on 1/23/20.     ANCILLARY DATA REVIEWED:     Lab Data Review:  Recent Results (from the past 24 hour(s))   ACCU-CHEK GLUCOSE    Collection Time: 01/23/20 12:13 PM   Result Value Ref Range    Glucose - Accu-Ck 122 (H) 65 - 99 mg/dL   HIV AG/AB COMBO ASSAY SCREENING    Collection Time:  01/23/20  2:10 PM   Result Value Ref Range    HIV Ag/Ab Combo Assay Non Reactive Non Reactive   TSH    Collection Time: 01/23/20  2:10 PM   Result Value Ref Range    TSH 3.240 0.380 - 5.330 uIU/mL   VITAMIN B12    Collection Time: 01/23/20  2:10 PM   Result Value Ref Range    Vitamin B12 -True Cobalamin 600 211 - 911 pg/mL   T.PALLIDUM AB EIA    Collection Time: 01/23/20  2:10 PM   Result Value Ref Range    Syphilis, Treponemal Qual Non Reactive Non Reactive   ACCU-CHEK GLUCOSE    Collection Time: 01/23/20  5:26 PM   Result Value Ref Range    Glucose - Accu-Ck 125 (H) 65 - 99 mg/dL   ACCU-CHEK GLUCOSE    Collection Time: 01/24/20 12:20 AM   Result Value Ref Range    Glucose - Accu-Ck 110 (H) 65 - 99 mg/dL   CBC with Differential    Collection Time: 01/24/20  5:22 AM   Result Value Ref Range    WBC 7.8 4.8 - 10.8 K/uL    RBC 2.58 (L) 4.20 - 5.40 M/uL    Hemoglobin 8.5 (L) 12.0 - 16.0 g/dL    Hematocrit 27.9 (L) 37.0 - 47.0 %    .1 (H) 81.4 - 97.8 fL    MCH 32.9 27.0 - 33.0 pg    MCHC 30.5 (L) 33.6 - 35.0 g/dL    RDW 67.8 (H) 35.9 - 50.0 fL    Platelet Count 136 (L) 164 - 446 K/uL    MPV 9.9 9.0 - 12.9 fL    Neutrophils-Polys 68.50 44.00 - 72.00 %    Lymphocytes 20.70 (L) 22.00 - 41.00 %    Monocytes 5.40 0.00 - 13.40 %    Eosinophils 1.80 0.00 - 6.90 %    Basophils 1.80 0.00 - 1.80 %    Nucleated RBC 0.00 /100 WBC    Neutrophils (Absolute) 5.34 2.00 - 7.15 K/uL    Lymphs (Absolute) 1.61 1.00 - 4.80 K/uL    Monos (Absolute) 0.42 0.00 - 0.85 K/uL    Eos (Absolute) 0.14 0.00 - 0.51 K/uL    Baso (Absolute) 0.14 (H) 0.00 - 0.12 K/uL    NRBC (Absolute) 0.00 K/uL    Hypochromia 1+     Anisocytosis 2+     Macrocytosis 2+    Basic Metabolic Panel (BMP)    Collection Time: 01/24/20  5:22 AM   Result Value Ref Range    Sodium 137 135 - 145 mmol/L    Potassium 3.7 3.6 - 5.5 mmol/L    Chloride 105 96 - 112 mmol/L    Co2 26 20 - 33 mmol/L    Glucose 106 (H) 65 - 99 mg/dL    Bun 27 (H) 8 - 22 mg/dL    Creatinine 1.03 0.50 -  1.40 mg/dL    Calcium 8.3 (L) 8.5 - 10.5 mg/dL    Anion Gap 6.0 0.0 - 11.9   Triglyceride    Collection Time: 01/24/20  5:22 AM   Result Value Ref Range    Triglycerides 98 0 - 149 mg/dL   DIFFERENTIAL MANUAL    Collection Time: 01/24/20  5:22 AM   Result Value Ref Range    Myelocytes 1.80 %    Manual Diff Status PERFORMED    PERIPHERAL SMEAR REVIEW    Collection Time: 01/24/20  5:22 AM   Result Value Ref Range    Peripheral Smear Review see below    PLATELET ESTIMATE    Collection Time: 01/24/20  5:22 AM   Result Value Ref Range    Plt Estimation Decreased    MORPHOLOGY    Collection Time: 01/24/20  5:22 AM   Result Value Ref Range    RBC Morphology Present    ESTIMATED GFR    Collection Time: 01/24/20  5:22 AM   Result Value Ref Range    GFR If African American >60 >60 mL/min/1.73 m 2    GFR If Non African American 52 (A) >60 mL/min/1.73 m 2   ACCU-CHEK GLUCOSE    Collection Time: 01/24/20  5:28 AM   Result Value Ref Range    Glucose - Accu-Ck 94 65 - 99 mg/dL       Labs reviewed by me.       Imaging reviewed by me:     DX-CHEST-PORTABLE (1 VIEW)   Final Result      Stable chest with retrocardiac opacity from atelectasis and/or pleural fluid favored over consolidation      DX-CHEST-PORTABLE (1 VIEW)   Final Result      Stable chest with retrocardiac opacity from atelectasis and/or pleural fluid favored over consolidation      DX-CHEST-PORTABLE (1 VIEW)   Final Result         No significant change from prior.               DX-CHEST-PORTABLE (1 VIEW)   Final Result         1. Stable lines and tubes..   2. Stable retrocardiac and left perihilar atelectasis versus consolidation. Suspected small left pleural effusion.            DX-CHEST-PORTABLE (1 VIEW)   Final Result         1. Stable lines and tubes..   2. Stable retrocardiac atelectasis versus consolidation with increased left perihilar opacity. Suspected trace left pleural effusion.         [id number] VIEW   Final Result      1.  Enteric tube has been placed  and the tip projects over the stomach.      2.  Pre-existing feeding tube tip projects at the gastroduodenal junction      DX-CHEST-PORTABLE (1 VIEW)   Final Result         1. Lines and tubes as above.   2. Stable retrocardiac atelectasis versus consolidation. Suspected trace left pleural effusion.         MR-BRAIN-WITH & W/O   Final Result      1.  Moderate cerebral atrophy.   2.  Evidence of intraventricular hemorrhage, indeterminate age. Possibly chronic intraventricular hemosiderin deposition.   3.  Extensive encephalomalacic change with hemosiderin deposition involving the right corona radiata, basal ganglia, posterior thalamus, subthalamic region, bordering the right cerebral peduncle. Associated ex vacuo dilatation of the body of the right    lateral ventricle. No change.   4.  Additional foci of old microhemorrhage most consistent with old hypertensive microhemorrhage or amyloid angiopathy in the left basal ganglia, left thalamus, and midline upper ventral abel.   5.  Punctate focus of acute infarction in the right parietal deep white matter. No change.   6.  Advanced supratentorial white matter disease most consistent with microvascular ischemic change.   7.  Encephalomalacic changes in the abel and right cerebral peduncle consistent with old infarction.   8.  Partially empty sella.   9.  Overall, no new findings and no significant change from 1/14/2020.      DX-CHEST-PORTABLE (1 VIEW)   Final Result         1. Stable lines and tubes.   2. Unchanged retrocardiac atelectasis versus consolidation.   3. Stable trace left pleural effusion.         OUTSIDE IMAGES-DX CHEST   Final Result      OUTSIDE IMAGES-US VASCULAR   Final Result      OUTSIDE IMAGES-MR BRAIN   Final Result      OUTSIDE IMAGES-DX CHEST   Final Result      OUTSIDE IMAGES-CT HEAD   Final Result      EC-ECHOCARDIOGRAM COMPLETE W/O CONT   Final Result      DX-CHEST-FOR LINE PLACEMENT Perform procedure in: Patient's Room   Final Result         1.   Retrocardiac opacity concerning for infiltrate, stable.   2.  Trace left pleural effusion, stable   3.  Cardiomegaly   4.  Atherosclerosis   5.  Perihilar interstitial prominence and bronchial wall cuffing, appearance suggests changes of underlying bronchial inflammation, consider bronchitis.      DX-ABDOMEN FOR TUBE PLACEMENT   Final Result         1.  Nonspecific bowel gas pattern.   2.  Dobbhoff tube tip overlying the expected location of the pylorus or first duodenal segment.   3.  Left lung base atelectasis and/or small effusion      DX-CHEST-PORTABLE (1 VIEW)   Final Result         1.  Retrocardiac opacity concerning for infiltrate.   2.  Trace left pleural effusion, stable   3.  Cardiomegaly   4.  Atherosclerosis      [id number] FILM X-RAY   Final Result      OUTSIDE IMAGES-DX CHEST   Final Result          ASSESSMENT:  74 y.o. woman with history of CHF, R parietal stroke with residual left-sided weakness, DM2, GERD, ESRD on dialysis, GERD, history of GI bleed, HTN, and hx of ICH presenting to [facility] on 1/[facility] as a transfer for seizures; found to have subclinical/non convulsive status epilepticus per cEEG on 1/19; responsive to Benzos; currently being treated with Vimpat, Topamax, and Keppra.    Overnight (1/23/20), EEG somewhat improved; no subclinical seizures/NCSE overnight. Patient remains on propofol (decreased this morning with attempt to wean), remains on Versed gtt; No clinical seizures overnight per nursing.   Regarding etiology of seizures, this has yet to be determined. Per discussion with Dr. Hein and Dr. Bustos (epileptologist), feel it reasonable to pursue repeat LP (patient had an initial LP at OSH; basic CSF studies and cultures reportedly unremarkable) for additional CSF studies. Given refractory nature of patient's epileptogenic activity (note, patient's seizures initially improved, then worsened over the following 1-2 days, now slightly improved this morning), at this  point must rule out other underlying causes of cortical irritability-- ie infectious processes, autoimmune, paraneoplastics, or even prion disease/CJD given pattern noted per EEG.     Recommendations/Plan:     -Continue cEEG monitoring; will plan to re-evaluate EEG tomorrow morning 1/25/20 and provide additional recs accordingly.   -Per discussion with Dr. Bustos, attempt to wean propofol; continue Versed gtt. Continue current doses of Vimpat 300 mg q12h, Topamax 200 mg q12h and Keppra 500 mg IV q12h.   -Seizure precautions; Ativan 1 mg IV for breakthrough GTCS.   -As was noted above, repeat LP is planned for today. CSF studies including cell count, glucose protein, meningo-encephalitis panel, VDRL, Cytology, Paraneoplastics, 14-3-3, and flow cytometry have been ordered. Will follow up with results as they become available.   -Palliative medicine is on board to establish goals of care; will follow up with their input as is available; planning for family meeting today 1/24/20 to further discuss goals of care.   -All other medical management per primary team.   -DVT PPX: SQ Heparin.     The plan of care above has been discussed with Dr. Bustos and with Dr. Villanueva.     Mary Lacey MSN, BSN, [facility]  [facility]

## 2020-01-24 NOTE — PROGRESS NOTES
Midline dressing due to be completed today 1/24/20. VAT RN removed existing dressing. Purple discoloration noted below hub of catheter. Attempted to reposition catheter to avoid further pressure on area and unable to. Midline catheter removed after discussion with Rina primary RN. Image and wound consult placed my Rina RN. Anusha wound team RN notified.

## 2020-01-24 NOTE — PROGRESS NOTES
"Palliative Care follow-up  Care conference held with pt's daughter Belen and son Catracho, Mary MATA and this writer.  Mary presented an overview of the patient's clinical course and current status.  Diagnostic testing including MRI and LP have not identified the cause of the seizures.   No seizure activity overnight. Propofol weaned today.  Second LP planned.  Mary explained the procedure in detail.  Family's questions answered.  Explored what the pt's children believe would be their mother's goals.  Belen and Catracho very optimistic that the source of the seizures and an effective treatment will be found. \"She is a fighter\" \"She went thorough this after her stroke and was able to overcome it\".  Discussed what if pt condition declined or treatment was to be ineffective.  Family only wanting to focus on the positive.  Family does state that being able to interact with and recognize them would be critical.  Family OK now with trach as long as it's not permanent.  Belen thinks that this is different than her father who had a trach for COPD.  Belen plans to take pt home. Family encouraged to call for any additional questions.  Belen taking it \"one day at a time\".  Advised ongoing discussions would be required as pt's condition evolves.  Empathetic listening and support provided.      Updated: Mary MATA & Rina HAILE    Plan: Belen will follow up with clinical team Monday regarding any changes in pt condition and initial results from LP.    Thank you for allowing Palliative Care to support this patient and family. Contact [phone] for additional assistance, change in patient status, or with any questions/concerns.   "

## 2020-01-24 NOTE — CARE PLAN
Problem: Communication  Goal: The ability to communicate needs accurately and effectively will improve  Outcome: PROGRESSING AS EXPECTED     Problem: Bowel/Gastric:  Goal: Will not experience complications related to bowel motility  Outcome: PROGRESSING AS EXPECTED

## 2020-01-24 NOTE — DIETARY
Nutrition support weekly update: Day 8 of admit. Cassandra Guzmán is a 74 y.o. female with admitting DX of seizures, ventilatory respiratory failure, acute renal failure, respiratory failure requiring intubation.     Tube feeding initiated on 1/17. Current TF via small bowel Cortrak is Peptamen Intense VHP @ 40 ml/hr (goal rate) continuous providing 960 kcal, 88 grams of protein, and 806 ml of free water.      Assessment:  Weight was 60.5 kg (133 lb 6.1 oz) per bed scale on 1/16 and 65.5 kg (144 lb 6.4 oz) per bed scale on 1/19. 5 kg (11 lb) weight increase noted. Pt +2.1 L per I/O flow sheet.      Evaluation:   1. Labs: glucose 106, BUN 27, calcium 8.3  2. Meds: pepcid, SSI, propofol @ 30 mcg/min (312 kcals/day)  3. RD will monitor propofol rate and adjust TF as needed.  4. Stage 3 sacrum pressure injury per wound team on 1/20. Discussed vitamin therapy for wound healing recommendations with RN.     5. Last BM: 1/23  6. TF adjusted per recent met cart results. Will continue with estimated nutritional needs previously provided on 1/22.   7. Current feeding remains appropriate at this time.     Malnutrition risk: No new risk identified.     Recommendations/Plan:  1. Continue TF formula and rate while on propofol.   2. Peptamen Intense VHP @ 40 ml/hr (goal rate) will provide 64% of RDI/DRIs. Suggest vitamin therapy for wound healing: Multivitamin with minerals daily and 500 mg Vitamin C BID for 14 days per MD discretion.   3. Off propofol, change tube feeding to Impact Peptide 1.5 @ goal rate of 40 mL/hr, providing 1440 kcals, 90 grams of protein and 739 mL of free water per day. This formula provides arginine and vitamin C to aid wound healing.  4. Fluids per MD.  5. Monitor weight.    RD following.

## 2020-01-24 NOTE — PROCEDURES
VIDEO ELECTROENCEPHALOGRAM REPORT        Referring provider: Dr. You.      DOS: 1/24/2020 (total recording of 23 hours and 36 minutes).      INDICATION:  Cassandra [place] 74 y.o. female presenting with recurrent seizures, status epilepticus.      CURRENT ANTIEPILEPTIC REGIMEN: Lacosamide 300 mg q 12 hrs, Topiramate 200 mg bid. Levetiracetam 500 mg q 12 hrs. Sedated with propofol, and Midazolam infusions. Propofol lowered and held during the study, but patient failed weaning and had to be placed back on Propofol and Midazolam for sedation.       TECHNIQUE: 30 channel video electroencephalogram (EEG) was performed in accordance with the international 10-20 system. The study was reviewed in bipolar and referential montages. The recording examined a sedated patient.       DESCRIPTION OF THE RECORD:  Most of the study suggested a sedated state, with waxing and waning of the cerebral electrical activity. With weaning of Propofol, there was worsening of the eeg, with frequent triphasic and frontal sharp waves, which appeared most prominent on the left and intermittently exhibited a rhythmic pattern to suggest brief non convulsive seizures. With resumption of Propofol, no further seizures captured.      ACTIVATION PROCEDURES:   Not performed.      EKG: sampling of the EKG recording demonstrated sinus rhythm.      EVENTS:  None.      INTERPRETATION:  This is an abnormal 24 hrs video EEG recording in a sedated patient. With weaning of Propofol, there was worsening of the eeg, with frequent triphasic and frontal sharp waves, which appeared most prominent on the left and intermittently exhibited a rhythmic pattern to suggest brief non convulsive seizures. With resumption of Propofol, no further seizures captured. The findings suggest cortical irritability and/or structural abnormality. Clinical and radiological correlation is recommended.     Updates provided to Neurology and ICU teams.         Catracho Bustos MD  Section  Chief Epilepsy and Neurodiagnostics.   Clinical  of Neurology [facility].   Diplomate in Neurology, Epilepsy, and Electrodiagnostic Medicine.   Office: [phone]  Fax: [phone]
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
## 2020-01-24 NOTE — PROGRESS NOTES
Discussed Pt with MD Hein, Informed of bp 90/46 (60) and hr running 50-55; patient's bp usually in the 140s systolic per night shift RN. MD states to change the fixed propofol gtt from dose 50 mcg/kg/min to 30 mcg/kg/min and to leave the versed dose unchanged. See new orders. Gtt changed as discussed. Also informed MD that family is requesting to discuss potential for LP today with MD and have a family meeting. VSS. Will continue to monitor.
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
## 2020-01-24 NOTE — PROGRESS NOTES
Critical Care Progress Note    Date of admission  1/16/2020    Chief Complaint  status epilepticus and respiratory failure    Hospital Course    74 y.o. female who presented 1/16/2020 with PMH significant for ESRD on IHD since November 2019 M/W/F, HTN, HPL, CAD, admitted for first time witness seizure at SNF, transferred from [facility]. Had a second seizure with prolonged postictal state. Intubated. MRI brain with an acute right parietal lacunar infarct with overall brain parenchymal loss. Loaded with Keppra, dilantin. LP was done with no pleocytosis. Was empirically treated with antibiotics. Code status FULL CODE.      Interval Problem Update  Reviewed last 24 hour events:  Remains on continuous EEG   On propofol was at 50, versed 4mg/hr  On keppra 500 Q12H, locasamide 300 BID, topamax 200 BID  D/w Dr. Bustos about cEEG and pt appears well sedated, no seizure activity    We attempted a trial of weaning off propofol from 30 mcg/kg/min for few hours, then off.   We noted some potential activity concerning of seizures while pt off propofol   We decided to place pt back on propofol 30 and increased versed to 8mg/hr    Afebrile, Tm 37.2C  SBP in 100-150s, HR in 70s  Off norepinephrine since 1/20  Remains on ventilatory support with FiO2 30%, PEEP 8. TV 320cc  Vent day #9. Was on vent x 2 days at OSH.    Minimal secretions. Weak cough.     Had IHD yesterday 500cc UF   WBC 8.5  Hgb 8.7, plt 145  BG in 150s   On tube feed and tolerating.   Family meeting tomorrow.     Review of Systems  Review of Systems   Unable to perform ROS: Acuity of condition        Vital Signs for last 24 hours   Temp:  [36.1 °C (97 °F)-36.4 °C (97.6 °F)] 36.2 °C (97.2 °F)  Pulse:  [50-71] 50  Resp:  [0-14] 14  BP: ()/(49-79) 93/49  SpO2:  [100 %] 100 %    Hemodynamic parameters for last 24 hours       Respiratory Information for the last 24 hours  Zepeda Vent Mode: APVCMV  Rate (breaths/min): 14  Vt Target (mL): 320  PEEP/CPAP:  8  FiO2: 30  P MEAN: 11  Control VTE (exp VT): 318    Physical Exam   Physical Exam  Vitals signs and nursing note reviewed.   Constitutional:       General: She is not in acute distress.     Appearance: She is ill-appearing. She is not toxic-appearing.      Comments: Intubated, not awake to verbal     HENT:      Head: Normocephalic.      Mouth/Throat:      Comments: Et tube in place  Eyes:      Conjunctiva/sclera: Conjunctivae normal.   Cardiovascular:      Rate and Rhythm: Normal rate and regular rhythm.      Pulses: Normal pulses.      Heart sounds: No murmur.   Pulmonary:      Effort: Pulmonary effort is normal.      Breath sounds: Normal breath sounds. No wheezing or rales.      Comments: Diminished at bases  Abdominal:      General: There is no distension.      Palpations: Abdomen is soft.      Tenderness: There is no tenderness. There is no guarding.      Comments: Hypoactive bowel sound   Musculoskeletal:      Right lower leg: No edema.      Left lower leg: No edema.      Comments: + edema in upper extremities   Skin:     General: Skin is warm and dry.      Capillary Refill: Capillary refill takes 2 to 3 seconds.      Findings: No bruising.   Neurological:      Comments: Baseline left-sided deficits from previous CVA  Opening eyes but not following commands         Medications  Current Facility-Administered Medications   Medication Dose Route Frequency Provider Last Rate Last Dose   • levETIRAcetam (KEPPRA) 500 mg in  mL IVPB  500 mg Intravenous Q12HRS ALONSO Giordano.O.   Stopped at 01/24/20 0548   • midazolam (VERSED) premix 125 mg/125 mL NS  0-10 mg/hr Intravenous Continuous YORDY GiordanoO. 4 mL/hr at 01/23/20 0935 4 mg/hr at 01/23/20 0935   • lacosamide (VIMPAT) 300 mg in  mL ivpb  300 mg Intravenous BID ALONSO Giordano.O.   Stopped at 01/24/20 0633   • propofol (DIPRIVAN) injection  0-80 mcg/kg/min Intravenous Continuous ALONSO Giordano.O. 19.7 mL/hr at 01/24/20 0320 50 mcg/kg/min at 01/24/20  0320   • LORazepam (ATIVAN) injection 1 mg  1 mg Intravenous Q3HRS PRN Catracho Bustos M.D.   1 mg at 01/20/20 1740   • hydrALAZINE (APRESOLINE) injection 10 mg  10 mg Intravenous Q4HRS PRN Anurag Hein D.O.   10 mg at 01/24/20 0145   • topiramate (TOPAMAX) tablet 200 mg  200 mg Enteral Tube Q12HRS Catracho Bustos M.D.   200 mg at 01/24/20 0533   • aspirin (ASA) chewable tab 81 mg  81 mg Enteral Tube DAILY Josep You M.D.   81 mg at 01/24/20 0533   • atorvastatin (LIPITOR) tablet 40 mg  40 mg Enteral Tube DAILY Jospe You M.D.   40 mg at 01/24/20 0533   • famotidine (PEPCID) tablet 20 mg  20 mg Enteral Tube DAILY Fletcher Willis M.D.   20 mg at 01/24/20 0533    Or   • famotidine (PEPCID) injection 20 mg  20 mg Intravenous DAILY Fletcher Willis M.D.   20 mg at 01/19/20 0508   • heparin injection 3,300 Units  3,300 Units Intracatheter PRN Lydia Carcamo M.D.   3,300 Units at 01/22/20 1705   • epoetin antoinette (EPOGEN/PROCRIT) injection 4,000 Units  4,000 Units Subcutaneous MO, WE + FR Payam Cavazos M.D.   4,000 Units at 01/22/20 0944   • Respiratory Care per Protocol   Nebulization Continuous RT Jeremy M Gonda, M.D.       • ipratropium-albuterol (DUONEB) nebulizer solution  3 mL Nebulization Q2HRS PRN (RT) Jeremy M Gonda, M.D.       • senna-docusate (PERICOLACE or SENOKOT S) 8.6-50 MG per tablet 2 Tab  2 Tab Enteral Tube BID Jeremy M Gonda, M.D.   Stopped at 01/20/20 1800    And   • polyethylene glycol/lytes (MIRALAX) PACKET 1 Packet  1 Packet Enteral Tube QDAY PRN Jeremy M Gonda, M.D.        And   • magnesium hydroxide (MILK OF MAGNESIA) suspension 30 mL  30 mL Enteral Tube QDAY PRN Jeremy M Gonda, M.D.        And   • bisacodyl (DULCOLAX) suppository 10 mg  10 mg Rectal QDAY PRN Jeremy M Gonda, M.D.       • lidocaine (XYLOCAINE) 1 % injection 1-2 mL  1-2 mL Tracheal Tube Q30 MIN PRN Jeremy M Gonda, M.D.       • Pharmacy Consult: Enteral tube insertion - review meds/change route/product selection  1 Each  Other PHARMACY TO DOSE Jeremy M Gonda, M.D.       • fentaNYL (SUBLIMAZE) injection 25 mcg  25 mcg Intravenous Q HOUR PRN Jeremy M Gonda, M.D.   25 mcg at 01/16/20 2310    Or   • fentaNYL (SUBLIMAZE) injection 50 mcg  50 mcg Intravenous Q HOUR PRN Jeremy M Gonda, M.D.   50 mcg at 01/20/20 1555    Or   • fentaNYL (SUBLIMAZE) injection 100 mcg  100 mcg Intravenous Q HOUR PRN Jeremy M Gonda, M.D.   100 mcg at 01/21/20 1748   • allopurinol (ZYLOPRIM) tablet 200 mg  200 mg Enteral Tube DAILY Rina Matta M.D.   200 mg at 01/24/20 0533   • fenofibrate micronized (LOFIBRA) capsule 134 mg  134 mg Enteral Tube DAILY Rina Matta M.D.   134 mg at 01/24/20 0533   • ondansetron (ZOFRAN) syringe/vial injection 4 mg  4 mg Intravenous Q4HRS PRN Rina Matta M.D.   4 mg at 01/18/20 1214   • labetalol (NORMODYNE/TRANDATE) injection 10 mg  10 mg Intravenous Q4HRS PRN Rina Matta M.D.   10 mg at 01/20/20 1239   • insulin regular (HUMULIN R) injection 2-9 Units  2-9 Units Subcutaneous Q6HRS Rina Matta M.D.   Stopped at 01/22/20 1800    And   • DEXTROSE 10% BOLUS 250 mL  250 mL Intravenous Q15 MIN PRN Rina Matta M.D.   250 mL at 01/20/20 0026   • acetaminophen (TYLENOL) tablet 650 mg  650 mg Enteral Tube Q6HRS PRN Jeremy M Gonda, M.D.       • ondansetron (ZOFRAN ODT) dispertab 4 mg  4 mg Enteral Tube Q4HRS PRN Jeremy M Gonda, M.D.           Fluids    Intake/Output Summary (Last 24 hours) at 1/24/2020 0648  Last data filed at 1/24/2020 0600  Gross per 24 hour   Intake 2239.9 ml   Output 350 ml   Net 1889.9 ml       Laboratory  Recent Labs     01/22/20  0409   ISTATAPH 7.473   ISTATAPCO2 37.8*   ISTATAPO2 100*   ISTATATCO2 29   NIJJNSU9QYM 98   ISTATARTHCO3 27.7*   ISTATARTBE 4*   ISTATTEMP 97.9 F   ISTATFIO2 30   ISTATSPEC Arterial   ISTATAPHTC 7.479   MYVAUGDM2UD 98*         Recent Labs     01/22/20  0545 01/23/20  0605 01/24/20  0522   SODIUM 135 137 137   POTASSIUM 3.9 3.9 3.7   CHLORIDE 104 103 105   CO2 28 31 26    BUN 30* 20 27*   CREATININE 1.13 0.72 1.03   PHOSPHORUS 1.9*  --   --    CALCIUM 8.1* 8.0* 8.3*     Recent Labs     01/22/20  0545 01/23/20  0605 01/24/20 0522   GLUCOSE 151* 126* 106*     Recent Labs     01/22/20  0545 01/23/20  0605 01/24/20 0522   WBC 7.9 7.7 7.8   NEUTSPOLYS 51.60 50.50 68.50   LYMPHOCYTES 30.70 31.10 20.70*   MONOCYTES 12.30 13.20 5.40   EOSINOPHILS 3.80 3.20 1.80   BASOPHILS 0.80 0.60 1.80     Recent Labs     01/22/20  0545 01/23/20 0605 01/24/20 0522   RBC 2.76* 2.61* 2.58*   HEMOGLOBIN 9.0* 9.0* 8.5*   HEMATOCRIT 29.9* 28.1* 27.9*   PLATELETCT 160* 137* 136*     Called [facility] [phone] today (1/21) for update on the LP done on 1/15.   1/15 CSF Gram stain negative, Cx no growth to date (final)  1/15 CSF cell count was WBC 1, RBC 2. Total protein 45. Glucose 79  1/15 CSF HSV PCR negative  1/15 CSF paraneoplastic negative    Imaging  X-Ray:  I have personally reviewed the images and compared with prior images.    Assessment/Plan  * Status epilepticus (HCC)  Assessment & Plan  New onset, unclear etiology  On continuous EEG  Propofol gtt at 50mcg/kg/min  Continue vimpat 300 BID and topamax 200 BID  Add versed gtt and keppra 500 BID  Versed/ativan prn clinical seizures.   D/w Dr. Bustos, neurology about the EEG overnight and noted some brief seizures.   Called [facility] [phone] today (1/21) for update on the LP done on 1/15. No pleocytosis or hypoglycorrhachia.  GS/Cultures negative. HSV PCR negative.   Will plan to repeat LP here  D/w Neurology       Acute respiratory failure with hypoxia (HCC)  Assessment & Plan  Intubated in emergency department 1/14 at outside hospital  Continue full mechanical ventilatory support  On 1/19 we attempted to keep pt off sedation. Off propofol and fentanyl. Antiseizures with vimpat, clonazepam and topamax.   On 1/19, captured evidence of subclinical seizures despite on current antiseizures.   Propofol  gtt was started in the evening  On 1/20, spoke with Dr. Bustos, Neurology, and noted brief seizures overnight,  will continue propofol gtt and increased to 40mcg/kg/min  On 1/22, Pt still had intermittent brief seizure, propofol increased to 50. Vimpat increased to 300 BID. On topamax 200 BID  On 1/23, pt still having seizures. Versed gtt was started at 4mg/hr, propofol 50, vimpat 300 BID and topomax 200BID. Added keppra  On 1/24, attempted a trial off propofol, failed to wean. Propofol was back to 30 and we increased versed to 8mg/hr    Not candidate for SAT, SBT, and mobility    No SAT and no mobility  Goal to keep sat >94%    Severe protein-calorie malnutrition (HCC)  Assessment & Plan  Continue tube feeds at goal rate   Dietary following    Cerebrovascular accident (CVA) due to embolism of right middle cerebral artery (HCC)  Assessment & Plan  Continue high intensity statin, aspirin  PT/OT eval  Neurology following  Echocardiogram reviewed    End stage renal failure on dialysis ([facility])  Assessment & Plan  HD per nephrology  Renally dose medications  Monitor electrolytes  Had MRI contrast, dializying for 3 consecutive doses    Normocytic anemia- (present on admission)  Assessment & Plan  Daily CBC with conservative transfusion strategy, monitor for bleeding    Hypertension  Assessment & Plan  scheduled hydralazine, Isordil, labetalol  PRN IV labetalol, hydralazine for goal SBP less than 160    DM (diabetes mellitus) ([facility])- (present on admission)  Assessment & Plan  Insulin sliding scale  Hypoglycemia protocol  FS BS  Goal -180    Goals of care, counseling/discussion  Assessment & Plan  Discussed at length with son regarding patient's critical condition on 1/21  Son came from [facility]. Pt has daughter who's going to be here on 1/23. She's currently in [place].   Palliative care is following. Plan to have family meeting today on 1/24 with daughter, aj DPOA      Pressure ulcer  Assessment & Plan  Stage II  coccyx -present on arrival  Continue wound care    Hypomagnesemia  Assessment & Plan  Replace keep greater than 2    Gout- (present on admission)  Assessment & Plan  Continue allopurinol    Obesity (BMI 30-39.9)- (present on admission)  Assessment & Plan  Nutritionist consultation  Encourage behavioral and dietary modification once extubated for weight loss       VTE:  Heparin  Ulcer: H2 Antagonist  Lines: Central Line  Ongoing indication addressed and [facility] Catheter  Ongoing indication addressed    I have performed a physical exam and reviewed and updated ROS and Plan today (1/24/2020). In review of yesterday's note (1/23/2020), there are no changes except as documented above.     Discussed patient condition and risk of morbidity and/or mortality with RN, RT, Pharmacy and nephrology and neurology     I have assessed  her respiratory status, hemodynamics, cardiovascular and neurological status.  Pt remains having seizures. . Need ongoing goal of care discussion with family to discuss about goal of care.  High risk of deterioration and worsening vital organ dysfunction and death without the above critical care interventions.     The patient remains critically ill.  Critical care time = 80 minutes in directly providing and coordinating critical care and extensive data review.  No time overlap and excludes procedures.

## 2020-01-25 NOTE — RESPIRATORY CARE
COPD EDUCATION by COPD CLINICAL EDUCATOR  1/25/2020 at 8:17 AM by Aleksandra Dubon, RRT     Patient reviewed by COPD education team. Patient does not have a history or diagnosis of COPD and is a non-smoker, therefore does not qualify for the COPD program.
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
## 2020-01-25 NOTE — PROGRESS NOTES
Discussed patient in multi disciplinary rounds; informed MD of all assessment findings. Plans for HD today. VSS. Will continue to monitor. See new orders.

## 2020-01-25 NOTE — CARE PLAN
Adult Ventilation Update    Total Vent Days:  11    Patient Lines/Drains/Airways Status      Active Airway       Name: Placement date: Placement time: Site: Days:    Airway ETT Oral 7.5  01/14/20   --   Oral  11                    Plateau Pressure (Q Shift): 23 (01/25/20 1408)  Static Compliance (ml / cm H2O): 22 (01/25/20 1408)    Patient failed trials because of Barriers to Wean: Presence of Neuromuscular Blockade Agents (01/25/20 1408)    Mobility  Level of Mobility: Level I (01/25/20 0800)  Activity Performed: Unable to mobilize (01/25/20 0800)  Assistance: Assistance of Two or More (01/25/20 0800)  Pt Calls for Assistance: No (01/25/20 0800)  Gait: Unable to Ambulate (01/25/20 1200)  Reason Not Mobilized: Unstable condition (01/25/20 0800)  Mobilization Comments: strict bedrest for status epilepticus (01/25/20 0800)    Events/Summary/Plan: Pt remained on full support, due to sedation for ECG. Will continue to monitor.

## 2020-01-25 NOTE — CARE PLAN
Adult Ventilation Update    Total Vent Days: 10    Patient Lines/Drains/Airways Status    Active Airway     Name: Placement date: Placement time: Site: Days:    Airway ETT Oral 7.5  01/14/20   --   Oral  11                #FVC / Vital Capacity (liters) : (does not follow commands) (01/20/20 0826)  NIF (cm H2O) : (does not follow) (01/20/20 0826)  Rapid Shallow Breathing Index (RR/VT): 60 (01/21/20 0518)  Plateau Pressure (Q Shift): 20 (01/24/20 1831)  Static Compliance (ml / cm H2O): 28 (01/25/20 0300)    Patient failed trials because of Barriers to Wean: Other (Comments);Presence of Neuromuscular Blockade Agents(continous eeg) (01/24/20 1415)  Barriers to SBT Weaning Trial Stopped due to:: Hemodynamically unstable/new or worsening arrythmia/SBP <80 or >180 mmHg(Increase in SBP by 30%) (01/21/20 0518)  Length of Weaning Trial Length of Weaning Trial (Hours): .25 (01/21/20 0518)    Sputum/Suction   Cough: Weak;Non Productive (01/25/20 0400)  Sputum Amount: Scant (01/25/20 0400)  Sputum Color: White;Clear (01/25/20 0400)  Sputum Consistency: Thin (01/25/20 0400)    Mobility  Level of Mobility: Level I (01/24/20 0800)  Activity Performed: Unable to mobilize (01/24/20 2000)  Assistance: Assistance of Two or More (01/24/20 0800)  Pt Calls for Assistance: No (01/24/20 0800)  Gait: Unable to Ambulate (01/25/20 0400)  Reason Not Mobilized: Unstable condition;Bed rest (01/24/20 2000)  Mobilization Comments: strict bedrest for cont seizure activity (01/24/20 0800)    Events/Summary/Plan: vent check (01/23/20 1411)

## 2020-01-25 NOTE — PROGRESS NOTES
HD treatment today per routine order.Treatment not tolerated well.Patient became hypotensive.BP dropped in the 80's-70's despite administration of midodrine and NS boluses.had to terminate treatment 30 mins early due to persistent hypotension.Dr Patterson notified.Order receive to run patient again tomorrow with HD order already in place.BP improved right after returning blood,115/65.Net UF removed 1500 ml.CVC locked with heparin.Report given to primary Rn.

## 2020-01-25 NOTE — PROGRESS NOTES
Dr. Hein at bedside   Preparation for lumbar puncture  Time out performed  Sterile environment maintained  Specimens collected labeled and sent to lab.  Pt tolerated procedure well  Will continue to round and monitor to ensure pt safety.

## 2020-01-25 NOTE — PROCEDURES
VIDEO ELECTROENCEPHALOGRAM REPORT        Referring provider: Dr. You.      DOS: 1/25/2020 (total recording of 23 hours and 39 minutes).      INDICATION:  Cassandra De Las Pond 74 y.o. female presenting with recurrent seizures, status epilepticus.      CURRENT ANTIEPILEPTIC REGIMEN: Lacosamide 300 mg q 12 hrs, Topiramate 200 mg bid, Levetiracetam 500 mg q 12 hrs. Sedated with propofol, and Midazolam infusions.      TECHNIQUE: 30 channel video electroencephalogram (EEG) was performed in accordance with the international 10-20 system. The study was reviewed in bipolar and referential montages. The recording examined a sedated patient.       DESCRIPTION OF THE RECORD:  Most of the study suggested a sedated state, with waxing and waning of the cerebral electrical activity. Triphasic and frontal sharp waves are still seen frequently throughout the study, which appeared most prominent on the left frontal region. No seizures captured.      ACTIVATION PROCEDURES:   Not performed.      EKG: sampling of the EKG recording demonstrated sinus rhythm.      EVENTS:  None.      INTERPRETATION:  This is an abnormal 24 hrs video EEG recording in a sedated patient. Triphasic waves and frontal sharp waves are still seen frequently throughout the study, which appeared most prominent on the left frontal region. No seizures captured. The findings suggest persistnt cortical irritability and/or structural abnormality. Clinical and radiological correlation is recommended.     Updates provided to Neurology and ICU teams.         Catracho Bustos MD   Epilepsy and Neurodiagnostics.   Clinical  of Neurology [facility].   Diplomate in Neurology, Epilepsy, and Electrodiagnostic Medicine.   Office: [phone]  Fax: [phone]

## 2020-01-25 NOTE — PROGRESS NOTES
No chief complaint on file.      Problem List Items Addressed This Visit     * (Principal) Status epilepticus (HCC)     New onset, unclear etiology  On continuous EEG  Propofol gtt at 50mcg/kg/min  Continue vimpat 300 BID and topamax 200 BID  Add versed gtt and keppra 500 BID  Versed/ativan prn clinical seizures.   D/w Dr. Bustos, neurology about the EEG overnight and noted some brief seizures.   Called [facility] [phone] today (1/21) for update on the LP done on 1/15. No pleocytosis or hypoglycorrhachia.  GS/Cultures negative. HSV PCR negative.   Will plan to repeat LP here  D/w Neurology            Relevant Medications    lacosamide (VIMPAT) 200 mg in  mL ivpb (Completed)    lacosamide (VIMPAT) 300 mg in  mL ivpb (Completed)    brivaracetam (BRIVIACT) injection 200 mg (Completed)    topiramate (TOPAMAX) tablet 200 mg    MIDAZOLAM HCL 2 MG/2ML INJ SOLN (Completed)    lacosamide (VIMPAT) 300 mg in  mL ivpb    levETIRAcetam (KEPPRA) 500 mg in  mL IVPB    midazolam (VERSED) premix 125 mg/125 mL NS    Other Relevant Orders    Lumbar Puncture (Completed)    End stage renal failure on dialysis (HCC)     HD per nephrology  Renally dose medications  Monitor electrolytes  Had MRI contrast, dializying for 3 consecutive doses         Relevant Orders    Lumbar Puncture (Completed)      Other Visit Diagnoses     Shock (HCC)        Relevant Orders    Central Line Insertion (Completed)      Neurology Progress Note    Brief History of present illness:  74 y.o. 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     Interval, 1/24/20:   Patient remains intubated,  sedated with Propofol, Versed.  No clinical seizures or other events overnight.    No changes to HPI as was previously documented.     Past medical history:   Past Medical History:   Diagnosis Date   • Arthritis    • Chronic diastolic heart failure ([facility]) 6/1/2016   • Clostridium difficile diarrhea 6/2/2016   • CVA (cerebral vascular accident) ([facility])     L side weakness   • DM (diabetes mellitus) type II uncontrolled with renal manifestation 4/26/2014   • GERD (gastroesophageal reflux disease)    • GIB (gastrointestinal bleeding) 6/13/2016   • GOUT    • Hypertension    • ICH (intracerebral hemorrhage) ([facility]) 4/6/2016   • Indigestion    • Kidney failure    • RALF (obstructive sleep apnea)     Not compliant with oxygen or CPAP   • RALF (obstructive sleep apnea)    • Other and unspecified angina pectoris     hospitalized 8/13 chest pain   • Seizure ([facility]) 1/16/2020   • Unspecified cataract     right eye   • UTI (urinary tract infection) 6/1/2016       Past surgical history:   Past Surgical History:   Procedure Laterality Date   • GASTROSCOPY-ENDO  6/14/2016    Procedure: GASTROSCOPY-ENDO;  Surgeon: Oliver Macedo M.D.;  Location: ENDOSCOPY [facility];  Service:    • VENTRAL HERNIA REPAIR  4/9/2014    Performed by Trinity Bryan M.D. at SURGERY [facility]   • VITA BY LAPAROSCOPY  10/5/2013    Performed by Trinity Bryan M.D. at [facility]   • TUBAL LIGATION  1977   • CATARACT EXTRACTION WITH IOL      left eye       Family history:   Family History   Problem Relation Age of Onset   • Diabetes Mother    • Diabetes Father    • Cancer Father         sarcoma       Social history:   Social History     Socioeconomic History   • Marital status:      Spouse name: Not on file   • Number of children: Not on file   • Years of education: Not on file   • Highest education level: Not on file   Occupational History   • Not on file   Social Needs   • Financial resource strain: Not on file   • Food insecurity:      Worry: Not on file     Inability: Not on file   • Transportation needs:     Medical: Not on file     Non-medical: Not on file   Tobacco Use   • Smoking status: Never Smoker   • Smokeless tobacco: Never Used   Substance and Sexual Activity   • Alcohol use: No   • Drug use: No   • Sexual activity: Not on file   Lifestyle   • Physical activity:     Days per week: Not on file     Minutes per session: Not on file   • Stress: Not on file   Relationships   • Social connections:     Talks on phone: Not on file     Gets together: Not on file     Attends [employer] service: Not on file     Active member of club or organization: Not on file     Attends meetings of clubs or organizations: Not on file     Relationship status: Not on file   • Intimate partner violence:     Fear of current or ex partner: Not on file     Emotionally abused: Not on file     Physically abused: Not on file     Forced sexual activity: Not on file   Other Topics Concern   • Not on file   Social History Narrative    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 , buffet host    Lives with her daughter, dependent for most of ADLs after stroke gave L sided weakness       Current medications:   Current Facility-Administered Medications   Medication Dose   • heparin injection 5,000 Units  5,000 Units   • vitamin B comp+C (ALLBEE WITH C) 1 Tab  1 Tab   • folic acid (FOLVITE) tablet 1 mg  1 mg   • midodrine (PROAMATINE) tablet 5 mg  5 mg   • NOREPINEPHRINE BITARTRATE 1 MG/ML IV SOLN     • norepinephrine (LEVOPHED) 8 mg in  mL Infusion  0-30 mcg/min   • propofol (DIPRIVAN) injection  0-80 mcg/kg/min   • allopurinol (ZYLOPRIM) tablet 100 mg  100 mg   • nystatin (MYCOSTATIN) powder     • levETIRAcetam (KEPPRA) 500 mg in  mL IVPB  500 mg   • midazolam (VERSED) premix 125 mg/125 mL NS  0-10 mg/hr   • lacosamide (VIMPAT) 300 mg in  mL ivpb  300 mg   • LORazepam (ATIVAN) injection 1 mg  1 mg   • hydrALAZINE (APRESOLINE) injection 10 mg  10  mg   • topiramate (TOPAMAX) tablet 200 mg  200 mg   • aspirin (ASA) chewable tab 81 mg  81 mg   • atorvastatin (LIPITOR) tablet 40 mg  40 mg   • famotidine (PEPCID) tablet 20 mg  20 mg    Or   • famotidine (PEPCID) injection 20 mg  20 mg   • heparin injection 3,300 Units  3,300 Units   • epoetin antoinette (EPOGEN/PROCRIT) injection 4,000 Units  4,000 Units   • Respiratory Care per Protocol     • ipratropium-albuterol (DUONEB) nebulizer solution  3 mL   • senna-docusate (PERICOLACE or SENOKOT S) 8.6-50 MG per tablet 2 Tab  2 Tab    And   • polyethylene glycol/lytes (MIRALAX) PACKET 1 Packet  1 Packet    And   • bisacodyl (DULCOLAX) suppository 10 mg  10 mg   • lidocaine (XYLOCAINE) 1 % injection 1-2 mL  1-2 mL   • Pharmacy Consult: Enteral tube insertion - review meds/change route/product selection  1 Each   • fentaNYL (SUBLIMAZE) injection 25 mcg  25 mcg    Or   • fentaNYL (SUBLIMAZE) injection 50 mcg  50 mcg    Or   • fentaNYL (SUBLIMAZE) injection 100 mcg  100 mcg   • ondansetron (ZOFRAN) syringe/vial injection 4 mg  4 mg   • labetalol (NORMODYNE/TRANDATE) injection 10 mg  10 mg   • acetaminophen (TYLENOL) tablet 650 mg  650 mg   • ondansetron (ZOFRAN ODT) dispertab 4 mg  4 mg       Medication Allergy:  Allergies   Allergen Reactions   • Amlodipine Besylate Cough   • Amlodipine Cough       Review of systems:   Limited as patient is intubated/sedated.     Physical examination:   Vitals:    01/25/20 1059 01/25/20 1100 01/25/20 1200 01/25/20 1408   BP:  114/57 142/63    Pulse: 69 68 67 74   Resp:  13 13    Temp:       TempSrc:       SpO2: 100% 100% 100% 100%   Weight:       Height:         General: Patient in no acute distress.,  Ventilated and sedated.    NEUROLOGICAL EXAM:   Mental status, orientation: Intubated/sedated.   Speech and language: No verbal output, does not follow commands.   Cranial nerve exam: Pupils are 1.5 mm bilaterally and equally sluggishly reactive to light. Visual fields cannot be evaluated.  No  spontaneous eye opening; does not track spontaneously or to command. Face appears symmetric. Sensation cannot be evaluated on face or other parts of the body no response to pain.  Cannot reevaluate tongue..   Motor/Sensory exam No spontaneous/voluntary movement to BUE/BLE; Withdrawal/grimace to nox stim to BLE, plantar responses are mute bilaterally..    Deep tendon reflexes: Could not elicit any reflexes at the knees, plantar responses are mute bilaterally  Coordination: Not possible  Gait: Not assessed, intubated sedated.      ANCILLARY DATA REVIEWED:     Lab Data Review:  Recent Results (from the past 24 hour(s))   CSF CULTURE    Collection Time: 01/24/20  8:47 PM   Result Value Ref Range    Significant Indicator NEG     Source CSF     Site TAP     Culture Result No growth at 24 hours.     Gram Stain Result Rare WBCs.  No organisms seen.      CSF Cell Count    Collection Time: 01/24/20  8:47 PM   Result Value Ref Range    Number Of Tubes 4     Volume 15.5 mL    Color-Body Fluid Pink     Character-Body Fluid Hazy     Supernatant Appearance Colorless     Total RBC Count 10508 cells/uL    Crenated RBC 0 %    Total WBC Count 39 (H) 0 - 10 cells/uL    Polys 76 %    Lymphs 17 %    Mononuclear Cells - CSF 7 %    Comments see below     CSF Tube Number 3    Argelia Ink    Collection Time: 01/24/20  8:47 PM   Result Value Ref Range    Significant Indicator NEG     Source CSF     Site TAP     Argelia Ink No encapsulated yeast seen    GRAM STAIN    Collection Time: 01/24/20  8:47 PM   Result Value Ref Range    Significant Indicator .     Source CSF     Site TAP     Gram Stain Result Rare WBCs.  No organisms seen.      ACCU-CHEK GLUCOSE    Collection Time: 01/25/20 12:04 AM   Result Value Ref Range    Glucose - Accu-Ck 115 (H) 65 - 99 mg/dL   CBC with Differential    Collection Time: 01/25/20  4:56 AM   Result Value Ref Range    WBC 10.1 4.8 - 10.8 K/uL    RBC 2.68 (L) 4.20 - 5.40 M/uL    Hemoglobin 9.0 (L) 12.0 - 16.0 g/dL     Hematocrit 28.3 (L) 37.0 - 47.0 %    .6 (H) 81.4 - 97.8 fL    MCH 33.6 (H) 27.0 - 33.0 pg    MCHC 31.8 (L) 33.6 - 35.0 g/dL    RDW 66.1 (H) 35.9 - 50.0 fL    Platelet Count 143 (L) 164 - 446 K/uL    MPV 10.1 9.0 - 12.9 fL    Neutrophils-Polys 62.90 44.00 - 72.00 %    Lymphocytes 22.70 22.00 - 41.00 %    Monocytes 10.60 0.00 - 13.40 %    Eosinophils 1.10 0.00 - 6.90 %    Basophils 0.60 0.00 - 1.80 %    Immature Granulocytes 2.10 (H) 0.00 - 0.90 %    Nucleated RBC 0.00 /100 WBC    Neutrophils (Absolute) 6.36 2.00 - 7.15 K/uL    Lymphs (Absolute) 2.30 1.00 - 4.80 K/uL    Monos (Absolute) 1.07 (H) 0.00 - 0.85 K/uL    Eos (Absolute) 0.11 0.00 - 0.51 K/uL    Baso (Absolute) 0.06 0.00 - 0.12 K/uL    Immature Granulocytes (abs) 0.21 (H) 0.00 - 0.11 K/uL    NRBC (Absolute) 0.00 K/uL   Basic Metabolic Panel (BMP)    Collection Time: 01/25/20  4:56 AM   Result Value Ref Range    Sodium 135 135 - 145 mmol/L    Potassium 3.9 3.6 - 5.5 mmol/L    Chloride 101 96 - 112 mmol/L    Co2 28 20 - 33 mmol/L    Glucose 141 (H) 65 - 99 mg/dL    Bun 21 8 - 22 mg/dL    Creatinine 0.76 0.50 - 1.40 mg/dL    Calcium 8.5 8.5 - 10.5 mg/dL    Anion Gap 6.0 0.0 - 11.9   Renal Function Panel    Collection Time: 01/25/20  4:56 AM   Result Value Ref Range    Phosphorus 1.7 (L) 2.5 - 4.5 mg/dL    Albumin 2.2 (L) 3.2 - 4.9 g/dL   ESTIMATED GFR    Collection Time: 01/25/20  4:56 AM   Result Value Ref Range    GFR If African American >60 >60 mL/min/1.73 m 2    GFR If Non African American >60 >60 mL/min/1.73 m 2   ACCU-CHEK GLUCOSE    Collection Time: 01/25/20  5:00 AM   Result Value Ref Range    Glucose - Accu-Ck 127 (H) 65 - 99 mg/dL   Cytology    Collection Time: 01/25/20 10:43 AM   Result Value Ref Range    Cytology Reg See Path Report        Labs reviewed by me.       Imaging reviewed by me:     DX-CHEST-PORTABLE (1 VIEW)   Final Result      Stable chest with retrocardiac opacity from atelectasis and/or pleural fluid favored over consolidation       DX-CHEST-PORTABLE (1 VIEW)   Final Result      Stable chest with retrocardiac opacity from atelectasis and/or pleural fluid favored over consolidation      DX-CHEST-PORTABLE (1 VIEW)   Final Result         No significant change from prior.               DX-CHEST-PORTABLE (1 VIEW)   Final Result         1. Stable lines and tubes..   2. Stable retrocardiac and left perihilar atelectasis versus consolidation. Suspected small left pleural effusion.            DX-CHEST-PORTABLE (1 VIEW)   Final Result         1. Stable lines and tubes..   2. Stable retrocardiac atelectasis versus consolidation with increased left perihilar opacity. Suspected trace left pleural effusion.         [id number] VIEW   Final Result      1.  Enteric tube has been placed and the tip projects over the stomach.      2.  Pre-existing feeding tube tip projects at the gastroduodenal junction      DX-CHEST-PORTABLE (1 VIEW)   Final Result         1. Lines and tubes as above.   2. Stable retrocardiac atelectasis versus consolidation. Suspected trace left pleural effusion.         MR-BRAIN-WITH & W/O   Final Result      1.  Moderate cerebral atrophy.   2.  Evidence of intraventricular hemorrhage, indeterminate age. Possibly chronic intraventricular hemosiderin deposition.   3.  Extensive encephalomalacic change with hemosiderin deposition involving the right corona radiata, basal ganglia, posterior thalamus, subthalamic region, bordering the right cerebral peduncle. Associated ex vacuo dilatation of the body of the right    lateral ventricle. No change.   4.  Additional foci of old microhemorrhage most consistent with old hypertensive microhemorrhage or amyloid angiopathy in the left basal ganglia, left thalamus, and midline upper ventral abel.   5.  Punctate focus of acute infarction in the right parietal deep white matter. No change.   6.  Advanced supratentorial white matter disease most consistent with microvascular ischemic change.   7.   Encephalomalacic changes in the abel and right cerebral peduncle consistent with old infarction.   8.  Partially empty sella.   9.  Overall, no new findings and no significant change from 1/14/2020.      DX-CHEST-PORTABLE (1 VIEW)   Final Result         1. Stable lines and tubes.   2. Unchanged retrocardiac atelectasis versus consolidation.   3. Stable trace left pleural effusion.         OUTSIDE IMAGES-DX CHEST   Final Result      OUTSIDE IMAGES-US VASCULAR   Final Result      OUTSIDE IMAGES-MR BRAIN   Final Result      OUTSIDE IMAGES-DX CHEST   Final Result      OUTSIDE IMAGES-CT HEAD   Final Result      EC-ECHOCARDIOGRAM COMPLETE W/O CONT   Final Result      DX-CHEST-FOR LINE PLACEMENT Perform procedure in: Patient's Room   Final Result         1.  Retrocardiac opacity concerning for infiltrate, stable.   2.  Trace left pleural effusion, stable   3.  Cardiomegaly   4.  Atherosclerosis   5.  Perihilar interstitial prominence and bronchial wall cuffing, appearance suggests changes of underlying bronchial inflammation, consider bronchitis.      DX-ABDOMEN FOR TUBE PLACEMENT   Final Result         1.  Nonspecific bowel gas pattern.   2.  Dobbhoff tube tip overlying the expected location of the pylorus or first duodenal segment.   3.  Left lung base atelectasis and/or small effusion      DX-CHEST-PORTABLE (1 VIEW)   Final Result         1.  Retrocardiac opacity concerning for infiltrate.   2.  Trace left pleural effusion, stable   3.  Cardiomegaly   4.  Atherosclerosis      [id number] FILM X-RAY   Final Result      OUTSIDE IMAGES-DX CHEST   Final Result          ASSESSMENT:  From recent evaluations.  74 y.o. 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    Overnight (1/23/20), EEG somewhat improved; no subclinical seizures/NCSE overnight. Patient remains on propofol (decreased this morning with attempt to wean), remains on Versed gtt; No clinical seizures overnight per nursing.   Regarding etiology of seizures, this has yet to be determined. Per discussion with Dr. Hein and Dr. Bustos (epileptologist), feel it reasonable to pursue repeat LP (patient had an initial LP at OSH; basic CSF studies and cultures reportedly unremarkable) for additional CSF studies. Given refractory nature of patient's epileptogenic activity (note, patient's seizures initially improved, then worsened over the following 1-2 days, now slightly improved this morning), at this point must rule out other underlying causes of cortical irritability-- ie infectious processes, autoimmune, paraneoplastics, or even prion disease/CJD given pattern noted per EEG.         Today patient did not show any new changes on examination, almost unresponsive to all  painful stimuli, pupils were pinpoint and sluggishly reactive equal.  She was on Versed and propofol gtt. intubated..    Recommendations/Plan:     -Continue cEEG monitoring.   -Continue current doses of Vimpat 300 mg q12h, Topamax 200 mg q12h and Keppra 500 mg IV q12h.   -Seizure precautions; Ativan 1 mg IV for breakthrough GTCS.   -As was noted above, repeat LP is planned for today. CSF studies including cell count, glucose protein, meningo-encephalitis panel, VDRL, Cytology, Paraneoplastics, 14-3-3, and flow cytometry have been ordered. Will follow up with results as they become available.   -Palliative medicine is on board to establish goals of care; will follow up with their input as is available; planning for family meeting today 1/24/20 to further discuss goals of care.   -All other medical management per primary team.   -DVT PPX: SQ Heparin.       Case was discussed  with  in ICU, we reviewed her recent CSF, which appears traumatic with RBC with proportionate white cell count.  No concern for acute infection.,  Her initial brain MRI was also reviewed, showing multiple foci of hemosiderin and chronic hemorrhagic foci.  No main mass-effect orac aniya lesion explaining her seizures.,    Given recent EEG changes when decreasing her propofol- currently we agreed that she needs to continue on Versed and propofolgtt and current AEDs. gtt. continue to monitor her problem is cEEG for electrical events.    We also discussed to look for any metabolic derangements check magnesium, calcium, phosphorus, TSH and ammonia levels and correct if abnormal.    Keep serum magnesium 2.0 or above.    Overall prognosis seems quite guarded.      Mary Lacey MSN, BSN, AGNP-C  [facility] [facility]

## 2020-01-25 NOTE — PROCEDURES
Procedure Lumbar Puncture  Date/Time: 1/25/2020 6:45 AM  Performed by: Anurag Hein D.O.  Authorized by: Anurag Hein D.O.     Consent:     Consent obtained:  Written    Consent given by:  Patient    Risks discussed:  Infection, nerve damage, headache, bleeding, pain and repeat procedure    Alternatives discussed:  No treatment  Universal protocol:     Immediately prior to procedure a time out was called: yes      Site/side marked: yes      Patient identity confirmed:  Arm band  Pre-procedure details:     Procedure purpose:  Diagnostic    Preparation: Patient was prepped and draped in usual sterile fashion    Sedation:     Sedation type:  None  Anesthesia:     Anesthesia method:  Local infiltration    Local anesthetic:  Lidocaine 1% w/o epi  Procedure details:     Lumbar space:  L4-L5 interspace    Patient position:  L lateral decubitus    Needle gauge:  20    Needle length (in):  3.5    Ultrasound guidance: no      Number of attempts:  1    Opening pressure (cm H2O): not measured.    Fluid appearance:  Blood-tinged    Tubes of fluid:  4    Total volume (ml):  15  Post-procedure:     Puncture site:  Adhesive bandage applied and direct pressure applied    Patient tolerance of procedure:  Tolerated well, no immediate complications

## 2020-01-25 NOTE — CARE PLAN
Problem: Communication  Goal: The ability to communicate needs accurately and effectively will improve  Outcome: PROGRESSING AS EXPECTED     Problem: Infection  Goal: Will remain free from infection  Outcome: PROGRESSING AS EXPECTED     Problem: Venous Thromboembolism (VTW)/Deep Vein Thrombosis (DVT) Prevention:  Goal: Patient will participate in Venous Thrombosis (VTE)/Deep Vein Thrombosis (DVT)Prevention Measures  Outcome: PROGRESSING AS EXPECTED     Problem: Bowel/Gastric:  Goal: Will not experience complications related to bowel motility  Outcome: PROGRESSING AS EXPECTED     Problem: Fluid Volume:  Goal: Will maintain balanced intake and output  Outcome: PROGRESSING AS EXPECTED     Problem: Urinary Elimination:  Goal: Ability to reestablish a normal urinary elimination pattern will improve  Outcome: PROGRESSING AS EXPECTED

## 2020-01-25 NOTE — PROGRESS NOTES
Palliative Care follow-up  Dr. Hein updated on care conference with pt's children.    Thank you for allowing Palliative Care to support this patient and family. Contact [phone] for additional assistance, change in patient status, or with any questions/concerns.

## 2020-01-25 NOTE — PROGRESS NOTES
Critical Care Progress Note    Date of admission  1/16/2020    Chief Complaint  status epilepticus and respiratory failure    Hospital Course    74 y.o. female who presented 1/16/2020 with PMH significant for ESRD on IHD since November 2019 M/W/F, HTN, HPL, CAD, admitted for first time witness seizure at SNF, transferred from [facility]. Had a second seizure with prolonged postictal state. Intubated. MRI brain with an acute right parietal lacunar infarct with overall brain parenchymal loss. Loaded with Keppra, dilantin. LP was done with no pleocytosis. Was empirically treated with antibiotics. Code status FULL CODE.  S/p repeat LP 1/24      Interval Problem Update  Reviewed last 24 hour events:  S/p LP yesterday, cell count with WBC 39, but RBC 96624. Corrected WBC is normal. Other CSF labs are pending   Remains on continuous EEG   On propofol was at 30, versed 8mg/hr  On keppra 500 Q12H, locasamide 300 BID, topamax 200 BID  D/w Dr. Bustos about cEEG and pt appears well sedated, no seizure activity    We attempted a trial of weaning off propofol from 20 mcg/kg/min and keep versed at 8.   This afternoon, pt appears to remain seizure free and well sedated.     Afebrile, Tm 36.5C  SBP in 100-150s, HR in 70s  Off norepinephrine since 1/20  Remains on ventilatory support with FiO2 30%, PEEP 8.   Vent day #10. Was on vent x 2 days at OSH.      To have IHD today   WBC 10  Hgb 9.0  plt 143  BG in 150s   On tube feed and tolerating.       Review of Systems  Review of Systems   Unable to perform ROS: Acuity of condition        Vital Signs for last 24 hours   Temp:  [36.1 °C (97 °F)-37.2 °C (99 °F)] 36.7 °C (98.1 °F)  Pulse:  [] 69  Resp:  [0-17] 14  BP: ()/(49-80) 146/66  SpO2:  [100 %] 100 %    Hemodynamic parameters for last 24 hours       Respiratory Information for the last 24 hours  Zepeda Vent Mode: APVCMV  Rate (breaths/min): 14  Vt Target (mL): 320  PEEP/CPAP: 8  FiO2: 30  P MEAN: 11  Control VTE  (exp VT): 315    Physical Exam   Physical Exam  Vitals signs and nursing note reviewed.   Constitutional:       General: She is not in acute distress.     Appearance: She is ill-appearing. She is not toxic-appearing.      Comments: Intubated, not awake to verbal     HENT:      Head: Normocephalic.      Mouth/Throat:      Comments: Et tube in place  Eyes:      Conjunctiva/sclera: Conjunctivae normal.   Cardiovascular:      Rate and Rhythm: Normal rate and regular rhythm.      Pulses: Normal pulses.      Heart sounds: No murmur.   Pulmonary:      Effort: Pulmonary effort is normal.      Breath sounds: Normal breath sounds. No wheezing or rales.      Comments: Diminished at bases  Abdominal:      General: There is no distension.      Palpations: Abdomen is soft.      Tenderness: There is no tenderness. There is no guarding.      Comments: Hypoactive bowel sound   Musculoskeletal:      Right lower leg: No edema.      Left lower leg: No edema.      Comments: + edema in upper extremities   Skin:     General: Skin is warm and dry.      Capillary Refill: Capillary refill takes 2 to 3 seconds.      Findings: No bruising.   Neurological:      Comments: Baseline left-sided deficits from previous CVA  Opening eyes but not following commands         Medications  Current Facility-Administered Medications   Medication Dose Route Frequency Provider Last Rate Last Dose   • propofol (DIPRIVAN) injection  0-80 mcg/kg/min Intravenous Continuous YORDY GiordanoOUli 7.9 mL/hr at 01/25/20 0801 20 mcg/kg/min at 01/25/20 0801   • midodrine (PROAMATINE) tablet 5 mg  5 mg Oral PRN Maliha Patterson D.OUli   5 mg at 01/24/20 1537   • allopurinol (ZYLOPRIM) tablet 100 mg  100 mg Enteral Tube DAILY ALONSO Giordano.O.   100 mg at 01/25/20 0446   • nystatin (MYCOSTATIN) powder   Topical BID Anurag Hein D.O.       • levETIRAcetam (KEPPRA) 500 mg in  mL IVPB  500 mg Intravenous Q12HRS YORDY GiordnaoO.   Stopped at 01/25/20 0500   • midazolam  (VERSED) premix 125 mg/125 mL NS  0-10 mg/hr Intravenous Continuous Anurag Hein, D.O. 8 mL/hr at 01/25/20 0445 8 mg/hr at 01/25/20 0445   • lacosamide (VIMPAT) 300 mg in  mL ivpb  300 mg Intravenous BID Anurag Hein D.O.   Stopped at 01/25/20 0604   • LORazepam (ATIVAN) injection 1 mg  1 mg Intravenous Q3HRS PRN Catracho Bustos M.D.   1 mg at 01/20/20 1740   • hydrALAZINE (APRESOLINE) injection 10 mg  10 mg Intravenous Q4HRS PRN Anurag Hein D.O.   10 mg at 01/24/20 1220   • topiramate (TOPAMAX) tablet 200 mg  200 mg Enteral Tube Q12HRS Catracho Bustos M.D.   200 mg at 01/25/20 0445   • aspirin (ASA) chewable tab 81 mg  81 mg Enteral Tube DAILY Josep You M.D.   81 mg at 01/25/20 0445   • atorvastatin (LIPITOR) tablet 40 mg  40 mg Enteral Tube DAILY Josep You M.D.   40 mg at 01/25/20 0445   • famotidine (PEPCID) tablet 20 mg  20 mg Enteral Tube DAILY Fletcher Willis M.D.   20 mg at 01/25/20 0445    Or   • famotidine (PEPCID) injection 20 mg  20 mg Intravenous DAILY Fletcher Willis M.D.   20 mg at 01/19/20 0508   • heparin injection 3,300 Units  3,300 Units Intracatheter PRN Lydia Carcamo M.D.   3,300 Units at 01/24/20 1713   • epoetin antoinette (EPOGEN/PROCRIT) injection 4,000 Units  4,000 Units Subcutaneous MO, WE + FR Payam Cavazos M.D.   4,000 Units at 01/24/20 1220   • Respiratory Care per Protocol   Nebulization Continuous RT Jeremy M Gonda, M.D.       • ipratropium-albuterol (DUONEB) nebulizer solution  3 mL Nebulization Q2HRS PRN (RT) Jeremy M Gonda, M.D.       • senna-docusate (PERICOLACE or SENOKOT S) 8.6-50 MG per tablet 2 Tab  2 Tab Enteral Tube BID Jeremy M Gonda, M.D.   Stopped at 01/25/20 0600    And   • polyethylene glycol/lytes (MIRALAX) PACKET 1 Packet  1 Packet Enteral Tube QDAY PRN Jeremy M Gonda, M.D.        And   • bisacodyl (DULCOLAX) suppository 10 mg  10 mg Rectal QDAY PRN Jeremy M Gonda, M.D.       • lidocaine (XYLOCAINE) 1 % injection 1-2 mL  1-2 mL Tracheal Tube Q30 MIN PRN  Jeremy M Gonda, M.D.       • Pharmacy Consult: Enteral tube insertion - review meds/change route/product selection  1 Each Other PHARMACY TO DOSE Jeremy M Gonda, M.D.       • fentaNYL (SUBLIMAZE) injection 25 mcg  25 mcg Intravenous Q HOUR PRN Jeremy M Gonda, M.D.   25 mcg at 01/16/20 2310    Or   • fentaNYL (SUBLIMAZE) injection 50 mcg  50 mcg Intravenous Q HOUR PRN Jeremy M Gonda, M.D.   50 mcg at 01/20/20 1555    Or   • fentaNYL (SUBLIMAZE) injection 100 mcg  100 mcg Intravenous Q HOUR PRN Jeremy M Gonda, M.D.   100 mcg at 01/21/20 1748   • ondansetron (ZOFRAN) syringe/vial injection 4 mg  4 mg Intravenous Q4HRS PRN Rina Matta M.D.   4 mg at 01/18/20 1214   • labetalol (NORMODYNE/TRANDATE) injection 10 mg  10 mg Intravenous Q4HRS PRN Rnia Matta M.D.   10 mg at 01/20/20 1239   • acetaminophen (TYLENOL) tablet 650 mg  650 mg Enteral Tube Q6HRS PRN Jeremy M Gonda, M.D.       • ondansetron (ZOFRAN ODT) dispertab 4 mg  4 mg Enteral Tube Q4HRS PRN Jeremy M Gonda, M.D.           Fluids    Intake/Output Summary (Last 24 hours) at 1/25/2020 1112  Last data filed at 1/25/2020 1000  Gross per 24 hour   Intake 2065.85 ml   Output 1500 ml   Net 565.85 ml       Laboratory          Recent Labs     01/23/20  0605 01/24/20  0522 01/25/20  0456   SODIUM 137 137 135   POTASSIUM 3.9 3.7 3.9   CHLORIDE 103 105 101   CO2 31 26 28   BUN 20 27* 21   CREATININE 0.72 1.03 0.76   PHOSPHORUS  --   --  1.7*   CALCIUM 8.0* 8.3* 8.5     Recent Labs     01/23/20  0605 01/24/20  0522 01/25/20  0456   GLUCOSE 126* 106* 141*     Recent Labs     01/23/20  0605 01/24/20  0522 01/25/20  0456   WBC 7.7 7.8 10.1   NEUTSPOLYS 50.50 68.50 62.90   LYMPHOCYTES 31.10 20.70* 22.70   MONOCYTES 13.20 5.40 10.60   EOSINOPHILS 3.20 1.80 1.10   BASOPHILS 0.60 1.80 0.60     Recent Labs     01/23/20  0605 01/24/20  0522 01/25/20  0456   RBC 2.61* 2.58* 2.68*   HEMOGLOBIN 9.0* 8.5* 9.0*   HEMATOCRIT 28.1* 27.9* 28.3*   PLATELETCT 137* 136* 143*     Called  [facility] [phone] today (1/21) for update on the LP done on 1/15.   1/15 CSF Gram stain negative, Cx no growth to date (final)  1/15 CSF cell count was WBC 1, RBC 2. Total protein 45. Glucose 79  1/15 CSF HSV PCR negative  1/15 CSF paraneoplastic negative    Imaging  X-Ray:  I have personally reviewed the images and compared with prior images.    Assessment/Plan  * Status epilepticus (HCC)  Assessment & Plan  New onset, unclear etiology  On continuous EEG and her course with EEG as follows:  1/19 we attempted to keep pt off sedation. Off propofol and fentanyl. Antiseizures with vimpat, clonazepam and topamax.   1/19, captured evidence of subclinical seizures despite on current antiseizures.   Propofol gtt was started in the evening  1/20, spoke with Dr. Bustos, Neurology, and noted brief seizures overnight,  will continue propofol gtt and increased to 40mcg/kg/min  1/22, Pt still had intermittent brief seizure, propofol increased to 50. Vimpat increased to 300 BID. On topamax 200 BID  1/23, pt still having seizures. Versed gtt was started at 4mg/hr, propofol 50, vimpat 300 BID and topomax 200BID. Added keppra  1/24, attempted a trial off propofol, but failed. +seizure activity noted. She failed to wean. Propofol was back to 30 and we increased versed to 8mg/hr  1/25, propofol decreased to 20 and versed at 8    Continue vimpat 300 BID and topamax 200 BID, keppra 500 BID  Versed/ativan prn clinical seizures.   D/w Dr. Bustos, neurology          Acute respiratory failure with hypoxia (HCC)  Assessment & Plan  Intubated in emergency department 1/14 at outside hospital  Continue full mechanical ventilatory support  Not candidate for SAT, SBT, and mobility due to deeply sedated with propofol and versed.   Goal to keep sat >94%  Given the refractory seizures and needing high doses of sedatives, pt will not be able to be weaned off ventilation soon. Family seems want to continue treatment and  leaning towards trach early next week.     Severe protein-calorie malnutrition (HCC)  Assessment & Plan  Continue tube feeds at goal rate   Dietary following    Cerebrovascular accident (CVA) due to embolism of right middle cerebral artery (HCC)  Assessment & Plan  Continue high intensity statin, aspirin  PT/OT eval  Neurology following  Echocardiogram reviewed    End stage renal failure on dialysis (HCC)  Assessment & Plan  HD per nephrology  Renally dose medications  Monitor electrolytes  Had MRI contrast, dializying for 3 consecutive doses    Normocytic anemia- (present on admission)  Assessment & Plan  Daily CBC with conservative transfusion strategy, monitor for bleeding    Hypertension  Assessment & Plan  scheduled hydralazine, Isordil, labetalol  PRN IV labetalol, hydralazine for goal SBP less than 160    DM (diabetes mellitus) (HCC)- (present on admission)  Assessment & Plan  Insulin sliding scale  Hypoglycemia protocol  FS BS  Goal -180    Goals of care, counseling/discussion  Assessment & Plan  Discussed at length with son regarding patient's critical condition on 1/21  Son came from [facility]. Pt has daughter who's going to be here on 1/23. She's currently in [place].   Palliative care is following. Plan to have family meeting today on 1/24 with daughteraj DPOA      Pressure ulcer  Assessment & Plan  Stage II coccyx -present on arrival  Continue wound care    Hypomagnesemia  Assessment & Plan  Replace keep greater than 2    Gout- (present on admission)  Assessment & Plan  Continue allopurinol    Obesity (BMI 30-39.9)- (present on admission)  Assessment & Plan  Nutritionist consultation  Encourage behavioral and dietary modification once extubated for weight loss       VTE:  Heparin  Ulcer: H2 Antagonist  Lines: Central Line  Ongoing indication addressed and Rivre Catheter  Ongoing indication addressed    I have performed a physical exam and reviewed and updated ROS and Plan today  (1/25/2020). In review of yesterday's note (1/24/2020), there are no changes except as documented above.     Discussed patient condition and risk of morbidity and/or mortality with RN, RT, Pharmacy and nephrology and neurology     I have assessed  her respiratory status, hemodynamics, cardiovascular and neurological status.  Pt remains having seizures. . Need ongoing goal of care discussion with family to discuss about goal of care.  High risk of deterioration and worsening vital organ dysfunction and death without the above critical care interventions.     The patient remains critically ill.  Critical care time = 60 minutes in directly providing and coordinating critical care and extensive data review.  No time overlap and excludes procedures.

## 2020-01-25 NOTE — PROGRESS NOTES
Dr. Hein at bedside; states to restart propofol gtt at fixed rate of 30 mcg/kg/min and to increase versed to 8 for seizure activity on the EEG. Informed MD that patient is receiving hemodialysis for 2 more hours and bp is currently 95/63. MD states to make the sedation changes at this time and to inform if patient's hypotension becomes worse. VSS. Will continue to monitor.

## 2020-01-26 NOTE — PROCEDURES
VIDEO ELECTROENCEPHALOGRAM REPORT        Referring provider: Dr. You.      DOS: 1/26/2020 (total recording of 23 hours and 48 minutes).      INDICATION:  Cassandra [place] 74 y.o. female presenting with recurrent seizures, status epilepticus.      CURRENT ANTIEPILEPTIC REGIMEN: Lacosamide 300 mg q 12 hrs, Topiramate 200 mg bid, Levetiracetam 500 mg q 12 hrs. Sedated with Midazolam infusions. Propofol infusion was weaned off..      TECHNIQUE: 30 channel video electroencephalogram (EEG) was performed in accordance with the international 10-20 system. The study was reviewed in bipolar and referential montages. The recording examined a sedated patient.       DESCRIPTION OF THE RECORD:  Most of the study suggested a sedated state, with waxing and waning of the cerebral electrical activity. Triphasic and frontal sharp waves are still seen frequently throughout the study,and these appeared most prominent on the left frontal region. There has been some mild worsening of the study with the weaning of Propofol, with epileptiform discharges becoming more frequent and sometimes briefly periodic, in addition, brief runs of rhythmic delta activity are seen in the frontal regions from time to time. No seizures captured.      ACTIVATION PROCEDURES:   Not performed.      EKG: sampling of the EKG recording demonstrated sinus rhythm.      EVENTS:  None.      INTERPRETATION:  This is an abnormal 24 hrs video EEG recording in a sedated patient. Triphasic and frontal sharp waves are still seen frequently throughout the study,and these appeared most prominent on the left frontal region. There has been some mild worsening of the study with the weaning of Propofol, with epileptiform discharges becoming more frequent and sometimes briefly periodic, in addition, brief runs of rhythmic delta activity are seen in the frontal regions from time to time. The findings increase risk for seizures, however no seizures captured.  The findings  suggest persistnt cortical irritability and/or structural abnormality. Clinical and radiological correlation is recommended.     Updates provided to Neurology and ICU teams.         Catracho Bustos MD   Epilepsy and Neurodiagnostics.   Clinical  of Neurology [facility].   Diplomate in Neurology, Epilepsy, and Electrodiagnostic Medicine.   Office: [phone]  Fax: [phone]

## 2020-01-26 NOTE — CARE PLAN
Adult Ventilation Update    Total Vent Days: 12    Patient Lines/Drains/Airways Status    Active Airway     Name: Placement date: Placement time: Site: Days:    Airway ETT Oral 7.5  01/14/20   --   Oral  12                #FVC / Vital Capacity (liters) : (does not follow commands) (01/20/20 0826)  NIF (cm H2O) : (does not follow) (01/20/20 0826)  Rapid Shallow Breathing Index (RR/VT): 60 (01/21/20 0518)  Plateau Pressure (Q Shift): 23 (01/25/20 1910)  Static Compliance (ml / cm H2O): 23 (01/25/20 1910)    Patient failed trials because of Barriers to Wean: Presence of Neuromuscular Blockade Agents (01/25/20 1408)  Barriers to SBT Weaning Trial Stopped due to:: Hemodynamically unstable/new or worsening arrythmia/SBP <80 or >180 mmHg(Increase in SBP by 30%) (01/21/20 0518)  Length of Weaning Trial Length of Weaning Trial (Hours): .25 (01/21/20 0518)    Sputum/Suction   Cough: Weak;Non Productive (01/26/20 0000)  Sputum Amount: Scant (01/26/20 0000)  Sputum Color: White;Clear (01/25/20 1600)  Sputum Consistency: Thin (01/25/20 1600)    Mobility  Level of Mobility: Level I (01/25/20 2000)  Activity Performed: Unable to mobilize (01/25/20 2000)  Assistance: Assistance of Two or More (01/25/20 0800)  Pt Calls for Assistance: No (01/25/20 0800)  Gait: Unable to Ambulate (01/25/20 1800)  Reason Not Mobilized: Unstable condition (01/25/20 2000)  Mobilization Comments: strict bedrest, status epilepticus (01/25/20 2000)    Events/Summary/Plan: Holister changed with ECG tech at bedside (01/25/20 3382)

## 2020-01-26 NOTE — PROGRESS NOTES
Critical Care Progress Note    Date of admission  1/16/2020    Chief Complaint  status epilepticus and respiratory failure    Hospital Course    74 y.o. female who presented 1/16/2020 with PMH significant for ESRD on IHD since November 2019 M/W/F, HTN, HPL, CAD, admitted for first time witness seizure at SNF, transferred from [facility]. Had a second seizure with prolonged postictal state. Intubated. MRI brain with an acute right parietal lacunar infarct with overall brain parenchymal loss. Loaded with Keppra, dilantin. LP was done with no pleocytosis. Was empirically treated with antibiotics. Code status FULL CODE.  S/p repeat LP 1/24. Cell count with WBC 39, but RBC 26308. Corrected WBC is normal. Other CSF labs are pending       Interval Problem Update  Reviewed last 24 hour events:  Remains on continuous EEG   On propofol was at 20, versed 8mg/hr  On keppra 500 Q12H, locasamide 300 BID, topamax 200 BID  D/w Dr. Bustos about cEEG and pt appears well sedated, no seizure activity    Propofol is decreased to 10, later decreased to 0 because patient does not exhibit any seizures.     Afebrile, Tm 36.6C  SBP in 100-150s, HR in 90s  Remains on ventilatory support with FiO2 30%, PEEP 8.   Vent day #11. Was on vent x 2 days at OSH.    WBC 11.2  Hgb \8.4  plt 130  BG in 150s   On tube feed and tolerating.       Review of Systems  Review of Systems   Unable to perform ROS: Acuity of condition        Vital Signs for last 24 hours   Temp:  [36.1 °C (97 °F)-36.5 °C (97.7 °F)] 36.5 °C (97.7 °F)  Pulse:  [58-77] 76  Resp:  [13-25] 13  BP: ()/(50-72) 151/72  SpO2:  [95 %-100 %] 100 %    Hemodynamic parameters for last 24 hours       Respiratory Information for the last 24 hours  Zepeda Vent Mode: APVCMV  Rate (breaths/min): 14  Vt Target (mL): 320  PEEP/CPAP: 8  FiO2: 30  P MEAN: 11  Control VTE (exp VT): 332    Physical Exam   Physical Exam  Vitals signs and nursing note reviewed.   Constitutional:       General:  She is not in acute distress.     Appearance: She is ill-appearing. She is not toxic-appearing.      Comments: Intubated, not awake to verbal     HENT:      Head: Normocephalic.      Mouth/Throat:      Comments: Et tube in place  Eyes:      Conjunctiva/sclera: Conjunctivae normal.   Cardiovascular:      Rate and Rhythm: Normal rate and regular rhythm.      Pulses: Normal pulses.      Heart sounds: No murmur.   Pulmonary:      Effort: Pulmonary effort is normal.      Breath sounds: Normal breath sounds. No wheezing or rales.      Comments: Diminished at bases  Abdominal:      General: There is no distension.      Palpations: Abdomen is soft.      Tenderness: There is no tenderness. There is no guarding.      Comments: Hypoactive bowel sound   Musculoskeletal:      Right lower leg: Edema present.      Comments: + edema in upper extremities   Skin:     General: Skin is warm and dry.      Capillary Refill: Capillary refill takes 2 to 3 seconds.      Findings: No bruising.   Neurological:      Comments: Baseline left-sided deficits from previous CVA  Opening eyes but not following commands         Medications  Current Facility-Administered Medications   Medication Dose Route Frequency Provider Last Rate Last Dose   • propofol (DIPRIVAN) injection  0-80 mcg/kg/min Intravenous Continuous ALONSO Giordano.OUli 3.9 mL/hr at 01/26/20 1138 10 mcg/kg/min at 01/26/20 1138   • sodium phosphate 30 mmol in D5W 500 mL ivpb  30 mmol Intravenous Once YORDY McgarryOUli 83.3 mL/hr at 01/26/20 1138 30 mmol at 01/26/20 1138   • [START ON 1/27/2020] sodium phosphate 30 mmol in D5W 500 mL ivpb  30 mmol Intravenous Once PRN ALONSO Mcgarry.O.       • heparin injection 5,000 Units  5,000 Units Subcutaneous Q8HRS YORDY GiordanoOUli   5,000 Units at 01/26/20 1311   • vitamin B comp+C (ALLBEE WITH C) 1 Tab  1 Tab Enteral Tube DAILY Anurag Hein D.O.   1 Tab at 01/26/20 0521   • folic acid (FOLVITE) tablet 1 mg  1 mg Enteral Tube DAILY Anurag  Melvina, D.O.   1 mg at 01/26/20 0521   • midodrine (PROAMATINE) tablet 5 mg  5 mg Enteral Tube PRN Maliha Patterson, D.O.   5 mg at 01/25/20 1304   • norepinephrine (LEVOPHED) 8 mg in  mL Infusion  0-30 mcg/min Intravenous Continuous Anurag Hein D.O.   Stopped at 01/25/20 1600   • allopurinol (ZYLOPRIM) tablet 100 mg  100 mg Enteral Tube DAILY Anurag Hein D.O.   100 mg at 01/26/20 0521   • nystatin (MYCOSTATIN) powder   Topical BID Anurag Hein D.O.       • levETIRAcetam (KEPPRA) 500 mg in  mL IVPB  500 mg Intravenous Q12HRS Anurag Hein D.O.   Stopped at 01/26/20 0621   • midazolam (VERSED) premix 125 mg/125 mL NS  0-10 mg/hr Intravenous Continuous ALONSO Giordano.O. 8 mL/hr at 01/26/20 1309 8 mg/hr at 01/26/20 1309   • lacosamide (VIMPAT) 300 mg in  mL ivpb  300 mg Intravenous BID Anurag Hein D.O.   Stopped at 01/26/20 0710   • LORazepam (ATIVAN) injection 1 mg  1 mg Intravenous Q3HRS PRN Catracho Bustos M.D.   1 mg at 01/20/20 1740   • hydrALAZINE (APRESOLINE) injection 10 mg  10 mg Intravenous Q4HRS PRN Anurag Hein D.O.   Stopped at 01/26/20 1333   • topiramate (TOPAMAX) tablet 200 mg  200 mg Enteral Tube Q12HRS Catracho Bustos M.D.   200 mg at 01/26/20 0520   • aspirin (ASA) chewable tab 81 mg  81 mg Enteral Tube DAILY Josep oYu M.D.   81 mg at 01/26/20 0521   • atorvastatin (LIPITOR) tablet 40 mg  40 mg Enteral Tube DAILY Josep You M.D.   40 mg at 01/26/20 0521   • famotidine (PEPCID) tablet 20 mg  20 mg Enteral Tube DAILY Fletcher Willis M.D.   20 mg at 01/26/20 0520   • heparin injection 3,300 Units  3,300 Units Intracatheter PRN Lydia Carcamo M.D.   3,300 Units at 01/25/20 1605   • epoetin antoinette (EPOGEN/PROCRIT) injection 4,000 Units  4,000 Units Subcutaneous MO, WE + FR Payam Cavazos M.D.   4,000 Units at 01/24/20 1220   • Respiratory Care per Protocol   Nebulization Continuous RT Jeremy M Gonda, M.D.       • ipratropium-albuterol (DUONEB) nebulizer solution  3 mL Nebulization  Q2HRS PRN (RT) Jeremy M Gonda, M.D.       • senna-docusate (PERICOLACE or SENOKOT S) 8.6-50 MG per tablet 2 Tab  2 Tab Enteral Tube BID Jeremy M Gonda, M.D.   Stopped at 01/25/20 0600    And   • polyethylene glycol/lytes (MIRALAX) PACKET 1 Packet  1 Packet Enteral Tube QDAY PRN Jeremy M Gonda, M.D.        And   • bisacodyl (DULCOLAX) suppository 10 mg  10 mg Rectal QDAY PRN Jeremy M Gonda, M.D.       • lidocaine (XYLOCAINE) 1 % injection 1-2 mL  1-2 mL Tracheal Tube Q30 MIN PRN Jeremy M Gonda, M.D.       • Pharmacy Consult: Enteral tube insertion - review meds/change route/product selection  1 Each Other PHARMACY TO DOSE Jeremy M Gonda, M.D.       • fentaNYL (SUBLIMAZE) injection 25 mcg  25 mcg Intravenous Q HOUR PRN Jeremy M Gonda, M.D.   25 mcg at 01/16/20 2310    Or   • fentaNYL (SUBLIMAZE) injection 50 mcg  50 mcg Intravenous Q HOUR PRN Jeremy M Gonda, M.D.   50 mcg at 01/20/20 1555    Or   • fentaNYL (SUBLIMAZE) injection 100 mcg  100 mcg Intravenous Q HOUR PRN Jeremy M Gonda, M.D.   100 mcg at 01/21/20 1748   • ondansetron (ZOFRAN) syringe/vial injection 4 mg  4 mg Intravenous Q4HRS PRN Rina Matta M.D.   4 mg at 01/18/20 1214   • labetalol (NORMODYNE/TRANDATE) injection 10 mg  10 mg Intravenous Q4HRS PRN Rina Matta M.D.   10 mg at 01/20/20 1239   • acetaminophen (TYLENOL) tablet 650 mg  650 mg Enteral Tube Q6HRS PRN Jeremy M Gonda, M.D.       • ondansetron (ZOFRAN ODT) dispertab 4 mg  4 mg Enteral Tube Q4HRS PRN Jeremy M Gonda, M.D.           Fluids    Intake/Output Summary (Last 24 hours) at 1/26/2020 1448  Last data filed at 1/26/2020 1200  Gross per 24 hour   Intake 2448.17 ml   Output 2200 ml   Net 248.17 ml       Laboratory          Recent Labs     01/24/20  0522 01/25/20  0456 01/25/20  1620 01/26/20  0339   SODIUM 137 135  --  136   POTASSIUM 3.7 3.9  --  4.2   CHLORIDE 105 101  --  104   CO2 26 28  --  26   BUN 27* 21  --  17   CREATININE 1.03 0.76  --  0.69   MAGNESIUM  --   --  1.7 1.7    PHOSPHORUS  --  1.7* 1.2* 1.5*   CALCIUM 8.3* 8.5  --  8.3*     Recent Labs     01/24/20  0522 01/25/20  0456 01/26/20  0339   GLUCOSE 106* 141* 99     Recent Labs     01/24/20  0522 01/25/20  0456 01/26/20  0339   WBC 7.8 10.1 11.2*   NEUTSPOLYS 68.50 62.90 54.30   LYMPHOCYTES 20.70* 22.70 28.30   MONOCYTES 5.40 10.60 12.90   EOSINOPHILS 1.80 1.10 1.30   BASOPHILS 1.80 0.60 0.60     Recent Labs     01/24/20 0522 01/25/20 0456 01/26/20  0339   RBC 2.58* 2.68* 2.45*   HEMOGLOBIN 8.5* 9.0* 8.4*   HEMATOCRIT 27.9* 28.3* 26.2*   PLATELETCT 136* 143* 130*     Called [facility] [phone] today (1/21) for update on the LP done on 1/15.   1/15 CSF Gram stain negative, Cx no growth to date (final)  1/15 CSF cell count was WBC 1, RBC 2. Total protein 45. Glucose 79  1/15 CSF HSV PCR negative  1/15 CSF paraneoplastic negative    Imaging  X-Ray:  I have personally reviewed the images and compared with prior images.    Assessment/Plan  * Status epilepticus (HCC)  Assessment & Plan  New onset, unclear etiology  On continuous EEG and her course with EEG as follows:  1/19 we attempted to keep pt off sedation. Off propofol and fentanyl. Antiseizures with vimpat, clonazepam and topamax.   1/19, captured evidence of subclinical seizures despite on current antiseizures.   Propofol gtt was started in the evening  1/20, spoke with Dr. Bustos, Neurology, and noted brief seizures overnight,  will continue propofol gtt and increased to 40mcg/kg/min  1/22, Pt still had intermittent brief seizure, propofol increased to 50. Vimpat increased to 300 BID. On topamax 200 BID  1/23, pt still having seizures. Versed gtt was started at 4mg/hr, propofol 50, vimpat 300 BID and topomax 200BID. Added keppra  1/24, attempted a trial off propofol, but failed. +seizure activity noted. She failed to wean. Propofol was back to 30 and we increased versed to 8mg/hr  1/25, propofol decreased to 20 and versed at 8  1/26 weaned off  propofol and continue versed at 8    Continue vimpat 300 BID and topamax 200 BID, keppra 500 BID  Versed/ativan prn clinical seizures.   D/w Dr. Bustos, neurology          Acute respiratory failure with hypoxia (HCC)  Assessment & Plan  Intubated in emergency department 1/14 at outside hospital  Continue full mechanical ventilatory support  Not candidate for SAT, SBT, and mobility due to deeply sedated with propofol and versed.   Goal to keep sat >94%  Given the refractory seizures and needing high doses of sedatives, pt will not be able to be weaned off ventilation soon. Family seems want to continue treatment and leaning towards trach early next week.     Severe protein-calorie malnutrition (HCC)  Assessment & Plan  Continue tube feeds at goal rate   Dietary following    Cerebrovascular accident (CVA) due to embolism of right middle cerebral artery ([facility])  Assessment & Plan  Continue high intensity statin, aspirin  PT/OT eval  Neurology following  Echocardiogram reviewed    End stage renal failure on dialysis ([facility])  Assessment & Plan  HD per nephrology  Renally dose medications  Monitor electrolytes  Had MRI contrast, dializying for 3 consecutive doses    Normocytic anemia- (present on admission)  Assessment & Plan  Daily CBC with conservative transfusion strategy, monitor for bleeding    Hypertension  Assessment & Plan  scheduled hydralazine, Isordil, labetalol  PRN IV labetalol, hydralazine for goal SBP less than 160    DM (diabetes mellitus) ([facility])- (present on admission)  Assessment & Plan  Insulin sliding scale  Hypoglycemia protocol  FS BS  Goal -180    Goals of care, counseling/discussion  Assessment & Plan  Discussed at length with son regarding patient's critical condition on 1/21  Had family meeting 1/24, palliative care and neurology. All questions were answered.   Family seems hopeful and would like to continue full treatment at this time.   Another meeting scheduled on 1/27    I updated family  regarding the critical condition and all questions were answered.       Pressure ulcer  Assessment & Plan  Stage II coccyx -present on arrival  Continue wound care    Hypomagnesemia  Assessment & Plan  Replace keep greater than 2    Gout- (present on admission)  Assessment & Plan  Continue allopurinol    Obesity (BMI 30-39.9)- (present on admission)  Assessment & Plan  Nutritionist consultation  Encourage behavioral and dietary modification once extubated for weight loss       VTE:  Heparin  Ulcer: H2 Antagonist  Lines: Central Line  Ongoing indication addressed and River Catheter  Ongoing indication addressed    I have performed a physical exam and reviewed and updated ROS and Plan today (1/26/2020). In review of yesterday's note (1/25/2020), there are no changes except as documented above.     Discussed patient condition and risk of morbidity and/or mortality with RN, RT, Pharmacy and nephrology and neurology     I have assessed  her respiratory status, hemodynamics, cardiovascular and neurological status.  Pt remains having seizures. . Need ongoing goal of care discussion with family to discuss about goal of care.  High risk of deterioration and worsening vital organ dysfunction and death without the above critical care interventions.     The patient remains critically ill.  Critical care time = 40 minutes in directly providing and coordinating critical care and extensive data review.  No time overlap and excludes procedures.

## 2020-01-26 NOTE — PROGRESS NOTES
Dialysis ordered by Dr Patterson for 4hrs on 4K acid bath. Treatment initiated at 1155, and ended at 1555.  Patient was hypotensive despite midodrine dosing; Dr Patterson notified; order to decrease bfr/dfr to 250/500. Staff RN obtained pressor order from Dr Hein; pressor started with good effect. Please see flow sheet for details.  Net UF 1700ml. Report given to primary RNDARIUS.

## 2020-01-27 NOTE — CARE PLAN
Problem: Infection  Goal: Will remain free from infection  Outcome: PROGRESSING AS EXPECTED     Problem: Venous Thromboembolism (VTW)/Deep Vein Thrombosis (DVT) Prevention:  Goal: Patient will participate in Venous Thrombosis (VTE)/Deep Vein Thrombosis (DVT)Prevention Measures  Outcome: PROGRESSING AS EXPECTED     Problem: Bowel/Gastric:  Goal: Will not experience complications related to bowel motility  Outcome: PROGRESSING AS EXPECTED

## 2020-01-27 NOTE — PROCEDURES
VIDEO ELECTROENCEPHALOGRAM REPORT        Referring provider: Dr. You.      DOS: 1/27/2020 (total recording of 23 hours and 20 minutes).      INDICATION:  Cassandra [place] 74 y.o. female presenting with recurrent seizures, status epilepticus.      CURRENT ANTIEPILEPTIC REGIMEN: Lacosamide 300 mg q 12 hrs, Topiramate 200 mg bid, Levetiracetam increased to 750 mg q 12 hrs. Sedated with Midazolam infusion.      TECHNIQUE: 30 channel video electroencephalogram (EEG) was performed in accordance with the international 10-20 system. The study was reviewed in bipolar and referential montages. The recording examined a sedated patient.       DESCRIPTION OF THE RECORD:  Most of the study suggested a sedated state, with waxing and waning of the cerebral electrical activity. Triphasic and frontal sharp waves are seen frequently throughout the study, often exhibiting a periodic pattern, and appearing most prominent on the left frontal region at times. Also,  brief runs of generalized, rhythmic delta activity, with maximum in the frontal regions were notd from time to time. No clear seizures captured.      ACTIVATION PROCEDURES:   Not performed.      EKG: sampling of the EKG recording demonstrated sinus rhythm.      EVENTS:  None.      INTERPRETATION:  This is an abnormal 24 hrs video EEG recording in a sedated patient. Most of the study suggested a sedated state, with waxing and waning of the cerebral electrical activity. Frequent arousals noted, with some improvement of the background. A mild to moderate, toxic / metabolic encephalopathy is suggested. Triphasic and frontal sharp waves were seen frequently throughout the study, often exhibiting a periodic pattern, and appearing most prominent on the left frontal region at times. Also,  brief runs of generalized, rhythmic delta activity, with maximum in the frontal regions, were noted from time to time. The findings significantly increase risk for seizures, however no clear  seizures captured.  The findings suggest persistnt cortical irritability and/or structural abnormality. Clinical and radiological correlation is recommended.     Updates provided to Dr. Lynn.         Catracho Bustos MD   Epilepsy and Neurodiagnostics.   Clinical  of Neurology [facility].   Diplomate in Neurology, Epilepsy, and Electrodiagnostic Medicine.   Office: [phone]  Fax: [phone]

## 2020-01-27 NOTE — CARE PLAN
Problem: Ventilation Defect:  Goal: Ability to achieve and maintain unassisted ventilation or tolerate decreased levels of ventilator support  Outcome: PROGRESSING SLOWER THAN EXPECTED   Adult Ventilation Update    Total Vent Days: 12    Patient Lines/Drains/Airways Status      Active Airway       Name: Placement date: Placement time: Site: Days:    Airway ETT Oral 7.5  01/14/20   --   Oral  13                  Sputum/Suction   Cough: Productive (01/26/20 1600)  Sputum Amount: Small (01/26/20 1200)  Sputum Color: White (01/26/20 1200)  Sputum Consistency: Thick (01/26/20 1200)    Mobility  Activity Performed: Unable to mobilize (01/26/20 0800)  Reason Not Mobilized: Unstable condition (01/26/20 0800)  Mobilization Comments: STRICT BEDREST FOR STATUS (01/26/20 0800)    Events/Summary/Plan: no vent changes made, no sbt due to eeg

## 2020-01-27 NOTE — PROGRESS NOTES
Dr. Uriarte rounding at bedside. Updated on all assessment findings and overnight events. Discussed plan for family meeting today. Dr. Uriarte would like to have left subclavian triple lumen removed ; patient continues to need vasopressor therapy during frequent hemodialysis--will inquire with nephrology about potential for PICC line placement despite pt's diaylsis    1300--Spoke with Dr. Carcamo via phone from nephrology. MD states okay in this patient's case to have PICC line placed in order to d/c L subclavian central line. States will write this recommendation in patient progress note for the day and place nurse communication order. PICC RN notified of this and states will come to place PICC after daily HD is completed (ongoing currently) and if HD completes after 1600 will come tomorrow to place line. Central line to be d/c'ed after PICC placed.

## 2020-01-27 NOTE — PROGRESS NOTES
Spoke to Dr. Hein via phone states to turn propofol gtt off at this time. See Nursing communication. VSS. Will continue to monitor.

## 2020-01-27 NOTE — PROGRESS NOTES
[facility] Nephrology Daily Progress Note    Date of Service  1/27/2020    AUTHOR: Rusty Montes M.D.    Chief Complaint  74 y.o. female admitted to [facility] on 1/14/20 with seizures.She was at a SNF.She had been previously hospitalized at [facility] and diaysis was initiated.  She was doing very poorly then and palliative care was discussed with the family,but they declined.She had very poor functional status and required Jimena lift to get into the dialysis chair.She was found to have  a CVA on MRI at [facility].Her seizures were not controlled and it was felt she was in status epilepticus.  She was getting HD at [facility].Last HD was on 1/13/20.She was seen by Dr Carcamo at [facility].Creatinine was 1.8 and dialysis was held.Neurology [place] that the patient needed continuous EEG monitoring and he was transferred to [facility] [place]    Interval Problem Update  01/16/20 - Transferred to [facility] [place].In ICU  01/17/20 - Intubated.Ventilated.On continuous EEG monitotring.On Norepinephrine,enteral feedings and Propofol.UOP 70 mL today.  01/18/20 - NAEO, sedation off and she responds to verbal stimuli; MRI being scheduled today  01/19/20 - NAEO, MRI yesterday showed acute CVA and multiple microhemorrhage areas variable chronicity  01/20/20 - 3/3 post gadolinium HD session today, tolerating off norepi  01/21/20 - tolerated HD, son reports she opened her eyes yesterday  01/22/20 - SBP 130s-160s, HD today  01/23/20 - SBP labile, 110s-170s  01/24/20 - SBP 90s-120s. ~6L positive for admission  01/25/20 - hypotension on HD yesterday, terminated early, HD planned for today, with midodrine  01/26/20 - hypotension again with HD, phos low today  01/27/20 - Getting HD when visited pt. Has been requiring pressor support during HD for hypotension. Subclavian line removed per ICU team, will replace with PICC. Plan for family meeting today. Phos increased from 1.5 to 4.5 this AM. Cr up to 0.94 from 0.69, BUN 17=>28    Review of Systems   Unable to perform ROS:  Acuity of condition     PAST FAMILY HISTORY: Reviewed and unchanged since admission  PAST SURGICAL HISTORY:  Reviewed and unchanged since admission  SOCIAL HISTORY:  Reviewed and unchanged since admission  FAMILY HISTORY: Reviewed and unchanged since admission  CURRENT MEDICATIONS: Reviewed since admission to present  IMAGING STUDIES: Reviewed since admission to present  LABORATORY STUDIES: Reviewed since admission to present    Physical Exam  Temp:  [36.3 °C (97.4 °F)-36.8 °C (98.3 °F)] 36.8 °C (98.3 °F)  Pulse:  [] 106  Resp:  [11-21] 11  BP: (101-171)/(51-83) 112/68  SpO2:  [100 %] 100 %    Physical Exam  Vitals signs and nursing note reviewed.   Constitutional:       General: She is not in acute distress.     Appearance: Normal appearance. She is ill-appearing.   HENT:      Head: Normocephalic and atraumatic.      Right Ear: External ear normal.      Left Ear: External ear normal.      Nose: Nose normal. No congestion.      Mouth/Throat:      Mouth: Mucous membranes are moist.      Comments: ETT  Eyes:      General:         Right eye: No discharge.         Left eye: No discharge.      Conjunctiva/sclera: Conjunctivae normal.   Neck:      Musculoskeletal: Neck supple. No muscular tenderness.   Cardiovascular:      Rate and Rhythm: Normal rate and regular rhythm.      Heart sounds: Normal heart sounds.   Pulmonary:      Effort: Pulmonary effort is normal.      Breath sounds: Normal breath sounds.   Chest:      Chest wall: No tenderness.   Abdominal:      General: Bowel sounds are normal. There is no distension.      Palpations: Abdomen is soft.   Musculoskeletal:         General: No tenderness or signs of injury.      Right lower leg: Edema present.      Left lower leg: Edema present.      Comments: anasarca   Skin:     General: Skin is warm and dry.      Findings: No rash.   Neurological:      Mental Status: She is alert.      Comments: Does not open eyes to loud voice, sedated   Psychiatric:         Mood  and Affect: Mood normal.         Behavior: Behavior normal.       Fluids    Intake/Output Summary (Last 24 hours) at 1/27/2020 1552  Last data filed at 1/27/2020 1500  Gross per 24 hour   Intake 2738.82 ml   Output --   Net 2738.82 ml       Laboratory  Recent Labs     01/25/20  0456 01/26/20  0339 01/27/20  0304   WBC 10.1 11.2* 12.3*   RBC 2.68* 2.45* 2.60*   HEMOGLOBIN 9.0* 8.4* 8.9*   HEMATOCRIT 28.3* 26.2* 28.1*   .6* 106.9* 108.1*   MCH 33.6* 34.3* 34.2*   MCHC 31.8* 32.1* 31.7*   RDW 66.1* 68.3* 68.5*   PLATELETCT 143* 130* 139*   MPV 10.1 9.8 9.0     Recent Labs     01/25/20  0456 01/26/20  0339 01/27/20  0304   SODIUM 135 136 132*   POTASSIUM 3.9 4.2 4.2   CHLORIDE 101 104 101   CO2 28 26 25   GLUCOSE 141* 99 89   BUN 21 17 28*   CREATININE 0.76 0.69 0.94   CALCIUM 8.5 8.3* 8.6         No results for input(s): NTPROBNP in the last 72 hours.  Recent Labs     01/27/20  0304   TRIGLYCERIDE 95        IMPRESSION:  # ESRD   MWF iHD as OP at [facility]   CrCl 6              Hypotension with HD/UF, subclavian line removed per ICU team  # Status epilepticus   MRI with amanda being requested  # S/P acute lacunar infarct   Hx of previous CVA with significant deficits.  # HTN  # DM  # VDRF  # Hx of dysphagia  # Anemia of CKD.Ferritin previously significantly elevated. 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 Family meeting planned with primary team 1/27/20  PLAN:  -Seizures management per Neurology--be sure meds are dosed to accommodate HD  -MWF and PRN iHD  - UF as tolerated, concentrate IV meds as able, will likely require pressor support for HD  - midodrine 5mg q30min prior to HD for SBP<120 and 2 hours into HD if SBP<100  - lasix x 1 dose today, if useful, plan on use on non HD days  -Enteral feedings  -No  dietary protein restrictions  - phos supplement ordered, recheck tomorrow, phos ordered for AM if <4  -Epogen  -Follow labs  -Continue GOC discussions with family    Other management per primary team

## 2020-01-27 NOTE — PROGRESS NOTES
No chief complaint on file.      Problem List Items Addressed This Visit     * (Principal) Status epilepticus (HCC)     New onset, unclear etiology  On continuous EEG and her course with EEG as follows:  1/19 we attempted to keep pt off sedation. Off propofol and fentanyl. Antiseizures with vimpat, clonazepam and topamax.   1/19, captured evidence of subclinical seizures despite on current antiseizures.   Propofol gtt was started in the evening  1/20, spoke with Dr. Bustos, Neurology, and noted brief seizures overnight,  will continue propofol gtt and increased to 40mcg/kg/min  1/22, Pt still had intermittent brief seizure, propofol increased to 50. Vimpat increased to 300 BID. On topamax 200 BID  1/23, pt still having seizures. Versed gtt was started at 4mg/hr, propofol 50, vimpat 300 BID and topomax 200BID. Added keppra  1/24, attempted a trial off propofol, but failed. +seizure activity noted. She failed to wean. Propofol was back to 30 and we increased versed to 8mg/hr  1/25, propofol decreased to 20 and versed at 8    Continue vimpat 300 BID and topamax 200 BID, keppra 500 BID  Versed/ativan prn clinical seizures.   D/w Dr. Bustos, neurology               Relevant Medications    lacosamide (VIMPAT) 200 mg in  mL ivpb (Completed)    lacosamide (VIMPAT) 300 mg in  mL ivpb (Completed)    brivaracetam (BRIVIACT) injection 200 mg (Completed)    topiramate (TOPAMAX) tablet 200 mg    MIDAZOLAM HCL 2 MG/2ML INJ SOLN (Completed)    lacosamide (VIMPAT) 300 mg in  mL ivpb    levETIRAcetam (KEPPRA) 500 mg in  mL IVPB    midazolam (VERSED) premix 125 mg/125 mL NS    Other Relevant Orders    Lumbar Puncture (Completed)    End stage renal failure on dialysis (HCC)     HD per nephrology  Renally dose medications  Monitor electrolytes  Had MRI contrast, dializying for 3 consecutive doses         Relevant Orders    Lumbar Puncture (Completed)      Other Visit Diagnoses     Shock (HCC)        Relevant Orders     Central Line Insertion (Completed)      Neurology Progress Note    Brief History of present illness:  74 y.o. woman with history of CHF, R parietal stroke with residual left-sided weakness, DM2, GERD, ESRD on dialysis, GERD, history of GI bleed, HTN, and hx of ICH presenting to [facility] on 1/16/20 as a transfer for seizures; found to have subclinical/non convulsive status epilepticus per cEEG on 1/19; responsive to Benzos; currently being treated with Vimpat, Topamax.     Interval, 1/24/20:   Patient remains intubated, sedated with Propofol, Versed.  No clinical seizures or other events overnight.    No changes to HPI as was previously documented.     Past medical history:   Past Medical History:   Diagnosis Date   • Arthritis    • Chronic diastolic heart failure ([facility]) 6/1/2016   • Clostridium difficile diarrhea 6/2/2016   • CVA (cerebral vascular accident) ([facility])     L side weakness   • DM (diabetes mellitus) type II uncontrolled with renal manifestation 4/26/2014   • GERD (gastroesophageal reflux disease)    • GIB (gastrointestinal bleeding) 6/13/2016   • GOUT    • Hypertension    • ICH (intracerebral hemorrhage) ([facility]) 4/6/2016   • Indigestion    • Kidney failure    • RALF (obstructive sleep apnea)     Not compliant with oxygen or CPAP   • RALF (obstructive sleep apnea)    • Other and unspecified angina pectoris     hospitalized 8/13 chest pain   • Seizure ([facility]) 1/16/2020   • Unspecified cataract     right eye   • UTI (urinary tract infection) 6/1/2016       Past surgical history:   Past Surgical History:   Procedure Laterality Date   • GASTROSCOPY-ENDO  6/14/2016    Procedure: GASTROSCOPY-ENDO;  Surgeon: Oliver Macedo M.D.;  Location: ENDOSCOPY [facility];  Service:    • VENTRAL HERNIA REPAIR  4/9/2014    Performed by Trinity Bryan M.D. at SURGERY [facility]   • VITA BY LAPAROSCOPY  10/5/2013    Performed by Trinity Bryan M.D. at SURGERY [facility]   • TUBAL LIGATION  1977   • CATARACT  EXTRACTION WITH IOL      left eye       Family history:   Family History   Problem Relation Age of Onset   • Diabetes Mother    • Diabetes Father    • Cancer Father         sarcoma       Social history:   Social History     Socioeconomic History   • Marital status:      Spouse name: Not on file   • Number of children: Not on file   • Years of education: Not on file   • Highest education level: Not on file   Occupational History   • Not on file   Social Needs   • Financial resource strain: Not on file   • Food insecurity:     Worry: Not on file     Inability: Not on file   • Transportation needs:     Medical: Not on file     Non-medical: Not on file   Tobacco Use   • Smoking status: Never Smoker   • Smokeless tobacco: Never Used   Substance and Sexual Activity   • Alcohol use: No   • Drug use: No   • Sexual activity: Not on file   Lifestyle   • Physical activity:     Days per week: Not on file     Minutes per session: Not on file   • Stress: Not on file   Relationships   • Social connections:     Talks on phone: Not on file     Gets together: Not on file     Attends Scientologist service: Not on file     Active member of club or organization: Not on file     Attends meetings of clubs or organizations: Not on file     Relationship status: Not on file   • Intimate partner violence:     Fear of current or ex partner: Not on file     Emotionally abused: Not on file     Physically abused: Not on file     Forced sexual activity: Not on file   Other Topics Concern   • Not on file   Social History Narrative    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 , buffet host    Lives with her daughter, dependent for most of ADLs after stroke gave L sided weakness       Current medications:   Current Facility-Administered Medications   Medication Dose   • propofol (DIPRIVAN) injection  0-80 mcg/kg/min   • [START ON 1/27/2020] sodium phosphate 30 mmol in D5W 500 mL ivpb  30 mmol   • miconazole 2%-zinc oxide (INZO)  topical cream     • heparin injection 5,000 Units  5,000 Units   • vitamin B comp+C (ALLBEE WITH C) 1 Tab  1 Tab   • folic acid (FOLVITE) tablet 1 mg  1 mg   • midodrine (PROAMATINE) tablet 5 mg  5 mg   • norepinephrine (LEVOPHED) 8 mg in  mL Infusion  0-30 mcg/min   • allopurinol (ZYLOPRIM) tablet 100 mg  100 mg   • nystatin (MYCOSTATIN) powder     • levETIRAcetam (KEPPRA) 500 mg in  mL IVPB  500 mg   • midazolam (VERSED) premix 125 mg/125 mL NS  0-10 mg/hr   • lacosamide (VIMPAT) 300 mg in  mL ivpb  300 mg   • LORazepam (ATIVAN) injection 1 mg  1 mg   • hydrALAZINE (APRESOLINE) injection 10 mg  10 mg   • topiramate (TOPAMAX) tablet 200 mg  200 mg   • aspirin (ASA) chewable tab 81 mg  81 mg   • atorvastatin (LIPITOR) tablet 40 mg  40 mg   • famotidine (PEPCID) tablet 20 mg  20 mg   • heparin injection 3,300 Units  3,300 Units   • epoetin antoinette (EPOGEN/PROCRIT) injection 4,000 Units  4,000 Units   • Respiratory Care per Protocol     • ipratropium-albuterol (DUONEB) nebulizer solution  3 mL   • senna-docusate (PERICOLACE or SENOKOT S) 8.6-50 MG per tablet 2 Tab  2 Tab    And   • polyethylene glycol/lytes (MIRALAX) PACKET 1 Packet  1 Packet    And   • bisacodyl (DULCOLAX) suppository 10 mg  10 mg   • lidocaine (XYLOCAINE) 1 % injection 1-2 mL  1-2 mL   • Pharmacy Consult: Enteral tube insertion - review meds/change route/product selection  1 Each   • fentaNYL (SUBLIMAZE) injection 25 mcg  25 mcg    Or   • fentaNYL (SUBLIMAZE) injection 50 mcg  50 mcg    Or   • fentaNYL (SUBLIMAZE) injection 100 mcg  100 mcg   • ondansetron (ZOFRAN) syringe/vial injection 4 mg  4 mg   • labetalol (NORMODYNE/TRANDATE) injection 10 mg  10 mg   • acetaminophen (TYLENOL) tablet 650 mg  650 mg   • ondansetron (ZOFRAN ODT) dispertab 4 mg  4 mg       Medication Allergy:  Allergies   Allergen Reactions   • Amlodipine Besylate Cough   • Amlodipine Cough       Review of systems:   Limited as patient is intubated/sedated.      Physical examination:   Vitals:    01/26/20 1515 01/26/20 1600 01/26/20 1700 01/26/20 1800   BP: 142/68 115/58 117/58 128/65   Pulse: 84 87 88 93   Resp: 14 13 14 16   Temp:  36.6 °C (97.9 °F)     TempSrc:  Temporal     SpO2: 100% 100% 100% 100%   Weight:       Height:         General: Patient in no acute distress.,  Ventilated and sedated.    NEUROLOGICAL EXAM:   Mental status, orientation: Intubated/sedated.   Speech and language: No verbal output, does not follow commands.   Cranial nerve exam: Pupils are 1.5 mm bilaterally and equally sluggishly reactive to light. Visual fields cannot be evaluated.  No spontaneous eye opening; does not track spontaneously or to command. Face appears symmetric. Sensation cannot be evaluated on face or other parts of the body no response to pain.  Cannot reevaluate tongue..   Motor/Sensory exam No spontaneous/voluntary movement.    Deep tendon reflexes: Could not elicit any reflexes at the knees, plantar responses are mute bilaterally  Coordination: Not possible  Gait: Not assessed, intubated sedated.      Today patient did open her eyes on pain.  Pupils were 2 mm round and reactive.  Extraocular movements are conjugate.  No other motor movements mild movement of toes left more than right on plantar stimulation.      ANCILLARY DATA REVIEWED:     Lab Data Review:  Recent Results (from the past 24 hour(s))   CBC with Differential    Collection Time: 01/26/20  3:39 AM   Result Value Ref Range    WBC 11.2 (H) 4.8 - 10.8 K/uL    RBC 2.45 (L) 4.20 - 5.40 M/uL    Hemoglobin 8.4 (L) 12.0 - 16.0 g/dL    Hematocrit 26.2 (L) 37.0 - 47.0 %    .9 (H) 81.4 - 97.8 fL    MCH 34.3 (H) 27.0 - 33.0 pg    MCHC 32.1 (L) 33.6 - 35.0 g/dL    RDW 68.3 (H) 35.9 - 50.0 fL    Platelet Count 130 (L) 164 - 446 K/uL    MPV 9.8 9.0 - 12.9 fL    Neutrophils-Polys 54.30 44.00 - 72.00 %    Lymphocytes 28.30 22.00 - 41.00 %    Monocytes 12.90 0.00 - 13.40 %    Eosinophils 1.30 0.00 - 6.90 %    Basophils  0.60 0.00 - 1.80 %    Immature Granulocytes 2.60 (H) 0.00 - 0.90 %    Nucleated RBC 0.20 /100 WBC    Neutrophils (Absolute) 6.09 2.00 - 7.15 K/uL    Lymphs (Absolute) 3.17 1.00 - 4.80 K/uL    Monos (Absolute) 1.44 (H) 0.00 - 0.85 K/uL    Eos (Absolute) 0.14 0.00 - 0.51 K/uL    Baso (Absolute) 0.07 0.00 - 0.12 K/uL    Immature Granulocytes (abs) 0.29 (H) 0.00 - 0.11 K/uL    NRBC (Absolute) 0.02 K/uL   Basic Metabolic Panel (BMP)    Collection Time: 01/26/20  3:39 AM   Result Value Ref Range    Sodium 136 135 - 145 mmol/L    Potassium 4.2 3.6 - 5.5 mmol/L    Chloride 104 96 - 112 mmol/L    Co2 26 20 - 33 mmol/L    Glucose 99 65 - 99 mg/dL    Bun 17 8 - 22 mg/dL    Creatinine 0.69 0.50 - 1.40 mg/dL    Calcium 8.3 (L) 8.5 - 10.5 mg/dL    Anion Gap 6.0 0.0 - 11.9   MAGNESIUM    Collection Time: 01/26/20  3:39 AM   Result Value Ref Range    Magnesium 1.7 1.5 - 2.5 mg/dL   PHOSPHORUS    Collection Time: 01/26/20  3:39 AM   Result Value Ref Range    Phosphorus 1.5 (L) 2.5 - 4.5 mg/dL   ESTIMATED GFR    Collection Time: 01/26/20  3:39 AM   Result Value Ref Range    GFR If African American >60 >60 mL/min/1.73 m 2    GFR If Non African American >60 >60 mL/min/1.73 m 2       Labs reviewed by me.       Imaging reviewed by me:     DX-CHEST-PORTABLE (1 VIEW)   Final Result      Stable chest with retrocardiac opacity from atelectasis and/or pleural fluid favored over consolidation      DX-CHEST-PORTABLE (1 VIEW)   Final Result      Stable chest with retrocardiac opacity from atelectasis and/or pleural fluid favored over consolidation      DX-CHEST-PORTABLE (1 VIEW)   Final Result         No significant change from prior.               DX-CHEST-PORTABLE (1 VIEW)   Final Result         1. Stable lines and tubes..   2. Stable retrocardiac and left perihilar atelectasis versus consolidation. Suspected small left pleural effusion.            DX-CHEST-PORTABLE (1 VIEW)   Final Result         1. Stable lines and tubes..   2. Stable  retrocardiac atelectasis versus consolidation with increased left perihilar opacity. Suspected trace left pleural effusion.         [id number] VIEW   Final Result      1.  Enteric tube has been placed and the tip projects over the stomach.      2.  Pre-existing feeding tube tip projects at the gastroduodenal junction      DX-CHEST-PORTABLE (1 VIEW)   Final Result         1. Lines and tubes as above.   2. Stable retrocardiac atelectasis versus consolidation. Suspected trace left pleural effusion.         MR-BRAIN-WITH & W/O   Final Result      1.  Moderate cerebral atrophy.   2.  Evidence of intraventricular hemorrhage, indeterminate age. Possibly chronic intraventricular hemosiderin deposition.   3.  Extensive encephalomalacic change with hemosiderin deposition involving the right corona radiata, basal ganglia, posterior thalamus, subthalamic region, bordering the right cerebral peduncle. Associated ex vacuo dilatation of the body of the right    lateral ventricle. No change.   4.  Additional foci of old microhemorrhage most consistent with old hypertensive microhemorrhage or amyloid angiopathy in the left basal ganglia, left thalamus, and midline upper ventral abel.   5.  Punctate focus of acute infarction in the right parietal deep white matter. No change.   6.  Advanced supratentorial white matter disease most consistent with microvascular ischemic change.   7.  Encephalomalacic changes in the abel and right cerebral peduncle consistent with old infarction.   8.  Partially empty sella.   9.  Overall, no new findings and no significant change from 1/14/2020.      DX-CHEST-PORTABLE (1 VIEW)   Final Result         1. Stable lines and tubes.   2. Unchanged retrocardiac atelectasis versus consolidation.   3. Stable trace left pleural effusion.         OUTSIDE IMAGES-DX CHEST   Final Result      OUTSIDE IMAGES-US VASCULAR   Final Result      OUTSIDE IMAGES-MR BRAIN   Final Result      OUTSIDE IMAGES-DX CHEST   Final  Result      OUTSIDE IMAGES-CT HEAD   Final Result      EC-ECHOCARDIOGRAM COMPLETE W/O CONT   Final Result      DX-CHEST-FOR LINE PLACEMENT Perform procedure in: Patient's Room   Final Result         1.  Retrocardiac opacity concerning for infiltrate, stable.   2.  Trace left pleural effusion, stable   3.  Cardiomegaly   4.  Atherosclerosis   5.  Perihilar interstitial prominence and bronchial wall cuffing, appearance suggests changes of underlying bronchial inflammation, consider bronchitis.      DX-ABDOMEN FOR TUBE PLACEMENT   Final Result         1.  Nonspecific bowel gas pattern.   2.  Dobbhoff tube tip overlying the expected location of the pylorus or first duodenal segment.   3.  Left lung base atelectasis and/or small effusion      DX-CHEST-PORTABLE (1 VIEW)   Final Result         1.  Retrocardiac opacity concerning for infiltrate.   2.  Trace left pleural effusion, stable   3.  Cardiomegaly   4.  Atherosclerosis      EK-IOMXLLI-VTRKVON FILM X-RAY   Final Result      OUTSIDE IMAGES-DX CHEST   Final Result          ASSESSMENT:  From recent evaluations.  74 y.o. woman with history of CHF, R parietal stroke with residual left-sided weakness, DM2, GERD, ESRD on dialysis, GERD, history of GI bleed, HTN, and hx of ICH presenting to [facility] on 1/16/20 as a transfer for seizures; found to have subclinical/non convulsive status epilepticus per cEEG on 1/19; responsive to Benzos; currently being treated with Vimpat, Topamax, and Keppra.    Overnight (1/23/20), EEG somewhat improved; no subclinical seizures/NCSE overnight. Patient remains on propofol (decreased this morning with attempt to wean), remains on Versed gtt; No clinical seizures overnight per nursing.   Regarding etiology of seizures, this has yet to be determined. Per discussion with Dr. Hein and Dr. Bustos (epileptologist), feel it reasonable to pursue repeat LP (patient had an initial LP at OSH; basic CSF studies and cultures reportedly  unremarkable) for additional CSF studies. Given refractory nature of patient's epileptogenic activity (note, patient's seizures initially improved, then worsened over the following 1-2 days, now slightly improved this morning), at this point must rule out other underlying causes of cortical irritability-- ie infectious processes, autoimmune, paraneoplastics, or even prion disease/CJD given pattern noted per EEG.         Today patient did open her eyes on pain.  Pupils were 2 mm round and reactive.  Extraocular movements are conjugate.  No other motor movements mild movement of toes left more than right on plantar stimulation.      Recommendations/Plan:     -Continue cEEG monitoring.  Plan for today is to see if she tolerates wean off propofol, continue on Versed  -Continue current doses of Vimpat 300 mg q12h, Topamax 200 mg q12h and Keppra 500 mg IV q12h.   -Seizure precautions; Ativan 1 mg IV for breakthrough GTCS.   -As was noted above, repeat LP is planned for today. CSF studies including cell count, glucose protein, meningo-encephalitis panel, VDRL, Cytology, Paraneoplastics, 14-3-3, and flow cytometry have been ordered. Will follow up with results as they become available.   -Palliative medicine is on board to establish goals of care; will follow up with their input as is available; planning for family meeting today 1/24/20 to further discuss goals of care.   -All other medical management per primary team.   -DVT PPX: SQ Heparin.       Case was discussed with  in ICU, we reviewed her recent CSF, which appears traumatic with RBC with proportionate white cell count.  No concern for acute infection.,  Her initial brain MRI was also reviewed, showing multiple foci of hemosiderin and chronic hemorrhagic foci.  No main mass-effect orac aniya lesion explaining her seizures.,    Given recent EEG changes when decreasing her propofol- currently we agreed that she needs to continue on Versed and propofolgtt and  current AEDs. gtt. continue to monitor her problem is cEEG for electrical events.    We also discussed to look for any metabolic derangements check magnesium, calcium, phosphorus, TSH and ammonia levels and correct if abnormal.    Keep serum magnesium 2.0 or above.    Overall prognosis seems quite guarded.      Mary Lacey MSN, BSN, AGNP-C  [facility]

## 2020-01-28 NOTE — PROGRESS NOTES
Hospital Neurology Progress Note:     Interval History: No acute events overnight. Unable to obtain ROS due to intubation.     Objective:   Vitals:    01/28/20 0620 01/28/20 0700 01/28/20 0800 01/28/20 0900   BP:  120/63 (!) 98/52 114/57   Pulse: 84 75 70 76   Resp:  13 13 15   Temp:   36.7 °C (98 °F)    TempSrc:       SpO2: 100% 100% 100% 98%   Weight:       Height:           Labs:     Lab Results   Component Value Date/Time    PROTHROMBTM 14.4 (H) 11/17/2019 10:40 AM    INR 1.38 11/17/2019 10:40 AM      Lab Results   Component Value Date/Time    WBC 13.4 (H) 01/28/2020 02:19 AM    RBC 2.70 (L) 01/28/2020 02:19 AM    HEMOGLOBIN 9.0 (L) 01/28/2020 02:19 AM    HEMATOCRIT 29.1 (L) 01/28/2020 02:19 AM    .8 (H) 01/28/2020 02:19 AM    MCH 33.3 (H) 01/28/2020 02:19 AM    MCHC 30.9 (L) 01/28/2020 02:19 AM    MPV 9.5 01/28/2020 02:19 AM    NEUTSPOLYS 54.20 01/28/2020 02:19 AM    LYMPHOCYTES 28.10 01/28/2020 02:19 AM    MONOCYTES 13.00 01/28/2020 02:19 AM    EOSINOPHILS 1.40 01/28/2020 02:19 AM    BASOPHILS 0.80 01/28/2020 02:19 AM    HYPOCHROMIA 1+ 01/24/2020 05:22 AM    ANISOCYTOSIS 2+ 01/24/2020 05:22 AM      Lab Results   Component Value Date/Time    SODIUM 135 01/28/2020 02:19 AM    POTASSIUM 4.5 01/28/2020 02:19 AM    CHLORIDE 100 01/28/2020 02:19 AM    CO2 29 01/28/2020 02:19 AM    GLUCOSE 109 (H) 01/28/2020 02:19 AM    BUN 21 01/28/2020 02:19 AM    CREATININE 0.73 01/28/2020 02:19 AM      Lab Results   Component Value Date/Time    CHOLSTRLTOT 79 02/07/2019 01:40 PM    LDL 34 02/07/2019 01:40 PM    HDL 23 (L) 02/07/2019 01:40 PM    TRIGLYCERIDE 95 01/27/2020 03:04 AM       Lab Results   Component Value Date/Time    ALKPHOSPHAT 36 01/20/2020 05:15 AM    ASTSGOT 23 01/20/2020 05:15 AM    ALTSGPT 7 01/20/2020 05:15 AM    TBILIRUBIN 0.7 01/20/2020 05:15 AM        Imaging/Testing:   MRI on 1/18/2020 reviewed in chart    EEG from 1/27/2020 through 1/28/2020 discussed with interpreting neurologist and no further  seizure activity was seen.    Physical Exam:     General: Intubated 74-year-old female in bed no acute distress  Cardio: Normal S1/S2. No peripheral edema.   Pulm: CTAX2. No respiratory distress.   Skin: Warm, dry, no rashes or lesions   Psychiatric: Unable to assess  HEENT: Atraumatic head, normal sclera and conjunctiva, moist oral mucosa. No lid lag.  Abdomen: Soft, non tender. No masses or hepatosplenomegaly.    Neurologic:  Mental Status: GCS 3 T (E1, M1, V1 T)  Cranial Nerves: Pupils equally round and unreactive to light.  Extraocular movements intact.  Face symmetric.  Motor: Normal muscle bulk with decreased tone throughout.  No movement was seen.  Reflexes: Deferred  Coordination: Unable to assess  Sensation: No withdrawal to noxious stimulus  Gait/Station: Unable to assess    Assessment/Plan:    Cassandra Guzmán is a 74 y.o. female with history of CHF, right parietal stroke, diabetes, GERD, end-stage renal disease on dialysis, history of GI bleed, hypertension and history of ICH presenting on 1/16/2020 for seizures treated with Vimpat, Topamax and Keppra.  The patient has had a protracted hospital course during which she developed subclinical seizures.  This has been treated with a combination of multiple antiseizure medications including Keppra, Topamax and Vimpat as well as anesthetics (propofol and Versed).  The patient has remained with refractory seizures however EEG has not shown seizure activity for the past 48 hours so at this time we will begin a trial of weaning sedation.     Plan:  1.  Continue Keppra 500 mg twice daily  2.  Continue Vimpat 200 mg twice daily  3.  Continue Topamax 200 mg twice daily  4.  Start weaning Versed at a rate of 1 mg/h every 2 hours  5.  Continue video EEG monitoring  6.  If no further seizure activity is seen after weaning Versed, we may then look to decreasing antiseizure medications.  7.  Lab work for etiology of seizures (14 3 3, paraneoplastic panel,  meningoencephalitis panel etc.) is pending.  8.  Plan discussed with consulting physician and patient's nurse.     Emigdio Lynn M.D., Diplomat of the American Board of Psychiatry and Neurology  Diplomat of [employer] Epilepsy Subspecialty   Assistant Clinical Professor, [facility] Neurology Consultant

## 2020-01-28 NOTE — CARE PLAN
Problem: Ventilation Defect:  Goal: Ability to achieve and maintain unassisted ventilation or tolerate decreased levels of ventilator support  Outcome: PROGRESSING AS EXPECTED      Ventilator Daily Summary    Vent Day # 14    Ventilator settings changed this shift: not at this time     Weaning trials: No     Respiratory Procedures: Not at this time     Plan: Continue current ventilator settings and wean mechanical ventilation as tolerated per physician orders.

## 2020-01-28 NOTE — PROGRESS NOTES
[facility] Nephrology Daily Progress Note    Date of Service  1/28/2020    AUTHOR: Rusty Montes M.D.    Chief Complaint  74 y.o. female admitted to [facility] on 1/14/20 with seizures.She was at a SNF.She had been previously hospitalized at [facility] and diaysis was initiated.  She was doing very poorly then and palliative care was discussed with the family,but they declined.She had very poor functional status and required Jimena lift to get into the dialysis chair.She was found to have  a CVA on MRI at [facility].Her seizures were not controlled and it was felt she was in status epilepticus.  She was getting HD at [facility].Last HD was on 1/13/20.She was seen by Dr Carcamo at [facility].Creatinine was 1.8 and dialysis was held.Neurology [place] that the patient needed continuous EEG monitoring and he was transferred to [facility]    Interval Problem Update  01/16/20 - Transferred to [facility].In ICU  01/17/20 - Intubated.Ventilated.On continuous EEG monitotring.On Norepinephrine,enteral feedings and Propofol.UOP 70 mL today.  01/18/20 - NAEO, sedation off and she responds to verbal stimuli; MRI being scheduled today  01/19/20 - NAEO, MRI yesterday showed acute CVA and multiple microhemorrhage areas variable chronicity  01/20/20 - 3/3 post gadolinium HD session today, tolerating off norepi  01/21/20 - tolerated HD, son reports she opened her eyes yesterday  01/22/20 - SBP 130s-160s, HD today  01/23/20 - SBP labile, 110s-170s  01/24/20 - SBP 90s-120s. ~6L positive for admission  01/25/20 - hypotension on HD yesterday, terminated early, HD planned for today, with midodrine  01/26/20 - hypotension again with HD, phos low today  01/27/20 - Getting HD when visited pt. Has been requiring pressor support during HD for hypotension. Subclavian line removed per ICU team, will replace with PICC. Plan for family meeting today. Phos increased from 1.5 to 4.5 this AM. Cr up to 0.94 from 0.69, BUN 17=>28  01/28/20 - No changes. K4.5, BUN 21, Scr  0.73. No HD today    Review of Systems   Unable to perform ROS: Acuity of condition     PAST FAMILY HISTORY: Reviewed and unchanged since admission  PAST SURGICAL HISTORY:  Reviewed and unchanged since admission  SOCIAL HISTORY:  Reviewed and unchanged since admission  FAMILY HISTORY: Reviewed and unchanged since admission  CURRENT MEDICATIONS: Reviewed since admission to present  IMAGING STUDIES: Reviewed since admission to present  LABORATORY STUDIES: Reviewed since admission to present    Physical Exam  Temp:  [36.5 °C (97.7 °F)-36.9 °C (98.5 °F)] 36.8 °C (98.2 °F)  Pulse:  [] 78  Resp:  [11-18] 14  BP: ()/(52-81) 158/70  SpO2:  [97 %-100 %] 99 %    Physical Exam  Vitals signs and nursing note reviewed.   Constitutional:       General: She is not in acute distress.     Appearance: Normal appearance. She is ill-appearing.   HENT:      Head: Normocephalic and atraumatic.      Right Ear: External ear normal.      Left Ear: External ear normal.      Nose: Nose normal. No congestion.      Mouth/Throat:      Mouth: Mucous membranes are moist.      Comments: ETT  Eyes:      General:         Right eye: No discharge.         Left eye: No discharge.      Conjunctiva/sclera: Conjunctivae normal.   Neck:      Musculoskeletal: Neck supple. No muscular tenderness.   Cardiovascular:      Rate and Rhythm: Normal rate and regular rhythm.      Heart sounds: Normal heart sounds.   Pulmonary:      Effort: Pulmonary effort is normal.      Breath sounds: Normal breath sounds.   Chest:      Chest wall: No tenderness.   Abdominal:      General: Bowel sounds are normal. There is no distension.      Palpations: Abdomen is soft.   Musculoskeletal:         General: No tenderness or signs of injury.      Right lower leg: Edema present.      Left lower leg: Edema present.      Comments: anasarca   Skin:     General: Skin is warm and dry.      Findings: No rash.   Neurological:      Mental Status: She is alert.      Comments: Does  not open eyes to loud voice, sedated   Psychiatric:         Mood and Affect: Mood normal.         Behavior: Behavior normal.       Fluids    Intake/Output Summary (Last 24 hours) at 1/28/2020 1500  Last data filed at 1/28/2020 1200  Gross per 24 hour   Intake 2588.66 ml   Output 6000 ml   Net -3411.34 ml       Laboratory  Recent Labs     01/26/20  0339 01/27/20  0304 01/28/20  0219   WBC 11.2* 12.3* 13.4*   RBC 2.45* 2.60* 2.70*   HEMOGLOBIN 8.4* 8.9* 9.0*   HEMATOCRIT 26.2* 28.1* 29.1*   .9* 108.1* 107.8*   MCH 34.3* 34.2* 33.3*   MCHC 32.1* 31.7* 30.9*   RDW 68.3* 68.5* 68.2*   PLATELETCT 130* 139* 145*   MPV 9.8 9.0 9.5     Recent Labs     01/26/20  0339 01/27/20  0304 01/28/20  0219   SODIUM 136 132* 135   POTASSIUM 4.2 4.2 4.5   CHLORIDE 104 101 100   CO2 26 25 29   GLUCOSE 99 89 109*   BUN 17 28* 21   CREATININE 0.69 0.94 0.73   CALCIUM 8.3* 8.6 9.0         No results for input(s): NTPROBNP in the last 72 hours.  Recent Labs     01/27/20  0304   TRIGLYCERIDE 95        IMPRESSION:  # ESRD   MWF iHD as OP at [facility]   CrCl 6              Hypotension with HD/UF, subclavian line removed per ICU team, PICC line for pressors/meds  # Status epilepticus   MRI with amanda being requested  # S/P acute lacunar infarct   Hx of previous CVA with significant deficits.  # HTN  # DM  # VDRF  # Hx of dysphagia  # Anemia of CKD.Ferritin previously significantly elevated. 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 Family meeting planned with primary team 1/27/20  PLAN:  -Seizures management per Neurology--be sure meds are dosed to accommodate HD  -MWF and PRN iHD  - UF as tolerated, concentrate IV meds as able, will likely require pressor support for HD  - midodrine 5mg q30min prior to HD for SBP<120 and 2 hours into HD  if SBP<100  - lasix x 1 dose today, if useful, plan on use on non HD days  -Enteral feedings  -No dietary protein restrictions  - phos supplement ordered, recheck tomorrow, phos ordered for AM if <4  -Epogen  -Follow labs  -Continue GOC discussions with family    Other management per primary team

## 2020-01-28 NOTE — DISCHARGE PLANNING
Care Transition Team Assessment  In the case of an emergency, pt's legal NOK is daughterBelen     RNCM called daughter  and obtained the information used in this assessment. Pt currently a resident at [facility] in [place]. Also goes to hemodialysis at [facility] in [facility].  Prior to current hospitalization, pt was dependent in ADLS/IADLS, was able to feed self and assist with transfers.  Pt has a good support system. Pt deniesany hx of substance use and denies any dx of mh. Pt will need to return to SNF at discharge.    Information Source:daughter  Orientation : Unable to Assess  Information Given By: Relative  Informant's Name: (DaughterBelen)  Who is responsible for making decisions for patient? : Patient         Elopement Risk  Legal Hold: No  Ambulatory or Self Mobile in Wheelchair: No-Not an Elopement Risk  Elopement Risk: Not at Risk for Elopement    Interdisciplinary Discharge Planning  Does Admitting Nurse Feel This Could be a Complex Discharge?: No  Primary Care Physician: (Dr. Maciel in [place])  Lives with - Patient's Self Care Capacity: (SNF)  Support Systems: Children  Housing / Facility: (SNF)  Name of Care Facility: [facility]  Do You Take your Prescribed Medications Regularly: Yes  Mobility Issues: Yes  Prior Services: Continuous (24 Hour) Care Giving Per Service  Patient Expects to be Discharged to:: (SNF)  Assistance Needed: Yes    Discharge Preparedness  What is your plan after discharge?: Skilled nursing facility  What are your discharge supports?: Child  Prior Functional Level: Needs Assist with Activities of Daily Living, Needs Assist with Medication Management, Wheelchair Dependent  Difficulity with ADLs: Bathing, Dressing, Toileting, Walking  Difficulity with IADLs: Cooking, Driving, Keeping track of finances, Laundry, Managing medication, Shopping    Functional Assesment  Prior Functional Level: Needs Assist with Activities of Daily Living, Needs Assist with Medication  Management, Wheelchair Dependent    Finances  Financial Barriers to Discharge: No  Prescription Coverage: Yes                        Psychological Assessment  History of Substance Abuse: None  History of Psychiatric Problems: No         Anticipated Discharge Information  Anticipated discharge disposition: SNF

## 2020-01-28 NOTE — PROGRESS NOTES
Hd treatment ordered by Dr Carcamo started at 1040 and ended at 1643 with net uf of 5 liters as ordered. Stable bp entire treatment with pressor for bp support. Right IJ CVC patent with good BFR x 2 entire treatment. See flow sheet for details.

## 2020-01-28 NOTE — PROCEDURES
VIDEO ELECTROENCEPHALOGRAM REPORT        Referring provider: Dr. You.      DOS: 1/28/2020 (total recording of 23 hours and 12 minutes).      INDICATION:  Cassandra [place] 74 y.o. female presenting with recurrent seizures, status epilepticus.      CURRENT ANTIEPILEPTIC REGIMEN: Lacosamide 300 mg q 12 hrs, Topiramate 200 mg bid, Levetiracetam 750 mg q 12 hrs. Sedated with Midazolam infusion.       TECHNIQUE: 30 channel video electroencephalogram (EEG) was performed in accordance with the international 10-20 system. The study was reviewed in bipolar and referential montages. The recording examined a sedated patient.       DESCRIPTION OF THE RECORD:  Most of the study suggested a sedated state, with waxing and waning of the cerebral electrical activity. Triphasic and frontal sharp waves are seen frequently throughout the study, often exhibiting a periodic pattern, and appearing most prominent on the left frontal region at times. Also,  brief runs of generalized, rhythmic delta activity, with maximum in the frontal regions were notd from time to time. No clear seizures captured.      ACTIVATION PROCEDURES:   Not performed.      EKG: sampling of the EKG recording demonstrated sinus rhythm.      EVENTS:  None.      INTERPRETATION:  This is an abnormal 24 hrs video EEG recording in a sedated patient. Most of the study suggested a sedated state, with waxing and waning of the cerebral electrical activity. Frequent, mild arousals noted, with some improvement of the background. A mild to moderate, toxic / metabolic encephalopathy is suggested. Triphasic and frontal sharp waves were seen frequently throughout the study, often exhibiting a periodic pattern, and appearing most prominent on the left frontal region at times. Also,  brief runs of generalized, rhythmic delta activity, with maximum in the frontal regions, were noted from time to time. The findings increase risk for seizures, however no clear seizures  captured.  The findings suggest persistnt cortical irritability and/or structural abnormality. Clinical and radiological correlation is recommended.     Updates provided to Dr. Lynn.         Catracho Bustos MD   Epilepsy and Neurodiagnostics.   Clinical  of Neurology [facility].   Diplomate in Neurology, Epilepsy, and Electrodiagnostic Medicine.   Office: [phone]  Fax: [phone]

## 2020-01-28 NOTE — PROCEDURES
Vascular Access Team     Date of Insertion: 1/28/20  Arm Circumference: 28  Internal length: 36  External Length: 2  Vein Occupancy %: 40   Reason for PICC: Vasopressors  Labs: WBC 13.4, , BUN 21, Cr 0.73, GFR >60, INR n/a     Consents confirmed, vessel patency confirmed with ultrasound. Risks and benefits of procedure explained to patient and family and education regarding central line associated bloodstream infections provided. Questions answered.      PICC placed in RUE per licensed provider order with ultrasound guidance.  5 Fr, 2 lumen PICC placed in brachial vein after 1 attempt(s). 2 mL of 1% lidocaine injected intradermally, 21 gauge microintroducer needle and modified Seldinger technique used. 36 cm catheter inserted with good blood return. Secured at 2 cm marker. Each lumen flushed without resistance with 10 mL 0.9% normal saline. PICC line secured with Biopatch and Tegaderm.     PICC tip placement location is confirmed by nurse to be in the Superior Vena Cava (SVC) utilizing 3CG technology. PICC line is appropriate for use at this time. Patient tolerated procedure well, without complications.  Patient condition relayed to unit RN or ordering physician via this post procedure note in the EMR.      Ultrasound images uploaded to PACS and viewable in the EMR - yes  Ultrasound imaged printed and placed in paper chart - no     BARD Power PICC ref # [id number], Lot # [id number]

## 2020-01-28 NOTE — CARE PLAN
Problem: Fluid Volume:  Goal: Will maintain balanced intake and output  Outcome: PROGRESSING AS EXPECTED  Note:   5L off in HD on Monday 1/27/20     Problem: Communication  Goal: The ability to communicate needs accurately and effectively will improve  Outcome: PROGRESSING SLOWER THAN EXPECTED  Note:   Patient does not follow verbal commands; no attempt to communicate or interact      Problem: Bowel/Gastric:  Goal: Will not experience complications related to bowel motility  Outcome: PROGRESSING SLOWER THAN EXPECTED  Note:   Frequent loose stooling

## 2020-01-28 NOTE — CARE PLAN
Problem: Ventilation Defect:  Goal: Ability to achieve and maintain unassisted ventilation or tolerate decreased levels of ventilator support  Outcome: PROGRESSING SLOWER THAN EXPECTED   Adult Ventilation Update    Total Vent Days: 13    Patient Lines/Drains/Airways Status      Active Airway       Name: Placement date: Placement time: Site: Days:    Airway ETT Oral 7.5  01/14/20   --   Oral  13                  Patient failed trials because of Barriers to Wean: No Order(eeg) (01/27/20 0612)    Sputum/Suction   Cough: Productive (01/27/20 1600)  Sputum Amount: Scant (01/27/20 1600)  Sputum Color: Yellow (01/27/20 1600)  Sputum Consistency: Thick (01/27/20 1600)    Mobility  Activity Performed: Unable to mobilize (01/27/20 0800)  Reason Not Mobilized: Unstable condition (01/27/20 0800)  Mobilization Comments: STRICT BEDREST FOR STATUS EPILEPTICUS (01/27/20 0800)    Events/Summary/Plan: no vent changes made       Abdomen soft, +rt LQ ttp, no guarding, no rebound, NABS, ND

## 2020-01-28 NOTE — PROGRESS NOTES
Critical Care Progress Note    Date of admission  1/16/2020    Chief Complaint  status epilepticus and respiratory failure    Hospital Course    74 y.o. female who presented 1/16/2020 with PMH significant for ESRD on IHD since November 2019 M/W/F, HTN, HPL, CAD, admitted for first time witness seizure at SNF, transferred from [facility]. Had a second seizure with prolonged postictal state. Intubated. MRI brain with an acute right parietal lacunar infarct with overall brain parenchymal loss. Loaded with Keppra, dilantin. LP was done with no pleocytosis. Was empirically treated with antibiotics. Code status FULL CODE.  S/p repeat LP 1/24. Cell count with WBC 39, but RBC 45322. Corrected WBC is normal. Other CSF labs are pending       Interval Problem Update  Reviewed last 24 hour events:  Remains on ventilator, poorly responsive  Seizure activity seems to have decreased per neurology  Continues to receive dialysis  I discussed poor prognosis with family particular the patient's daughter today.  The patient's daughter stated that no one had discussed with her prior that the patient had a poor prognosis for survival and return to independent function.  Discussed with neurology who feels prognosis is grim  Continuing aggressive medical support despite grave prognosis  Left subclavian line day 11 I asked nursing to remove that and replaced with a PICC line  Remains on Versed, propofol off for the time being  Blood pressure is unchanged  Palliative care helping to support the family  Patient's DPOA, the daughter, states that she remains very optimistic that patient will do well despite my medical input today that the prognosis recovery to independent function is grim  Neurology will likely speak to family about grim prognosis tomorrow    Prior 24 hour  Remains on continuous EEG   On propofol was at 20, versed 8mg/hr  On keppra 500 Q12H, locasamide 300 BID, topamax 200 BID  D/w Dr. Bustos about cEEG and pt appears well  sedated, no seizure activity    Propofol is decreased to 10, later decreased to 0 because patient does not exhibit any seizures.     Afebrile, Tm 36.6C  SBP in 100-150s, HR in 90s  Remains on ventilatory support with FiO2 30%, PEEP 8.   Vent day #11. Was on vent x 2 days at OSH.    WBC 11.2  Hgb \8.4  plt 130  BG in 150s   On tube feed and tolerating.       Review of Systems  Review of Systems   Unable to perform ROS: Acuity of condition        Vital Signs for last 24 hours   Temp:  [36.3 °C (97.4 °F)-36.8 °C (98.3 °F)] 36.8 °C (98.3 °F)  Pulse:  [] 102  Resp:  [11-21] 14  BP: (101-171)/(51-83) 125/69  SpO2:  [100 %] 100 %    Hemodynamic parameters for last 24 hours       Respiratory Information for the last 24 hours  Zepeda Vent Mode: APVCMV  Rate (breaths/min): 14  Vt Target (mL): 320  PEEP/CPAP: 8  FiO2: 30  P MEAN: 10  Control VTE (exp VT): 317    Physical Exam   Physical Exam  Vitals signs and nursing note reviewed.   Constitutional:       General: She is not in acute distress.     Appearance: She is ill-appearing. She is not toxic-appearing.      Comments: Intubated, not awake to verbal     HENT:      Head: Normocephalic.      Mouth/Throat:      Comments: Et tube in place  Eyes:      Conjunctiva/sclera: Conjunctivae normal.   Cardiovascular:      Rate and Rhythm: Normal rate and regular rhythm.      Pulses: Normal pulses.      Heart sounds: No murmur.   Pulmonary:      Effort: Pulmonary effort is normal.      Breath sounds: Normal breath sounds. No wheezing or rales.      Comments: Diminished at bases  Abdominal:      General: There is no distension.      Palpations: Abdomen is soft.      Tenderness: There is no tenderness. There is no guarding.      Comments: Hypoactive bowel sound   Musculoskeletal:      Right lower leg: Edema present.      Comments: + edema in upper extremities   Skin:     General: Skin is warm and dry.      Capillary Refill: Capillary refill takes 2 to 3 seconds.      Findings: No  bruising.   Neurological:      Comments: Baseline left-sided deficits from previous CVA  Opening eyes but not following commands         Medications  Current Facility-Administered Medications   Medication Dose Route Frequency Provider Last Rate Last Dose   • levETIRAcetam (KEPPRA) 750 mg in  mL IVPB  750 mg Intravenous Q12HRS Catracho Bustos M.D.   Stopped at 01/27/20 1851   • propofol (DIPRIVAN) injection  0-80 mcg/kg/min Intravenous Continuous ALONSO Giordano.O.   Stopped at 01/26/20 1730   • sodium phosphate 30 mmol in D5W 500 mL ivpb  30 mmol Intravenous Once PRN Maliha Patterson D.O.       • miconazole 2%-zinc oxide (INZO) topical cream   Topical BID ALONSO Giordano.O.       • heparin injection 5,000 Units  5,000 Units Subcutaneous Q8HRS Anurag Hein D.O.   5,000 Units at 01/27/20 1521   • vitamin B comp+C (ALLBEE WITH C) 1 Tab  1 Tab Enteral Tube DAILY ALONSO Giordano.O.   1 Tab at 01/27/20 0458   • folic acid (FOLVITE) tablet 1 mg  1 mg Enteral Tube DAILY Anurag Hein D.O.   1 mg at 01/27/20 0458   • midodrine (PROAMATINE) tablet 5 mg  5 mg Enteral Tube PRN Maliha Patterson, D.O.   5 mg at 01/25/20 1304   • norepinephrine (LEVOPHED) 8 mg in  mL Infusion  0-30 mcg/min Intravenous Continuous ALONSO Giordano.O.   Stopped at 01/27/20 1600   • allopurinol (ZYLOPRIM) tablet 100 mg  100 mg Enteral Tube DAILY Anurag Hein D.O.   100 mg at 01/27/20 0458   • nystatin (MYCOSTATIN) powder   Topical BID ALONSO Giordano.O.       • midazolam (VERSED) premix 125 mg/125 mL NS  0-10 mg/hr Intravenous Continuous ALONSO Giordano.O. 8 mL/hr at 01/27/20 0854 8 mg/hr at 01/27/20 0854   • lacosamide (VIMPAT) 300 mg in  mL ivpb  300 mg Intravenous BID ALONSO Giordano.O. 130 mL/hr at 01/27/20 1835 300 mg at 01/27/20 1835   • LORazepam (ATIVAN) injection 1 mg  1 mg Intravenous Q3HRS PRN Catracho Bustos M.D.   1 mg at 01/20/20 1740   • hydrALAZINE (APRESOLINE) injection 10 mg  10 mg Intravenous Q4HRS PRN Anurag Hein D.O.   10 mg at  01/27/20 0415   • topiramate (TOPAMAX) tablet 200 mg  200 mg Enteral Tube Q12HRS Catracho Bustos M.D.   200 mg at 01/27/20 1836   • aspirin (ASA) chewable tab 81 mg  81 mg Enteral Tube DAILY Josep You M.D.   81 mg at 01/27/20 0458   • atorvastatin (LIPITOR) tablet 40 mg  40 mg Enteral Tube DAILY Josep You M.D.   40 mg at 01/27/20 0458   • famotidine (PEPCID) tablet 20 mg  20 mg Enteral Tube DAILY Fletcher Willis M.D.   20 mg at 01/27/20 0458   • heparin injection 3,300 Units  3,300 Units Intracatheter PRN Lydia Carcamo M.D.   3,300 Units at 01/27/20 1643   • epoetin antoinette (EPOGEN/PROCRIT) injection 4,000 Units  4,000 Units Subcutaneous MO, WE + FR Payam Cavazos M.D.   4,000 Units at 01/27/20 1056   • Respiratory Care per Protocol   Nebulization Continuous RT Jeremy M Gonda, M.D.       • ipratropium-albuterol (DUONEB) nebulizer solution  3 mL Nebulization Q2HRS PRN (RT) Jeremy M Gonda, M.D.       • senna-docusate (PERICOLACE or SENOKOT S) 8.6-50 MG per tablet 2 Tab  2 Tab Enteral Tube BID Jeremy M Gonda, M.D.   Stopped at 01/25/20 0600    And   • polyethylene glycol/lytes (MIRALAX) PACKET 1 Packet  1 Packet Enteral Tube QDAY PRN Jeremy M Gonda, M.D.        And   • bisacodyl (DULCOLAX) suppository 10 mg  10 mg Rectal QDAY PRN Jeremy M Gonda, M.D.       • lidocaine (XYLOCAINE) 1 % injection 1-2 mL  1-2 mL Tracheal Tube Q30 MIN PRN Jeremy M Gonda, M.D.       • Pharmacy Consult: Enteral tube insertion - review meds/change route/product selection  1 Each Other PHARMACY TO DOSE Jeremy M Gonda, M.D.       • fentaNYL (SUBLIMAZE) injection 25 mcg  25 mcg Intravenous Q HOUR PRN Jeremy M Gonda, M.D.   25 mcg at 01/16/20 2310    Or   • fentaNYL (SUBLIMAZE) injection 50 mcg  50 mcg Intravenous Q HOUR PRN Jeremy M Gonda, M.D.   50 mcg at 01/20/20 1555    Or   • fentaNYL (SUBLIMAZE) injection 100 mcg  100 mcg Intravenous Q HOUR PRN Jeremy M Gonda, M.D.   100 mcg at 01/21/20 1748   • ondansetron (ZOFRAN)  syringe/vial injection 4 mg  4 mg Intravenous Q4HRS PRN Rina Matta M.D.   4 mg at 01/18/20 1214   • labetalol (NORMODYNE/TRANDATE) injection 10 mg  10 mg Intravenous Q4HRS PRN Rina Matta M.D.   10 mg at 01/20/20 1239   • acetaminophen (TYLENOL) tablet 650 mg  650 mg Enteral Tube Q6HRS PRN Jeremy M Gonda, M.D.       • ondansetron (ZOFRAN ODT) dispertab 4 mg  4 mg Enteral Tube Q4HRS PRN Jeremy M Gonda, M.D.           Fluids    Intake/Output Summary (Last 24 hours) at 1/27/2020 2020  Last data filed at 1/27/2020 1851  Gross per 24 hour   Intake 2629.64 ml   Output 6000 ml   Net -3370.36 ml       Laboratory          Recent Labs     01/25/20  0456 01/25/20  1620 01/26/20  0339 01/27/20  0304   SODIUM 135  --  136 132*   POTASSIUM 3.9  --  4.2 4.2   CHLORIDE 101  --  104 101   CO2 28  --  26 25   BUN 21  --  17 28*   CREATININE 0.76  --  0.69 0.94   MAGNESIUM  --  1.7 1.7 1.7   PHOSPHORUS 1.7* 1.2* 1.5* 4.5   CALCIUM 8.5  --  8.3* 8.6     Recent Labs     01/25/20  0456 01/26/20  0339 01/27/20  0304   GLUCOSE 141* 99 89     Recent Labs     01/25/20  0456 01/26/20  0339 01/27/20  0304   WBC 10.1 11.2* 12.3*   NEUTSPOLYS 62.90 54.30 54.00   LYMPHOCYTES 22.70 28.30 26.60   MONOCYTES 10.60 12.90 13.80*   EOSINOPHILS 1.10 1.30 1.90   BASOPHILS 0.60 0.60 0.60     Recent Labs     01/25/20  0456 01/26/20  0339 01/27/20  0304   RBC 2.68* 2.45* 2.60*   HEMOGLOBIN 9.0* 8.4* 8.9*   HEMATOCRIT 28.3* 26.2* 28.1*   PLATELETCT 143* 130* 139*     Called [facility] [phone] today (1/21) for update on the LP done on 1/15.   1/15 CSF Gram stain negative, Cx no growth to date (final)  1/15 CSF cell count was WBC 1, RBC 2. Total protein 45. Glucose 79  1/15 CSF HSV PCR negative  1/15 CSF paraneoplastic negative    Imaging  X-Ray:  I have personally reviewed the images and compared with prior images.    Assessment/Plan  * Status epilepticus (HCC)  Assessment & Plan  New onset, unclear etiology  On continuous  EEG and her course with EEG as follows:  1/19 we attempted to keep pt off sedation. Off propofol and fentanyl. Antiseizures with vimpat, clonazepam and topamax.   1/19, captured evidence of subclinical seizures despite on current antiseizures.   Propofol gtt was started in the evening  1/20, spoke with Dr. Busots, Neurology, and noted brief seizures overnight,  will continue propofol gtt and increased to 40mcg/kg/min  1/22, Pt still had intermittent brief seizure, propofol increased to 50. Vimpat increased to 300 BID. On topamax 200 BID  1/23, pt still having seizures. Versed gtt was started at 4mg/hr, propofol 50, vimpat 300 BID and topomax 200BID. Added keppra  1/24, attempted a trial off propofol, but failed. +seizure activity noted. She failed to wean. Propofol was back to 30 and we increased versed to 8mg/hr  1/25, propofol decreased to 20 and versed at 8  1/26 weaned off propofol and continue versed at 8    Continue vimpat 300 BID and topamax 200 BID, keppra 500 BID  Versed/ativan prn clinical seizures.   D/w Dr. Bustos, neurology      Discussed with neurology today anticipate no major interval changes pending further discussions with family regarding goals of care.  Etiology of the seizures has not been exact did despite extensive work-up and re-evaluations        Acute respiratory failure with hypoxia (HCC)  Assessment & Plan  Ventilator day 12, patient remains intubated was a poor mental status.  Prognosis for recovery is poor thus patient is not a good candidate for trach as trach would not be a bridge to helping her get better.  Continue palliative care discussions with family encouraged him to consider shifting goals to comfort measures only which seems medically appropriate indicated given her extremely poor prognosis.  Out of care is involved.  Neurology states they were also encourage family to adopt a comfort care approach rather continued medical aggressive intervention which is not improving her  prognosis    Severe protein-calorie malnutrition (HCC)  Assessment & Plan  Continue tube feeds at goal rate   Dietary following    Cerebrovascular accident (CVA) due to embolism of right middle cerebral artery ([facility])  Assessment & Plan  Continue high intensity statin, aspirin  PT/OT eval  Neurology following  Echocardiogram reviewed    End stage renal failure on dialysis ([facility])  Assessment & Plan  HD per nephrology  Renally dose medications  Monitor electrolytes  Had MRI contrast, dializying for 3 consecutive doses    Continue to get dialysis had dialysis today.  Continue to monitor follow closely    Normocytic anemia- (present on admission)  Assessment & Plan  Daily CBC with conservative transfusion strategy, monitor for bleeding    Hypertension  Assessment & Plan  scheduled hydralazine, Isordil, labetalol  PRN IV labetalol, hydralazine for goal SBP less than 160    DM (diabetes mellitus) ([facility])- (present on admission)  Assessment & Plan  Insulin sliding scale  Hypoglycemia protocol  FS BS  Goal -180    Goals of care, counseling/discussion  Assessment & Plan  As noted above family states that no one had discussed with them today the patient is a poor prognosis with poor likelihood of survival and certainly very poor prognosis for return to independent function.  The patient's daughter who is the D POA states she remains optimistic the patient will have essentially a full recovery despite my medical input today.  Think it is important for team including palliative care and neurology to help family work through these issues.  Neurology tells me they feel prognosis is grim and they will talk further with the family tomorrow.  Given the patient's grim overall picture I do not think a tracheostomy would be a good idea.      Pressure ulcer  Assessment & Plan  Stage II coccyx -present on arrival  Continue wound care    Hypomagnesemia  Assessment & Plan  Replace keep greater than 2    Gout- (present on  admission)  Assessment & Plan  Continue allopurinol    Obesity (BMI 30-39.9)- (present on admission)  Assessment & Plan  Nutritionist consultation  Encourage behavioral and dietary modification once extubated for weight loss       VTE:  Heparin  Ulcer: H2 Antagonist  Lines: Central Line  Ongoing indication addressed and [facility] Catheter  Ongoing indication addressed    I have performed a physical exam and reviewed and updated ROS and Plan today (1/27/2020). In review of yesterday's note (1/26/2020), there are no changes except as documented above.     Discussed patient condition and risk of morbidity and/or mortality with RN, RT, Pharmacy and nephrology and neurology     I have assessed  her respiratory status, hemodynamics, cardiovascular and neurological status.  Pt remains having seizures. . Need ongoing goal of care discussion with family to discuss about goal of care.  High risk of deterioration and worsening vital organ dysfunction and death without the above critical care interventions.     The patient remains critically ill.  Continued and progressive multiorgan failure with renal failure respiratory failure neurologic failure.  Critical care time = 80 minutes in directly providing and coordinating critical care and extensive data review.  No time overlap and excludes procedures.

## 2020-01-29 NOTE — PROGRESS NOTES
Critical Care Progress Note    Date of admission  1/16/2020    Chief Complaint  status epilepticus and respiratory failure    Hospital Course    74 y.o. female who presented 1/16/2020 with PMH significant for ESRD on IHD since November 2019 M/W/F, HTN, HPL, CAD, admitted for first time witness seizure at SNF, transferred from [facility]. Had a second seizure with prolonged postictal state. Intubated. MRI brain with an acute right parietal lacunar infarct with overall brain parenchymal loss. Loaded with Keppra, dilantin. LP was done with no pleocytosis. Was empirically treated with antibiotics. Code status FULL CODE.  S/p repeat LP 1/24. Cell count with WBC 39, but RBC 00151. Corrected WBC is normal. Other CSF labs are pending       Interval Problem Update  Reviewed last 24 hour events:  No further seizure activity reported for 24 to 40 hours  Mechanical ventilation day 13  Subclavian line day 12 subclavian line removed today  PICC line being placed  Neurology adjusting antiseizure medicines  Weaning Versed today watching for neurologic response  Remains on mechanical relation not a candidate for weaning given poor neurologic status and sedation  Electrolytes repleted  Family updated by neurology  Specialized CSF sent out studies remain pending please see neurology note      Prior 24 hours  Remains on ventilator, poorly responsive  Seizure activity seems to have decreased per neurology  Continues to receive dialysis  I discussed poor prognosis with family particular the patient's daughter today.  The patient's daughter stated that no one had discussed with her prior that the patient had a poor prognosis for survival and return to independent function.  Discussed with neurology who feels prognosis is grim  Continuing aggressive medical support despite grave prognosis  Left subclavian line day 11 I asked nursing to remove that and replaced with a PICC line  Remains on Versed, propofol off for the time  being  Blood pressure is unchanged  Palliative care helping to support the family  Patient's DPOA, the daughter, states that she remains very optimistic that patient will do well despite my medical input today that the prognosis recovery to independent function is grim  Neurology will likely speak to family about grim prognosis tomorrow    Prior 24 hour  Remains on continuous EEG   On propofol was at 20, versed 8mg/hr  On keppra 500 Q12H, locasamide 300 BID, topamax 200 BID  D/w Dr. Bustos about cEEG and pt appears well sedated, no seizure activity    Propofol is decreased to 10, later decreased to 0 because patient does not exhibit any seizures.     Afebrile, Tm 36.6C  SBP in 100-150s, HR in 90s  Remains on ventilatory support with FiO2 30%, PEEP 8.   Vent day #11. Was on vent x 2 days at OSH.    WBC 11.2  Hgb \8.4  plt 130  BG in 150s   On tube feed and tolerating.       Review of Systems  Review of Systems   Unable to perform ROS: Acuity of condition        Vital Signs for last 24 hours   Temp:  [36.5 °C (97.7 °F)-36.9 °C (98.5 °F)] 36.6 °C (97.9 °F)  Pulse:  [70-97] 87  Resp:  [11-18] 13  BP: ()/(52-81) 148/70  SpO2:  [97 %-100 %] 100 %    Hemodynamic parameters for last 24 hours       Respiratory Information for the last 24 hours  Zepeda Vent Mode: APVCMV  Rate (breaths/min): 14  Vt Target (mL): 320  PEEP/CPAP: 8  FiO2: 30  P MEAN: 11  Control VTE (exp VT): 326    Physical Exam   Physical Exam  Vitals signs and nursing note reviewed.   Constitutional:       General: She is not in acute distress.     Appearance: She is ill-appearing. She is not toxic-appearing.      Comments: Intubated, not awake to verbal     HENT:      Head: Normocephalic.      Mouth/Throat:      Comments: Et tube in place  Eyes:      Conjunctiva/sclera: Conjunctivae normal.   Cardiovascular:      Rate and Rhythm: Normal rate and regular rhythm.      Pulses: Normal pulses.      Heart sounds: No murmur.   Pulmonary:      Effort: Pulmonary  effort is normal.      Breath sounds: Normal breath sounds. No wheezing or rales.      Comments: Diminished at bases  Abdominal:      General: There is no distension.      Palpations: Abdomen is soft.      Tenderness: There is no tenderness. There is no guarding.      Comments: Hypoactive bowel sound   Musculoskeletal:      Right lower leg: Edema present.      Comments: + edema in upper extremities   Skin:     General: Skin is warm and dry.      Capillary Refill: Capillary refill takes 2 to 3 seconds.      Findings: No bruising.   Neurological:      Comments: Baseline left-sided deficits from previous CVA  Opening eyes but not following commands         Medications  Current Facility-Administered Medications   Medication Dose Route Frequency Provider Last Rate Last Dose   • levETIRAcetam (KEPPRA) 750 mg in  mL IVPB  750 mg Intravenous Q12HRS Catracho Bustos M.D.   Stopped at 01/28/20 1744   • propofol (DIPRIVAN) injection  0-80 mcg/kg/min Intravenous Continuous Anurag Hein D.O.   Stopped at 01/26/20 1730   • miconazole 2%-zinc oxide (INZO) topical cream   Topical BID Anurag Hein D.O.       • heparin injection 5,000 Units  5,000 Units Subcutaneous Q8HRS Anurag Hein D.O.   Stopped at 01/28/20 1400   • vitamin B comp+C (ALLBEE WITH C) 1 Tab  1 Tab Enteral Tube DAILY Anurag Hein D.O.   1 Tab at 01/28/20 0507   • folic acid (FOLVITE) tablet 1 mg  1 mg Enteral Tube DAILY Anurag Hein D.O.   1 mg at 01/28/20 0506   • midodrine (PROAMATINE) tablet 5 mg  5 mg Enteral Tube PRN Maliha Patterson D.O.   5 mg at 01/25/20 1304   • norepinephrine (LEVOPHED) 8 mg in  mL Infusion  0-30 mcg/min Intravenous Continuous Anurag Hein D.O.   Stopped at 01/27/20 1600   • allopurinol (ZYLOPRIM) tablet 100 mg  100 mg Enteral Tube DAILY Anurag Hein D.O.   100 mg at 01/28/20 0507   • nystatin (MYCOSTATIN) powder   Topical BID Anurag Hein D.O.       • midazolam (VERSED) premix 125 mg/125 mL NS  0-10 mg/hr Intravenous Continuous Anurag  Melvina D.OUli 2 mL/hr at 01/28/20 1733 2 mg/hr at 01/28/20 1733   • lacosamide (VIMPAT) 300 mg in  mL ivpb  300 mg Intravenous BID ALONSO [email or web address].[email or web address]   Stopped at 01/28/20 1829   • LORazepam (ATIVAN) injection 1 mg  1 mg Intravenous Q3HRS PRN Catracho Bustos M.D.   1 mg at 01/20/20 1740   • hydrALAZINE (APRESOLINE) injection 10 mg  10 mg Intravenous Q4HRS PRN ALONSO [email or web address].OUli   10 mg at 01/27/20 0415   • topiramate (TOPAMAX) tablet 200 mg  200 mg Enteral Tube Q12HRS Catracho Bustos M.D.   200 mg at 01/28/20 1732   • aspirin (ASA) chewable tab 81 mg  81 mg Enteral Tube DAILY Josep You M.D.   81 mg at 01/28/20 0508   • atorvastatin (LIPITOR) tablet 40 mg  40 mg Enteral Tube DAILY Josep You M.D.   40 mg at 01/28/20 0507   • famotidine (PEPCID) tablet 20 mg  20 mg Enteral Tube DAILY Fletcher Willis M.D.   20 mg at 01/28/20 0507   • heparin injection 3,300 Units  3,300 Units Intracatheter PRN Lydia Carcamo M.D.   3,300 Units at 01/27/20 1643   • epoetin antoinette (EPOGEN/PROCRIT) injection 4,000 Units  4,000 Units Subcutaneous MO, WE + FR Payam Cavazos M.D.   4,000 Units at 01/27/20 1056   • Respiratory Care per Protocol   Nebulization Continuous RT Jeremy M Gonda, M.D.       • ipratropium-albuterol (DUONEB) nebulizer solution  3 mL Nebulization Q2HRS PRN (RT) Jeremy M Gonda, M.D.       • senna-docusate (PERICOLACE or SENOKOT S) 8.6-50 MG per tablet 2 Tab  2 Tab Enteral Tube BID Jeremy M Gonda, M.D.   Stopped at 01/25/20 0600    And   • polyethylene glycol/lytes (MIRALAX) PACKET 1 Packet  1 Packet Enteral Tube QDAY PRN Jeremy M Gonda, M.D.        And   • bisacodyl (DULCOLAX) suppository 10 mg  10 mg Rectal QDAY PRN Jeremy M Gonda, M.D.       • lidocaine (XYLOCAINE) 1 % injection 1-2 mL  1-2 mL Tracheal Tube Q30 MIN PRN Jeremy M Gonda, M.D.       • Pharmacy Consult: Enteral tube insertion - review meds/change route/product selection  1 Each Other PHARMACY TO DOSE Jeremy M Gonda, M.D.       • fentaNYL  (SUBLIMAZE) injection 25 mcg  25 mcg Intravenous Q HOUR PRN Jeremy M Gonda, M.D.   25 mcg at 01/16/20 2310    Or   • fentaNYL (SUBLIMAZE) injection 50 mcg  50 mcg Intravenous Q HOUR PRN Jeremy M Gonda, M.D.   50 mcg at 01/20/20 1555    Or   • fentaNYL (SUBLIMAZE) injection 100 mcg  100 mcg Intravenous Q HOUR PRN Jeremy M Gonda, M.D.   100 mcg at 01/21/20 1748   • ondansetron (ZOFRAN) syringe/vial injection 4 mg  4 mg Intravenous Q4HRS PRN Rina Matta M.D.   4 mg at 01/18/20 1214   • labetalol (NORMODYNE/TRANDATE) injection 10 mg  10 mg Intravenous Q4HRS PRN Rina Matta M.D.   10 mg at 01/20/20 1239   • acetaminophen (TYLENOL) tablet 650 mg  650 mg Enteral Tube Q6HRS PRN Jeremy M Gonda, M.D.       • ondansetron (ZOFRAN ODT) dispertab 4 mg  4 mg Enteral Tube Q4HRS PRN Jeremy M Gonda, M.D.           Fluids    Intake/Output Summary (Last 24 hours) at 1/28/2020 1845  Last data filed at 1/28/2020 1829  Gross per 24 hour   Intake 2096.41 ml   Output 0 ml   Net 2096.41 ml       Laboratory          Recent Labs     01/26/20 0339 01/27/20  0304 01/28/20 0219   SODIUM 136 132* 135   POTASSIUM 4.2 4.2 4.5   CHLORIDE 104 101 100   CO2 26 25 29   BUN 17 28* 21   CREATININE 0.69 0.94 0.73   MAGNESIUM 1.7 1.7 1.8   PHOSPHORUS 1.5* 4.5 2.4*   CALCIUM 8.3* 8.6 9.0     Recent Labs     01/26/20 0339 01/27/20  0304 01/28/20 0219   PREALBUMIN  --   --  17.0*   GLUCOSE 99 89 109*     Recent Labs     01/26/20  0339 01/27/20  0304 01/28/20 0219   WBC 11.2* 12.3* 13.4*   NEUTSPOLYS 54.30 54.00 54.20   LYMPHOCYTES 28.30 26.60 28.10   MONOCYTES 12.90 13.80* 13.00   EOSINOPHILS 1.30 1.90 1.40   BASOPHILS 0.60 0.60 0.80     Recent Labs     01/26/20  0339 01/27/20  0304 01/28/20  0219   RBC 2.45* 2.60* 2.70*   HEMOGLOBIN 8.4* 8.9* 9.0*   HEMATOCRIT 26.2* 28.1* 29.1*   PLATELETCT 130* 139* 145*     Called [facility] [phone] today (1/21) for update on the LP done on 1/15.   1/15 CSF Gram stain negative, Cx no  growth to date (final)  1/15 CSF cell count was WBC 1, RBC 2. Total protein 45. Glucose 79  1/15 CSF HSV PCR negative  1/15 CSF paraneoplastic negative    Imaging  X-Ray:  I have personally reviewed the images and compared with prior images.    Assessment/Plan  * Status epilepticus (HCC)  Assessment & Plan  New onset, unclear etiology  On continuous EEG and her course with EEG as follows:  1/19 we attempted to keep pt off sedation. Off propofol and fentanyl. Antiseizures with vimpat, clonazepam and topamax.   1/19, captured evidence of subclinical seizures despite on current antiseizures.   Propofol gtt was started in the evening  1/20, spoke with Dr. Bustos, Neurology, and noted brief seizures overnight,  will continue propofol gtt and increased to 40mcg/kg/min  1/22, Pt still had intermittent brief seizure, propofol increased to 50. Vimpat increased to 300 BID. On topamax 200 BID  1/23, pt still having seizures. Versed gtt was started at 4mg/hr, propofol 50, vimpat 300 BID and topomax 200BID. Added keppra  1/24, attempted a trial off propofol, but failed. +seizure activity noted. She failed to wean. Propofol was back to 30 and we increased versed to 8mg/hr  1/25, propofol decreased to 20 and versed at 8  1/26 weaned off propofol and continue versed at 8    Continue vimpat 300 BID and topamax 200 BID, keppra 500 BID  Versed/ativan prn clinical seizures.   D/w Dr. Bustos, neurology      No seizure activity for 24 to 40 hours per neurology G.  Adjusting seizure medicines as recommended by neurology who are also decreasing the Versed which hopefully will make assessment of her new baseline neurologic status more feasible.  However prior to this patient has had nearly 10 days of refractory seizures and this makes it difficult anticipate that she will have a good neurologic outcome .  I believe she has a poor outlook for return to independent function..        Acute respiratory failure with hypoxia (HCC)  Assessment &  Plan  Ventilator day 12, patient remains intubated was a poor mental status.  Prognosis for recovery is poor thus patient is not a good candidate for trach as trach would not be a bridge to helping her get better.  Continue palliative care discussions with family encouraged him to consider shifting goals to comfort measures only which seems medically appropriate indicated given her extremely poor prognosis.  Out of care is involved.  Neurology states they were also encourage family to adopt a comfort care approach rather continued medical aggressive intervention which is not improving her prognosis    Ventilator day 13 no major change.  Patient is ventilator dependent with poor mental status and sedation.  Ideally as we wean off sedative drugs as allowed by neurology undertake more focused efforts at spontaneous breathing trials.  However given her age and 2 weeks of decannulation with refractory seizures with status likelihood that she will successfully wean from mechanical ventilation seems poor at this time.    Severe protein-calorie malnutrition (HCC)  Assessment & Plan  Continue tube feeds at goal rate   Dietary following    Cerebrovascular accident (CVA) due to embolism of right middle cerebral artery (HCC)  Assessment & Plan  Continue high intensity statin, aspirin  PT/OT eval  Neurology following  Echocardiogram reviewed    End stage renal failure on dialysis (HCC)  Assessment & Plan  HD per nephrology  Renally dose medications  Monitor electrolytes  Had MRI contrast, dializying for 3 consecutive doses    Continue to get dialysis had dialysis today.  Continue to monitor follow closely    Patient now on dialysis schedule approximate 3 times a week per renal    Normocytic anemia- (present on admission)  Assessment & Plan  Daily CBC with conservative transfusion strategy, monitor for bleeding    Hypertension  Assessment & Plan  scheduled hydralazine, Isordil, labetalol  PRN IV labetalol, hydralazine for goal  SBP less than 160    DM (diabetes mellitus) ([facility])- (present on admission)  Assessment & Plan  Insulin sliding scale  Hypoglycemia protocol  FS BS  Goal -180    Goals of care, counseling/discussion  Assessment & Plan  As noted above family states that no one had discussed with them today the patient is a poor prognosis with poor likelihood of survival and certainly very poor prognosis for return to independent function.  The patient's daughter who is the D POA states she remains optimistic the patient will have essentially a full recovery despite my medical input today.  Think it is important for team including palliative care and neurology to help family work through these issues.  Neurology tells me they feel prognosis is grim and they will talk further with the family tomorrow.  Given the patient's grim overall picture I do not think a tracheostomy would be a good idea.    Awaiting neurology to reassess and discuss in more detail with family their prognosis.  Aside from neurologic issue 71-year-old lady newly on dialysis with long-term mechanical ventilation weakness poor nutrition coupled with a poor neurologic status is in a class of a poor likelihood of returning to independent function.  A tracheostomy will not affect this likelihood of return independent function.      Pressure ulcer  Assessment & Plan  Stage II coccyx -present on arrival  Continue wound care    Hypomagnesemia  Assessment & Plan  Replace keep greater than 2    Gout- (present on admission)  Assessment & Plan  Continue allopurinol    Obesity (BMI 30-39.9)- (present on admission)  Assessment & Plan  Nutritionist consultation  Encourage behavioral and dietary modification once extubated for weight loss       VTE:  Heparin  Ulcer: H2 Antagonist  Lines: Central Line  Ongoing indication addressed and [facility] Catheter  Ongoing indication addressed    I have performed a physical exam and reviewed and updated ROS and Plan today (1/28/2020). In  review of yesterday's note (1/27/2020), there are no changes except as documented above.     Discussed patient condition and risk of morbidity and/or mortality with RN, RT, Pharmacy and nephrology and neurology     I have assessed  her respiratory status, hemodynamics, cardiovascular and neurological status.  Pt remains having seizures. . Need ongoing goal of care discussion with family to discuss about goal of care.  High risk of deterioration and worsening vital organ dysfunction and death without the above critical care interventions.     The patient remains critically ill.  Continued and progressive multiorgan failure with renal failure respiratory failure neurologic failure.  Critical care time = 75 minutes in directly providing and coordinating critical care and extensive data review.  No time overlap and excludes procedures.

## 2020-01-29 NOTE — CARE PLAN
Ventilator Daily Summary    Vent Day # 15    Ventilator settings changed this shift: none     Weaning trials: none at this time (continous EEG)    Respiratory Procedures: none at this time    Plan: Continue current ventilator settings and wean mechanical ventilation as tolerated per physician orders.

## 2020-01-29 NOTE — PROGRESS NOTES
Hospital Neurology Progress Note:     Interval History: No acute events overnight. Unable to obtain ROS due to intubation.     Objective:   Vitals:    01/29/20 0700 01/29/20 0800 01/29/20 0900 01/29/20 1000   BP: 141/69 147/71 135/67 122/64   Pulse: (!) 105 (!) 101 99 96   Resp: 16 15 13 14   Temp:  37 °C (98.6 °F)     TempSrc:  Temporal     SpO2: 100% 99% 100% 100%   Weight:       Height:           Labs:     Lab Results   Component Value Date/Time    PROTHROMBTM 14.5 01/29/2020 06:05 AM    INR 1.11 01/29/2020 06:05 AM      Lab Results   Component Value Date/Time    WBC 11.5 (H) 01/29/2020 06:05 AM    RBC 2.68 (L) 01/29/2020 06:05 AM    HEMOGLOBIN 9.0 (L) 01/29/2020 06:05 AM    HEMATOCRIT 28.9 (L) 01/29/2020 06:05 AM    .8 (H) 01/29/2020 06:05 AM    MCH 33.6 (H) 01/29/2020 06:05 AM    MCHC 31.1 (L) 01/29/2020 06:05 AM    MPV 9.6 01/29/2020 06:05 AM    NEUTSPOLYS 59.70 01/29/2020 06:05 AM    LYMPHOCYTES 23.60 01/29/2020 06:05 AM    MONOCYTES 12.10 01/29/2020 06:05 AM    EOSINOPHILS 1.40 01/29/2020 06:05 AM    BASOPHILS 1.00 01/29/2020 06:05 AM    HYPOCHROMIA 1+ 01/24/2020 05:22 AM    ANISOCYTOSIS 2+ 01/24/2020 05:22 AM      Lab Results   Component Value Date/Time    SODIUM 135 01/29/2020 06:05 AM    POTASSIUM 4.6 01/29/2020 06:05 AM    CHLORIDE 102 01/29/2020 06:05 AM    CO2 27 01/29/2020 06:05 AM    GLUCOSE 159 (H) 01/29/2020 06:05 AM    BUN 43 (H) 01/29/2020 06:05 AM    CREATININE 1.10 01/29/2020 06:05 AM      Lab Results   Component Value Date/Time    CHOLSTRLTOT 79 02/07/2019 01:40 PM    LDL 34 02/07/2019 01:40 PM    HDL 23 (L) 02/07/2019 01:40 PM    TRIGLYCERIDE 95 01/27/2020 03:04 AM       Lab Results   Component Value Date/Time    ALKPHOSPHAT 36 01/20/2020 05:15 AM    ASTSGOT 23 01/20/2020 05:15 AM    ALTSGPT 7 01/20/2020 05:15 AM    TBILIRUBIN 0.7 01/20/2020 05:15 AM        Imaging/Testing:   EEG from 1/28/2020 through 1/29/2020 was discussed with the interpreting neurologist and no further seizure  activity was seen.    Physical Exam:     General: Intubated 74-year-old female in bed no acute distress  Cardio: Normal S1/S2. No peripheral edema.   Pulm: CTAX2. No respiratory distress.   Skin: Warm, dry, no rashes or lesions   Psychiatric: Unable to assess  HEENT: Atraumatic head, normal sclera and conjunctiva, moist oral mucosa. No lid lag.  Abdomen: Soft, non tender. No masses or hepatosplenomegaly.    Neurologic:  Mental Status: GCS 3 T (E1, M1, V1 T)  Cranial Nerves: Pupils equally round and unreactive to light.  Extraocular movements intact.  Face symmetric.  Motor: Normal muscle bulk with decreased tone throughout.  No movement was seen.  Reflexes: Deferred  Coordination: Unable to assess  Sensation: No withdrawal to noxious stimulus  Gait/Station: Unable to assess    Patient examined on 1/28/2020 and compared to exam on 1/29/2020 no significant clinical change was seen.    Assessment/Plan:    Cassandra Guzmán is a 74 y.o. female with history of CHF, right parietal stroke, diabetes, GERD, end-stage renal disease on dialysis, history of GI bleed, hypertension and history of ICH presenting on 1/16/2020 for seizures treated with Vimpat, Topamax and Keppra.  The patient has had a protracted hospital course during which she developed subclinical seizures.  This has been treated with a combination of multiple antiseizure medications including Keppra, Topamax and Vimpat as well as anesthetics (propofol and Versed).  The patient has remained with refractory seizures however EEG has not shown seizure activity and so we will continue weaning sedation.    Plan:  1.  Continue Keppra 500 mg twice daily  2.  Continue Vimpat 200 mg twice daily  3.  Continue Topamax 200 mg twice daily  4.  Plan to discontinue Versed today  5.  Continue video EEG monitoring  6.  If no further seizure activity is seen after weaning Versed, we may then look to decreasing antiseizure medications.  7.  Lab work for etiology of seizures (14 3  3, paraneoplastic panel, meningoencephalitis panel etc.) is pending.  8.  Plan discussed with consulting physician and patient's nurse.     Emigdio Lynn M.D., Diplomat of the American Board of Psychiatry and Neurology  Diplomat of [employer] Epilepsy Subspecialty   Assistant Clinical Professor, [facility] Neurology Consultant

## 2020-01-29 NOTE — PROGRESS NOTES
[facility] Nephrology Daily Progress Note    Date of Service  1/29/2020    AUTHOR: Rusty Montes M.D.    Chief Complaint  74 y.o. female admitted to [facility] on 1/14/20 with seizures.She was at a SNF.She had been previously hospitalized at [facility] and diaysis was initiated.  She was doing very poorly then and palliative care was discussed with the family,but they declined.She had very poor functional status and required Jiemna lift to get into the dialysis chair.She was found to have  a CVA on MRI at [facility].Her seizures were not controlled and it was felt she was in status epilepticus.  She was getting HD at [facility] ([place], [place]).Last HD was on 1/13/20.She was seen by Dr Carcamo at [facility].Creatinine was 1.8 and dialysis was held.Neurology [place] that the patient needed continuous EEG monitoring and he was transferred to [facility]    Interval Problem Update  01/16/20 - Transferred to [facility].In ICU  01/17/20 - Intubated.Ventilated.On continuous EEG monitotring.On Norepinephrine,enteral feedings and Propofol.UOP 70 mL today.  01/18/20 - NAEO, sedation off and she responds to verbal stimuli; MRI being scheduled today  01/19/20 - NAEO, MRI yesterday showed acute CVA and multiple microhemorrhage areas variable chronicity  01/20/20 - 3/3 post gadolinium HD session today, tolerating off norepi  01/21/20 - tolerated HD, son reports she opened her eyes yesterday  01/22/20 - SBP 130s-160s, HD today  01/23/20 - SBP labile, 110s-170s  01/24/20 - SBP 90s-120s. ~6L positive for admission  01/25/20 - hypotension on HD yesterday, terminated early, HD planned for today, with midodrine  01/26/20 - hypotension again with HD, phos low today  01/27/20 - Getting HD when visited pt. Has been requiring pressor support during HD for hypotension. Subclavian line removed per ICU team, will replace with PICC. Plan for family meeting today. Phos increased from 1.5 to 4.5 this AM. Cr up to 0.94 from 0.69, BUN 17=>28  01/28/20 - No changes. K4.5, BUN 21, Scr  0.73. No HD today  01/29/20 - No changes in pt. Scr 1.1, BUN 43. Hold HD today, reassess tomorrow    Review of Systems   Unable to perform ROS: Acuity of condition     PAST FAMILY HISTORY: Reviewed and unchanged since admission  PAST SURGICAL HISTORY:  Reviewed and unchanged since admission  SOCIAL HISTORY:  Reviewed and unchanged since admission  FAMILY HISTORY: Reviewed and unchanged since admission  CURRENT MEDICATIONS: Reviewed since admission to present  IMAGING STUDIES: Reviewed since admission to present  LABORATORY STUDIES: Reviewed since admission to present    Physical Exam  Temp:  [36.6 °C (97.8 °F)-37 °C (98.6 °F)] 37 °C (98.6 °F)  Pulse:  [] 96  Resp:  [13-16] 14  BP: (122-181)/(58-86) 132/58  SpO2:  [97 %-100 %] 100 %    Physical Exam  Vitals signs and nursing note reviewed.   Constitutional:       General: She is not in acute distress.     Appearance: Normal appearance. She is ill-appearing.   HENT:      Head: Normocephalic and atraumatic.      Right Ear: External ear normal.      Left Ear: External ear normal.      Nose: Nose normal. No congestion.      Mouth/Throat:      Mouth: Mucous membranes are moist.      Comments: ETT  Eyes:      General:         Right eye: No discharge.         Left eye: No discharge.      Conjunctiva/sclera: Conjunctivae normal.   Neck:      Musculoskeletal: Neck supple. No muscular tenderness.   Cardiovascular:      Rate and Rhythm: Normal rate and regular rhythm.      Heart sounds: Normal heart sounds.   Pulmonary:      Effort: Pulmonary effort is normal.      Breath sounds: Normal breath sounds.   Chest:      Chest wall: No tenderness.   Abdominal:      General: Bowel sounds are normal. There is no distension.      Palpations: Abdomen is soft.   Musculoskeletal:         General: No tenderness or signs of injury.      Right lower leg: Edema present.      Left lower leg: Edema present.      Comments: anasarca   Skin:     General: Skin is warm and dry.      Findings:  No rash.   Neurological:      Mental Status: She is alert.      Comments: Does not open eyes to loud voice, sedated   Psychiatric:         Mood and Affect: Mood normal.         Behavior: Behavior normal.       Fluids    Intake/Output Summary (Last 24 hours) at 1/29/2020 1204  Last data filed at 1/29/2020 1029  Gross per 24 hour   Intake 1373.41 ml   Output --   Net 1373.41 ml       Laboratory  Recent Labs     01/27/20  0304 01/28/20  0219 01/29/20  0605   WBC 12.3* 13.4* 11.5*   RBC 2.60* 2.70* 2.68*   HEMOGLOBIN 8.9* 9.0* 9.0*   HEMATOCRIT 28.1* 29.1* 28.9*   .1* 107.8* 107.8*   MCH 34.2* 33.3* 33.6*   MCHC 31.7* 30.9* 31.1*   RDW 68.5* 68.2* 69.3*   PLATELETCT 139* 145* 167   MPV 9.0 9.5 9.6     Recent Labs     01/27/20  0304 01/28/20  0219 01/29/20  0605   SODIUM 132* 135 135   POTASSIUM 4.2 4.5 4.6   CHLORIDE 101 100 102   CO2 25 29 27   GLUCOSE 89 109* 159*   BUN 28* 21 43*   CREATININE 0.94 0.73 1.10   CALCIUM 8.6 9.0 9.5     Recent Labs     01/29/20  0605   INR 1.11     No results for input(s): NTPROBNP in the last 72 hours.  Recent Labs     01/27/20  0304   TRIGLYCERIDE 95        IMPRESSION:  # ESRD   MWF iHD as OP at [facility]   CrCl 6              Hypotension with HD/UF, subclavian line removed per ICU team, PICC line for pressors/meds  # Status epilepticus   MRI with amanda being requested  # S/P acute lacunar infarct   Hx of previous CVA with significant deficits.  # HTN  # DM  # VDRF  # Hx of dysphagia  # Anemia of CKD.Ferritin previously significantly elevated. 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  PLAN:  -Seizures management per Neurology--be sure meds are dosed to accommodate HD  -MWF and PRN iHD, hold 1/29 and reassess tomorrow   - UF as tolerated, concentrate IV meds as able, will likely  require pressor support for HD  - midodrine 5mg q30min prior to HD for SBP<120 and 2 hours into HD if SBP<100  - lasix x 1 dose today, if useful, plan on use on non HD days  -Enteral feedings  -No dietary protein restrictions  - phos supplement ordered, recheck tomorrow, phos ordered for AM if <4  -Epogen  -Follow labs  -Continue GOC discussions with family    Other management per primary team

## 2020-01-29 NOTE — PROGRESS NOTES
Pt bleeding from PICC line site.   Notified physician  Ordered lab work  Hold pressure and call back if does not stop  Hold asa and heparin this am

## 2020-01-29 NOTE — FLOWSHEET NOTE
01/29/20 1525   Weaning Parameters   RR (bpm) 16   #FVC / Vital Capacity (liters)    (Pt did not follow)   NIF (cm H2O)    (Pt did not follow)   Rapid Shallow Breathing Index (RR/VT) 74   Spontaneous VE (!) 4.3   Spontaneous

## 2020-01-29 NOTE — PROCEDURES
VIDEO ELECTROENCEPHALOGRAM REPORT        Referring provider: Dr. You.      DOS: 1/29/2020 (total recording of 23 hours and 12 minutes).      INDICATION:  Cassandra [place] 74 y.o. female presenting with recurrent seizures, status epilepticus.      CURRENT ANTIEPILEPTIC REGIMEN: Lacosamide 300 mg q 12 hrs, Topiramate 200 mg bid, Levetiracetam 750 mg q 12 hrs. Sedated with Midazolam infusion, which was discontinued.       TECHNIQUE: 30 channel video electroencephalogram (EEG) was performed in accordance with the international 10-20 system. The study was reviewed in bipolar and referential montages. The recording examined a sedated or encephalopathic patient.       DESCRIPTION OF THE RECORD:  Most of the study suggested a sedated state, with waxing and waning of the cerebral electrical activity. with discontinuation of sedation, brief arousals are captured with some improvement in background. Triphasic and frontal sharp waves are seen frequently throughout the study, often exhibiting a periodic pattern, and appearing most prominent on the left frontal region at times. Also,  brief runs of generalized, rhythmic delta activity, with maximum in the frontal regions were notd from time to time. No clear seizures captured.      ACTIVATION PROCEDURES:   Not performed.      EKG: sampling of the EKG recording demonstrated sinus rhythm.      EVENTS:  None.      INTERPRETATION:  This is an abnormal 24 hrs video EEG recording in a sedated and/or encephalopathic patient. Most of the study suggested a sedated state, with waxing and waning of the cerebral electrical activity. with discontinuation of sedation, brief arousals noted, with some improvement of the background. A mild to moderate, toxic / metabolic encephalopathy is suggested. Triphasic and frontal sharp waves were seen frequently throughout the study, often exhibiting a periodic pattern, and appearing most prominent on the left frontal region at times. Also,  brief  runs of generalized, rhythmic delta activity, with maximum in the frontal regions, were noted from time to time. The findings increase risk for seizures, however no seizures captured despite discontinuation of sedative.  The findings suggest persistnt cortical irritability and/or structural abnormality. Clinical and radiological correlation is recommended.     Updates provided to Dr. Lynn.         Catracho Bustos MD   Epilepsy and Neurodiagnostics.   Clinical  of Neurology [facility].   Diplomate in Neurology, Epilepsy, and Electrodiagnostic Medicine.   Office: [phone]  Fax: [phone]

## 2020-01-30 NOTE — PROGRESS NOTES
Late entry---  Spoke to MD Lydia Carcamo with nephrology; discussed PICC line placement for patient per MD Uriarte's request. MD states okay for picc line even  though patient receives dialysis.  MD Carcamo states will write in progress note. Will enter nursing communication as well for order received.

## 2020-01-30 NOTE — PROGRESS NOTES
[facility] Nephrology Daily Progress Note    Date of Service  1/30/2020    AUTHOR: Rusty Montes M.D.    Chief Complaint  74 y.o. female admitted to [facility] on 1/14/20 with seizures.She was at a SNF.She had been previously hospitalized at [facility] and diaysis was initiated.  She was doing very poorly then and palliative care was discussed with the family,but they declined.She had very poor functional status and required Jimena lift to get into the dialysis chair.She was found to have  a CVA on MRI at [facility].Her seizures were not controlled and it was felt she was in status epilepticus.  She was getting HD at [facility].Last HD was on 1/13/20.She was seen by Dr Carcamo at [facility].Creatinine was 1.8 and dialysis was held. Neurology [place] that the patient needed continuous EEG monitoring and he was transferred to [facility]    Interval Problem Update  01/16/20 - Transferred to [facility].In ICU  01/17/20 - Intubated.Ventilated.On continuous EEG monitotring.On Norepinephrine,enteral feedings and Propofol.UOP 70 mL today.  01/18/20 - NAEO, sedation off and she responds to verbal stimuli; MRI being scheduled today  01/19/20 - NAEO, MRI yesterday showed acute CVA and multiple microhemorrhage areas variable chronicity  01/20/20 - 3/3 post gadolinium HD session today, tolerating off norepi  01/21/20 - tolerated HD, son reports she opened her eyes yesterday  01/22/20 - SBP 130s-160s, HD today  01/23/20 - SBP labile, 110s-170s  01/24/20 - SBP 90s-120s. ~6L positive for admission  01/25/20 - hypotension on HD yesterday, terminated early, HD planned for today, with midodrine  01/26/20 - hypotension again with HD, phos low today  01/27/20 - Getting HD when visited pt. Has been requiring pressor support during HD for hypotension. Subclavian line removed per ICU team, will replace with PICC. Plan for family meeting today. Phos increased from 1.5 to 4.5 this AM. Cr up to 0.94 from 0.69, BUN 17=>28  01/28/20 - No changes. K4.5, BUN 21, Scr  0.73. No HD today  01/29/20 - No changes in pt. Scr 1.1, BUN 43. Hold HD today, reassess tomorrow  01/30/20 - NAEO. No changes in pt. Off sedatives. Will try for HD today or tomorrow    Review of Systems   Unable to perform ROS: Acuity of condition     PAST FAMILY HISTORY: Reviewed and unchanged since admission  PAST SURGICAL HISTORY:  Reviewed and unchanged since admission  SOCIAL HISTORY:  Reviewed and unchanged since admission  FAMILY HISTORY: Reviewed and unchanged since admission  CURRENT MEDICATIONS: Reviewed since admission to present  IMAGING STUDIES: Reviewed since admission to present  LABORATORY STUDIES: Reviewed since admission to present    Physical Exam  Temp:  [37 °C (98.6 °F)-37.5 °C (99.5 °F)] 37.1 °C (98.8 °F)  Pulse:  [] 90  Resp:  [13-31] 15  BP: ()/(45-77) 132/66  SpO2:  [98 %-100 %] 100 %    Physical Exam  Vitals signs and nursing note reviewed.   Constitutional:       General: She is not in acute distress.     Appearance: Normal appearance. She is ill-appearing.   HENT:      Head: Normocephalic and atraumatic.      Right Ear: External ear normal.      Left Ear: External ear normal.      Nose: Nose normal. No congestion.      Mouth/Throat:      Mouth: Mucous membranes are moist.      Comments: ETT  Eyes:      General:         Right eye: No discharge.         Left eye: No discharge.      Conjunctiva/sclera: Conjunctivae normal.   Neck:      Musculoskeletal: Neck supple. No muscular tenderness.   Cardiovascular:      Rate and Rhythm: Normal rate and regular rhythm.      Heart sounds: Normal heart sounds.   Pulmonary:      Effort: Pulmonary effort is normal.      Breath sounds: Normal breath sounds.   Chest:      Chest wall: No tenderness.   Abdominal:      General: Bowel sounds are normal. There is no distension.      Palpations: Abdomen is soft.   Musculoskeletal:         General: No tenderness or signs of injury.      Right lower leg: Edema present.      Left lower leg: Edema  present.      Comments: anasarca   Skin:     General: Skin is warm and dry.      Findings: No rash.   Neurological:      Mental Status: She is alert.      Comments: Does not open eyes to loud voice, sedated   Psychiatric:         Mood and Affect: Mood normal.         Behavior: Behavior normal.       Fluids    Intake/Output Summary (Last 24 hours) at 1/30/2020 1059  Last data filed at 1/30/2020 1034  Gross per 24 hour   Intake 1471 ml   Output --   Net 1471 ml       Laboratory  Recent Labs     01/28/20 0219 01/29/20  0605 01/30/20  0437   WBC 13.4* 11.5* 12.7*   RBC 2.70* 2.68* 2.45*   HEMOGLOBIN 9.0* 9.0* 8.2*   HEMATOCRIT 29.1* 28.9* 26.3*   .8* 107.8* 107.3*   MCH 33.3* 33.6* 33.5*   MCHC 30.9* 31.1* 31.2*   RDW 68.2* 69.3* 69.0*   PLATELETCT 145* 167 154*   MPV 9.5 9.6 9.6     Recent Labs     01/28/20 0219 01/29/20  0605 01/30/20  0437   SODIUM 135 135 137   POTASSIUM 4.5 4.6 4.8   CHLORIDE 100 102 104   CO2 29 27 25   GLUCOSE 109* 159* 161*   BUN 21 43* 60*   CREATININE 0.73 1.10 1.28   CALCIUM 9.0 9.5 9.3     Recent Labs     01/29/20  0605   INR 1.11     No results for input(s): NTPROBNP in the last 72 hours.         IMPRESSION:  # ESRD   MWF iHD as OP at [facility]   CrCl 6              Hypotension with HD/UF, subclavian line removed per ICU team, PICC line for pressors/meds  # Status epilepticus   MRI with amanda being requested  # S/P acute lacunar infarct   Hx of previous CVA with significant deficits.  # HTN  # DM  # VDRF  # Hx of dysphagia  # Anemia of CKD.Ferritin previously significantly elevated. 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  PLAN:  -Seizures management per Neurology--be sure meds are dosed to accommodate HD  -MWF and PRN iHD; will try for HD today (1/30) or tomorrow, should remove  any residual sedatives and metabolites still on board  - UF as tolerated, concentrate IV meds as able, will likely require pressor support for HD  - midodrine 5mg q30min prior to HD for SBP<120 and 2 hours into HD if SBP<100  -Enteral feedings  -No dietary protein restrictions  - Monitor phos, lytes  -Epogen  -Follow labs  -Continue GOC discussions with family    Other management per primary team

## 2020-01-30 NOTE — CARE PLAN
Problem: Communication  Goal: The ability to communicate needs accurately and effectively will improve  Outcome: PROGRESSING AS EXPECTED  Patient white board updated. Patient/Family updated on plan of care. All questions and concerns addressed.      Problem: Venous Thromboembolism (VTW)/Deep Vein Thrombosis (DVT) Prevention:  Goal: Patient will participate in Venous Thrombosis (VTE)/Deep Vein Thrombosis (DVT)Prevention Measures  Outcome: PROGRESSING AS EXPECTED  DVT prophylaxis in place. Ambulation, mobility, and frequent repositioning encouraged. Patient educated about DVT precautions and prevention.

## 2020-01-30 NOTE — PROGRESS NOTES
Called to bedside for hypotension. MAP 53. Repositioned patient and bp cuff- no change in bp. No change in neuro status at this time. Patient has levophed gtt ordered for HD only. Dr. Uriarte called and informed of situation. Ordered to start levophed gtt.  Levophed started at 0755. BP improved to a map of 65 and eventually 109. Levophed gtt on standby at this time. Will bring up situation in rounds and with neurology.

## 2020-01-30 NOTE — CARE PLAN
Ventilator Daily Summary    Vent Day # 16    Ventilator settings changed this shift: none    Weaning trials: on hold (eeg)    Respiratory Procedures: none at this time    Plan: Continue current ventilator settings and wean mechanical ventilation as tolerated per physician orders.

## 2020-01-30 NOTE — PROGRESS NOTES
Hospital Neurology Progress Note:     Interval History: No acute events overnight. Unable to obtain ROS due to intubation.     Objective:   Vitals:    01/30/20 0915 01/30/20 0942 01/30/20 1000 01/30/20 1100   BP: 110/58  132/66 149/72   Pulse: 83 90 90 88   Resp: 14  15 15   Temp:   37.1 °C (98.8 °F)    TempSrc:   Temporal    SpO2: 100% 99% 100% 100%   Weight:       Height:           Labs:     Lab Results   Component Value Date/Time    PROTHROMBTM 14.5 01/29/2020 06:05 AM    INR 1.11 01/29/2020 06:05 AM      Lab Results   Component Value Date/Time    WBC 12.7 (H) 01/30/2020 04:37 AM    RBC 2.45 (L) 01/30/2020 04:37 AM    HEMOGLOBIN 8.2 (L) 01/30/2020 04:37 AM    HEMATOCRIT 26.3 (L) 01/30/2020 04:37 AM    .3 (H) 01/30/2020 04:37 AM    MCH 33.5 (H) 01/30/2020 04:37 AM    MCHC 31.2 (L) 01/30/2020 04:37 AM    MPV 9.6 01/30/2020 04:37 AM    NEUTSPOLYS 56.40 01/30/2020 04:37 AM    LYMPHOCYTES 25.80 01/30/2020 04:37 AM    MONOCYTES 13.90 (H) 01/30/2020 04:37 AM    EOSINOPHILS 1.50 01/30/2020 04:37 AM    BASOPHILS 0.80 01/30/2020 04:37 AM    HYPOCHROMIA 1+ 01/30/2020 04:37 AM    ANISOCYTOSIS 1+ 01/30/2020 04:37 AM      Lab Results   Component Value Date/Time    SODIUM 137 01/30/2020 04:37 AM    POTASSIUM 4.8 01/30/2020 04:37 AM    CHLORIDE 104 01/30/2020 04:37 AM    CO2 25 01/30/2020 04:37 AM    GLUCOSE 161 (H) 01/30/2020 04:37 AM    BUN 60 (H) 01/30/2020 04:37 AM    CREATININE 1.28 01/30/2020 04:37 AM      Lab Results   Component Value Date/Time    CHOLSTRLTOT 79 02/07/2019 01:40 PM    LDL 34 02/07/2019 01:40 PM    HDL 23 (L) 02/07/2019 01:40 PM    TRIGLYCERIDE 95 01/27/2020 03:04 AM       Lab Results   Component Value Date/Time    ALKPHOSPHAT 36 01/20/2020 05:15 AM    ASTSGOT 23 01/20/2020 05:15 AM    ALTSGPT 7 01/20/2020 05:15 AM    TBILIRUBIN 0.7 01/20/2020 05:15 AM        Imaging/Testing:   EEG from 1/29/2020 through 1/30/2020 discussed with the interpreting neurologist and not show any seizure  activity.    Physical Exam:     General: Intubated 74-year-old female in bed no acute distress  Cardio: Normal S1/S2. No peripheral edema.   Pulm: CTAX2. No respiratory distress.   Skin: Warm, dry, no rashes or lesions   Psychiatric: Unable to assess  HEENT: Atraumatic head, normal sclera and conjunctiva, moist oral mucosa. No lid lag.  Abdomen: Soft, non tender. No masses or hepatosplenomegaly.    Neurologic:  Mental Status: Obtunded.  Unable to follow commands.  No speech to evaluate.  Cranial Nerves: Pupils equally round and unreactive to light.  Extraocular movements intact.  Face symmetric.  Motor: Normal muscle bulk with decreased tone throughout.  Withdraws to noxious stimuli throughout with the exception of the left upper extremity  Reflexes: Deferred  Coordination: Unable to assess  Sensation: No withdrawal to noxious stimulus  Gait/Station: Unable to assess    Assessment/Plan:    Cassandra Guzmán is a 74 y.o. female with history of CHF, right parietal stroke, diabetes, GERD, end-stage renal disease on dialysis, history of GI bleed, hypertension and history of ICH presenting on 1/16/2020 for seizures treated with Vimpat, Topamax and Keppra.  The patient has had a protracted hospital course during which she developed subclinical seizures.  This has been treated with a combination of multiple antiseizure medications including Keppra, Topamax and Vimpat as well as anesthetics (propofol and Versed).  No seizure activity has been seen over the past 48 hours and so Versed was discontinued.  At this time, while the patient's prognosis is overall poor given poor health status prior to intubation and mechanical ventilation which has been prolonged she has nevertheless stopped seizing.  Given that Versed has some distribution into lipids, it may take some time for the patient to wake up however at this time we will observe for any clinical improvement.    Plan:  1.  Decrease Keppra 500 mg twice daily  2.  Continue  Vimpat 300 mg twice daily  3.  Continue Topamax 200 mg twice daily  4.  Versed discontinued  5.  Discontinue video EEG monitoring  6.  Follow for pending labs  7.  We will attempt to have conversation with the patient's daughter today regarding overall prognosis.  8.  Plan discussed with consulting physician and patient's nurse.     Emigdio Lynn M.D., Diplomat of the American Board of Psychiatry and Neurology  Diplomat of [facility] Epilepsy Subspecialty   Assistant Clinical Professor, [facility] Neurology Consultant

## 2020-01-30 NOTE — CARE PLAN
Adult Ventilation Update    Total Vent Days: 16  APVCMV MODE, TV  32,  RR 14 ,PEEP  8 ,FIO2  30%    Patient Lines/Drains/Airways Status      Active Airway       Name: Placement date: Placement time: Site: Days:    Airway ETT Oral 7.5  01/14/20   --   Oral  16                    #FVC / Vital Capacity (liters) : (pt does not follow) (01/30/20 0502)  NIF (cm H2O) : (pt does not follow) (01/30/20 0502)  Rapid Shallow Breathing Index (RR/VT): 78 (01/30/20 0502)  Plateau Pressure (Q Shift): 20 (01/30/20 0639)  Static Compliance (ml / cm H2O): 34 (01/30/20 1401)    Patient failed trials because of Barriers to Wean: Other (Comments)(Continous EEG in progress) (01/29/20 0223)  Barriers to SBT Weaning Trial Stopped due to:: Pt weaned for 1 hour and returned to rest settings per protocol (01/30/20 0502)  Length of Weaning Trial Length of Weaning Trial (Hours): 1 (01/30/20 0502)    Cough: Productive (01/29/20 1834)  Sputum Amount: Small (01/30/20 1200)  Sputum Color: Tan (01/30/20 1200)  Sputum Consistency: Thick (01/30/20 1200)    Mobility  Level of Mobility: Level I (01/30/20 0800)  Activity Performed: Unable to mobilize (01/29/20 1600)  Assistance: Assistance of Two or More (01/29/20 2000)  Pt Calls for Assistance: No (01/29/20 0800)  Gait: Unable to Ambulate (01/30/20 1200)  Reason Not Mobilized: Unstable condition (01/30/20 0800)  Mobilization Comments: (Q2 Turns, ROM exercises) (01/29/20 2000)    Events/Summary/Plan: parameters attempted, pt placed back on rest settings (01/30/20 0502)

## 2020-01-30 NOTE — PROGRESS NOTES
Critical Care Progress Note    Date of admission  1/16/2020    Chief Complaint  status epilepticus and respiratory failure    Hospital Course    74 y.o. female who presented 1/16/2020 with PMH significant for ESRD on IHD since November 2019 M/W/F, HTN, HPL, CAD, admitted for first time witness seizure at SNF, transferred from [facility]. Had a second seizure with prolonged postictal state. Intubated. MRI brain with an acute right parietal lacunar infarct with overall brain parenchymal loss. Loaded with Keppra dilantin. LP was done with no pleocytosis. Was empirically treated with antibiotics. Code status FULL CODE.  S/p repeat LP 1/24. Cell count with WBC 39, but RBC 98037. Corrected WBC is normal. Other CSF labs are pending       Interval Problem Update  Reviewed last 24 hour events:  Vent day 15  Neuro exam remains poor  No evidence of seizure activity per neurology last 24 hours  Some oozing from PICC line treated with prolonged direct pressure  Versed weaned off today  Withdraws to noxious stimulation but does not follow any commands  PICC line scheduled for today or tomorrow  Dialysis today  Systolic blood pressures running somewhat high  Lites okay    Prior 24 hours  No further seizure activity reported for 24 to 40 hours  Mechanical ventilation day 14  Subclavian line day 12 subclavian line removed today  PICC line being placed  Neurology adjusting antiseizure medicines  Weaning Versed today watching for neurologic response  Remains on mechanical relation not a candidate for weaning given poor neurologic status and sedation  Electrolytes repleted  Family updated by neurology  Specialized CSF sent out studies remain pending please see neurology note      Prior 24 hours  Remains on ventilator, poorly responsive  Seizure activity seems to have decreased per neurology  Continues to receive dialysis  I discussed poor prognosis with family particular the patient's daughter today.  The patient's daughter  stated that no one had discussed with her prior that the patient had a poor prognosis for survival and return to independent function.  Discussed with neurology who feels prognosis is grim  Continuing aggressive medical support despite grave prognosis  Left subclavian line day 11 I asked nursing to remove that and replaced with a PICC line  Remains on Versed, propofol off for the time being  Blood pressure is unchanged  Palliative care helping to support the family  Patient's DPOA, the daughter, states that she remains very optimistic that patient will do well despite my medical input today that the prognosis recovery to independent function is grim  Neurology will likely speak to family about grim prognosis tomorrow    Prior 24 hour  Remains on continuous EEG   On propofol was at 20, versed 8mg/hr  On keppra 500 Q12H, locasamide 300 BID, topamax 200 BID  D/w Dr. Bustos about cEEG and pt appears well sedated, no seizure activity    Propofol is decreased to 10, later decreased to 0 because patient does not exhibit any seizures.     Afebrile, Tm 36.6C  SBP in 100-150s, HR in 90s  Remains on ventilatory support with FiO2 30%, PEEP 8.   Vent day #11. Was on vent x 2 days at OSH.    WBC 11.2  Hgb \8.4  plt 130  BG in 150s   On tube feed and tolerating.       Review of Systems  Review of Systems   Unable to perform ROS: Acuity of condition        Vital Signs for last 24 hours   Temp:  [36.6 °C (97.8 °F)-37.5 °C (99.5 °F)] 37.4 °C (99.3 °F)  Pulse:  [] 110  Resp:  [13-20] 16  BP: (122-176)/(58-86) 153/77  SpO2:  [99 %-100 %] 99 %    Hemodynamic parameters for last 24 hours       Respiratory Information for the last 24 hours  Zepeda Vent Mode: APVCMV  Rate (breaths/min): 14  Vt Target (mL): 320  PEEP/CPAP: 8  FiO2: 30  P Support: 5  P MEAN: 11  Length of Weaning Trial (Hours): 1  Control VTE (exp VT): 318    Physical Exam   Physical Exam  Vitals signs and nursing note reviewed.   Constitutional:       General: She is  not in acute distress.     Appearance: She is ill-appearing. She is not toxic-appearing.      Comments: Intubated, not awake to verbal     HENT:      Head: Normocephalic.      Mouth/Throat:      Comments: Et tube in place  Eyes:      Conjunctiva/sclera: Conjunctivae normal.   Cardiovascular:      Rate and Rhythm: Normal rate and regular rhythm.      Pulses: Normal pulses.      Heart sounds: No murmur.   Pulmonary:      Effort: Pulmonary effort is normal.      Breath sounds: Normal breath sounds. No wheezing or rales.      Comments: Diminished at bases  Abdominal:      General: There is no distension.      Palpations: Abdomen is soft.      Tenderness: There is no tenderness. There is no guarding.      Comments: Hypoactive bowel sound   Musculoskeletal:      Right lower leg: Edema present.      Comments: + edema in upper extremities   Skin:     General: Skin is warm and dry.      Capillary Refill: Capillary refill takes 2 to 3 seconds.      Findings: No bruising.   Neurological:      Comments: Baseline left-sided deficits from previous CVA  Opening eyes but not following commands         Medications  Current Facility-Administered Medications   Medication Dose Route Frequency Provider Last Rate Last Dose   • levETIRAcetam (KEPPRA) 750 mg in  mL IVPB  750 mg Intravenous Q12HRS Catracho Bustos M.D.   Stopped at 01/29/20 1739   • miconazole 2%-zinc oxide (INZO) topical cream   Topical BID Anurag Hein D.O.       • heparin injection 5,000 Units  5,000 Units Subcutaneous Q8HRS YORDY GiordanoOUli   Stopped at 01/28/20 1400   • vitamin B comp+C (ALLBEE WITH C) 1 Tab  1 Tab Enteral Tube DAILY YORDY GiordanoO.   1 Tab at 01/29/20 0556   • folic acid (FOLVITE) tablet 1 mg  1 mg Enteral Tube DAILY YORDY GiordanoO.   1 mg at 01/29/20 0554   • midodrine (PROAMATINE) tablet 5 mg  5 mg Enteral Tube PRN YORDY McgarryOUli   5 mg at 01/25/20 1304   • norepinephrine (LEVOPHED) 8 mg in  mL Infusion  0-30 mcg/min Intravenous  Continuous Anurag Hein, D.O.   Stopped at 01/27/20 1600   • allopurinol (ZYLOPRIM) tablet 100 mg  100 mg Enteral Tube DAILY Anurag Hein D.O.   100 mg at 01/29/20 0555   • nystatin (MYCOSTATIN) powder   Topical BID Anurag Hein D.O.       • midazolam (VERSED) premix 125 mg/125 mL NS  0-10 mg/hr Intravenous Continuous ALONSO Giordano.O.   Stopped at 01/29/20 1100   • lacosamide (VIMPAT) 300 mg in  mL ivpb  300 mg Intravenous BID Anurag Hein D.O.   Stopped at 01/29/20 1824   • LORazepam (ATIVAN) injection 1 mg  1 mg Intravenous Q3HRS PRN Catracho Bustos M.D.   1 mg at 01/20/20 1740   • hydrALAZINE (APRESOLINE) injection 10 mg  10 mg Intravenous Q4HRS PRN Anurag Hein D.OUli   10 mg at 01/29/20 1725   • topiramate (TOPAMAX) tablet 200 mg  200 mg Enteral Tube Q12HRS Catracho Bustos M.D.   200 mg at 01/29/20 1724   • aspirin (ASA) chewable tab 81 mg  81 mg Enteral Tube DAILY Josep You M.D.   Stopped at 01/29/20 0554   • atorvastatin (LIPITOR) tablet 40 mg  40 mg Enteral Tube DAILY Josep You M.D.   40 mg at 01/29/20 0554   • famotidine (PEPCID) tablet 20 mg  20 mg Enteral Tube DAILY Fletcher Willis M.D.   20 mg at 01/29/20 0554   • heparin injection 3,300 Units  3,300 Units Intracatheter PRN Lydia Carcamo M.D.   3,300 Units at 01/27/20 1643   • epoetin antoinette (EPOGEN/PROCRIT) injection 4,000 Units  4,000 Units Subcutaneous MO, WE + FR Payam Cavazos M.D.   4,000 Units at 01/29/20 1012   • Respiratory Care per Protocol   Nebulization Continuous RT Jeremy M Gonda, M.D.       • ipratropium-albuterol (DUONEB) nebulizer solution  3 mL Nebulization Q2HRS PRN (RT) Jeremy M Gonda, M.D.       • senna-docusate (PERICOLACE or SENOKOT S) 8.6-50 MG per tablet 2 Tab  2 Tab Enteral Tube BID Jeremy M Gonda, M.D.   Stopped at 01/25/20 0600    And   • polyethylene glycol/lytes (MIRALAX) PACKET 1 Packet  1 Packet Enteral Tube QDAY PRN Jeremy M Gonda, M.D.        And   • bisacodyl (DULCOLAX) suppository 10 mg  10 mg Rectal QDAY  PRN Jeremy M Gonda, M.D.       • lidocaine (XYLOCAINE) 1 % injection 1-2 mL  1-2 mL Tracheal Tube Q30 MIN PRN Jeremy M Gonda, M.D.       • Pharmacy Consult: Enteral tube insertion - review meds/change route/product selection  1 Each Other PHARMACY TO DOSE Jeremy M Gonda, M.D.       • fentaNYL (SUBLIMAZE) injection 25 mcg  25 mcg Intravenous Q HOUR PRN Jeremy M Gonda, M.D.   25 mcg at 01/16/20 2310    Or   • fentaNYL (SUBLIMAZE) injection 50 mcg  50 mcg Intravenous Q HOUR PRN Jeremy M Gonda, M.D.   50 mcg at 01/20/20 1555    Or   • fentaNYL (SUBLIMAZE) injection 100 mcg  100 mcg Intravenous Q HOUR PRN Jeremy M Gonda, M.D.   100 mcg at 01/21/20 1748   • ondansetron (ZOFRAN) syringe/vial injection 4 mg  4 mg Intravenous Q4HRS PRN Rina Matta M.D.   4 mg at 01/18/20 1214   • labetalol (NORMODYNE/TRANDATE) injection 10 mg  10 mg Intravenous Q4HRS PRN Rina Matta M.D.   10 mg at 01/20/20 1239   • acetaminophen (TYLENOL) tablet 650 mg  650 mg Enteral Tube Q6HRS PRN Jeremy M Gonda, M.D.       • ondansetron (ZOFRAN ODT) dispertab 4 mg  4 mg Enteral Tube Q4HRS PRN Jeremy M Gonda, M.D.           Fluids    Intake/Output Summary (Last 24 hours) at 1/29/2020 1918  Last data filed at 1/29/2020 1824  Gross per 24 hour   Intake 1492 ml   Output --   Net 1492 ml       Laboratory          Recent Labs     01/27/20  0304 01/28/20  0219 01/29/20  0605   SODIUM 132* 135 135   POTASSIUM 4.2 4.5 4.6   CHLORIDE 101 100 102   CO2 25 29 27   BUN 28* 21 43*   CREATININE 0.94 0.73 1.10   MAGNESIUM 1.7 1.8 1.8   PHOSPHORUS 4.5 2.4* 3.2   CALCIUM 8.6 9.0 9.5     Recent Labs     01/27/20  0304 01/28/20 0219 01/29/20  0605   PREALBUMIN  --  17.0*  --    GLUCOSE 89 109* 159*     Recent Labs     01/27/20  0304 01/28/20 0219 01/29/20  0605   WBC 12.3* 13.4* 11.5*   NEUTSPOLYS 54.00 54.20 59.70   LYMPHOCYTES 26.60 28.10 23.60   MONOCYTES 13.80* 13.00 12.10   EOSINOPHILS 1.90 1.40 1.40   BASOPHILS 0.60 0.80 1.00     Recent Labs     01/27/20 0304  01/28/20  0219 01/29/20  0605   RBC 2.60* 2.70* 2.68*   HEMOGLOBIN 8.9* 9.0* 9.0*   HEMATOCRIT 28.1* 29.1* 28.9*   PLATELETCT 139* 145* 167   PROTHROMBTM  --   --  14.5   INR  --   --  1.11     Called [facility] [phone] today (1/21) for update on the LP done on 1/15.   1/15 CSF Gram stain negative, Cx no growth to date (final)  1/15 CSF cell count was WBC 1, RBC 2. Total protein 45. Glucose 79  1/15 CSF HSV PCR negative  1/15 CSF paraneoplastic negative    Imaging  X-Ray:  I have personally reviewed the images and compared with prior images.    Assessment/Plan  * Status epilepticus (HCC)  Assessment & Plan  New onset, unclear etiology  On continuous EEG and her course with EEG as follows:  1/19 we attempted to keep pt off sedation. Off propofol and fentanyl. Antiseizures with vimpat, clonazepam and topamax.   1/19, captured evidence of subclinical seizures despite on current antiseizures.   Propofol gtt was started in the evening  1/20, spoke with Dr. Bustos, Neurology, and noted brief seizures overnight,  will continue propofol gtt and increased to 40mcg/kg/min  1/22, Pt still had intermittent brief seizure, propofol increased to 50. Vimpat increased to 300 BID. On topamax 200 BID  1/23, pt still having seizures. Versed gtt was started at 4mg/hr, propofol 50, vimpat 300 BID and topomax 200BID. Added keppra  1/24, attempted a trial off propofol, but failed. +seizure activity noted. She failed to wean. Propofol was back to 30 and we increased versed to 8mg/hr  1/25, propofol decreased to 20 and versed at 8  1/26 weaned off propofol and continue versed at 8    Continue vimpat 300 BID and topamax 200 BID, keppra 500 BID  Versed/ativan prn clinical seizures.   D/w Dr. Bustos, neurology      No seizure activity for 24 to 40 hours per neurology G.  Adjusting seizure medicines as recommended by neurology who are also decreasing the Versed which hopefully will make assessment of her new baseline  neurologic status more feasible.  However prior to this patient has had nearly 10 days of refractory seizures and this makes it difficult anticipate that she will have a good neurologic outcome .  I believe she has a poor outlook for return to independent function..      No seizures reported over the past 24 to 48 hours weaning Versed off continuing same other seizure medicines      Acute respiratory failure with hypoxia (HCC)  Assessment & Plan  Ventilator day 12, patient remains intubated was a poor mental status.  Prognosis for recovery is poor thus patient is not a good candidate for trach as trach would not be a bridge to helping her get better.  Continue palliative care discussions with family encouraged him to consider shifting goals to comfort measures only which seems medically appropriate indicated given her extremely poor prognosis.  Out of care is involved.  Neurology states they were also encourage family to adopt a comfort care approach rather continued medical aggressive intervention which is not improving her prognosis    Ventilator day 13 no major change.  Patient is ventilator dependent with poor mental status and sedation.  Ideally as we wean off sedative drugs as allowed by neurology undertake more focused efforts at spontaneous breathing trials.  However given her age and 2 weeks of decannulation with refractory seizures with status likelihood that she will successfully wean from mechanical ventilation seems poor at this time.    Vent day 15.  Patient still not extubated will terms of meeting criteria because of poor neurologic status.  Prognosis for return of function as noted before is poor given patient's age progression and progressive deterioration over the past several months and failure to improve neurologically on this admission.  Apparently family continues to remain optimistic.  I look forward to her for follow-up discussions with the family as well as neurology discussing prognosis  with family.  To me neurology verbally expresses extreme pessimism and grim prognosis for return to independent function    Severe protein-calorie malnutrition (HCC)  Assessment & Plan  Continue tube feeds at goal rate   Dietary following    Cerebrovascular accident (CVA) due to embolism of right middle cerebral artery (HCC)  Assessment & Plan  Continue high intensity statin, aspirin  PT/OT eval  Neurology following  Echocardiogram reviewed    End stage renal failure on dialysis ([facility])  Assessment & Plan  HD per nephrology  Renally dose medications  Monitor electrolytes  Had MRI contrast, dializying for 3 consecutive doses    Continue to get dialysis had dialysis today.  Continue to monitor follow closely    Patient now on dialysis schedule approximate 3 times a week per renal    Normocytic anemia- (present on admission)  Assessment & Plan  Daily CBC with conservative transfusion strategy, monitor for bleeding    Hypertension  Assessment & Plan  scheduled hydralazine, Isordil, labetalol  PRN IV labetalol, hydralazine for goal SBP less than 160    DM (diabetes mellitus) ([facility])- (present on admission)  Assessment & Plan  Insulin sliding scale  Hypoglycemia protocol  FS BS  Goal -180    Goals of care, counseling/discussion  Assessment & Plan  As noted above family states that no one had discussed with them today the patient is a poor prognosis with poor likelihood of survival and certainly very poor prognosis for return to independent function.  The patient's daughter who is the D POA states she remains optimistic the patient will have essentially a full recovery despite my medical input today.  Think it is important for team including palliative care and neurology to help family work through these issues.  Neurology tells me they feel prognosis is grim and they will talk further with the family tomorrow.  Given the patient's grim overall picture I do not think a tracheostomy would be a good idea.    Awaiting  neurology to reassess and discuss in more detail with family their prognosis.  Aside from neurologic issue 71-year-old lady newly on dialysis with long-term mechanical ventilation weakness poor nutrition coupled with a poor neurologic status is in a class of a poor likelihood of returning to independent function.  A tracheostomy will not affect this likelihood of return independent function.    Palliative care involved, continue discussions with family.  Unfortunately prognosis from the medical viewpoint is exceedingly grim by family mainly the patient's daughter continues to live note that she feels for her sake that is the daughter saying she cannot let her mother go.  We hope to have her daughter realized that the central person assist the patient and that we should consider function and quality of life for the patient if we continue to pursue aggressive artificial life support.      Pressure ulcer  Assessment & Plan  Stage II coccyx -present on arrival  Continue wound care    Hypomagnesemia  Assessment & Plan  Replace keep greater than 2    Gout- (present on admission)  Assessment & Plan  Continue allopurinol    Obesity (BMI 30-39.9)- (present on admission)  Assessment & Plan  Nutritionist consultation  Encourage behavioral and dietary modification once extubated for weight loss       VTE:  Heparin  Ulcer: H2 Antagonist  Lines: Central Line  Ongoing indication addressed and River Catheter  Ongoing indication addressed    I have performed a physical exam and reviewed and updated ROS and Plan today (1/29/2020). In review of yesterday's note (1/28/2020), there are no changes except as documented above.     Discussed patient condition and risk of morbidity and/or mortality with RN, RT, Pharmacy and nephrology and neurology     I have assessed  her respiratory status, hemodynamics, cardiovascular and neurological status.  Pt remains having seizures. . Need ongoing goal of care discussion with family to discuss about  goal of care.  High risk of deterioration and worsening vital organ dysfunction and death without the above critical care interventions.     The patient remains critically ill.  Continued and progressive multiorgan failure with renal failure respiratory failure neurologic failure.  Critical care time = 40 minutes in directly providing and coordinating critical care and extensive data review.  No time overlap and excludes procedures.

## 2020-01-30 NOTE — FLOWSHEET NOTE
01/30/20 0502   Weaning Parameters   RR (bpm) 20   #FVC / Vital Capacity (liters)    (pt does not follow)   NIF (cm H2O)    (pt does not follow)   Rapid Shallow Breathing Index (RR/VT) 78   Spontaneous VE 5.1   Spontaneous    Weaning Trial   Weaning Trial Stopped due to: Pt weaned for 1 hour and returned to rest settings per protocol   Length of Weaning Trial (Hours) 1

## 2020-01-31 NOTE — CARE PLAN
Ventilator Daily Summary    Vent Day # 17    Ventilator settings changed this shift: none    Weaning trials: none this shift    Respiratory Procedures: none at this time     Plan: Continue current ventilator settings and wean mechanical ventilation as tolerated per physician orders.

## 2020-01-31 NOTE — PROGRESS NOTES
Critical Care Progress Note    Date of admission  1/16/2020    Chief Complaint  status epilepticus and respiratory failure    Hospital Course    74 y.o. female who presented 1/16/2020 with PMH significant for ESRD on IHD since November 2019 M/W/F, HTN, HPL, CAD, admitted for first time witness seizure at SNF, transferred from [facility]. Had a second seizure with prolonged postictal state. Intubated. MRI brain with an acute right parietal lacunar infarct with overall brain parenchymal loss. Loaded with Keppra dilantin. LP was done with no pleocytosis. Was empirically treated with antibiotics. Code status FULL CODE.  S/p repeat LP 1/24. Cell count with WBC 39, but RBC [id number]. Corrected WBC is normal. Other CSF labs are pending       Interval Problem Update  Reviewed last 24 hour events:  Vent day 17 unable to extubate secondary to poor neuro status  Patient remains off all sedation for 2 to 3 days remains poorly responsive withdraws somewhat to noxious stimulation but does not follow any commands  No clinical seizure activity reported  Labs overall unchanged  Dialysis today  Continues to have PICC line  Remains on same seizure medicines per neurology  T-max 99 degrees degrees  RASS of -4  River BMS  Continues with tube feeds via core track  Sinus sinus tach stable vital signs      Prior 24 hours  Vent day 16  Neuro exam poor off all sedation 36 hours, withdraws, no spont, does not follow commands, assymetric  PICC line  Dialysis tomorrow  Neurology, Nephrology, Critical Care feel prognosis is poor  Family remains optimistic re: outcome  No further seizures reported  WBC 12K  Continuous EEG stopped by neurology  Neurology anticipates discussing neuro picture with family tomorrow    Prior 24 hours  Vent day 15  Neuro exam remains poor  No evidence of seizure activity per neurology last 24 hours  Some oozing from PICC line treated with prolonged direct pressure  Versed weaned off today  Withdraws to noxious  stimulation but does not follow any commands  PICC line scheduled for today or tomorrow  Dialysis today  Systolic blood pressures running somewhat high  Lites okay    Prior 24 hours  No further seizure activity reported for 24 to 40 hours  Mechanical ventilation day 14  Subclavian line day 12 subclavian line removed today  PICC line being placed  Neurology adjusting antiseizure medicines  Weaning Versed today watching for neurologic response  Remains on mechanical relation not a candidate for weaning given poor neurologic status and sedation  Electrolytes repleted  Family updated by neurology  Specialized CSF sent out studies remain pending please see neurology note      Prior 24 hours  Remains on ventilator, poorly responsive  Seizure activity seems to have decreased per neurology  Continues to receive dialysis  I discussed poor prognosis with family particular the patient's daughter today.  The patient's daughter stated that no one had discussed with her prior that the patient had a poor prognosis for survival and return to independent function.  Discussed with neurology who feels prognosis is grim  Continuing aggressive medical support despite grave prognosis  Left subclavian line day 11 I asked nursing to remove that and replaced with a PICC line  Remains on Versed, propofol off for the time being  Blood pressure is unchanged  Palliative care helping to support the family  Patient's DPOA, the daughter, states that she remains very optimistic that patient will do well despite my medical input today that the prognosis recovery to independent function is grim  Neurology will likely speak to family about grim prognosis tomorrow    Prior 24 hour  Remains on continuous EEG   On propofol was at 20, versed 8mg/hr  On keppra 500 Q12H, locasamide 300 BID, topamax 200 BID  D/w Dr. Bustos about cEEG and pt appears well sedated, no seizure activity    Propofol is decreased to 10, later decreased to 0 because patient does not  exhibit any seizures.     Afebrile, Tm 36.6C  SBP in 100-150s, HR in 90s  Remains on ventilatory support with FiO2 30%, PEEP 8.   Vent day #11. Was on vent x 2 days at OSH.    WBC 11.2  Hgb \8.4  plt 130  BG in 150s   On tube feed and tolerating.       Review of Systems  Review of Systems   Unable to perform ROS: Acuity of condition   Gastrointestinal: Positive for nausea.        Vital Signs for last 24 hours   Temp:  [37.1 °C (98.8 °F)-37.5 °C (99.5 °F)] (P) 37.2 °C (98.9 °F)  Pulse:  [] (P) 71  Resp:  [10-28] (P) 13  BP: (109-154)/(60-78) (P) 97/51  SpO2:  [94 %-100 %] (P) 100 %    Hemodynamic parameters for last 24 hours       Respiratory Information for the last 24 hours  Zepeda Vent Mode: APVCMV  Rate (breaths/min): 14  Vt Target (mL): 320  PEEP/CPAP: 8  FiO2: 30  P Support: 5  P MEAN: 10  Control VTE (exp VT): 335    Physical Exam   Physical Exam  Vitals signs and nursing note reviewed.   Constitutional:       General: She is not in acute distress.     Appearance: She is ill-appearing. She is not toxic-appearing.      Comments: Intubated, not awake to verbal     HENT:      Head: Normocephalic.      Mouth/Throat:      Comments: Et tube in place  Eyes:      Conjunctiva/sclera: Conjunctivae normal.   Cardiovascular:      Rate and Rhythm: Normal rate and regular rhythm.      Pulses: Normal pulses.      Heart sounds: No murmur.   Pulmonary:      Effort: Pulmonary effort is normal.      Breath sounds: Normal breath sounds. No wheezing or rales.      Comments: Diminished at bases  Abdominal:      General: There is no distension.      Palpations: Abdomen is soft.      Tenderness: There is no tenderness. There is no guarding.      Comments: Hypoactive bowel sound   Musculoskeletal:      Right lower leg: Edema present.      Comments: + edema in upper extremities   Skin:     General: Skin is warm and dry.      Capillary Refill: Capillary refill takes 2 to 3 seconds.      Findings: No bruising.   Neurological:       Comments: Baseline left-sided deficits from previous CVA  Opening eyes but not following commands         Medications  Current Facility-Administered Medications   Medication Dose Route Frequency Provider Last Rate Last Dose   • carboxymethylcellulose (REFRESH TEARS) 0.5 % ophthalmic drops 1 Drop  1 Drop Both Eyes Q2HRS PRN Tanner Louis M.D.   1 Drop at 01/31/20 0546   • levETIRAcetam (KEPPRA) 750 mg in  mL IVPB  750 mg Intravenous Q12HRS Catracho Bustos M.D.   Stopped at 01/31/20 0604   • miconazole 2%-zinc oxide (INZO) topical cream   Topical BID Anurag Hein D.O.       • heparin injection 5,000 Units  5,000 Units Subcutaneous Q8HRS ALONSO Giordano.O.   5,000 Units at 01/31/20 0545   • vitamin B comp+C (ALLBEE WITH C) 1 Tab  1 Tab Enteral Tube DAILY ALONSO Giordano.O.   1 Tab at 01/31/20 0544   • folic acid (FOLVITE) tablet 1 mg  1 mg Enteral Tube DAILY Anurag Hein D.O.   1 mg at 01/31/20 0545   • midodrine (PROAMATINE) tablet 5 mg  5 mg Enteral Tube PRN Maliha Patterson, D.O.   5 mg at 01/25/20 1304   • norepinephrine (LEVOPHED) 8 mg in  mL Infusion  0-30 mcg/min Intravenous Continuous Anurag Hein D.O.   Stopped at 01/27/20 1600   • allopurinol (ZYLOPRIM) tablet 100 mg  100 mg Enteral Tube DAILY Anurag Hein D.O.   100 mg at 01/31/20 0547   • nystatin (MYCOSTATIN) powder   Topical BID ALONSO Giordano.O.       • lacosamide (VIMPAT) 300 mg in  mL ivpb  300 mg Intravenous BID Anurag Hein D.O.   Stopped at 01/31/20 0720   • LORazepam (ATIVAN) injection 1 mg  1 mg Intravenous Q3HRS PRN Catracho Bustos M.D.   1 mg at 01/20/20 1740   • hydrALAZINE (APRESOLINE) injection 10 mg  10 mg Intravenous Q4HRS PRN Anurag Hein D.O.   10 mg at 01/29/20 1725   • topiramate (TOPAMAX) tablet 200 mg  200 mg Enteral Tube Q12HRS Catracho Bustos M.D.   200 mg at 01/31/20 0544   • aspirin (ASA) chewable tab 81 mg  81 mg Enteral Tube DAILY Josep You M.D.   81 mg at 01/31/20 0544   • atorvastatin (LIPITOR) tablet 40 mg  40  mg Enteral Tube DAILY Josep You M.D.   40 mg at 01/31/20 0545   • famotidine (PEPCID) tablet 20 mg  20 mg Enteral Tube DAILY Fletcher Willis M.D.   20 mg at 01/31/20 0544   • heparin injection 3,300 Units  3,300 Units Intracatheter PRN Lydia Carcamo M.D.   3,300 Units at 01/30/20 2016   • epoetin antoinette (EPOGEN/PROCRIT) injection 4,000 Units  4,000 Units Subcutaneous MO, WE + FR Payam Cavazos M.D.   Stopped at 01/31/20 0900   • Respiratory Care per Protocol   Nebulization Continuous RT Jeremy M Gonda, M.D.       • ipratropium-albuterol (DUONEB) nebulizer solution  3 mL Nebulization Q2HRS PRN (RT) Jeremy M Gonda, M.D.       • senna-docusate (PERICOLACE or SENOKOT S) 8.6-50 MG per tablet 2 Tab  2 Tab Enteral Tube BID Jeremy M Gonda, M.D.   Stopped at 01/30/20 1800    And   • polyethylene glycol/lytes (MIRALAX) PACKET 1 Packet  1 Packet Enteral Tube QDAY PRN Jeremy M Gonda, M.D.        And   • bisacodyl (DULCOLAX) suppository 10 mg  10 mg Rectal QDAY PRN Jeremy M Gonda, M.D.       • lidocaine (XYLOCAINE) 1 % injection 1-2 mL  1-2 mL Tracheal Tube Q30 MIN PRN Jeremy M Gonda, M.D.       • Pharmacy Consult: Enteral tube insertion - review meds/change route/product selection  1 Each Other PHARMACY TO DOSE Jeremy M Gonda, M.D.       • fentaNYL (SUBLIMAZE) injection 25 mcg  25 mcg Intravenous Q HOUR PRN Jeremy M Gonda, M.D.   25 mcg at 01/16/20 2310    Or   • fentaNYL (SUBLIMAZE) injection 50 mcg  50 mcg Intravenous Q HOUR PRN Jeremy M Gonda, M.D.   50 mcg at 01/20/20 1555    Or   • fentaNYL (SUBLIMAZE) injection 100 mcg  100 mcg Intravenous Q HOUR PRN Jeremy M Gonda, M.D.   100 mcg at 01/21/20 1748   • ondansetron (ZOFRAN) syringe/vial injection 4 mg  4 mg Intravenous Q4HRS PRN Rina Matta M.D.   4 mg at 01/18/20 1214   • labetalol (NORMODYNE/TRANDATE) injection 10 mg  10 mg Intravenous Q4HRS PRN Rina Matta M.D.   10 mg at 01/20/20 1239   • acetaminophen (TYLENOL) tablet 650 mg  650 mg Enteral Tube Q6HRS  PRN Jeremy M Gonda, M.D.       • ondansetron (ZOFRAN ODT) dispertab 4 mg  4 mg Enteral Tube Q4HRS PRN Jeremy M Gonda, M.D.           Fluids    Intake/Output Summary (Last 24 hours) at 1/31/2020 1133  Last data filed at 1/31/2020 1000  Gross per 24 hour   Intake 2140 ml   Output 2500 ml   Net -360 ml       Laboratory          Recent Labs     01/29/20  0605 01/30/20  0437 01/31/20  0046   SODIUM 135 137 133*   POTASSIUM 4.6 4.8 4.0   CHLORIDE 102 104 100   CO2 27 25 29   BUN 43* 60* 35*   CREATININE 1.10 1.28 0.82   MAGNESIUM 1.8 1.9 1.8   PHOSPHORUS 3.2 3.6 2.2*   CALCIUM 9.5 9.3 8.8     Recent Labs     01/29/20  0605 01/30/20 0437 01/31/20  0046   GLUCOSE 159* 161* 162*     Recent Labs     01/29/20  0605 01/30/20  0437 01/31/20  0046   WBC 11.5* 12.7* 10.6   NEUTSPOLYS 59.70 56.40 51.70   LYMPHOCYTES 23.60 25.80 30.60   MONOCYTES 12.10 13.90* 13.60*   EOSINOPHILS 1.40 1.50 2.20   BASOPHILS 1.00 0.80 0.80     Recent Labs     01/29/20  0605 01/30/20 0437 01/31/20  0046   RBC 2.68* 2.45* 2.38*   HEMOGLOBIN 9.0* 8.2* 8.2*   HEMATOCRIT 28.9* 26.3* 25.2*   PLATELETCT 167 154* 127*   PROTHROMBTM 14.5  --   --    INR 1.11  --   --      Called [facility] [phone] today (1/21) for update on the LP done on 1/15.   1/15 CSF Gram stain negative, Cx no growth to date (final)  1/15 CSF cell count was WBC 1, RBC 2. Total protein 45. Glucose 79  1/15 CSF HSV PCR negative  1/15 CSF paraneoplastic negative    Imaging  X-Ray:  I have personally reviewed the images and compared with prior images.    Assessment/Plan  * Status epilepticus (HCC)  Assessment & Plan  New onset, unclear etiology  On continuous EEG and her course with EEG as follows:  1/19 we attempted to keep pt off sedation. Off propofol and fentanyl. Antiseizures with vimpat, clonazepam and topamax.   1/19, captured evidence of subclinical seizures despite on current antiseizures.   Propofol gtt was started in the evening  1/20, spoke with   Juli, Neurology, and noted brief seizures overnight,  will continue propofol gtt and increased to 40mcg/kg/min  1/22, Pt still had intermittent brief seizure, propofol increased to 50. Vimpat increased to 300 BID. On topamax 200 BID  1/23, pt still having seizures. Versed gtt was started at 4mg/hr, propofol 50, vimpat 300 BID and topomax 200BID. Added keppra  1/24, attempted a trial off propofol, but failed. +seizure activity noted. She failed to wean. Propofol was back to 30 and we increased versed to 8mg/hr  1/25, propofol decreased to 20 and versed at 8  1/26 weaned off propofol and continue versed at 8    Continue vimpat 300 BID and topamax 200 BID, keppra 500 BID  Versed/ativan prn clinical seizures.   D/w Dr. Bustos, neurology      No seizure activity for 24 to 40 hours per neurology G.  Adjusting seizure medicines as recommended by neurology who are also decreasing the Versed which hopefully will make assessment of her new baseline neurologic status more feasible.  However prior to this patient has had nearly 10 days of refractory seizures and this makes it difficult anticipate that she will have a good neurologic outcome .  I believe she has a poor outlook for return to independent function..      No seizures reported over the past 24 to 48 hours weaning Versed off continuing same other seizure medicines    No seizures for 72 hours.  We have off Versed.  Neurology is managing the antiseizure medicines which will be continued for now.  Neurology also stopping the continuous EEG.      Acute respiratory failure with hypoxia (HCC)  Assessment & Plan  Ventilator day 12, patient remains intubated was a poor mental status.  Prognosis for recovery is poor thus patient is not a good candidate for trach as trach would not be a bridge to helping her get better.  Continue palliative care discussions with family encouraged him to consider shifting goals to comfort measures only which seems medically appropriate indicated  given her extremely poor prognosis.  Out of care is involved.  Neurology states they were also encourage family to adopt a comfort care approach rather continued medical aggressive intervention which is not improving her prognosis    Ventilator day 13 no major change.  Patient is ventilator dependent with poor mental status and sedation.  Ideally as we wean off sedative drugs as allowed by neurology undertake more focused efforts at spontaneous breathing trials.  However given her age and 2 weeks of decannulation with refractory seizures with status likelihood that she will successfully wean from mechanical ventilation seems poor at this time.    Vent day 15.  Patient still not extubated will terms of meeting criteria because of poor neurologic status.  Prognosis for return of function as noted before is poor given patient's age progression and progressive deterioration over the past several months and failure to improve neurologically on this admission.  Apparently family continues to remain optimistic.  I look forward to her for follow-up discussions with the family as well as neurology discussing prognosis with family.  To me neurology verbally expresses extreme pessimism and grim prognosis for return to independent function    Vent day 16.  From a neurologic standpoint patient far too weak and feeble for a reasonable trial at extubation.  Family remains optimistic of the medically nephrology neurology and myself critical care and the team are very pessimistic about her chance of survival return to any kind of independent function.  Family persist in their optimism we will reevaluate for tracheostomy.  Neurology anticipates talking with family tomorrow about neuro prognosis.  I continue to update the family on a periodic basis about medical progress or lack of progress    Severe protein-calorie malnutrition (HCC)  Assessment & Plan  Continue tube feeds at goal rate   Dietary following    Cerebrovascular accident  (CVA) due to embolism of right middle cerebral artery ([facility])  Assessment & Plan  Continue high intensity statin, aspirin  PT/OT eval  Neurology following  Echocardiogram reviewed    End stage renal failure on dialysis ([facility])  Assessment & Plan  HD per nephrology  Renally dose medications  Monitor electrolytes  Had MRI contrast, dializying for 3 consecutive doses    Continue to get dialysis had dialysis today.  Continue to monitor follow closely    Patient now on dialysis schedule approximate 3 times a week per renal    Anticipate dialysis tomorrow.  Patient is now on a Monday Wednesday Friday schedule    Normocytic anemia- (present on admission)  Assessment & Plan  Daily CBC with conservative transfusion strategy, monitor for bleeding    Hypertension  Assessment & Plan  scheduled hydralazine, Isordil, labetalol  PRN IV labetalol, hydralazine for goal SBP less than 160    DM (diabetes mellitus) ([facility])- (present on admission)  Assessment & Plan  Insulin sliding scale  Hypoglycemia protocol  FS BS  Goal -180    Goals of care, counseling/discussion  Assessment & Plan  As noted above family states that no one had discussed with them today the patient is a poor prognosis with poor likelihood of survival and certainly very poor prognosis for return to independent function.  The patient's daughter who is the D POA states she remains optimistic the patient will have essentially a full recovery despite my medical input today.  Think it is important for team including palliative care and neurology to help family work through these issues.  Neurology tells me they feel prognosis is grim and they will talk further with the family tomorrow.  Given the patient's grim overall picture I do not think a tracheostomy would be a good idea.    Awaiting neurology to reassess and discuss in more detail with family their prognosis.  Aside from neurologic issue 71-year-old lady newly on dialysis with long-term mechanical ventilation  weakness poor nutrition coupled with a poor neurologic status is in a class of a poor likelihood of returning to independent function.  A tracheostomy will not affect this likelihood of return independent function.    Palliative care involved, continue discussions with family.  Unfortunately prognosis from the medical viewpoint is exceedingly grim by family mainly the patient's daughter continues to live note that she feels for her sake that is the daughter saying she cannot let her mother go.  We hope to have her daughter realized that the central person assist the patient and that we should consider function and quality of life for the patient if we continue to pursue aggressive artificial life support.    As discussed above continued ongoing discussions with family.  This is difficult for the particular for the patient's daughter states that she herself feels she has remain optimistic and feels her mom to come back home to her.  Involved palliative care we have multiple consultants and the team talking to the patient daughter to help support her in this difficult.      Pressure ulcer  Assessment & Plan  Stage II coccyx -present on arrival  Continue wound care    Hypomagnesemia  Assessment & Plan  Replace keep greater than 2    Gout- (present on admission)  Assessment & Plan  Continue allopurinol    Obesity (BMI 30-39.9)- (present on admission)  Assessment & Plan  Nutritionist consultation  Encourage behavioral and dietary modification once extubated for weight loss       VTE:  Heparin  Ulcer: H2 Antagonist  Lines: Central Line  Ongoing indication addressed and River Catheter  Ongoing indication addressed    I have performed a physical exam and reviewed and updated ROS and Plan today (1/31/2020). In review of yesterday's note (1/30/2020), there are no changes except as documented above.     Discussed patient condition and risk of morbidity and/or mortality with RN, RT, Pharmacy and nephrology and neurology     I  have assessed  her respiratory status, hemodynamics, cardiovascular and neurological status.  Pt remains having seizures. . Need ongoing goal of care discussion with family to discuss about goal of care.  High risk of deterioration and worsening vital organ dysfunction and death without the above critical care interventions.     The patient remains critically ill.  Continued and progressive multiorgan failure with renal failure respiratory failure neurologic failure.  Critical care time = 30 minutes in directly providing and coordinating critical care and extensive data review.  No time overlap and excludes procedures.

## 2020-01-31 NOTE — CARE PLAN
Problem: Communication  Goal: The ability to communicate needs accurately and effectively will improve  Outcome: PROGRESSING AS EXPECTED   Patient white board updated. Patient updated on plan of care. All questions and concerns addressed.   Problem: Venous Thromboembolism (VTW)/Deep Vein Thrombosis (DVT) Prevention:  Goal: Patient will participate in Venous Thrombosis (VTE)/Deep Vein Thrombosis (DVT)Prevention Measures  Outcome: PROGRESSING AS EXPECTED  DVT prophylaxis in place. Ambulation, mobility, and frequent repositioning encouraged. Patient educated about DVT precautions and prevention.

## 2020-01-31 NOTE — CARE PLAN
Adult Ventilation Update    Total Vent Days: 17  APVCMV MODE, ,PEEP 8 FIO2 30%    Patient Lines/Drains/Airways Status      Active Airway       Name: Placement date: Placement time: Site: Days:    Airway ETT Oral 7.5  01/14/20   --   Oral  17                           Mobility  Level of Mobility: Level I (01/31/20 0800)  Activity Performed: Unable to mobilize (01/31/20 0800)  Assistance: Assistance of Two or More (01/31/20 0800)  Pt Calls for Assistance: No (01/29/20 0800)  Gait: Unable to Ambulate (01/31/20 0800)  Reason Not Mobilized: Unstable condition (01/31/20 0800)  Mobilization Comments: (RASS -4) (01/31/20 0800)    Events/Summary/Plan: parameters attempted, pt placed back on rest settings (01/30/20 0502)

## 2020-01-31 NOTE — DIETARY
Nutrition support weekly update:  Day 15 of admit.  Cassandra Guzmán is a 74 y.o. female with admitting DX of seizures, ventilatory respiratory failure, and ARF.  Tube feeding initiated on 1/17. Current TF via Cortrak is Impact Peptide 1.5 @ 40 mL/hr (final goal rate when Propofol is off), providing 1440 kcal, 90 gm protein, 134 gm CHO, and 739 mL of free water per day.     Assessment:  Weight from 1/19 was 65.5 kg via bed scale.  Please continue to obtain measured wt in order to monitor trends and assess nutritional status.  Per I/Os, pt is +9L.    Evaluation:   1. Pt has been tolerating TF @ goal.   2. Pt remains on the ventilator - vent day 17.  Poor neuro exam - Neurology to discuss with family.   3. Continues with stage 3 pressure injury to sacrum/coccyx - Impact is an appropriate formula to aide with wound healing.   4. Ongoing discussions regarding POC/GOC with MD(s).  5. Continues to receive HD M/W/F though did receive HD Thursday (1/30/20).   6. Labs: Sodium: 133, Glucose: 162, BUN: 35, Phosphorus: 2.2.  7. Meds: Epogen, Folvite, Allbee with C.  Levophed last given 1/27/20.  8. LBM: 1/31/20.  9. Current feeding remains appropriate at this time.     Malnutrition risk: N/A.     Recommendations/Plan:  1. Continue current TF formula and goal rate.  2. Fluids per MD.  3. Monitor weight - please obtain measured wt as feasible.    RD continues to follow.

## 2020-01-31 NOTE — PALLIATIVE CARE
Palliative Care follow-up  PC RN visited BS and received update from RN noting hopeful meeting with neurology today when daughter Belen arrived, but uncertain of the family's time of arrival today. Son Erick not currently at bedside. PC attempting to join the meeting today if family is present when the neurology team is available. Appreciate RN updating this RN if meeting is arranged.      Plan: join meeting for updates from neurology    Thank you for allowing Palliative Care to support this patient and family. Contact [phone] for additional assistance, change in patient status, or with any questions/concerns.

## 2020-01-31 NOTE — WOUND TEAM
Wound team note:   Pt seen for placement of BMS. MD order verified. Pt assessed, noted to be incontinent of loose brown stool. Stage 3 pressure injury to coccyx. Sphincter tone determined to be adequate. BMS placed without difficulty, 35 mL of tap water placed in retention balloon, irrigated with  60 mL warm tap water. Confirmed irrigant/stool output in tube -Yes. Nursing to irrigate q shift with 60 mL-120 mL warm tap water or until patent.

## 2020-01-31 NOTE — PROGRESS NOTES
Critical Care Progress Note    Date of admission  1/16/2020    Chief Complaint  status epilepticus and respiratory failure    Hospital Course    74 y.o. female who presented 1/16/2020 with PMH significant for ESRD on IHD since November 2019 M/W/F, HTN, HPL, CAD, admitted for first time witness seizure at SNF, transferred from [facility]. Had a second seizure with prolonged postictal state. Intubated. MRI brain with an acute right parietal lacunar infarct with overall brain parenchymal loss. Loaded with Keppra dilantin. LP was done with no pleocytosis. Was empirically treated with antibiotics. Code status FULL CODE.  S/p repeat LP 1/24. Cell count with WBC 39, but RBC [id number]. Corrected WBC is normal. Other CSF labs are pending       Interval Problem Update  Reviewed last 24 hour events:  Vent day 16  Neuro exam poor off all sedation 36 hours, withdraws, no spont, does not follow commands, assymetric  PICC line  Dialysis tomorrow  Neurology, Nephrology, Critical Care feel prognosis is poor  Family remains optimistic re: outcome  No further seizures reported  WBC 12K  Continuous EEG stopped by neurology  Neurology anticipates discussing neuro picture with family tomorrow    Prior 24 hours  Vent day 15  Neuro exam remains poor  No evidence of seizure activity per neurology last 24 hours  Some oozing from PICC line treated with prolonged direct pressure  Versed weaned off today  Withdraws to noxious stimulation but does not follow any commands  PICC line scheduled for today or tomorrow  Dialysis today  Systolic blood pressures running somewhat high  Lites okay    Prior 24 hours  No further seizure activity reported for 24 to 40 hours  Mechanical ventilation day 14  Subclavian line day 12 subclavian line removed today  PICC line being placed  Neurology adjusting antiseizure medicines  Weaning Versed today watching for neurologic response  Remains on mechanical relation not a candidate for weaning given poor  neurologic status and sedation  Electrolytes repleted  Family updated by neurology  Specialized CSF sent out studies remain pending please see neurology note      Prior 24 hours  Remains on ventilator, poorly responsive  Seizure activity seems to have decreased per neurology  Continues to receive dialysis  I discussed poor prognosis with family particular the patient's daughter today.  The patient's daughter stated that no one had discussed with her prior that the patient had a poor prognosis for survival and return to independent function.  Discussed with neurology who feels prognosis is grim  Continuing aggressive medical support despite grave prognosis  Left subclavian line day 11 I asked nursing to remove that and replaced with a PICC line  Remains on Versed, propofol off for the time being  Blood pressure is unchanged  Palliative care helping to support the family  Patient's DPOA, the daughter, states that she remains very optimistic that patient will do well despite my medical input today that the prognosis recovery to independent function is grim  Neurology will likely speak to family about grim prognosis tomorrow    Prior 24 hour  Remains on continuous EEG   On propofol was at 20, versed 8mg/hr  On keppra 500 Q12H, locasamide 300 BID, topamax 200 BID  D/w Dr. Bustos about cEEG and pt appears well sedated, no seizure activity    Propofol is decreased to 10, later decreased to 0 because patient does not exhibit any seizures.     Afebrile, Tm 36.6C  SBP in 100-150s, HR in 90s  Remains on ventilatory support with FiO2 30%, PEEP 8.   Vent day #11. Was on vent x 2 days at OSH.    WBC 11.2  Hgb \8.4  plt 130  BG in 150s   On tube feed and tolerating.       Review of Systems  Review of Systems   Unable to perform ROS: Acuity of condition        Vital Signs for last 24 hours   Temp:  [37 °C (98.6 °F)-37.5 °C (99.5 °F)] 37.2 °C (99 °F)  Pulse:  [] 100  Resp:  [13-31] 17  BP: ()/(45-78) 144/78  SpO2:  [94  %-100 %] 95 %    Hemodynamic parameters for last 24 hours       Respiratory Information for the last 24 hours  Zepeda Vent Mode: APVCMV  Rate (breaths/min): 14  Vt Target (mL): 320  PEEP/CPAP: 8  FiO2: 30  P Support: 5  P MEAN: 10  Length of Weaning Trial (Hours): 1  Control VTE (exp VT): 326    Physical Exam   Physical Exam  Vitals signs and nursing note reviewed.   Constitutional:       General: She is not in acute distress.     Appearance: She is ill-appearing. She is not toxic-appearing.      Comments: Intubated, not awake to verbal     HENT:      Head: Normocephalic.      Mouth/Throat:      Comments: Et tube in place  Eyes:      Conjunctiva/sclera: Conjunctivae normal.   Cardiovascular:      Rate and Rhythm: Normal rate and regular rhythm.      Pulses: Normal pulses.      Heart sounds: No murmur.   Pulmonary:      Effort: Pulmonary effort is normal.      Breath sounds: Normal breath sounds. No wheezing or rales.      Comments: Diminished at bases  Abdominal:      General: There is no distension.      Palpations: Abdomen is soft.      Tenderness: There is no tenderness. There is no guarding.      Comments: Hypoactive bowel sound   Musculoskeletal:      Right lower leg: Edema present.      Comments: + edema in upper extremities   Skin:     General: Skin is warm and dry.      Capillary Refill: Capillary refill takes 2 to 3 seconds.      Findings: No bruising.   Neurological:      Comments: Baseline left-sided deficits from previous CVA  Opening eyes but not following commands         Medications  Current Facility-Administered Medications   Medication Dose Route Frequency Provider Last Rate Last Dose   • carboxymethylcellulose (REFRESH TEARS) 0.5 % ophthalmic drops 1 Drop  1 Drop Both Eyes Q2HRS PRN Tanner Louis M.D.   1 Drop at 01/30/20 0400   • levETIRAcetam (KEPPRA) 750 mg in  mL IVPB  750 mg Intravenous Q12HRS Catracho Bustos M.D.   Stopped at 01/30/20 0615   • miconazole 2%-zinc oxide (INZO)  topical cream   Topical BID Anurag Hein D.O.       • heparin injection 5,000 Units  5,000 Units Subcutaneous Q8HRS ALONSO Giordano.O.   Stopped at 01/28/20 1400   • vitamin B comp+C (ALLBEE WITH C) 1 Tab  1 Tab Enteral Tube DAILY ALONSO Giordano.O.   1 Tab at 01/30/20 0602   • folic acid (FOLVITE) tablet 1 mg  1 mg Enteral Tube DAILY ALONSO Giordano.O.   1 mg at 01/30/20 0602   • midodrine (PROAMATINE) tablet 5 mg  5 mg Enteral Tube PRN Maliha Patterson D.OUli   5 mg at 01/25/20 1304   • norepinephrine (LEVOPHED) 8 mg in  mL Infusion  0-30 mcg/min Intravenous Continuous ALONSO Giordano.O.   Stopped at 01/27/20 1600   • allopurinol (ZYLOPRIM) tablet 100 mg  100 mg Enteral Tube DAILY ALONSO Giordano.O.   100 mg at 01/30/20 0602   • nystatin (MYCOSTATIN) powder   Topical BID ALONSO Giordano.O.       • lacosamide (VIMPAT) 300 mg in  mL ivpb  300 mg Intravenous BID ALONSO Giordano.O.   Stopped at 01/30/20 0720   • LORazepam (ATIVAN) injection 1 mg  1 mg Intravenous Q3HRS PRN Catracho Bustos M.D.   1 mg at 01/20/20 1740   • hydrALAZINE (APRESOLINE) injection 10 mg  10 mg Intravenous Q4HRS PRN Anurag Hein D.O.   10 mg at 01/29/20 1725   • topiramate (TOPAMAX) tablet 200 mg  200 mg Enteral Tube Q12HRS Catracho Bustos M.D.   200 mg at 01/30/20 0601   • aspirin (ASA) chewable tab 81 mg  81 mg Enteral Tube DAILY Josep You M.D.   81 mg at 01/30/20 0601   • atorvastatin (LIPITOR) tablet 40 mg  40 mg Enteral Tube DAILY Josep You M.D.   40 mg at 01/30/20 0602   • famotidine (PEPCID) tablet 20 mg  20 mg Enteral Tube DAILY Fletcher Willis M.D.   20 mg at 01/30/20 0601   • heparin injection 3,300 Units  3,300 Units Intracatheter PRN Lydia Carcamo M.D.   3,300 Units at 01/27/20 1643   • epoetin antoinette (EPOGEN/PROCRIT) injection 4,000 Units  4,000 Units Subcutaneous MO, WE + FR Payam Cavazos M.D.   4,000 Units at 01/29/20 1012   • Respiratory Care per Protocol   Nebulization Continuous RT Jeremy M Gonda, M.D.       •  ipratropium-albuterol (DUONEB) nebulizer solution  3 mL Nebulization Q2HRS PRN (RT) Jeremy M Gonda, M.D.       • senna-docusate (PERICOLACE or SENOKOT S) 8.6-50 MG per tablet 2 Tab  2 Tab Enteral Tube BID Jeremy M Gonda, M.D.   2 Tab at 01/30/20 0601    And   • polyethylene glycol/lytes (MIRALAX) PACKET 1 Packet  1 Packet Enteral Tube QDAY PRN Jeremy M Gonda, M.D.        And   • bisacodyl (DULCOLAX) suppository 10 mg  10 mg Rectal QDAY PRN Jeremy M Gonda, M.D.       • lidocaine (XYLOCAINE) 1 % injection 1-2 mL  1-2 mL Tracheal Tube Q30 MIN PRN Jeremy M Gonda, M.D.       • Pharmacy Consult: Enteral tube insertion - review meds/change route/product selection  1 Each Other PHARMACY TO DOSE Jeremy M Gonda, M.D.       • fentaNYL (SUBLIMAZE) injection 25 mcg  25 mcg Intravenous Q HOUR PRN Jeremy M Gonda, M.D.   25 mcg at 01/16/20 2310    Or   • fentaNYL (SUBLIMAZE) injection 50 mcg  50 mcg Intravenous Q HOUR PRN Jeremy M Gonda, M.D.   50 mcg at 01/20/20 1555    Or   • fentaNYL (SUBLIMAZE) injection 100 mcg  100 mcg Intravenous Q HOUR PRN Jeremy M Gonda, M.D.   100 mcg at 01/21/20 1748   • ondansetron (ZOFRAN) syringe/vial injection 4 mg  4 mg Intravenous Q4HRS PRN Rina Matta M.D.   4 mg at 01/18/20 1214   • labetalol (NORMODYNE/TRANDATE) injection 10 mg  10 mg Intravenous Q4HRS PRN Rina Matta M.D.   10 mg at 01/20/20 1239   • acetaminophen (TYLENOL) tablet 650 mg  650 mg Enteral Tube Q6HRS PRN Jeremy M Gonda, M.D.       • ondansetron (ZOFRAN ODT) dispertab 4 mg  4 mg Enteral Tube Q4HRS PRN Kenroy M Gonda, M.D.           Fluids    Intake/Output Summary (Last 24 hours) at 1/30/2020 1731  Last data filed at 1/30/2020 1600  Gross per 24 hour   Intake 1470 ml   Output --   Net 1470 ml       Laboratory          Recent Labs     01/28/20  0219 01/29/20  0605 01/30/20  0437   SODIUM 135 135 137   POTASSIUM 4.5 4.6 4.8   CHLORIDE 100 102 104   CO2 29 27 25   BUN 21 43* 60*   CREATININE 0.73 1.10 1.28   MAGNESIUM 1.8 1.8 1.9    PHOSPHORUS 2.4* 3.2 3.6   CALCIUM 9.0 9.5 9.3     Recent Labs     01/28/20 0219 01/29/20  0605 01/30/20  0437   PREALBUMIN 17.0*  --   --    GLUCOSE 109* 159* 161*     Recent Labs     01/28/20 0219 01/29/20  0605 01/30/20  0437   WBC 13.4* 11.5* 12.7*   NEUTSPOLYS 54.20 59.70 56.40   LYMPHOCYTES 28.10 23.60 25.80   MONOCYTES 13.00 12.10 13.90*   EOSINOPHILS 1.40 1.40 1.50   BASOPHILS 0.80 1.00 0.80     Recent Labs     01/28/20 0219 01/29/20 0605 01/30/20  0437   RBC 2.70* 2.68* 2.45*   HEMOGLOBIN 9.0* 9.0* 8.2*   HEMATOCRIT 29.1* 28.9* 26.3*   PLATELETCT 145* 167 154*   PROTHROMBTM  --  14.5  --    INR  --  1.11  --      Called [facility] [phone] today (1/21) for update on the LP done on 1/15.   1/15 CSF Gram stain negative, Cx no growth to date (final)  1/15 CSF cell count was WBC 1, RBC 2. Total protein 45. Glucose 79  1/15 CSF HSV PCR negative  1/15 CSF paraneoplastic negative    Imaging  X-Ray:  I have personally reviewed the images and compared with prior images.    Assessment/Plan  * Status epilepticus (HCC)  Assessment & Plan  New onset, unclear etiology  On continuous EEG and her course with EEG as follows:  1/19 we attempted to keep pt off sedation. Off propofol and fentanyl. Antiseizures with vimpat, clonazepam and topamax.   1/19, captured evidence of subclinical seizures despite on current antiseizures.   Propofol gtt was started in the evening  1/20, spoke with Dr. Bustos, Neurology, and noted brief seizures overnight,  will continue propofol gtt and increased to 40mcg/kg/min  1/22, Pt still had intermittent brief seizure, propofol increased to 50. Vimpat increased to 300 BID. On topamax 200 BID  1/23, pt still having seizures. Versed gtt was started at 4mg/hr, propofol 50, vimpat 300 BID and topomax 200BID. Added keppra  1/24, attempted a trial off propofol, but failed. +seizure activity noted. She failed to wean. Propofol was back to 30 and we increased versed to  8mg/hr  1/25, propofol decreased to 20 and versed at 8  1/26 weaned off propofol and continue versed at 8    Continue vimpat 300 BID and topamax 200 BID, keppra 500 BID  Versed/ativan prn clinical seizures.   D/w Dr. Bustos, neurology      No seizure activity for 24 to 40 hours per neurology G.  Adjusting seizure medicines as recommended by neurology who are also decreasing the Versed which hopefully will make assessment of her new baseline neurologic status more feasible.  However prior to this patient has had nearly 10 days of refractory seizures and this makes it difficult anticipate that she will have a good neurologic outcome .  I believe she has a poor outlook for return to independent function..      No seizures reported over the past 24 to 48 hours weaning Versed off continuing same other seizure medicines    No seizures for 72 hours.  We have off Versed.  Neurology is managing the antiseizure medicines which will be continued for now.  Neurology also stopping the continuous EEG.      Acute respiratory failure with hypoxia (HCC)  Assessment & Plan  Ventilator day 12, patient remains intubated was a poor mental status.  Prognosis for recovery is poor thus patient is not a good candidate for trach as trach would not be a bridge to helping her get better.  Continue palliative care discussions with family encouraged him to consider shifting goals to comfort measures only which seems medically appropriate indicated given her extremely poor prognosis.  Out of care is involved.  Neurology states they were also encourage family to adopt a comfort care approach rather continued medical aggressive intervention which is not improving her prognosis    Ventilator day 13 no major change.  Patient is ventilator dependent with poor mental status and sedation.  Ideally as we wean off sedative drugs as allowed by neurology undertake more focused efforts at spontaneous breathing trials.  However given her age and 2 weeks of  decannulation with refractory seizures with status likelihood that she will successfully wean from mechanical ventilation seems poor at this time.    Vent day 15.  Patient still not extubated will terms of meeting criteria because of poor neurologic status.  Prognosis for return of function as noted before is poor given patient's age progression and progressive deterioration over the past several months and failure to improve neurologically on this admission.  Apparently family continues to remain optimistic.  I look forward to her for follow-up discussions with the family as well as neurology discussing prognosis with family.  To me neurology verbally expresses extreme pessimism and grim prognosis for return to independent function    Vent day 16.  From a neurologic standpoint patient far too weak and feeble for a reasonable trial at extubation.  Family remains optimistic of the medically nephrology neurology and myself critical care and the team are very pessimistic about her chance of survival return to any kind of independent function.  Family persist in their optimism we will reevaluate for tracheostomy.  Neurology anticipates talking with family tomorrow about neuro prognosis.  I continue to update the family on a periodic basis about medical progress or lack of progress    Severe protein-calorie malnutrition (HCC)  Assessment & Plan  Continue tube feeds at goal rate   Dietary following    Cerebrovascular accident (CVA) due to embolism of right middle cerebral artery (HCC)  Assessment & Plan  Continue high intensity statin, aspirin  PT/OT eval  Neurology following  Echocardiogram reviewed    End stage renal failure on dialysis (HCC)  Assessment & Plan  HD per nephrology  Renally dose medications  Monitor electrolytes  Had MRI contrast, dializying for 3 consecutive doses    Continue to get dialysis had dialysis today.  Continue to monitor follow closely    Patient now on dialysis schedule approximate 3 times a  week per renal    Anticipate dialysis tomorrow.  Patient is now on a Monday Wednesday Friday schedule    Normocytic anemia- (present on admission)  Assessment & Plan  Daily CBC with conservative transfusion strategy, monitor for bleeding    Hypertension  Assessment & Plan  scheduled hydralazine, Isordil, labetalol  PRN IV labetalol, hydralazine for goal SBP less than 160    DM (diabetes mellitus) (HCC)- (present on admission)  Assessment & Plan  Insulin sliding scale  Hypoglycemia protocol  FS BS  Goal -180    Goals of care, counseling/discussion  Assessment & Plan  As noted above family states that no one had discussed with them today the patient is a poor prognosis with poor likelihood of survival and certainly very poor prognosis for return to independent function.  The patient's daughter who is the D POA states she remains optimistic the patient will have essentially a full recovery despite my medical input today.  Think it is important for team including palliative care and neurology to help family work through these issues.  Neurology tells me they feel prognosis is grim and they will talk further with the family tomorrow.  Given the patient's grim overall picture I do not think a tracheostomy would be a good idea.    Awaiting neurology to reassess and discuss in more detail with family their prognosis.  Aside from neurologic issue 71-year-old lady newly on dialysis with long-term mechanical ventilation weakness poor nutrition coupled with a poor neurologic status is in a class of a poor likelihood of returning to independent function.  A tracheostomy will not affect this likelihood of return independent function.    Palliative care involved, continue discussions with family.  Unfortunately prognosis from the medical viewpoint is exceedingly grim by family mainly the patient's daughter continues to live note that she feels for her sake that is the daughter saying she cannot let her mother go.  We hope to  have her daughter realized that the central person assist the patient and that we should consider function and quality of life for the patient if we continue to pursue aggressive artificial life support.    As discussed above continued ongoing discussions with family.  This is difficult for the particular for the patient's daughter states that she herself feels she has remain optimistic and feels her mom to come back home to her.  Involved palliative care we have multiple consultants and the team talking to the patient daughter to help support her in this difficult.      Pressure ulcer  Assessment & Plan  Stage II coccyx -present on arrival  Continue wound care    Hypomagnesemia  Assessment & Plan  Replace keep greater than 2    Gout- (present on admission)  Assessment & Plan  Continue allopurinol    Obesity (BMI 30-39.9)- (present on admission)  Assessment & Plan  Nutritionist consultation  Encourage behavioral and dietary modification once extubated for weight loss       VTE:  Heparin  Ulcer: H2 Antagonist  Lines: Central Line  Ongoing indication addressed and [facility] Catheter  Ongoing indication addressed    I have performed a physical exam and reviewed and updated ROS and Plan today (1/30/2020). In review of yesterday's note (1/29/2020), there are no changes except as documented above.     Discussed patient condition and risk of morbidity and/or mortality with RN, RT, Pharmacy and nephrology and neurology     I have assessed  her respiratory status, hemodynamics, cardiovascular and neurological status.  Pt remains having seizures. . Need ongoing goal of care discussion with family to discuss about goal of care.  High risk of deterioration and worsening vital organ dysfunction and death without the above critical care interventions.     The patient remains critically ill.  Continued and progressive multiorgan failure with renal failure respiratory failure neurologic failure.  Critical care time = 35 minutes in  directly providing and coordinating critical care and extensive data review.  No time overlap and excludes procedures.

## 2020-01-31 NOTE — PROGRESS NOTES
Pt exhibited new onset persistent twitching around her eyes. Dr Willis notified, Neurology notified. No new orders. Per neurology, pt has exhibited these symptoms previously and they were not associated w/ EEG activity.

## 2020-01-31 NOTE — PROGRESS NOTES
[facility] Nephrology Daily Progress Note    Date of Service  1/31/2020    AUTHOR: Rusty Montes M.D.    Chief Complaint  74 y.o. female admitted to [facility] on 1/14/20 with seizures.She was at a SNF.She had been previously hospitalized at [facility] and diaysis was initiated.  She was doing very poorly then and palliative care was discussed with the family,but they declined.She had very poor functional status and required Jimena lift to get into the dialysis chair.She was found to have  a CVA on MRI at [facility].Her seizures were not controlled and it was felt she was in status epilepticus.  She was getting HD at [facility].Last HD was on 1/13/20.She was seen by Dr Carcamo at [facility].Creatinine was 1.8 and dialysis was held. Neurology [place] that the patient needed continuous EEG monitoring and he was transferred to [facility]    Interval Problem Update  01/16/20 - Transferred to [facility].In ICU  01/17/20 - Intubated.Ventilated.On continuous EEG monitotring.On Norepinephrine,enteral feedings and Propofol.UOP 70 mL today.  01/18/20 - NAEO, sedation off and she responds to verbal stimuli; MRI being scheduled today  01/19/20 - NAEO, MRI yesterday showed acute CVA and multiple microhemorrhage areas variable chronicity  01/20/20 - 3/3 post gadolinium HD session today, tolerating off norepi  01/21/20 - tolerated HD, son reports she opened her eyes yesterday  01/22/20 - SBP 130s-160s, HD today  01/23/20 - SBP labile, 110s-170s  01/24/20 - SBP 90s-120s. ~6L positive for admission  01/25/20 - hypotension on HD yesterday, terminated early, HD planned for today, with midodrine  01/26/20 - hypotension again with HD, phos low today  01/27/20 - Getting HD when visited pt. Has been requiring pressor support during HD for hypotension. Subclavian line removed per ICU team, will replace with PICC. Plan for family meeting today. Phos increased from 1.5 to 4.5 this AM. Cr up to 0.94 from 0.69, BUN 17=>28  01/28/20 - No changes. K4.5, BUN 21, Scr  0.73. No HD today  01/29/20 - No changes in pt. Scr 1.1, BUN 43. Hold HD today, reassess tomorrow  01/30/20 - NAEO. No changes in pt. Off sedatives. Will try for HD today or tomorrow  01/31/20 - No changes in patient. Had continued eye twitching last night, does not correlate to EEG changes. Scr 0.8, BUN 35    Review of Systems   Unable to perform ROS: Acuity of condition     PAST FAMILY HISTORY: Reviewed and unchanged since admission  PAST SURGICAL HISTORY:  Reviewed and unchanged since admission  SOCIAL HISTORY:  Reviewed and unchanged since admission  FAMILY HISTORY: Reviewed and unchanged since admission  CURRENT MEDICATIONS: Reviewed since admission to present  IMAGING STUDIES: Reviewed since admission to present  LABORATORY STUDIES: Reviewed since admission to present    Physical Exam  Temp:  [37.1 °C (98.8 °F)-37.5 °C (99.5 °F)] (P) 37.2 °C (98.9 °F)  Pulse:  [] (P) 71  Resp:  [10-28] (P) 13  BP: (109-154)/(60-78) (P) 97/51  SpO2:  [94 %-100 %] (P) 100 %    Physical Exam  Vitals signs and nursing note reviewed.   Constitutional:       General: She is not in acute distress.     Appearance: Normal appearance. She is ill-appearing.   HENT:      Head: Normocephalic and atraumatic.      Right Ear: External ear normal.      Left Ear: External ear normal.      Nose: Nose normal. No congestion.      Mouth/Throat:      Mouth: Mucous membranes are moist.      Comments: ETT  Eyes:      General:         Right eye: No discharge.         Left eye: No discharge.      Conjunctiva/sclera: Conjunctivae normal.   Neck:      Musculoskeletal: Neck supple. No muscular tenderness.   Cardiovascular:      Rate and Rhythm: Normal rate and regular rhythm.      Heart sounds: Normal heart sounds.   Pulmonary:      Effort: Pulmonary effort is normal.      Breath sounds: Normal breath sounds.   Chest:      Chest wall: No tenderness.   Abdominal:      General: Bowel sounds are normal. There is no distension.      Palpations: Abdomen  is soft.   Musculoskeletal:         General: No tenderness or signs of injury.      Right lower leg: Edema present.      Left lower leg: Edema present.      Comments: anasarca   Skin:     General: Skin is warm and dry.      Findings: No rash.   Neurological:      Mental Status: She is alert.      Comments: Does not open eyes to loud voice, sedated   Psychiatric:         Mood and Affect: Mood normal.         Behavior: Behavior normal.       Fluids    Intake/Output Summary (Last 24 hours) at 1/31/2020 1159  Last data filed at 1/31/2020 1000  Gross per 24 hour   Intake 2140 ml   Output 2500 ml   Net -360 ml       Laboratory  Recent Labs     01/29/20  0605 01/30/20  0437 01/31/20  0046   WBC 11.5* 12.7* 10.6   RBC 2.68* 2.45* 2.38*   HEMOGLOBIN 9.0* 8.2* 8.2*   HEMATOCRIT 28.9* 26.3* 25.2*   .8* 107.3* 105.9*   MCH 33.6* 33.5* 34.5*   MCHC 31.1* 31.2* 32.5*   RDW 69.3* 69.0* 67.0*   PLATELETCT 167 154* 127*   MPV 9.6 9.6 9.3     Recent Labs     01/29/20  0605 01/30/20  0437 01/31/20  0046   SODIUM 135 137 133*   POTASSIUM 4.6 4.8 4.0   CHLORIDE 102 104 100   CO2 27 25 29   GLUCOSE 159* 161* 162*   BUN 43* 60* 35*   CREATININE 1.10 1.28 0.82   CALCIUM 9.5 9.3 8.8     Recent Labs     01/29/20  0605   INR 1.11     No results for input(s): NTPROBNP in the last 72 hours.         IMPRESSION:  # ESRD   MWF iHD as OP at [facility]   CrCl 6              Hypotension with HD/UF, subclavian line removed per ICU team, PICC line for pressors/meds  # Status epilepticus   MRI with amanda being requested  # S/P acute lacunar infarct   Hx of previous CVA with significant deficits.  # HTN  # DM  # VDRF  # Hx of dysphagia  # Anemia of CKD.Ferritin previously significantly elevated. 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  PLAN:  -Seizures management per Neurology--be sure meds are dosed to accommodate HD  -MWF and PRN iHD; will try for HD today (1/30) or tomorrow, should remove any residual sedatives and metabolites still on board  - UF as tolerated, concentrate IV meds as able, will likely require pressor support for HD  - midodrine 5mg q30min prior to HD for SBP<120 and 2 hours into HD if SBP<100  -Enteral feedings  -No dietary protein restrictions  - Monitor phos, lytes  -Epogen  -Follow labs  -Continue GOC discussions with family    Other management per primary team

## 2020-01-31 NOTE — PROGRESS NOTES
Hd treatment ordered by Dr Carcamo started at 1644 and ended at 2016 with net uf of 2000 ml as bp tolerated. Stable bp entire treatment.  Right IJ CVC patent with good BFR x 2. See flow sheet for details.

## 2020-01-31 NOTE — PROGRESS NOTES
Neurology Interval Note    Discussion with bedside RN Madeline Hines.     Off sedation for >36 hr. Now s/p HD. Witnessed to have twitching of her face. RN had not received report of similar semiology.      I am familiar with this case and recall earlier in the hospital course the pt having these symptoms with NO electrograpghic correlate. Ddx hemifacial spasm vs facial myoclonus.     Continue AED at currently prescribe d regimen. Further recommendations regarding mnmgt once seen in the AM by epileptologist, Dr. Lynn.     ROEL You MD  Neurology

## 2020-02-01 NOTE — CARE PLAN
Problem: Ventilation Defect:  Goal: Ability to achieve and maintain unassisted ventilation or tolerate decreased levels of ventilator support  Outcome: PROGRESSING SLOWER THAN EXPECTED   Adult Ventilation Update    Total Vent Days: 19    Patient Lines/Drains/Airways Status      Active Airway       Name: Placement date: Placement time: Site: Days:    Airway ETT Oral 7.5  01/14/20   --   Oral  19                #FVC / Vital Capacity (liters) : (pt does not follow) (02/01/20 0457)  NIF (cm H2O) : (pt does not follow) (02/01/20 0457)  Rapid Shallow Breathing Index (RR/VT): 72 (02/01/20 0457)    Barriers to SBT Weaning Trial Stopped due to:: Pt weaned for 1 hour and returned to rest settings per protocol (02/01/20 0457)    Sputum/Suction   Cough: Productive (02/01/20 1424)  Sputum Amount: Small (02/01/20 1424)  Sputum Color: Yellow;White (02/01/20 1424)  Sputum Consistency: Thick (02/01/20 1424)    Mobility  Activity Performed: Unable to mobilize (02/01/20 0800)  Reason Not Mobilized: Bed rest;Unstable condition (02/01/20 0800)  Mobilization Comments: (RASS -4) (01/31/20 0800)    Events/Summary/Plan: did 1 hour spont early am- does not follow. Now on eeg again so no afternon sbt

## 2020-02-01 NOTE — PROGRESS NOTES
EEG c/w return of sz. Loading w/ Keppra, increasing Keppra to 1,000mg BID. Converting EEG to cEEG.

## 2020-02-01 NOTE — FLOWSHEET NOTE
02/01/20 0457   Weaning Parameters   RR (bpm) 18   #FVC / Vital Capacity (liters)    (pt does not follow)   NIF (cm H2O)    (pt does not follow)   Rapid Shallow Breathing Index (RR/VT) 72   Spontaneous VE (!) 4.8   Spontaneous    Weaning Trial   Weaning Trial Stopped due to: Pt weaned for 1 hour and returned to rest settings per protocol   Length of Weaning Trial (Hours) 1

## 2020-02-01 NOTE — PALLIATIVE CARE
Palliative Care follow-up  PC RN visited BS; no family present at this time. Discussed with neurology and PMA noting no improvement in pt's situation. Off sedation and not following/responding. Per neurology, araseli discussion with dtr Belen last night. Call placed to Belen and message left requesting a call back to arrange a time to meet, suggested including all children (2 here, others in the [facility]) regarding next steps. Pt at point of needing plan for trach or not depending on family decision.      Plan: meeting when all children available    Thank you for allowing Palliative Care to support this patient and family. Contact [phone] for additional assistance, change in patient status, or with any questions/concerns.

## 2020-02-01 NOTE — PROCEDURES
VIDEO ELECTROENCEPHALOGRAM REPORT        Referring provider: Dr. Bobo.      DOS: 2/1/2020 (total recording of 18 hours and 14 minutes).      INDICATION:  Cassandra [place] 74 y.o. female presenting with recurrent seizures, h/o status epilepticus.      CURRENT ANTIEPILEPTIC REGIMEN: Lacosamide 300 mg q 12 hrs, Topiramate 200 mg bid, Levetiracetam increased to 1000 mg q 12 hrs. Lorazepam prn. Not sedated.      TECHNIQUE: 30 channel video electroencephalogram (EEG) was performed in accordance with the international 10-20 system. The study was reviewed in bipolar and referential montages. The recording examined an encephalopathic patient.       DESCRIPTION OF THE RECORD:  During the awake state, background fluctuates between 6-7 hz theta activity. There is no reactivity to eye closure. During the sleep state, generalized delta activity was noted. Several episodes of facial twitching captured, with associated myogenic artifact. There is slowing in the anterior regions, which may be briefly rhythmic at times. Rare triphasic waves noted. Although, no clear seizures captured, discrete seizures cannot be fully ruled out. The patient is not in status epilepticus.      ACTIVATION PROCEDURES:   Not performed.      EKG: sampling of the EKG recording demonstrated sinus rhythm.      EVENTS:  facial twitching.      INTERPRETATION:  This is an abnormal 24 hrs video EEG recording in an encephalopathic patient. A mild to moderate, toxic / metabolic encephalopathy is suggested. Several episodes of facial twitching captured, with associated myogenic artifact. There is slowing in the anterior regions, which may be briefly rhythmic at times. Rare triphasic waves noted. Although, no clear seizures captured, discrete seizures cannot be fully ruled out. The patient is not in status epilepticus. The findings do increase risk for seizures and suggest cortical irritability and/or structural abnormality. Clinical and radiological correlation  is recommended.     Updates provided to Dr. Bobo.         Catracho Bustos MD   Epilepsy and Neurodiagnostics.   Clinical  of Neurology [facility].   Diplomate in Neurology, Epilepsy, and Electrodiagnostic Medicine.   Office: [phone]  Fax: [phone]

## 2020-02-01 NOTE — PROGRESS NOTES
[facility] Nephrology Daily Progress Note    Date of Service  2/1/2020    AUTHOR: Lydia Carcamo M.D.    Chief Complaint  74 y.o. female admitted to [facility] on 1/14/20 with seizures.She was at a SNF.She had been previously hospitalized at [facility] and diaysis was initiated.  She was doing very poorly then and palliative care was discussed with the family,but they declined.She had very poor functional status and required Jimena lift to get into the dialysis chair.She was found to have  a CVA on MRI at [facility].Her seizures were not controlled and it was felt she was in status epilepticus.  She was getting HD at [facility].Last HD was on 1/13/20.She was seen by Dr Carcamo at [facility].Creatinine was 1.8 and dialysis was held. Neurology [place] that the patient needed continuous EEG monitoring and he was transferred to [facility]    Interval Problem Update  Please see note from 1/31 for prior summaries  2/01 - NAEO, another EEG being done this AM    Review of Systems   Unable to perform ROS: Acuity of condition     PAST FAMILY HISTORY: Reviewed and unchanged since admission  PAST SURGICAL HISTORY:  Reviewed and unchanged since admission  SOCIAL HISTORY:  Reviewed and unchanged since admission  FAMILY HISTORY: Reviewed and unchanged since admission  CURRENT MEDICATIONS: Reviewed since admission to present  IMAGING STUDIES: Reviewed since admission to present  LABORATORY STUDIES: Reviewed since admission to present    Physical Exam  Temp:  [35.9 °C (96.7 °F)-37.5 °C (99.5 °F)] 37.2 °C (98.9 °F)  Pulse:  [] 86  Resp:  [13-21] 15  BP: ()/(49-89) 148/71  SpO2:  [98 %-100 %] 100 %    Physical Exam  Vitals signs and nursing note reviewed.   Constitutional:       General: She is not in acute distress.     Appearance: Normal appearance. She is ill-appearing.   HENT:      Head: Normocephalic and atraumatic.      Right Ear: External ear normal.      Left Ear: External ear normal.      Nose: Nose normal. No congestion.       Mouth/Throat:      Mouth: Mucous membranes are moist.      Comments: ETT  Eyes:      General:         Right eye: No discharge.         Left eye: No discharge.      Conjunctiva/sclera: Conjunctivae normal.   Neck:      Musculoskeletal: Neck supple. No muscular tenderness.   Cardiovascular:      Rate and Rhythm: Normal rate and regular rhythm.      Heart sounds: Normal heart sounds.   Pulmonary:      Effort: Pulmonary effort is normal.      Breath sounds: Normal breath sounds.   Chest:      Chest wall: No tenderness.   Abdominal:      General: Bowel sounds are normal. There is no distension.      Palpations: Abdomen is soft.   Musculoskeletal:         General: No tenderness or signs of injury.      Right lower leg: Edema present.      Left lower leg: Edema present.      Comments: anasarca   Skin:     General: Skin is warm and dry.      Findings: No rash.   Neurological:      Mental Status: She is alert.      Comments: Does not open eyes to loud voice, sedated   Psychiatric:         Mood and Affect: Mood normal.         Behavior: Behavior normal.       Fluids    Intake/Output Summary (Last 24 hours) at 2/1/2020 1306  Last data filed at 2/1/2020 0800  Gross per 24 hour   Intake 1055 ml   Output 1060 ml   Net -5 ml       Laboratory  Recent Labs     01/30/20  0437 01/31/20  0046 02/01/20  0535   WBC 12.7* 10.6 12.9*   RBC 2.45* 2.38* 2.28*   HEMOGLOBIN 8.2* 8.2* 7.6*   HEMATOCRIT 26.3* 25.2* 24.7*   .3* 105.9* 108.3*   MCH 33.5* 34.5* 33.3*   MCHC 31.2* 32.5* 30.8*   RDW 69.0* 67.0* 66.9*   PLATELETCT 154* 127* 148*   MPV 9.6 9.3 9.9     Recent Labs     01/30/20  0437 01/31/20  0046 02/01/20  0535   SODIUM 137 133* 135   POTASSIUM 4.8 4.0 4.4   CHLORIDE 104 100 103   CO2 25 29 27   GLUCOSE 161* 162* 153*   BUN 60* 35* 65*   CREATININE 1.28 0.82 1.24   CALCIUM 9.3 8.8 9.1       IMPRESSION:  # ESRD   MWF iHD as OP at [facility]   CrCl 6              Hypotension with HD/UF, subclavian line removed per ICU team, PICC line  for pressors/meds  # Status epilepticus   MRI with amanda being requested  # S/P acute lacunar infarct   Hx of previous CVA with significant deficits.  # HTN  # DM  # VDRF  # Hx of dysphagia  # Anemia of CKD.Ferritin previously significantly elevated. 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  PLAN:  -Seizures management per Neurology--be sure meds are dosed to accommodate HD  -MWF iHD  -UF as tolerated  -Enteral feedings  -No dietary protein restrictions  -Epogen  -Follow labs  -Continue GOC discussions with family  -Very poor prognosis, recommend comfort care, if and when family agreeable    All prior notes form other doctors and RN staff were reviewed from admission to current day to help me make my clinical decisions.

## 2020-02-01 NOTE — PROGRESS NOTES
Critical Care Progress Note    Date of admission  1/16/2020    Chief Complaint  status epilepticus and respiratory failure    Hospital Course    74 y.o. female who presented 1/16/2020 with PMH significant for ESRD on IHD since November 2019 M/W/F, HTN, HPL, CAD, admitted for first time witness seizure at SNF, transferred from [facility]. Had a second seizure with prolonged postictal state. Intubated. MRI brain with an acute right parietal lacunar infarct with overall brain parenchymal loss. Loaded with Keppra, dilantin. LP was done with no pleocytosis. Was empirically treated with antibiotics. Code status FULL CODE.  S/p repeat LP 1/24. Cell count with WBC 39, but RBC 27157. Corrected WBC is normal. Other CSF labs are pending       Interval Problem Update  Reviewed last 24 hour events:  Vent day 18  Neurologic exam remains poor despite being off all sedating sedative medicines for 3+ days  Neurology talk with family and neurology reports that they advised family the prognosis was extremely poor  Family (daughter) remains optimistic  Modest to weak cough and gag  RASS of -4  Labs overall unchanged  River/BMS  Receiving tube feedings at goal rate  Nonspecific small movements to noxious stimulation does not follow commands does not withdraw      Vent day 17   unable to extubate secondary to poor neuro status  Patient remains off all sedation for 2 to 3 days remains poorly responsive withdraws somewhat to noxious stimulation but does not follow any commands  No clinical seizure activity reported  Labs overall unchanged  Dialysis today  Continues to have PICC line  Remains on same seizure medicines per neurology  T-max 99 degrees degrees  RASS of -4  River BMS  Continues with tube feeds via core track  Sinus sinus tach stable vital signs      Prior 24 hours  Vent day 16  Neuro exam poor off all sedation 36 hours, withdraws, no spont, does not follow commands, assymetric  PICC line  Dialysis tomorrow  Neurology,  Nephrology, Critical Care feel prognosis is poor  Family remains optimistic re: outcome  No further seizures reported  WBC 12K  Continuous EEG stopped by neurology  Neurology anticipates discussing neuro picture with family tomorrow    Prior 24 hours  Vent day 15  Neuro exam remains poor  No evidence of seizure activity per neurology last 24 hours  Some oozing from PICC line treated with prolonged direct pressure  Versed weaned off today  Withdraws to noxious stimulation but does not follow any commands  PICC line scheduled for today or tomorrow  Dialysis today  Systolic blood pressures running somewhat high  Lites okay    Prior 24 hours  No further seizure activity reported for 24 to 40 hours  Mechanical ventilation day 14  Subclavian line day 12 subclavian line removed today  PICC line being placed  Neurology adjusting antiseizure medicines  Weaning Versed today watching for neurologic response  Remains on mechanical relation not a candidate for weaning given poor neurologic status and sedation  Electrolytes repleted  Family updated by neurology  Specialized CSF sent out studies remain pending please see neurology note      Prior 24 hours  Remains on ventilator, poorly responsive  Seizure activity seems to have decreased per neurology  Continues to receive dialysis  I discussed poor prognosis with family particular the patient's daughter today.  The patient's daughter stated that no one had discussed with her prior that the patient had a poor prognosis for survival and return to independent function.  Discussed with neurology who feels prognosis is grim  Continuing aggressive medical support despite grave prognosis  Left subclavian line day 11 I asked nursing to remove that and replaced with a PICC line  Remains on Versed, propofol off for the time being  Blood pressure is unchanged  Palliative care helping to support the family  Patient's DPOA, the daughter, states that she remains very optimistic that patient  will do well despite my medical input today that the prognosis recovery to independent function is grim  Neurology will likely speak to family about grim prognosis tomorrow    Prior 24 hour  Remains on continuous EEG   On propofol was at 20, versed 8mg/hr  On keppra 500 Q12H, locasamide 300 BID, topamax 200 BID  D/w Dr. Bustos about cEEG and pt appears well sedated, no seizure activity    Propofol is decreased to 10, later decreased to 0 because patient does not exhibit any seizures.     Afebrile, Tm 36.6C  SBP in 100-150s, HR in 90s  Remains on ventilatory support with FiO2 30%, PEEP 8.   Vent day #11. Was on vent x 2 days at OSH.    WBC 11.2  Hgb \8.4  plt 130  BG in 150s   On tube feed and tolerating.       Review of Systems  Review of Systems   Unable to perform ROS: Acuity of condition   Gastrointestinal: Positive for nausea.        Vital Signs for last 24 hours   Temp:  [35.9 °C (96.7 °F)-37.5 °C (99.5 °F)] 37.2 °C (98.9 °F)  Pulse:  [] 85  Resp:  [13-21] 15  BP: ()/(49-89) 92/49  SpO2:  [98 %-100 %] 100 %    Hemodynamic parameters for last 24 hours       Respiratory Information for the last 24 hours  Zepeda Vent Mode: APVCMV  Rate (breaths/min): 14  Vt Target (mL): 320  PEEP/CPAP: 8  FiO2: 30  P Support: 5  P MEAN: 12  Length of Weaning Trial (Hours): 1  Control VTE (exp VT): 316    Physical Exam   Physical Exam  Vitals signs and nursing note reviewed.   Constitutional:       General: She is not in acute distress.     Appearance: She is ill-appearing. She is not toxic-appearing.      Comments: Intubated, not awake to verbal     HENT:      Head: Normocephalic.      Mouth/Throat:      Comments: Et tube in place  Eyes:      Conjunctiva/sclera: Conjunctivae normal.   Cardiovascular:      Rate and Rhythm: Normal rate and regular rhythm.      Pulses: Normal pulses.      Heart sounds: No murmur.   Pulmonary:      Effort: Pulmonary effort is normal.      Breath sounds: Normal breath sounds. No wheezing or  rales.      Comments: Diminished at bases  Abdominal:      General: There is no distension.      Palpations: Abdomen is soft.      Tenderness: There is no tenderness. There is no guarding.      Comments: Hypoactive bowel sound   Musculoskeletal:      Right lower leg: Edema present.      Comments: + edema in upper extremities   Skin:     General: Skin is warm and dry.      Capillary Refill: Capillary refill takes 2 to 3 seconds.      Findings: No bruising.   Neurological:      Comments: Baseline left-sided deficits from previous CVA  Opening eyes but not following commands         Medications  Current Facility-Administered Medications   Medication Dose Route Frequency Provider Last Rate Last Dose   • lacosamide (VIMPAT) tablet 300 mg  300 mg Enteral Tube BID Sergei Uriarte M.D.   300 mg at 02/01/20 0557   • levETIRAcetam (KEPPRA) tablet 500 mg  500 mg Enteral Tube BID Emigdio Lynn M.D.   500 mg at 02/01/20 0521   • carboxymethylcellulose (REFRESH TEARS) 0.5 % ophthalmic drops 1 Drop  1 Drop Both Eyes Q2HRS PRN Tanner Louis M.D.   1 Drop at 01/31/20 0546   • miconazole 2%-zinc oxide (INZO) topical cream   Topical BID Anurag Hein D.O.       • heparin injection 5,000 Units  5,000 Units Subcutaneous Q8HRS Anurag Hein D.O.   5,000 Units at 02/01/20 0521   • vitamin B comp+C (ALLBEE WITH C) 1 Tab  1 Tab Enteral Tube DAILY Anurag Hein D.O.   1 Tab at 02/01/20 0521   • folic acid (FOLVITE) tablet 1 mg  1 mg Enteral Tube DAILY Anurag Hein D.O.   1 mg at 02/01/20 0521   • midodrine (PROAMATINE) tablet 5 mg  5 mg Enteral Tube PRN YORDY McgarryOUli   5 mg at 01/25/20 1304   • allopurinol (ZYLOPRIM) tablet 100 mg  100 mg Enteral Tube DAILY Anurag Hein D.O.   100 mg at 02/01/20 0521   • nystatin (MYCOSTATIN) powder   Topical BID Anurag Hein D.O.       • LORazepam (ATIVAN) injection 1 mg  1 mg Intravenous Q3HRS PRN Catracho Bustos M.D.   1 mg at 01/20/20 9895   • hydrALAZINE (APRESOLINE) injection 10 mg  10 mg  Intravenous Q4HRS PRN Anurag Hein D.O.   10 mg at 01/31/20 1701   • topiramate (TOPAMAX) tablet 200 mg  200 mg Enteral Tube Q12HRS Catracho Bustos M.D.   200 mg at 02/01/20 0521   • aspirin (ASA) chewable tab 81 mg  81 mg Enteral Tube DAILY Josep You M.D.   81 mg at 02/01/20 0521   • atorvastatin (LIPITOR) tablet 40 mg  40 mg Enteral Tube DAILY Josep You M.D.   40 mg at 02/01/20 0521   • famotidine (PEPCID) tablet 20 mg  20 mg Enteral Tube DAILY Fletcher Willis M.D.   20 mg at 02/01/20 0521   • heparin injection 3,300 Units  3,300 Units Intracatheter PRN Lydia Carcamo M.D.   3,300 Units at 01/30/20 2016   • epoetin antoinette (EPOGEN/PROCRIT) injection 4,000 Units  4,000 Units Subcutaneous MO, WE + FR Payam Cavazos M.D.   Stopped at 01/31/20 0900   • Respiratory Care per Protocol   Nebulization Continuous RT Jeremy M Gonda, M.D.       • ipratropium-albuterol (DUONEB) nebulizer solution  3 mL Nebulization Q2HRS PRN (RT) Jeremy M Gonda, M.D.       • senna-docusate (PERICOLACE or SENOKOT S) 8.6-50 MG per tablet 2 Tab  2 Tab Enteral Tube BID Jeremy M Gonda, M.D.   Stopped at 02/01/20 0600    And   • polyethylene glycol/lytes (MIRALAX) PACKET 1 Packet  1 Packet Enteral Tube QDAY PRN Jeremy M Gonda, M.D.        And   • bisacodyl (DULCOLAX) suppository 10 mg  10 mg Rectal QDAY PRN Jeremy M Gonda, M.D.       • lidocaine (XYLOCAINE) 1 % injection 1-2 mL  1-2 mL Tracheal Tube Q30 MIN PRN Jeremy M Gonda, M.D.       • Pharmacy Consult: Enteral tube insertion - review meds/change route/product selection  1 Each Other PHARMACY TO DOSE Jeremy M Gonda, M.D.       • fentaNYL (SUBLIMAZE) injection 25 mcg  25 mcg Intravenous Q HOUR PRN Jeremy M Gonda, M.D.   25 mcg at 01/16/20 2310    Or   • fentaNYL (SUBLIMAZE) injection 50 mcg  50 mcg Intravenous Q HOUR PRN Jeremy M Gonda, M.D.   50 mcg at 01/20/20 1555    Or   • fentaNYL (SUBLIMAZE) injection 100 mcg  100 mcg Intravenous Q HOUR PRN Jeremy M Gonda, M.D.   100 mcg at  01/21/20 1748   • ondansetron (ZOFRAN) syringe/vial injection 4 mg  4 mg Intravenous Q4HRS PRN Rina Matta M.D.   4 mg at 01/18/20 1214   • labetalol (NORMODYNE/TRANDATE) injection 10 mg  10 mg Intravenous Q4HRS PRN Rina Matta M.D.   10 mg at 01/20/20 1239   • acetaminophen (TYLENOL) tablet 650 mg  650 mg Enteral Tube Q6HRS PRN Jeremy M Gonda, M.D.       • ondansetron (ZOFRAN ODT) dispertab 4 mg  4 mg Enteral Tube Q4HRS PRN Jeremy M Gonda, M.D.           Fluids    Intake/Output Summary (Last 24 hours) at 2/1/2020 1018  Last data filed at 2/1/2020 0800  Gross per 24 hour   Intake 1165 ml   Output 1060 ml   Net 105 ml       Laboratory          Recent Labs     01/30/20 0437 01/31/20 0046 02/01/20  0535   SODIUM 137 133* 135   POTASSIUM 4.8 4.0 4.4   CHLORIDE 104 100 103   CO2 25 29 27   BUN 60* 35* 65*   CREATININE 1.28 0.82 1.24   MAGNESIUM 1.9 1.8 1.8   PHOSPHORUS 3.6 2.2* 3.1   CALCIUM 9.3 8.8 9.1     Recent Labs     01/30/20 0437 01/31/20  0046 02/01/20  0535   GLUCOSE 161* 162* 153*     Recent Labs     01/30/20 0437 01/31/20  0046 02/01/20  0535   WBC 12.7* 10.6 12.9*   NEUTSPOLYS 56.40 51.70 64.30   LYMPHOCYTES 25.80 30.60 28.70   MONOCYTES 13.90* 13.60* 3.50   EOSINOPHILS 1.50 2.20 1.70   BASOPHILS 0.80 0.80 0.90     Recent Labs     01/30/20 0437 01/31/20  0046 02/01/20  0535   RBC 2.45* 2.38* 2.28*   HEMOGLOBIN 8.2* 8.2* 7.6*   HEMATOCRIT 26.3* 25.2* 24.7*   PLATELETCT 154* 127* 148*     Called [facility] [phone] today (1/21) for update on the LP done on 1/15.   1/15 CSF Gram stain negative, Cx no growth to date (final)  1/15 CSF cell count was WBC 1, RBC 2. Total protein 45. Glucose 79  1/15 CSF HSV PCR negative  1/15 CSF paraneoplastic negative    Imaging  X-Ray:  I have personally reviewed the images and compared with prior images.    Assessment/Plan  * Status epilepticus (HCC)  Assessment & Plan  New onset, unclear etiology  On continuous EEG and her course with EEG  as follows:  1/19 we attempted to keep pt off sedation. Off propofol and fentanyl. Antiseizures with vimpat, clonazepam and topamax.   1/19, captured evidence of subclinical seizures despite on current antiseizures.   Propofol gtt was started in the evening  1/20, spoke with Dr. Bustos, Neurology, and noted brief seizures overnight,  will continue propofol gtt and increased to 40mcg/kg/min  1/22, Pt still had intermittent brief seizure, propofol increased to 50. Vimpat increased to 300 BID. On topamax 200 BID  1/23, pt still having seizures. Versed gtt was started at 4mg/hr, propofol 50, vimpat 300 BID and topomax 200BID. Added keppra  1/24, attempted a trial off propofol, but failed. +seizure activity noted. She failed to wean. Propofol was back to 30 and we increased versed to 8mg/hr  1/25, propofol decreased to 20 and versed at 8  1/26 weaned off propofol and continue versed at 8    Continue vimpat 300 BID and topamax 200 BID, keppra 500 BID  Versed/ativan prn clinical seizures.   D/w Dr. Bustos, neurology      No seizure activity for 24 to 40 hours per neurology G.  Adjusting seizure medicines as recommended by neurology who are also decreasing the Versed which hopefully will make assessment of her new baseline neurologic status more feasible.  However prior to this patient has had nearly 10 days of refractory seizures and this makes it difficult anticipate that she will have a good neurologic outcome .  I believe she has a poor outlook for return to independent function..      No seizures reported over the past 24 to 48 hours weaning Versed off continuing same other seizure medicines    No seizures for 72 hours.  We have off Versed.  Neurology is managing the antiseizure medicines which will be continued for now.  Neurology also stopping the continuous EEG.    Remains seizure-free.  De-escalation of seizure medicines if possible is up to her neurology.  Patient is off all continuous infusions    Remains  seizure-free clinically 3 days after stopping all sedation.  De-escalation of seizure medicines per neurology      Acute respiratory failure with hypoxia (HCC)  Assessment & Plan  Ventilator day 12, patient remains intubated was a poor mental status.  Prognosis for recovery is poor thus patient is not a good candidate for trach as trach would not be a bridge to helping her get better.  Continue palliative care discussions with family encouraged him to consider shifting goals to comfort measures only which seems medically appropriate indicated given her extremely poor prognosis.  Out of care is involved.  Neurology states they were also encourage family to adopt a comfort care approach rather continued medical aggressive intervention which is not improving her prognosis    Ventilator day 13 no major change.  Patient is ventilator dependent with poor mental status and sedation.  Ideally as we wean off sedative drugs as allowed by neurology undertake more focused efforts at spontaneous breathing trials.  However given her age and 2 weeks of decannulation with refractory seizures with status likelihood that she will successfully wean from mechanical ventilation seems poor at this time.    Vent day 15.  Patient still not extubated will terms of meeting criteria because of poor neurologic status.  Prognosis for return of function as noted before is poor given patient's age progression and progressive deterioration over the past several months and failure to improve neurologically on this admission.  Apparently family continues to remain optimistic.  I look forward to her for follow-up discussions with the family as well as neurology discussing prognosis with family.  To me neurology verbally expresses extreme pessimism and grim prognosis for return to independent function    Vent day 16.  From a neurologic standpoint patient far too weak and feeble for a reasonable trial at extubation.  Family remains optimistic of the  medically nephrology neurology and myself critical care and the team are very pessimistic about her chance of survival return to any kind of independent function.  Family persist in their optimism we will reevaluate for tracheostomy.  Neurology anticipates talking with family tomorrow about neuro prognosis.  I continue to update the family on a periodic basis about medical progress or lack of progress    Vent day 17.  Patient has made no notable progress as far as extubation status in the past days.  If patient persists in their optimism and insistence on continuing on we will reluctantly request tracheostomy.  Neurology anticipates talking with the family about the dismal prognosis overall prognosis.  Palliative care is also involved    At 18 no notable changes.  Patient passes an SBT in terms of rapid shallow breathing index but may not have great airway protection.  1 potential choices a trial of extubation if family continues to push forward for trach.  Palliative care advises today that they will wait till they try to get hold of other family in the [facility] as apparently the patient's daughter who is here is not actually a designated the POA.  Also palliative care is recommending consider ethics consultation as well because of the concern that we are for trach and PEG and move onto LTAC we would be providing nonbeneficial care    Severe protein-calorie malnutrition (HCC)  Assessment & Plan  Continue tube feeds at goal rate   Dietary following    Cerebrovascular accident (CVA) due to embolism of right middle cerebral artery (HCC)  Assessment & Plan  Continue high intensity statin, aspirin  PT/OT eval  Neurology following  Echocardiogram reviewed    End stage renal failure on dialysis (HCC)  Assessment & Plan  HD per nephrology  Renally dose medications  Monitor electrolytes  Had MRI contrast, dializying for 3 consecutive doses    Continue to get dialysis had dialysis today.  Continue to monitor follow  closely    Patient now on dialysis schedule approximate 3 times a week per renal    Anticipate dialysis tomorrow.  Patient is now on a Monday Wednesday Friday schedule    Normocytic anemia- (present on admission)  Assessment & Plan  Daily CBC with conservative transfusion strategy, monitor for bleeding    Hypertension  Assessment & Plan  scheduled hydralazine, Isordil, labetalol  PRN IV labetalol, hydralazine for goal SBP less than 160    DM (diabetes mellitus) (HCC)- (present on admission)  Assessment & Plan  Insulin sliding scale  Hypoglycemia protocol  FS BS  Goal -180    Goals of care, counseling/discussion  Assessment & Plan  As noted above family states that no one had discussed with them today the patient is a poor prognosis with poor likelihood of survival and certainly very poor prognosis for return to independent function.  The patient's daughter who is the D POA states she remains optimistic the patient will have essentially a full recovery despite my medical input today.  Think it is important for team including palliative care and neurology to help family work through these issues.  Neurology tells me they feel prognosis is grim and they will talk further with the family tomorrow.  Given the patient's grim overall picture I do not think a tracheostomy would be a good idea.    Awaiting neurology to reassess and discuss in more detail with family their prognosis.  Aside from neurologic issue 71-year-old lady newly on dialysis with long-term mechanical ventilation weakness poor nutrition coupled with a poor neurologic status is in a class of a poor likelihood of returning to independent function.  A tracheostomy will not affect this likelihood of return independent function.    Palliative care involved, continue discussions with family.  Unfortunately prognosis from the medical viewpoint is exceedingly grim by family mainly the patient's daughter continues to live note that she feels for her sake  that is the daughter saying she cannot let her mother go.  We hope to have her daughter realized that the central person assist the patient and that we should consider function and quality of life for the patient if we continue to pursue aggressive artificial life support.    As discussed above continued ongoing discussions with family.  This is difficult for the particular for the patient's daughter states that she herself feels she has remain optimistic and feels her mom to come back home to her.  Involved palliative care we have multiple consultants and the team talking to the patient daughter to help support her in this difficult.    Continue to provide the patient family support and medical information to facilitate decision-making.  Unfortunately prognosis return to independent function remains dismal.  There is a large gap in the perception of nephrology neurology critical care in the ICU team and the prognosis and that of the patient's daughter.      Pressure ulcer  Assessment & Plan  Stage II coccyx -present on arrival  Continue wound care    Hypomagnesemia  Assessment & Plan  Replace keep greater than 2    Gout- (present on admission)  Assessment & Plan  Continue allopurinol    Obesity (BMI 30-39.9)- (present on admission)  Assessment & Plan  Nutritionist consultation  Encourage behavioral and dietary modification once extubated for weight loss       VTE:  Heparin  Ulcer: H2 Antagonist  Lines: Central Line  Ongoing indication addressed and River Catheter  Ongoing indication addressed    I have performed a physical exam and reviewed and updated ROS and Plan today (2/1/2020). In review of yesterday's note (1/31/2020), there are no changes except as documented above.     Discussed patient condition and risk of morbidity and/or mortality with RN, RT, Pharmacy and nephrology and neurology     I have assessed  her respiratory status, hemodynamics, cardiovascular and neurological status.  Pt remains having  seizures. . Need ongoing goal of care discussion with family to discuss about goal of care.  High risk of deterioration and worsening vital organ dysfunction and death without the above critical care interventions.     The patient remains critically ill.  Continued and progressive multiorgan failure with renal failure respiratory failure neurologic failure.  I discussed medical condition in detail with palliative care and neurology.  With the perception of all involved clinicians as it prognosis is grim.  However the daughter who is here states that she remains very optimistic that patient will have a great recovery.  Concern of the clinicians here in the ICU team is agree providing nonbeneficial care in terms of meeting the goal of patient returning to independent function.  As noted above palliative care is anticipates trying to reach out to the family living in the [facility] still and recommends considering ethics consultation depending on the discussions with the siblings.    Critical care time = 35 minutes in directly providing and coordinating critical care and extensive data review.  No time overlap and excludes procedures.

## 2020-02-01 NOTE — PROGRESS NOTES
Hospital Neurology Progress Note:     Interval History: No acute events overnight. Unable to obtain ROS due to intubation.  He is now having some twitching of the forehead and eyelids.    Objective:   Vitals:    02/01/20 0600 02/01/20 0606 02/01/20 0700 02/01/20 0800   BP: 122/60  128/65 (!) 92/49   Pulse: 94 85 82 85   Resp: (!) 21  14 15   Temp:    37.2 °C (98.9 °F)   TempSrc:    Temporal   SpO2: 99% 98% 100% 100%   Weight:       Height:           Labs:     Lab Results   Component Value Date/Time    PROTHROMBTM 14.5 01/29/2020 06:05 AM    INR 1.11 01/29/2020 06:05 AM      Lab Results   Component Value Date/Time    WBC 12.9 (H) 02/01/2020 05:35 AM    RBC 2.28 (L) 02/01/2020 05:35 AM    HEMOGLOBIN 7.6 (L) 02/01/2020 05:35 AM    HEMATOCRIT 24.7 (L) 02/01/2020 05:35 AM    .3 (H) 02/01/2020 05:35 AM    MCH 33.3 (H) 02/01/2020 05:35 AM    MCHC 30.8 (L) 02/01/2020 05:35 AM    MPV 9.9 02/01/2020 05:35 AM    NEUTSPOLYS 64.30 02/01/2020 05:35 AM    LYMPHOCYTES 28.70 02/01/2020 05:35 AM    MONOCYTES 3.50 02/01/2020 05:35 AM    EOSINOPHILS 1.70 02/01/2020 05:35 AM    BASOPHILS 0.90 02/01/2020 05:35 AM    HYPOCHROMIA 1+ 02/01/2020 05:35 AM    ANISOCYTOSIS 1+ 02/01/2020 05:35 AM      Lab Results   Component Value Date/Time    SODIUM 135 02/01/2020 05:35 AM    POTASSIUM 4.4 02/01/2020 05:35 AM    CHLORIDE 103 02/01/2020 05:35 AM    CO2 27 02/01/2020 05:35 AM    GLUCOSE 153 (H) 02/01/2020 05:35 AM    BUN 65 (H) 02/01/2020 05:35 AM    CREATININE 1.24 02/01/2020 05:35 AM      Lab Results   Component Value Date/Time    CHOLSTRLTOT 79 02/07/2019 01:40 PM    LDL 34 02/07/2019 01:40 PM    HDL 23 (L) 02/07/2019 01:40 PM    TRIGLYCERIDE 95 01/27/2020 03:04 AM       Lab Results   Component Value Date/Time    ALKPHOSPHAT 36 01/20/2020 05:15 AM    ASTSGOT 23 01/20/2020 05:15 AM    ALTSGPT 7 01/20/2020 05:15 AM    TBILIRUBIN 0.7 01/20/2020 05:15 AM        Imaging/Testing:   No new neuro imaging or testing to review    Physical Exam:      General: Intubated 74-year-old female in bed no acute distress  Cardio: Normal S1/S2. No peripheral edema.   Pulm: CTAX2. No respiratory distress.   Skin: Warm, dry, no rashes or lesions   Psychiatric: Unable to assess  HEENT: Atraumatic head, normal sclera and conjunctiva, moist oral mucosa. No lid lag.  Abdomen: Soft, non tender. No masses or hepatosplenomegaly.    Neurologic:  Mental Status: Obtunded.  Unable to follow commands.  No speech to evaluate.  Cranial Nerves: Pupils equally round and unreactive to light.  Extraocular movements intact.  Face symmetric.  Motor: Normal muscle bulk with decreased tone throughout.  Withdraws to noxious stimuli throughout with the exception of the left upper extremity.  Twitching of the bilateral eyelids was seen which appeared at times to be rhythmic  Reflexes: Deferred  Coordination: Unable to assess  Sensation: No withdrawal to noxious stimulus  Gait/Station: Unable to assess    Assessment/Plan:    Cassandra Guzmán is a 74 y.o. female with history of CHF, right parietal stroke, diabetes, GERD, end-stage renal disease on dialysis, history of GI bleed, hypertension and history of ICH presenting on 1/16/2020 for seizures treated with Vimpat, Topamax and Keppra.  The patient has had a protracted hospital course during which she developed subclinical seizures.  This has been treated with a combination of multiple antiseizure medications including Keppra, Topamax and Vimpat as well as anesthetics (propofol and Versed).      The patient was weaned off of Versed however has been slow to wake up.  There has been no significant change over the past 2 to 3 days.  I spoke to the patient's daughter who is the power of  and explained to her that the likelihood of the patient returning to baseline was low and that the likelihood of the patient returning to full independent function was essentially 0.  I also explained to her that at this point we are trying to see if the  "patient wakes up as there are no further seizures that we can tell to the best of our ability.  She states that her mother \"wants to fight\" and that these were her previously stated wishes and that in this regard she would be okay with a tracheostomy and a long-term rehab facility as she states that \"she knows the process will be slow\" but hopes that \"she can get better\".    Plan:  1.  Continue Keppra 500 mg twice daily  2.  Continue Vimpat 300 mg twice daily  3.  Continue Topamax 200 mg twice daily  4.  Obtaining spot video EEG  5.  Follow for pending labs  6.  Plan discussed with consulting physician and patient's nurse.     Emigdio Lynn M.D., Diplomat of the American Board of Psychiatry and Neurology  Diplomat of [employer] Epilepsy Subspecialty   Assistant Clinical Professor, [facility] Neurology Consultant    "

## 2020-02-01 NOTE — PROGRESS NOTES
Upon placement of hernandez catheter, 700 mls of light brown/green/yellow cloudy urine was returned.

## 2020-02-01 NOTE — CARE PLAN
Problem: Communication  Goal: The ability to communicate needs accurately and effectively will improve  Outcome: PROGRESSING SLOWER THAN EXPECTED     Problem: Bowel/Gastric:  Goal: Will not experience complications related to bowel motility  Outcome: PROGRESSING SLOWER THAN EXPECTED

## 2020-02-02 NOTE — PROGRESS NOTES
[facility] Nephrology Daily Progress Note    Date of Service  2/2/2020    AUTHOR: Lydia Carcamo M.D.    Chief Complaint  74 y.o. female admitted to [facility] on 1/14/20 with seizures.She was at a SNF.She had been previously hospitalized at [facility] and diaysis was initiated.  She was doing very poorly then and palliative care was discussed with the family,but they declined.She had very poor functional status and required Jimena lift to get into the dialysis chair.She was found to have  a CVA on MRI at [facility].Her seizures were not controlled and it was felt she was in status epilepticus.  She was getting HD at [facility].Last HD was on 1/13/20.She was seen by Dr Carcamo at [facility].Creatinine was 1.8 and dialysis was held. Neurology [place] that the patient needed continuous EEG monitoring and he was transferred to [facility]    Interval Problem Update  Please see note from 1/31 for prior summaries  2/01 - NAEO, another EEG being done this AM  2/02 - NAEO, scheduled for Trach today    Review of Systems   Unable to perform ROS: Acuity of condition     PAST FAMILY HISTORY: Reviewed and unchanged since admission  PAST SURGICAL HISTORY:  Reviewed and unchanged since admission  SOCIAL HISTORY:  Reviewed and unchanged since admission  FAMILY HISTORY: Reviewed and unchanged since admission  CURRENT MEDICATIONS: Reviewed since admission to present  IMAGING STUDIES: Reviewed since admission to present  LABORATORY STUDIES: Reviewed since admission to present    Physical Exam  Temp:  [36.6 °C (97.9 °F)-37.3 °C (99.1 °F)] 37 °C (98.6 °F)  Pulse:  [71-96] 80  Resp:  [7-27] 12  BP: ()/(50-83) 136/68  SpO2:  [99 %-100 %] 100 %    Physical Exam  Vitals signs and nursing note reviewed.   Constitutional:       General: She is not in acute distress.     Appearance: Normal appearance. She is ill-appearing.   HENT:      Head: Normocephalic and atraumatic.      Right Ear: External ear normal.      Left Ear: External ear normal.      Nose:  Nose normal. No congestion.      Mouth/Throat:      Mouth: Mucous membranes are moist.      Comments: ETT  Eyes:      General:         Right eye: No discharge.         Left eye: No discharge.      Conjunctiva/sclera: Conjunctivae normal.   Neck:      Musculoskeletal: Neck supple. No muscular tenderness.   Cardiovascular:      Rate and Rhythm: Normal rate and regular rhythm.      Heart sounds: Normal heart sounds.   Pulmonary:      Effort: Pulmonary effort is normal.      Breath sounds: Normal breath sounds.   Chest:      Chest wall: No tenderness.   Abdominal:      General: Bowel sounds are normal. There is no distension.      Palpations: Abdomen is soft.   Musculoskeletal:         General: No tenderness or signs of injury.      Right lower leg: Edema present.      Left lower leg: Edema present.      Comments: anasarca   Skin:     General: Skin is warm and dry.      Findings: No rash.   Neurological:      Mental Status: She is alert.      Comments: Does not open eyes to loud voice, sedated   Psychiatric:         Mood and Affect: Mood normal.         Behavior: Behavior normal.       Fluids    Intake/Output Summary (Last 24 hours) at 2/2/2020 1200  Last data filed at 2/2/2020 1200  Gross per 24 hour   Intake 1430 ml   Output 750 ml   Net 680 ml     Laboratory  Recent Labs     01/31/20  0046 02/01/20  0535 02/02/20  0530   WBC 10.6 12.9* 13.7*   RBC 2.38* 2.28* 2.36*   HEMOGLOBIN 8.2* 7.6* 8.1*   HEMATOCRIT 25.2* 24.7* 25.7*   .9* 108.3* 108.9*   MCH 34.5* 33.3* 34.3*   MCHC 32.5* 30.8* 31.5*   RDW 67.0* 66.9* 66.1*   PLATELETCT 127* 148* 188   MPV 9.3 9.9 9.8     Recent Labs     01/31/20  0046 02/01/20  0535 02/02/20  0530   SODIUM 133* 135 135   POTASSIUM 4.0 4.4 4.3   CHLORIDE 100 103 104   CO2 29 27 24   GLUCOSE 162* 153* 133*   BUN 35* 65* 77*   CREATININE 0.82 1.24 1.57*   CALCIUM 8.8 9.1 9.5     IMPRESSION:  # ESRD   MWF iHD as OP at [facility]   CrCl 6              Hypotension with HD/UF, subclavian line  removed per ICU team, PICC line for pressors/meds  # Status epilepticus   MRI with amanda being requested  # S/P acute lacunar infarct   Hx of previous CVA with significant deficits.  # HTN  # DM  # VDRF  # Hx of dysphagia  # Anemia of CKD.Ferritin previously significantly elevated. 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    PLAN:  -Seizures management per Neurology--be sure meds are dosed to accommodate HD  -MWF iHD  -UF as tolerated  -Trach today  -Enteral feedings  -No dietary protein restrictions  -Epogen  -Follow labs  -Continue GOC discussions with family  -Very poor prognosis, recommend comfort care, if and when family agreeable    All prior notes form other doctors and RN staff were reviewed from admission to current day to help me make my clinical decisions.

## 2020-02-02 NOTE — CARE PLAN
Problem: Infection  Goal: Will remain free from infection  Outcome: PROGRESSING AS EXPECTED  Note:    Family educated to use hand sanitzer upon entrance and exit of patient room and throughout hospital.     Daily AM Labs being monitored for increase WBC count.     Q2 hour temperature checks and monitoring for vital signs for cues to possible infection    River being cleaned every shift with soap and water.       Problem: Venous Thromboembolism (VTW)/Deep Vein Thrombosis (DVT) Prevention:  Goal: Patient will participate in Venous Thrombosis (VTE)/Deep Vein Thrombosis (DVT)Prevention Measures  Outcome: PROGRESSING AS EXPECTED  Note:   Pt wearing SCD's and receiving Heparin Q 8 subcut

## 2020-02-02 NOTE — CARE PLAN
Problem: Ventilation Defect:  Goal: Ability to achieve and maintain unassisted ventilation or tolerate decreased levels of ventilator support  Outcome: PROGRESSING SLOWER THAN EXPECTED   Vent day 20. SBT held due to continuous EEG.

## 2020-02-02 NOTE — PROCEDURES
DATE OF OPERATION: 2/2/2020     PREOPERATIVE DIAGNOSIS: Respiratory failure.     POSTOPERATIVE DIAGNOSIS: Same.     PROCEDURE PERFORMED: Percutaneous tracheostomy under bronchoscopic guidance.     SURGEON: Halima Devine MD, FACS     ASSISTANT AND ENDOSCOPIST: Jeremy Gonda, MD    ANESTHESIA: Intravenous sedation, analgesia, and pharmacologic restrain..     INDICATIONS: The patient is a 74 y.o. female with a acute on chronic respiratory failure. Percutaneous tracheostomy is carried out at the bedside under bronchoscopic guidance.     PROCEDURE: Following informed consent, the patient was properly identified and optimally positioned in bed. She was preoxygenated with 100% oxygen and place on a regular ventilatory rate. Intravenous sedation, analgesia, and pharmacologic restraint was administered.    The fiberoptic bronchoscope was advance through the indwelling endotracheal tube. The endotracheal tube was repositioned just above the vocal cords under direct vision. Satisfactory ventilation and delivered tidal volumes were confirmed. The anterior neck was transilluminated at the surface landmark site for the tracheostomy. Please see Dr Gonda's dictation for full details of the bronchoscopy.    The bronchoscope was withdrawn well back into the endotracheal tube. A Portex® ULTRAperc® Single Stage Dilator Technique Kit was employed. The trachea was cannulated in the midline with a 14 gauge angiocatheter midway between the thyroid cartilage and suprasternal notch. A flexible guidewire was passed without resistance. The scope was advanced distally and satisfactory cannulation and wire placement was confirmed.  A #7 Blue Line Ultra® Suctionaid tracheostomy tube was passed using Selldinger technique and secured to the skin with sutures and a tracheostomy tape.     The patient tolerated the procedure well. There were no apparent complications.    ____________________________________   HALIMA DEVINE MD     / [facility]   DD:  2/2/2020  11:49 AM

## 2020-02-02 NOTE — PROGRESS NOTES
Critical Care Progress Note    Date of admission  1/16/2020    Chief Complaint  status epilepticus and respiratory failure    Hospital Course    74 y.o. female who presented 1/16/2020 with PMH significant for ESRD on IHD since November 2019 M/W/F, HTN, HPL, CAD, admitted for first time witness seizure at SNF, transferred from [facility]. Had a second seizure with prolonged postictal state. Intubated. MRI brain with an acute right parietal lacunar infarct with overall brain parenchymal loss. Loaded with Keppra, dilantin. LP was done with no pleocytosis. Was empirically treated with antibiotics. Code status FULL CODE.  S/p repeat LP 1/24. Cell count with WBC 39, but RBC [id number]. Corrected WBC is normal. Other CSF labs are pending       Interval Problem Update  Reviewed last 24 hour events:  Tracheostomy placed today  Vent day 19  Neurologic exam remains poor- slightly more movement of lower extremities with more eye opening today  Neurology notes possible seizures last night seizure medicines adjusted  's vital signs unchanged  Initiating him or continuing spontaneous breathing trials.  We will  Will change over to trach collar tomorrow  LTAC referral made  Laboratories are essentially unremarkable  Receiving dialysis Monday Wednesday and Friday  Family remains optimistic about overall clinical picture and likelihood of return to independent function despite clinician input from nephrology neurology and critical care that prognosis is dismal    Prior 24 hours  Vent day 18  Neurologic exam remains poor despite being off all sedating sedative medicines for 3+ days  Neurology talk with family and neurology reports that they advised family the prognosis was extremely poor  Family (daughter) remains optimistic  Modest to weak cough and gag  RASS of -4  Labs overall unchanged  River/BMS  Receiving tube feedings at goal rate  Nonspecific small movements to noxious stimulation does not follow commands does not  withdraw      Vent day 17   unable to extubate secondary to poor neuro status  Patient remains off all sedation for 2 to 3 days remains poorly responsive withdraws somewhat to noxious stimulation but does not follow any commands  No clinical seizure activity reported  Labs overall unchanged  Dialysis today  Continues to have PICC line  Remains on same seizure medicines per neurology  T-max 99 degrees degrees  RASS of -4  River BMS  Continues with tube feeds via core track  Sinus sinus tach stable vital signs      Prior 24 hours  Vent day 16  Neuro exam poor off all sedation 36 hours, withdraws, no spont, does not follow commands, assymetric  PICC line  Dialysis tomorrow  Neurology, Nephrology, Critical Care feel prognosis is poor  Family remains optimistic re: outcome  No further seizures reported  WBC 12K  Continuous EEG stopped by neurology  Neurology anticipates discussing neuro picture with family tomorrow    Prior 24 hours  Vent day 15  Neuro exam remains poor  No evidence of seizure activity per neurology last 24 hours  Some oozing from PICC line treated with prolonged direct pressure  Versed weaned off today  Withdraws to noxious stimulation but does not follow any commands  PICC line scheduled for today or tomorrow  Dialysis today  Systolic blood pressures running somewhat high  Lites okay    Prior 24 hours  No further seizure activity reported for 24 to 40 hours  Mechanical ventilation day 14  Subclavian line day 12 subclavian line removed today  PICC line being placed  Neurology adjusting antiseizure medicines  Weaning Versed today watching for neurologic response  Remains on mechanical relation not a candidate for weaning given poor neurologic status and sedation  Electrolytes repleted  Family updated by neurology  Specialized CSF sent out studies remain pending please see neurology note      Prior 24 hours  Remains on ventilator, poorly responsive  Seizure activity seems to have decreased per  neurology  Continues to receive dialysis  I discussed poor prognosis with family particular the patient's daughter today.  The patient's daughter stated that no one had discussed with her prior that the patient had a poor prognosis for survival and return to independent function.  Discussed with neurology who feels prognosis is grim  Continuing aggressive medical support despite grave prognosis  Left subclavian line day 11 I asked nursing to remove that and replaced with a PICC line  Remains on Versed, propofol off for the time being  Blood pressure is unchanged  Palliative care helping to support the family  Patient's DPOA, the daughter, states that she remains very optimistic that patient will do well despite my medical input today that the prognosis recovery to independent function is grim  Neurology will likely speak to family about grim prognosis tomorrow    Prior 24 hour  Remains on continuous EEG   On propofol was at 20, versed 8mg/hr  On keppra 500 Q12H, locasamide 300 BID, topamax 200 BID  D/w Dr. Bustos about cEEG and pt appears well sedated, no seizure activity    Propofol is decreased to 10, later decreased to 0 because patient does not exhibit any seizures.     Afebrile, Tm 36.6C  SBP in 100-150s, HR in 90s  Remains on ventilatory support with FiO2 30%, PEEP 8.   Vent day #11. Was on vent x 2 days at OSH.    WBC 11.2  Hgb \8.4  plt 130  BG in 150s   On tube feed and tolerating.       Review of Systems  Review of Systems   Unable to perform ROS: Acuity of condition   Gastrointestinal: Positive for nausea.        Vital Signs for last 24 hours   Temp:  [35.8 °C (96.4 °F)-37.3 °C (99.1 °F)] 35.8 °C (96.4 °F)  Pulse:  [71-96] 85  Resp:  [7-27] 15  BP: ()/(50-83) 153/68  SpO2:  [99 %-100 %] 100 %    Hemodynamic parameters for last 24 hours       Respiratory Information for the last 24 hours  Zepeda Vent Mode: APVCMV  Rate (breaths/min): 14  Vt Target (mL): 320  PEEP/CPAP: 8  FiO2: 30  P MEAN:  11  Control VTE (exp VT): 322    Physical Exam   Physical Exam  Vitals signs and nursing note reviewed.   Constitutional:       General: She is not in acute distress.     Appearance: She is ill-appearing. She is not toxic-appearing.      Comments: Intubated, not awake to verbal     HENT:      Head: Normocephalic.      Mouth/Throat:      Comments: Et tube in place  Eyes:      Conjunctiva/sclera: Conjunctivae normal.   Cardiovascular:      Rate and Rhythm: Normal rate and regular rhythm.      Pulses: Normal pulses.      Heart sounds: No murmur.   Pulmonary:      Effort: Pulmonary effort is normal.      Breath sounds: Normal breath sounds. No wheezing or rales.      Comments: Diminished at bases  Abdominal:      General: There is no distension.      Palpations: Abdomen is soft.      Tenderness: There is no tenderness. There is no guarding.      Comments: Hypoactive bowel sound   Musculoskeletal:      Right lower leg: Edema present.      Comments: + edema in upper extremities   Skin:     General: Skin is warm and dry.      Capillary Refill: Capillary refill takes 2 to 3 seconds.      Findings: No bruising.   Neurological:      Comments: Baseline left-sided deficits from previous CVA  Opening eyes but not following commands         Medications  Current Facility-Administered Medications   Medication Dose Route Frequency Provider Last Rate Last Dose   • valproate (DEPACON) 500 mg in D5W 50 mL IVPB  500 mg Intravenous BID Emigdio Lynn M.D.       • PHENYLEPHRINE HCL-NACL 1-0.9 MG/10ML-% IV SOSY        Stopped at 02/02/20 1145   • lacosamide (VIMPAT) tablet 300 mg  300 mg Enteral Tube BID Sergei Uriarte M.D.   300 mg at 02/02/20 0522   • carboxymethylcellulose (REFRESH TEARS) 0.5 % ophthalmic drops 1 Drop  1 Drop Both Eyes Q2HRS PRN Tanner Louis M.D.   1 Drop at 01/31/20 0546   • miconazole 2%-zinc oxide (INZO) topical cream   Topical BID Anurag Hein D.O.       • heparin injection 5,000 Units  5,000 Units  Subcutaneous Q8HRS Anurag Hein D.O.   Stopped at 02/02/20 1400   • vitamin B comp+C (ALLBEE WITH C) 1 Tab  1 Tab Enteral Tube DAILY Anurag Hein D.O.   1 Tab at 02/02/20 0524   • folic acid (FOLVITE) tablet 1 mg  1 mg Enteral Tube DAILY Anurag Hein D.O.   1 mg at 02/02/20 0522   • midodrine (PROAMATINE) tablet 5 mg  5 mg Enteral Tube PRN Maliha Patterson, D.O.   5 mg at 01/25/20 1304   • allopurinol (ZYLOPRIM) tablet 100 mg  100 mg Enteral Tube DAILY Anurag Hein D.O.   100 mg at 02/02/20 0524   • nystatin (MYCOSTATIN) powder   Topical BID Anurag Hein D.O.       • LORazepam (ATIVAN) injection 1 mg  1 mg Intravenous Q3HRS PRN Catracho Bustos M.D.   1 mg at 02/01/20 1300   • hydrALAZINE (APRESOLINE) injection 10 mg  10 mg Intravenous Q4HRS PRN Anurag Hein D.O.   10 mg at 02/02/20 1406   • topiramate (TOPAMAX) tablet 200 mg  200 mg Enteral Tube Q12HRS Catracho Bustos M.D.   200 mg at 02/02/20 0524   • aspirin (ASA) chewable tab 81 mg  81 mg Enteral Tube DAILY Josep You M.D.   81 mg at 02/02/20 0523   • atorvastatin (LIPITOR) tablet 40 mg  40 mg Enteral Tube DAILY Josep You M.D.   40 mg at 02/02/20 0523   • famotidine (PEPCID) tablet 20 mg  20 mg Enteral Tube DAILY Fletcher Willis M.D.   20 mg at 02/02/20 0522   • heparin injection 3,300 Units  3,300 Units Intracatheter PRN Lydia Carcamo M.D.   3,300 Units at 01/30/20 2016   • epoetin antoinette (EPOGEN/PROCRIT) injection 4,000 Units  4,000 Units Subcutaneous MO, WE + FR Payam Cavazos M.D.   Stopped at 01/31/20 0900   • Respiratory Care per Protocol   Nebulization Continuous RT Jeremy M Gonda, M.D.       • ipratropium-albuterol (DUONEB) nebulizer solution  3 mL Nebulization Q2HRS PRN (RT) Jeremy M Gonda, M.D.       • senna-docusate (PERICOLACE or SENOKOT S) 8.6-50 MG per tablet 2 Tab  2 Tab Enteral Tube BID Jeremy M Gonda, M.D.   Stopped at 02/02/20 0600    And   • polyethylene glycol/lytes (MIRALAX) PACKET 1 Packet  1 Packet Enteral Tube QDAY PRN Kenroy PATINO  Gonda, M.D.        And   • bisacodyl (DULCOLAX) suppository 10 mg  10 mg Rectal QDAY PRN Jeremy M Gonda, M.D.       • lidocaine (XYLOCAINE) 1 % injection 1-2 mL  1-2 mL Tracheal Tube Q30 MIN PRN Jeremy M Gonda, M.D.       • Pharmacy Consult: Enteral tube insertion - review meds/change route/product selection  1 Each Other PHARMACY TO DOSE Jeremy M Gonda, M.D.       • fentaNYL (SUBLIMAZE) injection 25 mcg  25 mcg Intravenous Q HOUR PRN Jeremy M Gonda, M.D.   25 mcg at 01/16/20 2310    Or   • fentaNYL (SUBLIMAZE) injection 50 mcg  50 mcg Intravenous Q HOUR PRN Jeremy M Gonda, M.D.   50 mcg at 01/20/20 1555    Or   • fentaNYL (SUBLIMAZE) injection 100 mcg  100 mcg Intravenous Q HOUR PRN Jeremy M Gonda, M.D.   100 mcg at 01/21/20 1748   • ondansetron (ZOFRAN) syringe/vial injection 4 mg  4 mg Intravenous Q4HRS PRN Rina Matta M.D.   4 mg at 01/18/20 1214   • labetalol (NORMODYNE/TRANDATE) injection 10 mg  10 mg Intravenous Q4HRS PRN Rina Matta M.D.   10 mg at 01/20/20 1239   • acetaminophen (TYLENOL) tablet 650 mg  650 mg Enteral Tube Q6HRS PRN Jeremy M Gonda, M.D.       • ondansetron (ZOFRAN ODT) dispertab 4 mg  4 mg Enteral Tube Q4HRS PRN Jeremy M Gonda, M.D.           Fluids    Intake/Output Summary (Last 24 hours) at 2/2/2020 1431  Last data filed at 2/2/2020 1400  Gross per 24 hour   Intake 1320 ml   Output 850 ml   Net 470 ml       Laboratory          Recent Labs     01/31/20  0046 02/01/20  0535 02/02/20  0530   SODIUM 133* 135 135   POTASSIUM 4.0 4.4 4.3   CHLORIDE 100 103 104   CO2 29 27 24   BUN 35* 65* 77*   CREATININE 0.82 1.24 1.57*   MAGNESIUM 1.8 1.8 1.8   PHOSPHORUS 2.2* 3.1 3.8   CALCIUM 8.8 9.1 9.5     Recent Labs     01/31/20  0046 02/01/20  0535 02/02/20  0530   GLUCOSE 162* 153* 133*     Recent Labs     01/31/20 0046 02/01/20  0535 02/02/20  0530   WBC 10.6 12.9* 13.7*   NEUTSPOLYS 51.70 64.30 58.10   LYMPHOCYTES 30.60 28.70 26.60   MONOCYTES 13.60* 3.50 11.20   EOSINOPHILS 2.20 1.70 2.70    BASOPHILS 0.80 0.90 0.70     Recent Labs     01/31/20  0046 02/01/20  0535 02/02/20  0530   RBC 2.38* 2.28* 2.36*   HEMOGLOBIN 8.2* 7.6* 8.1*   HEMATOCRIT 25.2* 24.7* 25.7*   PLATELETCT 127* 148* 188     Called [facility] Microbiology [phone] today (1/21) for update on the LP done on 1/15.   1/15 CSF Gram stain negative, Cx no growth to date (final)  1/15 CSF cell count was WBC 1, RBC 2. Total protein 45. Glucose 79  1/15 CSF HSV PCR negative  1/15 CSF paraneoplastic negative    Imaging  X-Ray:  I have personally reviewed the images and compared with prior images.    Assessment/Plan  * Status epilepticus (HCC)  Assessment & Plan  Neurology reports 2 seizures last night.  Seizure medicines adjusted by neurology.  Continue to monitor continuous EEG reads were stopped again today by neurology      Acute respiratory failure with hypoxia (HCC)  Assessment & Plan  LAD 19.  Because daughter is very enthusiastic about patient's likely will return to independent function I obtained surgical consultation for trach.  Dr. Jessie Hernandez placed a trach uneventfully please see surgical note.  Starting today and tomorrow we will put patient on T-piece anticipate based on the spontaneous breathing trials the patient may no longer be vent dependent although we need to test.  Trach was placed because of sustained poor mental status.    Severe protein-calorie malnutrition (HCC)  Assessment & Plan  Continue tube feeds at goal rate   Dietary following    Cerebrovascular accident (CVA) due to embolism of right middle cerebral artery (HCC)  Assessment & Plan  Continue high intensity statin, aspirin  PT/OT eval  Neurology following  Echocardiogram reviewed    End stage renal failure on dialysis (HCC)  Assessment & Plan  Patient having dialysis Monday Wednesday Friday long-term, no anticipation renal return to renal renal function    Normocytic anemia- (present on admission)  Assessment & Plan  Daily CBC with conservative  transfusion strategy, monitor for bleeding    Hypertension  Assessment & Plan  scheduled hydralazine, Isordil, labetalol  PRN IV labetalol, hydralazine for goal SBP less than 160    DM (diabetes mellitus) (HCC)- (present on admission)  Assessment & Plan  Insulin sliding scale  Hypoglycemia protocol  FS BS  Goal -180    Goals of care, counseling/discussion  Assessment & Plan  Independent function and that is her expectation despite being cautioned by nephrology and neurology the critical care team and myself that that problem probably prognosis is not achievable. Prognosis for return to independent panel function remains grim.  Patient's daughter remains very optimistic despite being informed of the grim result medical prognosis.  I placed an LTAC referral tracheostomy should facilitate that referral may consider PEG at some other time but no urgency.  We appreciate palliative care's input      Pressure ulcer  Assessment & Plan  Stage II coccyx -present on arrival  Continue wound care    Hypomagnesemia  Assessment & Plan  Replace keep greater than 2    Gout- (present on admission)  Assessment & Plan  Continue allopurinol    Obesity (BMI 30-39.9)- (present on admission)  Assessment & Plan  Nutritionist consultation  Encourage behavioral and dietary modification once extubated for weight loss       VTE:  Heparin  Ulcer: H2 Antagonist  Lines: Central Line  Ongoing indication addressed and River Catheter  Ongoing indication addressed    I have performed a physical exam and reviewed and updated ROS and Plan today (2/2/2020). In review of yesterday's note (2/1/2020), there are no changes except as documented above.     Discussed patient condition and risk of morbidity and/or mortality with RN, RT, Pharmacy and nephrology and neurology     I have assessed  her respiratory status, hemodynamics, cardiovascular and neurological status.  Pt remains having seizures. . Need ongoing goal of care discussion with family to  discuss about goal of care.  High risk of deterioration and worsening vital organ dysfunction and death without the above critical care interventions.     The patient remains critically ill.  Continued and progressive multiorgan failure with renal failure respiratory failure neurologic failure.  I discussed medical condition in detail with palliative care and neurology.  With the perception of all involved clinicians as it prognosis is grim.  However the daughter who is here states that she remains very optimistic that patient will have a great recovery.  Concern of the clinicians here in the ICU team is agree providing nonbeneficial care in terms of meeting the goal of patient returning to independent function.  As noted above palliative care is anticipates trying to reach out to the family living in the [facility] still and recommends considering ethics consultation depending on the discussions with the siblings.    Critical care time = 40 minutes in directly providing and coordinating critical care and extensive data review.  No time overlap and excludes procedures.

## 2020-02-02 NOTE — PROGRESS NOTES
Hospital Neurology Progress Note:     Interval History: No acute events overnight. Unable to obtain ROS due to intubation.  Twitching of the forehead and eyelids is resolved.  EEG overnight did not show any further seizure activity.    Objective:   Vitals:    02/02/20 0700 02/02/20 0730 02/02/20 0800 02/02/20 0900   BP: (!) 97/51  122/57 125/66   Pulse: 88 86 86 88   Resp: 14  14 14   Temp:   37 °C (98.6 °F)    TempSrc:   Axillary    SpO2: 100% 100% 99% 99%   Weight:       Height:           Labs:     Lab Results   Component Value Date/Time    PROTHROMBTM 14.5 01/29/2020 06:05 AM    INR 1.11 01/29/2020 06:05 AM      Lab Results   Component Value Date/Time    WBC 13.7 (H) 02/02/2020 05:30 AM    RBC 2.36 (L) 02/02/2020 05:30 AM    HEMOGLOBIN 8.1 (L) 02/02/2020 05:30 AM    HEMATOCRIT 25.7 (L) 02/02/2020 05:30 AM    .9 (H) 02/02/2020 05:30 AM    MCH 34.3 (H) 02/02/2020 05:30 AM    MCHC 31.5 (L) 02/02/2020 05:30 AM    MPV 9.8 02/02/2020 05:30 AM    NEUTSPOLYS 58.10 02/02/2020 05:30 AM    LYMPHOCYTES 26.60 02/02/2020 05:30 AM    MONOCYTES 11.20 02/02/2020 05:30 AM    EOSINOPHILS 2.70 02/02/2020 05:30 AM    BASOPHILS 0.70 02/02/2020 05:30 AM    HYPOCHROMIA 1+ 02/01/2020 05:35 AM    ANISOCYTOSIS 1+ 02/01/2020 05:35 AM      Lab Results   Component Value Date/Time    SODIUM 135 02/02/2020 05:30 AM    POTASSIUM 4.3 02/02/2020 05:30 AM    CHLORIDE 104 02/02/2020 05:30 AM    CO2 24 02/02/2020 05:30 AM    GLUCOSE 133 (H) 02/02/2020 05:30 AM    BUN 77 (HH) 02/02/2020 05:30 AM    CREATININE 1.57 (H) 02/02/2020 05:30 AM      Lab Results   Component Value Date/Time    CHOLSTRLTOT 79 02/07/2019 01:40 PM    LDL 34 02/07/2019 01:40 PM    HDL 23 (L) 02/07/2019 01:40 PM    TRIGLYCERIDE 95 01/27/2020 03:04 AM       Lab Results   Component Value Date/Time    ALKPHOSPHAT 36 01/20/2020 05:15 AM    ASTSGOT 23 01/20/2020 05:15 AM    ALTSGPT 7 01/20/2020 05:15 AM    TBILIRUBIN 0.7 01/20/2020 05:15 AM        Imaging/Testing:   EEG from  2/1/2023 2/2/2020 was discussed with interpreting neurologist that no further seizure activity occurred since the beginning of the record.    Physical Exam:     General: Intubated 74-year-old female in bed no acute distress  Cardio: Normal S1/S2. No peripheral edema.   Pulm: CTAX2. No respiratory distress.   Skin: Warm, dry, no rashes or lesions   Psychiatric: Unable to assess  HEENT: Atraumatic head, normal sclera and conjunctiva, moist oral mucosa. No lid lag.  Abdomen: Soft, non tender. No masses or hepatosplenomegaly.    Neurologic:  Mental Status: Somnolent but appears awake. Unable to follow commands.  No speech to evaluate.  Cranial Nerves: Pupils equally round and unreactive to light.  Extraocular movements intact.  Face symmetric.  Motor: Normal muscle bulk with decreased tone throughout.  Withdraws to noxious stimuli throughout with the exception of the left upper extremity.    Reflexes: Deferred  Coordination: Unable to assess  Sensation: No withdrawal to noxious stimulus  Gait/Station: Unable to assess    Patient seen and examined on 2/2/2020 and compared to 120 twitching of the bilateral eyelids and forehead is improved.    Assessment/Plan:    Cassandra Guzmán is a 74 y.o. female with history of CHF, right parietal stroke, diabetes, GERD, end-stage renal disease on dialysis, history of GI bleed, hypertension and history of ICH presenting on 1/16/2020 for seizures treated with Vimpat, Topamax and Keppra.  The patient has had a protracted hospital course during which she developed subclinical seizures.  This has been treated with a combination of multiple antiseizure medications including Keppra, Topamax and Vimpat as well as anesthetics (propofol and Versed).      The patient was weaned off of Versed however has been slow to wake up.  There has been no significant change over the past 2 to 3 days.  I spoke to the patient's daughter who is the power of  and explained to her that the likelihood  "of the patient returning to baseline was low and that the likelihood of the patient returning to full independent function was essentially 0.  I also explained to her that at this point we are trying to see if the patient wakes up as there are no further seizures that we can tell to the best of our ability.  She states that her mother \"wants to fight\" and that these were her previously stated wishes and that in this regard she would be okay with a tracheostomy and a long-term rehab facility as she states that \"she knows the process will be slow\" but hopes that \"she can get better\".    Plan:  1.  Discontinue Keppra 500 mg twice daily  2.  Continue Vimpat 300 mg twice daily  3.  Continue Topamax 200 mg twice daily  4.  Discontinue video EEG  5.  Depacon 20 mg/kg load, followed by 500 mg twice daily.  This will replace Keppra as Keppra is filtered out when she has hemodialysis which means that the levels of the drug are somewhat unstable.  Hopefully, valproic acid would result in a better steady state.  6.  Plan discussed with consulting physician and patient's nurse.     Emigdio Lynn M.D., Diplomat of the American Board of Psychiatry and Neurology  Diplomat of [facility] Ep[facility]   Assistant Clinical Professor, [facility] Neurology Consultant    "

## 2020-02-02 NOTE — PROGRESS NOTES
Dr. Gonda and Dr. Devine at bedside for tracheostomy and bronch.  Patient pre-medicated per Dr. Devine.

## 2020-02-03 NOTE — PROGRESS NOTES
[facility] Services Progress Note     Hemodialysis treatment ordered today per Dr. Meneses x 3.5 hours.   Treatment initiated at 0938, ended at 1308.      Albumin 25% 25g IV given for BP support per MD order. See paper flow sheet for details. Pt stable post HD.     Net UF 2,000 mL.      Post tx, CVC flushed with saline then locked with heparin 1000 units/mL per designated amount in each wing then clamped and capped. Aspirate heparin prior to next CVC use.     Report given to Kiesha Hartley RN.

## 2020-02-03 NOTE — CARE PLAN
Problem: Ventilation Defect:  Goal: Ability to achieve and maintain unassisted ventilation or tolerate decreased levels of ventilator support  Note:   Vent day 20 Trach day 2  APVCMV  14  320  8  30%    Pt tolerating SBT at this time; minimal secretions

## 2020-02-03 NOTE — PROCEDURES
Procedure Note    Date: 2/2/2020  Time: 1130    Procedure: Bronchoscopy    Indication: Assistance to Dr. Dr. Devine with bedside percutaneous tracheostomy placement, persistent pulmonary infiltrates    Consent: Informed consent obtained from patient or designated decision maker after explaining the benefits/risks of the procedure including but not limited to bleeding, infection, airway trauma or loss therof, pneumothorax/hemothorax, arrhythmia, or death. Patient or surrogate expressed understanding and agreement.    Procedure: After obtaining consent, a time-out was performed. Respiratory therapy and nursing at bedside throughout procedure.  Patient provided sedation and analgesia throughout the procedure. Using a fiberoptic bronchoscope, trachea entered via 7.5 endotracheal tube ETT. Airways visualized directly bilaterally to the subsegmental airways. Bronchial washings performed in bilateral lungs. The bronchoscope was then placed into R main bronchus and held in place while ETT backed up through vocal cords by RT and held in place right above the cords. The bronchoscope then withdrawn to level of ETT tip to provide transillumination for the percutaneous tracheostomy. Direct observation of the needle, guidewire, and seldinger technique of the percutaneous tracheostomy placement occurred throughout the entire procedure. Once tracheostomy tube in place, bronchoscope withdrawn and again placed into the trachea through the newly inserted tracheostomy tube providing direct confirmation of appropriate placement. Findings as below. Patient tolerated procedure well without any difficulties and left in care of bedside nurse/RT.     Medications: Per surgery  Findings:  Upper airway - Not visualized as bronchoscope passed through ETT    Trachea to rosalio - normal mucosa without anatomic variation, lesions, or intraluminal mass    R proximal and distal airways - normal mucosa without anatomic variation, lesions, or obvious  endobronchial mass, secretions thin, small, white    L proximal and distal airways - normal mucosa without anatomic variation, lesions, or obvious endobronchial mass, secretions thin, small, white    Samples -bilateral lower lobe bronchoalveolar lavage of bronchi    Complications: None  CXR (if applicable): Pending    Jeremy Gonda, MD  Critical Care Medicine

## 2020-02-03 NOTE — DISCHARGE PLANNING
· This RNCM met with the patient's daughter, Belen, regarding LTAC choice   · Belen states that her mom (Pt) is not ready to go to an LTAC  · RNCM explained process of choice and that the Pt would not go to that facility until she is medically cleared by the doctors involved in her care  · Belen states that she is not willing to chose a facility at this time  · RNCM encouraged Belen to look into facilities to have a choice in mind when the doctors clear Pt    Plan: CM to follow up

## 2020-02-03 NOTE — PROGRESS NOTES
[facility] Nephrology Daily Progress Note    Date of Service  2/3/2020    AUTHOR: Saima Meneses M.D.    Chief Complaint   Follow up ESRD    HPI  74 y.o. female admitted to [facility] on 1/14/20 with seizures.She was at a SNF.She had been previously hospitalized at [facility] and diaysis was initiated.  She was doing very poorly then and palliative care was discussed with the family,but they declined.She had very poor functional status and required Jimena lift to get into the dialysis chair.She was found to have  a CVA on MRI at [facility].Her seizures were not controlled and it was felt she was in status epilepticus.  She was getting HD at [facility].Last HD was on 1/13/20.She was seen by Dr Carcamo at [facility].Creatinine was 1.8 and dialysis was held. Neurology [place] that the patient needed continuous EEG monitoring and he was transferred to [facility]    Interval Problem Update  Please see note from 1/31 for prior summaries  2/01 - NAEO, another EEG being done this AM  2/02 - NAEO, scheduled for Trach today  2/03 - No events, underwent trach yesterday, no change in neuro status, BP stable overnight but low this am, given midodrine, to have HD today    Review of Systems   Unable to perform ROS: Acuity of condition     PAST FAMILY HISTORY: Reviewed and unchanged since admission  PAST SURGICAL HISTORY:  Reviewed and unchanged since admission  SOCIAL HISTORY:  Reviewed and unchanged since admission  FAMILY HISTORY: Reviewed and unchanged since admission  CURRENT MEDICATIONS: Reviewed since admission to present  IMAGING STUDIES: Reviewed since admission to present  LABORATORY STUDIES: Reviewed since admission to present    Physical Exam  Temp:  [35.8 °C (96.4 °F)-36.8 °C (98.3 °F)] 36.8 °C (98.2 °F)  Pulse:  [71-99] 86  Resp:  [7-27] 25  BP: ()/(55-83) 145/68  SpO2:  [99 %-100 %] 100 %    Physical Exam  Vitals signs and nursing note reviewed.   Constitutional:       General: She is not in acute distress.     Appearance:  Normal appearance. She is ill-appearing.   HENT:      Head: Normocephalic and atraumatic.      Right Ear: External ear normal.      Left Ear: External ear normal.      Nose: Nose normal. No congestion.      Mouth/Throat:      Mouth: Mucous membranes are moist.      Pharynx: No oropharyngeal exudate.      Comments: ETT  Eyes:      General: No scleral icterus.     Conjunctiva/sclera: Conjunctivae normal.      Pupils: Pupils are equal, round, and reactive to light.   Neck:      Musculoskeletal: Neck supple. No muscular tenderness.      Comments: +Trach  Cardiovascular:      Rate and Rhythm: Normal rate and regular rhythm.      Heart sounds: Normal heart sounds.   Pulmonary:      Effort: Pulmonary effort is normal. No respiratory distress.      Breath sounds: Normal breath sounds.      Comments: +Vent  Chest:      Chest wall: No tenderness.   Abdominal:      General: Bowel sounds are normal. There is no distension.      Palpations: Abdomen is soft.   Musculoskeletal:         General: Swelling present. No tenderness or signs of injury.      Comments: anasarca   Lymphadenopathy:      Cervical: No cervical adenopathy.   Skin:     General: Skin is warm and dry.      Findings: No rash.   Neurological:      Mental Status: She is alert.      Comments: Does not open eyes to loud voice, withdraws to painful stimuli   Psychiatric:      Comments: Unable to assess, pt unresponsive       Fluids    Intake/Output Summary (Last 24 hours) at 2/3/2020 1004  Last data filed at 2/3/2020 0600  Gross per 24 hour   Intake 952.5 ml   Output 635 ml   Net 317.5 ml     Laboratory  Recent Labs     02/01/20  0535 02/02/20  0530 02/03/20  0605   WBC 12.9* 13.7* 12.0*   RBC 2.28* 2.36* 2.09*   HEMOGLOBIN 7.6* 8.1* 7.1*   HEMATOCRIT 24.7* 25.7* 22.5*   .3* 108.9* 107.7*   MCH 33.3* 34.3* 34.0*   MCHC 30.8* 31.5* 31.6*   RDW 66.9* 66.1* 63.4*   PLATELETCT 148* 188 195   MPV 9.9 9.8 9.6     Recent Labs     02/01/20  0535 02/02/20  0530  02/03/20  0605   SODIUM 135 135 134*   POTASSIUM 4.4 4.3 4.1   CHLORIDE 103 104 102   CO2 27 24 24   GLUCOSE 153* 133* 191*   BUN 65* 77* 94*   CREATININE 1.24 1.57* 1.58*   CALCIUM 9.1 9.5 9.2     IMPRESSION:  # ESRD   MWF iHD as OP at [facility]   CrCl 6              Hypotension with HD/UF, subclavian line removed per ICU team, PICC line for pressors/meds  # Status epilepticus  # S/P acute lacunar infarct   Hx of previous CVA with significant deficits.  # HTN--BP variable  # DM II  # Acute Hypoxic Respiratory Failure  # Hx of dysphagia  # Anemia of CKD.Ferritin previously significantly elevated. 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    PLAN:  -HD today and qMWF  -Seizures management per Neurology--dose adjust meds to accomodate dialysis  -UF with HD as tolerated, midodrine prior to dialysis  -Enteral feedings  -No dietary protein restrictions  -Epogen with HD  -Follow labs  -Continue GOC discussions with family  -Very poor prognosis, recommend comfort care, if and when family agreeable    **I spent a total of 35 minutes in the evaluation and coordination of care for this critically ill patient including discussions with ICU RN and Dialysis RN    All prior notes form other doctors and RN staff were reviewed from admission to current day to help me make my clinical decisions.

## 2020-02-03 NOTE — PROGRESS NOTES
Critical Care Progress Note    Date of admission  1/16/2020    Chief Complaint  74 y.o. female who presented 1/16/2020 with a past medical history significant for end-stage renal disease on hemodialysis since November 2019 M/W/F, hypertension, hyperlipidemia and coronary artery disease.  She was recently admitted to [facility] for an extended period of time followed by placement in a SNF.  She has been very frail requiring a hoist to get her in and out of the dialysis chair with failure to thrive.  On January 14 while at the SNF patient had a witnessed first-time seizure.  She was transferred to the emergency department where she had a second seizure with prolonged postictal state.  She was intubated for airway protection and started on a propofol drip in the emergency department on January 14 at [facility].  She was admitted to the intensive care unit where she underwent an MRI of her brain showing an acute right parietal lacunar infarct with overall brain parenchymal loss.  Neurology was consulted and patient was loaded with Keppra and continued on the propofol drip.  Despite this, on spot EEGs patient continued to have evidence of focal seizure and Dilantin was started yesterday.  She underwent a lumbar puncture today with unremarkable CSF and was being treated empirically with acyclovir, ampicillin, Rocephin, vancomycin and Decadron.  Given her persistent abnormal EEGs, the neurologist at the outside hospital recommend patient be transferred to a facility that can provide continuous EEG monitoring.  For this reason she was transferred to [facility] and I was consulted for her critical care management.  She was remained on a ventilator since her admission and is receiving dialysis as needed and is being followed by Dr. English.  She is oliguric.  Per documentation there is been some difficulty with patient's daughter when patient was at SNF and some discussion with family  while at [facility] regarding goals of care.  They apparently did not want to talk about changing CODE STATUS nor transitioning to comfort care and patient remains a full code. Taken from Dr Gonda note.     Hospital Course      Interval Problem Update  Reviewed last 24 hour events:  Trach 2/2  Neuro: withdrawal 4 perrl facial twitching no seizure   HR: snr 70-80's  SBP: 120-140's midodrine prior to dialysis   Tmax: afebrile   GI: loose stools bms, TF at goal  UOP: 15ml/hr  Lines: picc and dialysis catheter  Resp: vent #20 trach #2 cmv 14 320 8 40% 1 hr sbt rsbi 72 not following, secretions subglotic bloody minimal clear to white  Vte: heparin  PPI/H2: pepcid  Antibx: none    Valporic, vimpat, topomax  Last SZ 2 days ago  Spoke with daughter  Tolerating SBT will do trial of t piece  Dialysis 1 L of fluid removal    Review of Systems  Review of Systems   Unable to perform ROS: Acuity of condition        Vital Signs for last 24 hours   Temp:  [35.8 °C (96.4 °F)-36.8 °C (98.3 °F)] 36.8 °C (98.2 °F)  Pulse:  [71-99] 86  Resp:  [7-27] 25  BP: ()/(55-83) 145/68  SpO2:  [99 %-100 %] 100 %    Hemodynamic parameters for last 24 hours       Respiratory Information for the last 24 hours  Zepeda Vent Mode: APVCMV  Rate (breaths/min): 14  Vt Target (mL): 320  PEEP/CPAP: 8  FiO2: 30  P Support: 5  P MEAN: 12  Length of Weaning Trial (Hours): 1  Control VTE (exp VT): 333    Physical Exam   Physical Exam  Vitals signs and nursing note reviewed.   Constitutional:       General: She is not in acute distress.     Appearance: She is ill-appearing. She is not toxic-appearing.      Comments: trach, not awake to verbal but with stimulation dose awaken more     HENT:      Head: Normocephalic.      Comments: Trach in place with suture, some dried blood     Mouth/Throat:      Mouth: Mucous membranes are moist.      Comments: Et tube in place  Eyes:      Conjunctiva/sclera: Conjunctivae normal.      Comments: Small 1 mm  bilateral with eye movements   Cardiovascular:      Rate and Rhythm: Normal rate and regular rhythm.      Pulses: Normal pulses.      Heart sounds: No murmur.      Comments: Right sided dialysis port   Pulmonary:      Effort: Pulmonary effort is normal.      Breath sounds: Normal breath sounds. No wheezing or rales.      Comments: Diminished at bases, mechanical on ventilator  Abdominal:      General: There is no distension.      Palpations: Abdomen is soft. There is no mass.      Tenderness: There is no tenderness. There is no guarding or rebound.      Hernia: No hernia is present.      Comments: Mild tenderness to right lower quadrant   Musculoskeletal:         General: Swelling present. No tenderness or signs of injury.      Right lower leg: Edema present.      Left lower leg: Edema present.      Comments: + edema in upper and lower extremities, contractor of left hand   Skin:     General: Skin is warm and dry.      Capillary Refill: Capillary refill takes 2 to 3 seconds.      Findings: Bruising present.   Neurological:      Comments: Baseline left-sided deficits from previous CVA    Doesn't awake with verbal but with stimulus does awaken, right sided withdrawal weaker in right upper, and right sided greater then left, contractor and stiffiness to left hand, tries to open eyes, pupils 1-2mm reactive bilateral with EOMI, does not follow commands, responds with facial grimace to pain on left   Psychiatric:      Comments: Unable to determine         Medications  Current Facility-Administered Medications   Medication Dose Route Frequency Provider Last Rate Last Dose   • albumin human 25% solution 25 g  25 g Intravenous ACUTE DIALYSIS PRN Saima Meneses M.D.       • valproate (DEPACON) 500 mg in D5W 50 mL IVPB  500 mg Intravenous BID Emigdio Lynn M.D.   Stopped at 02/03/20 0645   • lacosamide (VIMPAT) tablet 300 mg  300 mg Enteral Tube BID Sergei Uriarte M.D.   300 mg at 02/03/20 0545   •  carboxymethylcellulose (REFRESH TEARS) 0.5 % ophthalmic drops 1 Drop  1 Drop Both Eyes Q2HRS PRN Tanner Louis M.D.   1 Drop at 01/31/20 0546   • heparin injection 5,000 Units  5,000 Units Subcutaneous Q8HRS ALONSO Giordano.O.   5,000 Units at 02/03/20 0545   • vitamin B comp+C (ALLBEE WITH C) 1 Tab  1 Tab Enteral Tube DAILY Anurag Hein D.O.   1 Tab at 02/03/20 0545   • folic acid (FOLVITE) tablet 1 mg  1 mg Enteral Tube DAILY Anurag Hein D.O.   1 mg at 02/03/20 0545   • midodrine (PROAMATINE) tablet 5 mg  5 mg Enteral Tube PRN ALONSO [email or web address]   5 mg at 02/03/20 0839   • allopurinol (ZYLOPRIM) tablet 100 mg  100 mg Enteral Tube DAILY Anurag Hein D.O.   100 mg at 02/03/20 0545   • nystatin (MYCOSTATIN) powder   Topical BID Anurag Hein D.O.       • LORazepam (ATIVAN) injection 1 mg  1 mg Intravenous Q3HRS PRN Catracho Bustos M.D.   1 mg at 02/01/20 1300   • hydrALAZINE (APRESOLINE) injection 10 mg  10 mg Intravenous Q4HRS PRN Anurag Hein D.O.   10 mg at 02/02/20 1957   • topiramate (TOPAMAX) tablet 200 mg  200 mg Enteral Tube Q12HRS Catracho Bustos M.D.   200 mg at 02/03/20 0545   • aspirin (ASA) chewable tab 81 mg  81 mg Enteral Tube DAILY Josep You M.D.   81 mg at 02/03/20 0545   • atorvastatin (LIPITOR) tablet 40 mg  40 mg Enteral Tube DAILY Josep You M.D.   40 mg at 02/03/20 0545   • famotidine (PEPCID) tablet 20 mg  20 mg Enteral Tube DAILY Fletcher Willis M.D.   20 mg at 02/03/20 0545   • heparin injection 3,300 Units  3,300 Units Intracatheter PRN Lydia Carcamo M.D.   3,300 Units at 01/30/20 2016   • epoetin antoinette (EPOGEN/PROCRIT) injection 4,000 Units  4,000 Units Subcutaneous MO, WE + FR Payam Cavazos M.D.   4,000 Units at 02/03/20 0852   • Respiratory Care per Protocol   Nebulization Continuous RT Jeremy M Gonda, M.D.       • ipratropium-albuterol (DUONEB) nebulizer solution  3 mL Nebulization Q2HRS PRN (RT) Jeremy M Gonda, M.D.       • senna-docusate (PERICOLACE or SENOKOT S)  8.6-50 MG per tablet 2 Tab  2 Tab Enteral Tube BID Jeremy M Gonda, M.D.   Stopped at 02/03/20 0600    And   • polyethylene glycol/lytes (MIRALAX) PACKET 1 Packet  1 Packet Enteral Tube QDAY PRN Jeremy M Gonda, M.D.        And   • bisacodyl (DULCOLAX) suppository 10 mg  10 mg Rectal QDAY PRN Jeremy M Gonda, M.D.       • lidocaine (XYLOCAINE) 1 % injection 1-2 mL  1-2 mL Tracheal Tube Q30 MIN PRN Jeremy M Gonda, M.D.       • Pharmacy Consult: Enteral tube insertion - review meds/change route/product selection  1 Each Other PHARMACY TO DOSE Jeremy M Gonda, M.D.       • fentaNYL (SUBLIMAZE) injection 25 mcg  25 mcg Intravenous Q HOUR PRN Jeremy M Gonda, M.D.   25 mcg at 01/16/20 2310    Or   • fentaNYL (SUBLIMAZE) injection 50 mcg  50 mcg Intravenous Q HOUR PRN Jeremy M Gonda, M.D.   50 mcg at 01/20/20 1555    Or   • fentaNYL (SUBLIMAZE) injection 100 mcg  100 mcg Intravenous Q HOUR PRN Jeremy M Gonda, M.D.   100 mcg at 01/21/20 1748   • ondansetron (ZOFRAN) syringe/vial injection 4 mg  4 mg Intravenous Q4HRS PRN Rina Matta M.D.   4 mg at 01/18/20 1214   • labetalol (NORMODYNE/TRANDATE) injection 10 mg  10 mg Intravenous Q4HRS PRN Rina Matta M.D.   10 mg at 02/02/20 1825   • acetaminophen (TYLENOL) tablet 650 mg  650 mg Enteral Tube Q6HRS PRN Jeremy M Gonda, M.D.   650 mg at 02/02/20 1957   • ondansetron (ZOFRAN ODT) dispertab 4 mg  4 mg Enteral Tube Q4HRS PRN Jeremy M Gonda, M.D.           Fluids    Intake/Output Summary (Last 24 hours) at 2/3/2020 1033  Last data filed at 2/3/2020 0600  Gross per 24 hour   Intake 802.5 ml   Output 635 ml   Net 167.5 ml       Laboratory          Recent Labs     02/01/20  0535 02/02/20  0530 02/03/20  0605   SODIUM 135 135 134*   POTASSIUM 4.4 4.3 4.1   CHLORIDE 103 104 102   CO2 27 24 24   BUN 65* 77* 94*   CREATININE 1.24 1.57* 1.58*   MAGNESIUM 1.8 1.8 1.8   PHOSPHORUS 3.1 3.8 4.3   CALCIUM 9.1 9.5 9.2     Recent Labs     02/01/20  0535 02/02/20  0530 02/03/20  0605   ALTSGPT   --   --  14   ASTSGOT  --   --  21   ALKPHOSPHAT  --   --  55   TBILIRUBIN  --   --  0.4   GLUCOSE 153* 133* 191*     Recent Labs     02/01/20  0535 02/02/20  0530 02/03/20  0605   WBC 12.9* 13.7* 12.0*   NEUTSPOLYS 64.30 58.10 61.10   LYMPHOCYTES 28.70 26.60 23.70   MONOCYTES 3.50 11.20 11.40   EOSINOPHILS 1.70 2.70 2.80   BASOPHILS 0.90 0.70 0.40   ASTSGOT  --   --  21   ALTSGPT  --   --  14   ALKPHOSPHAT  --   --  55   TBILIRUBIN  --   --  0.4     Recent Labs     02/01/20  0535 02/02/20  0530 02/03/20  0605   RBC 2.28* 2.36* 2.09*   HEMOGLOBIN 7.6* 8.1* 7.1*   HEMATOCRIT 24.7* 25.7* 22.5*   PLATELETCT 148* 188 195     Called [facility] [phone] today (1/21) for update on the LP done on 1/15.   1/15 CSF Gram stain negative, Cx no growth to date (final)  1/15 CSF cell count was WBC 1, RBC 2. Total protein 45. Glucose 79  1/15 CSF HSV PCR negative  1/15 CSF paraneoplastic negative    Imaging  X-Ray:  I have personally reviewed the images and compared with prior images. and My impression is: bilateral infiltrates slightly worse, cortrax, trach in place    Assessment/Plan  * Status epilepticus (HCC)  Assessment & Plan  Neurology following and adjusting epileptics  Vimpat 300mg BID  Topamax 200mg BID  Depakote 500mg BID    Monitor need for EEG  Seizure precautions    Acute respiratory failure with hypoxia (HCC)  Assessment & Plan  Intubated date: 1/14/2020 s/p trach 2/2 Dr Devine  Goal saturation > 92%    Monitor pulse oximetry and adjust peep and FIO2 to abg/vbg, and peep optimization.  Monitor ventilator waveforms and titrate flow/peep and volumes according.   CXR: monitor lung volumes and tube/line placement  VAP bundle prevention, oral care, post pyloric feeding  Head of bed > 30 degree  GI prophylaxis  Daily awakening and SBT trials unless contraindicated  Monitor for liberation/extubation    Respiratory treatments: prn    Continue to volume removal with dialysis    Severe  protein-calorie malnutrition (HCC)  Assessment & Plan  Continue tube feeds at goal rate   Dietary following    Cerebrovascular accident (CVA) due to embolism of right middle cerebral artery ([facility])  Assessment & Plan  Continue high intensity statin, aspirin  PT/OT eval  Blood pressure control    End stage renal failure on dialysis ([facility])  Assessment & Plan  Avoid nephrotoxins  Patient having dialysis Monday Wednesday Friday long-term, no anticipation renal return to renal renal function  Aggressive volume removal    Normocytic anemia- (present on admission)  Assessment & Plan  Daily CBC with conservative transfusion strategy, monitor for bleeding  Restrictive transfusion strategy hg < 7    Hypertension  Assessment & Plan  scheduled hydralazine, Isordil, labetalol  PRN IV labetalol, hydralazine for goal SBP less than 160    DM (diabetes mellitus) ([facility])- (present on admission)  Assessment & Plan  Insulin sliding scale  Hypoglycemia protocol  FS BS  Goal -180    Goals of care, counseling/discussion  Assessment & Plan  Planned for LTAC s/p tracheostomy once on stable antiepileptic  Start ventilator weaning and t piece trials  Prognosis poor for overall independent or meaningful     Discuss timing of PEG    Pressure ulcer  Assessment & Plan  Stage II coccyx -present on arrival  Continue wound care    Hypomagnesemia  Assessment & Plan  Replace keep greater than 2    Dysphagia as late effect of cerebrovascular accident (CVA)- (present on admission)  Assessment & Plan  Aspiration precautions    Gout- (present on admission)  Assessment & Plan  Continue allopurinol    Obesity (BMI 30-39.9)- (present on admission)  Assessment & Plan  Nutritionist consultation  Encourage behavioral and dietary modification once extubated for weight loss       VTE:  Heparin  Ulcer: H2 Antagonist  Lines: Central Line  Ongoing indication addressed and [place] Catheter  Ongoing indication addressed    I have performed a physical exam and reviewed  and updated ROS and Plan today (2/3/2020). In review of yesterday's note (2/2/2020), there are no changes except as documented above.     Discussed patient condition and risk of morbidity and/or mortality with RN, RT, Pharmacy and neurology     The patient remains critically ill from multiple system failure on ventilator with active titration and close neuro monitoring for seizures monitoring. Critical care time = 78 minutes in directly providing and coordinating critical care and extensive data review.  No time overlap and excludes procedures.

## 2020-02-03 NOTE — DISCHARGE PLANNING
Hospital Care Management Discharge Planning       Anticipated Discharge Disposition:   · Possible LTAC     Action:   · LTAC referral received   · This RNCM called Pt's daughter, Belen, at [phone] to obtain LTAC choice   · Per Belen, she would rather discuss LTAC in person later today around 1600  · RNCM hours and phone number provided to Belen  · Per Belen, she will call this RNCM when she arrives at the hospital to meet and discuss LTAC        Barriers to Discharge:   · LTAC choice/ acceptance/ availability   · Transportation  · Medical clearance     Plan:   · F/U with Belen  · F/U with treatment team   · Continue to provide support services and assistance with discharge planning as needed.

## 2020-02-03 NOTE — PROGRESS NOTES
Neurology Progress Note  Neurohospitalist Service, [facility]    Referring Physician: Anurag Hein D.O.    No chief complaint on file.      HPI: Refer to initial documented Neurology H&P, as detailed in the patient's chart.    Interval History 2/3/20: No acute events overnight.  Remains in ICU level of care.  No seizures reported.     Review of systems: In addition to what is detailed in the HPI and/or updated in the interval history, all other systems reviewed and are negative.    Past Medical History:    has a past medical history of Arthritis, Chronic diastolic heart failure (HCC) (6/1/2016), Clostridium difficile diarrhea (6/2/2016), CVA (cerebral vascular accident) ([facility]), DM (diabetes mellitus) type II uncontrolled with renal manifestation (4/26/2014), GERD (gastroesophageal reflux disease), GIB (gastrointestinal bleeding) (6/13/2016), GOUT, Hypertension, ICH (intracerebral hemorrhage) ([facility]) (4/6/2016), Indigestion, Kidney failure, RALF (obstructive sleep apnea), RALF (obstructive sleep apnea), Other and unspecified angina pectoris, Seizure ([facility]) (1/16/2020), Unspecified cataract, and UTI (urinary tract infection) (6/1/2016). She also has no past medical history of Anesthesia, Arrhythmia, Back pain, Bronchitis, Cancer ([facility]), Cold, Congestive heart failure (HCC), COPD (chronic obstructive pulmonary disease) ([facility]), Dental disorder, Dialysis patient ([facility]), Fall, Glaucoma, Heart valve disease, Hepatitis A, Hepatitis B, Hepatitis C, Jaundice, Myocardial infarct (HCC), Other emphysema ([facility]), Other specified symptom associated with female genital organs, Pacemaker, Personal history of venous thrombosis and embolism, Psychiatric problem, Rheumatic fever, Unspecified asthma(493.90), Unspecified hemorrhagic conditions, or Unspecified urinary incontinence.    FHx:  family history includes Cancer in her father; Diabetes in her father and mother.    SHx:   reports that she has never smoked. She has  never used smokeless tobacco. She reports that she does not drink alcohol or use drugs.    Medications:    Current Facility-Administered Medications:   •  valproate (DEPACON) 500 mg in D5W 50 mL IVPB, 500 mg, Intravenous, BID, Emigdio Lynn M.D., Stopped at 02/03/20 0645  •  lacosamide (VIMPAT) tablet 300 mg, 300 mg, Enteral Tube, BID, Sergei Uriarte M.D., 300 mg at 02/03/20 0545  •  carboxymethylcellulose (REFRESH TEARS) 0.5 % ophthalmic drops 1 Drop, 1 Drop, Both Eyes, Q2HRS PRN, Tanner Louis M.D., 1 Drop at 01/31/20 0546  •  heparin injection 5,000 Units, 5,000 Units, Subcutaneous, Q8HRS, Anurag Hein D.O., 5,000 Units at 02/03/20 0545  •  vitamin B comp+C (ALLBEE WITH C) 1 Tab, 1 Tab, Enteral Tube, DAILY, Anurag Hein D.O., 1 Tab at 02/03/20 0545  •  folic acid (FOLVITE) tablet 1 mg, 1 mg, Enteral Tube, DAILY, Anurag Hein D.O., 1 mg at 02/03/20 0545  •  midodrine (PROAMATINE) tablet 5 mg, 5 mg, Enteral Tube, PRN, Maliha Patterson D.O., 5 mg at 02/03/20 0839  •  allopurinol (ZYLOPRIM) tablet 100 mg, 100 mg, Enteral Tube, DAILY, Anurag Hein D.O., 100 mg at 02/03/20 0545  •  nystatin (MYCOSTATIN) powder, , Topical, BID, Anurag Hein D.O.  •  LORazepam (ATIVAN) injection 1 mg, 1 mg, Intravenous, Q3HRS PRN, Catracho Bustos M.D., 1 mg at 02/01/20 1300  •  hydrALAZINE (APRESOLINE) injection 10 mg, 10 mg, Intravenous, Q4HRS PRN, Anurag Hein D.O., 10 mg at 02/02/20 1957  •  topiramate (TOPAMAX) tablet 200 mg, 200 mg, Enteral Tube, Q12HRS, Catracho Bustos M.D., 200 mg at 02/03/20 0545  •  aspirin (ASA) chewable tab 81 mg, 81 mg, Enteral Tube, DAILY, Josep You M.D., 81 mg at 02/03/20 0545  •  atorvastatin (LIPITOR) tablet 40 mg, 40 mg, Enteral Tube, DAILY, Josep You M.D., 40 mg at 02/03/20 0545  •  famotidine (PEPCID) tablet 20 mg, 20 mg, Enteral Tube, DAILY, 20 mg at 02/03/20 0545 **OR** [DISCONTINUED] famotidine (PEPCID) injection 20 mg, 20 mg, Intravenous, DAILY, Fletcher Willis M.D., 20 mg at  01/19/20 0508  •  heparin injection 3,300 Units, 3,300 Units, Intracatheter, PRN, Lydia Carcamo M.D., 3,300 Units at 01/30/20 2016  •  epoetin antoinette (EPOGEN/PROCRIT) injection 4,000 Units, 4,000 Units, Subcutaneous, MO, WE + FR, Payam Cavazos M.D., Stopped at 01/31/20 0900  •  Respiratory Care per Protocol, , Nebulization, Continuous RT, Jeremy M Gonda, M.D.  •  ipratropium-albuterol (DUONEB) nebulizer solution, 3 mL, Nebulization, Q2HRS PRN (RT), Jeremy M Gonda, M.D.  •  senna-docusate (PERICOLACE or SENOKOT S) 8.6-50 MG per tablet 2 Tab, 2 Tab, Enteral Tube, BID, Stopped at 02/03/20 0600 **AND** polyethylene glycol/lytes (MIRALAX) PACKET 1 Packet, 1 Packet, Enteral Tube, QDAY PRN **AND** [DISCONTINUED] magnesium hydroxide (MILK OF MAGNESIA) suspension 30 mL, 30 mL, Enteral Tube, QDAY PRN **AND** bisacodyl (DULCOLAX) suppository 10 mg, 10 mg, Rectal, QDAY PRN, Jeremy M Gonda, M.D.  •  lidocaine (XYLOCAINE) 1 % injection 1-2 mL, 1-2 mL, Tracheal Tube, Q30 MIN PRN, Jeremy M Gonda, M.D.  •  Pharmacy Consult: Enteral tube insertion - review meds/change route/product selection, 1 Each, Other, PHARMACY TO DOSE, Jeremy M Gonda, M.D.  •  fentaNYL (SUBLIMAZE) injection 25 mcg, 25 mcg, Intravenous, Q HOUR PRN, 25 mcg at 01/16/20 2310 **OR** fentaNYL (SUBLIMAZE) injection 50 mcg, 50 mcg, Intravenous, Q HOUR PRN, 50 mcg at 01/20/20 1555 **OR** fentaNYL (SUBLIMAZE) injection 100 mcg, 100 mcg, Intravenous, Q HOUR PRN, Jeremy M Gonda, M.D., 100 mcg at 01/21/20 1748  •  ondansetron (ZOFRAN) syringe/vial injection 4 mg, 4 mg, Intravenous, Q4HRS PRN, Rina Matta M.D., 4 mg at 01/18/20 1214  •  labetalol (NORMODYNE/TRANDATE) injection 10 mg, 10 mg, Intravenous, Q4HRS PRN, Rina Matta M.D., 10 mg at 02/02/20 1825  •  acetaminophen (TYLENOL) tablet 650 mg, 650 mg, Enteral Tube, Q6HRS PRN, Jeremy M Gonda, M.D., 650 mg at 02/02/20 1957  •  ondansetron (ZOFRAN ODT) dispertab 4 mg, 4 mg, Enteral Tube, Q4HRS PRN, Jeremy M Gonda,  M.D.    Physical Examination:     Vitals:    02/03/20 0500 02/03/20 0544 02/03/20 0600 02/03/20 0632   BP: 139/67  145/68    Pulse: 87 87 85 86   Resp: 20  (!) 25    Temp:       TempSrc:       SpO2: 100% 99% 100% 100%   Weight:       Height:           General: Patient is awake and in no acute distress  Eyes: examination of optic disks not indicated at this time  CV: RRR    NEUROLOGICAL EXAM:     Mental status: eyes closed, somnolent, does not follow commands; opens eyes to noxious stim  Speech and language: intubated/trach  Cranial nerve exam: Pupils are equal, round, pinpoint, and nonreactive to light bilaterally. No blink to threat b/l. Extraocular muscles: VOR intact. Face is symmetric.  Motor exam: does not participate in formal strength testing. Tone is decreased. No abnormal movements were seen on exam.  Sensory exam: No sensory deficits identified   Deep tendon reflexes:  1+ and symmetric. Toes mute bilaterally.  Coordination: no ataxia   Gait: deferred due to ICU status    Objective Data:    Labs:  Lab Results   Component Value Date/Time    PROTHROMBTM 14.5 01/29/2020 06:05 AM    INR 1.11 01/29/2020 06:05 AM      Lab Results   Component Value Date/Time    WBC 12.0 (H) 02/03/2020 06:05 AM    RBC 2.09 (L) 02/03/2020 06:05 AM    HEMOGLOBIN 7.1 (L) 02/03/2020 06:05 AM    HEMATOCRIT 22.5 (L) 02/03/2020 06:05 AM    .7 (H) 02/03/2020 06:05 AM    MCH 34.0 (H) 02/03/2020 06:05 AM    MCHC 31.6 (L) 02/03/2020 06:05 AM    MPV 9.6 02/03/2020 06:05 AM    NEUTSPOLYS 61.10 02/03/2020 06:05 AM    LYMPHOCYTES 23.70 02/03/2020 06:05 AM    MONOCYTES 11.40 02/03/2020 06:05 AM    EOSINOPHILS 2.80 02/03/2020 06:05 AM    BASOPHILS 0.40 02/03/2020 06:05 AM    HYPOCHROMIA 1+ 02/01/2020 05:35 AM    ANISOCYTOSIS 1+ 02/01/2020 05:35 AM      Lab Results   Component Value Date/Time    SODIUM 134 (L) 02/03/2020 06:05 AM    POTASSIUM 4.1 02/03/2020 06:05 AM    CHLORIDE 102 02/03/2020 06:05 AM    CO2 24 02/03/2020 06:05 AM    GLUCOSE  191 (H) 02/03/2020 06:05 AM    BUN 94 (HH) 02/03/2020 06:05 AM    CREATININE 1.58 (H) 02/03/2020 06:05 AM      Lab Results   Component Value Date/Time    CHOLSTRLTOT 79 02/07/2019 01:40 PM    LDL 34 02/07/2019 01:40 PM    HDL 23 (L) 02/07/2019 01:40 PM    TRIGLYCERIDE 95 01/27/2020 03:04 AM       Lab Results   Component Value Date/Time    ALKPHOSPHAT 55 02/03/2020 06:05 AM    ASTSGOT 21 02/03/2020 06:05 AM    ALTSGPT 14 02/03/2020 06:05 AM    TBILIRUBIN 0.4 02/03/2020 06:05 AM        Imaging/Testing:    I interpreted and/or reviewed the patient's neuroimaging    DX-CHEST-PORTABLE (1 VIEW)   Final Result         1.  Pulmonary edema and/or infiltrates are identified, which are stable since the prior exam.   2.  Cardiomegaly   3.  Atherosclerosis      DX-CHEST-PORTABLE (1 VIEW)   Final Result         1.  Pulmonary edema and/or infiltrates are identified, which are stable since the prior exam.   2.  Cardiomegaly   3.  Atherosclerosis      DX-CHEST-PORTABLE (1 VIEW)   Final Result      1.  Unchanged left lower lobe atelectasis or pneumonia.      2.  Clear right lung.      DX-CHEST-PORTABLE (1 VIEW)   Final Result      Unchanged LEFT basilar atelectasis and/or airspace disease      DX-CHEST-PORTABLE (1 VIEW)   Final Result      Left basilar atelectasis, hilar and cardiac silhouette enlargement as before      DX-CHEST-PORTABLE (1 VIEW)   Final Result      Interval removal of a left subclavian central line. Stable patchy bilateral infiltrates.      IR-PICC LINE PLACEMENT W/ GUIDANCE > AGE 5   Final Result                  Ultrasound-guided PICC placement performed by qualified nursing staff as    above.          DX-CHEST-PORTABLE (1 VIEW)   Final Result      1.  Multiple support devices present.      2.  Patchy bilateral atelectasis.      DX-CHEST-PORTABLE (1 VIEW)   Final Result      Stable chest with retrocardiac opacity from atelectasis and/or pleural fluid favored over consolidation      DX-CHEST-PORTABLE (1 VIEW)    Final Result      Stable chest with retrocardiac opacity from atelectasis and/or pleural fluid favored over consolidation      DX-CHEST-PORTABLE (1 VIEW)   Final Result         No significant change from prior.               DX-CHEST-PORTABLE (1 VIEW)   Final Result         1. Stable lines and tubes..   2. Stable retrocardiac and left perihilar atelectasis versus consolidation. Suspected small left pleural effusion.            DX-CHEST-PORTABLE (1 VIEW)   Final Result         1. Stable lines and tubes..   2. Stable retrocardiac atelectasis versus consolidation with increased left perihilar opacity. Suspected trace left pleural effusion.         [id number] VIEW   Final Result      1.  Enteric tube has been placed and the tip projects over the stomach.      2.  Pre-existing feeding tube tip projects at the gastroduodenal junction      DX-CHEST-PORTABLE (1 VIEW)   Final Result         1. Lines and tubes as above.   2. Stable retrocardiac atelectasis versus consolidation. Suspected trace left pleural effusion.         MR-BRAIN-WITH & W/O   Final Result      1.  Moderate cerebral atrophy.   2.  Evidence of intraventricular hemorrhage, indeterminate age. Possibly chronic intraventricular hemosiderin deposition.   3.  Extensive encephalomalacic change with hemosiderin deposition involving the right corona radiata, basal ganglia, posterior thalamus, subthalamic region, bordering the right cerebral peduncle. Associated ex vacuo dilatation of the body of the right    lateral ventricle. No change.   4.  Additional foci of old microhemorrhage most consistent with old hypertensive microhemorrhage or amyloid angiopathy in the left basal ganglia, left thalamus, and midline upper ventral abel.   5.  Punctate focus of acute infarction in the right parietal deep white matter. No change.   6.  Advanced supratentorial white matter disease most consistent with microvascular ischemic change.   7.  Encephalomalacic changes in the bael  and right cerebral peduncle consistent with old infarction.   8.  Partially empty sella.   9.  Overall, no new findings and no significant change from 1/14/2020.      DX-CHEST-PORTABLE (1 VIEW)   Final Result         1. Stable lines and tubes.   2. Unchanged retrocardiac atelectasis versus consolidation.   3. Stable trace left pleural effusion.         OUTSIDE IMAGES-DX CHEST   Final Result      OUTSIDE IMAGES-US VASCULAR   Final Result      OUTSIDE IMAGES-MR BRAIN   Final Result      OUTSIDE IMAGES-DX CHEST   Final Result      OUTSIDE IMAGES-CT HEAD   Final Result      EC-ECHOCARDIOGRAM COMPLETE W/O CONT   Final Result      DX-CHEST-FOR LINE PLACEMENT Perform procedure in: Patient's Room   Final Result         1.  Retrocardiac opacity concerning for infiltrate, stable.   2.  Trace left pleural effusion, stable   3.  Cardiomegaly   4.  Atherosclerosis   5.  Perihilar interstitial prominence and bronchial wall cuffing, appearance suggests changes of underlying bronchial inflammation, consider bronchitis.      DX-ABDOMEN FOR TUBE PLACEMENT   Final Result         1.  Nonspecific bowel gas pattern.   2.  Dobbhoff tube tip overlying the expected location of the pylorus or first duodenal segment.   3.  Left lung base atelectasis and/or small effusion      DX-CHEST-PORTABLE (1 VIEW)   Final Result         1.  Retrocardiac opacity concerning for infiltrate.   2.  Trace left pleural effusion, stable   3.  Cardiomegaly   4.  Atherosclerosis      [id number] FILM X-RAY   Final Result      OUTSIDE IMAGES-DX CHEST   Final Result          Assessment and Plan:    Cassandra Guzmán is a 74 y.o. woman with history of CHF, right parietal stroke, diabetes, GERD, end-stage renal disease on dialysis, history of GI bleed, hypertension and history of ICH presenting on 1/16/2020 for seizures treated with Vimpat, Topamax and Keppra.  The patient has had a protracted hospital course during which she developed subclinical seizures.   "This has been treated with a combination of multiple antiseizure medications including Keppra, Topamax and Vimpat as well as anesthetics (propofol and Versed).       The patient was weaned off of Versed however has been slow to wake up.  There has been no significant change over the past week. Dr. Bobo spoke with daughter regarding the likelihood of the patient returning to baseline was low and that the likelihood of the patient returning to full independent function was essentially zero.  As documented by Dr. Bobo \"at this point we are trying to see if the patient wakes up as there are no further seizures that we can tell to the best of our ability.  She states that her mother 'wants to fight' and that these were her previously stated wishes and that in this regard she would be okay with a tracheostomy and a long-term rehab facility as she states that she knows the 'process will be slow' but hopes that she can get better.\"     Plan:    - Continue Vimpat 300 mg twice daily  - Continue Topamax 200 mg twice daily  - Continue Depakote 500 mg twice daily    The evaluation of the patient, and recommended management, was discussed with the resident staff. I have performed a physical exam and reviewed and updated ROS and Plan today (2/3/2020). In review of yesterday's note (2/2/2020), there are no changes except as documented above.    Josep You MD  Director, [facility], [facility]  Neurohospitalist, [facility]  Clinical  of Neurology, [employer]  t) [phone] (f) [phone]  "

## 2020-02-03 NOTE — CARE PLAN
Problem: Ventilation Defect:  Goal: Ability to achieve and maintain unassisted ventilation or tolerate decreased levels of ventilator support  Outcome: PROGRESSING SLOWER THAN EXPECTED     Adult Ventilation Update    Total Vent Days: 19    Patient Lines/Drains/Airways Status      Active Airway       Name: Placement date: Placement time: Site: Days:    Airway Trach Tracheostomy 7.0  02/02/20   1145   Tracheostomy  less than 1                  PT did not wean do to continous EEG    #FVC / Vital Capacity (liters) : (pt does not follow) (02/01/20 0457)  NIF (cm H2O) : (pt does not follow) (02/01/20 0457)  Rapid Shallow Breathing Index (RR/VT): 72 (02/01/20 0457)  Plateau Pressure (Q Shift): 15 (02/02/20 1513)  Static Compliance (ml / cm H2O): 43.8 (02/02/20 1513)    Patient failed trials because of Barriers to Wean: Other (Comments)(Continous EEG) (02/02/20 0730)        Less than 1 emma post trache.  The patient is currently in Trache wean according to protocol.      Sputum/Suction   Cough: Weak(with stimulation) (02/02/20 1600)  Sputum Amount: Scant (02/02/20 1600)  Sputum Color: White (02/02/20 1600)  Sputum Consistency: Thin (02/02/20 1600)    Mobility  Level of Mobility: Level I (02/02/20 1600)  Activity Performed: Unable to mobilize (02/02/20 1200)  Assistance: Assistance of Two or More  Pt Calls for Assistance: No   Gait: Unable to Ambulate (02/02/20 1600)  Reason Not Mobilized: Bed rest;Unstable condition (02/02/20 1200)  Mobilization Comments: RASS -4, continious EEG (    Events/Summary/Plan: PT remains stable on current ventilator settings with no changes made throughout the day. PT resting comfortably after having trach placed. Will continue to monitor PT closely. (02/02/20 1513)

## 2020-02-03 NOTE — FLOWSHEET NOTE
02/03/20 0544   Events/Summary/Plan   Events/Summary/Plan pt placed back on rest settings   General Vent Information   Pulse Oximetry 99 %   Pulse 87   Zepeda Vent   Zepeda Vent Mode APVCMV   [place] Conventional Settings   Rate (breaths/min) 14   Vt Target (mL) 320   TI (Seconds) 1.4   PEEP/CPAP 8   FiO2 30   Sensitivity Setting Flow Trigger   Other Settings 5   Weaning Trial   Weaning Trial Stopped due to: Pt weaned for 1 hour and returned to rest settings per protocol   Length of Weaning Trial (Hours) 1   Vent Weaning Smart Text completed? Yes

## 2020-02-04 NOTE — CARE PLAN
Ventilator Daily Summary    Vent Day # 20  Trache 2    Ventilator settings changed this shift: not at this time    Weaning trials:SBT as tolerated    Respiratory Procedures: not at this time    Plan: Continue current ventilator settings and wean mechanical ventilation as tolerated per physician orders.

## 2020-02-04 NOTE — CARE PLAN
Adult Ventilation Update    Total Vent Days: 21      Patient Lines/Drains/Airways Status      Active Airway       Name: Placement date: Placement time: Site: Days:    Airway Trach Tracheostomy 7.0  02/02/20   1145   Tracheostomy  2                    In the last 24 hours, the patient tolerated SBT for 1.5 hours on settings of T-P at 10 l/m and 40%.    #FVC / Vital Capacity (liters) : (pt does not follow) (02/01/20 0457)  NIF (cm H2O) : (pt does not follow) (02/01/20 0457)  Rapid Shallow Breathing Index (RR/VT): 72 (02/01/20 0457)  Plateau Pressure (Q Shift): 18 (02/03/20 1839)  Static Compliance (ml / cm H2O): 64 (02/04/20 0343)    Patient failed trials because of Barriers to Wean: Other (Comments)(Continous EEG) (02/02/20 0730)  Barriers to SBT Weaning Trial Stopped due to:: Pt weaned for 1 hour and returned to rest settings per protocol (02/03/20 0544)  Length of Weaning Trial Length of Weaning Trial (Hours): 1 (02/03/20 0544)      Cough: Moist;Weak (02/04/20 0800)  Sputum Amount: Small (02/04/20 0800)  Sputum Color: Clear;White (02/04/20 0800)  Sputum Consistency: Thin (02/04/20 0800)    Mobility  Level of Mobility: Level I (02/04/20 0800)  Activity Performed: Edge of bed(dangled with max assist) (02/04/20 0944)  Time Activity Tolerated: 5 min (02/04/20 0944)  Assistance: Assistance of Two or More (02/04/20 0944)  Ambulation Tolerance: Tolerates Well (02/04/20 0944)  Pt Calls for Assistance: No (02/04/20 0944)  Staff Present for Mobilization: RN;CNA (02/04/20 0944)  Gait: Unable to Ambulate (02/04/20 0944)  Reason Not Mobilized: Bed rest;Unstable condition (02/02/20 1200)  Mobilization Comments: (max assist, does not participate in mobilization w/ staff) (02/04/20 0944)    Events/Summary/Plan: placed back  on the vent  (02/04/20 8624)

## 2020-02-04 NOTE — PROGRESS NOTES
[facility] Nephrology Daily Progress Note    Date of Service  2/4/2020    AUTHOR: Saima Meneses M.D.    Chief Complaint   Follow up ESRD    HPI  74 y.o. female admitted to [facility] on 1/14/20 with seizures.She was at a SNF.She had been previously hospitalized at [facility] and diaysis was initiated.  She was doing very poorly then and palliative care was discussed with the family,but they declined.She had very poor functional status and required Jimena lift to get into the dialysis chair.She was found to have  a CVA on MRI at [facility].Her seizures were not controlled and it was felt she was in status epilepticus.  She was getting HD at [facility].Last HD was on 1/13/20.She was seen by Dr Carcamo at [facility].Creatinine was 1.8 and dialysis was held. Neurology Floyd that the patient needed continuous EEG monitoring and he was transferred to [facility]    Interval Problem Update  Please see note from 1/31 for prior summaries  2/01 - NAEO, another EEG being done this AM  2/02 - NAEO, scheduled for Trach today  2/03 - No events, underwent trach yesterday, no change in neuro status, BP stable overnight but low this am, given midodrine, to have HD today  2/04 - No events, tolerated HD yest with 2.1L UF, BP stable this am    Review of Systems   Unable to perform ROS: Acuity of condition     PAST FAMILY HISTORY: Reviewed and unchanged since admission  PAST SURGICAL HISTORY:  Reviewed and unchanged since admission  SOCIAL HISTORY:  Reviewed and unchanged since admission  FAMILY HISTORY: Reviewed and unchanged since admission  CURRENT MEDICATIONS: Reviewed since admission to present  IMAGING STUDIES: Reviewed since admission to present  LABORATORY STUDIES: Reviewed since admission to present    Physical Exam  Temp:  [36.5 °C (97.7 °F)-37.2 °C (99 °F)] 36.6 °C (97.9 °F)  Pulse:  [70-97] 77  Resp:  [13-26] 25  BP: (103-198)/(51-90) 198/90  SpO2:  [95 %-100 %] 100 %    Physical Exam  Vitals signs and nursing note reviewed.    Constitutional:       General: She is not in acute distress.     Appearance: Normal appearance. She is ill-appearing.   HENT:      Head: Normocephalic and atraumatic.      Right Ear: External ear normal.      Left Ear: External ear normal.      Nose: Nose normal. No congestion.      Mouth/Throat:      Mouth: Mucous membranes are moist.      Pharynx: No oropharyngeal exudate.      Comments: ETT  Eyes:      General: No scleral icterus.     Conjunctiva/sclera: Conjunctivae normal.      Pupils: Pupils are equal, round, and reactive to light.   Neck:      Musculoskeletal: Neck supple. No muscular tenderness.      Comments: +Trach  Cardiovascular:      Rate and Rhythm: Normal rate and regular rhythm.      Heart sounds: Normal heart sounds.   Pulmonary:      Effort: Pulmonary effort is normal. No respiratory distress.      Breath sounds: Normal breath sounds.      Comments: +Vent  Chest:      Chest wall: No tenderness.   Abdominal:      General: Bowel sounds are normal. There is no distension.      Palpations: Abdomen is soft.   Musculoskeletal:         General: No swelling, tenderness or signs of injury.      Comments: anasarca   Lymphadenopathy:      Cervical: No cervical adenopathy.   Skin:     General: Skin is warm and dry.      Findings: No rash.   Neurological:      Mental Status: She is alert.      Comments: Somnolent, opens eyes to painful stimuli, withdraws to painful stimuli   Psychiatric:      Comments: Unable to assess, pt unresponsive       Fluids    Intake/Output Summary (Last 24 hours) at 2/4/2020 1034  Last data filed at 2/4/2020 0605  Gross per 24 hour   Intake 1532.5 ml   Output 2980 ml   Net -1447.5 ml     Laboratory  Recent Labs     02/02/20  0530 02/03/20  0605 02/04/20  0513   WBC 13.7* 12.0* 13.1*   RBC 2.36* 2.09* 2.25*   HEMOGLOBIN 8.1* 7.1* 7.6*   HEMATOCRIT 25.7* 22.5* 24.0*   .9* 107.7* 106.7*   MCH 34.3* 34.0* 33.8*   MCHC 31.5* 31.6* 31.7*   RDW 66.1* 63.4* 61.5*   PLATELETCT 188 195  217   MPV 9.8 9.6 10.1     Recent Labs     02/02/20  0530 02/03/20  0605 02/04/20  0513   SODIUM 135 134* 133*   POTASSIUM 4.3 4.1 3.8   CHLORIDE 104 102 98   CO2 24 24 28   GLUCOSE 133* 191* 137*   BUN 77* 94* 55*   CREATININE 1.57* 1.58* 1.12   CALCIUM 9.5 9.2 9.2     IMPRESSION:  # ESRD   MWF iHD as OP at [facility]   CrCl 6              Hypotension with HD/UF, subclavian line removed per ICU team, PICC line for pressors/meds  # Status epilepticus  # S/P acute lacunar infarct   Hx of previous CVA with significant deficits.  # HTN--BP variable  # DM II  # Acute Hypoxic Respiratory Failure  # Hx of dysphagia  # Anemia of CKD.Ferritin previously significantly elevated. 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    PLAN:  -HD tomorow and qMWF  -Seizures management per Neurology--dose adjust meds to accomodate dialysis  -UF with HD as tolerated, midodrine prior to dialysis  -Enteral feedings  -No dietary protein restrictions  -Epogen with HD  -Follow labs  -Continue GOC discussions with family  -Very poor prognosis, recommend comfort care, if and when family agreeable    **I spent a total of 35 minutes in the evaluation and coordination of care for this critically ill patient including discussions with ICU RN    All prior notes form other doctors and RN staff were reviewed from admission to current day to help me make my clinical decisions.

## 2020-02-04 NOTE — PROGRESS NOTES
Critical Care Progress Note    Date of admission  1/16/2020    Chief Complaint  74 y.o. female who presented 1/16/2020 with a past medical history significant for end-stage renal disease on hemodialysis since November 2019 M/W/F, hypertension, hyperlipidemia and coronary artery disease.  She was recently admitted to [facility] for an extended period of time followed by placement in a SNF.  She has been very frail requiring a hoist to get her in and out of the dialysis chair with failure to thrive.  On January 14 while at the SNF patient had a witnessed first-time seizure.  She was transferred to the emergency department where she had a second seizure with prolonged postictal state.  She was intubated for airway protection and started on a propofol drip in the emergency department on January 14 at [facility].  She was admitted to the intensive care unit where she underwent an MRI of her brain showing an acute right parietal lacunar infarct with overall brain parenchymal loss.  Neurology was consulted and patient was loaded with Keppra and continued on the propofol drip.  Despite this, on spot EEGs patient continued to have evidence of focal seizure and Dilantin was started yesterday.  She underwent a lumbar puncture today with unremarkable CSF and was being treated empirically with acyclovir, ampicillin, Rocephin, vancomycin and Decadron.  Given her persistent abnormal EEGs, the neurologist at the outside hospital recommend patient be transferred to a facility that can provide continuous EEG monitoring.  For this reason she was transferred to [facility] and I was consulted for her critical care management.  She was remained on a ventilator since her admission and is receiving dialysis as needed and is being followed by Dr. English.  She is oliguric.  Per documentation there is been some difficulty with patient's daughter when patient was at SNF and some discussion with family  while at [facility] regarding goals of care.  They apparently did not want to talk about changing CODE STATUS nor transitioning to comfort care and patient remains a full code. Taken from Dr Gonda note.     Hospital Course    Interval Problem Update  Reviewed last 24 hour events:  Neuro: withdrawal in all 4 stronger on left, sluggish  HR: snr 80-90  SBP: 100-150's  Tmax: afebrile   GI: liquid stool TF at goal   UOP: urine 25ml/hr dialysis  Lines: picc and dialysis cath  Resp: vent day 21 trach 3, t piece trail no suction mild blood tinged   Vte: heparin vte held   PPI/H2:pepcid  Antibx: none    Free water flushes std order 30ml Q4     GI to for peg tube  Family refusing LTAC placement     I certify that the patient requires continued medically necessary hospital services and treatment of post seizure encephalopathy and respiratory failure and will reamin in hopsital for at least 7 days. Discharge plan is anticipated to be LTAC for chance of possible neurologic recovery.       Review of Systems  Review of Systems   Unable to perform ROS: Mental acuity        Vital Signs for last 24 hours   Temp:  [36.5 °C (97.7 °F)-37.1 °C (98.8 °F)] 36.6 °C (97.9 °F)  Pulse:  [56-95] 56  Resp:  [13-26] 20  BP: (103-198)/(51-90) 114/51  SpO2:  [85 %-100 %] 99 %    Hemodynamic parameters for last 24 hours       Respiratory Information for the last 24 hours  Zepeda Vent Mode: APVCMV  Rate (breaths/min): 14  Vt Target (mL): 320  PEEP/CPAP: 8  FiO2: 30  P MEAN: 12  Control VTE (exp VT): 323    Physical Exam   Physical Exam  Vitals signs and nursing note reviewed.   Constitutional:       General: She is not in acute distress.     Appearance: She is ill-appearing. She is not toxic-appearing.      Comments: trach, not awake to verbal but with stimulation dose awaken more     HENT:      Head: Normocephalic.      Mouth/Throat:      Mouth: Mucous membranes are moist.   Eyes:      Conjunctiva/sclera: Conjunctivae normal.       Comments: Small 1 mm bilateral with eye movements   Neck:      Comments: Trach in place with some dried blood  Cardiovascular:      Rate and Rhythm: Normal rate and regular rhythm.      Pulses: Normal pulses.      Heart sounds: No murmur.      Comments: Right sided dialysis port   Pulmonary:      Effort: Pulmonary effort is normal.      Breath sounds: Normal breath sounds. No wheezing or rales.      Comments: Diminished at bases, mechanical on ventilator  Abdominal:      General: There is no distension.      Palpations: Abdomen is soft. There is no mass.      Tenderness: There is no tenderness. There is no guarding or rebound.      Hernia: No hernia is present.      Comments: Mild tenderness to right lower quadrant   Musculoskeletal:         General: Swelling present. No tenderness or signs of injury.      Right lower leg: Edema present.      Left lower leg: Edema present.      Comments: + edema in upper and lower extremities, contractor of left hand   Skin:     General: Skin is warm and dry.      Capillary Refill: Capillary refill takes 2 to 3 seconds.      Findings: Bruising present.   Neurological:      Comments: Baseline left-sided deficits from previous CVA    Doesn't awake with verbal but with stimulus does awaken, right sided withdrawal weaker in right upper, and right sided greater then left, contractor and stiffiness to left hand, tries to open eyes, pupils 1-2mm reactive bilateral with EOMI, does not follow commands, responds with facial grimace to pain on left   Psychiatric:      Comments: Unable to determine         Medications  Current Facility-Administered Medications   Medication Dose Route Frequency Provider Last Rate Last Dose   • albumin human 25% solution 25 g  25 g Intravenous ACUTE DIALYSIS PRN Saima Meneses M.D.   Stopped at 02/03/20 1225   • valproate (DEPACON) 500 mg in D5W 50 mL IVPB  500 mg Intravenous BID Emigdio Lynn M.D.   Stopped at 02/04/20 0605   • lacosamide (VIMPAT)  tablet 300 mg  300 mg Enteral Tube BID Sergei Uriarte M.D.   300 mg at 02/04/20 0505   • carboxymethylcellulose (REFRESH TEARS) 0.5 % ophthalmic drops 1 Drop  1 Drop Both Eyes Q2HRS PRN Tanner Louis M.D.   1 Drop at 01/31/20 0546   • heparin injection 5,000 Units  5,000 Units Subcutaneous Q8HRS Anurag Hein D.O.   Stopped at 02/03/20 2200   • vitamin B comp+C (ALLBEE WITH C) 1 Tab  1 Tab Enteral Tube DAILY Anurag Hein D.O.   1 Tab at 02/04/20 0505   • folic acid (FOLVITE) tablet 1 mg  1 mg Enteral Tube DAILY Anurag Hein D.O.   1 mg at 02/04/20 0505   • midodrine (PROAMATINE) tablet 5 mg  5 mg Enteral Tube PRN Maliha Patterson DUliOUli   5 mg at 02/03/20 0839   • allopurinol (ZYLOPRIM) tablet 100 mg  100 mg Enteral Tube DAILY Anurag Hein D.O.   100 mg at 02/04/20 0505   • nystatin (MYCOSTATIN) powder   Topical BID Anurag Hein D.O.       • LORazepam (ATIVAN) injection 1 mg  1 mg Intravenous Q3HRS PRN Catracho Bustos M.D.   1 mg at 02/01/20 1300   • hydrALAZINE (APRESOLINE) injection 10 mg  10 mg Intravenous Q4HRS PRN Anurag Hein D.OUli   10 mg at 02/03/20 1839   • topiramate (TOPAMAX) tablet 200 mg  200 mg Enteral Tube Q12HRS Catracho Bustos M.D.   200 mg at 02/04/20 0505   • aspirin (ASA) chewable tab 81 mg  81 mg Enteral Tube DAILY Josep You M.D.   Stopped at 02/04/20 0600   • atorvastatin (LIPITOR) tablet 40 mg  40 mg Enteral Tube DAILY Josep You M.D.   40 mg at 02/04/20 0505   • famotidine (PEPCID) tablet 20 mg  20 mg Enteral Tube DAILY Fletcher Willis M.D.   20 mg at 02/04/20 0505   • heparin injection 3,300 Units  3,300 Units Intracatheter PRN Lydia Carcamo M.D.   3,300 Units at 02/03/20 1310   • epoetin antoinette (EPOGEN/PROCRIT) injection 4,000 Units  4,000 Units Subcutaneous MO, WE + FR Payam Cavazos M.D.   4,000 Units at 02/03/20 0852   • Respiratory Care per Protocol   Nebulization Continuous RT Jeremy M Gonda, M.D.       • ipratropium-albuterol (DUONEB) nebulizer solution  3 mL Nebulization  Q2HRS PRN (RT) Jeremy M Gonda, M.D.       • senna-docusate (PERICOLACE or SENOKOT S) 8.6-50 MG per tablet 2 Tab  2 Tab Enteral Tube BID Jeremy M Gonda, M.D.   Stopped at 02/03/20 0600    And   • polyethylene glycol/lytes (MIRALAX) PACKET 1 Packet  1 Packet Enteral Tube QDAY PRN Jeremy M Gonda, M.D.        And   • bisacodyl (DULCOLAX) suppository 10 mg  10 mg Rectal QDAY PRN Jeremy M Gonda, M.D.       • lidocaine (XYLOCAINE) 1 % injection 1-2 mL  1-2 mL Tracheal Tube Q30 MIN PRN Jeremy M Gonda, M.D.       • Pharmacy Consult: Enteral tube insertion - review meds/change route/product selection  1 Each Other PHARMACY TO DOSE Jeremy M Gonda, M.D.       • fentaNYL (SUBLIMAZE) injection 25 mcg  25 mcg Intravenous Q HOUR PRN Jeremy M Gonda, M.D.   25 mcg at 01/16/20 2310    Or   • fentaNYL (SUBLIMAZE) injection 50 mcg  50 mcg Intravenous Q HOUR PRN Jeremy M Gonda, M.D.   50 mcg at 01/20/20 1555    Or   • fentaNYL (SUBLIMAZE) injection 100 mcg  100 mcg Intravenous Q HOUR PRN Jeremy M Gonda, M.D.   100 mcg at 01/21/20 1748   • ondansetron (ZOFRAN) syringe/vial injection 4 mg  4 mg Intravenous Q4HRS PRN Rina Matta M.D.   4 mg at 01/18/20 1214   • labetalol (NORMODYNE/TRANDATE) injection 10 mg  10 mg Intravenous Q4HRS PRN Rina Matta M.D.   10 mg at 02/04/20 1027   • acetaminophen (TYLENOL) tablet 650 mg  650 mg Enteral Tube Q6HRS PRN Jeremy M Gonda, M.D.   650 mg at 02/04/20 0505   • ondansetron (ZOFRAN ODT) dispertab 4 mg  4 mg Enteral Tube Q4HRS PRN Jeremy M Gonda, M.D.           Fluids    Intake/Output Summary (Last 24 hours) at 2/4/2020 1235  Last data filed at 2/4/2020 1000  Gross per 24 hour   Intake 1482.5 ml   Output 3045 ml   Net -1562.5 ml       Laboratory          Recent Labs     02/02/20  0530 02/03/20  0605 02/04/20  0513   SODIUM 135 134* 133*   POTASSIUM 4.3 4.1 3.8   CHLORIDE 104 102 98   CO2 24 24 28   BUN 77* 94* 55*   CREATININE 1.57* 1.58* 1.12   MAGNESIUM 1.8 1.8 1.8   PHOSPHORUS 3.8 4.3 2.9    CALCIUM 9.5 9.2 9.2     Recent Labs     02/02/20  0530 02/03/20  0605 02/03/20 2129 02/04/20 0513   ALTSGPT  --  14  --   --    ASTSGOT  --  21  --   --    ALKPHOSPHAT  --  55  --   --    TBILIRUBIN  --  0.4  --   --    PREALBUMIN  --   --  14.0*  --    GLUCOSE 133* 191*  --  137*     Recent Labs     02/02/20  0530 02/03/20  0605 02/04/20  0513   WBC 13.7* 12.0* 13.1*   NEUTSPOLYS 58.10 61.10 62.80   LYMPHOCYTES 26.60 23.70 17.60*   MONOCYTES 11.20 11.40 14.40*   EOSINOPHILS 2.70 2.80 2.80   BASOPHILS 0.70 0.40 0.90   ASTSGOT  --  21  --    ALTSGPT  --  14  --    ALKPHOSPHAT  --  55  --    TBILIRUBIN  --  0.4  --      Recent Labs     02/02/20 0530 02/03/20 0605 02/04/20 0513   RBC 2.36* 2.09* 2.25*   HEMOGLOBIN 8.1* 7.1* 7.6*   HEMATOCRIT 25.7* 22.5* 24.0*   PLATELETCT 188 195 217     Called [facility] [phone] today (1/21) for update on the LP done on 1/15.   1/15 CSF Gram stain negative, Cx no growth to date (final)  1/15 CSF cell count was WBC 1, RBC 2. Total protein 45. Glucose 79  1/15 CSF HSV PCR negative  1/15 CSF paraneoplastic negative    Imaging  X-Ray:  I have personally reviewed the images and compared with prior images. and No film today    Assessment/Plan  * Status epilepticus (HCC)  Assessment & Plan  Neurology following and adjusting epileptics  Vimpat 300mg BID  Topamax 200mg BID  Depakote 500mg BID    Monitor need for EEG  Seizure precautions    Acute respiratory failure with hypoxia (HCC)  Assessment & Plan  Intubated date: 1/14/2020 s/p trach 2/2 Dr Devine  Goal saturation > 92%    Monitor pulse oximetry and adjust peep and FIO2 to abg/vbg, and peep optimization.  Monitor ventilator waveforms and titrate flow/peep and volumes according.   CXR: monitor lung volumes and tube/line placement  VAP bundle prevention, oral care, post pyloric feeding  Head of bed > 30 degree  GI prophylaxis  Daily awakening and SBT trials unless contraindicated  Monitor for  liberation/extubation    Respiratory treatments: prn    Continue to volume removal with dialysis    Tolerating t piece trial continue extending out trials    Severe protein-calorie malnutrition (HCC)  Assessment & Plan  Continue tube feeds at goal rate   Dietary following    Cerebrovascular accident (CVA) due to embolism of right middle cerebral artery ([facility])  Assessment & Plan  Continue high intensity statin, aspirin  PT/OT eval  Blood pressure control    End stage renal failure on dialysis ([facility])  Assessment & Plan  Avoid nephrotoxins  Patient having dialysis Monday Wednesday Friday long-term, no anticipation renal return to renal renal function  Aggressive volume removal    Normocytic anemia- (present on admission)  Assessment & Plan  Daily CBC with conservative transfusion strategy, monitor for bleeding  Restrictive transfusion strategy hg < 7    Hypertension  Assessment & Plan  scheduled hydralazine, Isordil, labetalol  PRN IV labetalol, hydralazine for goal SBP less than 160    DM (diabetes mellitus) ([facility])- (present on admission)  Assessment & Plan  Insulin sliding scale  Hypoglycemia protocol  FS BS  Goal -180    Goals of care, counseling/discussion  Assessment & Plan  Planned for LTAC s/p tracheostomy once on stable antiepileptic  Start ventilator weaning and t piece trials  Prognosis poor for overall independent or meaningful     Discuss timing of PEG    Pressure ulcer  Assessment & Plan  Stage II coccyx -present on arrival  Continue wound care    Discuss with nutrition for possible stool thickening   Doubt like bowel edema from volume overload    Hypomagnesemia  Assessment & Plan  Replace keep greater than 2    Dysphagia as late effect of cerebrovascular accident (CVA)- (present on admission)  Assessment & Plan  Aspiration precautions    Gout- (present on admission)  Assessment & Plan  Continue allopurinol    Obesity (BMI 30-39.9)- (present on admission)  Assessment & Plan  Nutritionist  consultation  Encourage behavioral and dietary modification once extubated for weight loss       VTE:  Heparin  Ulcer: H2 Antagonist  Lines: Central Line  Ongoing indication addressed and River Catheter  Ongoing indication addressed    I have performed a physical exam and reviewed and updated ROS and Plan today (2/4/2020). In review of yesterday's note (2/3/2020), there are no changes except as documented above.     Discussed patient condition and risk of morbidity and/or mortality with RN, RT, Pharmacy and neurology     The patient remains critically ill with multisystem failure on ventilator with active titration. Critical care time = 41 minutes in directly providing and coordinating critical care and extensive data review.  No time overlap and excludes procedures.

## 2020-02-05 NOTE — CARE PLAN
Problem: Pain Management  Goal: Pain level will decrease to patient's comfort goal  Outcome: PROGRESSING AS EXPECTED     Problem: Discharge Barriers/Planning  Goal: Patient's continuum of care needs will be met  Outcome: PROGRESSING SLOWER THAN EXPECTED  Note:   Pt's family not wanting to decide on an LTACH at this time. Continuing to educate on pt's prognosis and provide emotional support to family.

## 2020-02-05 NOTE — CARE PLAN
Ventilator Daily Summary    Vent Day # 22  Trach Day# 3     Ventilator settings changed this shift: none    Weaning trials: aerosol as tolerated    Respiratory Procedures: none at this time    Plan: Continue current ventilator settings and wean mechanical ventilation as tolerated per physician orders.

## 2020-02-05 NOTE — CARE PLAN
Problem: Communication  Goal: The ability to communicate needs accurately and effectively will improve  Outcome: PROGRESSING SLOWER THAN EXPECTED     Problem: Infection  Goal: Will remain free from infection  Outcome: PROGRESSING SLOWER THAN EXPECTED     Problem: Venous Thromboembolism (VTW)/Deep Vein Thrombosis (DVT) Prevention:  Goal: Patient will participate in Venous Thrombosis (VTE)/Deep Vein Thrombosis (DVT)Prevention Measures  Outcome: PROGRESSING SLOWER THAN EXPECTED

## 2020-02-05 NOTE — PROGRESS NOTES
[facility] Progress Note     Hemodialysis treatment ordered today per Dr. Meneses x 3.5 hours.   Treatment initiated at 0909, ended at 1239.      Patient with low BP before HD; Albumin 25% 25g IV preload given per Nephrologist order. BP elevated mid treatment, UF goal increased to 2.5-3L per Dr. Meneses. Patient stable post HD. See paper flow sheet for details.      Net UF 2,500 mL.      Post tx, CVC flushed with saline then locked with heparin 1000 units/mL per designated amount in each wing then clamped and capped. Aspirate heparin prior to next CVC use.     Report given to Penelope Silver RN.

## 2020-02-05 NOTE — DIETARY
Nutrition Services: Update   Pt has been receiving Impact Peptide 1.5 @ 40 mL/hr, providing 1440 kcal, 90 gm protein, 134 gm CHO, 0 gm of fiber, and 739 mL of free water per day.  MD added Nutrisource Fiber packets 2/4 - discussed case in IDT rounds - pt will remain on an elemental formula at this time however will adjust fiber flushes/day.    Recommendations/Plan:  · Nutrisource Fiber packets 6x/day, providing an additional 90 kcal (1530 kcal total w/ kcal from TF) and 18 gm of fiber.   · Consider addition of Metamucil if fiber flushes 6x/day is not feasible.  · Continue Impact Peptide 1.5 @ 40 mL/hr.  Fluids per MD.

## 2020-02-05 NOTE — DISCHARGE PLANNING
· Family conference scheduled with this RNCM, [place]- Palliative RN, and Dr. Quiroz on 2/6 at 1500  · Per Belen, Pt's daughter, she and her brother will attend the family conference

## 2020-02-05 NOTE — PROGRESS NOTES
Critical Care Progress Note    Date of admission  1/16/2020    Chief Complaint  74 y.o. female who presented 1/16/2020 with a past medical history significant for end-stage renal disease on hemodialysis since November 2019 M/W/F, hypertension, hyperlipidemia and coronary artery disease.  She was recently admitted to [facility] for an extended period of time followed by placement in a SNF.  She has been very frail requiring a hoist to get her in and out of the dialysis chair with failure to thrive.  On January 14 while at the SNF patient had a witnessed first-time seizure.  She was transferred to the emergency department where she had a second seizure with prolonged postictal state.  She was intubated for airway protection and started on a propofol drip in the emergency department on January 14 at [facility].  She was admitted to the intensive care unit where she underwent an MRI of her brain showing an acute right parietal lacunar infarct with overall brain parenchymal loss.  Neurology was consulted and patient was loaded with Keppra and continued on the propofol drip.  Despite this, on spot EEGs patient continued to have evidence of focal seizure and Dilantin was started yesterday.  She underwent a lumbar puncture today with unremarkable CSF and was being treated empirically with acyclovir, ampicillin, Rocephin, vancomycin and Decadron.  Given her persistent abnormal EEGs, the neurologist at the outside hospital recommend patient be transferred to a facility that can provide continuous EEG monitoring.  For this reason she was transferred to [facility] and I was consulted for her critical care management.  She was remained on a ventilator since her admission and is receiving dialysis as needed and is being followed by Dr. English.  She is oliguric.  Per documentation there is been some difficulty with patient's daughter when patient was at SNF and some discussion with family  while at [facility] regarding goals of care.  They apparently did not want to talk about changing CODE STATUS nor transitioning to comfort care and patient remains a full code. Taken from Dr Gonda note.     Hospital Course    Interval Problem Update  Reviewed last 24 hour events:  Neuro: does not follow withdrawal weak right, withdrawal left, opens eyes to touch no tracking  HR: 60-70  SBP: 's  Tmax: afebrile  GI: impact at goal, loose stool  UOP: 150ml   Lines: dialysis picc  Resp: vent 22 trach 3 spont x1  tpiece 1 hr tachypnea and desat   Vte: heparin   PPI/H2:pepcid  Antibx: bal negative   Electrolyte per nephrology  Midodrine 10mg   Discuss with nutrition  Discussed with nephrology   Tried to set up meeting with family   Sounds like tomorrow at 3pm with palliative care    Review of Systems  Review of Systems   Unable to perform ROS: Mental acuity        Vital Signs for last 24 hours   Temp:  [36.2 °C (97.2 °F)-36.7 °C (98 °F)] 36.7 °C (98 °F)  Pulse:  [63-85] 79  Resp:  [0-34] 20  BP: ()/(44-74) 135/58  SpO2:  [98 %-100 %] 98 %    Hemodynamic parameters for last 24 hours       Respiratory Information for the last 24 hours  Zepeda Vent Mode: APVCMV  Rate (breaths/min): 14  Vt Target (mL): 320  PEEP/CPAP: 8  FiO2: 30  P MEAN: 10  Length of Weaning Trial (Hours): 1  Control VTE (exp VT): 318    Physical Exam   Physical Exam  Vitals signs and nursing note reviewed.   Constitutional:       General: She is not in acute distress.     Appearance: She is ill-appearing. She is not toxic-appearing.      Comments: awake looking around family at bedside   HENT:      Head: Normocephalic.      Mouth/Throat:      Mouth: Mucous membranes are moist.   Eyes:      Conjunctiva/sclera: Conjunctivae normal.      Comments: Small 1 mm bilateral with eye movements   Neck:      Comments: Trach in place with dried blood  Cardiovascular:      Rate and Rhythm: Normal rate and regular rhythm.      Pulses: Normal  pulses.      Heart sounds: No murmur.      Comments: Right sided dialysis port   Pulmonary:      Effort: Pulmonary effort is normal.      Breath sounds: Normal breath sounds. No wheezing or rales.      Comments: Diminished at bases, mechanical on ventilator  Abdominal:      General: There is no distension.      Palpations: Abdomen is soft. There is no mass.      Tenderness: There is no tenderness. There is no guarding or rebound.      Hernia: No hernia is present.      Comments: Mild tenderness to right lower quadrant   Musculoskeletal:         General: Swelling present. No tenderness or signs of injury.      Right lower leg: Edema present.      Left lower leg: Edema present.      Comments: + edema in upper and lower extremities, contractor of left hand   Skin:     General: Skin is warm and dry.      Capillary Refill: Capillary refill takes 2 to 3 seconds.      Findings: Bruising present.   Neurological:      Comments: Baseline left-sided deficits from previous CVA    Awake looking around no blink to threat, right sided withdrawal weaker in right upper, and right sided greater then left, contractor and stiffiness to left hand, tries to open eyes, pupils 1-2mm reactive bilateral with EOMI, does not follow commands, responds with facial grimace to pain on left   Psychiatric:      Comments: Unable to determine         Medications  Current Facility-Administered Medications   Medication Dose Route Frequency Provider Last Rate Last Dose   • midodrine (PROAMATINE) tablet 10 mg  10 mg Enteral Tube PRN Fletcher Quiroz M.D.   Stopped at 02/05/20 1100   • divalproex (DEPAKOTE SPRINKLE) capsule 500 mg  500 mg Enteral Tube Q12HRS Fletcher Quiroz M.D.       • albumin human 25% solution 25 g  25 g Intravenous ACUTE DIALYSIS PRN Saima Meneses M.D.   Stopped at 02/03/20 1225   • lacosamide (VIMPAT) tablet 300 mg  300 mg Enteral Tube BID Sergei Uriarte M.D.   300 mg at 02/05/20 0513   • carboxymethylcellulose (REFRESH  TEARS) 0.5 % ophthalmic drops 1 Drop  1 Drop Both Eyes Q2HRS PRN Tanner Louis M.D.   1 Drop at 01/31/20 0546   • heparin injection 5,000 Units  5,000 Units Subcutaneous Q8HRS Anurag Hein D.O.   5,000 Units at 02/05/20 1300   • vitamin B comp+C (ALLBEE WITH C) 1 Tab  1 Tab Enteral Tube DAILY Anurag Hein D.O.   1 Tab at 02/05/20 0512   • folic acid (FOLVITE) tablet 1 mg  1 mg Enteral Tube DAILY Anurag Hein D.O.   1 mg at 02/05/20 0512   • allopurinol (ZYLOPRIM) tablet 100 mg  100 mg Enteral Tube DAILY Anurag Hein D.O.   100 mg at 02/05/20 0512   • nystatin (MYCOSTATIN) powder   Topical BID Anurag Hein D.O.       • LORazepam (ATIVAN) injection 1 mg  1 mg Intravenous Q3HRS PRN Catracho Bustos M.D.   1 mg at 02/01/20 1300   • hydrALAZINE (APRESOLINE) injection 10 mg  10 mg Intravenous Q4HRS PRN Anurag Hein D.OUli   10 mg at 02/05/20 1505   • topiramate (TOPAMAX) tablet 200 mg  200 mg Enteral Tube Q12HRS Catracho Bustos M.D.   200 mg at 02/05/20 0512   • aspirin (ASA) chewable tab 81 mg  81 mg Enteral Tube DAILY Josep You M.D.   81 mg at 02/05/20 0512   • atorvastatin (LIPITOR) tablet 40 mg  40 mg Enteral Tube DAILY Josep You M.D.   40 mg at 02/05/20 0512   • famotidine (PEPCID) tablet 20 mg  20 mg Enteral Tube DAILY Fletcher Willis M.D.   20 mg at 02/05/20 0512   • heparin injection 3,300 Units  3,300 Units Intracatheter PRN Lydia Carcamo M.D.   3,300 Units at 02/05/20 1241   • epoetin antoinette (EPOGEN/PROCRIT) injection 4,000 Units  4,000 Units Subcutaneous MO, WE + FR Payam Cavazos M.D.   4,000 Units at 02/05/20 0927   • Respiratory Care per Protocol   Nebulization Continuous RT Jeremy M Gonda, M.D.       • ipratropium-albuterol (DUONEB) nebulizer solution  3 mL Nebulization Q2HRS PRN (RT) Jeremy M Gonda, M.D.       • senna-docusate (PERICOLACE or SENOKOT S) 8.6-50 MG per tablet 2 Tab  2 Tab Enteral Tube BID Jeremy M Gonda, M.D.   2 Tab at 02/05/20 0512    And   • polyethylene glycol/lytes (MIRALAX)  PACKET 1 Packet  1 Packet Enteral Tube QDAY PRN Jeremy M Gonda, M.D.        And   • bisacodyl (DULCOLAX) suppository 10 mg  10 mg Rectal QDAY PRN Jeremy M Gonda, M.D.       • lidocaine (XYLOCAINE) 1 % injection 1-2 mL  1-2 mL Tracheal Tube Q30 MIN PRN Jeremy M Gonda, M.D.       • Pharmacy Consult: Enteral tube insertion - review meds/change route/product selection  1 Each Other PHARMACY TO DOSE Jeremy M Gonda, M.D.       • fentaNYL (SUBLIMAZE) injection 25 mcg  25 mcg Intravenous Q HOUR PRN Jeremy M Gonda, M.D.   25 mcg at 01/16/20 2310    Or   • fentaNYL (SUBLIMAZE) injection 50 mcg  50 mcg Intravenous Q HOUR PRN Jeremy M Gonda, M.D.   50 mcg at 01/20/20 1555    Or   • fentaNYL (SUBLIMAZE) injection 100 mcg  100 mcg Intravenous Q HOUR PRN Jeremy M Gonda, M.D.   100 mcg at 01/21/20 1748   • ondansetron (ZOFRAN) syringe/vial injection 4 mg  4 mg Intravenous Q4HRS PRN Rina Matta M.D.   4 mg at 01/18/20 1214   • labetalol (NORMODYNE/TRANDATE) injection 10 mg  10 mg Intravenous Q4HRS PRN Rina Matta M.D.   10 mg at 02/05/20 1316   • acetaminophen (TYLENOL) tablet 650 mg  650 mg Enteral Tube Q6HRS PRN Jeremy M Gonda, M.D.   650 mg at 02/05/20 1257   • ondansetron (ZOFRAN ODT) dispertab 4 mg  4 mg Enteral Tube Q4HRS PRN Jeremy M Gonda, M.D.           Fluids    Intake/Output Summary (Last 24 hours) at 2/5/2020 1711  Last data filed at 2/5/2020 1400  Gross per 24 hour   Intake 1900 ml   Output 3285 ml   Net -1385 ml       Laboratory          Recent Labs     02/03/20  0605 02/04/20  0513 02/05/20  0528   SODIUM 134* 133* 131*   POTASSIUM 4.1 3.8 4.2   CHLORIDE 102 98 96   CO2 24 28 28   BUN 94* 55* 80*   CREATININE 1.58* 1.12 1.48*   MAGNESIUM 1.8 1.8 1.9   PHOSPHORUS 4.3 2.9 3.7   CALCIUM 9.2 9.2 9.2     Recent Labs     02/03/20  0605 02/03/20 2129 02/04/20 0513 02/05/20  0528   ALTSGPT 14  --   --   --    ASTSGOT 21  --   --   --    ALKPHOSPHAT 55  --   --   --    TBILIRUBIN 0.4  --   --   --    PREALBUMIN  --   14.0*  --   --    GLUCOSE 191*  --  137* 127*     Recent Labs     02/03/20  0605 02/04/20  0513 02/05/20  0528   WBC 12.0* 13.1* 12.8*   NEUTSPOLYS 61.10 62.80 56.80   LYMPHOCYTES 23.70 17.60* 24.70   MONOCYTES 11.40 14.40* 13.20   EOSINOPHILS 2.80 2.80 3.20   BASOPHILS 0.40 0.90 0.80   ASTSGOT 21  --   --    ALTSGPT 14  --   --    ALKPHOSPHAT 55  --   --    TBILIRUBIN 0.4  --   --      Recent Labs     02/03/20  0605 02/04/20  0513 02/05/20  0528   RBC 2.09* 2.25* 2.33*   HEMOGLOBIN 7.1* 7.6* 7.8*   HEMATOCRIT 22.5* 24.0* 25.0*   PLATELETCT 195 217 238     Called [facility] [phone] today (1/21) for update on the LP done on 1/15.   1/15 CSF Gram stain negative, Cx no growth to date (final)  1/15 CSF cell count was WBC 1, RBC 2. Total protein 45. Glucose 79  1/15 CSF HSV PCR negative  1/15 CSF paraneoplastic negative    Imaging  X-Ray:  I have personally reviewed the images and compared with prior images. and No film today    Assessment/Plan  * Status epilepticus (HCC)  Assessment & Plan  Neurology following and adjusting epileptics  Vimpat 300mg BID  Topamax 200mg BID  Depakote 500mg BID    Monitor need for EEG  Seizure precautions    Acute respiratory failure with hypoxia (HCC)  Assessment & Plan  Intubated date: 1/14/2020 s/p trach 2/2 Dr Devine  Goal saturation > 92%    Monitor pulse oximetry and adjust peep and FIO2 to abg/vbg, and peep optimization.  Monitor ventilator waveforms and titrate flow/peep and volumes according.   CXR: monitor lung volumes and tube/line placement  VAP bundle prevention, oral care, post pyloric feeding  Head of bed > 30 degree  GI prophylaxis  Daily awakening and SBT trials unless contraindicated  Monitor for liberation/extubation    Respiratory treatments: prn    Continue to volume removal with dialysis  Tolerating t piece trial only 1 hour yesterday 2/4 continue extending out trials and monitor    Severe protein-calorie malnutrition (HCC)  Assessment &  Plan  Continue tube feeds at goal rate   Dietary following    Cerebrovascular accident (CVA) due to embolism of right middle cerebral artery (HCC)  Assessment & Plan  Continue high intensity statin, aspirin  PT/OT eval -> not able to participate   Blood pressure control    End stage renal failure on dialysis ([facility])  Assessment & Plan  Avoid nephrotoxins  Patient having dialysis Monday Wednesday Friday long-term, no anticipation renal return to renal renal function  Aggressive volume removal    Normocytic anemia- (present on admission)  Assessment & Plan  Daily CBC with conservative transfusion strategy, monitor for bleeding  Restrictive transfusion strategy hg < 7    Hypertension  Assessment & Plan  PRN IV labetalol, hydralazine for goal SBP less than 160    Mild hypotension needing midodrine prior to dialysis continue to monitor need    DM (diabetes mellitus) ([facility])- (present on admission)  Assessment & Plan  Insulin sliding scale  Hypoglycemia protocol  FS BS  Goal -180    Goals of care, counseling/discussion  Assessment & Plan  Planned for LTAC s/p tracheostomy once on stable antiepileptic  Start ventilator weaning and t piece trials  Prognosis poor for overall independent or meaningful     Discuss timing of PEG    Pressure ulcer  Assessment & Plan  Stage II coccyx -present on arrival  Continue wound care    Discuss with nutrition for possible stool thickening   Doubt like bowel edema from volume overload    Hypomagnesemia  Assessment & Plan  Replace keep greater than 2    Dysphagia as late effect of cerebrovascular accident (CVA)- (present on admission)  Assessment & Plan  Aspiration precautions, discuss if peg tube within goals of care vs LTAC with cortrax    Gout- (present on admission)  Assessment & Plan  Continue allopurinol    Obesity (BMI 30-39.9)- (present on admission)  Assessment & Plan  Nutritionist consultation  Encourage behavioral and dietary modification once extubated for weight loss        VTE:  Heparin  Ulcer: H2 Antagonist  Lines: Central Line  Ongoing indication addressed and River Catheter  Ongoing indication addressed    I have performed a physical exam and reviewed and updated ROS and Plan today (2/5/2020). In review of yesterday's note (2/4/2020), there are no changes except as documented above.     Discussed patient condition and risk of morbidity and/or mortality with RN, RT, Pharmacy and neurology     The patient remains critically ill with multisystem failure on ventilator with active titration and goals of care discussion and need for midodrine and monitoring for pressors for fluid removal. Critical care time = 76 minutes in directly providing and coordinating critical care and extensive data review.  No time overlap and excludes procedures.

## 2020-02-05 NOTE — WOUND TEAM
[facility]  Inpatient Services  Wound and Skin Care Progress Note    Admission Date:  1/16/2020   HPI, PMH, SH: Reviewed  Unit where seen by Wound Team: S128/00    WOUND TEAM FOLLOW UP: POA pressure injury    SUBJECTIVE:  trach     Self Report / Pain Level:no s/s of distress      OBJECTIVE: on NUBIA bed, heel float boots, BMS  WOUND TYPE, LOCATION, CHARACTERISTICS (Pressure ulcers: location, stage, POA or date identified)  Pressure Injury 01/16/20 Sacrum;Coccyx stage 3 POA  (Active)      2/4/2020  1:00 PM   Pressure Injury Stage 3    State of Healing Early/partial granulation;Non-healing    Site Assessment Red;Pink, yellow    Lucina-wound Assessment Clean;Red    Margins Defined edges    Wound Length (cm) 6.5 cm 2/4/2020  1:00 PM   Wound Width (cm) 6 cm 2/4/2020  1:00 PM   Wound Depth (cm) 0 cm 2/4/2020  1:00 PM   Wound Surface Area (cm^2) 39 cm^2 2/4/2020  1:00 PM   Tunneling 0 cm 2/4/2020  1:00 PM   Undermining 0 cm 2/4/2020  1:00 PM   Closure None    Drainage Amount Small    Drainage Description Serosanguineous    Treatments Cleansed    Cleansing Normal Saline Irrigation    Periwound Protectant Antifungal Therapy;Barrier Paste    Dressing Options Viscopaste    Dressing Cleansing/Solutions Normal Saline    Dressing Changed Changed    Dressing Status Clean;Dry;Intact    Dressing Change Frequency Every 48 hrs    NEXT Dressing Change  02/06/20    NEXT Weekly Photo (Inpatient Only) 02/12/20    Odor None     Exposed Structures None     Tissue Type and Percentage 95% red/pink 5% yellow      Vascular:  Wounds not r/t vascular problem    Lab Values:    Lab Values:    Lab Results   Component Value Date/Time    WBC 13.1 (H) 02/04/2020 05:13 AM    RBC 2.25 (L) 02/04/2020 05:13 AM    HEMOGLOBIN 7.6 (L) 02/04/2020 05:13 AM    HEMATOCRIT 24.0 (L) 02/04/2020 05:13 AM        Results from last 7 days   Lab Units 02/03/20 2129   C REACTIVE PROTEIN 4596 mg/dL 7.77*           Lab Results   Component Value Date/Time    HBA1C 7.5 (H) 02/07/2019 01:20 PM         Culture:   na    INTERVENTIONS BY WOUND TEAM:  Sacrum;Coccyx -Dressing Options: stoma powder, viscopaste, mepilex    Interdisciplinary consultation:   With Nursing;With Patient;With Physician    EVALUATION:pt's suleman-wound skin improved, wound bed has improved. BMS still with leakage, does frequently require additional drsg changes. Discussed possibility of trying additional fiber to bulk up stool with Dr. Pt has small partial thickness wounds to head from tape stripping/irritation of EEG leads.     Goals:  Slow steady decrease in wound area and depth weekly     NURSING PLAN OF CARE:    Dressing changes: Continue previous Dressing Maintenance orders:        See new Dressing Maintenance orders:  x     Skin care: See Skin Care orders:  cont      Rectal tube care: See Rectal Tube Care orders:      Other orders:           WOUND TEAM PLAN OF CARE (X):   NPWT change 3 x week:        Dressing changes:       Follow up as needed:  x     Other:    Anticipated discharge plans (X):  SNF:           Home Care:           Outpatient Wound Center:            Self Care:            Other:unsure of needs @ this time

## 2020-02-05 NOTE — PROGRESS NOTES
[facility] Nephrology Daily Progress Note    Date of Service  2/5/2020    AUTHOR: Saima Meneses M.D.    Chief Complaint   Follow up ESRD    HPI  74 y.o. female admitted to [facility] on 1/14/20 with seizures.She was at a SNF.She had been previously hospitalized at [facility] and diaysis was initiated.  She was doing very poorly then and palliative care was discussed with the family,but they declined.She had very poor functional status and required Jimena lift to get into the dialysis chair.She was found to have  a CVA on MRI at [facility].Her seizures were not controlled and it was felt she was in status epilepticus.  She was getting HD at [facility].Last HD was on 1/13/20.She was seen by Dr Carcamo at [facility].Creatinine was 1.8 and dialysis was held. Neurology Dennison that the patient needed continuous EEG monitoring and he was transferred to [facility]    Interval Problem Update  Please see note from 1/31 for prior summaries  2/01 - NAEO, another EEG being done this AM  2/02 - NAEO, scheduled for Trach today  2/03 - No events, underwent trach yesterday, no change in neuro status, BP stable overnight but low this am, given midodrine, to have HD today  2/04 - No events, tolerated HD yest with 2.1L UF, BP stable this am  2/05 - No events, BP low prior to starting HD, given albumin at the start of HD and BP stable at this time    Review of Systems   Unable to perform ROS: Acuity of condition     PAST FAMILY HISTORY: Reviewed and unchanged since admission  PAST SURGICAL HISTORY:  Reviewed and unchanged since admission  SOCIAL HISTORY:  Reviewed and unchanged since admission  FAMILY HISTORY: Reviewed and unchanged since admission  CURRENT MEDICATIONS: Reviewed since admission to present  IMAGING STUDIES: Reviewed since admission to present  LABORATORY STUDIES: Reviewed since admission to present    Physical Exam  Temp:  [36.2 °C (97.2 °F)-36.6 °C (97.8 °F)] 36.6 °C (97.8 °F)  Pulse:  [56-87] 83  Resp:  [0-34] 16  BP:  ()/(39-64) 135/60  SpO2:  [85 %-100 %] 100 %    Physical Exam  Vitals signs and nursing note reviewed.   Constitutional:       General: She is not in acute distress.     Appearance: Normal appearance. She is ill-appearing.   HENT:      Head: Normocephalic and atraumatic.      Right Ear: External ear normal.      Left Ear: External ear normal.      Nose: Nose normal. No congestion.      Mouth/Throat:      Mouth: Mucous membranes are moist.      Pharynx: No oropharyngeal exudate.      Comments: ETT  Eyes:      General: No scleral icterus.     Conjunctiva/sclera: Conjunctivae normal.      Pupils: Pupils are equal, round, and reactive to light.   Neck:      Musculoskeletal: Neck supple. No muscular tenderness.      Comments: +Trach  Cardiovascular:      Rate and Rhythm: Normal rate and regular rhythm.      Heart sounds: Normal heart sounds.   Pulmonary:      Effort: Pulmonary effort is normal. No respiratory distress.      Breath sounds: Normal breath sounds.      Comments: +Vent  Chest:      Chest wall: No tenderness.   Abdominal:      General: Bowel sounds are normal. There is no distension.      Palpations: Abdomen is soft.   Musculoskeletal:         General: No swelling, tenderness or signs of injury.      Comments: anasarca   Lymphadenopathy:      Cervical: No cervical adenopathy.   Skin:     General: Skin is warm and dry.      Findings: No rash.   Neurological:      Mental Status: She is alert.      Comments: Somnolent, opens eyes to painful stimuli, withdraws to painful stimuli   Psychiatric:      Comments: Unable to assess, pt unresponsive       Fluids    Intake/Output Summary (Last 24 hours) at 2/5/2020 1033  Last data filed at 2/5/2020 1000  Gross per 24 hour   Intake 1487.5 ml   Output 395 ml   Net 1092.5 ml     Laboratory  Recent Labs     02/03/20  0605 02/04/20  0513 02/05/20  0528   WBC 12.0* 13.1* 12.8*   RBC 2.09* 2.25* 2.33*   HEMOGLOBIN 7.1* 7.6* 7.8*   HEMATOCRIT 22.5* 24.0* 25.0*   .7*  106.7* 107.3*   MCH 34.0* 33.8* 33.5*   MCHC 31.6* 31.7* 31.2*   RDW 63.4* 61.5* 61.4*   PLATELETCT 195 217 238   MPV 9.6 10.1 9.7     Recent Labs     02/03/20  0605 02/04/20  0513 02/05/20  0528   SODIUM 134* 133* 131*   POTASSIUM 4.1 3.8 4.2   CHLORIDE 102 98 96   CO2 24 28 28   GLUCOSE 191* 137* 127*   BUN 94* 55* 80*   CREATININE 1.58* 1.12 1.48*   CALCIUM 9.2 9.2 9.2     IMPRESSION:  # ESRD   MWF iHD as OP at [facility]   CrCl 6              Hypotension with HD/UF, subclavian line removed per ICU team, PICC line for pressors/meds  # Status epilepticus  # S/P acute lacunar infarct   Hx of previous CVA with significant deficits.  # HTN--BP variable often with intradialytic hypotension  # DM II  # Acute Hypoxic Respiratory Failure  --CXR 2/3 still with bilateral pulm edema  # Hx of dysphagia  # Anemia of CKD.Ferritin previously significantly elevated. 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    PLAN:  -HD today and qMWF  -Will eval for PUF treatment in am to optimize volume status  -UF with HD as tolerated  -Midodrine increased to 10mg prior to dialysis  -May require pressor support with HD for fluid removal  -Seizures management per Neurology--dose adjust meds to accomodate dialysis  -Enteral feedings  -No dietary protein restrictions  -Epogen with HD  -Follow labs  -Continue GOC discussions with family  -Very poor prognosis, recommend comfort care, if and when family agreeable which they are not at this time    **I spent a total of 35 minutes in the evaluation and coordination of care for this critically ill patient including discussions with Intensivist and family at bedside    All prior notes form other doctors and RN staff were reviewed from admission to current day to help  me make my clinical decisions.

## 2020-02-05 NOTE — PROGRESS NOTES
[facility]    Patient seen and examined on hemodialysis  VSS--see dialysis treatment sheet for full details  Labs reviewed

## 2020-02-05 NOTE — CARE PLAN
Problem: Infection  Goal: Will remain free from infection  Outcome: PROGRESSING AS EXPECTED     Problem: Venous Thromboembolism (VTW)/Deep Vein Thrombosis (DVT) Prevention:  Goal: Patient will participate in Venous Thrombosis (VTE)/Deep Vein Thrombosis (DVT)Prevention Measures  Outcome: PROGRESSING AS EXPECTED     Problem: Safety - Medical Restraint  Goal: Remains free of injury from restraints (Restraint for Interference with Medical Device)  Description  INTERVENTIONS:  1. Determine that other, less restrictive measures have been tried or would not be effective before applying the restraint  2. Evaluate the patient's condition at the time of restraint application  3. Inform patient/family regarding the reason for restraint  4. Q2H: Monitor safety, psychosocial status, comfort, nutrition and hydration  Outcome: PROGRESSING AS EXPECTED  Goal: Free from restraint(s) (Restraint for Interference with Medical Device)  Description  INTERVENTIONS:  1. ONCE/SHIFT or MINIMUM Q12H: Assess and document the continuing need for restraints  2. Q24H: Continued use of restraint requires LIP to perform face to face examination and written order  3. Identify and implement measures to help patient regain control  Outcome: PROGRESSING AS EXPECTED     Problem: Skin Integrity  Goal: Risk for impaired skin integrity will decrease  Outcome: PROGRESSING SLOWER THAN EXPECTED

## 2020-02-06 NOTE — CARE PLAN
Ventilator Daily Summary    Vent Day #23  Trache day 4    Ventilator settings changed this shift: no    Weaning trials:  T-piece trial     Respiratory Procedures: not at this time    Plan: Continue current ventilator settings and wean mechanical ventilation as tolerated per physician orders.

## 2020-02-06 NOTE — DISCHARGE PLANNING
· RNCM notified that MD cannot make it to family conference   · PC RN, Dr. Guzman, and family notified  · RNCM spoke to luis Wilson, with update that a later family conference might be needed this evening  · House SW number provided to bedside RNPenelope, if needed after 1600

## 2020-02-06 NOTE — CARE PLAN
Problem: Venous Thromboembolism (VTW)/Deep Vein Thrombosis (DVT) Prevention:  Goal: Patient will participate in Venous Thrombosis (VTE)/Deep Vein Thrombosis (DVT)Prevention Measures  Outcome: PROGRESSING AS EXPECTED  SCDs on and in place.     Problem: Bowel/Gastric:  Goal: Will not experience complications related to bowel motility  Outcome: PROGRESSING AS EXPECTED  BMS flushed multiple times and area cleansed.     Problem: Skin Integrity  Goal: Risk for impaired skin integrity will decrease  Outcome: PROGRESSING AS EXPECTED  Proper wound protocols in place. Dressing changed.     Problem: Knowledge Deficit  Goal: Knowledge of the prescribed therapeutic regimen will improve  Outcome: PROGRESSING SLOWER THAN EXPECTED   Family meeting planned for this afternoon.

## 2020-02-06 NOTE — CARE PLAN
Problem: Ventilation Defect:  Goal: Ability to achieve and maintain unassisted ventilation or tolerate decreased levels of ventilator support  Outcome: PROGRESSING SLOWER THAN EXPECTED     PT now on T-Piece trials and tolerating well.    Adult Ventilation Update    Total Vent Days: 22    Patient Lines/Drains/Airways Status      Active Airway       Name: Placement date: Placement time: Site: Days:    Airway Trach Tracheostomy 7.0  02/02/20   1145   Tracheostomy  3                    In the last 24 hours, the patient tolerated SBT for 4 hours and still tolerating very wellon settings of 40% T-Piece.    #FVC / Vital Capacity (liters) : (pt does not follow)   NIF (cm H2O) : (pt does not follow)   Rapid Shallow Breathing Index (RR/VT): 67 (02/05/20 0429)  Plateau Pressure (Q Shift): 16 (02/05/20 1010)  Static Compliance (ml / cm H2O): 37 (02/05/20 1010)    Patient failed trials because of Barriers to Wean:   Barriers to SBT Weaning Trial Stopped due to:: Pt weaned for 1 hour and returned to rest settings per protocol (02/05/20 0429)  Length of Weaning Trial Length of Weaning Trial (Hours): 1 (02/05/20 0429)    3 day post trache.  The patient is currently in trache wean according to protocol.       Sputum/Suction   Cough: (none) (02/05/20 1327)  Sputum Amount: Moderate (02/05/20 1200)  Sputum Color: Tan;White (02/05/20 1200)  Sputum Consistency: Thick;Thin (02/05/20 1200)    Mobility  Level of Mobility: Level I (02/05/20 0600)  Activity Performed: Unable to mobilize (02/05/20 0800)  Pt Calls for Assistance: No (02/05/20 0800)  Gait: Unable to Ambulate (02/05/20 1200)  Reason Not Mobilized: Unstable condition;Patient refused, education provided;Other (comment)(Patient does not follow) (02/05/20 0800)  Mobilization Comments: does not follow commands     Events/Summary/Plan: PT placed on 40% T-Peice. PT tolerating very well @ this time. Will continue to monitor PT closely. (02/05/20 1327)

## 2020-02-06 NOTE — PROGRESS NOTES
Critical Care Progress Note    Date of admission  1/16/2020    Chief Complaint  74 y.o. female who presented 1/16/2020 with a past medical history significant for end-stage renal disease on hemodialysis since November 2019 M/W/F, hypertension, hyperlipidemia and coronary artery disease.  She was recently admitted to [facility] for an extended period of time followed by placement in a SNF.  She has been very frail requiring a hoist to get her in and out of the dialysis chair with failure to thrive.  On January 14 while at the SNF patient had a witnessed first-time seizure.  She was transferred to the emergency department where she had a second seizure with prolonged postictal state.  She was intubated for airway protection and started on a propofol drip in the emergency department on January 14 at [facility].  She was admitted to the intensive care unit where she underwent an MRI of her brain showing an acute right parietal lacunar infarct with overall brain parenchymal loss.  Neurology was consulted and patient was loaded with Keppra and continued on the propofol drip.  Despite this, on spot EEGs patient continued to have evidence of focal seizure and Dilantin was started yesterday.  She underwent a lumbar puncture today with unremarkable CSF and was being treated empirically with acyclovir, ampicillin, Rocephin, vancomycin and Decadron.  Given her persistent abnormal EEGs, the neurologist at the outside hospital recommend patient be transferred to a facility that can provide continuous EEG monitoring.  For this reason she was transferred to [facility] and I was consulted for her critical care management.  She was remained on a ventilator since her admission and is receiving dialysis as needed and is being followed by Dr. English.  She is oliguric.  Per documentation there is been some difficulty with patient's daughter when patient was at SNF and some discussion with family  while at [facility] regarding goals of care.  They apparently did not want to talk about changing CODE STATUS nor transitioning to comfort care and patient remains a full code. Taken from Dr Gonda note.     Hospital Course    Interval Problem Update  Reviewed last 24 hour events:  Neuro: not following withdrawal all 4 possible spont left upper, tracks to right   HR: 90's  SBP: 120-180's labetalol  Tmax: afebrile  GI: cortrax on hold vomiting this once  UOP: 200ml  Lines: picc edvin no gtt  Skin stage 3 ulcer  No mobilize  Resp: 10l 40% 20 hrs   Vte: heparin  PPI/H2:pepcid -> d/c  Antibx: none antibiotics  Family conference 3pm     Long discussion daughter is still hopefull has some questions about CSF results and safety of peg tube with ascites. She would like to hold off a couple more days for peg tube or even have her mother sent to LTAC then return for peg tube.   Will have GI and Neurology come discuss with patient  I updated her if things are still find tomorrow felt the patient could go to floor.   She asked we reduce heparin dose for tracheal bleeding.     Review of Systems  Review of Systems   Unable to perform ROS: Mental acuity        Vital Signs for last 24 hours   Temp:  [36.5 °C (97.7 °F)-36.9 °C (98.4 °F)] 36.9 °C (98.4 °F)  Pulse:  [63-98] 98  Resp:  [13-51] 18  BP: ()/(44-74) 138/63  SpO2:  [98 %-100 %] 99 %    Hemodynamic parameters for last 24 hours       Respiratory Information for the last 24 hours  [place] Vent Mode: APVCMV  Rate (breaths/min): 14  Vt Target (mL): 320  PEEP/CPAP: 8  FiO2: 30  P MEAN: 10  Control VTE (exp VT): 318    Physical Exam   Physical Exam  Vitals signs and nursing note reviewed.   Constitutional:       General: She is not in acute distress.     Appearance: She is ill-appearing. She is not toxic-appearing.      Comments: awake looking around family at bedside   HENT:      Head: Normocephalic.      Mouth/Throat:      Mouth: Mucous membranes are moist.    Eyes:      Conjunctiva/sclera: Conjunctivae normal.      Comments: Small 1 mm bilateral with eye movements   Neck:      Comments: Trach in place with dried blood  Cardiovascular:      Rate and Rhythm: Normal rate and regular rhythm.      Pulses: Normal pulses.      Heart sounds: No murmur.      Comments: Right sided dialysis port   Pulmonary:      Effort: Pulmonary effort is normal.      Breath sounds: Normal breath sounds. No wheezing or rales.      Comments: Diminished at bases, mechanical on ventilator  Abdominal:      General: There is no distension.      Palpations: Abdomen is soft. There is no mass.      Tenderness: There is no tenderness. There is no guarding or rebound.      Hernia: No hernia is present.      Comments: Mild tenderness to right lower quadrant   Musculoskeletal:         General: Swelling present. No tenderness or signs of injury.      Right lower leg: Edema present.      Left lower leg: Edema present.      Comments: + edema in upper and lower extremities, contractor of left hand   Skin:     General: Skin is warm and dry.      Capillary Refill: Capillary refill takes 2 to 3 seconds.      Findings: Bruising present.   Neurological:      Comments: Baseline left-sided deficits from previous CVA    Awake looking around no blink to threat, right sided withdrawal weaker in right upper, and right sided greater then left, contractor and stiffiness to left hand, tries to open eyes, pupils 1-2mm reactive bilateral with EOMI, does not follow commands, responds with facial grimace to pain on left   Psychiatric:      Comments: Unable to determine         Medications  Current Facility-Administered Medications   Medication Dose Route Frequency Provider Last Rate Last Dose   • midodrine (PROAMATINE) tablet 10 mg  10 mg Enteral Tube PRN Fletcher Quiroz M.D.   Stopped at 02/05/20 1100   • divalproex (DEPAKOTE SPRINKLE) capsule 500 mg  500 mg Enteral Tube Q12HRS Fletcher Quiroz M.D.   500 mg at 02/06/20  0602   • albumin human 25% solution 25 g  25 g Intravenous ACUTE DIALYSIS PRN Saima Meneses M.D.   Stopped at 02/03/20 1225   • lacosamide (VIMPAT) tablet 300 mg  300 mg Enteral Tube BID Sergei Uriarte M.D.   300 mg at 02/06/20 0603   • carboxymethylcellulose (REFRESH TEARS) 0.5 % ophthalmic drops 1 Drop  1 Drop Both Eyes Q2HRS PRN Tanner Louis M.D.   1 Drop at 01/31/20 0546   • heparin injection 5,000 Units  5,000 Units Subcutaneous Q8HRS Anurag Hein D.O.   5,000 Units at 02/06/20 0603   • vitamin B comp+C (ALLBEE WITH C) 1 Tab  1 Tab Enteral Tube DAILY Anurag Hein D.O.   1 Tab at 02/06/20 0602   • folic acid (FOLVITE) tablet 1 mg  1 mg Enteral Tube DAILY Anurag Hein D.O.   1 mg at 02/06/20 0602   • allopurinol (ZYLOPRIM) tablet 100 mg  100 mg Enteral Tube DAILY Anurag Hein D.O.   100 mg at 02/06/20 0602   • nystatin (MYCOSTATIN) powder   Topical BID Anurag Hein D.O.       • LORazepam (ATIVAN) injection 1 mg  1 mg Intravenous Q3HRS PRN Catracho Bustos M.D.   1 mg at 02/01/20 1300   • hydrALAZINE (APRESOLINE) injection 10 mg  10 mg Intravenous Q4HRS PRN Anurag Hein D.O.   10 mg at 02/05/20 2100   • topiramate (TOPAMAX) tablet 200 mg  200 mg Enteral Tube Q12HRS Catracho Bustos M.D.   200 mg at 02/06/20 0602   • aspirin (ASA) chewable tab 81 mg  81 mg Enteral Tube DAILY Josep You M.D.   81 mg at 02/06/20 0602   • atorvastatin (LIPITOR) tablet 40 mg  40 mg Enteral Tube DAILY Josep You M.D.   40 mg at 02/06/20 0603   • famotidine (PEPCID) tablet 20 mg  20 mg Enteral Tube DAILY Fletcher Willis M.D.   20 mg at 02/06/20 0602   • heparin injection 3,300 Units  3,300 Units Intracatheter PRN Lydia Carcamo M.D.   3,300 Units at 02/05/20 1241   • epoetin antoinette (EPOGEN/PROCRIT) injection 4,000 Units  4,000 Units Subcutaneous MO, WE + FR Payam Cavazos M.D.   4,000 Units at 02/05/20 0927   • Respiratory Care per Protocol   Nebulization Continuous RT Jeremy M Gonda, M.D.       • ipratropium-albuterol  (DUONEB) nebulizer solution  3 mL Nebulization Q2HRS PRN (RT) Jeremy M Gonda, M.D.       • senna-docusate (PERICOLACE or SENOKOT S) 8.6-50 MG per tablet 2 Tab  2 Tab Enteral Tube BID Jeremy M Gonda, M.D.   Stopped at 02/05/20 1800    And   • polyethylene glycol/lytes (MIRALAX) PACKET 1 Packet  1 Packet Enteral Tube QDAY PRN Jeremy M Gonda, M.D.        And   • bisacodyl (DULCOLAX) suppository 10 mg  10 mg Rectal QDAY PRN Jeremy M Gonda, M.D.       • lidocaine (XYLOCAINE) 1 % injection 1-2 mL  1-2 mL Tracheal Tube Q30 MIN PRN Jeremy M Gonda, M.D.       • Pharmacy Consult: Enteral tube insertion - review meds/change route/product selection  1 Each Other PHARMACY TO DOSE Jeremy M Gonda, M.D.       • fentaNYL (SUBLIMAZE) injection 25 mcg  25 mcg Intravenous Q HOUR PRN Jeremy M Gonda, M.D.   25 mcg at 01/16/20 2310    Or   • fentaNYL (SUBLIMAZE) injection 50 mcg  50 mcg Intravenous Q HOUR PRN Jeremy M Gonda, M.D.   50 mcg at 01/20/20 1555    Or   • fentaNYL (SUBLIMAZE) injection 100 mcg  100 mcg Intravenous Q HOUR PRN Jeremy M Gonda, M.D.   100 mcg at 01/21/20 1748   • ondansetron (ZOFRAN) syringe/vial injection 4 mg  4 mg Intravenous Q4HRS PRN Rina Matta M.D.   4 mg at 01/18/20 1214   • labetalol (NORMODYNE/TRANDATE) injection 10 mg  10 mg Intravenous Q4HRS PRN Rina Matta M.D.   10 mg at 02/05/20 1316   • acetaminophen (TYLENOL) tablet 650 mg  650 mg Enteral Tube Q6HRS PRN Jeremy M Gonda, M.D.   650 mg at 02/06/20 0603   • ondansetron (ZOFRAN ODT) dispertab 4 mg  4 mg Enteral Tube Q4HRS PRN Kenroy M Gonda, M.D.           Fluids    Intake/Output Summary (Last 24 hours) at 2/6/2020 0624  Last data filed at 2/6/2020 0400  Gross per 24 hour   Intake 1900 ml   Output 3600 ml   Net -1700 ml       Laboratory          Recent Labs     02/04/20  0513 02/05/20  0528   SODIUM 133* 131*   POTASSIUM 3.8 4.2   CHLORIDE 98 96   CO2 28 28   BUN 55* 80*   CREATININE 1.12 1.48*   MAGNESIUM 1.8 1.9   PHOSPHORUS 2.9 3.7   CALCIUM 9.2  9.2     Recent Labs     02/03/20 2129 02/04/20 0513 02/05/20  0528   PREALBUMIN 14.0*  --   --    GLUCOSE  --  137* 127*     Recent Labs     02/04/20 0513 02/05/20  0528 02/06/20  0600   WBC 13.1* 12.8* 13.3*   NEUTSPOLYS 62.80 56.80 57.10   LYMPHOCYTES 17.60* 24.70 22.00   MONOCYTES 14.40* 13.20 15.00*   EOSINOPHILS 2.80 3.20 3.80   BASOPHILS 0.90 0.80 0.80     Recent Labs     02/04/20 0513 02/05/20 0528 02/06/20  0600   RBC 2.25* 2.33* 2.47*   HEMOGLOBIN 7.6* 7.8* 8.2*   HEMATOCRIT 24.0* 25.0* 26.6*   PLATELETCT 217 238 296     Called [facility] [phone] today (1/21) for update on the LP done on 1/15.   1/15 CSF Gram stain negative, Cx no growth to date (final)  1/15 CSF cell count was WBC 1, RBC 2. Total protein 45. Glucose 79  1/15 CSF HSV PCR negative  1/15 CSF paraneoplastic negative    Imaging  X-Ray:  I have personally reviewed the images and compared with prior images. and No film today    Assessment/Plan  * Status epilepticus (HCC)  Assessment & Plan  Neurology following and adjusting epileptics  Vimpat 300mg BID  Topamax 200mg BID  Depakote 500mg BID    Monitor need for EEG  Seizure precautions    Acute respiratory failure with hypoxia (HCC)  Assessment & Plan  Intubated date: 1/14/2020 s/p trach 2/2 Dr Devine  Goal saturation > 92%    Monitor pulse oximetry and adjust peep and FIO2 to abg/vbg, and peep optimization.  Monitor ventilator waveforms and titrate flow/peep and volumes according.   CXR: monitor lung volumes and tube/line placement  VAP bundle prevention, oral care, post pyloric feeding  Head of bed > 30 degree  GI prophylaxis  Daily awakening and SBT trials unless contraindicated  Monitor for liberation/extubation    Respiratory treatments: prn    Continue to volume removal with dialysis  Tolerating t piece trial only 1 hour yesterday 2/4 continue extending out trials and monitor    Severe protein-calorie malnutrition (HCC)  Assessment & Plan  Continue tube feeds  at goal rate   Dietary following    Cerebrovascular accident (CVA) due to embolism of right middle cerebral artery (HCC)  Assessment & Plan  Continue high intensity statin, aspirin  PT/OT eval -> not able to participate   Blood pressure control    End stage renal failure on dialysis ([facility])  Assessment & Plan  Avoid nephrotoxins  Patient having dialysis Monday Wednesday Friday long-term, no anticipation renal return to renal renal function  Aggressive volume removal    Normocytic anemia- (present on admission)  Assessment & Plan  Daily CBC with conservative transfusion strategy, monitor for bleeding  Restrictive transfusion strategy hg < 7    Hypertension  Assessment & Plan  PRN IV labetalol, hydralazine for goal SBP less than 160    Mild hypotension needing midodrine prior to dialysis continue to monitor need    DM (diabetes mellitus) ([facility])- (present on admission)  Assessment & Plan  Insulin sliding scale  Hypoglycemia protocol  FS BS  Goal -180    Goals of care, counseling/discussion  Assessment & Plan  Planned for LTAC s/p tracheostomy once on stable antiepileptic  Start ventilator weaning and t piece trials  Prognosis poor for overall independent or meaningful     Discuss timing of PEG    Pressure ulcer  Assessment & Plan  Stage II coccyx -present on arrival  Continue wound care    Discuss with nutrition for possible stool thickening   Doubt like bowel edema from volume overload    Hypomagnesemia  Assessment & Plan  Replace keep greater than 2    Dysphagia as late effect of cerebrovascular accident (CVA)- (present on admission)  Assessment & Plan  Aspiration precautions, discuss if peg tube within goals of care vs LTAC with cortrax    Gout- (present on admission)  Assessment & Plan  Continue allopurinol    Obesity (BMI 30-39.9)- (present on admission)  Assessment & Plan  Nutritionist consultation  Encourage behavioral and dietary modification once extubated for weight loss       VTE:  Heparin  Ulcer: H2  Antagonist  Lines: Central Line  Ongoing indication addressed and River Catheter  Ongoing indication addressed    I have performed a physical exam and reviewed and updated ROS and Plan today (2/6/2020). In review of yesterday's note (2/5/2020), there are no changes except as documented above.     Discussed patient condition and risk of morbidity and/or mortality with RN, RT, Pharmacy and neurology     The patient remains critically ill with renal failure protracted seizure and ventilator failure with active weaning and prolonged family discussion discussing next steps. Critical care time = 85 minutes in directly providing and coordinating critical care and extensive data review.  No time overlap and excludes procedures.

## 2020-02-06 NOTE — PROGRESS NOTES
[facility] Nephrology Daily Progress Note    Date of Service  2/6/2020    AUTHOR: Saima Meneses M.D.    Chief Complaint   Follow up ESRD    HPI  74 y.o. female admitted to [facility] on 1/14/20 with seizures.She was at a SNF.She had been previously hospitalized at [facility] and diaysis was initiated.  She was doing very poorly then and palliative care was discussed with the family,but they declined.She had very poor functional status and required Jimena lift to get into the dialysis chair.She was found to have  a CVA on MRI at [facility].Her seizures were not controlled and it was felt she was in status epilepticus.  She was getting HD at [facility].Last HD was on 1/13/20.She was seen by Dr Carcamo at [facility].Creatinine was 1.8 and dialysis was held. Neurology [place] that the patient needed continuous EEG monitoring and he was transferred to [facility]    Interval Problem Update  Please see note from 1/31 for prior summaries  2/01 - NAEO, another EEG being done this AM  2/02 - NAEO, scheduled for Trach today  2/03 - No events, underwent trach yesterday, no change in neuro status, BP stable overnight but low this am, given midodrine, to have HD today  2/04 - No events, tolerated HD yest with 2.1L UF, BP stable this am  2/05 - No events, BP low prior to starting HD, given albumin at the start of HD and BP stable at this time  2/06 - No events, tolerated HD yesterday with 2.5L UF, BP stable and high at times, still with dependent edema    Review of Systems   Unable to perform ROS: Acuity of condition     PAST FAMILY HISTORY: Reviewed and unchanged since admission  PAST SURGICAL HISTORY:  Reviewed and unchanged since admission  SOCIAL HISTORY:  Reviewed and unchanged since admission  FAMILY HISTORY: Reviewed and unchanged since admission  CURRENT MEDICATIONS: Reviewed since admission to present  IMAGING STUDIES: Reviewed since admission to present  LABORATORY STUDIES: Reviewed since admission to present    Physical Exam  Temp:   [36.5 °C (97.7 °F)-37.7 °C (99.9 °F)] 37.7 °C (99.9 °F)  Pulse:  [79-98] 79  Resp:  [9-51] 9  BP: (105-164)/(51-74) 105/51  SpO2:  [98 %-100 %] 100 %    Physical Exam  Vitals signs and nursing note reviewed.   Constitutional:       General: She is not in acute distress.     Appearance: Normal appearance. She is ill-appearing.   HENT:      Head: Normocephalic and atraumatic.      Right Ear: External ear normal.      Left Ear: External ear normal.      Nose: Nose normal. No congestion.      Mouth/Throat:      Mouth: Mucous membranes are moist.      Pharynx: No oropharyngeal exudate.      Comments: ETT  Eyes:      General: No scleral icterus.     Conjunctiva/sclera: Conjunctivae normal.      Pupils: Pupils are equal, round, and reactive to light.   Neck:      Musculoskeletal: Neck supple. No muscular tenderness.      Comments: +Trach  Cardiovascular:      Rate and Rhythm: Normal rate and regular rhythm.      Heart sounds: Normal heart sounds.   Pulmonary:      Effort: Pulmonary effort is normal. No respiratory distress.      Breath sounds: Normal breath sounds.      Comments: +Vent  Chest:      Chest wall: No tenderness.   Abdominal:      General: Bowel sounds are normal. There is no distension.      Palpations: Abdomen is soft.   Musculoskeletal:         General: Swelling present. No tenderness or signs of injury.      Comments: +dependent edema   Lymphadenopathy:      Cervical: No cervical adenopathy.   Skin:     General: Skin is warm and dry.      Findings: No rash.   Neurological:      Mental Status: She is alert.      Comments: Somnolent, opens eyes to painful stimuli, withdraws to painful stimuli   Psychiatric:      Comments: Unable to assess, pt unresponsive       Fluids    Intake/Output Summary (Last 24 hours) at 2/6/2020 1038  Last data filed at 2/6/2020 0800  Gross per 24 hour   Intake 1860 ml   Output 3600 ml   Net -1740 ml     Laboratory  Recent Labs     02/04/20  0513 02/05/20  0528 02/06/20  0600   WBC  13.1* 12.8* 13.3*   RBC 2.25* 2.33* 2.47*   HEMOGLOBIN 7.6* 7.8* 8.2*   HEMATOCRIT 24.0* 25.0* 26.6*   .7* 107.3* 107.7*   MCH 33.8* 33.5* 33.2*   MCHC 31.7* 31.2* 30.8*   RDW 61.5* 61.4* 61.3*   PLATELETCT 217 238 296   MPV 10.1 9.7 9.2     Recent Labs     02/04/20  0513 02/05/20  0528 02/06/20  0600   SODIUM 133* 131* 137   POTASSIUM 3.8 4.2 3.9   CHLORIDE 98 96 100   CO2 28 28 29   GLUCOSE 137* 127* 116*   BUN 55* 80* 53*   CREATININE 1.12 1.48* 1.08   CALCIUM 9.2 9.2 9.0     IMPRESSION:  # ESRD   MWF iHD as OP at [facility]   CrCl 6              Hypotension with HD/UF, subclavian line removed per ICU team, PICC line for pressors/meds  # Status epilepticus  # S/P acute lacunar infarct   Hx of previous CVA with significant deficits.  # HTN--BP variable often with intradialytic hypotension  # DM II  # Acute Hypoxic Respiratory Failure  --CXR 2/3 still with bilateral pulm edema  # Hx of dysphagia  # Anemia of CKD.Ferritin previously significantly elevated. 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    PLAN:  -HD tomorrow and qMWF  -PUF treatment today to optimize volume status  -UF with HD as tolerated  -Continue Midodrine 10mg prior to dialysis  -May require pressor support with HD for fluid removal  -Seizures management per Neurology--dose adjust meds to accomodate dialysis  -Enteral feedings  -No dietary protein restrictions  -Epogen with HD  -Follow labs  -Continue GOC discussions with family, family meeting later this afternoon  -Very poor prognosis, recommend comfort care, if and when family agreeable    **I spent a total of 35 minutes in the evaluation and coordination of care for this critically ill patient including discussions with Intensivist    All prior notes  form other doctors and RN staff were reviewed from admission to current day to help me make my clinical decisions.

## 2020-02-06 NOTE — PROGRESS NOTES
No chief complaint on file.      Problem List Items Addressed This Visit     Acute respiratory failure with hypoxia (HCC)     Intubated date: 1/14/2020 s/p trach 2/2 Dr Devine  Goal saturation > 92%    Monitor pulse oximetry and adjust peep and FIO2 to abg/vbg, and peep optimization.  Monitor ventilator waveforms and titrate flow/peep and volumes according.   CXR: monitor lung volumes and tube/line placement  VAP bundle prevention, oral care, post pyloric feeding  Head of bed > 30 degree  GI prophylaxis  Daily awakening and SBT trials unless contraindicated  Monitor for liberation/extubation    Respiratory treatments: prn    Continue to volume removal with dialysis  Tolerating t piece trial only 1 hour yesterday 2/4 continue extending out trials and monitor         Relevant Orders    REFERRAL TO LONG TERM ACUTE CARE    * (Principal) Status epilepticus (HCC)     Neurology following and adjusting epileptics  Vimpat 300mg BID  Topamax 200mg BID  Depakote 500mg BID    Monitor need for EEG  Seizure precautions         Relevant Medications    lacosamide (VIMPAT) 200 mg in  mL ivpb (Completed)    lacosamide (VIMPAT) 300 mg in  mL ivpb (Completed)    brivaracetam (BRIVIACT) injection 200 mg (Completed)    topiramate (TOPAMAX) tablet 200 mg    MIDAZOLAM HCL 2 MG/2ML INJ SOLN (Completed)    lacosamide (VIMPAT) tablet 300 mg    levETIRAcetam (KEPPRA) 1,000 mg in  mL IVPB (Completed)    valproate (DEPACON) 1,220 mg in  mL IVPB (Completed)    MIDAZOLAM HCL 5 MG/5ML INJ SOLN (Completed)    midazolam (VERSED) injection 5 mg (Completed)    divalproex (DEPAKOTE SPRINKLE) capsule 500 mg    Other Relevant Orders    Lumbar Puncture (Completed)    REFERRAL TO LONG TERM ACUTE CARE    End stage renal failure on dialysis (HCC)     Avoid nephrotoxins  Patient having dialysis Monday Wednesday Friday long-term, no anticipation renal return to renal renal function  Aggressive volume removal         Relevant Orders     Lumbar Puncture (Completed)    REFERRAL TO LONG TERM ACUTE CARE    Severe protein-calorie malnutrition (HCC)     Continue tube feeds at goal rate   Dietary following         Relevant Orders    REFERRAL TO LONG TERM ACUTE CARE    Goals of care, counseling/discussion     Planned for LTAC s/p tracheostomy once on stable antiepileptic  Start ventilator weaning and t piece trials  Prognosis poor for overall independent or meaningful     Discuss timing of PEG         Relevant Orders    REFERRAL TO LONG TERM ACUTE CARE      Other Visit Diagnoses     Shock (HCC)        Relevant Orders    Central Line Insertion (Completed)      Neurology Progress Note    Brief History of present illness:  74 y.o. female with history of CHF, right parietal stroke, diabetes, GERD, end-stage renal disease on dialysis, history of GI bleed, hypertension and history of ICH presenting on 1/16/2020 for intractable seizures, non-convulsive status epilepticus.    Interval, 2/6/20:   Patient sitting up in bed; arousable to painful stimuli, however poorly interactive, does not follow commands. No events overnight.     No changes to HPI as was previously documented.     Past medical history:   Past Medical History:   Diagnosis Date   • Arthritis    • Chronic diastolic heart failure ([facility]) 6/1/2016   • Clostridium difficile diarrhea 6/2/2016   • CVA (cerebral vascular accident) ([facility])     L side weakness   • DM (diabetes mellitus) type II uncontrolled with renal manifestation 4/26/2014   • GERD (gastroesophageal reflux disease)    • GIB (gastrointestinal bleeding) 6/13/2016   • GOUT    • Hypertension    • ICH (intracerebral hemorrhage) ([facility]) 4/6/2016   • Indigestion    • Kidney failure    • RALF (obstructive sleep apnea)     Not compliant with oxygen or CPAP   • RALF (obstructive sleep apnea)    • Other and unspecified angina pectoris     hospitalized 8/13 chest pain   • Seizure ([facility]) 1/16/2020   • Unspecified cataract     right eye   • UTI (urinary tract  infection) 6/1/2016       Past surgical history:   Past Surgical History:   Procedure Laterality Date   • GASTROSCOPY-ENDO  6/14/2016    Procedure: GASTROSCOPY-ENDO;  Surgeon: Oliver Macedo M.D.;  Location: ENDOSCOPY [facility];  Service:    • VENTRAL HERNIA REPAIR  4/9/2014    Performed by Trinity Bryan M.D. at SURGERY [facility]   • VITA BY LAPAROSCOPY  10/5/2013    Performed by Trinity Bryan M.D. at SURGERY [facility]   • TUBAL LIGATION  1977   • CATARACT EXTRACTION WITH IOL      left eye       Family history:   Family History   Problem Relation Age of Onset   • Diabetes Mother    • Diabetes Father    • Cancer Father         sarcoma       Social history:   Social History     Socioeconomic History   • Marital status:      Spouse name: Not on file   • Number of children: Not on file   • Years of education: Not on file   • Highest education level: Not on file   Occupational History   • Not on file   Social Needs   • Financial resource strain: Not on file   • Food insecurity:     Worry: Not on file     Inability: Not on file   • Transportation needs:     Medical: Not on file     Non-medical: Not on file   Tobacco Use   • Smoking status: Never Smoker   • Smokeless tobacco: Never Used   Substance and Sexual Activity   • Alcohol use: No   • Drug use: No   • Sexual activity: Not on file   Lifestyle   • Physical activity:     Days per week: Not on file     Minutes per session: Not on file   • Stress: Not on file   Relationships   • Social connections:     Talks on phone: Not on file     Gets together: Not on file     Attends Sikhism service: Not on file     Active member of club or organization: Not on file     Attends meetings of clubs or organizations: Not on file     Relationship status: Not on file   • Intimate partner violence:     Fear of current or ex partner: Not on file     Emotionally abused: Not on file     Physically abused: Not on file     Forced sexual activity: Not on file   Other  Topics Concern   • Not on file   Social History Narrative    Retired. 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 , marybeth host    Lives with her daughter, dependent for most of ADLs after stroke gave L sided weakness       Current medications:   Current Facility-Administered Medications   Medication Dose   • midodrine (PROAMATINE) tablet 10 mg  10 mg   • divalproex (DEPAKOTE SPRINKLE) capsule 500 mg  500 mg   • albumin human 25% solution 25 g  25 g   • lacosamide (VIMPAT) tablet 300 mg  300 mg   • carboxymethylcellulose (REFRESH TEARS) 0.5 % ophthalmic drops 1 Drop  1 Drop   • heparin injection 5,000 Units  5,000 Units   • vitamin B comp+C (ALLBEE WITH C) 1 Tab  1 Tab   • folic acid (FOLVITE) tablet 1 mg  1 mg   • allopurinol (ZYLOPRIM) tablet 100 mg  100 mg   • nystatin (MYCOSTATIN) powder     • LORazepam (ATIVAN) injection 1 mg  1 mg   • hydrALAZINE (APRESOLINE) injection 10 mg  10 mg   • topiramate (TOPAMAX) tablet 200 mg  200 mg   • aspirin (ASA) chewable tab 81 mg  81 mg   • atorvastatin (LIPITOR) tablet 40 mg  40 mg   • heparin injection 3,300 Units  3,300 Units   • epoetin antoinette (EPOGEN/PROCRIT) injection 4,000 Units  4,000 Units   • Respiratory Care per Protocol     • ipratropium-albuterol (DUONEB) nebulizer solution  3 mL   • senna-docusate (PERICOLACE or SENOKOT S) 8.6-50 MG per tablet 2 Tab  2 Tab    And   • polyethylene glycol/lytes (MIRALAX) PACKET 1 Packet  1 Packet    And   • bisacodyl (DULCOLAX) suppository 10 mg  10 mg   • lidocaine (XYLOCAINE) 1 % injection 1-2 mL  1-2 mL   • Pharmacy Consult: Enteral tube insertion - review meds/change route/product selection  1 Each   • fentaNYL (SUBLIMAZE) injection 25 mcg  25 mcg    Or   • fentaNYL (SUBLIMAZE) injection 50 mcg  50 mcg    Or   • fentaNYL (SUBLIMAZE) injection 100 mcg  100 mcg   • ondansetron (ZOFRAN) syringe/vial injection 4 mg  4 mg   • labetalol (NORMODYNE/TRANDATE) injection 10 mg  10 mg   • acetaminophen (TYLENOL) tablet 650 mg  650 mg   •  ondansetron (ZOFRAN ODT) dispertab 4 mg  4 mg       Medication Allergy:  Allergies   Allergen Reactions   • Amlodipine Besylate Cough   • Amlodipine Cough       Review of systems:   Unable to obtain as patient is poorly interactive/non verbal at this time.     Physical examination:   Vitals:    02/06/20 1000 02/06/20 1004 02/06/20 1100 02/06/20 1200   BP: (!) 97/48  140/64 158/70   Pulse: 79 79 79 89   Resp: 20 (!) 9 (!) 21 (!) 8   Temp:    36.9 °C (98.4 °F)   TempSrc:    Temporal   SpO2: 100% 100% 100% 98%   Weight:       Height:         General: Patient in no acute distress.  HEENT: Normocephalic, no signs of acute trauma.   Neck: supple, no meningeal signs or carotid bruits. There is normal range of motion. No tenderness on exam.   Chest: clear to auscultation. No cough.   CV: RRR, no murmurs.   Skin: no signs of acute rashes or trauma.   Musculoskeletal: joints exhibit full range of motion, without any pain to palpation. There are no signs of joint or muscle swelling. There is no tenderness to deep palpation of muscles.   Psychiatric: No hallucinatory behavior.       NEUROLOGICAL EXAM:   Mental status, orientation: Eyes open to nox stim; poorly interactive.   Speech and language: No verbal output, does not follow commands.   Cranial nerve exam: Pupils are 3-2 mm bilaterally and equally reactive to light. Visual fields are intact by confrontation. Patient tracks spontaneously, not to command. Face appears symmetric Cough/gag/corneal reflexes intact. Tongue is midline and without any signs of tongue biting or fasciculations.   Motor exam: No voluntary or spontaneous motor activity witnessed by me. No abnormal movements were seen on exam.   Sensory exam Grimaces to nox stim to BUE, LUE more brisk than RUE. Minimal response to BLE.   Deep tendon reflexes:  2+ throughout. Plantar responses are flexor. There is no clonus.   Coordination: shows a normal finger-nose-finger. Normal rapidly alternating movements.   Gait:  Not assessed at this time as patient is unable.       ANCILLARY DATA REVIEWED:     Lab Data Review:  Recent Results (from the past 24 hour(s))   CBC with Differential    Collection Time: 02/06/20  6:00 AM   Result Value Ref Range    WBC 13.3 (H) 4.8 - 10.8 K/uL    RBC 2.47 (L) 4.20 - 5.40 M/uL    Hemoglobin 8.2 (L) 12.0 - 16.0 g/dL    Hematocrit 26.6 (L) 37.0 - 47.0 %    .7 (H) 81.4 - 97.8 fL    MCH 33.2 (H) 27.0 - 33.0 pg    MCHC 30.8 (L) 33.6 - 35.0 g/dL    RDW 61.3 (H) 35.9 - 50.0 fL    Platelet Count 296 164 - 446 K/uL    MPV 9.2 9.0 - 12.9 fL    Neutrophils-Polys 57.10 44.00 - 72.00 %    Lymphocytes 22.00 22.00 - 41.00 %    Monocytes 15.00 (H) 0.00 - 13.40 %    Eosinophils 3.80 0.00 - 6.90 %    Basophils 0.80 0.00 - 1.80 %    Immature Granulocytes 1.30 (H) 0.00 - 0.90 %    Nucleated RBC 0.00 /100 WBC    Neutrophils (Absolute) 7.62 (H) 2.00 - 7.15 K/uL    Lymphs (Absolute) 2.93 1.00 - 4.80 K/uL    Monos (Absolute) 2.00 (H) 0.00 - 0.85 K/uL    Eos (Absolute) 0.50 0.00 - 0.51 K/uL    Baso (Absolute) 0.11 0.00 - 0.12 K/uL    Immature Granulocytes (abs) 0.17 (H) 0.00 - 0.11 K/uL    NRBC (Absolute) 0.00 K/uL   Basic Metabolic Panel (BMP)    Collection Time: 02/06/20  6:00 AM   Result Value Ref Range    Sodium 137 135 - 145 mmol/L    Potassium 3.9 3.6 - 5.5 mmol/L    Chloride 100 96 - 112 mmol/L    Co2 29 20 - 33 mmol/L    Glucose 116 (H) 65 - 99 mg/dL    Bun 53 (H) 8 - 22 mg/dL    Creatinine 1.08 0.50 - 1.40 mg/dL    Calcium 9.0 8.5 - 10.5 mg/dL    Anion Gap 8.0 0.0 - 11.9   MAGNESIUM    Collection Time: 02/06/20  6:00 AM   Result Value Ref Range    Magnesium 1.9 1.5 - 2.5 mg/dL   PHOSPHORUS    Collection Time: 02/06/20  6:00 AM   Result Value Ref Range    Phosphorus 2.3 (L) 2.5 - 4.5 mg/dL   ESTIMATED GFR    Collection Time: 02/06/20  6:00 AM   Result Value Ref Range    GFR If African American >60 >60 mL/min/1.73 m 2    GFR If Non African American 50 (A) >60 mL/min/1.73 m 2       Labs reviewed by me.        Imaging reviewed by me:     DX-CHEST-PORTABLE (1 VIEW)   Final Result         1.  Pulmonary edema and/or infiltrates are identified, which are stable since the prior exam.   2.  Cardiomegaly   3.  Atherosclerosis      DX-CHEST-PORTABLE (1 VIEW)   Final Result         1.  Pulmonary edema and/or infiltrates are identified, which are stable since the prior exam.   2.  Cardiomegaly   3.  Atherosclerosis      DX-CHEST-PORTABLE (1 VIEW)   Final Result      1.  Unchanged left lower lobe atelectasis or pneumonia.      2.  Clear right lung.      DX-CHEST-PORTABLE (1 VIEW)   Final Result      Unchanged LEFT basilar atelectasis and/or airspace disease      DX-CHEST-PORTABLE (1 VIEW)   Final Result      Left basilar atelectasis, hilar and cardiac silhouette enlargement as before      DX-CHEST-PORTABLE (1 VIEW)   Final Result      Interval removal of a left subclavian central line. Stable patchy bilateral infiltrates.      IR-PICC LINE PLACEMENT W/ GUIDANCE > AGE 5   Final Result                  Ultrasound-guided PICC placement performed by qualified nursing staff as    above.          DX-CHEST-PORTABLE (1 VIEW)   Final Result      1.  Multiple support devices present.      2.  Patchy bilateral atelectasis.      DX-CHEST-PORTABLE (1 VIEW)   Final Result      Stable chest with retrocardiac opacity from atelectasis and/or pleural fluid favored over consolidation      DX-CHEST-PORTABLE (1 VIEW)   Final Result      Stable chest with retrocardiac opacity from atelectasis and/or pleural fluid favored over consolidation      DX-CHEST-PORTABLE (1 VIEW)   Final Result         No significant change from prior.               DX-CHEST-PORTABLE (1 VIEW)   Final Result         1. Stable lines and tubes..   2. Stable retrocardiac and left perihilar atelectasis versus consolidation. Suspected small left pleural effusion.            DX-CHEST-PORTABLE (1 VIEW)   Final Result         1. Stable lines and tubes..   2. Stable retrocardiac  atelectasis versus consolidation with increased left perihilar opacity. Suspected trace left pleural effusion.         [id number] VIEW   Final Result      1.  Enteric tube has been placed and the tip projects over the stomach.      2.  Pre-existing feeding tube tip projects at the gastroduodenal junction      DX-CHEST-PORTABLE (1 VIEW)   Final Result         1. Lines and tubes as above.   2. Stable retrocardiac atelectasis versus consolidation. Suspected trace left pleural effusion.         MR-BRAIN-WITH & W/O   Final Result      1.  Moderate cerebral atrophy.   2.  Evidence of intraventricular hemorrhage, indeterminate age. Possibly chronic intraventricular hemosiderin deposition.   3.  Extensive encephalomalacic change with hemosiderin deposition involving the right corona radiata, basal ganglia, posterior thalamus, subthalamic region, bordering the right cerebral peduncle. Associated ex vacuo dilatation of the body of the right    lateral ventricle. No change.   4.  Additional foci of old microhemorrhage most consistent with old hypertensive microhemorrhage or amyloid angiopathy in the left basal ganglia, left thalamus, and midline upper ventral abel.   5.  Punctate focus of acute infarction in the right parietal deep white matter. No change.   6.  Advanced supratentorial white matter disease most consistent with microvascular ischemic change.   7.  Encephalomalacic changes in the abel and right cerebral peduncle consistent with old infarction.   8.  Partially empty sella.   9.  Overall, no new findings and no significant change from 1/14/2020.      DX-CHEST-PORTABLE (1 VIEW)   Final Result         1. Stable lines and tubes.   2. Unchanged retrocardiac atelectasis versus consolidation.   3. Stable trace left pleural effusion.         OUTSIDE IMAGES-DX CHEST   Final Result      OUTSIDE IMAGES-US VASCULAR   Final Result      OUTSIDE IMAGES-MR BRAIN   Final Result      OUTSIDE IMAGES-DX CHEST   Final Result       OUTSIDE IMAGES-CT HEAD   Final Result      EC-ECHOCARDIOGRAM COMPLETE W/O CONT   Final Result      DX-CHEST-FOR LINE PLACEMENT Perform procedure in: Patient's Room   Final Result         1.  Retrocardiac opacity concerning for infiltrate, stable.   2.  Trace left pleural effusion, stable   3.  Cardiomegaly   4.  Atherosclerosis   5.  Perihilar interstitial prominence and bronchial wall cuffing, appearance suggests changes of underlying bronchial inflammation, consider bronchitis.      DX-ABDOMEN FOR TUBE PLACEMENT   Final Result         1.  Nonspecific bowel gas pattern.   2.  Dobbhoff tube tip overlying the expected location of the pylorus or first duodenal segment.   3.  Left lung base atelectasis and/or small effusion      DX-CHEST-PORTABLE (1 VIEW)   Final Result         1.  Retrocardiac opacity concerning for infiltrate.   2.  Trace left pleural effusion, stable   3.  Cardiomegaly   4.  Atherosclerosis      [id number] FILM X-RAY   Final Result      OUTSIDE IMAGES-DX CHEST   Final Result            ASSESSMENT:   74 y.o. female with history of CHF, right parietal stroke, diabetes, GERD, end-stage renal disease on dialysis, history of GI bleed, hypertension and history of ICH presenting on 1/16/2020 for seizures treated with Vimpat, Topamax and Keppra.  The patient has had a protracted hospital course during which she developed subclinical seizures.  This has been treated with a combination of multiple antiseizure medications including Keppra, Topamax and Vimpat as well as anesthetics (propofol and Versed).    The patient was weaned off of Versed however has been slow to wake up; on 2/1/20, there was some concern per EEG that seizures had recurred; patient re-loaded with Keppra; the following evening/day, no seizures per EEG. Per Dr. Pearson, from 2/2/20, no significant change; per discussion with patient's daughter, likelihood of the patient returning to baseline was low and that the likelihood of  the patient returning to full independent function was essentially 0.  Daughter also informed that at this point we are trying to see if the patient wakes up as there are no further seizures that we can tell to the best of our ability. Daughter has continued to express her wishes to pursue full life sustaining care/treatment and long-term care facility if necessary; patient now post-tracheostomy yesterday as well.     Additional Recommendations/Plan:     -q4h and PRN neuro assessment. VS per nursing/unit protocol.   -Seizure precautions; Ativan 1 mg IV PRN for breakthrough generalized tonic clonic seizure.   -Continue current doses of Vimpat, Topamax, and Depakote.  -All other medical management per primary team; case management for dispo.     The plan of care above has been discussed with Dr. Anderson. Other than the above, no further recommendations from a neurological perspective. Please call with further questions.     SHAHIDA Rdz.  [facility] [facility]

## 2020-02-07 PROBLEM — R11.10 VOMITING: Status: ACTIVE | Noted: 2020-01-01

## 2020-02-07 NOTE — PROGRESS NOTES
Hospital Medicine Daily Progress Note    Date of Service  2/7/2020    Chief Complaint  74 y.o. female admitted 1/16/2020 with Altered mental status    Hospital Course    74 y.o. female who presented 1/16/2020 with a past medical history significant for end-stage renal disease on hemodialysis (followed by Dr. Cavazos) since November 2019 M/W/F, hypertension, hyperlipidemia and coronary artery disease.  She was recently admitted to [facility] for an extended period of time followed by placement in a SNF.  She has been very frail requiring a hoist to get her in and out of the dialysis chair with failure to thrive.  On January 14 while at the SNF patient had a witnessed seizure, with no history previously.  She was transferred to the emergency department where she had a second seizure with a prolonged postictal state.  She was intubated for airway protection and started on a propofol drip in the emergency department on January 14 at [facility].  She was admitted to the intensive care unit where she underwent an MRI of her brain showing an acute right parietal lacunar infarct with overall brain parenchymal loss.  Neurology was consulted and patient was loaded with Keppra and continued on the propofol drip. Despite this, EEGs continued to show evidence of focal seizure and Dilantin was started.  She underwent a lumbar puncture with unremarkable CSF and was being treated empirically with acyclovir, ampicillin, Rocephin, vancomycin and Decadron.  Given her persistent abnormal EEGs, the neurologist at the outside hospital recommend patient be transferred to a facility that can provide continuous EEG monitoring.  For this reason she was transferred to [facility].  She was extubated on 2/5, but remained unresponsive.  Family has apparently not wanted to discuss changing CODE STATUS nor transitioning to comfort care and patient remains a full code.      Interval Problem Update  Daughter  doesn't want peg tube yet because  Has been no t piece for 48 hours  After meds having emesis, feeds off since yesterday evening  Holding fiber from tube feeds  ?zofran with meds  BMS loose stool/soft  Ascites, daughter wants to know from GI how peg would affect that  Daughter wants to hold off on peg tube  Neuro:  Csf results?  ltach referral in but waiting on daughter to decide which to send her to  Holding heparin for bleeding around trach site    Consultants/Specialty  Intensivist  Nephrology  Neurology  Palliative Care    Code Status  FULL    Disposition  To neuro floor    I certify that the patient requires medically necessary hospital services for the treatment of seizure/encephalopathy/respiratory failure and will remain in the hospital for at least 3 more days.  Discharge plan is anticipated to be back to skilled nursing 2/11 at the earliest.      D/W tx team on rounds, neuro      Review of Systems  Review of Systems   Unable to perform ROS: Patient unresponsive        Physical Exam  Temp:  [36.6 °C (97.8 °F)-37.7 °C (99.9 °F)] 37 °C (98.6 °F)  Pulse:  [72-93] 73  Resp:  [8-40] 17  BP: ()/(48-83) 136/63  SpO2:  [98 %-100 %] 100 %    Physical Exam  Vitals signs and nursing note reviewed.   Constitutional:       Appearance: She is well-developed.   HENT:      Head: Normocephalic and atraumatic.      Nose:      Comments: cortrak  Cardiovascular:      Rate and Rhythm: Normal rate and regular rhythm.      Heart sounds: Normal heart sounds. No murmur.   Pulmonary:      Effort: Pulmonary effort is normal. No respiratory distress.      Breath sounds: No wheezing or rales.      Comments: Diminished  Trach in place, a little dried blood surrounding   Abdominal:      General: Bowel sounds are normal. There is no distension.      Palpations: Abdomen is soft.      Tenderness: There is no tenderness.   Skin:     General: Skin is warm and dry.      Findings: No erythema.   Neurological:      Comments: Unresponsive;  flinches minimally to noxious stimuli         Fluids    Intake/Output Summary (Last 24 hours) at 2/7/2020 0753  Last data filed at 2/7/2020 0524  Gross per 24 hour   Intake 1090 ml   Output 3265 ml   Net -2175 ml       Laboratory  Recent Labs     02/05/20 0528 02/06/20  0600 02/07/20  0552   WBC 12.8* 13.3* 13.5*   RBC 2.33* 2.47* 2.25*   HEMOGLOBIN 7.8* 8.2* 7.5*   HEMATOCRIT 25.0* 26.6* 24.1*   .3* 107.7* 107.1*   MCH 33.5* 33.2* 33.3*   MCHC 31.2* 30.8* 31.1*   RDW 61.4* 61.3* 60.5*   PLATELETCT 238 296 315   MPV 9.7 9.2 8.9*     Recent Labs     02/05/20 0528 02/06/20 0600 02/07/20  0552   SODIUM 131* 137 136   POTASSIUM 4.2 3.9 4.1   CHLORIDE 96 100 99   CO2 28 29 27   GLUCOSE 127* 116* 78   BUN 80* 53* 67*   CREATININE 1.48* 1.08 1.37   CALCIUM 9.2 9.0 9.0                   Imaging  [id number] VIEW   Final Result      Feeding tube tip overlies the gastric antrum.      DX-CHEST-PORTABLE (1 VIEW)   Final Result         1.  Pulmonary edema and/or infiltrates are identified, which are stable since the prior exam.   2.  Cardiomegaly   3.  Atherosclerosis      DX-CHEST-PORTABLE (1 VIEW)   Final Result         1.  Pulmonary edema and/or infiltrates are identified, which are stable since the prior exam.   2.  Cardiomegaly   3.  Atherosclerosis      DX-CHEST-PORTABLE (1 VIEW)   Final Result      1.  Unchanged left lower lobe atelectasis or pneumonia.      2.  Clear right lung.      DX-CHEST-PORTABLE (1 VIEW)   Final Result      Unchanged LEFT basilar atelectasis and/or airspace disease      DX-CHEST-PORTABLE (1 VIEW)   Final Result      Left basilar atelectasis, hilar and cardiac silhouette enlargement as before      DX-CHEST-PORTABLE (1 VIEW)   Final Result      Interval removal of a left subclavian central line. Stable patchy bilateral infiltrates.      IR-PICC LINE PLACEMENT W/ GUIDANCE > AGE 5   Final Result                  Ultrasound-guided PICC placement performed by qualified nursing staff as     above.          DX-CHEST-PORTABLE (1 VIEW)   Final Result      1.  Multiple support devices present.      2.  Patchy bilateral atelectasis.      DX-CHEST-PORTABLE (1 VIEW)   Final Result      Stable chest with retrocardiac opacity from atelectasis and/or pleural fluid favored over consolidation      DX-CHEST-PORTABLE (1 VIEW)   Final Result      Stable chest with retrocardiac opacity from atelectasis and/or pleural fluid favored over consolidation      DX-CHEST-PORTABLE (1 VIEW)   Final Result         No significant change from prior.               DX-CHEST-PORTABLE (1 VIEW)   Final Result         1. Stable lines and tubes..   2. Stable retrocardiac and left perihilar atelectasis versus consolidation. Suspected small left pleural effusion.            DX-CHEST-PORTABLE (1 VIEW)   Final Result         1. Stable lines and tubes..   2. Stable retrocardiac atelectasis versus consolidation with increased left perihilar opacity. Suspected trace left pleural effusion.         [id number] VIEW   Final Result      1.  Enteric tube has been placed and the tip projects over the stomach.      2.  Pre-existing feeding tube tip projects at the gastroduodenal junction      DX-CHEST-PORTABLE (1 VIEW)   Final Result         1. Lines and tubes as above.   2. Stable retrocardiac atelectasis versus consolidation. Suspected trace left pleural effusion.         MR-BRAIN-WITH & W/O   Final Result      1.  Moderate cerebral atrophy.   2.  Evidence of intraventricular hemorrhage, indeterminate age. Possibly chronic intraventricular hemosiderin deposition.   3.  Extensive encephalomalacic change with hemosiderin deposition involving the right corona radiata, basal ganglia, posterior thalamus, subthalamic region, bordering the right cerebral peduncle. Associated ex vacuo dilatation of the body of the right    lateral ventricle. No change.   4.  Additional foci of old microhemorrhage most consistent with old hypertensive microhemorrhage or  amyloid angiopathy in the left basal ganglia, left thalamus, and midline upper ventral abel.   5.  Punctate focus of acute infarction in the right parietal deep white matter. No change.   6.  Advanced supratentorial white matter disease most consistent with microvascular ischemic change.   7.  Encephalomalacic changes in the abel and right cerebral peduncle consistent with old infarction.   8.  Partially empty sella.   9.  Overall, no new findings and no significant change from 1/14/2020.      DX-CHEST-PORTABLE (1 VIEW)   Final Result         1. Stable lines and tubes.   2. Unchanged retrocardiac atelectasis versus consolidation.   3. Stable trace left pleural effusion.         OUTSIDE IMAGES-DX CHEST   Final Result      OUTSIDE IMAGES-US VASCULAR   Final Result      OUTSIDE IMAGES-MR BRAIN   Final Result      OUTSIDE IMAGES-DX CHEST   Final Result      OUTSIDE IMAGES-CT HEAD   Final Result      EC-ECHOCARDIOGRAM COMPLETE W/O CONT   Final Result      DX-CHEST-FOR LINE PLACEMENT Perform procedure in: Patient's Room   Final Result         1.  Retrocardiac opacity concerning for infiltrate, stable.   2.  Trace left pleural effusion, stable   3.  Cardiomegaly   4.  Atherosclerosis   5.  Perihilar interstitial prominence and bronchial wall cuffing, appearance suggests changes of underlying bronchial inflammation, consider bronchitis.      DX-ABDOMEN FOR TUBE PLACEMENT   Final Result         1.  Nonspecific bowel gas pattern.   2.  Dobbhoff tube tip overlying the expected location of the pylorus or first duodenal segment.   3.  Left lung base atelectasis and/or small effusion      DX-CHEST-PORTABLE (1 VIEW)   Final Result         1.  Retrocardiac opacity concerning for infiltrate.   2.  Trace left pleural effusion, stable   3.  Cardiomegaly   4.  Atherosclerosis      [id number] FILM X-RAY   Final Result      OUTSIDE IMAGES-DX CHEST   Final Result           Assessment/Plan  * Status epilepticus (HCC)- (present on  "admission)  Assessment & Plan  Was intubated for airway protection, now trached  Neurology signed off  Continuing vimpat, topamax, depakote      Acute respiratory failure with hypoxia (HCC)- (present on admission)  Assessment & Plan  Trached 2/2, on t piece since 2/6  RT protocol  Preventing fluid overload with dialysis    Severe protein-calorie malnutrition (HCC)- (present on admission)  Assessment & Plan  Continue tube feeds, goal rate    Cerebrovascular accident (CVA) due to embolism of right middle cerebral artery (HCC)- (present on admission)  Assessment & Plan  On statin, asa  No PT/OT as unable to participate      End stage renal failure on dialysis (HCC)- (present on admission)  Assessment & Plan  MWF HD, nephrology following ([facility])  Likely permanent; poor prognosis per nephrology, they recommend comfort care  Avoid nephrotoxins      Normocytic anemia- (present on admission)  Assessment & Plan  Monitor H&H if drops of 7 or 21 transfuse  ?concern for gi bleed and on bid omeprazole  Will guaiac stool to see if she in fact has any blood  ?2/2 trach    Hypertension- (present on admission)  Assessment & Plan  Was hypotensive with dialysis, on midodrine prn prior to HD if SBP<120  BP in 130s-140s  PRN IVs  Holding scheduled meds    DM (diabetes mellitus) ([facility])- (present on admission)  Assessment & Plan  No coverage needed    Vomiting- (present on admission)  Assessment & Plan  She had had some vomiting 2/6 and one episode of coffee ground, but ?2/2 trach blood  Hgb slightly less, on bid omeprazole  I will guaiac her stool   Perhaps could get EGD with PEG placement    Goals of care, counseling/discussion- (present on admission)  Assessment & Plan  There have been multiple discussions with palliative/treatment team and family, to no avail  Patient remains full code, has trach  Needs PEG if to continue; daughter has refused thus far, says it's \"too much\" right now as patient just got trached  Will explore PEG tube " for Monday, or once she goes to [facility]    Pressure ulcer  Assessment & Plan  Stage II coccyx -present on arrival  Continue wound care  Has BMS in place, mary     Hypomagnesemia- (present on admission)  Assessment & Plan  Replacing for <2    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

## 2020-02-07 NOTE — PROGRESS NOTES
Critical Care Progress Note    Date of admission  1/16/2020    Chief Complaint  74 y.o. female who presented 1/16/2020 with a past medical history significant for end-stage renal disease on hemodialysis since November 2019 M/W/F, hypertension, hyperlipidemia and coronary artery disease.  She was recently admitted to [facility] for an extended period of time followed by placement in a SNF.  She has been very frail requiring a hoist to get her in and out of the dialysis chair with failure to thrive.  On January 14 while at the SNF patient had a witnessed first-time seizure.  She was transferred to the emergency department where she had a second seizure with prolonged postictal state.  She was intubated for airway protection and started on a propofol drip in the emergency department on January 14 at [facility].  She was admitted to the intensive care unit where she underwent an MRI of her brain showing an acute right parietal lacunar infarct with overall brain parenchymal loss.  Neurology was consulted and patient was loaded with Keppra and continued on the propofol drip.  Despite this, on spot EEGs patient continued to have evidence of focal seizure and Dilantin was started yesterday.  She underwent a lumbar puncture today with unremarkable CSF and was being treated empirically with acyclovir, ampicillin, Rocephin, vancomycin and Decadron.  Given her persistent abnormal EEGs, the neurologist at the outside hospital recommend patient be transferred to a facility that can provide continuous EEG monitoring.  For this reason she was transferred to [facility] and I was consulted for her critical care management.  She was remained on a ventilator since her admission and is receiving dialysis as needed and is being followed by Dr. English.  She is oliguric.  Per documentation there is been some difficulty with patient's daughter when patient was at SNF and some discussion with family  while at [facility] regarding goals of care.  They apparently did not want to talk about changing CODE STATUS nor transitioning to comfort care and patient remains a full code. Taken from Dr Gonda note.     Hospital Course    Interval Problem Update  Reviewed last 24 hour events:  Neuro: same opens trach withdrawals no pain  HR: snr 70-90's  SBP: 120-160's  Tmax: afebrile  GI: tf paused again, restarted afternoon vomited   UOP: not charting  Lines: central line  Resp: t piece trial, 35% 7l  Vte: held heparin per family request since bleeding from trach site  PPI/H2:n/a -> protonix coffee ground emesis  Antibx: none  2l dialysis yesterday  Aspirin held    Vomited overnight  Serial hg Q8    Review of Systems  Review of Systems   Unable to perform ROS: Mental acuity        Vital Signs for last 24 hours   Temp:  [36.6 °C (97.8 °F)-37.2 °C (99 °F)] 37 °C (98.6 °F)  Pulse:  [72-92] 73  Resp:  [8-40] 17  BP: ()/(48-83) 136/63  SpO2:  [98 %-100 %] 100 %    Hemodynamic parameters for last 24 hours       Respiratory Information for the last 24 hours       Physical Exam   Physical Exam  Vitals signs and nursing note reviewed.   Constitutional:       General: She is not in acute distress.     Appearance: She is ill-appearing. She is not toxic-appearing.      Comments: awake looking around family at bedside   HENT:      Head: Normocephalic.      Mouth/Throat:      Mouth: Mucous membranes are moist.   Eyes:      Conjunctiva/sclera: Conjunctivae normal.      Comments: Small 1 mm bilateral with eye movements   Neck:      Comments: Trach in place with dried blood  Cardiovascular:      Rate and Rhythm: Normal rate and regular rhythm.      Pulses: Normal pulses.      Heart sounds: No murmur.      Comments: Right sided dialysis port   Pulmonary:      Effort: Pulmonary effort is normal.      Breath sounds: Normal breath sounds. No wheezing or rales.      Comments: Diminished at bases, tolerating T piece  trials  Abdominal:      General: There is no distension.      Palpations: Abdomen is soft. There is no mass.      Tenderness: There is no tenderness. There is no guarding or rebound.      Hernia: No hernia is present.      Comments: Mild tenderness to right lower quadrant   Musculoskeletal:         General: Swelling present. No tenderness or signs of injury.      Right lower leg: Edema present.      Left lower leg: Edema present.      Comments: + edema in upper and lower extremities, contractor of left hand   Skin:     General: Skin is warm and dry.      Capillary Refill: Capillary refill takes 2 to 3 seconds.      Findings: Bruising present.   Neurological:      Comments: Baseline left-sided deficits from previous CVA    Awake looking around no blink to threat, right sided withdrawal weaker in right upper, and right sided greater then left, contractor and stiffiness to left hand, tries to open eyes, pupils 1-2mm reactive bilateral with EOMI, does not follow commands, responds with facial grimace to pain on left   Psychiatric:      Comments: Unable to determine         Medications  Current Facility-Administered Medications   Medication Dose Route Frequency Provider Last Rate Last Dose   • pantoprazole (PROTONIX) injection 40 mg  40 mg Intravenous BID Geovani Thrasher M.D.   40 mg at 02/07/20 0547   • midodrine (PROAMATINE) tablet 10 mg  10 mg Enteral Tube PRN Fletcher Quiroz M.D.   Stopped at 02/05/20 1100   • divalproex (DEPAKOTE SPRINKLE) capsule 500 mg  500 mg Enteral Tube Q12HRS Fletcher Quiroz M.D.   500 mg at 02/07/20 0539   • albumin human 25% solution 25 g  25 g Intravenous ACUTE DIALYSIS PRN Saima Meneses M.D.   Stopped at 02/03/20 1225   • lacosamide (VIMPAT) tablet 300 mg  300 mg Enteral Tube BID Sergei Uriarte M.D.   300 mg at 02/07/20 0539   • carboxymethylcellulose (REFRESH TEARS) 0.5 % ophthalmic drops 1 Drop  1 Drop Both Eyes Q2HRS PRN Tanner Louis M.D.   1 Drop at 01/31/20 0546   •  vitamin B comp+C (ALLBEE WITH C) 1 Tab  1 Tab Enteral Tube DAILY Anurag Hein D.O.   1 Tab at 02/07/20 0539   • folic acid (FOLVITE) tablet 1 mg  1 mg Enteral Tube DAILY Anurag Hein D.O.   1 mg at 02/07/20 0539   • allopurinol (ZYLOPRIM) tablet 100 mg  100 mg Enteral Tube DAILY Anurag Hein D.O.   100 mg at 02/07/20 0539   • nystatin (MYCOSTATIN) powder   Topical BID Anurag Hein D.O.       • LORazepam (ATIVAN) injection 1 mg  1 mg Intravenous Q3HRS PRN Catracho Bustos M.D.   1 mg at 02/01/20 1300   • hydrALAZINE (APRESOLINE) injection 10 mg  10 mg Intravenous Q4HRS PRN ALONSO Giordano.OUli   10 mg at 02/06/20 2049   • topiramate (TOPAMAX) tablet 200 mg  200 mg Enteral Tube Q12HRS Catracho Bustos M.D.   200 mg at 02/07/20 0539   • atorvastatin (LIPITOR) tablet 40 mg  40 mg Enteral Tube DAILY Josep You M.D.   40 mg at 02/07/20 0539   • heparin injection 3,300 Units  3,300 Units Intracatheter PRN Lydia Carcamo M.D.   3,300 Units at 02/06/20 1846   • epoetin antoinette (EPOGEN/PROCRIT) injection 4,000 Units  4,000 Units Subcutaneous MO, WE + FR Payam Cavazos M.D.   4,000 Units at 02/05/20 0927   • Respiratory Care per Protocol   Nebulization Continuous RT Jeremy M Gonda, M.D.       • ipratropium-albuterol (DUONEB) nebulizer solution  3 mL Nebulization Q2HRS PRN (RT) Jeremy M Gonda, M.D.       • senna-docusate (PERICOLACE or SENOKOT S) 8.6-50 MG per tablet 2 Tab  2 Tab Enteral Tube BID Jeremy M Gonda, M.D.   Stopped at 02/07/20 0600    And   • polyethylene glycol/lytes (MIRALAX) PACKET 1 Packet  1 Packet Enteral Tube QDAY PRN Jeremy M Gonda, M.D.        And   • bisacodyl (DULCOLAX) suppository 10 mg  10 mg Rectal QDAY PRN Jeremy M Gonda, M.D.       • lidocaine (XYLOCAINE) 1 % injection 1-2 mL  1-2 mL Tracheal Tube Q30 MIN PRN Jeremy M Gonda, M.D.       • Pharmacy Consult: Enteral tube insertion - review meds/change route/product selection  1 Each Other PHARMACY TO DOSE Jeremy M Gonda, M.D.       • fentaNYL (SUBLIMAZE)  injection 25 mcg  25 mcg Intravenous Q HOUR PRN Jeremy M Gonda, M.D.   25 mcg at 02/06/20 0729    Or   • fentaNYL (SUBLIMAZE) injection 50 mcg  50 mcg Intravenous Q HOUR PRN Jeremy M Gonda, M.D.   50 mcg at 01/20/20 1555    Or   • fentaNYL (SUBLIMAZE) injection 100 mcg  100 mcg Intravenous Q HOUR PRN Jeremy M Gonda, M.D.   100 mcg at 01/21/20 1748   • ondansetron (ZOFRAN) syringe/vial injection 4 mg  4 mg Intravenous Q4HRS PRN Rina Matta M.D.   4 mg at 02/06/20 1916   • labetalol (NORMODYNE/TRANDATE) injection 10 mg  10 mg Intravenous Q4HRS PRN Rina Matta M.D.   10 mg at 02/06/20 1951   • acetaminophen (TYLENOL) tablet 650 mg  650 mg Enteral Tube Q6HRS PRN Jeremy M Gonda, M.D.   650 mg at 02/06/20 0603   • ondansetron (ZOFRAN ODT) dispertab 4 mg  4 mg Enteral Tube Q4HRS PRN Jeremy M Gonda, M.D.           Fluids    Intake/Output Summary (Last 24 hours) at 2/7/2020 0949  Last data filed at 2/7/2020 0524  Gross per 24 hour   Intake 1010 ml   Output 3265 ml   Net -2255 ml       Laboratory          Recent Labs     02/05/20 0528 02/06/20 0600 02/07/20  0552   SODIUM 131* 137 136   POTASSIUM 4.2 3.9 4.1   CHLORIDE 96 100 99   CO2 28 29 27   BUN 80* 53* 67*   CREATININE 1.48* 1.08 1.37   MAGNESIUM 1.9 1.9 1.9   PHOSPHORUS 3.7 2.3* 3.9   CALCIUM 9.2 9.0 9.0     Recent Labs     02/05/20 0528 02/06/20 0600 02/07/20  0552   GLUCOSE 127* 116* 78     Recent Labs     02/05/20 0528 02/06/20 0600 02/07/20  0552   WBC 12.8* 13.3* 13.5*   NEUTSPOLYS 56.80 57.10 56.10   LYMPHOCYTES 24.70 22.00 26.50   MONOCYTES 13.20 15.00* 12.00   EOSINOPHILS 3.20 3.80 3.00   BASOPHILS 0.80 0.80 0.70     Recent Labs     02/05/20  0528 02/06/20  0600 02/07/20  0552   RBC 2.33* 2.47* 2.25*   HEMOGLOBIN 7.8* 8.2* 7.5*   HEMATOCRIT 25.0* 26.6* 24.1*   PLATELETCT 238 296 315     Called [facility] [phone] today (1/21) for update on the LP done on 1/15.   1/15 CSF Gram stain negative, Cx no growth to date  (final)  1/15 CSF cell count was WBC 1, RBC 2. Total protein 45. Glucose 79  1/15 CSF HSV PCR negative  1/15 CSF paraneoplastic negative    Imaging  X-Ray:  I have personally reviewed the images and compared with prior images. and No film today    Assessment/Plan  * Status epilepticus (HCC)  Assessment & Plan  Neurology following and adjusting epileptics  Vimpat 300mg BID  Topamax 200mg BID  Depakote 500mg BID    Monitor need for EEG  Seizure precautions    Acute respiratory failure with hypoxia (HCC)  Assessment & Plan  Intubated date: 1/14/2020 s/p trach 2/2 Dr Devine  Goal saturation > 92%  Respiratory treatments: prn    Continue to volume removal with dialysis  Tolerating t piece trial for over 36 hrs    Severe protein-calorie malnutrition (HCC)  Assessment & Plan  Continue tube feeds at goal rate   Dietary following    Cerebrovascular accident (CVA) due to embolism of right middle cerebral artery ([facility])  Assessment & Plan  Continue high intensity statin, aspirin  PT/OT eval -> not able to participate   Blood pressure control    End stage renal failure on dialysis ([facility])  Assessment & Plan  Avoid nephrotoxins  Patient having dialysis Monday Wednesday Friday long-term, no anticipation renal return to renal renal function  Aggressive volume removal    Normocytic anemia- (present on admission)  Assessment & Plan  Daily CBC with conservative transfusion strategy, monitor for bleeding serial monitor and start protonix bid for gi bleed  Restrictive transfusion strategy hg < 7    Hypertension  Assessment & Plan  PRN IV labetalol, hydralazine for goal SBP less than 160    Mild hypotension needing midodrine prior to dialysis continue to monitor need    DM (diabetes mellitus) ([facility])- (present on admission)  Assessment & Plan  Insulin sliding scale  Hypoglycemia protocol  FS BS  Goal -180    Vomiting  Assessment & Plan  2 different times one episode coffee ground   Started on protonix  Will check lft, lipase and  KUB  Serial monitor hg    Goals of care, counseling/discussion  Assessment & Plan  Planned for LTAC s/p tracheostomy once on stable antiepileptic  Start ventilator weaning and t piece trials  Prognosis poor for overall independent or meaningful     Discuss timing of PEG    Pressure ulcer  Assessment & Plan  Stage II coccyx -present on arrival  Continue wound care    Discuss with nutrition for possible stool thickening   Doubt like bowel edema from volume overload    Hypomagnesemia  Assessment & Plan  Replace keep greater than 2    Dysphagia as late effect of cerebrovascular accident (CVA)- (present on admission)  Assessment & Plan  Aspiration precautions, discuss if peg tube within goals of care vs LTAC with cortrax    Gout- (present on admission)  Assessment & Plan  Continue allopurinol    Obesity (BMI 30-39.9)- (present on admission)  Assessment & Plan  Nutritionist consultation  Encourage behavioral and dietary modification once extubated for weight loss       VTE:  Contraindicated  Ulcer: PPI  Lines: Central Line  Ongoing indication addressed and [facility] Catheter  Ongoing indication addressed    I have performed a physical exam and reviewed and updated ROS and Plan today (2/7/2020). In review of yesterday's note (2/6/2020), there are no changes except as documented above.     Discussed patient condition and risk of morbidity and/or mortality with RN, RT, Pharmacy and neurology     Stable for floor transfer please consult if critical care can be of assistance

## 2020-02-07 NOTE — DIETARY
Nutrition support weekly update:  Day 22 of admit.  Cassandra Guzmán is a 74 y.o. female with admitting DX of seizures, ARF, and respiratory failure requiring intubation.  Tube feeding initiated on 1/17. Current TF via [employer] is Impact Peptide 1.5 @ 40 mL/hr, providing 1440 kcal, 90 gm protein, 134 gm CHO, and 739 mL of free water per day.  In addition, pt is receiving Nutrisource Fiber packets 6x/day, providing an additional 18 gm fiber and 90 kcal, for a total of 1530 kcal/day.      Assessment:  Weight from 2/4 was 59 kg via bed scale, which is stable with previous wts this admit.     Evaluation:   1. Pt previously tolerating TF @ goal.  TF currently paused 2/2 emesis.   2. For pt's age, 21 gm fiber recommended/day.    3. Per Wound team note from 2/4, pt continues with stage 3 pressure ulcer to sacrum/coccyx.  Noted that wound bed has improved though BMS still with leakage, irritating wound.  Nutrisource fiber packets were added in attempt to bulk-up stool.    4. Pt continues HD - 2L off 2/6.   5. Trach day 5 - tolerating T-piece per progress notes.  6. Per discussion in IDT rounds earlier this week - anticipate PEG placement with GI in the future; pt's family was hesitant to have PEG place so soon after trach was placed.  7. Labs: BUN: 67, GFR: 38.  8. Meds: Depakote sprinkle, Epogen/Procrit, Folvite, Protonix, Allbee w/ C.  9. Pt noted with 2+, generalized edema to RUE and 1+, generalized edema to LUE and BLE.  10. LBM: 2/6 - soft, brown, loose.  11. Impact Peptide 1.5 remains an appropriate elemental, low-volume formula to meet pt's estimated needs.     Recommendations/Plan:  1. Continue current TF formula and goal rate.  2. Continue Nutrisource Fiber packets 6x/day.  3. Continue to monitor weight.   4. PEG placement for long-term nutrition support if within POC.     RD continues to follow.

## 2020-02-07 NOTE — PROGRESS NOTES
[facility] Progress Note       PUF ordered by Dr. Meneses. Treatment started at 1616 and ended at 1846.    Net UF removed: 2500mL.     Pt tolerated treatment well with no issues. See paper flow sheet for details. CVC patent, locked with Heparin 1,000 units. No s/s of infection present. Dressing in place, CDI.

## 2020-02-07 NOTE — CARE PLAN
Pt tolerating T-piece at this time no respiratory distress noted, 10L/40 %  Trache day 5     Plan: Continue to  wean Fio2 as tolerated            Reason For Visit    Chief Complaint   Patient presents with   • Ear Problem   Dong Reid is accompanied by parent.      Referred By  Patient was referred by Mary Jo Lockwood MD.     History of Present Illness  25 month old male with ear tubes.   Tubes were placed in Fall 2018.   Last seen after that with open tubes and healthy ears. Did not tolerate OAEs - advised to repeat today.  Had drainage in November 2018, was treated with drops.   Continues to have frequent drainage from his ears - likely wax.   Tube looks blocked on the right based on tympanometry - low volume flat tympanometry.     Review of Systems  10 systems were reviewed and are negative aside from those detailed above.     Past Medical History  Significant past medical history: No      No past medical history on file.     Surgical History  Significant past surgical history: above.  No past surgical history on file.     Social History  Custody status: Parents have full custody of the patient:  Yes     Current Meds  No current outpatient medications on file.     No current facility-administered medications for this visit.        Vitals        Visit Vitals  Temp 97.6 °F (36.4 °C)   Wt 15.5 kg (34 lb 2.7 oz)       Physical Exam  General:  The patient is awake, alert.  Head/Face:  Atraumatic, normocephalic. No dysmorphic features.  Eyes:  Pupils round, reactive to light.   Extraocular movement intact.   Conjunctiva normal.   Gaze conjugate.   Ears:  Right:  Tube in place and blocked. Middle ear aerated. Limited mobility.  Left:  Tube in place and patent with aerated middle ear.   Nose:   Anterior rhinoscopy reveals clear airway.   Oral Cavity:  The vestibule is normal appearing. Oral cavity mucosa is without lesion. The tongue is mobile and midline. Hard Palate is intact and without lesions. No ankyloglossia.  Oropharynx:  Soft palate elevates in the midline. Uvula normal. Tonsils small, symmetric. Base of tongue soft without lesions. Posterior  oropharyngeal wall clear.  Larynx:   No stridor. Normal voice.  Neck:  normal surface anatomy. Laryngeal landmarks are palpable. Thyroid normal to palpation. No masses. No cervical lymphadenopathy. Range of motion normal.  Lymphatic:  No cervical lymphadenopathy.  Neuro:  Alert, no focal deficits.     Assessment  Problem List Items Addressed This Visit     None      Visit Diagnoses     Dysfunction of both eustachian tubes    -  Primary    Relevant Orders    SERVICE TO AUDIOLOGY    S/P tympanotomy with insertion of tube        Ventilation tube blocked, initial encounter              Discussion/Summary  Right tube is blocked, middle ear is aerated. Left tube is open and also aerated middle ear.   Patient should get ear drops on the right side (saline for 2 weeks, BID) and should then follow up with us in 6 months. No other hearing test needed.          Genaro Cloud MD

## 2020-02-07 NOTE — DISCHARGE PLANNING
"· This RNCM met with pt's daughter, Belen, out side of Pt's room to discuss LTAC further   · Belen expressed concern about Pt going to an LTAC because of previous experience at a SNF  · RNCM explained that LTAC is ICU level of care and that the patient can get the same care she is getting currently- in an LTAC  · Belen states that the Pt is \"too unstable to move\"  · RNCM explained that the doctors have stated that she is stable and is cleared to transport to an LTAC  · RNCM also explained that the patient's oxygen requirements are decreasing and that she will transfer out of the ICU soon  · Belen now states that she is having a hard time \"looking into the LTAC facilities because of always going back and forth between the hospital and work\"  · RNCM explained to Belen that insurances can stop covering hospital stay once Pt meets criteria at a lower level of care  · Belen states she will look into the [facility], but could not give RNCM a timeframe for a decision to be made  · CM to continue to F/U with Belen  "

## 2020-02-07 NOTE — PROGRESS NOTES
Spoke with doctor shiva, pt was having brown coffee vomit at beginning of shift. Paused tube feeds. PPI ordered

## 2020-02-07 NOTE — PROGRESS NOTES
Spiritual Care Note    Patient Information     Patient's Name: Cassandra Guzmán   MRN: [id number]    YOB: 1945   Age and Gender: 74 y.o. female   Service Area: [facility]   Room (and Bed): [place] [address]   Ethnicity or Nationality:     Primary Language: English   Evangelical/Spiritual preference: Church   Place of Residence: [place], [place]   Family/Friends/Others Present: n/a   Clinical Team Present: Na/   Medical Diagnosis(-es)/Procedure(s): Seizures   Code Status: Full Code    Date of Admission: 1/16/2020   Length of Stay: 22 days        Spiritual Care Provider Information:  Name of Spiritual Care Provider: Fely Arenas  Title of Spiritual Care Provider: Associate   Phone Number: [phone]  E-mail: [email or web address]  Total time : 5 minutes    Spiritual Screen Results:    Gen Nursing  Spiritual Screen  Is your spiritual health or inner well-being important to you as you cope with your medical condition?: Yes  Would you like to receive a visit from our Spiritual Care team or your own Jewish or spiritual leader?: No  Was spiritual care education provided to the patient?: Yes  Cultural/Spiritual Needs During Care: Ceremonial, Cultural, Familial  Ceremonial Considerations: Prayer     Palliative Care  PC Evangelical/Spiritual Screening  Is your spiritual health or inner well-being important to you as you cope with your medical condition?: Yes  Would you like to receive a visit from our Spiritual Care team or your own Jewish or spiritual leader?: No  Was spiritual care education provided to the patient?: Yes      Encounter/Request Information  Encounter/Request Type   Visited With: Family, Patient not available  Nature of the Visit: Initial, On shift  Continue Visiting: Yes  Crisis Visit: Critical care  Referral From/ Origin of Request: Epic nursing(palliative care)  Referral To: Community clergy(Referred to Father Alverto.)    Religous Needs/Values  Evangelical Needs  Visit  Anabaptism Needs: Prayer      Notes:

## 2020-02-07 NOTE — DISCHARGE PLANNING
· RNCM received call from Jan, in UR, regarding Pt needing a Medicare recertification note added to chart by MD  · RNCM received wording from Jan  · RNCM updated Dr. Guzman and provided script

## 2020-02-08 NOTE — CARE PLAN
Bed in low and locked position. All alarm set appropriately, on and running.     Turn q2h to prevent skin breakdown.  Skin assessed.      Will monitor oxygen needs throughout this shift. Will provide oral care every 2 hours and prn.      Will continue to monitor pain throughout this shift. PRN meds to be given if needed

## 2020-02-08 NOTE — CARE PLAN
Pt tolerating T-piece at this time no respiratory distress noted, 5L/30%  Trache day 6     Plan: Continue to  wean Fio2 as tolerated

## 2020-02-08 NOTE — PALLIATIVE CARE
PALLIATIVE CARE FOLLOW-UP:    DPOA- paperwork scanned to [employer].     Son Erick at bedside asking about PEG placement and ICU status. Shared that pt has medical transfer orders.    Discussed LTACH as best opportunity for pt rehab to best possible new baseline, that this does not take place in the hospital.  Erick verbalized understanding and stated that his sister is looking at [facility] today.    Discussed with:  ICU RN Michele    Plan:  Palliative Care to continue to follow, provide support, and help facilitate decision-making as needed.    Thank you for allowing Palliative Care to support this pt and her family.  Contact [phone] for additional assistance, patient status change, questions or concerns.

## 2020-02-08 NOTE — CARE PLAN
Problem: Infection  Goal: Will remain free from infection  Outcome: PROGRESSING AS EXPECTED     Problem: Venous Thromboembolism (VTW)/Deep Vein Thrombosis (DVT) Prevention:  Goal: Patient will participate in Venous Thrombosis (VTE)/Deep Vein Thrombosis (DVT)Prevention Measures  Outcome: PROGRESSING AS EXPECTED     Problem: Respiratory:  Goal: Respiratory status will improve  Outcome: PROGRESSING AS EXPECTED     Problem: Knowledge Deficit  Goal: Knowledge of disease process/condition, treatment plan, diagnostic tests, and medications will improve  Outcome: PROGRESSING SLOWER THAN EXPECTED     Problem: Skin Integrity  Goal: Risk for impaired skin integrity will decrease  Outcome: PROGRESSING SLOWER THAN EXPECTED

## 2020-02-08 NOTE — PROGRESS NOTES
[facility] Nephrology Daily Progress Note    Date of Service  2/8/2020    AUTHOR: Olivier Hopson D.O.    Chief Complaint   Follow up ESRD    HPI  74 y.o. female admitted to [facility] on 1/14/20 with seizures.She was at a SNF.She had been previously hospitalized at [facility] and diaysis was initiated.  She was doing very poorly then and palliative care was discussed with the family,but they declined.She had very poor functional status and required Jimena lift to get into the dialysis chair.She was found to have  a CVA on MRI at [facility].Her seizures were not controlled and it was felt she was in status epilepticus.  She was getting HD at [facility].Last HD was on 1/13/20.She was seen by Dr Carcamo at [facility].Creatinine was 1.8 and dialysis was held. Neurology Astor that the patient needed continuous EEG monitoring and he was transferred to [facility]    Interval Problem Update  Please see note from 1/31 for prior summaries  2/01 - NAEO, another EEG being done this AM  2/02 - NAEO, scheduled for Trach today  2/03 - No events, underwent trach yesterday, no change in neuro status, BP stable overnight but low this am, given midodrine, to have HD today  2/04 - No events, tolerated HD yest with 2.1L UF, BP stable this am  2/05 - No events, BP low prior to starting HD, given albumin at the start of HD and BP stable at this time  2/06 - No events, tolerated HD yesterday with 2.5L UF, BP stable and high at times, still with dependent edema  2/07 - No events, tolerated PUF yesterday with 2.5L UF and did not require midodrine with treatment, BP stable today, still with dependent edema, to have HD today  2/08 - No overnight events or changes.  On TF at goal via NGT.  No neurologic change.  On vent/trach.     Review of Systems   Unable to perform ROS: Acuity of condition     PAST FAMILY HISTORY: Reviewed and unchanged since admission  PAST SURGICAL HISTORY:  Reviewed and unchanged since admission  SOCIAL HISTORY:  Reviewed and unchanged  since admission  FAMILY HISTORY: Reviewed and unchanged since admission  CURRENT MEDICATIONS: Reviewed since admission to present  IMAGING STUDIES: Reviewed since admission to present  LABORATORY STUDIES: Reviewed since admission to present    Physical Exam  Temp:  [36.6 °C (97.8 °F)-38.2 °C (100.7 °F)] 38.2 °C (100.7 °F)  Pulse:  [] 107  Resp:  [14-34] 22  BP: ()/(49-73) 135/61  SpO2:  [95 %-99 %] 96 %    Physical Exam  Vitals signs and nursing note reviewed.   Constitutional:       General: She is not in acute distress.     Appearance: Normal appearance. She is ill-appearing.   HENT:      Head: Normocephalic and atraumatic.      Right Ear: External ear normal.      Left Ear: External ear normal.      Nose: Nose normal. No congestion.      Mouth/Throat:      Mouth: Mucous membranes are moist.      Pharynx: No oropharyngeal exudate.      Comments: ETT  Eyes:      General: No scleral icterus.     Conjunctiva/sclera: Conjunctivae normal.      Pupils: Pupils are equal, round, and reactive to light.   Neck:      Musculoskeletal: Neck supple. No muscular tenderness.      Comments: +Trach  Cardiovascular:      Rate and Rhythm: Normal rate and regular rhythm.      Heart sounds: Normal heart sounds.   Pulmonary:      Effort: Pulmonary effort is normal. No respiratory distress.      Breath sounds: Normal breath sounds.      Comments: +Vent  Chest:      Chest wall: No tenderness.   Abdominal:      General: Bowel sounds are normal. There is no distension.      Palpations: Abdomen is soft.   Musculoskeletal:         General: Swelling present. No tenderness or signs of injury.      Comments: +dependent edema   Lymphadenopathy:      Cervical: No cervical adenopathy.   Skin:     General: Skin is warm and dry.      Findings: No rash.   Neurological:      Mental Status: She is alert.      Comments: Somnolent, opens eyes to painful stimuli, withdraws to painful stimuli   Psychiatric:      Comments: Unable to assess, pt  unresponsive       Fluids    Intake/Output Summary (Last 24 hours) at 2/8/2020 1031  Last data filed at 2/8/2020 0600  Gross per 24 hour   Intake 1360 ml   Output 2855 ml   Net -1495 ml     Laboratory  Recent Labs     02/06/20  0600 02/07/20  0552 02/07/20  1830 02/08/20  0550   WBC 13.3* 13.5*  --  14.0*   RBC 2.47* 2.25*  --  2.14*   HEMOGLOBIN 8.2* 7.5* 7.9* 7.3*   HEMATOCRIT 26.6* 24.1*  --  23.1*   .7* 107.1*  --  107.9*   MCH 33.2* 33.3*  --  34.1*   MCHC 30.8* 31.1*  --  31.6*   RDW 61.3* 60.5*  --  62.7*   PLATELETCT 296 315  --  277   MPV 9.2 8.9*  --  9.0     Recent Labs     02/06/20  0600 02/07/20  0552 02/08/20  0550   SODIUM 137 136 136   POTASSIUM 3.9 4.1 3.9   CHLORIDE 100 99 100   CO2 29 27 29   GLUCOSE 116* 78 140*   BUN 53* 67* 51*   CREATININE 1.08 1.37 1.22   CALCIUM 9.0 9.0 9.1     IMPRESSION:  # ESRD   No need for dialysis today (SAT)   Maintenance dialysis qMWF as OP at [facility]   CrCl 6              Hypotension with HD/UF, subclavian line removed per ICU team, PICC line for pressors/meds  # Status epilepticus  # S/P acute lacunar infarct   Hx of previous CVA with significant deficits.  # HTN--BP variable often with intradialytic hypotension  # DM II  # Acute Hypoxic Respiratory Failure  --CXR 2/3 still with bilateral pulm edema  # Hx of dysphagia  # Anemia of CKD.Ferritin previously significantly elevated. 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    PLAN:  -No need for dialysis today (SAT)  -Maintenance dialysis qMWF and PRN  -UF with HD as tolerated  -Midodrine 10mg prior to dialysis PRN if SBP low  -Seizures management per Neurology--dose adjust meds to accomodate dialysis  -Enteral feedings  -No dietary protein restrictions  -Epogen with HD to  goal Hgb 10-11  -Follow labs  -Continue GOC discussions with family  -Very poor prognosis, recommend comfort care, if and when family agreeable    Thank you,    **I spent a total of 35 minutes in the evaluation and coordination of care for this critically ill patient     All prior notes form other doctors and RN staff were reviewed from admission to current day to help me make my clinical decisions.

## 2020-02-08 NOTE — PROGRESS NOTES
[facility] Progress Note    Hemodialysis treatment ordered today per Dr. Meneses x 3.5 hours. Treatment initiated at 1315 and ended at 1630    Pt tolerated treatment, tx ended 15 min early due to clotting of dialyzer, blood able to return, Dr Meneses informed.; see paper flow sheets for details.    Net UF 1,925 mL Albumin 25% x 2 given for BP support.    Post tx, CVC flushed with saline then locked with heparin 1000 units/mL per designated amount in each wing then clamped and capped. Aspirate heparin prior to next CVC use.    Report given to Primary RN.

## 2020-02-08 NOTE — CARE PLAN
Problem: Infection  Goal: Will remain free from infection  Outcome: PROGRESSING AS EXPECTED     Problem: Venous Thromboembolism (VTW)/Deep Vein Thrombosis (DVT) Prevention:  Goal: Patient will participate in Venous Thrombosis (VTE)/Deep Vein Thrombosis (DVT)Prevention Measures  Outcome: PROGRESSING AS EXPECTED     Problem: Bowel/Gastric:  Goal: Will not experience complications related to bowel motility  Outcome: PROGRESSING AS EXPECTED     Problem: Fluid Volume:  Goal: Will maintain balanced intake and output  Outcome: PROGRESSING AS EXPECTED     Problem: Pain Management  Goal: Pain level will decrease to patient's comfort goal  Outcome: PROGRESSING AS EXPECTED     Problem: Urinary Elimination:  Goal: Ability to reestablish a normal urinary elimination pattern will improve  Outcome: PROGRESSING AS EXPECTED     Problem: Psychosocial Needs:  Goal: Level of anxiety will decrease  Outcome: PROGRESSING AS EXPECTED

## 2020-02-09 NOTE — ASSESSMENT & PLAN NOTE
She had had some vomiting 2/6 and one episode of coffee ground, but ?2/2 trach blood  Hgb slightly less, on bid omeprazole  Pending guaiac stool   Perhaps could get EGD with PEG placement  Vomiting resolved

## 2020-02-09 NOTE — CARE PLAN
Respiratory Update    Trache Day #7    7.0 Portex (6.0 Inner Cannula)    Aerosol T-Piece; 5L / 30%    Pt tolerating current therapy well with no noted distress.       Anesthesia Volume In Cc: 6

## 2020-02-09 NOTE — PROGRESS NOTES
Hospital Medicine Daily Progress Note    Date of Service  2/9/2020    Chief Complaint  74 y.o. female admitted 1/16/2020 with Altered mental status    Hospital Course    74 y.o. female who presented 1/16/2020 with a past medical history significant for end-stage renal disease on hemodialysis (followed by Dr. Cavazos) since November 2019 M/W/F, hypertension, hyperlipidemia and coronary artery disease.  She was recently admitted to [facility] for an extended period of time followed by placement in a SNF.  She has been very frail requiring a hoist to get her in and out of the dialysis chair with failure to thrive.  On January 14 while at the SNF patient had a witnessed seizure, with no history previously.  She was transferred to the emergency department where she had a second seizure with a prolonged postictal state.  She was intubated for airway protection and started on a propofol drip in the emergency department on January 14 at [facility].  She was admitted to the intensive care unit where she underwent an MRI of her brain showing an acute right parietal lacunar infarct with overall brain parenchymal loss.  Neurology was consulted and patient was loaded with Keppra and continued on the propofol drip. Despite this, EEGs continued to show evidence of focal seizure and Dilantin was started.  She underwent a lumbar puncture with unremarkable CSF and was being treated empirically with acyclovir, ampicillin, Rocephin, vancomycin and Decadron.  Given her persistent abnormal EEGs, the neurologist at the outside hospital recommend patient be transferred to a facility that can provide continuous EEG monitoring.  For this reason she was transferred to [facility].  She was trached on 2/2 and came off vent on 2/5, but remained unresponsive.  Family has apparently not wanted to discuss changing CODE STATUS nor transitioning to comfort care and patient remains a full code.  She remains  unresponsive.      Interval Problem Update  No changes today    Consultants/Specialty  Intensivist  Nephrology  Neurology  Palliative Care    Code Status  FULL    Disposition  To neuro floor, orders in 2/7     D/W tx team on rounds    Review of Systems  Review of Systems   Unable to perform ROS: Patient unresponsive        Physical Exam  Temp:  [37.7 °C (99.8 °F)-38.2 °C (100.7 °F)] 37.7 °C (99.8 °F)  Pulse:  [] 84  Resp:  [17-45] 20  BP: (114-159)/(52-77) 159/74  SpO2:  [91 %-99 %] 97 %    Physical Exam  Vitals signs and nursing note reviewed.   Constitutional:       Appearance: She is well-developed.   HENT:      Nose:      Comments: cortrak  Cardiovascular:      Rate and Rhythm: Normal rate and regular rhythm.      Heart sounds: Normal heart sounds. No murmur.   Pulmonary:      Effort: Pulmonary effort is normal. No respiratory distress.      Breath sounds: No wheezing or rales.      Comments: Trach in place  5L blow by  Abdominal:      General: Bowel sounds are normal. There is no distension.      Palpations: Abdomen is soft.      Tenderness: There is no tenderness.   Skin:     General: Skin is warm and dry.      Findings: No erythema.   Neurological:      Comments: Unresponsive, even to noxious stimuli         Fluids    Intake/Output Summary (Last 24 hours) at 2/9/2020 0839  Last data filed at 2/9/2020 0600  Gross per 24 hour   Intake 1240 ml   Output 280 ml   Net 960 ml       Laboratory  Recent Labs     02/07/20  0552 02/07/20  1830 02/08/20  0550 02/09/20  0600   WBC 13.5*  --  14.0* 14.2*   RBC 2.25*  --  2.14* 2.09*   HEMOGLOBIN 7.5* 7.9* 7.3* 7.2*   HEMATOCRIT 24.1*  --  23.1* 22.6*   .1*  --  107.9* 108.1*   MCH 33.3*  --  34.1* 34.4*   MCHC 31.1*  --  31.6* 31.9*   RDW 60.5*  --  62.7* 62.3*   PLATELETCT 315  --  277 287   MPV 8.9*  --  9.0 9.0     Recent Labs     02/07/20  0552 02/08/20  0550 02/09/20  0600   SODIUM 136 136 136   POTASSIUM 4.1 3.9 4.3   CHLORIDE 99 100 100   CO2 27 29 26    GLUCOSE 78 140* 157*   BUN 67* 51* 86*   CREATININE 1.37 1.22 1.60*   CALCIUM 9.0 9.1 9.3                   Imaging  [id number] VIEW   Final Result      Feeding tube tip overlies the gastric antrum.      DX-CHEST-PORTABLE (1 VIEW)   Final Result         1.  Pulmonary edema and/or infiltrates are identified, which are stable since the prior exam.   2.  Cardiomegaly   3.  Atherosclerosis      DX-CHEST-PORTABLE (1 VIEW)   Final Result         1.  Pulmonary edema and/or infiltrates are identified, which are stable since the prior exam.   2.  Cardiomegaly   3.  Atherosclerosis      DX-CHEST-PORTABLE (1 VIEW)   Final Result      1.  Unchanged left lower lobe atelectasis or pneumonia.      2.  Clear right lung.      DX-CHEST-PORTABLE (1 VIEW)   Final Result      Unchanged LEFT basilar atelectasis and/or airspace disease      DX-CHEST-PORTABLE (1 VIEW)   Final Result      Left basilar atelectasis, hilar and cardiac silhouette enlargement as before      DX-CHEST-PORTABLE (1 VIEW)   Final Result      Interval removal of a left subclavian central line. Stable patchy bilateral infiltrates.      IR-PICC LINE PLACEMENT W/ GUIDANCE > AGE 5   Final Result                  Ultrasound-guided PICC placement performed by qualified nursing staff as    above.          DX-CHEST-PORTABLE (1 VIEW)   Final Result      1.  Multiple support devices present.      2.  Patchy bilateral atelectasis.      DX-CHEST-PORTABLE (1 VIEW)   Final Result      Stable chest with retrocardiac opacity from atelectasis and/or pleural fluid favored over consolidation      DX-CHEST-PORTABLE (1 VIEW)   Final Result      Stable chest with retrocardiac opacity from atelectasis and/or pleural fluid favored over consolidation      DX-CHEST-PORTABLE (1 VIEW)   Final Result         No significant change from prior.               DX-CHEST-PORTABLE (1 VIEW)   Final Result         1. Stable lines and tubes..   2. Stable retrocardiac and left perihilar atelectasis  versus consolidation. Suspected small left pleural effusion.            DX-CHEST-PORTABLE (1 VIEW)   Final Result         1. Stable lines and tubes..   2. Stable retrocardiac atelectasis versus consolidation with increased left perihilar opacity. Suspected trace left pleural effusion.         [id number] VIEW   Final Result      1.  Enteric tube has been placed and the tip projects over the stomach.      2.  Pre-existing feeding tube tip projects at the gastroduodenal junction      DX-CHEST-PORTABLE (1 VIEW)   Final Result         1. Lines and tubes as above.   2. Stable retrocardiac atelectasis versus consolidation. Suspected trace left pleural effusion.         MR-BRAIN-WITH & W/O   Final Result      1.  Moderate cerebral atrophy.   2.  Evidence of intraventricular hemorrhage, indeterminate age. Possibly chronic intraventricular hemosiderin deposition.   3.  Extensive encephalomalacic change with hemosiderin deposition involving the right corona radiata, basal ganglia, posterior thalamus, subthalamic region, bordering the right cerebral peduncle. Associated ex vacuo dilatation of the body of the right    lateral ventricle. No change.   4.  Additional foci of old microhemorrhage most consistent with old hypertensive microhemorrhage or amyloid angiopathy in the left basal ganglia, left thalamus, and midline upper ventral abel.   5.  Punctate focus of acute infarction in the right parietal deep white matter. No change.   6.  Advanced supratentorial white matter disease most consistent with microvascular ischemic change.   7.  Encephalomalacic changes in the abel and right cerebral peduncle consistent with old infarction.   8.  Partially empty sella.   9.  Overall, no new findings and no significant change from 1/14/2020.      DX-CHEST-PORTABLE (1 VIEW)   Final Result         1. Stable lines and tubes.   2. Unchanged retrocardiac atelectasis versus consolidation.   3. Stable trace left pleural effusion.          OUTSIDE IMAGES-DX CHEST   Final Result      OUTSIDE IMAGES-US VASCULAR   Final Result      OUTSIDE IMAGES-MR BRAIN   Final Result      OUTSIDE IMAGES-DX CHEST   Final Result      OUTSIDE IMAGES-CT HEAD   Final Result      EC-ECHOCARDIOGRAM COMPLETE W/O CONT   Final Result      DX-CHEST-FOR LINE PLACEMENT Perform procedure in: Patient's Room   Final Result         1.  Retrocardiac opacity concerning for infiltrate, stable.   2.  Trace left pleural effusion, stable   3.  Cardiomegaly   4.  Atherosclerosis   5.  Perihilar interstitial prominence and bronchial wall cuffing, appearance suggests changes of underlying bronchial inflammation, consider bronchitis.      DX-ABDOMEN FOR TUBE PLACEMENT   Final Result         1.  Nonspecific bowel gas pattern.   2.  Dobbhoff tube tip overlying the expected location of the pylorus or first duodenal segment.   3.  Left lung base atelectasis and/or small effusion      DX-CHEST-PORTABLE (1 VIEW)   Final Result         1.  Retrocardiac opacity concerning for infiltrate.   2.  Trace left pleural effusion, stable   3.  Cardiomegaly   4.  Atherosclerosis      [id number] FILM X-RAY   Final Result      OUTSIDE IMAGES-DX CHEST   Final Result           Assessment/Plan  * Status epilepticus (HCC)- (present on admission)  Assessment & Plan  Was intubated for airway protection, now trached since 2/2  Neurology signed off  Continuing vimpat, topamax, depakote      Acute respiratory failure with hypoxia (HCC)- (present on admission)  Assessment & Plan  Trached 2/2, on t piece since 2/6, 5L O2  RT protocol  Preventing fluid overload with dialysis    Severe protein-calorie malnutrition (HCC)- (present on admission)  Assessment & Plan  Continue tube feeds, goal rate    Cerebrovascular accident (CVA) due to embolism of right middle cerebral artery (HCC)- (present on admission)  Assessment & Plan  On statin, asa  No PT/OT as unable to participate      End stage renal failure on dialysis  "([facility])- (present on admission)  Assessment & Plan  MWF HD, nephrology following ([facility])  Likely permanent; poor prognosis per nephrology, they recommend comfort care  Avoid nephrotoxins      Normocytic anemia- (present on admission)  Assessment & Plan  Monitor H&H if drops of 7 or 21 transfuse  ?concern for gi bleed and on bid omeprazole  Awaiting guaiac stool to see if she in fact has any blood  ?2/2 trach    Hypertension- (present on admission)  Assessment & Plan  Was hypotensive with dialysis, gets midodrine prn prior to HD if SBP<120  BP in 130s-150s  PRN IVs  Holding scheduled meds    DM (diabetes mellitus) ([facility])- (present on admission)  Assessment & Plan  No coverage needed, was on levemir outpatient      Vomiting- (present on admission)  Assessment & Plan  She had had some vomiting 2/6 and one episode of coffee ground, but ?2/2 trach blood  Hgb slightly less, on bid omeprazole  Pending guaiac stool   Perhaps could get EGD with PEG placement  Vomiting resolved    Goals of care, counseling/discussion- (present on admission)  Assessment & Plan  There have been multiple discussions with palliative/treatment team and family, to no avail  Patient remains full code, has trach  Needs PEG if to continue care; daughter has refused thus far, says it's \"too much\" right now as patient recently got trached  Will explore PEG tube for Monday, or once she goes to [facility]    Pressure ulcer  Assessment & Plan  Stage II coccyx -present on arrival  Continue wound care  Has BMS in place, hernandez   Wound care prn    Hypomagnesemia- (present on admission)  Assessment & Plan  Replacing for <2    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

## 2020-02-09 NOTE — ASSESSMENT & PLAN NOTE
Overall neurologic and physical prognosis is quite poor given her severe, unrelenting encephalopathy and dialysis, she remains unchanged   family meeting 3/12/2020 with no subsequent change of plan of care.    Patient changed to DNR, DNI CODE STATUS per ethics committee meeting 3/20 as appropriate.  Had a prolonged meeting with family and ethics team on 3/27/20 to explain the family about patients grave prognosis as above.   Daughter at this time still believes that there is hope and that HD should be continued, Nephrology Team was also present during the meeting and has clearly told patients daughter that at this time we feel like continuing HD will be causing her more harm than good and because of these reasons we will no longer continue HD     With the ethics, palliative care and nephrology team , decision was made to place patient at Comfort care measures. All team agreed and understood.     As per dr Singh pt is comfort care only now.    D/w palliative nurse and will continue with tube feeding and rectal tube since they are not causing discomfort  And d/w palliative nurse on 4/2 and no change in treatment and as PER Dominik/ the family wants dialysis to resume until family memebers can get here from the  [facility].  In no acute distress.    Patient's daughter keeps insisting about the hemodialysis.  Considering patient's age, tracheostomy status, [place], rectal tube continue hemodialysis is futile and will not add any benefits in her care and cause more suffering as per nephrology and multiple physicians on the case.

## 2020-02-09 NOTE — CARE PLAN
Will monitor oxygen needs throughout this shift. Will provide oral care every 2 hours and prn.      Turn q2h to prevent skin breakdown.  Skin assessed.      Will continue to monitor pain throughout this shift. PRN meds to be given if needed     Monitor settings checked. Bed in low locked position. Bed alarm On. Safety check completed

## 2020-02-09 NOTE — ASSESSMENT & PLAN NOTE
- Stage II coccyx -present on arrival.  - Continue wound care.  Continue pressure offloading strategies.

## 2020-02-09 NOTE — PROGRESS NOTES
[facility] Nephrology Daily Progress Note    Date of Service  2/9/2020    AUTHOR: Olivier Hopson D.O.    Chief Complaint   Follow up ESRD    HPI  74 y.o. female admitted to [facility] on 1/14/20 with seizures.She was at a SNF.She had been previously hospitalized at [facility] and diaysis was initiated.  She was doing very poorly then and palliative care was discussed with the family,but they declined.She had very poor functional status and required Jimena lift to get into the dialysis chair.She was found to have  a CVA on MRI at [facility].Her seizures were not controlled and it was felt she was in status epilepticus.  She was getting HD at [facility].Last HD was on 1/13/20.She was seen by Dr Carcamo at [facility].Creatinine was 1.8 and dialysis was held. Neurology [place] that the patient needed continuous EEG monitoring and [place] [facility]    DAILY NEPHROLOGY SUMMARY:  Please see note from 1/31 for prior summaries  2/01 - NAEO, another EEG being done this AM  2/02 - NAEO, scheduled for Trach today  2/03 - No events, underwent trach yesterday, no change in neuro status, BP stable overnight but low this am, given midodrine, to have HD today  2/04 - No events, tolerated HD yest with 2.1L UF, BP stable this am  2/05 - No events, BP low prior to starting HD, given albumin at the start of HD and BP stable at this time  2/06 - No events, tolerated HD yesterday with 2.5L UF, BP stable and high at times, still with dependent edema  2/07 - No events, tolerated PUF yesterday with 2.5L UF and did not require midodrine with treatment, BP stable today, still with dependent edema, to have HD today  2/08 - No overnight events or changes.  On TF at goal via NGT.  No neurologic change.  On vent/trach.   2/09 - No significant change.  No events.  Liquid stool from rectal tube.  On Vent/Trach    Review of Systems   Unable to perform ROS: Acuity of condition     PAST FAMILY HISTORY: Reviewed and unchanged since admission  PAST SURGICAL  HISTORY:  Reviewed and unchanged since admission  SOCIAL HISTORY:  Reviewed and unchanged since admission  FAMILY HISTORY: Reviewed and unchanged since admission  CURRENT MEDICATIONS: Reviewed since admission to present  IMAGING STUDIES: Reviewed since admission to present  LABORATORY STUDIES: Reviewed since admission to present    Physical Exam  Temp:  [37.7 °C (99.8 °F)-37.9 °C (100.2 °F)] 37.7 °C (99.8 °F)  Pulse:  [] 93  Resp:  [17-45] 27  BP: (114-159)/(52-77) 159/74  SpO2:  [91 %-99 %] 97 %    Physical Exam  Vitals signs and nursing note reviewed.   Constitutional:       General: She is not in acute distress.     Appearance: Normal appearance. She is ill-appearing.   HENT:      Head: Normocephalic and atraumatic.      Right Ear: External ear normal.      Left Ear: External ear normal.      Nose: Nose normal. No congestion.      Mouth/Throat:      Mouth: Mucous membranes are moist.      Pharynx: No oropharyngeal exudate.      Comments: ETT  Eyes:      General: No scleral icterus.     Conjunctiva/sclera: Conjunctivae normal.      Pupils: Pupils are equal, round, and reactive to light.   Neck:      Musculoskeletal: Neck supple. No muscular tenderness.      Comments: +Trach  Cardiovascular:      Rate and Rhythm: Normal rate and regular rhythm.      Heart sounds: Normal heart sounds.   Pulmonary:      Effort: Pulmonary effort is normal. No respiratory distress.      Breath sounds: Normal breath sounds.      Comments: +Vent  Chest:      Chest wall: No tenderness.   Abdominal:      General: Bowel sounds are normal. There is no distension.      Palpations: Abdomen is soft.   Musculoskeletal:         General: Swelling present. No tenderness or signs of injury.      Comments: +dependent edema   Lymphadenopathy:      Cervical: No cervical adenopathy.   Skin:     General: Skin is warm and dry.      Findings: No rash.   Neurological:      Mental Status: She is alert.      Comments: Somnolent, opens eyes to painful  stimuli, withdraws to painful stimuli   Psychiatric:      Comments: Unable to assess, pt unresponsive       Fluids    Intake/Output Summary (Last 24 hours) at 2/9/2020 1046  Last data filed at 2/9/2020 0600  Gross per 24 hour   Intake 1160 ml   Output 280 ml   Net 880 ml     Laboratory  Recent Labs     02/07/20  0552 02/07/20  1830 02/08/20  0550 02/09/20  0600   WBC 13.5*  --  14.0* 14.2*   RBC 2.25*  --  2.14* 2.09*   HEMOGLOBIN 7.5* 7.9* 7.3* 7.2*   HEMATOCRIT 24.1*  --  23.1* 22.6*   .1*  --  107.9* 108.1*   MCH 33.3*  --  34.1* 34.4*   MCHC 31.1*  --  31.6* 31.9*   RDW 60.5*  --  62.7* 62.3*   PLATELETCT 315  --  277 287   MPV 8.9*  --  9.0 9.0     Recent Labs     02/07/20  0552 02/08/20  0550 02/09/20  0600   SODIUM 136 136 136   POTASSIUM 4.1 3.9 4.3   CHLORIDE 99 100 100   CO2 27 29 26   GLUCOSE 78 140* 157*   BUN 67* 51* 86*   CREATININE 1.37 1.22 1.60*   CALCIUM 9.0 9.1 9.3     IMPRESSION:  # ESRD   No need for dialysis today (SUN)   Maintenance dialysis qMWF as OP at [facility]   mCrCl 6 by 24 hour urine  # Status epilepticus  # S/P acute lacunar infarct   Hx of previous CVA with significant deficits.  # HTN--BP variable often with intradialytic hypotension  # DM II  # Acute Hypoxic Respiratory Failure   CXR 2/3 still with bilateral pulm edema  # Hx of dysphagia  # Anemia of CKD.Ferritin previously significantly elevated. 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    SUGGESTIONS:   No need for dialysis today (SUN)   Maintenance dialysis qMWF and PRN   UF with HD as tolerated   Midodrine 10mg prior to dialysis PRN if SBP low   Seizures management per Neurology--dose adjust meds to accomodate dialysis   Enteral feedings   No dietary protein restrictions   Epogen with  HD to goal Hgb 10-11   Follow labs   Continue GOC discussions with family   Very poor prognosis, recommend comfort care, if and when family agreeable    Thank you,

## 2020-02-09 NOTE — PROGRESS NOTES
Hospital Medicine Daily Progress Note    Date of Service  2/8/2020    Chief Complaint  74 y.o. female admitted 1/16/2020 with Altered mental status    Hospital Course    74 y.o. female who presented 1/16/2020 with a past medical history significant for end-stage renal disease on hemodialysis (followed by Dr. Cavazos) since November 2019 M/W/F, hypertension, hyperlipidemia and coronary artery disease.  She was recently admitted to [facility] for an extended period of time followed by placement in a SNF.  She has been very frail requiring a hoist to get her in and out of the dialysis chair with failure to thrive.  On January 14 while at the SNF patient had a witnessed seizure, with no history previously.  She was transferred to the emergency department where she had a second seizure with a prolonged postictal state.  She was intubated for airway protection and started on a propofol drip in the emergency department on January 14 at [facility].  She was admitted to the intensive care unit where she underwent an MRI of her brain showing an acute right parietal lacunar infarct with overall brain parenchymal loss.  Neurology was consulted and patient was loaded with Keppra and continued on the propofol drip. Despite this, EEGs continued to show evidence of focal seizure and Dilantin was started.  She underwent a lumbar puncture with unremarkable CSF and was being treated empirically with acyclovir, ampicillin, Rocephin, vancomycin and Decadron.  Given her persistent abnormal EEGs, the neurologist at the outside hospital recommend patient be transferred to a facility that can provide continuous EEG monitoring.  For this reason she was transferred to [facility].  She was extubated on 2/5, but remained unresponsive.  Family has apparently not wanted to discuss changing CODE STATUS nor transitioning to comfort care and patient remains a full code.      Interval Problem Update  No  changes today  Remains unresponsive    Consultants/Specialty  Intensivist  Nephrology  Neurology  Palliative Care    Code Status  FULL    Disposition  To neuro floor, orders in 2/7     D/W tx team on rounds, neuro      Review of Systems  Review of Systems   Unable to perform ROS: Patient unresponsive        Physical Exam  Temp:  [36.8 °C (98.2 °F)-38.2 °C (100.7 °F)] 37.9 °C (100.2 °F)  Pulse:  [] 98  Resp:  [14-45] 24  BP: (114-159)/(52-72) 145/65  SpO2:  [95 %-99 %] 97 %    Physical Exam  Vitals signs and nursing note reviewed.   Constitutional:       Appearance: She is well-developed.   HENT:      Nose:      Comments: cortrak  Cardiovascular:      Rate and Rhythm: Normal rate and regular rhythm.      Heart sounds: Normal heart sounds. No murmur.   Pulmonary:      Effort: Pulmonary effort is normal. No respiratory distress.      Breath sounds: No wheezing or rales.      Comments: Diminished  Trach in place  Abdominal:      General: Bowel sounds are normal. There is no distension.      Palpations: Abdomen is soft.      Tenderness: There is no tenderness.   Skin:     General: Skin is warm and dry.      Findings: No erythema.   Neurological:      Comments: Unresponsive; stares ahead but doesn't focus on anything         Fluids    Intake/Output Summary (Last 24 hours) at 2/8/2020 2009  Last data filed at 2/8/2020 1800  Gross per 24 hour   Intake 1160 ml   Output 165 ml   Net 995 ml       Laboratory  Recent Labs     02/06/20  0600 02/07/20  0552 02/07/20  1830 02/08/20  0550   WBC 13.3* 13.5*  --  14.0*   RBC 2.47* 2.25*  --  2.14*   HEMOGLOBIN 8.2* 7.5* 7.9* 7.3*   HEMATOCRIT 26.6* 24.1*  --  23.1*   .7* 107.1*  --  107.9*   MCH 33.2* 33.3*  --  34.1*   MCHC 30.8* 31.1*  --  31.6*   RDW 61.3* 60.5*  --  62.7*   PLATELETCT 296 315  --  277   MPV 9.2 8.9*  --  9.0     Recent Labs     02/06/20  0600 02/07/20  0552 02/08/20  0550   SODIUM 137 136 136   POTASSIUM 3.9 4.1 3.9   CHLORIDE 100 99 100   CO2 29 27  29   GLUCOSE 116* 78 140*   BUN 53* 67* 51*   CREATININE 1.08 1.37 1.22   CALCIUM 9.0 9.0 9.1                   Imaging  [id number] VIEW   Final Result      Feeding tube tip overlies the gastric antrum.      DX-CHEST-PORTABLE (1 VIEW)   Final Result         1.  Pulmonary edema and/or infiltrates are identified, which are stable since the prior exam.   2.  Cardiomegaly   3.  Atherosclerosis      DX-CHEST-PORTABLE (1 VIEW)   Final Result         1.  Pulmonary edema and/or infiltrates are identified, which are stable since the prior exam.   2.  Cardiomegaly   3.  Atherosclerosis      DX-CHEST-PORTABLE (1 VIEW)   Final Result      1.  Unchanged left lower lobe atelectasis or pneumonia.      2.  Clear right lung.      DX-CHEST-PORTABLE (1 VIEW)   Final Result      Unchanged LEFT basilar atelectasis and/or airspace disease      DX-CHEST-PORTABLE (1 VIEW)   Final Result      Left basilar atelectasis, hilar and cardiac silhouette enlargement as before      DX-CHEST-PORTABLE (1 VIEW)   Final Result      Interval removal of a left subclavian central line. Stable patchy bilateral infiltrates.      IR-PICC LINE PLACEMENT W/ GUIDANCE > AGE 5   Final Result                  Ultrasound-guided PICC placement performed by qualified nursing staff as    above.          DX-CHEST-PORTABLE (1 VIEW)   Final Result      1.  Multiple support devices present.      2.  Patchy bilateral atelectasis.      DX-CHEST-PORTABLE (1 VIEW)   Final Result      Stable chest with retrocardiac opacity from atelectasis and/or pleural fluid favored over consolidation      DX-CHEST-PORTABLE (1 VIEW)   Final Result      Stable chest with retrocardiac opacity from atelectasis and/or pleural fluid favored over consolidation      DX-CHEST-PORTABLE (1 VIEW)   Final Result         No significant change from prior.               DX-CHEST-PORTABLE (1 VIEW)   Final Result         1. Stable lines and tubes..   2. Stable retrocardiac and left perihilar atelectasis  versus consolidation. Suspected small left pleural effusion.            DX-CHEST-PORTABLE (1 VIEW)   Final Result         1. Stable lines and tubes..   2. Stable retrocardiac atelectasis versus consolidation with increased left perihilar opacity. Suspected trace left pleural effusion.         [id number] VIEW   Final Result      1.  Enteric tube has been placed and the tip projects over the stomach.      2.  Pre-existing feeding tube tip projects at the gastroduodenal junction      DX-CHEST-PORTABLE (1 VIEW)   Final Result         1. Lines and tubes as above.   2. Stable retrocardiac atelectasis versus consolidation. Suspected trace left pleural effusion.         MR-BRAIN-WITH & W/O   Final Result      1.  Moderate cerebral atrophy.   2.  Evidence of intraventricular hemorrhage, indeterminate age. Possibly chronic intraventricular hemosiderin deposition.   3.  Extensive encephalomalacic change with hemosiderin deposition involving the right corona radiata, basal ganglia, posterior thalamus, subthalamic region, bordering the right cerebral peduncle. Associated ex vacuo dilatation of the body of the right    lateral ventricle. No change.   4.  Additional foci of old microhemorrhage most consistent with old hypertensive microhemorrhage or amyloid angiopathy in the left basal ganglia, left thalamus, and midline upper ventral abel.   5.  Punctate focus of acute infarction in the right parietal deep white matter. No change.   6.  Advanced supratentorial white matter disease most consistent with microvascular ischemic change.   7.  Encephalomalacic changes in the abel and right cerebral peduncle consistent with old infarction.   8.  Partially empty sella.   9.  Overall, no new findings and no significant change from 1/14/2020.      DX-CHEST-PORTABLE (1 VIEW)   Final Result         1. Stable lines and tubes.   2. Unchanged retrocardiac atelectasis versus consolidation.   3. Stable trace left pleural effusion.          OUTSIDE IMAGES-DX CHEST   Final Result      OUTSIDE IMAGES-US VASCULAR   Final Result      OUTSIDE IMAGES-MR BRAIN   Final Result      OUTSIDE IMAGES-DX CHEST   Final Result      OUTSIDE IMAGES-CT HEAD   Final Result      EC-ECHOCARDIOGRAM COMPLETE W/O CONT   Final Result      DX-CHEST-FOR LINE PLACEMENT Perform procedure in: Patient's Room   Final Result         1.  Retrocardiac opacity concerning for infiltrate, stable.   2.  Trace left pleural effusion, stable   3.  Cardiomegaly   4.  Atherosclerosis   5.  Perihilar interstitial prominence and bronchial wall cuffing, appearance suggests changes of underlying bronchial inflammation, consider bronchitis.      DX-ABDOMEN FOR TUBE PLACEMENT   Final Result         1.  Nonspecific bowel gas pattern.   2.  Dobbhoff tube tip overlying the expected location of the pylorus or first duodenal segment.   3.  Left lung base atelectasis and/or small effusion      DX-CHEST-PORTABLE (1 VIEW)   Final Result         1.  Retrocardiac opacity concerning for infiltrate.   2.  Trace left pleural effusion, stable   3.  Cardiomegaly   4.  Atherosclerosis      [id number] FILM X-RAY   Final Result      OUTSIDE IMAGES-DX CHEST   Final Result           Assessment/Plan  * Status epilepticus (HCC)- (present on admission)  Assessment & Plan  Was intubated for airway protection, now trached  Neurology signed off  Continuing vimpat, topamax, depakote      Acute respiratory failure with hypoxia (HCC)- (present on admission)  Assessment & Plan  Trached 2/2, on t piece since 2/6  RT protocol  Preventing fluid overload with dialysis    Severe protein-calorie malnutrition (HCC)- (present on admission)  Assessment & Plan  Continue tube feeds, goal rate    Cerebrovascular accident (CVA) due to embolism of right middle cerebral artery (HCC)- (present on admission)  Assessment & Plan  On statin, asa  No PT/OT as unable to participate      End stage renal failure on dialysis (HCC)- (present on  "admission)  Assessment & Plan  MWF HD, nephrology following ([place])  Likely permanent; poor prognosis per nephrology, they recommend comfort care  Avoid nephrotoxins      Normocytic anemia- (present on admission)  Assessment & Plan  Monitor H&H if drops of 7 or 21 transfuse  ?concern for gi bleed and on bid omeprazole  Will guaiac stool to see if she in fact has any blood  ?2/2 trach    Hypertension- (present on admission)  Assessment & Plan  Was hypotensive with dialysis, on midodrine prn prior to HD if SBP<120  BP in 130s-140s  PRN IVs  Holding scheduled meds    DM (diabetes mellitus) (HCC)- (present on admission)  Assessment & Plan  No coverage needed    Vomiting- (present on admission)  Assessment & Plan  She had had some vomiting 2/6 and one episode of coffee ground, but ?2/2 trach blood  Hgb slightly less, on bid omeprazole  Pending guaiac stool   Perhaps could get EGD with PEG placement    Goals of care, counseling/discussion- (present on admission)  Assessment & Plan  There have been multiple discussions with palliative/treatment team and family, to no avail  Patient remains full code, has trach  Needs PEG if to continue; daughter has refused thus far, says it's \"too much\" right now as patient just got trached  Will explore PEG tube for Monday, or once she goes to LTach    Pressure ulcer  Assessment & Plan  Stage II coccyx -present on arrival  Continue wound care  Has BMS in place, hernandez     Hypomagnesemia- (present on admission)  Assessment & Plan  Replacing for <2    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

## 2020-02-10 NOTE — PROGRESS NOTES
2 RN skin check with [facility]    Old scabbing to R hand, swelling  Pressure ulcer to sacrum, mepilex in place, wound team involved  Lucina area excoriated, kept dry and barrier cream in place  River and rectal tube in place, intact  PICC and dialysis catheter in place, intact  Swollen area of redness underneath tracheostomy dressing, wound picture uploaded and LDA opened  Large bruise to left arm    Q2 turns, waffle mattress in place

## 2020-02-10 NOTE — CARE PLAN
Problem: Infection  Goal: Will remain free from infection  Outcome: PROGRESSING AS EXPECTED     Problem: Bowel/Gastric:  Goal: Will not experience complications related to bowel motility  Outcome: PROGRESSING AS EXPECTED  Note:   Rectal tube in place, intact     Problem: Communication  Goal: The ability to communicate needs accurately and effectively will improve  Outcome: PROGRESSING SLOWER THAN EXPECTED  Note:   Unable to assess orientation, cannot make needs known

## 2020-02-10 NOTE — PROGRESS NOTES
Family belongings found in S128. Contacted Kirt (nurse) in neuro to return belongings to patients family.   [facility] behind in [facility]. Nothing more of the patients or family members belongings are left in S128..

## 2020-02-10 NOTE — CARE PLAN
Respiratory Update     7.0 Portex Trach   Trach T-piece 4L 28%       Pt tolerating current treatments well with no adverse reactions.

## 2020-02-10 NOTE — PROGRESS NOTES
Hospital Medicine Daily Progress Note    Date of Service  2/10/2020    Chief Complaint  74 y.o. female admitted 1/16/2020 with Altered mental status    Hospital Course    74 y.o. female who presented 1/16/2020 with a past medical history significant for end-stage renal disease on hemodialysis (followed by Dr. Cavazos) since November 2019 M/W/F, hypertension, hyperlipidemia and coronary artery disease.  She was recently admitted to [facility] for an extended period of time followed by placement in a SNF.  She has been very frail requiring a hoist to get her in and out of the dialysis chair with failure to thrive.  On January 14 while at the SNF patient had a witnessed seizure, with no history previously.  She was transferred to the emergency department where she had a second seizure with a prolonged postictal state.  She was intubated for airway protection and started on a propofol drip in the emergency department on January 14 at [facility].  She was admitted to the intensive care unit where she underwent an MRI of her brain showing an acute right parietal lacunar infarct with overall brain parenchymal loss.  Neurology was consulted and patient was loaded with Keppra and continued on the propofol drip. Despite this, EEGs continued to show evidence of focal seizure and Dilantin was started.  She underwent a lumbar puncture with unremarkable CSF and was being treated empirically with acyclovir, ampicillin, Rocephin, vancomycin and Decadron.  Given her persistent abnormal EEGs, the neurologist at the outside hospital recommend patient be transferred to a facility that can provide continuous EEG monitoring.  For this reason she was transferred to [facility].  She was trached on 2/2 and came off vent on 2/5, but remained unresponsive.  Family has apparently not wanted to discuss changing CODE STATUS nor transitioning to comfort care and patient remains a full code.  She remains  unresponsive.      Interval Problem Update  No changes today  Remains unresponsive  On 4L O2 over trach    Consultants/Specialty  Intensivist  Nephrology  Neurology  Palliative Care    Code Status  FULL    Disposition  ?ltac, family is supposed to be looking  Will need eventual PEG tube, can get at LTAC    D/W tx team on rounds    Review of Systems  Review of Systems   Unable to perform ROS: Patient unresponsive        Physical Exam  Temp:  [36.4 °C (97.5 °F)-37.2 °C (99 °F)] 37.1 °C (98.8 °F)  Pulse:  [75-94] 87  Resp:  [16-37] 16  BP: (115-187)/(58-85) 159/71  SpO2:  [96 %-100 %] 97 %    Physical Exam  Vitals signs and nursing note reviewed.   Constitutional:       Appearance: She is well-developed.      Comments: No changes today 2/10   HENT:      Nose:      Comments: cortrak  Cardiovascular:      Rate and Rhythm: Normal rate and regular rhythm.      Heart sounds: Normal heart sounds. No murmur.   Pulmonary:      Effort: Pulmonary effort is normal. No respiratory distress.      Breath sounds: No wheezing or rales.      Comments: Trach in place  5L blow by  Abdominal:      General: Bowel sounds are normal. There is no distension.      Palpations: Abdomen is soft.      Tenderness: There is no tenderness.   Skin:     General: Skin is warm and dry.      Findings: No erythema.   Neurological:      Comments: Unresponsive, even to noxious stimuli; no changes since 2/9         Fluids    Intake/Output Summary (Last 24 hours) at 2/10/2020 1238  Last data filed at 2/10/2020 0600  Gross per 24 hour   Intake 90 ml   Output 1000 ml   Net -910 ml       Laboratory  Recent Labs     02/08/20  0550 02/09/20  0600 02/10/20  1145   WBC 14.0* 14.2* 13.0*   RBC 2.14* 2.09* 2.27*   HEMOGLOBIN 7.3* 7.2* 7.6*   HEMATOCRIT 23.1* 22.6* 24.7*   .9* 108.1* 108.8*   MCH 34.1* 34.4* 33.5*   MCHC 31.6* 31.9* 30.8*   RDW 62.7* 62.3* 61.9*   PLATELETCT 277 287 293   MPV 9.0 9.0 8.9*     Recent Labs     02/08/20  0550 02/09/20  0600   SODIUM  136 136   POTASSIUM 3.9 4.3   CHLORIDE 100 100   CO2 29 26   GLUCOSE 140* 157*   BUN 51* 86*   CREATININE 1.22 1.60*   CALCIUM 9.1 9.3                   Imaging  DX-ABDOMEN FOR TUBE PLACEMENT   Final Result         Feeding tube with tip projecting over the expected area of the stomach.      [id number] VIEW   Final Result      Feeding tube tip overlies the gastric antrum.      DX-CHEST-PORTABLE (1 VIEW)   Final Result         1.  Pulmonary edema and/or infiltrates are identified, which are stable since the prior exam.   2.  Cardiomegaly   3.  Atherosclerosis      DX-CHEST-PORTABLE (1 VIEW)   Final Result         1.  Pulmonary edema and/or infiltrates are identified, which are stable since the prior exam.   2.  Cardiomegaly   3.  Atherosclerosis      DX-CHEST-PORTABLE (1 VIEW)   Final Result      1.  Unchanged left lower lobe atelectasis or pneumonia.      2.  Clear right lung.      DX-CHEST-PORTABLE (1 VIEW)   Final Result      Unchanged LEFT basilar atelectasis and/or airspace disease      DX-CHEST-PORTABLE (1 VIEW)   Final Result      Left basilar atelectasis, hilar and cardiac silhouette enlargement as before      DX-CHEST-PORTABLE (1 VIEW)   Final Result      Interval removal of a left subclavian central line. Stable patchy bilateral infiltrates.      IR-PICC LINE PLACEMENT W/ GUIDANCE > AGE 5   Final Result                  Ultrasound-guided PICC placement performed by qualified nursing staff as    above.          DX-CHEST-PORTABLE (1 VIEW)   Final Result      1.  Multiple support devices present.      2.  Patchy bilateral atelectasis.      DX-CHEST-PORTABLE (1 VIEW)   Final Result      Stable chest with retrocardiac opacity from atelectasis and/or pleural fluid favored over consolidation      DX-CHEST-PORTABLE (1 VIEW)   Final Result      Stable chest with retrocardiac opacity from atelectasis and/or pleural fluid favored over consolidation      DX-CHEST-PORTABLE (1 VIEW)   Final Result         No significant  change from prior.               DX-CHEST-PORTABLE (1 VIEW)   Final Result         1. Stable lines and tubes..   2. Stable retrocardiac and left perihilar atelectasis versus consolidation. Suspected small left pleural effusion.            DX-CHEST-PORTABLE (1 VIEW)   Final Result         1. Stable lines and tubes..   2. Stable retrocardiac atelectasis versus consolidation with increased left perihilar opacity. Suspected trace left pleural effusion.         [id number] VIEW   Final Result      1.  Enteric tube has been placed and the tip projects over the stomach.      2.  Pre-existing feeding tube tip projects at the gastroduodenal junction      DX-CHEST-PORTABLE (1 VIEW)   Final Result         1. Lines and tubes as above.   2. Stable retrocardiac atelectasis versus consolidation. Suspected trace left pleural effusion.         MR-BRAIN-WITH & W/O   Final Result      1.  Moderate cerebral atrophy.   2.  Evidence of intraventricular hemorrhage, indeterminate age. Possibly chronic intraventricular hemosiderin deposition.   3.  Extensive encephalomalacic change with hemosiderin deposition involving the right corona radiata, basal ganglia, posterior thalamus, subthalamic region, bordering the right cerebral peduncle. Associated ex vacuo dilatation of the body of the right    lateral ventricle. No change.   4.  Additional foci of old microhemorrhage most consistent with old hypertensive microhemorrhage or amyloid angiopathy in the left basal ganglia, left thalamus, and midline upper ventral abel.   5.  Punctate focus of acute infarction in the right parietal deep white matter. No change.   6.  Advanced supratentorial white matter disease most consistent with microvascular ischemic change.   7.  Encephalomalacic changes in the abel and right cerebral peduncle consistent with old infarction.   8.  Partially empty sella.   9.  Overall, no new findings and no significant change from 1/14/2020.      DX-CHEST-PORTABLE (1 VIEW)    Final Result         1. Stable lines and tubes.   2. Unchanged retrocardiac atelectasis versus consolidation.   3. Stable trace left pleural effusion.         OUTSIDE IMAGES-DX CHEST   Final Result      OUTSIDE IMAGES-US VASCULAR   Final Result      OUTSIDE IMAGES-MR BRAIN   Final Result      OUTSIDE IMAGES-DX CHEST   Final Result      OUTSIDE IMAGES-CT HEAD   Final Result      EC-ECHOCARDIOGRAM COMPLETE W/O CONT   Final Result      DX-CHEST-FOR LINE PLACEMENT Perform procedure in: Patient's Room   Final Result         1.  Retrocardiac opacity concerning for infiltrate, stable.   2.  Trace left pleural effusion, stable   3.  Cardiomegaly   4.  Atherosclerosis   5.  Perihilar interstitial prominence and bronchial wall cuffing, appearance suggests changes of underlying bronchial inflammation, consider bronchitis.      DX-ABDOMEN FOR TUBE PLACEMENT   Final Result         1.  Nonspecific bowel gas pattern.   2.  Dobbhoff tube tip overlying the expected location of the pylorus or first duodenal segment.   3.  Left lung base atelectasis and/or small effusion      DX-CHEST-PORTABLE (1 VIEW)   Final Result         1.  Retrocardiac opacity concerning for infiltrate.   2.  Trace left pleural effusion, stable   3.  Cardiomegaly   4.  Atherosclerosis      FD-[id number] FILM X-RAY   Final Result      OUTSIDE IMAGES-DX CHEST   Final Result           Assessment/Plan  * Status epilepticus (HCC)- (present on admission)  Assessment & Plan  Was intubated for airway protection, now trached since 2/2  Neurology signed off  Continuing vimpat, topamax, depakote      Acute respiratory failure with hypoxia (HCC)- (present on admission)  Assessment & Plan  Trached 2/2, on t piece since 2/6, 5L O2  RT protocol  Preventing fluid overload with dialysis    Severe protein-calorie malnutrition (HCC)- (present on admission)  Assessment & Plan  Continue tube feeds, goal rate    Cerebrovascular accident (CVA) due to embolism of right middle  "cerebral artery (HCC)- (present on admission)  Assessment & Plan  On statin, asa  No PT/OT as unable to participate      End stage renal failure on dialysis ([facility])- (present on admission)  Assessment & Plan  MWF HD, nephrology following ([facility])  Likely permanent; poor prognosis per nephrology, they recommend comfort care  Avoid nephrotoxins      Normocytic anemia- (present on admission)  Assessment & Plan  Monitor H&H if drops of 7 or 21 transfuse  ? Earlier concern for gi bleed and on bid omeprazole  Still awaiting guaiac stool to see if she in fact has any blood  Hgb up to 7.6 today      Hypertension- (present on admission)  Assessment & Plan  Had been getting hypotensive with dialysis  Now up to 150s, 160s, uncontrolled  HD due today  Will restart hydralazine, cardura, coreg home meds        DM (diabetes mellitus) ([facility])- (present on admission)  Assessment & Plan  No coverage needed, was on levemir outpatient      Vomiting- (present on admission)  Assessment & Plan  She had had some vomiting 2/6 and one episode of coffee ground, but ?2/2 trach blood  Hgb slightly less, on bid omeprazole  Pending guaiac stool   Perhaps could get EGD with PEG placement  Vomiting resolved    Goals of care, counseling/discussion- (present on admission)  Assessment & Plan  There have been multiple discussions with palliative/treatment team and family, to no avail  Patient remains full code, has trach  Needs PEG if to continue care; daughter has refused thus far, says it's \"too much\" right now as patient recently got trached  Can explore PEG tube once she goes to LTac    Pressure ulcer  Assessment & Plan  Stage II coccyx -present on arrival  Continue wound care  Has BMS in place, hernandez   Wound care prn    Hypomagnesemia- (present on admission)  Assessment & Plan  Replacing for <2    Dysphagia as late effect of cerebrovascular accident (CVA)- (present on admission)  Assessment & Plan  Continue cortrak, aspiration precautions  Needs PEG " if family wants to continue full treatment      Gout- (present on admission)  Assessment & Plan  Continue with allopurinol per the feeding tube       VTE prophylaxis: SCDs as was bleeding around trach site

## 2020-02-10 NOTE — PROGRESS NOTES
Cortrak Placement    Tube Team verified patient name and medical record number prior to tube placement.  Cortrak tube (43 inches, 10 [place]raini) placed at 70 cm in right nare.  Per Cortrak picture, tube appears to be in the stomach.  Nursing Instructions: Awaiting KUB to confirm placement before use for medications or feeding. Once placement confirmed, flush tube with 30 ml of water, and then remove and save stylet, in patient medication drawer.

## 2020-02-10 NOTE — RESPIRATORY CARE
Tracheostomy Update                 Cough: Moist;Productive (02/09/20 0800)  Sputum Amount: Small (02/09/20 1041)  Sputum Color: White (02/09/20 1041)  Sputum Consistency: Thick (02/09/20 1041)    FiO2%: 30 % (02/09/20 1447)  O2 (LPM): 5 (02/09/20 1447)      Events/Summary/Plan: patient tolerating T-piece and no changes made

## 2020-02-11 NOTE — PROGRESS NOTES
Pt trached, nonverbal, and unresponsive. Unable to assess orientation. Opens eyes but does not track. Cortrak in place for feeding and medications. Trache in place. River for incontinence and stage 3 pressure ulcer to sacrum. Rectal tube for incontinence. Q2 hour turns in place.

## 2020-02-11 NOTE — PROGRESS NOTES
3 1/2hr HD started @ 1624 and completed @ 1955,tolerated 2000ml net UF.TI,ANNE TDC dressing CDI,report given to Ángel Pardo RN.

## 2020-02-11 NOTE — CARE PLAN
Problem: Respiratory:  Goal: Respiratory status will improve  Outcome: PROGRESSING AS EXPECTED  Intervention: Assess and monitor pulmonary status  Note:   Here for seizure and incidental CVA found on MRI. Trached. Patient's lung sounds are surprisingly clear. Suctioning required infrequently: about 4 times this shift. Oral care provided with trach suctioning as well as chap stick for lips.     Problem: Skin Integrity  Goal: Risk for impaired skin integrity will decrease  Outcome: PROGRESSING SLOWER THAN EXPECTED  Intervention: Implement precautions to protect skin integrity in collaboration with the interdisciplinary team  Note:   Here for seizure and incidental CVA found on MRI. Obtunded. Q2 turns rectal tube and hernandez in place for skin care. Patient still has a sacral pressure injury.

## 2020-02-11 NOTE — PROGRESS NOTES
[facility] Nephrology Daily Progress Note    Date of Service  2/11/2020    AUTHOR: Harsh Ferrer M.D.    Chief Complaint   Follow up ESRD    HPI  74 y.o. female admitted to [facility] on 1/14/20 with seizures.She was at a SNF.She had been previously hospitalized at [facility] and diaysis was initiated.  She was doing very poorly then and palliative care was discussed with the family,but they declined.She had very poor functional status and required Jimena lift to get into the dialysis chair.She was found to have  a CVA on MRI at [facility].Her seizures were not controlled and it was felt she was in status epilepticus.  She was getting HD at [facility].Last HD was on 1/13/20.She was seen by Dr Carcamo at [facility].Creatinine was 1.8 and dialysis was held. Neurology Hendricks that the patient needed continuous EEG monitoring and he was transferred to [facility]    DAILY NEPHROLOGY SUMMARY:  Please see note from 1/31 for prior summaries  2/01 - NAEO, another EEG being done this AM  2/02 - NAEO, scheduled for Trach today  2/03 - No events, underwent trach yesterday, no change in neuro status, BP stable overnight but low this am, given midodrine, to have HD today  2/04 - No events, tolerated HD yest with 2.1L UF, BP stable this am  2/05 - No events, BP low prior to starting HD, given albumin at the start of HD and BP stable at this time  2/06 - No events, tolerated HD yesterday with 2.5L UF, BP stable and high at times, still with dependent edema  2/07 - No events, tolerated PUF yesterday with 2.5L UF and did not require midodrine with treatment, BP stable today, still with dependent edema, to have HD today  2/08 - No overnight events or changes.  On TF at goal via NGT.  No neurologic change.  On vent/trach.   2/09 - No significant change.  No events.  Liquid stool from rectal tube.  On Vent/Trach  2/10-  No new overnight renal events. +trach.  2/11- S/p HD yesterday with net UF of 2 L.     Review of Systems   Unable to perform ROS: Acuity  of condition     PAST FAMILY HISTORY: Reviewed and unchanged since admission  PAST SURGICAL HISTORY:  Reviewed and unchanged since admission  SOCIAL HISTORY:  Reviewed and unchanged since admission  FAMILY HISTORY: Reviewed and unchanged since admission  CURRENT MEDICATIONS: Reviewed since admission to present  IMAGING STUDIES: Reviewed since admission to present  LABORATORY STUDIES: Reviewed since admission to present    Physical Exam  Temp:  [37.1 °C (98.8 °F)-37.8 °C (100.1 °F)] 37.2 °C (99 °F)  Pulse:  [72-90] 72  Resp:  [16-18] 16  BP: (107-159)/(51-82) 113/51  SpO2:  [94 %-99 %] 96 %    Physical Exam  Vitals signs and nursing note reviewed.   Constitutional:       General: She is not in acute distress.     Appearance: Normal appearance. She is ill-appearing.   HENT:      Head: Normocephalic and atraumatic.      Right Ear: External ear normal.      Left Ear: External ear normal.      Nose: Nose normal. No congestion.      Mouth/Throat:      Mouth: Mucous membranes are moist.      Pharynx: No oropharyngeal exudate.      Comments: ETT  Eyes:      General: No scleral icterus.     Conjunctiva/sclera: Conjunctivae normal.      Pupils: Pupils are equal, round, and reactive to light.   Neck:      Musculoskeletal: Neck supple. No muscular tenderness.      Comments: +Trach  Cardiovascular:      Rate and Rhythm: Normal rate and regular rhythm.      Heart sounds: Normal heart sounds.   Pulmonary:      Effort: Pulmonary effort is normal. No respiratory distress.      Breath sounds: Normal breath sounds.      Comments: +Vent  Chest:      Chest wall: No tenderness.   Abdominal:      General: Bowel sounds are normal. There is no distension.      Palpations: Abdomen is soft.   Musculoskeletal:         General: Swelling present. No tenderness or signs of injury.      Comments: +dependent edema   Lymphadenopathy:      Cervical: No cervical adenopathy.   Skin:     General: Skin is warm and dry.      Findings: No rash.    Neurological:      Mental Status: She is alert.      Comments: Somnolent, opens eyes to painful stimuli, withdraws to painful stimuli   Psychiatric:      Comments: Unable to assess, pt unresponsive       Fluids    Intake/Output Summary (Last 24 hours) at 2/11/2020 0938  Last data filed at 2/11/2020 0600  Gross per 24 hour   Intake 980 ml   Output 2750 ml   Net -1770 ml     Laboratory  Recent Labs     02/09/20  0600 02/10/20  1145 02/11/20  0423   WBC 14.2* 13.0* 11.0*   RBC 2.09* 2.27* 2.03*   HEMOGLOBIN 7.2* 7.6* 6.8*   HEMATOCRIT 22.6* 24.7* 22.0*   .1* 108.8* 108.4*   MCH 34.4* 33.5* 33.5*   MCHC 31.9* 30.8* 30.9*   RDW 62.3* 61.9* 63.5*   PLATELETCT 287 293 195   MPV 9.0 8.9* 9.0     Recent Labs     02/09/20  0600 02/10/20  1145 02/11/20  0423   SODIUM 136 136 135   POTASSIUM 4.3 4.5 3.8   CHLORIDE 100 99 97   CO2 26 26 29   GLUCOSE 157* 136* 144*   BUN 86* 108* 46*   CREATININE 1.60* 1.98* 1.11   CALCIUM 9.3 9.6 8.4*     IMPRESSION:  # ESRD   No need for dialysis today (SUN)   Maintenance dialysis qMWF as OP at [facility]   mCrCl 6 by 24 hour urine  # Status epilepticus  # S/P acute lacunar infarct   Hx of previous CVA with significant deficits.  # HTN--BP variable often with intradialytic hypotension  # DM II  # Acute Hypoxic Respiratory Failure   CXR 2/3 still with bilateral pulm edema  # Hx of dysphagia  # Anemia of CKD.Ferritin previously significantly elevated. 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    SUGGESTIONS:   Maintenance dialysis qMWF and PRN. No plans for HD today.   UF with HD as tolerated   Midodrine 10mg prior to dialysis PRN if SBP low   Seizures management per Neurology--dose adjust meds to accomodate dialysis   Enteral feedings   No dietary  protein restrictions   Epogen with HD to goal Hgb 10-11   Follow labs   Continue GOC discussions with family   Very poor prognosis, recommend comfort care, if and when family agreeable    Thank you,

## 2020-02-11 NOTE — CARE PLAN
Problem: Venous Thromboembolism (VTW)/Deep Vein Thrombosis (DVT) Prevention:  Goal: Patient will participate in Venous Thrombosis (VTE)/Deep Vein Thrombosis (DVT)Prevention Measures  Outcome: PROGRESSING AS EXPECTED   SCDs in use  Problem: Urinary Elimination:  Goal: Ability to reestablish a normal urinary elimination pattern will improve  Outcome: PROGRESSING AS EXPECTED   River in place

## 2020-02-11 NOTE — PROGRESS NOTES
0730: Attempted to call Belen Nguyễn,patient's POA for telephone consent for a blood transfusion, unsuccessful.     0800: second attempt, unsuccessful.     0815: third attemt, unsuccessful. Called [facility] [place], no answer. Called Riaz Gopi, grandchild with answer. This RN asked grandchild if he knew a way to get a hold of Belen Nguyễn, grandchild provided the phone number already listed on patient's chart.     Dr. Alanis notified. Per Dr. Alanis, if patient becomes hemodynamically unstable, then do emergent blood transfusion otherwise, continue to contact Belen Nguyễn.

## 2020-02-11 NOTE — PROGRESS NOTES
Hemoglobin 6.8 this am. Hospitalist notified. Received order for type and screen and transfuse 1 unit of blood.

## 2020-02-11 NOTE — CARE PLAN
Problem: Venous Thromboembolism (VTW)/Deep Vein Thrombosis (DVT) Prevention:  Goal: Patient will participate in Venous Thrombosis (VTE)/Deep Vein Thrombosis (DVT)Prevention Measures  Outcome: PROGRESSING AS EXPECTED  Flowsheets (Taken 2/11/2020 1224)  Mechanical Prophylaxis : SCDs, Sequential Compression Device  SCDs, Sequential Compression Device: On     Problem: Skin Integrity  Goal: Risk for impaired skin integrity will decrease  Outcome: PROGRESSING SLOWER THAN EXPECTED  Note:   Patient has a pressure wound to coccyx area, wound team aware.

## 2020-02-11 NOTE — CARE PLAN
Problem: Oxygenation:  Goal: Maintain adequate oxygenation dependent on patient condition  Outcome: PROGRESSING SLOWER THAN EXPECTED     Problem: Bronchopulmonary Hygiene:  Goal: Increase mobilization of retained secretions  Outcome: PROGRESSING SLOWER THAN EXPECTED       Respiratory Update    Treatment modality: Aerosol  Frequency: Q4    Pt tolerating current treatments well with no adverse reactions.

## 2020-02-11 NOTE — PROGRESS NOTES
2 RN skin check. Bruising to LUE and RLE. Pressure ulcer on sacrum with mepilex and appropriate wound dressing in place. Redness under trache site, foam dressing in place. Excoriation to suleman area, hernandez and rectal tube in place. Q2hr turns, waffle mattress in use.

## 2020-02-11 NOTE — PROGRESS NOTES
Hospital Medicine Daily Progress Note    Date of Service  2/11/2020    Chief Complaint  Altered mental status    Hospital Course    74 y.o. female who presented 1/16/2020 with a past medical history significant for end-stage renal disease on hemodialysis (followed by Dr. Cavazos) since November 2019 M/W/F, hypertension, hyperlipidemia and coronary artery disease.  She was recently admitted to [facility] for an extended period of time followed by placement in a SNF.  She has been very frail requiring a hoist to get her in and out of the dialysis chair with failure to thrive.  On January 14 while at the SNF patient had a witnessed seizure, with no history previously.  She was transferred to the emergency department where she had a second seizure with a prolonged postictal state.  She was intubated for airway protection and started on a propofol drip in the emergency department on January 14 at [facility].  She was admitted to the intensive care unit where she underwent an MRI of her brain showing an acute right parietal lacunar infarct with overall brain parenchymal loss.  Neurology was consulted and patient was loaded with Keppra and continued on the propofol drip. Despite this, EEGs continued to show evidence of focal seizure and Dilantin was started.  She underwent a lumbar puncture with unremarkable CSF and was being treated empirically with acyclovir, ampicillin, Rocephin, vancomycin and Decadron.  Given her persistent abnormal EEGs, the neurologist at the outside hospital recommend patient be transferred to a facility that can provide continuous EEG monitoring.  For this reason she was transferred to [facility].  She was trached on 2/2 and came off vent on 2/5, but remained unresponsive.  She is continued on Vimpat, Topamax, and Depakote.  EEG showed no seizures. Neurology was consulted, who felt that likelihood of patient returning to baseline was low, and that the  likelihood of the patient returning to full independent function was essentially 0.  Family has apparently not wanted to discuss changing CODE STATUS nor transitioning to comfort care and patient remains a full code.  She remains unresponsive. LTACH is being pursued.        Interval Problem Update  2/11/2020 - I reviewed the patient's chart. There were no significant overnight events. Remains hemodynamically stable and afebrile. Stable on 4L by T-piece 28%.  WBC 11,000.  Hemoglobin 6.8.  Creatinine 1.11.  Electrolytes are normal.  Patient being transfused 1 unit packed RBC.    > I have personally seen and examined the patient today. Eyes opens, and stares, but does not track. Non verbal. Does not interact. Not in distress. Not in pain.       Consultants/Specialty  Intensivist  Nephrology  Neurology  Palliative Care    Code Status  FULL    Disposition  Monitor on the neurology floors. LTACH placement.   Will need eventual PEG tube, can get at LTAC      Review of Systems  Review of Systems      Unable to obtain due to vegetative state/mental status.       Physical Exam  Temp:  [36.7 °C (98 °F)-37.8 °C (100.1 °F)] 37.7 °C (99.8 °F)  Pulse:  [71-90] 71  Resp:  [16-18] 16  BP: (107-151)/(51-82) 139/64  SpO2:  [94 %-99 %] 97 %    Physical Exam  Vitals signs reviewed.   Constitutional:       General: She is not in acute distress.     Appearance: Normal appearance. She is normal weight. She is not ill-appearing or diaphoretic.   HENT:      Head: Normocephalic and atraumatic.      Right Ear: External ear normal.      Left Ear: External ear normal.      Nose: Nose normal.      Comments: Cortrak in place     Mouth/Throat:      Mouth: Mucous membranes are moist.      Pharynx: No oropharyngeal exudate or posterior oropharyngeal erythema.   Eyes:      General: No scleral icterus.     Extraocular Movements: Extraocular movements intact.      Conjunctiva/sclera: Conjunctivae normal.      Pupils: Pupils are equal, round, and  reactive to light.   Neck:      Musculoskeletal: Normal range of motion and neck supple. No neck rigidity or muscular tenderness.      Comments: Trach in place  Cardiovascular:      Rate and Rhythm: Normal rate and regular rhythm.      Heart sounds: Normal heart sounds. No murmur.   Pulmonary:      Effort: Pulmonary effort is normal. No respiratory distress.      Breath sounds: Normal breath sounds. No stridor. No wheezing, rhonchi or rales.   Chest:      Chest wall: No tenderness.   Abdominal:      General: Bowel sounds are normal. There is no distension.      Palpations: Abdomen is soft. There is no mass.      Tenderness: There is no tenderness. There is no guarding or rebound.   Musculoskeletal: Normal range of motion.         General: No swelling.      Right lower leg: No edema.      Left lower leg: No edema.   Lymphadenopathy:      Cervical: No cervical adenopathy.   Skin:     General: Skin is warm and dry.      Coloration: Skin is not jaundiced.      Findings: No rash.   Neurological:      Cranial Nerves: No cranial nerve deficit.      Comments:  Eyes opens, and stares, but does not track. Non verbal, and unresponsive. Does not interact.    Psychiatric:      Comments: Unable to assess due to mentation               Fluids    Intake/Output Summary (Last 24 hours) at 2/11/2020 1429  Last data filed at 2/11/2020 1301  Gross per 24 hour   Intake 1396 ml   Output 2750 ml   Net -1354 ml       Laboratory  Recent Labs     02/09/20  0600 02/10/20  1145 02/11/20  0423   WBC 14.2* 13.0* 11.0*   RBC 2.09* 2.27* 2.03*   HEMOGLOBIN 7.2* 7.6* 6.8*   HEMATOCRIT 22.6* 24.7* 22.0*   .1* 108.8* 108.4*   MCH 34.4* 33.5* 33.5*   MCHC 31.9* 30.8* 30.9*   RDW 62.3* 61.9* 63.5*   PLATELETCT 287 293 195   MPV 9.0 8.9* 9.0     Recent Labs     02/09/20  0600 02/10/20  1145 02/11/20  0423   SODIUM 136 136 135   POTASSIUM 4.3 4.5 3.8   CHLORIDE 100 99 97   CO2 26 26 29   GLUCOSE 157* 136* 144*   BUN 86* 108* 46*   CREATININE 1.60*  1.98* 1.11   CALCIUM 9.3 9.6 8.4*                   Imaging  DX-ABDOMEN FOR TUBE PLACEMENT   Final Result         Feeding tube with tip projecting over the expected area of the stomach.      [id number] VIEW   Final Result      Feeding tube tip overlies the gastric antrum.      DX-CHEST-PORTABLE (1 VIEW)   Final Result         1.  Pulmonary edema and/or infiltrates are identified, which are stable since the prior exam.   2.  Cardiomegaly   3.  Atherosclerosis      DX-CHEST-PORTABLE (1 VIEW)   Final Result         1.  Pulmonary edema and/or infiltrates are identified, which are stable since the prior exam.   2.  Cardiomegaly   3.  Atherosclerosis      DX-CHEST-PORTABLE (1 VIEW)   Final Result      1.  Unchanged left lower lobe atelectasis or pneumonia.      2.  Clear right lung.      DX-CHEST-PORTABLE (1 VIEW)   Final Result      Unchanged LEFT basilar atelectasis and/or airspace disease      DX-CHEST-PORTABLE (1 VIEW)   Final Result      Left basilar atelectasis, hilar and cardiac silhouette enlargement as before      DX-CHEST-PORTABLE (1 VIEW)   Final Result      Interval removal of a left subclavian central line. Stable patchy bilateral infiltrates.      IR-PICC LINE PLACEMENT W/ GUIDANCE > AGE 5   Final Result                  Ultrasound-guided PICC placement performed by qualified nursing staff as    above.          DX-CHEST-PORTABLE (1 VIEW)   Final Result      1.  Multiple support devices present.      2.  Patchy bilateral atelectasis.      DX-CHEST-PORTABLE (1 VIEW)   Final Result      Stable chest with retrocardiac opacity from atelectasis and/or pleural fluid favored over consolidation      DX-CHEST-PORTABLE (1 VIEW)   Final Result      Stable chest with retrocardiac opacity from atelectasis and/or pleural fluid favored over consolidation      DX-CHEST-PORTABLE (1 VIEW)   Final Result         No significant change from prior.               DX-CHEST-PORTABLE (1 VIEW)   Final Result         1. Stable lines  and tubes..   2. Stable retrocardiac and left perihilar atelectasis versus consolidation. Suspected small left pleural effusion.            DX-CHEST-PORTABLE (1 VIEW)   Final Result         1. Stable lines and tubes..   2. Stable retrocardiac atelectasis versus consolidation with increased left perihilar opacity. Suspected trace left pleural effusion.         [id number] VIEW   Final Result      1.  Enteric tube has been placed and the tip projects over the stomach.      2.  Pre-existing feeding tube tip projects at the gastroduodenal junction      DX-CHEST-PORTABLE (1 VIEW)   Final Result         1. Lines and tubes as above.   2. Stable retrocardiac atelectasis versus consolidation. Suspected trace left pleural effusion.         MR-BRAIN-WITH & W/O   Final Result      1.  Moderate cerebral atrophy.   2.  Evidence of intraventricular hemorrhage, indeterminate age. Possibly chronic intraventricular hemosiderin deposition.   3.  Extensive encephalomalacic change with hemosiderin deposition involving the right corona radiata, basal ganglia, posterior thalamus, subthalamic region, bordering the right cerebral peduncle. Associated ex vacuo dilatation of the body of the right    lateral ventricle. No change.   4.  Additional foci of old microhemorrhage most consistent with old hypertensive microhemorrhage or amyloid angiopathy in the left basal ganglia, left thalamus, and midline upper ventral abel.   5.  Punctate focus of acute infarction in the right parietal deep white matter. No change.   6.  Advanced supratentorial white matter disease most consistent with microvascular ischemic change.   7.  Encephalomalacic changes in the abel and right cerebral peduncle consistent with old infarction.   8.  Partially empty sella.   9.  Overall, no new findings and no significant change from 1/14/2020.      DX-CHEST-PORTABLE (1 VIEW)   Final Result         1. Stable lines and tubes.   2. Unchanged retrocardiac atelectasis versus  consolidation.   3. Stable trace left pleural effusion.         OUTSIDE IMAGES-DX CHEST   Final Result      OUTSIDE IMAGES-US VASCULAR   Final Result      OUTSIDE IMAGES-MR BRAIN   Final Result      OUTSIDE IMAGES-DX CHEST   Final Result      OUTSIDE IMAGES-CT HEAD   Final Result      EC-ECHOCARDIOGRAM COMPLETE W/O CONT   Final Result      DX-CHEST-FOR LINE PLACEMENT Perform procedure in: Patient's Room   Final Result         1.  Retrocardiac opacity concerning for infiltrate, stable.   2.  Trace left pleural effusion, stable   3.  Cardiomegaly   4.  Atherosclerosis   5.  Perihilar interstitial prominence and bronchial wall cuffing, appearance suggests changes of underlying bronchial inflammation, consider bronchitis.      DX-ABDOMEN FOR TUBE PLACEMENT   Final Result         1.  Nonspecific bowel gas pattern.   2.  Dobbhoff tube tip overlying the expected location of the pylorus or first duodenal segment.   3.  Left lung base atelectasis and/or small effusion      DX-CHEST-PORTABLE (1 VIEW)   Final Result         1.  Retrocardiac opacity concerning for infiltrate.   2.  Trace left pleural effusion, stable   3.  Cardiomegaly   4.  Atherosclerosis      [id number] FILM X-RAY   Final Result      OUTSIDE IMAGES-DX CHEST   Final Result           Assessment/Plan  * Status epilepticus (HCC)- (present on admission)  Assessment & Plan  -Was intubated for airway protection, now trached since 2/2.   -Continuing vimpat, topamax, depakote.  Repeat EEG here showed no seizures. Neurology gave the opinion that likelihood of patient returning to baseline was low, and that the likelihood of the patient returning to full independent function was essentially 0.    Acute respiratory failure with hypoxia (HCC)- (present on admission)  Assessment & Plan  - stable on trach/T-piece.   - continue RT protocol, O2 supplement.  -Continue hemodialysis to prevent fluid overload.    Severe protein-calorie malnutrition (HCC)- (present on  "admission)  Assessment & Plan  -Continue tube feeding.    Cerebrovascular accident (CVA) due to embolism of right middle cerebral artery (HCC)- (present on admission)  Assessment & Plan  -Continue statin and aspirin.    -No PT/OT needs as unable to participate, and likely at her new baseline.      End stage renal failure on dialysis ([facility])- (present on admission)  Assessment & Plan  -Continue hemodialysis MWF.  Nephrology on board.  - Likely permanent; poor prognosis per nephrology, they recommend comfort care.  - avoid nephrotoxins, and continue to renally dose all medications.    Normocytic anemia- (present on admission)  Assessment & Plan  -Likely anemia of chronic kidney disease.  FOBT is negative.  -Transfuse with 1 unit packed RBC today.  Continue to trend H&H.      Hypertension- (present on admission)  Assessment & Plan  -Maintaining good control, but had been getting hypotensive with dialysis.  -Continue hydralazine, cardura, coreg (home meds).  Monitor blood pressure trend closely.        DM (diabetes mellitus) ([facility])- (present on admission)  Assessment & Plan  -No need for coverage.  Continue to monitor.     Goals of care, counseling/discussion- (present on admission)  Assessment & Plan  - There have been multiple discussions with palliative/treatment team and family, to no avail. Patient remains full code, has trach.  - Needs PEG if to continue care; daughter has refused thus far, says it's \"too much\" right now as patient recently got trached. Can explore PEG tube once she goes to [facility].    Pressure ulcer  Assessment & Plan  - Stage II coccyx -present on arrival.  - Continue wound care.  Continue pressure offloading strategies.    Hypomagnesemia- (present on admission)  Assessment & Plan  -Continue to monitor.     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  Maintain on aspiration precautions.  -Will eventually need PEG placement " if family wants to continue full treatment.      Gout- (present on admission)  Assessment & Plan  -Continue with allopurinol per the feeding tube.       VTE prophylaxis: SCDs

## 2020-02-12 PROBLEM — L89.303 PRESSURE INJURY OF BUTTOCK, STAGE 3 (HCC): Status: ACTIVE | Noted: 2020-01-01

## 2020-02-12 NOTE — PROGRESS NOTES
[facility] Nephrology Daily Progress Note    Date of Service  2/12/2020    AUTHOR: Harsh Ferrer M.D.    Chief Complaint   Follow up ESRD    HPI  74 y.o. female admitted to [facility] on 1/14/20 with seizures.She was at a SNF.She had been previously hospitalized at [facility] and diaysis was initiated.  She was doing very poorly then and palliative care was discussed with the family,but they declined.She had very poor functional status and required Jimena lift to get into the dialysis chair.She was found to have  a CVA on MRI at [facility].Her seizures were not controlled and it was felt she was in status epilepticus.  She was getting HD at [facility].Last HD was on 1/13/20.She was seen by Dr Carcamo at [facility].Creatinine was 1.8 and dialysis was held. Neurology Moore that the patient needed continuous EEG monitoring and he was transferred to [facility]    DAILY NEPHROLOGY SUMMARY:  Please see note from 1/31 for prior summaries  2/01 - NAEO, another EEG being done this AM  2/02 - NAEO, scheduled for Trach today  2/03 - No events, underwent trach yesterday, no change in neuro status, BP stable overnight but low this am, given midodrine, to have HD today  2/04 - No events, tolerated HD yest with 2.1L UF, BP stable this am  2/05 - No events, BP low prior to starting HD, given albumin at the start of HD and BP stable at this time  2/06 - No events, tolerated HD yesterday with 2.5L UF, BP stable and high at times, still with dependent edema  2/07 - No events, tolerated PUF yesterday with 2.5L UF and did not require midodrine with treatment, BP stable today, still with dependent edema, to have HD today  2/08 - No overnight events or changes.  On TF at goal via NGT.  No neurologic change.  On vent/trach.   2/09 - No significant change.  No events.  Liquid stool from rectal tube.  On Vent/Trach  2/10-  No new overnight renal events. +trach.  2/11- S/p HD yesterday with net UF of 2 L.   2/12- No new overnight renal events. HD  today.    Review of Systems   Unable to perform ROS: Acuity of condition     PAST FAMILY HISTORY: Reviewed and unchanged since admission  PAST SURGICAL HISTORY:  Reviewed and unchanged since admission  SOCIAL HISTORY:  Reviewed and unchanged since admission  FAMILY HISTORY: Reviewed and unchanged since admission  CURRENT MEDICATIONS: Reviewed since admission to present  IMAGING STUDIES: Reviewed since admission to present  LABORATORY STUDIES: Reviewed since admission to present    Physical Exam  Temp:  [36.6 °C (97.8 °F)-38 °C (100.4 °F)] 38 °C (100.4 °F)  Pulse:  [69-85] 82  Resp:  [16-18] 18  BP: (117-162)/(55-71) 117/55  SpO2:  [95 %-98 %] 97 %    Physical Exam  Vitals signs and nursing note reviewed.   Constitutional:       General: She is not in acute distress.     Appearance: Normal appearance. She is ill-appearing.   HENT:      Head: Normocephalic and atraumatic.      Right Ear: External ear normal.      Left Ear: External ear normal.      Nose: Nose normal. No congestion.      Mouth/Throat:      Mouth: Mucous membranes are moist.      Pharynx: No oropharyngeal exudate.      Comments: ETT  Eyes:      General: No scleral icterus.     Conjunctiva/sclera: Conjunctivae normal.      Pupils: Pupils are equal, round, and reactive to light.   Neck:      Musculoskeletal: Neck supple. No muscular tenderness.      Comments: +Trach  Cardiovascular:      Rate and Rhythm: Normal rate and regular rhythm.      Heart sounds: Normal heart sounds.   Pulmonary:      Effort: Pulmonary effort is normal. No respiratory distress.      Breath sounds: Normal breath sounds.      Comments: +Vent  Chest:      Chest wall: No tenderness.   Abdominal:      General: Bowel sounds are normal. There is no distension.      Palpations: Abdomen is soft.   Musculoskeletal:         General: Swelling present. No tenderness or signs of injury.      Comments: +dependent edema   Lymphadenopathy:      Cervical: No cervical adenopathy.   Skin:      General: Skin is warm and dry.      Findings: No rash.   Neurological:      Mental Status: She is alert.      Comments: Somnolent, opens eyes to painful stimuli, withdraws to painful stimuli   Psychiatric:      Comments: Unable to assess, pt unresponsive       Fluids    Intake/Output Summary (Last 24 hours) at 2/12/2020 0936  Last data filed at 2/12/2020 0620  Gross per 24 hour   Intake 676 ml   Output 100 ml   Net 576 ml     Laboratory  Recent Labs     02/10/20  1145 02/11/20  0423 02/12/20  0000   WBC 13.0* 11.0* 11.8*   RBC 2.27* 2.03* 2.68*   HEMOGLOBIN 7.6* 6.8* 8.4*   HEMATOCRIT 24.7* 22.0* 26.8*   .8* 108.4* 100.0*   MCH 33.5* 33.5* 31.3   MCHC 30.8* 30.9* 31.3*   RDW 61.9* 63.5* 73.8*   PLATELETCT 293 195 217   MPV 8.9* 9.0 9.1     Recent Labs     02/10/20  1145 02/11/20  0423   SODIUM 136 135   POTASSIUM 4.5 3.8   CHLORIDE 99 97   CO2 26 29   GLUCOSE 136* 144*   * 46*   CREATININE 1.98* 1.11   CALCIUM 9.6 8.4*     IMPRESSION:  # ESRD   No need for dialysis today    Maintenance dialysis qMWF as OP at [facility]   mCrCl 6 by 24 hour urine - will repeat this tomorrow  # Status epilepticus  # S/P acute lacunar infarct   Hx of previous CVA with significant deficits.  # HTN--BP variable often with intradialytic hypotension  # DM II  # Acute Hypoxic Respiratory Failure   CXR 2/3 still with bilateral pulm edema  # Hx of dysphagia  # Anemia of CKD.Ferritin previously significantly elevated. 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    SUGGESTIONS:   Maintenance dialysis qMWF and PRN. HD today. Will do another 24 hr urine collection for CrCl starting tomorrow.   Midodrine 10mg prior to dialysis PRN if SBP low   Seizures management per Neurology--dose adjust meds to  accomodate dialysis   Enteral feedings   No dietary protein restrictions   Follow labs   Continue GOC discussions with family   Very poor prognosis, recommend comfort care, if and when family agreeable    Thank you,

## 2020-02-12 NOTE — CARE PLAN
Problem: Communication  Goal: The ability to communicate needs accurately and effectively will improve  Outcome: PROGRESSING SLOWER THAN EXPECTED  Pt. with trache in place and obtunded.     Problem: Skin Integrity  Goal: Risk for impaired skin integrity will decrease  Outcome: PROGRESSING SLOWER THAN EXPECTED  Q2H turns in place. Waffle mattress overlay. Heel float boots. Wound care in place. BMS in place. River catheter in place. Appropriate pressure injury prevention measures in place.

## 2020-02-12 NOTE — PROGRESS NOTES
2 RN skin check complete with MIC Glover: Redness around trach area. Bruising to BUE and BLE. Pressure wound to sacrum/coccyx area, wound team aware. Excoriation to groin and perianal area. Discoloration to RLE.     Q2hr turns, waffle cushion overlay in place.

## 2020-02-12 NOTE — CONSULTS
Consults                                              Gastroenterology Consult Note     Date of Consult: 2/12/2020     Reason for consult: PEG tube placement     HPI:  Cassandra is a 74 year old female with a past medical history significant for end-stage renal disease on hemodialysis since November 2019, GERD, GI beed, hypertension, hyperlipidemia, CVA with residual left sided deficits, and coronary artery disease.  Following a lengthy hospitalization in late 2019 she was placed in on assisted living facility.  Prior to her admission she was noted to be increasingly weak and requiring mechanical lifts just to get her into her dialysis bed.  She was noted to have ongoing seizure activity on January 14, 2020 and was transferred to an outlying facility where the seizure activity continued, and she was subsequently intubated for airway protection during a prolonged postictal state.  Upon admission an MRI of her brain showed an acute right parietal lacunar infarct with overall brain parenchymal loss.  Lumbar puncture was negative.   She was transferred to [facility] to obtain further neurological testing.  She has since been managed by neurology and has achieved control of her seizures on Vimpat, Topamax, Depakote.  Although her mentation has not recovered and she remains noncommunacative, does not follow commands or open her eyes to voice, and only withdraws to pain, her family wishes for her to remain a full code with full treatment and placement in an LTACH is goal.  She had a tracheostomy on 2 February and remains stable on 4L of oxygen via T-piece 28%.  We have been consulted for PEG tube placement.  She does not exhibit any signs of GI bleeding but did have some vomiting 2/7/2020 with one episode of coffee ground emesis and was started on Protonix.  Her hemoglobin has been fairly labile, dipping to 6.8 overnight for which she received a unit of pRBCs.  She has a history of GERD as well as GI bleeding, last upper  endoscopy was in 2016 with Dr. Macedo and revealed no abnormalities aside from a large amount of freshly clotted blood in the posterior pharynx.  A CT scan from November 2019 revealed possible inflammation of the duodenum, and gastric antrum as well as ascites within the abdomen and lower pelvis.        No family is available and given the patient's mental status most information was obtained from the chart.           PMHX:  Past Medical History:   Diagnosis Date   • Arthritis    • Chronic diastolic heart failure ([facility]) 6/1/2016   • Clostridium difficile diarrhea 6/2/2016   • CVA (cerebral vascular accident) ([facility])     L side weakness   • DM (diabetes mellitus) type II uncontrolled with renal manifestation 4/26/2014   • GERD (gastroesophageal reflux disease)    • GIB (gastrointestinal bleeding) 6/13/2016   • GOUT    • Hypertension    • ICH (intracerebral hemorrhage) ([facility]) 4/6/2016   • Indigestion    • Kidney failure    • RALF (obstructive sleep apnea)     Not compliant with oxygen or CPAP   • RALF (obstructive sleep apnea)    • Other and unspecified angina pectoris     hospitalized 8/13 chest pain   • Seizure ([facility]) 1/16/2020   • Unspecified cataract     right eye   • UTI (urinary tract infection) 6/1/2016          PSurgHx:   Past Surgical History:   Procedure Laterality Date   • GASTROSCOPY-ENDO  6/14/2016    Procedure: GASTROSCOPY-ENDO;  Surgeon: Oliver Macedo M.D.;  Location: [facility];  Service:    • VENTRAL HERNIA REPAIR  4/9/2014    Performed by Trinity Bryan M.D. at SURGERY [facility]   • VITA BY LAPAROSCOPY  10/5/2013    Performed by Trinity Bryan M.D. at [facility]   • TUBAL LIGATION  1977   • CATARACT EXTRACTION WITH IOL      left eye        ALLERGIES:Amlodipine besylate and Amlodipine     SocHx:   Social History     Socioeconomic History   • Marital status:      Spouse name: Not on file   • Number of children: Not on file   • Years of education: Not on file   • Highest  education level: Not on file   Occupational History   • Not on file   Social Needs   • Financial resource strain: Not on file   • Food insecurity     Worry: Not on file     Inability: Not on file   • Transportation needs     Medical: Not on file     Non-medical: Not on file   Tobacco Use   • Smoking status: Never Smoker   • Smokeless tobacco: Never Used   Substance and Sexual Activity   • Alcohol use: No   • Drug use: No   • Sexual activity: Not on file   Lifestyle   • Physical activity     Days per week: Not on file     Minutes per session: Not on file   • Stress: Not on file   Relationships   • Social connections     Talks on phone: Not on file     Gets together: Not on file     Attends Sikh service: Not on file     Active member of club or organization: Not on file     Attends meetings of clubs or organizations: Not on file     Relationship status: Not on file   • Intimate partner violence     Fear of current or ex partner: Not on file     Emotionally abused: Not on file     Physically abused: Not on file     Forced sexual activity: Not on file   Other Topics Concern   • Not on file   Social History Narrative    Retired. 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      PE:  Vitals:    02/12/20 0230 02/12/20 0455 02/12/20 0640 02/12/20 0720   BP:  141/62  117/55   Pulse: 80 80 83 82   Resp: 16 16 18 18   Temp:  37 °C (98.6 °F)  38 °C (100.4 °F)   TempSrc:  Temporal  Temporal   SpO2: 98% 97% 96% 97%   Weight:       Height:         Gen: Lying in bed  HEENT: PERRL at 1mm, sluggish at best  Neck: supple, no cervical or supraclavicular adenopathy.  Tracheostomy site clean.    CVS: regular  rhythm, normal rate, no MRG  Pulm: CTAB, no crackles, diminished throughout  Abd: soft, Nd, NT, no guarding or rebound  Ext: no edema, normal sensation  NEURO: Weak, withdrawing to pain, does not open eyes to voice  Skin: warm, no rash       LABS:  Lab Results   Component Value Date/Time    SODIUM 135 02/11/2020 04:23 AM    POTASSIUM 3.8 02/11/2020 04:23 AM    CHLORIDE 97 02/11/2020 04:23 AM    CO2 29 02/11/2020 04:23 AM    GLUCOSE 144 (H) 02/11/2020 04:23 AM    BUN 46 (H) 02/11/2020 04:23 AM    CREATININE 1.11 02/11/2020 04:23 AM      Lab Results   Component Value Date/Time    WBC 11.8 (H) 02/12/2020 12:00 AM    RBC 2.68 (L) 02/12/2020 12:00 AM    HEMOGLOBIN 8.4 (L) 02/12/2020 12:00 AM    HEMATOCRIT 26.8 (L) 02/12/2020 12:00 AM    .0 (H) 02/12/2020 12:00 AM    MCH 31.3 02/12/2020 12:00 AM    MCHC 31.3 (L) 02/12/2020 12:00 AM    MPV 9.1 02/12/2020 12:00 AM    NEUTSPOLYS 63.50 02/12/2020 12:00 AM    LYMPHOCYTES 26.10 02/12/2020 12:00 AM    MONOCYTES 7.80 02/12/2020 12:00 AM    EOSINOPHILS 1.70 02/12/2020 12:00 AM    BASOPHILS 0.90 02/12/2020 12:00 AM    HYPOCHROMIA 1+ 02/01/2020 05:35 AM    ANISOCYTOSIS 1+ 02/12/2020 12:00 AM        Lab Results   Component Value Date/Time    PROTHROMBTM 14.5 01/29/2020 06:05 AM    INR 1.11 01/29/2020 06:05 AM      Recent Labs     02/07/20  1830 02/10/20  1145   ASTSGOT 42 27   ALTSGPT 17 15   TBILIRUBIN 0.4 0.4   IBILIRUBIN 0.2  --    DBILIRUBIN 0.2  --    GLOBULIN  --  3.0       IMAGING: Reviewed personally and summarized in HPI         ASSESSMENT:   Patient is a 74-year-old female with existing deficits from a prior stroke as well as failure to thrive and worsening weakness with new onset seizures who is now controlled with medical management.  Unfortunately, her neurologic status has deteriorated to such a state that she will likely never be able to care for herself independently again.  Additionally, she requires respiratory support via a tracheostomy tube and it is  unclear when or if she will be able to safely swallow nutrition.  Family has chosen for long-term care and full treatment.  She is an appropriate candidate for a PEG tube placement to provide long term nutrition.      PROBLEMS:  Status epilepticus  Acute respiratory failure with hypoxia   Severe protein-calorie malnutrition   Normocytic anemia  Dysphagia     PLAN:   We will proceed with abdominal ultrasound for evaluation of ascites.  Pending the result, we may proceed with endoscopic evaluation to assess for gastritis, ulceration, and proceed with PEG tube placement as indicated.  Further recommendations from Dr. Gopal Demarco to follow.           Thank you for this consult.

## 2020-02-12 NOTE — PROGRESS NOTES
2 RN skin completed. Large area of bruising to bilateral forearms. Scabbing to chest. Wound to sacrum, suleman-area pink and dressing in place, CDI. Preventative mepilex to bilateral hips, area pink and blanching. Discolored dusky area to RLE. Bilateral feet dry/flaky. Trache insertion site mildly edematous with old dried blood. Area cleansed and foam dressing in place. No other skin issues of note at this time.

## 2020-02-12 NOTE — PROGRESS NOTES
Hemodialysis ordered by Dr. Ferrer. Treatment started at 0937 and ended at 1316 d/t clotting dialyzer and lines. Blood returned. New setting placed. Treatment restarted. Dr. Ferrer notified and aware. Pt stable, vss, no distress post tx. See flow sheets for details. Net UF 1.7 L. Reported to ROEL Carcamo RN. Nursing time charged for 90 mins d/t waiting for transport to bring pt to dialysis room.

## 2020-02-12 NOTE — WOUND TEAM
[facility]  Inpatient Services  Wound and Skin Care Progress Note    Admission Date:  1/16/2020   HPI, PMH, SH: Reviewed  Unit where seen by Wound Team: [id number]/[id number]    WOUND TEAM FOLLOW UP: sacrum    SUBJECTIVE:   trach    Self Report / Pain Level:no s/s of distress      OBJECTIVE: pt had large leakage of BMS, waffle overlay  WOUND TYPE, LOCATION, CHARACTERISTICS (Pressure ulcers: location, stage, POA or date identified)   Pressure Injury 01/16/20 Sacrum;Coccyx stage 3 POA  (Active)      2/11/2020 12:15 PM   Pressure Injury Stage 3    State of Healing Early/partial granulation    Site Assessment Red;Pink;Yellow    Lucina-wound Assessment Hyperpigmented    Margins Defined edges    Wound Length (cm) 6.5 cm 2/11/2020 12:15 PM   Wound Width (cm) 6 cm 2/11/2020 12:15 PM   Wound Depth (cm) 0 cm 2/11/2020 12:15 PM   Wound Surface Area (cm^2) 39 cm^2 2/11/2020 12:15 PM   Tunneling 0 cm 2/11/2020 12:15 PM   Undermining 0 cm 2/11/2020 12:15 PM   Closure None    Drainage Amount Scant    Drainage Description Serosanguineous    Treatments Cleansed    Cleansing Normal Saline Irrigation    Periwound Protectant Not Applicable    Dressing Options Mepilex    Dressing Cleansing/Solutions Not Applicable    Dressing Changed Changed    Dressing Status Intact    Dressing Change Frequency Every 48 hrs    NEXT Dressing Change  02/13/20    NEXT Weekly Photo (Inpatient Only) 02/19/20     Odor None     Exposed Structures None     Tissue Type and Percentage 95% red/pink 5% yellow        Vascular:  Wounds not r/t vascular problem    Lab Values:    Lab Values:    Lab Results   Component Value Date/Time    WBC 11.0 (H) 02/11/2020 04:23 AM    RBC 2.03 (L) 02/11/2020 04:23 AM    HEMOGLOBIN 6.8 (L) 02/11/2020 04:23 AM    HEMATOCRIT 22.0 (L) 02/11/2020 04:23 AM        Results from last 7 days   Lab Units 02/11/20  0423   C REACTIVE PROTEIN 4596 mg/dL 3.40*           Lab Results   Component Value Date/Time    HBA1C 7.5 (H) 02/07/2019 01:20 PM          Culture: na    INTERVENTIONS BY WOUND TEAM: pt turned to L side, cleaned of stool, Demonstrated how to irrigate BMS with primary RM, Cleaned wound with NS, dried. Stoma powder, barrier pates and viscopaste to wound bed, covered with mepilex. Pads changed. Pt repositioned.   Sacrum;Coccyx-Dressing Options: Mepilex(stoma powder, viscopaste)    Interdisciplinary consultation:   With Nursing;With Patient    EVALUATION:pt's moisture associated skin damage still has satellite lesions, miconazole reordered. Sacrococcygeal St 3 still present, has more yellow tissue present. Pt has BMS it leaks on occasion.       Goals:  Slow steady decrease in wound area and depth weekly    NURSING PLAN OF CARE:    Dressing changes: Continue previous Dressing Maintenance orders:   x     See new Dressing Maintenance orders:       Skin care: See Skin Care orders:        Rectal tube care: See Rectal Tube Care orders:    x  Other orders:           WOUND TEAM PLAN OF CARE (X):   NPWT change 3 x week:        Dressing changes:       Follow up as needed:  x     Other:    Anticipated discharge plans (X):  SNF:           Home Care:           Outpatient Wound Center:            Self Care:            Other: will need wound care

## 2020-02-12 NOTE — DISCHARGE PLANNING
Anticipated Discharge Disposition:   TBD    Action:    Left vm for patient's daughter with request to return call.    Barriers to Discharge:    Choice    Plan:    Obtain LTACH choice.

## 2020-02-12 NOTE — PROGRESS NOTES
2 RN skin check complete with MIC Chaudhary: Redness around trach area. Bruising to BUE and BLE. Pressure wound to sacrum/coccyx area, wound team aware, dressing changed. Excoriation to groin and perianal area.     Q2hr turn, waffle cushion overlay in place.

## 2020-02-12 NOTE — CARE PLAN
Problem: Communication  Goal: The ability to communicate needs accurately and effectively will improve  Outcome: PROGRESSING SLOWER THAN EXPECTED  Note: POC discussed with Daughter Belen     Problem: Skin Integrity  Goal: Risk for impaired skin integrity will decrease  Outcome: PROGRESSING SLOWER THAN EXPECTED

## 2020-02-12 NOTE — CARE PLAN
Problem: Oxygenation:  Goal: Maintain adequate oxygenation dependent on patient condition  Outcome: PROGRESSING AS EXPECTED       Respiratory Update    Treatment modality: Aerosol  Frequency: Q4    Pt tolerating current treatments well with no adverse reactions.

## 2020-02-12 NOTE — ASSESSMENT & PLAN NOTE
sHe has a substantial right hip decubitus ulcer with a wound VAC followed by wound care, poor tissue integrity and minimal if any wound healing.  Given her inability to move spontaneously, diabetes, dialysis, and poor condition, she is extremely high risk of developing further decubitus ulcers

## 2020-02-12 NOTE — PROGRESS NOTES
Hospital Medicine Daily Progress Note    Date of Service  2/12/2020    Chief Complaint  Altered mental status    Hospital Course    74 y.o. female who presented 1/16/2020 with a past medical history significant for end-stage renal disease on hemodialysis (followed by Dr. Cavazos) since November 2019 M/W/F, hypertension, hyperlipidemia and coronary artery disease.  She was recently admitted to [facility] for an extended period of time followed by placement in a SNF.  She has been very frail requiring a hoist to get her in and out of the dialysis chair with failure to thrive.  On January 14 while at the SNF patient had a witnessed seizure, with no history previously.  She was transferred to the emergency department where she had a second seizure with a prolonged postictal state.  She was intubated for airway protection and started on a propofol drip in the emergency department on January 14 at [facility].  She was admitted to the intensive care unit where she underwent an MRI of her brain showing an acute right parietal lacunar infarct with overall brain parenchymal loss.  Neurology was consulted and patient was loaded with Keppra and continued on the propofol drip. Despite this, EEGs continued to show evidence of focal seizure and Dilantin was started.  She underwent a lumbar puncture with unremarkable CSF and was being treated empirically with acyclovir, ampicillin, Rocephin, vancomycin and Decadron.  Given her persistent abnormal EEGs, the neurologist at the outside hospital recommend patient be transferred to a facility that can provide continuous EEG monitoring.  For this reason she was transferred to [facility].  She was trached on 2/2 and came off vent on 2/5, but remained unresponsive.  She is continued on Vimpat, Topamax, and Depakote.  EEG showed no seizures. Neurology was consulted, who felt that likelihood of patient returning to baseline was low, and that the  "likelihood of the patient returning to full independent function was essentially 0.  Family has apparently not wanted to discuss changing CODE STATUS nor transitioning to comfort care and patient remains a full code.  She remains unresponsive. LTACH is being pursued.        Interval Problem Update  2/12/2020 - I reviewed the patient's chart today. Uneventful night. VSS. Afebrile. Saturating well on 28% FiO2 via T-piece through trach.  WBC 11,800.  No bandemia.  Hemoglobin 8.4 posttransfusion yesterday.  Platelet count 217,000. I discussed advance care planning with the patient's daughter for at least 30 minutes, including diagnosis, prognosis, plan of care, risks and benefits of any therapies that could be offered, as well as alternatives including palliation and hospice, as appropriate.  I told him about neurologists opinion that this is likely her new baseline, and chances that she will recover back to previous function is likely 0.  Daughter is still hopeful that she will have reasonable recovery and that \"only time can tell\".  She is now open to putting a PEG tube in place for nutrition, as it has been more than 2 weeks since cortrak has been placed.      > I have personally seen and examined the patient today.  She is in dialysis.  She remains nonresponsive, although opens eyes to noxious stimuli, does not track.  Does not interact.  Does not follow commands.  Does not appear to be in pain.      Consultants/Specialty  Intensivist  Nephrology  Neurology  Palliative Care  GI    Code Status  FULL    Disposition  Monitor on the neurology floors. LTACH placement.       Review of Systems  ROS     Unable to obtain due to vegetative state/mental status.       Physical Exam  Temp:  [36.6 °C (97.8 °F)-38 °C (100.4 °F)] 38 °C (100.4 °F)  Pulse:  [69-85] 82  Resp:  [16-18] 18  BP: (117-162)/(55-71) 117/55  SpO2:  [96 %-98 %] 97 %    Physical Exam  Vitals signs reviewed.   Constitutional:       General: She is not in acute " distress.     Appearance: Normal appearance. She is normal weight. She is not ill-appearing or diaphoretic.   HENT:      Head: Normocephalic and atraumatic.      Right Ear: External ear normal.      Left Ear: External ear normal.      Nose: Nose normal.      Comments: Cortrak in place     Mouth/Throat:      Mouth: Mucous membranes are moist.      Pharynx: No oropharyngeal exudate or posterior oropharyngeal erythema.   Eyes:      General: No scleral icterus.     Extraocular Movements: Extraocular movements intact.      Conjunctiva/sclera: Conjunctivae normal.      Pupils: Pupils are equal, round, and reactive to light.   Neck:      Musculoskeletal: Normal range of motion and neck supple. No neck rigidity or muscular tenderness.      Comments: Trach in place  Cardiovascular:      Rate and Rhythm: Normal rate and regular rhythm.      Heart sounds: Normal heart sounds. No murmur.   Pulmonary:      Effort: Pulmonary effort is normal. No respiratory distress.      Breath sounds: Normal breath sounds. No stridor. No wheezing, rhonchi or rales.   Chest:      Chest wall: No tenderness.   Abdominal:      General: Bowel sounds are normal. There is no distension.      Palpations: Abdomen is soft. There is no mass.      Tenderness: There is no abdominal tenderness. There is no guarding or rebound.   Musculoskeletal: Normal range of motion.         General: No swelling.      Right lower leg: No edema.      Left lower leg: No edema.   Lymphadenopathy:      Cervical: No cervical adenopathy.   Skin:     General: Skin is warm and dry.      Coloration: Skin is not jaundiced.      Findings: No rash.   Neurological:      Cranial Nerves: No cranial nerve deficit.      Comments:  Eyes opens, and stares, but does not track. Non verbal, and unresponsive. Does not interact.    Psychiatric:      Comments: Unable to assess due to mentation             Fluids    Intake/Output Summary (Last 24 hours) at 2/12/2020 6157  Last data filed at  2/12/2020 0620  Gross per 24 hour   Intake 586 ml   Output 100 ml   Net 486 ml       Laboratory  Recent Labs     02/10/20  1145 02/11/20  0423 02/12/20  0000   WBC 13.0* 11.0* 11.8*   RBC 2.27* 2.03* 2.68*   HEMOGLOBIN 7.6* 6.8* 8.4*   HEMATOCRIT 24.7* 22.0* 26.8*   .8* 108.4* 100.0*   MCH 33.5* 33.5* 31.3   MCHC 30.8* 30.9* 31.3*   RDW 61.9* 63.5* 73.8*   PLATELETCT 293 195 217   MPV 8.9* 9.0 9.1     Recent Labs     02/10/20  1145 02/11/20  0423   SODIUM 136 135   POTASSIUM 4.5 3.8   CHLORIDE 99 97   CO2 26 29   GLUCOSE 136* 144*   * 46*   CREATININE 1.98* 1.11   CALCIUM 9.6 8.4*                   Imaging  DX-ABDOMEN FOR TUBE PLACEMENT   Final Result         Feeding tube with tip projecting over the expected area of the stomach.      [id number] VIEW   Final Result      Feeding tube tip overlies the gastric antrum.      DX-CHEST-PORTABLE (1 VIEW)   Final Result         1.  Pulmonary edema and/or infiltrates are identified, which are stable since the prior exam.   2.  Cardiomegaly   3.  Atherosclerosis      DX-CHEST-PORTABLE (1 VIEW)   Final Result         1.  Pulmonary edema and/or infiltrates are identified, which are stable since the prior exam.   2.  Cardiomegaly   3.  Atherosclerosis      DX-CHEST-PORTABLE (1 VIEW)   Final Result      1.  Unchanged left lower lobe atelectasis or pneumonia.      2.  Clear right lung.      DX-CHEST-PORTABLE (1 VIEW)   Final Result      Unchanged LEFT basilar atelectasis and/or airspace disease      DX-CHEST-PORTABLE (1 VIEW)   Final Result      Left basilar atelectasis, hilar and cardiac silhouette enlargement as before      DX-CHEST-PORTABLE (1 VIEW)   Final Result      Interval removal of a left subclavian central line. Stable patchy bilateral infiltrates.      IR-PICC LINE PLACEMENT W/ GUIDANCE > AGE 5   Final Result                  Ultrasound-guided PICC placement performed by qualified nursing staff as    above.          DX-CHEST-PORTABLE (1 VIEW)   Final  Result      1.  Multiple support devices present.      2.  Patchy bilateral atelectasis.      DX-CHEST-PORTABLE (1 VIEW)   Final Result      Stable chest with retrocardiac opacity from atelectasis and/or pleural fluid favored over consolidation      DX-CHEST-PORTABLE (1 VIEW)   Final Result      Stable chest with retrocardiac opacity from atelectasis and/or pleural fluid favored over consolidation      DX-CHEST-PORTABLE (1 VIEW)   Final Result         No significant change from prior.               DX-CHEST-PORTABLE (1 VIEW)   Final Result         1. Stable lines and tubes..   2. Stable retrocardiac and left perihilar atelectasis versus consolidation. Suspected small left pleural effusion.            DX-CHEST-PORTABLE (1 VIEW)   Final Result         1. Stable lines and tubes..   2. Stable retrocardiac atelectasis versus consolidation with increased left perihilar opacity. Suspected trace left pleural effusion.         [id number]-4 VIEW   Final Result      1.  Enteric tube has been placed and the tip projects over the stomach.      2.  Pre-existing feeding tube tip projects at the gastroduodenal junction      DX-CHEST-PORTABLE (1 VIEW)   Final Result         1. Lines and tubes as above.   2. Stable retrocardiac atelectasis versus consolidation. Suspected trace left pleural effusion.         MR-BRAIN-WITH & W/O   Final Result      1.  Moderate cerebral atrophy.   2.  Evidence of intraventricular hemorrhage, indeterminate age. Possibly chronic intraventricular hemosiderin deposition.   3.  Extensive encephalomalacic change with hemosiderin deposition involving the right corona radiata, basal ganglia, posterior thalamus, subthalamic region, bordering the right cerebral peduncle. Associated ex vacuo dilatation of the body of the right    lateral ventricle. No change.   4.  Additional foci of old microhemorrhage most consistent with old hypertensive microhemorrhage or amyloid angiopathy in the left basal ganglia, left  thalamus, and midline upper ventral abel.   5.  Punctate focus of acute infarction in the right parietal deep white matter. No change.   6.  Advanced supratentorial white matter disease most consistent with microvascular ischemic change.   7.  Encephalomalacic changes in the abel and right cerebral peduncle consistent with old infarction.   8.  Partially empty sella.   9.  Overall, no new findings and no significant change from 1/14/2020.      DX-CHEST-PORTABLE (1 VIEW)   Final Result         1. Stable lines and tubes.   2. Unchanged retrocardiac atelectasis versus consolidation.   3. Stable trace left pleural effusion.         OUTSIDE IMAGES-DX CHEST   Final Result      OUTSIDE IMAGES-US VASCULAR   Final Result      OUTSIDE IMAGES-MR BRAIN   Final Result      OUTSIDE IMAGES-DX CHEST   Final Result      OUTSIDE IMAGES-CT HEAD   Final Result      EC-ECHOCARDIOGRAM COMPLETE W/O CONT   Final Result      DX-CHEST-FOR LINE PLACEMENT Perform procedure in: Patient's Room   Final Result         1.  Retrocardiac opacity concerning for infiltrate, stable.   2.  Trace left pleural effusion, stable   3.  Cardiomegaly   4.  Atherosclerosis   5.  Perihilar interstitial prominence and bronchial wall cuffing, appearance suggests changes of underlying bronchial inflammation, consider bronchitis.      DX-ABDOMEN FOR TUBE PLACEMENT   Final Result         1.  Nonspecific bowel gas pattern.   2.  Dobbhoff tube tip overlying the expected location of the pylorus or first duodenal segment.   3.  Left lung base atelectasis and/or small effusion      DX-CHEST-PORTABLE (1 VIEW)   Final Result         1.  Retrocardiac opacity concerning for infiltrate.   2.  Trace left pleural effusion, stable   3.  Cardiomegaly   4.  Atherosclerosis      HQ-PQOHJWL-MCEOCBY FILM X-RAY   Final Result      OUTSIDE IMAGES-DX CHEST   Final Result           Assessment/Plan  * Status epilepticus (HCC)- (present on admission)  Assessment & Plan  -Was intubated for  airway protection, now trached since 2/2.   -Continuing vimpat, topamax, depakote.  Repeat EEG here showed no seizures. Neurology gave the opinion that likelihood of patient returning to baseline was low, and that the likelihood of the patient returning to full independent function was essentially 0.    Acute respiratory failure with hypoxia (HCC)- (present on admission)  Assessment & Plan  - stable on trach/T-piece.   - continue RT protocol, O2 supplement.  -Continue hemodialysis to prevent fluid overload.    Severe protein-calorie malnutrition (HCC)- (present on admission)  Assessment & Plan  -Continue tube feeding.    Cerebrovascular accident (CVA) due to embolism of right middle cerebral artery (HCC)- (present on admission)  Assessment & Plan  -Continue statin and aspirin.    -No PT/OT needs as unable to participate, and likely at her new baseline.      End stage renal failure on dialysis (HCC)- (present on admission)  Assessment & Plan  -Continue hemodialysis MWF.  Nephrology on board.  - Likely permanent; poor prognosis per nephrology, they recommend comfort care.  - avoid nephrotoxins, and continue to renally dose all medications.    Normocytic anemia- (present on admission)  Assessment & Plan  -Likely anemia of chronic kidney disease.  FOBT is negative.  -Continue to trend H&H, transfuse if drops below 7.      Hypertension- (present on admission)  Assessment & Plan  -Maintaining good control.  -Continue hydralazine, cardura, coreg (home meds).  Monitor blood pressure trend closely.        DM (diabetes mellitus) ([facility])- (present on admission)  Assessment & Plan  -No need for coverage.  Continue to monitor.     Goals of care, counseling/discussion- (present on admission)  Assessment & Plan  - There have been multiple discussions with palliative/treatment team and family, to no avail.  I again discussed with the daughter about this, and she is still hopeful that patient will have meaningful recovery.  Patient  remains full code, has trach.  -Daughter now wants PEG placement, GI consulted for this.     Pressure injury of sacrum, coccyx, stage 3 (HCC)- (present on admission)  Assessment & Plan  -Wound care per wound service. Continue pressure offloading strategies.    Hypomagnesemia- (present on admission)  Assessment & Plan  -Continue to monitor.     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  Maintain on aspiration precautions.  -Discussed with daughter, she would like to proceed with PEG placement.  I spoke with [facility] Kruger of [facility], who will consult for PEG placement.     Gout- (present on admission)  Assessment & Plan  -Continue with allopurinol per the feeding tube.       VTE prophylaxis: SCDs

## 2020-02-13 NOTE — CARE PLAN
Problem: Communication  Goal: The ability to communicate needs accurately and effectively will improve  Note: POC discussed with patient's daughter. Pending PEG tube placement.      Problem: Venous Thromboembolism (VTW)/Deep Vein Thrombosis (DVT) Prevention:  Goal: Patient will participate in Venous Thrombosis (VTE)/Deep Vein Thrombosis (DVT)Prevention Measures  Outcome: PROGRESSING AS EXPECTED     Problem: Skin Integrity  Goal: Risk for impaired skin integrity will decrease  Outcome: PROGRESSING SLOWER THAN EXPECTED  Note: Dressing to be changed today to coccyx, sacral area.

## 2020-02-13 NOTE — PROGRESS NOTES
Gastroenterology Progress Note     Author: MIKE Salamanca   Date & Time Created: 2020 10:27 AM    Chief Complaint:  PEG tube placement    Interval History:  She remains non communicative.    Review of Systems:  Review of Systems   Unable to perform ROS: Acuity of condition       Physical Exam:  Physical Exam  Constitutional:       General: She is not in acute distress.  Eyes:      Pupils: Pupils are equal, round, and reactive to light.      Comments: Pinpoint, sluggish   Cardiovascular:      Rate and Rhythm: Normal rate and regular rhythm.   Pulmonary:      Effort: Pulmonary effort is normal.      Breath sounds: Normal breath sounds.      Comments: On tpiece 4L  Abdominal:      General: Abdomen is flat. Bowel sounds are normal.      Palpations: Abdomen is soft.         Labs:          Recent Labs     02/10/20  1145 02/11/20  0423   SODIUM 136 135   POTASSIUM 4.5 3.8   CHLORIDE 99 97   CO2 26 29   * 46*   CREATININE 1.98* 1.11   CALCIUM 9.6 8.4*     Recent Labs     02/10/20  1145 02/11/20  0423   ALTSGPT 15  --    ASTSGOT 27  --    ALKPHOSPHAT 54  --    TBILIRUBIN 0.4  --    PREALBUMIN  --  15.0*   GLUCOSE 136* 144*     Recent Labs     02/10/20  1145 02/11/20  0423 20  0000 20  0330   RBC 2.27* 2.03* 2.68*  --    HEMOGLOBIN 7.6* 6.8* 8.4*  --    HEMATOCRIT 24.7* 22.0* 26.8*  --    PLATELETCT 293 195 217  --    PROTHROMBTM  --   --   --  15.5*   INR  --   --   --  1.20*     Recent Labs     02/10/20  1145 02/11/20  0423 02/12/20  0000   WBC 13.0* 11.0* 11.8*   NEUTSPOLYS  --   --  63.50   LYMPHOCYTES  --   --  26.10   MONOCYTES  --   --  7.80   EOSINOPHILS  --   --  1.70   BASOPHILS  --   --  0.90   ASTSGOT 27  --   --    ALTSGPT 15  --   --    ALKPHOSPHAT 54  --   --    TBILIRUBIN 0.4  --   --      Hemodynamics:  Temp (24hrs), Av.8 °C (98.2 °F), Min:35.7 °C (96.2 °F), Max:37.4 °C (99.4 °F)  Temperature: 37.4 °C (99.4 °F)  Pulse  Av  Min: 50  Max: 111   Blood  Pressure : 118/56     Respiratory:   $ Aerosol Therapy / Airway Management: T-Piece, Aerosol Humidity Temp (celsius): 35Respiration: 13, Pulse Oximetry: 96 %, O2 Daily Delivery Respiratory : T-Piece     Work Of Breathing / Effort: Mild  RUL Breath Sounds: Crackles, RML Breath Sounds: Crackles, RLL Breath Sounds: Diminished, DEREK Breath Sounds: Crackles, LLL Breath Sounds: Diminished  Fluids:    Intake/Output Summary (Last 24 hours) at 2/13/2020 1027  Last data filed at 2/13/2020 0800  Gross per 24 hour   Intake 1600 ml   Output 3250 ml   Net -1650 ml        GI/Nutrition:  Orders Placed This Encounter   Procedures   • Diet NPO     Standing Status:   Standing     Number of Occurrences:   1     Order Specific Question:   Type:     Answer:   Now [1]     Order Specific Question:   Restrict to:     Answer:   Strict [1]     Medical Decision Making, by Problem:  Active Hospital Problems    Diagnosis   • *Status epilepticus (HCC) [G40.901]   • Acute respiratory failure with hypoxia ([facility]) [J96.01]   • End stage renal failure on dialysis ([facility]) [N18.6, Z99.2]   • Cerebrovascular accident (CVA) due to embolism of right middle cerebral artery (HCC) [I63.411]   • Severe protein-calorie malnutrition (HCC) [E43]   • Normocytic anemia [D64.9]   • DM (diabetes mellitus) ([facility]) [E11.9]   • Hypertension [I10]   • Pressure injury of sacrum, coccyx, stage 3 (HCC) [L89.303]   • Hypomagnesemia [E83.42]   • 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    Will obtain consent from daughter Belen [phone].  She would like PEG planned for Saturday.      Quality-Core Measures

## 2020-02-13 NOTE — PROGRESS NOTES
Hospital Medicine Daily Progress Note    Date of Service  2/13/2020    Chief Complaint  Altered mental status    Hospital Course    74 y.o. female who presented 1/16/2020 with a past medical history significant for end-stage renal disease on hemodialysis (followed by Dr. Cavazos) since November 2019 M/W/F, hypertension, hyperlipidemia and coronary artery disease.  She was recently admitted to [facility] for an extended period of time followed by placement in a SNF.  She has been very frail requiring a hoist to get her in and out of the dialysis chair with failure to thrive.  On January 14 while at the SNF patient had a witnessed seizure, with no history previously.  She was transferred to the emergency department where she had a second seizure with a prolonged postictal state.  She was intubated for airway protection and started on a propofol drip in the emergency department on January 14 at [facility].  She was admitted to the intensive care unit where she underwent an MRI of her brain showing an acute right parietal lacunar infarct with overall brain parenchymal loss.  Neurology was consulted and patient was loaded with Keppra and continued on the propofol drip. Despite this, EEGs continued to show evidence of focal seizure and Dilantin was started.  She underwent a lumbar puncture with unremarkable CSF and was being treated empirically with acyclovir, ampicillin, Rocephin, vancomycin and Decadron.  Given her persistent abnormal EEGs, the neurologist at the outside hospital recommend patient be transferred to a facility that can provide continuous EEG monitoring.  For this reason she was transferred to [facility].  She was trached on 2/2 and came off vent on 2/5, but remained unresponsive.  She is continued on Vimpat, Topamax, and Depakote.  EEG showed no seizures. Neurology was consulted, who felt that likelihood of patient returning to baseline was low, and that the  likelihood of the patient returning to full independent function was essentially 0.  Family has apparently not wanted to discuss changing CODE STATUS nor transitioning to comfort care and patient remains a full code.  She remains unresponsive. LTACH is being pursued.  PEG tube placement was desired by the family, and GI was consulted.        Interval Problem Update  2/13/2020 - I reviewed the patient's chart. There were no significant overnight events. Remains hemodynamically stable and afebrile. Stable on 4L O2 NC.  GI to place PEG tube.     > I have personally seen and examined the patient today.  He remains unresponsive, and in vegetative state.  Eyes open, but does not track.  No purposeful movement.        Consultants/Specialty  Intensivist  Nephrology  Neurology  Palliative Care  GI    Code Status  FULL    Disposition  Monitor on the neurology floors. LTACH placement.       Review of Systems  ROS     Unable to obtain due to vegetative state/mental status.       Physical Exam  Temp:  [35.7 °C (96.2 °F)-37.4 °C (99.4 °F)] 37.4 °C (99.4 °F)  Pulse:  [71-88] 83  Resp:  [13-22] 16  BP: ()/(42-70) 118/56  SpO2:  [96 %-100 %] 96 %    Physical Exam  Vitals signs reviewed.   Constitutional:       General: She is not in acute distress.     Appearance: Normal appearance. She is normal weight. She is not ill-appearing or diaphoretic.   HENT:      Head: Normocephalic and atraumatic.      Right Ear: External ear normal.      Left Ear: External ear normal.      Nose: Nose normal.      Comments: Cortrak in place     Mouth/Throat:      Mouth: Mucous membranes are moist.      Pharynx: No oropharyngeal exudate or posterior oropharyngeal erythema.   Eyes:      General: No scleral icterus.     Extraocular Movements: Extraocular movements intact.      Conjunctiva/sclera: Conjunctivae normal.      Pupils: Pupils are equal, round, and reactive to light.   Neck:      Musculoskeletal: Normal range of motion and neck supple. No  neck rigidity or muscular tenderness.      Comments: Trach in place  Cardiovascular:      Rate and Rhythm: Normal rate and regular rhythm.      Heart sounds: Normal heart sounds. No murmur.   Pulmonary:      Effort: Pulmonary effort is normal. No respiratory distress.      Breath sounds: Normal breath sounds. No stridor. No wheezing, rhonchi or rales.   Chest:      Chest wall: No tenderness.   Abdominal:      General: Bowel sounds are normal. There is no distension.      Palpations: Abdomen is soft. There is no mass.      Tenderness: There is no abdominal tenderness. There is no guarding or rebound.   Musculoskeletal: Normal range of motion.         General: No swelling.      Right lower leg: No edema.      Left lower leg: No edema.   Lymphadenopathy:      Cervical: No cervical adenopathy.   Skin:     General: Skin is warm and dry.      Coloration: Skin is not jaundiced.      Findings: No rash.   Neurological:      Cranial Nerves: No cranial nerve deficit.      Comments:  Eyes opens, and stares, but does not track. Non verbal, and unresponsive. Does not interact.    Psychiatric:      Comments: Unable to assess due to mentation         I have performed the physical examination today 2/13/2020.  In review of yesterday's note, there are no new changes except as documented above.        Fluids    Intake/Output Summary (Last 24 hours) at 2/13/2020 1056  Last data filed at 2/13/2020 0800  Gross per 24 hour   Intake 1540 ml   Output 3250 ml   Net -1710 ml       Laboratory  Recent Labs     02/11/20  0423 02/12/20  0000 02/13/20  1040   WBC 11.0* 11.8* 13.7*   RBC 2.03* 2.68* 2.73*   HEMOGLOBIN 6.8* 8.4* 8.9*   HEMATOCRIT 22.0* 26.8* 27.7*   .4* 100.0* 101.5*   MCH 33.5* 31.3 32.6   MCHC 30.9* 31.3* 32.1*   RDW 63.5* 73.8* 69.8*   PLATELETCT 195 217 203   MPV 9.0 9.1 9.3     Recent Labs     02/10/20  1145 02/11/20  0423   SODIUM 136 135   POTASSIUM 4.5 3.8   CHLORIDE 99 97   CO2 26 29   GLUCOSE 136* 144*   *  46*   CREATININE 1.98* 1.11   CALCIUM 9.6 8.4*     Recent Labs     02/13/20  0330   INR 1.20*               Imaging  US-ABDOMEN LTD (SOFT TISSUE)   Final Result         1. Trace free fluid in the pelvis. No abdominal ascites.      DX-ABDOMEN FOR TUBE PLACEMENT   Final Result         Feeding tube with tip projecting over the expected area of the stomach.      KR-[id number] VIEW   Final Result      Feeding tube tip overlies the gastric antrum.      DX-CHEST-PORTABLE (1 VIEW)   Final Result         1.  Pulmonary edema and/or infiltrates are identified, which are stable since the prior exam.   2.  Cardiomegaly   3.  Atherosclerosis      DX-CHEST-PORTABLE (1 VIEW)   Final Result         1.  Pulmonary edema and/or infiltrates are identified, which are stable since the prior exam.   2.  Cardiomegaly   3.  Atherosclerosis      DX-CHEST-PORTABLE (1 VIEW)   Final Result      1.  Unchanged left lower lobe atelectasis or pneumonia.      2.  Clear right lung.      DX-CHEST-PORTABLE (1 VIEW)   Final Result      Unchanged LEFT basilar atelectasis and/or airspace disease      DX-CHEST-PORTABLE (1 VIEW)   Final Result      Left basilar atelectasis, hilar and cardiac silhouette enlargement as before      DX-CHEST-PORTABLE (1 VIEW)   Final Result      Interval removal of a left subclavian central line. Stable patchy bilateral infiltrates.      IR-PICC LINE PLACEMENT W/ GUIDANCE > AGE 5   Final Result                  Ultrasound-guided PICC placement performed by qualified nursing staff as    above.          DX-CHEST-PORTABLE (1 VIEW)   Final Result      1.  Multiple support devices present.      2.  Patchy bilateral atelectasis.      DX-CHEST-PORTABLE (1 VIEW)   Final Result      Stable chest with retrocardiac opacity from atelectasis and/or pleural fluid favored over consolidation      DX-CHEST-PORTABLE (1 VIEW)   Final Result      Stable chest with retrocardiac opacity from atelectasis and/or pleural fluid favored over consolidation       DX-CHEST-PORTABLE (1 VIEW)   Final Result         No significant change from prior.               DX-CHEST-PORTABLE (1 VIEW)   Final Result         1. Stable lines and tubes..   2. Stable retrocardiac and left perihilar atelectasis versus consolidation. Suspected small left pleural effusion.            DX-CHEST-PORTABLE (1 VIEW)   Final Result         1. Stable lines and tubes..   2. Stable retrocardiac atelectasis versus consolidation with increased left perihilar opacity. Suspected trace left pleural effusion.         [id number] VIEW   Final Result      1.  Enteric tube has been placed and the tip projects over the stomach.      2.  Pre-existing feeding tube tip projects at the gastroduodenal junction      DX-CHEST-PORTABLE (1 VIEW)   Final Result         1. Lines and tubes as above.   2. Stable retrocardiac atelectasis versus consolidation. Suspected trace left pleural effusion.         MR-BRAIN-WITH & W/O   Final Result      1.  Moderate cerebral atrophy.   2.  Evidence of intraventricular hemorrhage, indeterminate age. Possibly chronic intraventricular hemosiderin deposition.   3.  Extensive encephalomalacic change with hemosiderin deposition involving the right corona radiata, basal ganglia, posterior thalamus, subthalamic region, bordering the right cerebral peduncle. Associated ex vacuo dilatation of the body of the right    lateral ventricle. No change.   4.  Additional foci of old microhemorrhage most consistent with old hypertensive microhemorrhage or amyloid angiopathy in the left basal ganglia, left thalamus, and midline upper ventral abel.   5.  Punctate focus of acute infarction in the right parietal deep white matter. No change.   6.  Advanced supratentorial white matter disease most consistent with microvascular ischemic change.   7.  Encephalomalacic changes in the abel and right cerebral peduncle consistent with old infarction.   8.  Partially empty sella.   9.  Overall, no new findings and  no significant change from 1/14/2020.      DX-CHEST-PORTABLE (1 VIEW)   Final Result         1. Stable lines and tubes.   2. Unchanged retrocardiac atelectasis versus consolidation.   3. Stable trace left pleural effusion.         OUTSIDE IMAGES-DX CHEST   Final Result      OUTSIDE IMAGES-US VASCULAR   Final Result      OUTSIDE IMAGES-MR BRAIN   Final Result      OUTSIDE IMAGES-DX CHEST   Final Result      OUTSIDE IMAGES-CT HEAD   Final Result      EC-ECHOCARDIOGRAM COMPLETE W/O CONT   Final Result      DX-CHEST-FOR LINE PLACEMENT Perform procedure in: Patient's Room   Final Result         1.  Retrocardiac opacity concerning for infiltrate, stable.   2.  Trace left pleural effusion, stable   3.  Cardiomegaly   4.  Atherosclerosis   5.  Perihilar interstitial prominence and bronchial wall cuffing, appearance suggests changes of underlying bronchial inflammation, consider bronchitis.      DX-ABDOMEN FOR TUBE PLACEMENT   Final Result         1.  Nonspecific bowel gas pattern.   2.  Dobbhoff tube tip overlying the expected location of the pylorus or first duodenal segment.   3.  Left lung base atelectasis and/or small effusion      DX-CHEST-PORTABLE (1 VIEW)   Final Result         1.  Retrocardiac opacity concerning for infiltrate.   2.  Trace left pleural effusion, stable   3.  Cardiomegaly   4.  Atherosclerosis      TM-[id number]-OBUZAAM FILM X-RAY   Final Result      OUTSIDE IMAGES-DX CHEST   Final Result           Assessment/Plan  * Status epilepticus (HCC)- (present on admission)  Assessment & Plan  -Was intubated for airway protection, now trached since 2/2.   -Continuing vimpat, topamax, depakote.  Repeat EEG here showed no seizures. Neurology gave the opinion that likelihood of patient returning to baseline was low, and that the likelihood of the patient returning to full independent function was essentially 0.    Acute respiratory failure with hypoxia (HCC)- (present on admission)  Assessment & Plan  - stable on  trach/T-piece.   - continue RT protocol, O2 supplement.  -Continue hemodialysis to prevent fluid overload.    Severe protein-calorie malnutrition (HCC)- (present on admission)  Assessment & Plan  -Continue tube feeding.    Cerebrovascular accident (CVA) due to embolism of right middle cerebral artery (HCC)- (present on admission)  Assessment & Plan  -Continue statin and aspirin.    -No PT/OT needs as unable to participate, and likely at her new baseline.      End stage renal failure on dialysis (HCC)- (present on admission)  Assessment & Plan  -Continue hemodialysis MWF.  Nephrology on board.  - Likely permanent; poor prognosis per nephrology, they recommend comfort care.  - avoid nephrotoxins, and continue to renally dose all medications.    Normocytic anemia- (present on admission)  Assessment & Plan  -Likely anemia of chronic kidney disease.  FOBT is negative.   -Hgb stable.   -Continue to trend H&H, transfuse if drops below 7.      Hypertension- (present on admission)  Assessment & Plan  -Maintaining good control.  -Continue hydralazine, cardura, coreg (home meds).  Monitor blood pressure trend closely.        DM (diabetes mellitus) ([facility])- (present on admission)  Assessment & Plan  -No need for coverage.  Continue to monitor.     Goals of care, counseling/discussion- (present on admission)  Assessment & Plan  - There have been multiple discussions with palliative/treatment team and family, to no avail. Patient remains full code, has trach.  - Daughter now wants PEG placement, GI to place on Saturday.    Pressure injury of sacrum, coccyx, stage 3 ([facility])- (present on admission)  Assessment & Plan  -Wound care per wound service. Continue pressure offloading strategies.    Hypomagnesemia- (present on admission)  Assessment & Plan  -Continue to monitor.     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  Maintain on aspiration  precautions.  -Discussed with daughter, she would like to proceed with PEG placement.  GI to schedule PEG placement on Saturday.    Gout- (present on admission)  Assessment & Plan  -Continue with allopurinol per the feeding tube.       VTE prophylaxis: SCDs

## 2020-02-13 NOTE — PALLIATIVE CARE
Spiritual Care Note    Patient's Name: Cassandra Guzmán   MRN: [id number]    YOB: 1945   Age and Gender: 74 y.o. female   Service Area: Neuro   Room (and Bed): [place] [address]   Ethnicity or Nationality:    Primary Language: English   Jewish/Spiritual preference: Gnosticist   Place of Residence: [facility]   Family/Friends/Others Present: No   Clinical Team Present: No   Medical Diagnosis(-es)/Procedure(s): Seizures   Code Status: Full Code    Date of Admission: 1/16/2020   Length of Stay: 28 days        Spiritual Care Provider Information    Name of Spiritual Care Provider:   Nicolasa Capps  Title of Spiritual Care Provider:     Phone Number:     [phone]  E-mail:      [email or web address]  Total Time:      5 minutes    Spiritual Screen Results:    Gen Nursing    Is your spiritual health or inner well-being important to you as you cope with your medical condition?: Yes  Would you like to receive a visit from our Spiritual Care team or your own Mandaeism or spiritual leader?: No  Was spiritual care education provided to the patient?: Yes  Cultural/Spiritual Needs During Care: Ceremonial, Cultural, Familial  Ceremonial Considerations: Prayer     Palliative Care    Is your spiritual health or inner well-being important to you as you cope with your medical condition?: Yes  Would you like to receive a visit from our Spiritual Care team or your own Mandaeism or spiritual leader?: No  Was spiritual care education provided to the patient?: Yes      Encounter/Request Information    Visited With:      Patient not available  Nature of the Visit:     Follow-up, On shift  Continue Visiting:     Yes  Crisis Visit:      Critical care  Referral From/ Origin of Request:   [place] rounds(Palliative Care rounding)  Referral To:      Community clergy(Referred to Father Alverto.)      Spiritual Assessment     Observations/Symptoms:    (sedated and intubated.)  Interacton/Conversation:     left  note card of encouragement  Plan:       Visit Upon Request    Notes:    Thank you for allowing Spiritual Care to support this patient and family. Contact [phone] for additional assistance, changes in PT status, or with any questions/concerns.

## 2020-02-13 NOTE — PROGRESS NOTES
[facility] Nephrology Daily Progress Note    Date of Service  2/13/2020    AUTHOR: SHAHIDA Garcia.    Chief Complaint   Follow up ESRD    HPI  74 y.o. female admitted to [facility] on 1/14/20 with seizures.She was at a SNF.She had been previously hospitalized at [facility] and diaysis was initiated.  She was doing very poorly then and palliative care was discussed with the family,but they declined.She had very poor functional status and required Jimena lift to get into the dialysis chair.She was found to have  a CVA on MRI at [facility].Her seizures were not controlled and it was felt she was in status epilepticus.  She was getting HD at [facility].Last HD was on 1/13/20.She was seen by Dr Carcamo at [facility].Creatinine was 1.8 and dialysis was held. Neurology [place] that the patient needed continuous EEG monitoring and he was transferred to [facility]    DAILY NEPHROLOGY SUMMARY:  Please see note from 1/31 for prior summaries  2/01 - NAEO, another EEG being done this AM  2/02 - NAEO, scheduled for Trach today  2/03 - No events, underwent trach yesterday, no change in neuro status, BP stable overnight but low this am, given midodrine, to have HD today  2/04 - No events, tolerated HD yest with 2.1L UF, BP stable this am  2/05 - No events, BP low prior to starting HD, given albumin at the start of HD and BP stable at this time  2/06 - No events, tolerated HD yesterday with 2.5L UF, BP stable and high at times, still with dependent edema  2/07 - No events, tolerated PUF yesterday with 2.5L UF and did not require midodrine with treatment, BP stable today, still with dependent edema, to have HD today  2/08 - No overnight events or changes.  On TF at goal via NGT.  No neurologic change.  On vent/trach.   2/09 - No significant change.  No events.  Liquid stool from rectal tube.  On Vent/Trach  2/10-  No new overnight renal events. +trach.  2/11- S/p HD yesterday with net UF of 2 L.   2/12- No new overnight renal events. HD  today.  2/13 - HD yesterday with 1.7L net UF, plan for PEG placement Saturday, LTACH placement pending, some hypotension     Review of Systems   Unable to perform ROS: Acuity of condition     PAST FAMILY HISTORY: Reviewed and unchanged since admission  PAST SURGICAL HISTORY:  Reviewed and unchanged since admission  SOCIAL HISTORY:  Reviewed and unchanged since admission  FAMILY HISTORY: Reviewed and unchanged since admission  CURRENT MEDICATIONS: Reviewed since admission to present  IMAGING STUDIES: Reviewed since admission to present  LABORATORY STUDIES: Reviewed since admission to present    Physical Exam  Temp:  [35.7 °C (96.2 °F)-37.4 °C (99.4 °F)] 37.4 °C (99.4 °F)  Pulse:  [71-88] 83  Resp:  [13-22] 13  BP: ()/(42-70) 118/56  SpO2:  [96 %-100 %] 96 %    Physical Exam  Vitals signs and nursing note reviewed.   Constitutional:       General: She is not in acute distress.     Appearance: Normal appearance. She is ill-appearing.   HENT:      Head: Normocephalic and atraumatic.      Right Ear: External ear normal.      Left Ear: External ear normal.      Nose: Nose normal. No congestion.      Mouth/Throat:      Mouth: Mucous membranes are moist.      Pharynx: No oropharyngeal exudate.   Eyes:      General: No scleral icterus.     Conjunctiva/sclera: Conjunctivae normal.      Pupils: Pupils are equal, round, and reactive to light.   Neck:      Musculoskeletal: Neck supple.      Comments: +Trach  Cardiovascular:      Rate and Rhythm: Normal rate and regular rhythm.      Heart sounds: Normal heart sounds.   Pulmonary:      Effort: Pulmonary effort is normal. No respiratory distress.      Breath sounds: Normal breath sounds.      Comments: T-piece, supp O2   Chest:      Chest wall: No tenderness.   Abdominal:      General: Bowel sounds are normal. There is no distension.      Palpations: Abdomen is soft.      Comments: Enteral nutrition    Genitourinary:     Comments: BMS  Musculoskeletal:         General:  Swelling present. No tenderness or signs of injury.      Comments: +dependent edema   Lymphadenopathy:      Cervical: No cervical adenopathy.   Skin:     General: Skin is warm and dry.      Findings: No rash.      Comments: Heel float boots    Neurological:      Mental Status: She is alert.      Comments: Somnolent, opens eyes to painful stimuli, withdraws to painful stimuli   Psychiatric:      Comments: Unable to assess       Fluids    Intake/Output Summary (Last 24 hours) at 2/13/2020 1042  Last data filed at 2/13/2020 0800  Gross per 24 hour   Intake 1600 ml   Output 3250 ml   Net -1650 ml     Laboratory  Recent Labs     02/10/20  1145 02/11/20  0423 02/12/20  0000   WBC 13.0* 11.0* 11.8*   RBC 2.27* 2.03* 2.68*   HEMOGLOBIN 7.6* 6.8* 8.4*   HEMATOCRIT 24.7* 22.0* 26.8*   .8* 108.4* 100.0*   MCH 33.5* 33.5* 31.3   MCHC 30.8* 30.9* 31.3*   RDW 61.9* 63.5* 73.8*   PLATELETCT 293 195 217   MPV 8.9* 9.0 9.1     Recent Labs     02/10/20  1145 02/11/20  0423   SODIUM 136 135   POTASSIUM 4.5 3.8   CHLORIDE 99 97   CO2 26 29   GLUCOSE 136* 144*   * 46*   CREATININE 1.98* 1.11   CALCIUM 9.6 8.4*     IMPRESSION:  # ESRD   No need for dialysis today    Maintenance dialysis qMWF as OP at [facility]   mCrCl 6 by 24 hour urine - repeat study 2/13 pending results                # Status epilepticus  # S/P acute lacunar infarct   Hx of previous CVA with significant deficits.  # HTN--BP variable often with intradialytic hypotension  # DM II  # Acute Hypoxic Respiratory Failure   CXR 2/3 still with bilateral pulm edema  # Hx of dysphagia  # Anemia of CKD.Ferritin previously significantly elevated. CAT with HD.  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    SUGGESTIONS:   Maintenance dialysis qMWF and PRN. HD tomorrow (FRI).    Repeat 24 hr urine collection for CrCl results pending    Decrease doxazosin given hypotn   Seizure management per Neurology--dose adjust meds to accomodate dialysis   Enteral feedings, plan for PEG placement this weekend per GI   No dietary protein restrictions   Follow labs   Continue GOC discussions with family   Very poor prognosis, recommend comfort care, if and when family agreeable    Thank you,

## 2020-02-13 NOTE — DISCHARGE PLANNING
Anticipated Discharge Disposition:   LTACH    Action:    Met with patient's daughter, Belen.  She is going to visit [facility] in the next few days.  She is hopeful patient could stay at either [place] or [facility] for 90 days.  Hospice is out of the question.    Barriers to Discharge:    Choice    Plan:    F/U with patient's daughter for choice.

## 2020-02-13 NOTE — PROGRESS NOTES
2 RN skin completed with MIC Panchal.Trache insertion site mildly edematous with old dried blood.  Large area of bruising to bilateral forearms. Scabbing to chest. Perineal and perianal area edematous with blanchable erythema. Wound to sacrum, periwound pink and dressing in place, CDI. Preventative mepilex to bilateral hips, area pink and blanching. Discolored dusky area to RLE. Bilateral feet dry/flaky.

## 2020-02-13 NOTE — PROGRESS NOTES
2 RN skin check complete with MIC Banks: Redness around trach area. Bruising to BUE and BLE. Pressure wound to sacrum/coccyx area, wound team aware, dressing changed today. Excoriation to groin and perianal area. Discoloration to RLE.      Q2hr turns, waffle cushion overlay in place.

## 2020-02-14 NOTE — CARE PLAN
Problem: Bronchopulmonary Hygiene:  Goal: Increase mobilization of retained secretions  Outcome: PROGRESSING AS EXPECTED  Note:     Respiratory Update    Treatment modality: Aerosol 4l/28%  Frequency:Q4    Pt tolerating current treatments well with no adverse reactions.

## 2020-02-14 NOTE — PROGRESS NOTES
Hospital Medicine Daily Progress Note    Date of Service  2/14/2020    Chief Complaint  Altered mental status    Hospital Course    74 y.o. female who presented 1/16/2020 with a past medical history significant for end-stage renal disease on hemodialysis (followed by Dr. Cavazos) since November 2019 M/W/F, hypertension, hyperlipidemia and coronary artery disease.  She was recently admitted to [facility] for an extended period of time followed by placement in a SNF.  She has been very frail requiring a hoist to get her in and out of the dialysis chair with failure to thrive.  On January 14 while at the SNF patient had a witnessed seizure, with no history previously.  She was transferred to the emergency department where she had a second seizure with a prolonged postictal state.  She was intubated for airway protection and started on a propofol drip in the emergency department on January 14 at [facility].  She was admitted to the intensive care unit where she underwent an MRI of her brain showing an acute right parietal lacunar infarct with overall brain parenchymal loss.  Neurology was consulted and patient was loaded with Keppra and continued on the propofol drip. Despite this, EEGs continued to show evidence of focal seizure and Dilantin was started.  She underwent a lumbar puncture with unremarkable CSF and was being treated empirically with acyclovir, ampicillin, Rocephin, vancomycin and Decadron.  Given her persistent abnormal EEGs, the neurologist at the outside hospital recommend patient be transferred to a facility that can provide continuous EEG monitoring.  For this reason she was transferred to [facility].  She was trached on 2/2 and came off vent on 2/5, but remained unresponsive.  She is continued on Vimpat, Topamax, and Depakote.  EEG showed no seizures. Neurology was consulted, who felt that likelihood of patient returning to baseline was low, and that the  likelihood of the patient returning to full independent function was essentially 0.  Family has apparently not wanted to discuss changing CODE STATUS nor transitioning to comfort care and patient remains a full code.  She remains unresponsive. LTACH is being pursued.  PEG tube placement was desired by the family, and GI was consulted who scheduled her for that.        Interval Problem Update  2/14/2020 - I reviewed the patient's chart today. Uneventful night. VSS. Afebrile. Saturating well on 4L O2 per T-piece through trach.  WBC 13,500.  Hemoglobin stable at 8.2.  Potassium 3.5.  Creatinine 1.6.    > I have personally seen and examined the patient today.  She is in dialysis.  She remains nonresponsive.  Eyes open, but does not track.  Still with no purposeful movement.  She does not appear to be in pain and not in distress.      Consultants/Specialty  Intensivist  Nephrology  Neurology  Palliative Care  GI    Code Status  FULL    Disposition  Monitor on the neurology floors. LTACH placement.   Pending PEG placement.    Review of Systems  ROS     Unable to obtain due to vegetative state/mental status.       Physical Exam  Temp:  [36.8 °C (98.2 °F)-38 °C (100.4 °F)] 37.7 °C (99.9 °F)  Pulse:  [75-88] 76  Resp:  [16-18] 18  BP: (101-138)/(42-64) 132/61  SpO2:  [93 %-99 %] 97 %    Physical Exam  Vitals signs reviewed.   Constitutional:       General: She is not in acute distress.     Appearance: Normal appearance. She is normal weight. She is not ill-appearing or diaphoretic.   HENT:      Head: Normocephalic and atraumatic.      Right Ear: External ear normal.      Left Ear: External ear normal.      Nose: Nose normal.      Comments: Cortrak in place     Mouth/Throat:      Mouth: Mucous membranes are moist.      Pharynx: No oropharyngeal exudate or posterior oropharyngeal erythema.   Eyes:      General: No scleral icterus.     Extraocular Movements: Extraocular movements intact.      Conjunctiva/sclera: Conjunctivae  normal.      Pupils: Pupils are equal, round, and reactive to light.   Neck:      Musculoskeletal: Normal range of motion and neck supple. No neck rigidity or muscular tenderness.      Comments: Trach in place  Cardiovascular:      Rate and Rhythm: Normal rate and regular rhythm.      Heart sounds: Normal heart sounds. No murmur.   Pulmonary:      Effort: Pulmonary effort is normal. No respiratory distress.      Breath sounds: Normal breath sounds. No stridor. No wheezing, rhonchi or rales.   Chest:      Chest wall: No tenderness.   Abdominal:      General: Bowel sounds are normal. There is no distension.      Palpations: Abdomen is soft. There is no mass.      Tenderness: There is no abdominal tenderness. There is no guarding or rebound.   Musculoskeletal: Normal range of motion.         General: No swelling.      Right lower leg: No edema.      Left lower leg: No edema.   Lymphadenopathy:      Cervical: No cervical adenopathy.   Skin:     General: Skin is warm and dry.      Coloration: Skin is not jaundiced.      Findings: No rash.   Neurological:      Cranial Nerves: No cranial nerve deficit.      Comments:  Eyes opens, and stares, but does not track. Non verbal, and unresponsive. Does not interact.    Psychiatric:      Comments: Unable to assess due to mentation         I have performed the physical examination today 2/14/2020.  In review of yesterday's note, there are no new changes except as documented above.          Fluids    Intake/Output Summary (Last 24 hours) at 2/14/2020 1106  Last data filed at 2/13/2020 2000  Gross per 24 hour   Intake 410 ml   Output 0 ml   Net 410 ml       Laboratory  Recent Labs     02/12/20  0000 02/13/20  1040 02/14/20  0300   WBC 11.8* 13.7* 13.5*   RBC 2.68* 2.73* 2.60*   HEMOGLOBIN 8.4* 8.9* 8.2*   HEMATOCRIT 26.8* 27.7* 26.5*   .0* 101.5* 101.9*   MCH 31.3 32.6 31.5   MCHC 31.3* 32.1* 30.9*   RDW 73.8* 69.8* 68.2*   PLATELETCT 217 203 198   MPV 9.1 9.3 9.7     Recent  Labs     02/14/20  0300   SODIUM 135   POTASSIUM 3.5*   CHLORIDE 98   CO2 27   GLUCOSE 129*   BUN 79*   CREATININE 1.60*   CALCIUM 8.3*     Recent Labs     02/13/20  0330   INR 1.20*               Imaging  US-ABDOMEN LTD (SOFT TISSUE)   Final Result         1. Trace free fluid in the pelvis. No abdominal ascites.      DX-ABDOMEN FOR TUBE PLACEMENT   Final Result         Feeding tube with tip projecting over the expected area of the stomach.      [id number] VIEW   Final Result      Feeding tube tip overlies the gastric antrum.      DX-CHEST-PORTABLE (1 VIEW)   Final Result         1.  Pulmonary edema and/or infiltrates are identified, which are stable since the prior exam.   2.  Cardiomegaly   3.  Atherosclerosis      DX-CHEST-PORTABLE (1 VIEW)   Final Result         1.  Pulmonary edema and/or infiltrates are identified, which are stable since the prior exam.   2.  Cardiomegaly   3.  Atherosclerosis      DX-CHEST-PORTABLE (1 VIEW)   Final Result      1.  Unchanged left lower lobe atelectasis or pneumonia.      2.  Clear right lung.      DX-CHEST-PORTABLE (1 VIEW)   Final Result      Unchanged LEFT basilar atelectasis and/or airspace disease      DX-CHEST-PORTABLE (1 VIEW)   Final Result      Left basilar atelectasis, hilar and cardiac silhouette enlargement as before      DX-CHEST-PORTABLE (1 VIEW)   Final Result      Interval removal of a left subclavian central line. Stable patchy bilateral infiltrates.      IR-PICC LINE PLACEMENT W/ GUIDANCE > AGE 5   Final Result                  Ultrasound-guided PICC placement performed by qualified nursing staff as    above.          DX-CHEST-PORTABLE (1 VIEW)   Final Result      1.  Multiple support devices present.      2.  Patchy bilateral atelectasis.      DX-CHEST-PORTABLE (1 VIEW)   Final Result      Stable chest with retrocardiac opacity from atelectasis and/or pleural fluid favored over consolidation      DX-CHEST-PORTABLE (1 VIEW)   Final Result      Stable chest  with retrocardiac opacity from atelectasis and/or pleural fluid favored over consolidation      DX-CHEST-PORTABLE (1 VIEW)   Final Result         No significant change from prior.               DX-CHEST-PORTABLE (1 VIEW)   Final Result         1. Stable lines and tubes..   2. Stable retrocardiac and left perihilar atelectasis versus consolidation. Suspected small left pleural effusion.            DX-CHEST-PORTABLE (1 VIEW)   Final Result         1. Stable lines and tubes..   2. Stable retrocardiac atelectasis versus consolidation with increased left perihilar opacity. Suspected trace left pleural effusion.         [id number] VIEW   Final Result      1.  Enteric tube has been placed and the tip projects over the stomach.      2.  Pre-existing feeding tube tip projects at the gastroduodenal junction      DX-CHEST-PORTABLE (1 VIEW)   Final Result         1. Lines and tubes as above.   2. Stable retrocardiac atelectasis versus consolidation. Suspected trace left pleural effusion.         MR-BRAIN-WITH & W/O   Final Result      1.  Moderate cerebral atrophy.   2.  Evidence of intraventricular hemorrhage, indeterminate age. Possibly chronic intraventricular hemosiderin deposition.   3.  Extensive encephalomalacic change with hemosiderin deposition involving the right corona radiata, basal ganglia, posterior thalamus, subthalamic region, bordering the right cerebral peduncle. Associated ex vacuo dilatation of the body of the right    lateral ventricle. No change.   4.  Additional foci of old microhemorrhage most consistent with old hypertensive microhemorrhage or amyloid angiopathy in the left basal ganglia, left thalamus, and midline upper ventral abel.   5.  Punctate focus of acute infarction in the right parietal deep white matter. No change.   6.  Advanced supratentorial white matter disease most consistent with microvascular ischemic change.   7.  Encephalomalacic changes in the abel and right cerebral peduncle  consistent with old infarction.   8.  Partially empty sella.   9.  Overall, no new findings and no significant change from 1/14/2020.      DX-CHEST-PORTABLE (1 VIEW)   Final Result         1. Stable lines and tubes.   2. Unchanged retrocardiac atelectasis versus consolidation.   3. Stable trace left pleural effusion.         OUTSIDE IMAGES-DX CHEST   Final Result      OUTSIDE IMAGES-US VASCULAR   Final Result      OUTSIDE IMAGES-MR BRAIN   Final Result      OUTSIDE IMAGES-DX CHEST   Final Result      OUTSIDE IMAGES-CT HEAD   Final Result      EC-ECHOCARDIOGRAM COMPLETE W/O CONT   Final Result      DX-CHEST-FOR LINE PLACEMENT Perform procedure in: Patient's Room   Final Result         1.  Retrocardiac opacity concerning for infiltrate, stable.   2.  Trace left pleural effusion, stable   3.  Cardiomegaly   4.  Atherosclerosis   5.  Perihilar interstitial prominence and bronchial wall cuffing, appearance suggests changes of underlying bronchial inflammation, consider bronchitis.      DX-ABDOMEN FOR TUBE PLACEMENT   Final Result         1.  Nonspecific bowel gas pattern.   2.  Dobbhoff tube tip overlying the expected location of the pylorus or first duodenal segment.   3.  Left lung base atelectasis and/or small effusion      DX-CHEST-PORTABLE (1 VIEW)   Final Result         1.  Retrocardiac opacity concerning for infiltrate.   2.  Trace left pleural effusion, stable   3.  Cardiomegaly   4.  Atherosclerosis      [id number] FILM X-RAY   Final Result      OUTSIDE IMAGES-DX CHEST   Final Result           Assessment/Plan  * Status epilepticus (HCC)- (present on admission)  Assessment & Plan  -Was intubated for airway protection, now trached since 2/2.   -Continuing vimpat, topamax, depakote.  Repeat EEG here showed no seizures. Neurology gave the opinion that likelihood of patient returning to baseline was low, and that the likelihood of the patient returning to full independent function was essentially 0.    Acute  respiratory failure with hypoxia (HCC)- (present on admission)  Assessment & Plan  - stable on trach/T-piece.   - continue RT protocol, O2 supplement.  -Continue hemodialysis to prevent fluid overload.    Severe protein-calorie malnutrition (HCC)- (present on admission)  Assessment & Plan  -Continue tube feeding.    Cerebrovascular accident (CVA) due to embolism of right middle cerebral artery (HCC)- (present on admission)  Assessment & Plan  -Continue statin and aspirin.    -No PT/OT needs as unable to participate, and likely at her new baseline.      End stage renal failure on dialysis (HCC)- (present on admission)  Assessment & Plan  -Continue hemodialysis MWF.  Nephrology on board.  - Likely permanent; poor prognosis per nephrology, they recommend comfort care.  - avoid nephrotoxins, and continue to renally dose all medications.    Normocytic anemia- (present on admission)  Assessment & Plan  -Likely anemia of chronic kidney disease.  FOBT is negative.   -Hgb stable.   -Continue to trend H&H, transfuse if drops below 7.      Hypertension- (present on admission)  Assessment & Plan  -Maintaining good control.  -Continue hydralazine, cardura, coreg (home meds).  Monitor blood pressure trend closely.        DM (diabetes mellitus) ([facility])- (present on admission)  Assessment & Plan  -No need for coverage.  Continue to monitor.     Goals of care, counseling/discussion- (present on admission)  Assessment & Plan  - There have been multiple discussions with palliative/treatment team and family, to no avail. Patient remains full code, has trach.  - Daughter now wants PEG placement, GI to place on Saturday.    Pressure injury of sacrum, coccyx, stage 3 (HCC)- (present on admission)  Assessment & Plan  -Wound care per wound service. Continue pressure offloading strategies.    Hypomagnesemia- (present on admission)  Assessment & Plan  -Continue to monitor.     Dysphagia as late effect of cerebrovascular accident (CVA)- (present  on admission)  Assessment & Plan  - Continue cortrak.  Maintain on aspiration precautions.  - awaiting PEG placement by GI.     Gout- (present on admission)  Assessment & Plan  -Continue with allopurinol per the feeding tube.       VTE prophylaxis: SCDs

## 2020-02-14 NOTE — PROGRESS NOTES
[facility] Nephrology Daily Progress Note    Date of Service  2/14/2020    AUTHOR: Harsh Ferrer M.D.    Chief Complaint   Follow up ESRD    HPI  74 y.o. female admitted to [facility] on 1/14/20 with seizures.She was at a SNF.She had been previously hospitalized at [facility] and diaysis was initiated.  She was doing very poorly then and palliative care was discussed with the family,but they declined.She had very poor functional status and required Jimena lift to get into the dialysis chair.She was found to have  a CVA on MRI at [facility].Her seizures were not controlled and it was felt she was in status epilepticus.  She was getting HD at [facility].Last HD was on 1/13/20.She was seen by Dr Carcamo at [facility].Creatinine was 1.8 and dialysis was held. Neurology [place] that the patient needed continuous EEG monitoring and he was transferred to [facility]    DAILY NEPHROLOGY SUMMARY:  Please see note from 1/31 for prior summaries  2/01 - NAEO, another EEG being done this AM  2/02 - NAEO, scheduled for Trach today  2/03 - No events, underwent trach yesterday, no change in neuro status, BP stable overnight but low this am, given midodrine, to have HD today  2/04 - No events, tolerated HD yest with 2.1L UF, BP stable this am  2/05 - No events, BP low prior to starting HD, given albumin at the start of HD and BP stable at this time  2/06 - No events, tolerated HD yesterday with 2.5L UF, BP stable and high at times, still with dependent edema  2/07 - No events, tolerated PUF yesterday with 2.5L UF and did not require midodrine with treatment, BP stable today, still with dependent edema, to have HD today  2/08 - No overnight events or changes.  On TF at goal via NGT.  No neurologic change.  On vent/trach.   2/09 - No significant change.  No events.  Liquid stool from rectal tube.  On Vent/Trach  2/10-  No new overnight renal events. +trach.  2/11- S/p HD yesterday with net UF of 2 L.   2/12- No new overnight renal events. HD today.  2/13  - HD yesterday with 1.7L net UF, plan for PEG placement Saturday, LTACH placement pending, some hypotension   2/14 - On HD.    Review of Systems   Unable to perform ROS: Acuity of condition     PAST FAMILY HISTORY: Reviewed and unchanged since admission  PAST SURGICAL HISTORY:  Reviewed and unchanged since admission  SOCIAL HISTORY:  Reviewed and unchanged since admission  FAMILY HISTORY: Reviewed and unchanged since admission  CURRENT MEDICATIONS: Reviewed since admission to present  IMAGING STUDIES: Reviewed since admission to present  LABORATORY STUDIES: Reviewed since admission to present    Physical Exam  Temp:  [36.8 °C (98.2 °F)-38 °C (100.4 °F)] 37.7 °C (99.9 °F)  Pulse:  [75-88] 76  Resp:  [16-18] 18  BP: (101-138)/(42-64) 132/61  SpO2:  [93 %-99 %] 97 %    Physical Exam  Vitals signs and nursing note reviewed.   Constitutional:       General: She is not in acute distress.     Appearance: Normal appearance. She is ill-appearing.   HENT:      Head: Normocephalic and atraumatic.      Right Ear: External ear normal.      Left Ear: External ear normal.      Nose: Nose normal. No congestion.      Mouth/Throat:      Mouth: Mucous membranes are moist.      Pharynx: No oropharyngeal exudate.   Eyes:      General: No scleral icterus.     Conjunctiva/sclera: Conjunctivae normal.      Pupils: Pupils are equal, round, and reactive to light.   Neck:      Musculoskeletal: Neck supple.      Comments: +Trach  Cardiovascular:      Rate and Rhythm: Normal rate and regular rhythm.      Heart sounds: Normal heart sounds.   Pulmonary:      Effort: Pulmonary effort is normal. No respiratory distress.      Breath sounds: Normal breath sounds.      Comments: T-piece, supp O2   Chest:      Chest wall: No tenderness.   Abdominal:      General: Bowel sounds are normal. There is no distension.      Palpations: Abdomen is soft.      Comments: Enteral nutrition    Genitourinary:     Comments: BMS  Musculoskeletal:         General:  Swelling present. No tenderness or signs of injury.      Comments: +dependent edema   Lymphadenopathy:      Cervical: No cervical adenopathy.   Skin:     General: Skin is warm and dry.      Findings: No rash.      Comments: Heel float boots    Neurological:      Mental Status: She is alert.      Comments: Somnolent, opens eyes to painful stimuli, withdraws to painful stimuli   Psychiatric:      Comments: Unable to assess       Fluids    Intake/Output Summary (Last 24 hours) at 2/14/2020 1030  Last data filed at 2/13/2020 2000  Gross per 24 hour   Intake 410 ml   Output 0 ml   Net 410 ml     Laboratory  Recent Labs     02/12/20  0000 02/13/20  1040 02/14/20  0300   WBC 11.8* 13.7* 13.5*   RBC 2.68* 2.73* 2.60*   HEMOGLOBIN 8.4* 8.9* 8.2*   HEMATOCRIT 26.8* 27.7* 26.5*   .0* 101.5* 101.9*   MCH 31.3 32.6 31.5   MCHC 31.3* 32.1* 30.9*   RDW 73.8* 69.8* 68.2*   PLATELETCT 217 203 198   MPV 9.1 9.3 9.7     Recent Labs     02/14/20  0300   SODIUM 135   POTASSIUM 3.5*   CHLORIDE 98   CO2 27   GLUCOSE 129*   BUN 79*   CREATININE 1.60*   CALCIUM 8.3*     IMPRESSION:  # ESRD   dialysis today    Maintenance dialysis qMWF as OP at [facility]   mCrCl 6 by 24 hour urine - repeat study 2/13 pending results                # Status epilepticus  # S/P acute lacunar infarct   Hx of previous CVA with significant deficits.  # HTN--BP variable often with intradialytic hypotension  # DM II  # Acute Hypoxic Respiratory Failure   CXR 2/3 still with bilateral pulm edema  # Hx of dysphagia  # Anemia of CKD.Ferritin previously significantly elevated. CAT with HD.  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    SUGGESTIONS:   Maintenance dialysis qMWF and PRN. HD today (FRI).    Seizure  management per Neurology--dose adjust meds to accomodate dialysis   Enteral feedings, plan for PEG placement this weekend per GI   No dietary protein restrictions   Follow labs   Continue GOC discussions with family   Very poor prognosis, recommend comfort care, if and when family agreeable    Thank you,

## 2020-02-14 NOTE — DIETARY
Nutrition support weekly update:  Day 29 of admit.  Cassandra Guzmán is a 74 y.o. female with admitting DX of seizures, ARF, and respiratory failure requiring intubation.      Tube feeding initiated on 1/17. Current TF via Cortrak is Impact Peptide 1.5 @ 40 mL/hr, providing 1440 kcal, 90 gm protein, 134 gm CHO, and 739 mL of free water per day.  In addition, pt is receiving Nutrisource Fiber packets 6x/day, providing an additional 18 gm fiber and 90 kcal, for a total of 1530 kcal/day.      Assessment:  Weight was 57 kg on 2/8, which is down from previous wt of 59 kg on 2/4. Wt fluctuations expected with HD tx.     Evaluation:   1. Per MD note, PEG to be placed on Saturday.   2. Palliative involved, pt remains full code per family wishes.   3. Pt continues to receive HD - 1.7 L net UF 2/12.  4. 1+ edema (RUE, RLE, LUE, LLE), generalized edema and labial perineal edema.   5. Skin: Per wound therapy note (2/11): Sacrococcygeal stage 3 wound still present, has more yellow tissue present, leaks on occasion.   6. Labs: K 3.5, Glucose 129.   7. Meds: Vit B complex + C, folic acid.   8. GI: Last BM 2/13 (watery).   9. Current feeding remains appropriate to meet needs at this time and aide in wound healing.     Malnutrition risk: No new risks identified.     Recommendations/Plan:  1. Continue TF formula and goal rate.   2. Continue Nutrisource Fiber packets 6x/day.   3. If bolus feeds are desired, recommend Diabetisource AC (or equivalent) with goal of 5 cartons per day to provide 1500 kcal, 75 gm protein, 220 gm CHO, 19 gm fiber, 1025 mL free water and 100% of DRI for vitamins and minerals. Fibersource can be d/c'd if bolus feeds initiated.     RD Following.

## 2020-02-14 NOTE — PROGRESS NOTES
Hemodialysis ordered by Dr. Ferrer. Treatment started at 0919 and ended 1308 d/t clotting dialyzer and lines. Blood returned, new setting placed. tx restarted. Dr. Ferrer notified and aware. Pt stable, vss, no distress post tx. See flow sheets for details. Net  ml. Reported to PAYTON Espinal RN. Nursing time charged for 90 mins d/t waiting for transport to bring pt to dialysis room.

## 2020-02-14 NOTE — PROGRESS NOTES
Gastroenterology Progress Note     Author: MIKE Salamanca   Date & Time Created: 2020 11:10 AM    Chief Complaint:  PEG tube placement    Interval History:  She remains non communicative.  Receiving dialysis at time of exam.      Review of Systems:  Review of Systems   Unable to perform ROS: Acuity of condition       Physical Exam:  Physical Exam  Constitutional:       General: She is not in acute distress.  Eyes:      Pupils: Pupils are equal, round, and reactive to light.      Comments: Pinpoint, sluggish   Cardiovascular:      Rate and Rhythm: Normal rate and regular rhythm.   Pulmonary:      Effort: Pulmonary effort is normal.      Breath sounds: Normal breath sounds.      Comments: On tpiece 4L  Abdominal:      General: Abdomen is flat. Bowel sounds are normal.      Palpations: Abdomen is soft.      Tenderness: There is abdominal tenderness (slight grimace to deep palpation).         Labs:          Recent Labs     20  0300   SODIUM 135   POTASSIUM 3.5*   CHLORIDE 98   CO2 27   BUN 79*   CREATININE 1.60*   CALCIUM 8.3*     Recent Labs     20  0300   ALTSGPT 21   ASTSGOT 42   ALKPHOSPHAT 49   TBILIRUBIN 0.4   GLUCOSE 129*     Recent Labs     20  0000 20  0330 20  1040 20  0300   RBC 2.68*  --  2.73* 2.60*   HEMOGLOBIN 8.4*  --  8.9* 8.2*   HEMATOCRIT 26.8*  --  27.7* 26.5*   PLATELETCT 217  --  203 198   PROTHROMBTM  --  15.5*  --   --    INR  --  1.20*  --   --      Recent Labs     20  0000 20  1040 20  0300   WBC 11.8* 13.7* 13.5*   NEUTSPOLYS 63.50  --  53.70   LYMPHOCYTES 26.10  --  26.90   MONOCYTES 7.80  --  13.60*   EOSINOPHILS 1.70  --  2.20   BASOPHILS 0.90  --  0.60   ASTSGOT  --   --  42   ALTSGPT  --   --  21   ALKPHOSPHAT  --   --  49   TBILIRUBIN  --   --  0.4     Hemodynamics:  Temp (24hrs), Av.4 °C (99.4 °F), Min:36.8 °C (98.2 °F), Max:38 °C (100.4 °F)  Temperature: 37.7 °C (99.9 °F)  Pulse  Av  Min: 50   Max: 111   Blood Pressure : 132/61     Respiratory:    Respiration: 18, Pulse Oximetry: 97 %     Work Of Breathing / Effort: Mild  RUL Breath Sounds: Clear After Suction, RML Breath Sounds: Clear After Suction, RLL Breath Sounds: Clear After Suction, DEREK Breath Sounds: Clear After Suction, LLL Breath Sounds: Clear After Suction  Fluids:    Intake/Output Summary (Last 24 hours) at 2/13/2020 1027  Last data filed at 2/13/2020 0800  Gross per 24 hour   Intake 1600 ml   Output 3250 ml   Net -1650 ml        GI/Nutrition:  Orders Placed This Encounter   Procedures   • Diet NPO     Standing Status:   Standing     Number of Occurrences:   1     Order Specific Question:   Type:     Answer:   Now [1]     Order Specific Question:   Restrict to:     Answer:   Strict [1]   • Diet NPO     Standing Status:   Standing     Number of Occurrences:   8     Order Specific Question:   Restrict to:     Answer:   Strict [1]     Medical Decision Making, by Problem:  Active Hospital Problems    Diagnosis   • *Status epilepticus (HCC) [G40.901]   • Acute respiratory failure with hypoxia ([facility]) [J96.01]   • End stage renal failure on dialysis ([facility]) [N18.6, Z99.2]   • Cerebrovascular accident (CVA) due to embolism of right middle cerebral artery ([facility]) [I63.411]   • Severe protein-calorie malnutrition ([facility]) [E43]   • Normocytic anemia [D64.9]   • DM (diabetes mellitus) ([facility]) [E11.9]   • Hypertension [I10]   • Pressure injury of sacrum, coccyx, stage 3 ([facility]) [L89.303]   • Hypomagnesemia [E83.42]   • Dysphagia as late effect of cerebrovascular accident (CVA) [I69.391]   • Gout [M10.9]   • Vomiting [R11.10]   • Goals of care, counseling/discussion [Z71.89]       Plan:  Proceed with PEG tube placement for enteral nutrition once consent obtained.    2nd attempt to obtain consent from daughter Belen [phone], no answer.  Will plan PEG tube placement Saturday pending consent.  Hold Heparin/pharmacologic DVT prophylaxis prior to procedure.    1426:  Contacted Belen MENDIOLA via phone, discussed the risks and benefits of the procedure.  All questions answered.  Belen agrees to proceed with PEG placement and will sign consent tomorrow morning.    Quality-Core Measures

## 2020-02-15 NOTE — OR NURSING
Pt to PACU, no apparent distress. VSS, on 4LO2 via T-Piece, O2Sat 98%. BS is ordered per Dr. Tanner.

## 2020-02-15 NOTE — ANESTHESIA POSTPROCEDURE EVALUATION
Patient: Cassandra Guzmán    Procedure Summary     Date:  02/15/20 [facility] / [facility]    Anesthesia Start:  1340 Anesthesia Stop:  1409    Procedure:  GASTROSCOPY-PEG TUBE PLACEMENT (N/A Abdomen) Diagnosis:  (dysphagia)    Surgeon:  Dionisio Hyde M.D. Responsible Provider:  John Tanner M.D.    Anesthesia Type:  general ASA Status:  3          Final Anesthesia Type: general  Last vitals  BP   Blood Pressure : 158/67    Temp   36.8 °C (98.2 °F)    Pulse   Pulse: 71   Resp   16    SpO2   99 %      Anesthesia Post Evaluation    Patient location during evaluation: PACU  Patient participation: complete - patient participated  Level of consciousness: awake and alert  Pain score: 0    Airway patency: patent  Anesthetic complications: no  Cardiovascular status: hemodynamically stable  Respiratory status: acceptable  Hydration status: euvolemic    PONV: none           Nurse Pain Score: 3 (NPRS)

## 2020-02-15 NOTE — CARE PLAN
Problem: Pain Management  Goal: Pain level will decrease to patient's comfort goal  Outcome: PROGRESSING AS EXPECTED   Pt is non verbal> calm and does not appear in distress.  Pt does track when hears her name.  No non verbal ques of discomfort

## 2020-02-15 NOTE — PROGRESS NOTES
Patient seen and examined.   Labs reviewed.   Dysphagia.   EGD with PEG tube  Risks and benefits re-reviewed with Belen. All questions answered to the satisfaction of the POA.

## 2020-02-15 NOTE — ANESTHESIA QCDR
2019 [facility] Clinical Data Registry (for Quality Improvement)     Postoperative nausea/vomiting risk protocol (Adult = 18 yrs and Pediatric 3-17 yrs)- (430 and 463)  General inhalation anesthetic (NOT TIVA) with PONV risk factors: No  Provision of anti-emetic therapy with at least 2 different classes of agents: N/A  Patient DID NOT receive anti-emetic therapy and reason is documented in Medical Record: N/A    Multimodal Pain Management- (477)  Non-emergent surgery AND patient age >= 18: No  Use of Multimodal Pain Management, two or more drugs and/or interventions, NOT including systemic opioids:   Exception: Documented allergy to multiple classes of analgesics:     Smoking Abstinence (404)  Patient is current smoker (cigarette, pipe, e-cig, marijuanna): No  Elective Surgery:   Abstinence instructions provided prior to day of surgery:   Patient abstained from smoking on day of surgery:     Pre-Op Beta-Blocker in Isolated CABG (44)  Isolated CABG AND patient age >= 18: No  Beta-blocker admin within 24 hours of surgical incision:   Exception:of medical reason(s) for not administering beta blocker within 24 hours prior to surgical incision (e.g., not  indicated,other medical reason):     PACU assessment of acute postoperative pain prior to Anesthesia Care End- Applies to Patients Age = 18- (ABG7)  Initial PACU pain score is which of the following: Pain not assessed  Patient unable to report pain score: Yes (Patient Unable to Report)    Post-anesthetic transfer of care checklist/protocol to PACU/ICU- (426 and 427)  Upon conclusion of case, patient transferred to which of the following locations: PACU/Non-ICU  Use of transfer checklist/protocol: Yes  Exclusion: Service Performed in Patient Hospital Room (and thus did not require transfer): N/A  Unplanned admission to ICU related to anesthesia service up through end of PACU care- (MD51)  Unplanned admission to ICU (not initially anticipated at anesthesia start time):  No

## 2020-02-15 NOTE — CARE PLAN
Problem: Infection  Goal: Will remain free from infection  Outcome: PROGRESSING AS EXPECTED     Problem: Bowel/Gastric:  Goal: Will not experience complications related to bowel motility  Outcome: PROGRESSING AS EXPECTED     Problem: Pain Management  Goal: Pain level will decrease to patient's comfort goal  Outcome: PROGRESSING AS EXPECTED

## 2020-02-15 NOTE — CARE PLAN
Problem: Safety  Goal: Will remain free from injury  Reactivated  Goal: Will remain free from falls  Reactivated     Problem: Bowel/Gastric:  Goal: Normal bowel function is maintained or improved  Reactivated     Problem: Pain Management  Goal: Pain level will decrease to patient's comfort goal  Intervention: Follow pain managment plan developed in collaboration with patient and Interdisciplinary Team  Note: Reposition, nonpharm in use, pt resting in bed comfortably, will ctm     Problem: Skin Integrity  Goal: Risk for impaired skin integrity will decrease  Intervention: Implement precautions to protect skin integrity in collaboration with the interdisciplinary team  Note: Q2h turn, linen and pads changed as prn, BMS and catheter in use, hourly checking on pt,

## 2020-02-15 NOTE — ANESTHESIA TIME REPORT
Anesthesia Start and Stop Event Times     Date Time Event    2/15/2020 1331 Ready for Procedure     1340 Anesthesia Start     1409 Anesthesia Stop        Responsible Staff  02/15/20    Name Role Begin End    John Tanner M.D. Anesth 1340 1409        Preop Diagnosis (Free Text):  Pre-op Diagnosis     dysphagia        Preop Diagnosis (Codes):    Post op Diagnosis  Esophageal dysphagia      Premium Reason  E. Weekend    Comments:

## 2020-02-15 NOTE — ANESTHESIA PREPROCEDURE EVALUATION
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)   (+) Dyslipidemia associated with type 2 diabetes mellitus (HCC)       Physical Exam    Airway   Mallampati: II  TM distance: >3 FB  Neck ROM: full       Cardiovascular - normal exam  Rhythm: regular  Rate: normal  (-) murmur     Dental - normal exam         Pulmonary - normal exam  Breath sounds clear to auscultation     Abdominal    Neurological - normal exam         Other findings: Tracheostomy  Mildly responsive            Anesthesia Plan    ASA 3   ASA physical status 3 criteria: ESRD undergoing regularly scheduled dialysis    Plan - general       Airway plan will be Tracheostomy        Induction: intravenous    Postoperative Plan: Postoperative administration of opioids is intended.    Pertinent diagnostic labs and testing reviewed    Informed Consent:    Anesthetic plan and risks discussed with patient.    Use of blood products discussed with: patient whom consented to blood products.

## 2020-02-15 NOTE — PROGRESS NOTES
Hospital Medicine Daily Progress Note    Date of Service  2/15/2020    Chief Complaint  Altered mental status    Hospital Course    74 y.o. female who presented 1/16/2020 with a past medical history significant for end-stage renal disease on hemodialysis (followed by Dr. Cavazos) since November 2019 M/W/F, hypertension, hyperlipidemia and coronary artery disease.  She was recently admitted to [facility] [place] for an extended period of time followed by placement in a SNF.  She has been very frail requiring a hoist to get her in and out of the dialysis chair with failure to thrive.  On January 14 while at the SNF patient had a witnessed seizure, with no history previously.  She was transferred to the emergency department where she had a second seizure with a prolonged postictal state.  She was intubated for airway protection and started on a propofol drip in the emergency department on January 14 at [facility].  She was admitted to the intensive care unit where she underwent an MRI of her brain showing an acute right parietal lacunar infarct with overall brain parenchymal loss.  Neurology was consulted and patient was loaded with Keppra and continued on the propofol drip. Despite this, EEGs continued to show evidence of focal seizure and Dilantin was started.  She underwent a lumbar puncture with unremarkable CSF and was being treated empirically with acyclovir, ampicillin, Rocephin, vancomycin and Decadron.  Given her persistent abnormal EEGs, the neurologist at the outside hospital recommend patient be transferred to a facility that can provide continuous EEG monitoring.  For this reason she was transferred to [facility].  She was trached on 2/2 and came off vent on 2/5, but remained unresponsive.  She is continued on Vimpat, Topamax, and Depakote.  EEG showed no seizures. Neurology was consulted, who felt that likelihood of patient returning to baseline was low, and that the  likelihood of the patient returning to full independent function was essentially 0.  Family has apparently not wanted to discuss changing CODE STATUS nor transitioning to comfort care and patient remains a full code.  She remains unresponsive. LTACH is being pursued.  PEG tube placement was desired by the family, and GI was consulted who scheduled her for that.        Interval Problem Update  2/15/2020 - I reviewed the patient's chart. There were no significant overnight events. Remains hemodynamically stable and afebrile. Stable on 4L by T-piece through trach.  WBC 14,700.  Hemoglobin stable at 9.  Electrolytes are normal.  Creatinine 1.18.. Scheduled for PEG placement today.    > I have personally seen and examined the patient today.  She is status quo.  She remains unresponsive.  Opens eyes to voice and noxious stimuli, but still without purposeful movement.  Does not track.  Appears comfortable.          Consultants/Specialty  Intensivist  Nephrology  Neurology  Palliative Care  GI    Code Status  FULL    Disposition  Monitor on the neurology floors. LTACH placement.   Pending PEG placement.    Review of Systems  ROS     Unable to obtain due to vegetative state/mental status.       Physical Exam  Temp:  [36 °C (96.8 °F)-37.2 °C (99 °F)] 37.1 °C (98.8 °F)  Pulse:  [16-82] 82  Resp:  [15-70] 16  BP: (129-153)/(54-66) 129/66  SpO2:  [96 %-100 %] 100 %    Physical Exam  Vitals signs reviewed.   Constitutional:       General: She is not in acute distress.     Appearance: Normal appearance. She is normal weight. She is not ill-appearing or diaphoretic.   HENT:      Head: Normocephalic and atraumatic.      Right Ear: External ear normal.      Left Ear: External ear normal.      Nose: Nose normal.      Comments: Cortrak in place     Mouth/Throat:      Mouth: Mucous membranes are moist.      Pharynx: No oropharyngeal exudate or posterior oropharyngeal erythema.   Eyes:      General: No scleral icterus.     Extraocular  Movements: Extraocular movements intact.      Conjunctiva/sclera: Conjunctivae normal.      Pupils: Pupils are equal, round, and reactive to light.   Neck:      Musculoskeletal: Normal range of motion and neck supple. No neck rigidity or muscular tenderness.      Comments: Trach in place  Cardiovascular:      Rate and Rhythm: Normal rate and regular rhythm.      Heart sounds: Normal heart sounds. No murmur.   Pulmonary:      Effort: Pulmonary effort is normal. No respiratory distress.      Breath sounds: Normal breath sounds. No stridor. No wheezing, rhonchi or rales.   Chest:      Chest wall: No tenderness.   Abdominal:      General: Bowel sounds are normal. There is no distension.      Palpations: Abdomen is soft. There is no mass.      Tenderness: There is no abdominal tenderness. There is no guarding or rebound.   Musculoskeletal: Normal range of motion.         General: No swelling.      Right lower leg: No edema.      Left lower leg: No edema.   Lymphadenopathy:      Cervical: No cervical adenopathy.   Skin:     General: Skin is warm and dry.      Coloration: Skin is not jaundiced.      Findings: No rash.   Neurological:      Cranial Nerves: No cranial nerve deficit.      Comments:  Eyes opens, and stares, but does not track. Non verbal, and unresponsive. Does not interact.    Psychiatric:      Comments: Unable to assess due to mentation         I have performed the physical examination today 2/15/2020.  In review of yesterday's note, there are no new changes except as documented above.            Fluids    Intake/Output Summary (Last 24 hours) at 2/15/2020 1133  Last data filed at 2/15/2020 0800  Gross per 24 hour   Intake 900 ml   Output 1200 ml   Net -300 ml       Laboratory  Recent Labs     02/13/20  1040 02/14/20  0300 02/15/20  0756   WBC 13.7* 13.5* 14.7*   RBC 2.73* 2.60* 2.86*   HEMOGLOBIN 8.9* 8.2* 9.0*   HEMATOCRIT 27.7* 26.5* 29.7*   .5* 101.9* 103.8*   MCH 32.6 31.5 31.5   MCHC 32.1* 30.9*  30.3*   RDW 69.8* 68.2* 70.2*   PLATELETCT 203 198 233   MPV 9.3 9.7 9.5     Recent Labs     02/14/20  0300 02/15/20  0756   SODIUM 135 139   POTASSIUM 3.5* 3.7   CHLORIDE 98 102   CO2 27 28   GLUCOSE 129* 108*   BUN 79* 44*   CREATININE 1.60* 1.18   CALCIUM 8.3* 8.6     Recent Labs     02/13/20  0330   INR 1.20*               Imaging  US-ABDOMEN LTD (SOFT TISSUE)   Final Result         1. Trace free fluid in the pelvis. No abdominal ascites.      DX-ABDOMEN FOR TUBE PLACEMENT   Final Result         Feeding tube with tip projecting over the expected area of the stomach.      [id number] VIEW   Final Result      Feeding tube tip overlies the gastric antrum.      DX-CHEST-PORTABLE (1 VIEW)   Final Result         1.  Pulmonary edema and/or infiltrates are identified, which are stable since the prior exam.   2.  Cardiomegaly   3.  Atherosclerosis      DX-CHEST-PORTABLE (1 VIEW)   Final Result         1.  Pulmonary edema and/or infiltrates are identified, which are stable since the prior exam.   2.  Cardiomegaly   3.  Atherosclerosis      DX-CHEST-PORTABLE (1 VIEW)   Final Result      1.  Unchanged left lower lobe atelectasis or pneumonia.      2.  Clear right lung.      DX-CHEST-PORTABLE (1 VIEW)   Final Result      Unchanged LEFT basilar atelectasis and/or airspace disease      DX-CHEST-PORTABLE (1 VIEW)   Final Result      Left basilar atelectasis, hilar and cardiac silhouette enlargement as before      DX-CHEST-PORTABLE (1 VIEW)   Final Result      Interval removal of a left subclavian central line. Stable patchy bilateral infiltrates.      IR-PICC LINE PLACEMENT W/ GUIDANCE > AGE 5   Final Result                  Ultrasound-guided PICC placement performed by qualified nursing staff as    above.          DX-CHEST-PORTABLE (1 VIEW)   Final Result      1.  Multiple support devices present.      2.  Patchy bilateral atelectasis.      DX-CHEST-PORTABLE (1 VIEW)   Final Result      Stable chest with retrocardiac opacity  from atelectasis and/or pleural fluid favored over consolidation      DX-CHEST-PORTABLE (1 VIEW)   Final Result      Stable chest with retrocardiac opacity from atelectasis and/or pleural fluid favored over consolidation      DX-CHEST-PORTABLE (1 VIEW)   Final Result         No significant change from prior.               DX-CHEST-PORTABLE (1 VIEW)   Final Result         1. Stable lines and tubes..   2. Stable retrocardiac and left perihilar atelectasis versus consolidation. Suspected small left pleural effusion.            DX-CHEST-PORTABLE (1 VIEW)   Final Result         1. Stable lines and tubes..   2. Stable retrocardiac atelectasis versus consolidation with increased left perihilar opacity. Suspected trace left pleural effusion.         [id number] VIEW   Final Result      1.  Enteric tube has been placed and the tip projects over the stomach.      2.  Pre-existing feeding tube tip projects at the gastroduodenal junction      DX-CHEST-PORTABLE (1 VIEW)   Final Result         1. Lines and tubes as above.   2. Stable retrocardiac atelectasis versus consolidation. Suspected trace left pleural effusion.         MR-BRAIN-WITH & W/O   Final Result      1.  Moderate cerebral atrophy.   2.  Evidence of intraventricular hemorrhage, indeterminate age. Possibly chronic intraventricular hemosiderin deposition.   3.  Extensive encephalomalacic change with hemosiderin deposition involving the right corona radiata, basal ganglia, posterior thalamus, subthalamic region, bordering the right cerebral peduncle. Associated ex vacuo dilatation of the body of the right    lateral ventricle. No change.   4.  Additional foci of old microhemorrhage most consistent with old hypertensive microhemorrhage or amyloid angiopathy in the left basal ganglia, left thalamus, and midline upper ventral aebl.   5.  Punctate focus of acute infarction in the right parietal deep white matter. No change.   6.  Advanced supratentorial white matter  disease most consistent with microvascular ischemic change.   7.  Encephalomalacic changes in the abel and right cerebral peduncle consistent with old infarction.   8.  Partially empty sella.   9.  Overall, no new findings and no significant change from 1/14/2020.      DX-CHEST-PORTABLE (1 VIEW)   Final Result         1. Stable lines and tubes.   2. Unchanged retrocardiac atelectasis versus consolidation.   3. Stable trace left pleural effusion.         OUTSIDE IMAGES-DX CHEST   Final Result      OUTSIDE IMAGES-US VASCULAR   Final Result      OUTSIDE IMAGES-MR BRAIN   Final Result      OUTSIDE IMAGES-DX CHEST   Final Result      OUTSIDE IMAGES-CT HEAD   Final Result      EC-ECHOCARDIOGRAM COMPLETE W/O CONT   Final Result      DX-CHEST-FOR LINE PLACEMENT Perform procedure in: Patient's Room   Final Result         1.  Retrocardiac opacity concerning for infiltrate, stable.   2.  Trace left pleural effusion, stable   3.  Cardiomegaly   4.  Atherosclerosis   5.  Perihilar interstitial prominence and bronchial wall cuffing, appearance suggests changes of underlying bronchial inflammation, consider bronchitis.      DX-ABDOMEN FOR TUBE PLACEMENT   Final Result         1.  Nonspecific bowel gas pattern.   2.  Dobbhoff tube tip overlying the expected location of the pylorus or first duodenal segment.   3.  Left lung base atelectasis and/or small effusion      DX-CHEST-PORTABLE (1 VIEW)   Final Result         1.  Retrocardiac opacity concerning for infiltrate.   2.  Trace left pleural effusion, stable   3.  Cardiomegaly   4.  Atherosclerosis      XY-[id number] FILM X-RAY   Final Result      OUTSIDE IMAGES-DX CHEST   Final Result           Assessment/Plan  * Status epilepticus (HCC)- (present on admission)  Assessment & Plan  -Was intubated for airway protection, now trached since 2/2.   -Continuing vimpat, topamax, depakote.  Repeat EEG here showed no seizures. Neurology gave the opinion that likelihood of patient  returning to baseline was low, and that the likelihood of the patient returning to full independent function was essentially 0.    Acute respiratory failure with hypoxia (HCC)- (present on admission)  Assessment & Plan  - stable on trach/T-piece.   - continue RT protocol, O2 supplement.  -Continue hemodialysis to prevent fluid overload.    Severe protein-calorie malnutrition (HCC)- (present on admission)  Assessment & Plan  -Continue tube feeding.    Cerebrovascular accident (CVA) due to embolism of right middle cerebral artery (HCC)- (present on admission)  Assessment & Plan  -Continue statin and aspirin.    -No PT/OT needs as unable to participate, and likely at her new baseline.      End stage renal failure on dialysis (HCC)- (present on admission)  Assessment & Plan  -Continue hemodialysis MWF.  Nephrology on board.  - Likely permanent; poor prognosis per nephrology, they recommend comfort care.  - avoid nephrotoxins, and continue to renally dose all medications.    Normocytic anemia- (present on admission)  Assessment & Plan  -Likely anemia of chronic kidney disease.  FOBT is negative.   -Hgb stable.   -Continue to trend H&H, transfuse if drops below 7.      Hypertension- (present on admission)  Assessment & Plan  -Maintaining good control.  -Continue hydralazine, cardura, coreg (home meds).  Monitor blood pressure trend closely.        DM (diabetes mellitus) ([facility])- (present on admission)  Assessment & Plan  -No need for coverage.  Continue to monitor.     Goals of care, counseling/discussion- (present on admission)  Assessment & Plan  - There have been multiple discussions with palliative/treatment team and family, to no avail. Patient remains full code, has trach.  - Daughter now wants PEG placement, GI to place today.    Pressure injury of sacrum, coccyx, stage 3 (HCC)- (present on admission)  Assessment & Plan  -Wound care per wound service. Continue pressure offloading strategies.    Hypomagnesemia-  (present on admission)  Assessment & Plan  -Continue to monitor.     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  Maintain on aspiration precautions.  - awaiting PEG placement by GI, scheduled for today.    Gout- (present on admission)  Assessment & Plan  -Continue with allopurinol per the feeding tube.       VTE prophylaxis: SCDs

## 2020-02-15 NOTE — PROGRESS NOTES
Pt Strict NPO since midnight.  TF turned off.  .  No non verbal ques for pain.  Rectal tube in place, hernandez to dd, some blood in urine.  Q2 turns in place.  4l trache.  vss

## 2020-02-15 NOTE — PROGRESS NOTES
2 RN skin check complete with MIC Kim.   Devices in place cortrack, trach, PICC, SCD.  Skin assessed under devices yes.  Confirmed pressure ulcers found on coccyx.  New potential pressure ulcers noted on none. Wound consult placed n/a.  The following interventions in place wound dressing changed, q2h turn, hydration, linen and pads changing,  Noted: scattered bruising and scabs BLUEs*

## 2020-02-15 NOTE — PROGRESS NOTES
[facility] Nephrology Daily Progress Note    Date of Service  2/15/2020    AUTHOR: Tanner Ng M.D.    Chief Complaint   Follow up ESRD    HPI  74 y.o. female admitted to [facility] on 1/14/20 with seizures.She was at a SNF.She had been previously hospitalized at [facility] and diaysis was initiated.  She was doing very poorly then and palliative care was discussed with the family,but they declined.She had very poor functional status and required Jimena lift to get into the dialysis chair.She was found to have  a CVA on MRI at [facility].Her seizures were not controlled and it was felt she was in status epilepticus.  She was getting HD at [facility].Last HD was on 1/13/20.She was seen by Dr Carcamo at [facility].Creatinine was 1.8 and dialysis was held. Neurology [place] that the patient needed continuous EEG monitoring and he was transferred to [facility]    DAILY NEPHROLOGY SUMMARY:  Please see note from 1/31 for prior summaries  2/01 - NAEO, another EEG being done this AM  2/02 - NAEO, scheduled for Trach today  2/03 - No events, underwent trach yesterday, no change in neuro status, BP stable overnight but low this am, given midodrine, to have HD today  2/04 - No events, tolerated HD yest with 2.1L UF, BP stable this am  2/05 - No events, BP low prior to starting HD, given albumin at the start of HD and BP stable at this time  2/06 - No events, tolerated HD yesterday with 2.5L UF, BP stable and high at times, still with dependent edema  2/07 - No events, tolerated PUF yesterday with 2.5L UF and did not require midodrine with treatment, BP stable today, still with dependent edema, to have HD today  2/08 - No overnight events or changes.  On TF at goal via NGT.  No neurologic change.  On vent/trach.   2/09 - No significant change.  No events.  Liquid stool from rectal tube.  On Vent/Trach  2/10-  No new overnight renal events. +trach.  2/11- S/p HD yesterday with net UF of 2 L.   2/12- No new overnight renal events. HD  today.  2/13 - HD yesterday with 1.7L net UF, plan for PEG placement Saturday, LTACH placement pending, some hypotension   2/14 - On HD.  2/15 - Non communicative, HD yesterday 700cc UF    Review of Systems   Unable to perform ROS: Acuity of condition         Physical Exam  Temp:  [36 °C (96.8 °F)-37.2 °C (99 °F)] 37.2 °C (99 °F)  Pulse:  [16-82] 82  Resp:  [15-70] 16  BP: (114-153)/(54-66) 145/64  SpO2:  [96 %-100 %] 100 %    Physical Exam  Vitals signs and nursing note reviewed.   Constitutional:       General: She is not in acute distress.     Appearance: She is ill-appearing.   HENT:      Head: Normocephalic and atraumatic.      Right Ear: External ear normal.      Left Ear: External ear normal.      Nose: Nose normal. No congestion.      Mouth/Throat:      Mouth: Mucous membranes are moist.      Pharynx: No oropharyngeal exudate.   Eyes:      General: No scleral icterus.     Conjunctiva/sclera: Conjunctivae normal.      Pupils: Pupils are equal, round, and reactive to light.   Neck:      Musculoskeletal: Neck supple.      Comments: +Trach  Cardiovascular:      Rate and Rhythm: Normal rate and regular rhythm.      Heart sounds: Normal heart sounds.   Pulmonary:      Effort: Pulmonary effort is normal. No respiratory distress.      Breath sounds: Normal breath sounds.      Comments: T-piece, supp O2   Chest:      Chest wall: No tenderness.   Abdominal:      General: Bowel sounds are normal. There is no distension.      Palpations: Abdomen is soft.      Comments: Enteral nutrition    Genitourinary:     Comments: BMS  Musculoskeletal:         General: No swelling, tenderness or signs of injury.      Comments: +dependent edema   Lymphadenopathy:      Cervical: No cervical adenopathy.   Skin:     General: Skin is warm and dry.      Findings: No rash.      Comments: Heel float boots    Neurological:      Comments: Somnolent, opens eyes to painful stimuli, withdraws to painful stimuli   Psychiatric:      Comments:  Unable to assess       Fluids    Intake/Output Summary (Last 24 hours) at 2/15/2020 0820  Last data filed at 2/15/2020 0400  Gross per 24 hour   Intake 900 ml   Output 1200 ml   Net -300 ml     Laboratory  Recent Labs     02/13/20  1040 02/14/20  0300   WBC 13.7* 13.5*   RBC 2.73* 2.60*   HEMOGLOBIN 8.9* 8.2*   HEMATOCRIT 27.7* 26.5*   .5* 101.9*   MCH 32.6 31.5   MCHC 32.1* 30.9*   RDW 69.8* 68.2*   PLATELETCT 203 198   MPV 9.3 9.7     Recent Labs     02/14/20  0300   SODIUM 135   POTASSIUM 3.5*   CHLORIDE 98   CO2 27   GLUCOSE 129*   BUN 79*   CREATININE 1.60*   CALCIUM 8.3*     IMPRESSION:  # ESRD   dialysis today    Maintenance dialysis qMWF as OP at [facility]   mCrCl 6 by 24 hour urine - repeat study 2/13 pending results                # Status epilepticus  # S/P acute lacunar infarct   Hx of previous CVA with significant deficits.  # HTN--BP variable often with intradialytic hypotension  # DM II  # Acute Hypoxic Respiratory Failure   CXR 2/3 still with bilateral pulm edema  # Hx of dysphagia  # Anemia of CKD.Ferritin previously significantly elevated. CAT with HD.  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    SUGGESTIONS:   Maintenance dialysis qMWF and PRN..    Seizure management per Neurology--dose adjust meds to accomodate dialysis   Enteral feedings, plan for PEG placement this weekend per GI   No dietary protein restrictions   Follow labs   Continue GOC discussions with family   Very poor prognosis, recommend comfort care, if and when family agreeable   Will see MWF    Thank you,

## 2020-02-16 NOTE — CARE PLAN
Problem: Pain Management  Goal: Pain level will decrease to patient's comfort goal  Intervention: Educate and implement non-pharmacologic comfort measures. Examples: relaxation, distration, play therapy, activity therapy, massage, etc.  Note: Pain assessed, rest, reposition, relaxation technique, and emotional support provided, pt resting in bed comfortably     Problem: Skin Integrity  Goal: Risk for impaired skin integrity will decrease  Intervention: Implement precautions to protect skin integrity in collaboration with the interdisciplinary team  Note: Q2h turn, wound dressing changed, pad and linen changed, barrier cream and nystatin applied.

## 2020-02-16 NOTE — PROGRESS NOTES
Hospital Medicine Daily Progress Note    Date of Service  2/16/2020    Chief Complaint  Altered mental status    Hospital Course    74 y.o. female who presented 1/16/2020 with a past medical history significant for end-stage renal disease on hemodialysis (followed by Dr. Cavazos) since November 2019 M/W/F, hypertension, hyperlipidemia and coronary artery disease.  She was recently admitted to [facility] for an extended period of time followed by placement in a SNF.  She has been very frail requiring a hoist to get her in and out of the dialysis chair with failure to thrive.  On January 14 while at the SNF patient had a witnessed seizure, with no history previously.  She was transferred to the emergency department where she had a second seizure with a prolonged postictal state.  She was intubated for airway protection and started on a propofol drip in the emergency department on January 14 at [facility].  She was admitted to the intensive care unit where she underwent an MRI of her brain showing an acute right parietal lacunar infarct with overall brain parenchymal loss.  Neurology was consulted and patient was loaded with Keppra and continued on the propofol drip. Despite this, EEGs continued to show evidence of focal seizure and Dilantin was started.  She underwent a lumbar puncture with unremarkable CSF and was being treated empirically with acyclovir, ampicillin, Rocephin, vancomycin and Decadron.  Given her persistent abnormal EEGs, the neurologist at the outside hospital recommend patient be transferred to a facility that can provide continuous EEG monitoring.  For this reason she was transferred to [facility].  She was trached on 2/2 and came off vent on 2/5, but remained unresponsive.  She is continued on Vimpat, Topamax, and Depakote.  EEG showed no seizures. Neurology was consulted, who felt that likelihood of patient returning to baseline was low, and that the  likelihood of the patient returning to full independent function was essentially 0.  Family has apparently not wanted to discuss changing CODE STATUS nor transitioning to comfort care and patient remains a full code.  She remains unresponsive. LTACH is being pursued.  PEG tube placement was desired by the family, and GI was consulted who scheduled her for that.        Interval Problem Update  2/16/2020 - I reviewed the patient's chart today. Uneventful night. VSS. Afebrile.  She is stable on 4 L of oxygen by T-piece. Had PEG placement yesterday.    > I have personally seen and examined the patient today.  She is not in distress.  Appears comfortable.  Remains nonresponsive.  Opens eyes to noxious stimuli.        Consultants/Specialty  Intensivist  Nephrology  Neurology  Palliative Care  GI    Code Status  FULL    Disposition  Monitor on the neurology floors. LTACH placement.       Review of Systems  ROS     Unable to obtain due to vegetative state/mental status.       Physical Exam  Temp:  [36.2 °C (97.1 °F)-36.9 °C (98.4 °F)] 36.2 °C (97.1 °F)  Pulse:  [61-72] 70  Resp:  [12-18] 16  BP: (106-158)/(51-74) 124/62  SpO2:  [95 %-100 %] 97 %    Physical Exam  Vitals signs reviewed.   Constitutional:       General: She is not in acute distress.     Appearance: Normal appearance. She is normal weight. She is not ill-appearing or diaphoretic.   HENT:      Head: Normocephalic and atraumatic.      Right Ear: External ear normal.      Left Ear: External ear normal.      Nose: Nose normal.      Mouth/Throat:      Mouth: Mucous membranes are moist.      Pharynx: No oropharyngeal exudate or posterior oropharyngeal erythema.   Eyes:      General: No scleral icterus.     Extraocular Movements: Extraocular movements intact.      Conjunctiva/sclera: Conjunctivae normal.      Pupils: Pupils are equal, round, and reactive to light.   Neck:      Musculoskeletal: Normal range of motion and neck supple. No neck rigidity or muscular  tenderness.      Comments: Trach in place  Cardiovascular:      Rate and Rhythm: Normal rate and regular rhythm.      Heart sounds: Normal heart sounds. No murmur.   Pulmonary:      Effort: Pulmonary effort is normal. No respiratory distress.      Breath sounds: Normal breath sounds. No stridor. No wheezing, rhonchi or rales.   Chest:      Chest wall: No tenderness.   Abdominal:      General: Bowel sounds are normal. There is no distension.      Palpations: Abdomen is soft. There is no mass.      Tenderness: There is no abdominal tenderness. There is no guarding or rebound.      Comments: PEG now in place   Musculoskeletal: Normal range of motion.         General: No swelling.      Right lower leg: No edema.      Left lower leg: No edema.   Lymphadenopathy:      Cervical: No cervical adenopathy.   Skin:     General: Skin is warm and dry.      Coloration: Skin is not jaundiced.      Findings: No rash.   Neurological:      Cranial Nerves: No cranial nerve deficit.      Comments: No changes in mentation  Eyes opens, and stares, but does not track. Non verbal, and unresponsive. Does not interact.    Psychiatric:      Comments: Unable to assess due to mentation           Fluids    Intake/Output Summary (Last 24 hours) at 2/16/2020 1117  Last data filed at 2/16/2020 0400  Gross per 24 hour   Intake 310 ml   Output 250 ml   Net 60 ml       Laboratory  Recent Labs     02/14/20  0300 02/15/20  0756   WBC 13.5* 14.7*   RBC 2.60* 2.86*   HEMOGLOBIN 8.2* 9.0*   HEMATOCRIT 26.5* 29.7*   .9* 103.8*   MCH 31.5 31.5   MCHC 30.9* 30.3*   RDW 68.2* 70.2*   PLATELETCT 198 233   MPV 9.7 9.5     Recent Labs     02/14/20  0300 02/15/20  0756   SODIUM 135 139   POTASSIUM 3.5* 3.7   CHLORIDE 98 102   CO2 27 28   GLUCOSE 129* 108*   BUN 79* 44*   CREATININE 1.60* 1.18   CALCIUM 8.3* 8.6                   Imaging  US-ABDOMEN LTD (SOFT TISSUE)   Final Result         1. Trace free fluid in the pelvis. No abdominal ascites.       DX-ABDOMEN FOR TUBE PLACEMENT   Final Result         Feeding tube with tip projecting over the expected area of the stomach.      [id number] VIEW   Final Result      Feeding tube tip overlies the gastric antrum.      DX-CHEST-PORTABLE (1 VIEW)   Final Result         1.  Pulmonary edema and/or infiltrates are identified, which are stable since the prior exam.   2.  Cardiomegaly   3.  Atherosclerosis      DX-CHEST-PORTABLE (1 VIEW)   Final Result         1.  Pulmonary edema and/or infiltrates are identified, which are stable since the prior exam.   2.  Cardiomegaly   3.  Atherosclerosis      DX-CHEST-PORTABLE (1 VIEW)   Final Result      1.  Unchanged left lower lobe atelectasis or pneumonia.      2.  Clear right lung.      DX-CHEST-PORTABLE (1 VIEW)   Final Result      Unchanged LEFT basilar atelectasis and/or airspace disease      DX-CHEST-PORTABLE (1 VIEW)   Final Result      Left basilar atelectasis, hilar and cardiac silhouette enlargement as before      DX-CHEST-PORTABLE (1 VIEW)   Final Result      Interval removal of a left subclavian central line. Stable patchy bilateral infiltrates.      IR-PICC LINE PLACEMENT W/ GUIDANCE > AGE 5   Final Result                  Ultrasound-guided PICC placement performed by qualified nursing staff as    above.          DX-CHEST-PORTABLE (1 VIEW)   Final Result      1.  Multiple support devices present.      2.  Patchy bilateral atelectasis.      DX-CHEST-PORTABLE (1 VIEW)   Final Result      Stable chest with retrocardiac opacity from atelectasis and/or pleural fluid favored over consolidation      DX-CHEST-PORTABLE (1 VIEW)   Final Result      Stable chest with retrocardiac opacity from atelectasis and/or pleural fluid favored over consolidation      DX-CHEST-PORTABLE (1 VIEW)   Final Result         No significant change from prior.               DX-CHEST-PORTABLE (1 VIEW)   Final Result         1. Stable lines and tubes..   2. Stable retrocardiac and left perihilar  atelectasis versus consolidation. Suspected small left pleural effusion.            DX-CHEST-PORTABLE (1 VIEW)   Final Result         1. Stable lines and tubes..   2. Stable retrocardiac atelectasis versus consolidation with increased left perihilar opacity. Suspected trace left pleural effusion.         [id number] VIEW   Final Result      1.  Enteric tube has been placed and the tip projects over the stomach.      2.  Pre-existing feeding tube tip projects at the gastroduodenal junction      DX-CHEST-PORTABLE (1 VIEW)   Final Result         1. Lines and tubes as above.   2. Stable retrocardiac atelectasis versus consolidation. Suspected trace left pleural effusion.         MR-BRAIN-WITH & W/O   Final Result      1.  Moderate cerebral atrophy.   2.  Evidence of intraventricular hemorrhage, indeterminate age. Possibly chronic intraventricular hemosiderin deposition.   3.  Extensive encephalomalacic change with hemosiderin deposition involving the right corona radiata, basal ganglia, posterior thalamus, subthalamic region, bordering the right cerebral peduncle. Associated ex vacuo dilatation of the body of the right    lateral ventricle. No change.   4.  Additional foci of old microhemorrhage most consistent with old hypertensive microhemorrhage or amyloid angiopathy in the left basal ganglia, left thalamus, and midline upper ventral abel.   5.  Punctate focus of acute infarction in the right parietal deep white matter. No change.   6.  Advanced supratentorial white matter disease most consistent with microvascular ischemic change.   7.  Encephalomalacic changes in the abel and right cerebral peduncle consistent with old infarction.   8.  Partially empty sella.   9.  Overall, no new findings and no significant change from 1/14/2020.      DX-CHEST-PORTABLE (1 VIEW)   Final Result         1. Stable lines and tubes.   2. Unchanged retrocardiac atelectasis versus consolidation.   3. Stable trace left pleural effusion.          OUTSIDE IMAGES-DX CHEST   Final Result      OUTSIDE IMAGES-US VASCULAR   Final Result      OUTSIDE IMAGES-MR BRAIN   Final Result      OUTSIDE IMAGES-DX CHEST   Final Result      OUTSIDE IMAGES-CT HEAD   Final Result      EC-ECHOCARDIOGRAM COMPLETE W/O CONT   Final Result      DX-CHEST-FOR LINE PLACEMENT Perform procedure in: Patient's Room   Final Result         1.  Retrocardiac opacity concerning for infiltrate, stable.   2.  Trace left pleural effusion, stable   3.  Cardiomegaly   4.  Atherosclerosis   5.  Perihilar interstitial prominence and bronchial wall cuffing, appearance suggests changes of underlying bronchial inflammation, consider bronchitis.      DX-ABDOMEN FOR TUBE PLACEMENT   Final Result         1.  Nonspecific bowel gas pattern.   2.  Dobbhoff tube tip overlying the expected location of the pylorus or first duodenal segment.   3.  Left lung base atelectasis and/or small effusion      DX-CHEST-PORTABLE (1 VIEW)   Final Result         1.  Retrocardiac opacity concerning for infiltrate.   2.  Trace left pleural effusion, stable   3.  Cardiomegaly   4.  Atherosclerosis      [id number] FILM X-RAY   Final Result      OUTSIDE IMAGES-DX CHEST   Final Result           Assessment/Plan  * Status epilepticus (HCC)- (present on admission)  Assessment & Plan  -Was intubated for airway protection, now trached since 2/2.   -Continuing vimpat, topamax, depakote.  Repeat EEG here showed no seizures. Neurology gave the opinion that likelihood of patient returning to baseline was low, and that the likelihood of the patient returning to full independent function was essentially 0.    Acute respiratory failure with hypoxia (HCC)- (present on admission)  Assessment & Plan  - stable on trach/T-piece.   - continue RT protocol, O2 supplement.  -Continue hemodialysis to prevent fluid overload.    Severe protein-calorie malnutrition (HCC)- (present on admission)  Assessment & Plan  -Continue tube  feeding.    Cerebrovascular accident (CVA) due to embolism of right middle cerebral artery (HCC)- (present on admission)  Assessment & Plan  -Continue statin and aspirin.    -No PT/OT needs as unable to participate, and likely at her new baseline.      End stage renal failure on dialysis ([facility])- (present on admission)  Assessment & Plan  -Continue hemodialysis MWF.  Nephrology on board.  - Likely permanent; poor prognosis per nephrology, they recommend comfort care.  - avoid nephrotoxins, and continue to renally dose all medications.    Normocytic anemia- (present on admission)  Assessment & Plan  -Likely anemia of chronic kidney disease.  FOBT is negative.   -Hgb stable.   -Continue to trend H&H, transfuse if drops below 7.      Hypertension- (present on admission)  Assessment & Plan  -Maintaining good control.  -Continue hydralazine, cardura, coreg (home meds).  Monitor blood pressure trend closely.        DM (diabetes mellitus) ([facility])- (present on admission)  Assessment & Plan  -No need for coverage.  Continue to monitor.     Goals of care, counseling/discussion- (present on admission)  Assessment & Plan  - There have been multiple discussions with palliative/treatment team and family, to no avail. Patient remains full code, has trach and PEG.    Pressure injury of sacrum, coccyx, stage 3 ([facility])- (present on admission)  Assessment & Plan  -Wound care per wound service. Continue pressure offloading strategies.    Hypomagnesemia- (present on admission)  Assessment & Plan  -Continue to monitor.     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 Maintain on aspiration precautions.    Gout- (present on admission)  Assessment & Plan  -Continue with allopurinol per the PEG tube.       VTE prophylaxis: SCDs

## 2020-02-16 NOTE — PROGRESS NOTES
2 RN skin check performed with MIC Hodges.    Devices in place:  -Tracheostomy  -PEG tube  -Pulse ox probe  -Rectal tube  -River catheter  -SCDs  -PICC line  -HD catheter    Following skin breakdown noted:  -Extensive bruising to BUE  -Bruising over right ankle   -Small scab and redness to right of trach under neck tie  -Stage III pressure ulcer to coccyx with scant purulent and serosanguineous drainage   -Generalized redness to perineum; blanching     Preventative measures in place:  -Q2 turns  -Routine dressing changes  -Barrier cream used around perineum   -Low air loss mattress  -Pillows used to float heels and elbows

## 2020-02-16 NOTE — OP REPORT
DATE OF SERVICE:  02/15/2020    PROCEDURE PERFORMED:  Esophagogastroduodenoscopy with percutaneous endoscopic   gastrostomy tube placement.    PHYSICIAN:  Dionisio Hyde MD    ANESTHESIOLOGIST:  Present.    MEDICATIONS:  1.  General anesthesia.  2.  Ancef administered prior to PEG tube placement.    CONSENT:  Procedure risks and benefits had been reviewed thoroughly with the   patient as well as the patient's power of , her daughter, Belen.    Risks including, but not limited to bleeding, perforation, side effects of   medication, infection, buried bumper syndrome were all reviewed.  All   questions were answered to their satisfaction.    DESCRIPTION OF PROCEDURE:  The patient was maintained in a supine position   after sedation was maintained.  A bite block was inserted in the mouth and a   forward-viewing gastroscope was passed carefully and easily under direct   visualization into the esophagus.  All esophageal views were normal with the   exception of the existing Dobbhoff present.  The scope was advanced into the   stomach.  Other than the Dobbhoff, the forward and retroflex views of the   stomach were otherwise normal.  There was dimpling in the gastric mucosa   consistent with history of PEG tube placement.  It was clean, dry and intact,   healed.  The duodenal views were otherwise normal.  The gastric lumen was   insufflated utilizing endoluminal illumination and abdominal compression.  An   area in the left upper quadrant adjacent to the prior PEG tube site, which was   healed by secondary intention was marked, prepped and draped in a sterile   fashion.  A needle was utilized to inject into the subcutaneous space 1%   Xylocaine/lidocaine with bleb.  The plunger on the needle was then   subsequently retracted as the needle was advanced into the abdominal wall.  At   no time was there any rush of blood or air.  Once the needle appeared in the   gastric lumen endoscopically, then air was appreciated.   This occurred within   a matter of seconds.  The tract was then anesthetized utilizing the same   solution as above.  The same tract and trajectory was utilized to advance the   trocar.  The metallic portion of the trocar was removed.  A guidewire was   advanced through the plastic portion and it was grasped and snared utilizing a   snare that was passed through the scope.  The snared guidewire was then   brought out through the mouth.  A PEG tube was affixed to the guidewire and   then pulled into place.  The diameter of the PEG tube was approximated   utilizing a scalpel to open the abdominal wall.  The PEG tube was then brought   into place.  The external bumper was placed against the abdominal wall   approximately 2 cm and all external devices were affixed to the end of the PEG   tube.  The scope was then readvanced.  Examination of the stomach was   otherwise normal without complication.  The stomach was suctioned,   desufflated.  Scope was removed.    COMPLICATIONS:  None.    BLOOD LOSS:  1 mL or less.    SPECIMENS:  None.    RECOMMENDATIONS:  Successful PEG tube placement.  Discussion with the   patient's nurse revealed instructions for utilization of PEG tube in 4 hours   for water and medications and nutrition within 12 hours.  This was reviewed   thoroughly with the patient's nurse.  All questions were answered to her   satisfaction.       ____________________________________     MD DANIELA Stone / BERANDETTE    DD:  02/15/2020 17:04:23  DT:  02/15/2020 19:10:37    D#:  [id number]  Job#:  [id number]

## 2020-02-16 NOTE — PROGRESS NOTES
Pharmacy Pharmacotherapy Consult for LOS >30 days    Admit Date: 1/16/2020      Medications were reviewed for appropriateness and ongoing need.     Current Facility-Administered Medications   Medication Dose Route Frequency Provider Last Rate Last Dose   • heparin injection 5,000 Units  5,000 Units Subcutaneous Q12HRS Kojo Alanis M.D.   5,000 Units at 02/14/20 0442   • doxazosin (CARDURA) tablet 6 mg  6 mg Per G Tube QHS ALEX [email or web address]   6 mg at 02/15/20 2012   • aspirin (ASA) chewable tab 81 mg  81 mg Enteral Tube DAILY Kojo Alanis M.D.   81 mg at 02/16/20 0436   • carvedilol (COREG) tablet 25 mg  25 mg Enteral Tube BID Kojo Alanis M.D.   25 mg at 02/16/20 0436   • miconazole 2%-zinc oxide (INZO) topical cream   Topical BID Kojo Alanis M.D.       • hydrALAZINE (APRESOLINE) tablet 100 mg  100 mg Enteral Tube TID Shannan Guzman M.D.   100 mg at 02/16/20 1211   • omeprazole (FIRST-OMEPRAZOLE) 2 mg/mL oral susp 40 mg  40 mg Enteral Tube Q12HRS Shannan Guzman M.D.   40 mg at 02/16/20 0435   • divalproex (DEPAKOTE SPRINKLE) capsule 500 mg  500 mg Enteral Tube Q12HRS Fletcher Quiroz M.D.   500 mg at 02/16/20 0436   • albumin human 25% solution 25 g  25 g Intravenous ACUTE DIALYSIS PRN Saima Meneses M.D. 150 mL/hr at 02/07/20 1351 25 g at 02/07/20 1351   • lacosamide (VIMPAT) tablet 300 mg  300 mg Enteral Tube BID Sergei Uriarte M.D.   300 mg at 02/16/20 0436   • carboxymethylcellulose (REFRESH TEARS) 0.5 % ophthalmic drops 1 Drop  1 Drop Both Eyes Q2HRS PRN Tanner Louis M.D.   1 Drop at 01/31/20 0546   • vitamin B comp+C (ALLBEE WITH C) 1 Tab  1 Tab Enteral Tube DAILY Anurag Hein D.O.   1 Tab at 02/16/20 0435   • folic acid (FOLVITE) tablet 1 mg  1 mg Enteral Tube DAILY ALONSO Giordano.O.   1 mg at 02/16/20 0436   • allopurinol (ZYLOPRIM) tablet 100 mg  100 mg Enteral Tube DAILY ALONSO Giordano.O.   100 mg at 02/16/20 0436   • nystatin (MYCOSTATIN) powder   Topical BID Anurag  MIMI Hein       • hydrALAZINE (APRESOLINE) injection 10 mg  10 mg Intravenous Q4HRS PRN Anurag Hein D.O.   10 mg at 02/10/20 0424   • topiramate (TOPAMAX) tablet 200 mg  200 mg Enteral Tube Q12HRS Catracho Bustos M.D.   200 mg at 02/16/20 0436   • atorvastatin (LIPITOR) tablet 40 mg  40 mg Enteral Tube DAILY Josep You M.D.   40 mg at 02/16/20 0436   • heparin injection 3,300 Units  3,300 Units Intracatheter PRN Lydia Carcamo M.D.   3,300 Units at 02/14/20 1315   • epoetin antoinette (EPOGEN/PROCRIT) injection 4,000 Units  4,000 Units Subcutaneous MO, WE + FR Payam Cavazos M.D.   4,000 Units at 02/14/20 1252   • Respiratory Care per Protocol   Nebulization Continuous RT Jeremy M Gonda, M.D.       • ipratropium-albuterol (DUONEB) nebulizer solution  3 mL Nebulization Q2HRS PRN (RT) Jeremy M Gonda, M.D.       • senna-docusate (PERICOLACE or SENOKOT S) 8.6-50 MG per tablet 2 Tab  2 Tab Enteral Tube BID Jeremy M Gonda, M.D.   2 Tab at 02/16/20 0436    And   • polyethylene glycol/lytes (MIRALAX) PACKET 1 Packet  1 Packet Enteral Tube QDAY PRN Jeremy M Gonda, M.D.        And   • bisacodyl (DULCOLAX) suppository 10 mg  10 mg Rectal QDAY PRN Jeremy M Gonda, M.D.       • Pharmacy Consult: Enteral tube insertion - review meds/change route/product selection  1 Each Other PHARMACY TO DOSE Jeremy M Gonda, M.D.       • labetalol (NORMODYNE/TRANDATE) injection 10 mg  10 mg Intravenous Q4HRS PRN Rina Matta M.D.   10 mg at 02/10/20 0026   • acetaminophen (TYLENOL) tablet 650 mg  650 mg Enteral Tube Q6HRS PRN Jeremy M Gonda, M.D.   650 mg at 02/09/20 1658       Recommendations:  Discussed with MD, DC Zofran, Ativan prn. Patient not utilizing for >2 weeks.    Macho Reed, KelliD

## 2020-02-16 NOTE — PROGRESS NOTES
2 RN skin check complete with MIC Kim.   Devices in place Peg tube, trach, BMS, hernandez cath, PICC, SCD, pulse ox.  Skin assessed under devices yes.  Confirmed pressure ulcers found on coccyx.  New potential pressure ulcers noted on none. Wound consult placed n/a.  The following interventions in place wound dressing changed, q2h turn, hydration, linen and pads changing,  Noted: scattered bruising and scabs BLUEs, redness and blanching on perineal area*

## 2020-02-16 NOTE — CARE PLAN
Problem: Safety  Goal: Will remain free from injury  Outcome: PROGRESSING AS EXPECTED  Note: Universal fall precautions in place; bed in low/locked position; call light in reach; room free of clutter; mobility assessed and limitations dicussed with patient's family; instructed family to call for assistance        Problem: Venous Thromboembolism (VTW)/Deep Vein Thrombosis (DVT) Prevention:  Goal: Patient will participate in Venous Thrombosis (VTE)/Deep Vein Thrombosis (DVT)Prevention Measures  Outcome: PROGRESSING AS EXPECTED

## 2020-02-17 PROBLEM — E87.6 HYPOKALEMIA: Status: ACTIVE | Noted: 2020-01-01

## 2020-02-17 NOTE — PROGRESS NOTES
Potassium level 3.3; MD paged; orders received for 1 time dose of 40meq potassium via enteral tube

## 2020-02-17 NOTE — PROGRESS NOTES
Hospital Medicine Daily Progress Note    Date of Service  2/17/2020    Chief Complaint  Altered mental status    Hospital Course    74 y.o. female who presented 1/16/2020 with a past medical history significant for end-stage renal disease on hemodialysis (followed by Dr. Cavazos) since November 2019 M/W/F, hypertension, hyperlipidemia and coronary artery disease.  She was recently admitted to [facility] for an extended period of time followed by placement in a SNF.  She has been very frail requiring a hoist to get her in and out of the dialysis chair with failure to thrive.  On January 14 while at the SNF patient had a witnessed seizure, with no history previously.  She was transferred to the emergency department where she had a second seizure with a prolonged postictal state.  She was intubated for airway protection and started on a propofol drip in the emergency department on January 14 at [facility].  She was admitted to the intensive care unit where she underwent an MRI of her brain showing an acute right parietal lacunar infarct with overall brain parenchymal loss.  Neurology was consulted and patient was loaded with Keppra and continued on the propofol drip. Despite this, EEGs continued to show evidence of focal seizure and Dilantin was started.  She underwent a lumbar puncture with unremarkable CSF and was being treated empirically with acyclovir, ampicillin, Rocephin, vancomycin and Decadron.  Given her persistent abnormal EEGs, the neurologist at the outside hospital recommend patient be transferred to a facility that can provide continuous EEG monitoring.  For this reason she was transferred to [facility].  She was trached on 2/2 and came off vent on 2/5, but remained unresponsive.  She is continued on Vimpat, Topamax, and Depakote.  EEG showed no seizures. Neurology was consulted, who felt that likelihood of patient returning to baseline was low, and that the  likelihood of the patient returning to full independent function was essentially 0.  Family has apparently not wanted to discuss changing CODE STATUS nor transitioning to comfort care and patient remains a full code.  She remains unresponsive. LTACH is being pursued.  PEG tube placement was desired by the family, and GI was consulted who then had PEG placed endoscopically.         Interval Problem Update  2/17/2020 - I reviewed the patient's chart. There were no significant overnight events. Remains hemodynamically stable and afebrile. Stable on on the T-piece at 4L.  K 3.3. Crea 1.77.    > I have personally seen and examined the patient today.  She is getting dialysis.  She remains nonresponsive.  Opens eyes to voice and noxious stimuli, still does not track, and remains without purposeful movement.  Still without functional recovery.      Consultants/Specialty  Intensivist  Nephrology  Neurology  Palliative Care  GI    Code Status  FULL    Disposition  Monitor on the neurology floors. LTACH placement.       Review of Systems  ROS     Unable to obtain due to vegetative state/mental status.       Physical Exam  Temp:  [36.7 °C (98.1 °F)-37.6 °C (99.7 °F)] 37.2 °C (99 °F)  Pulse:  [71-77] 71  Resp:  [14-18] 16  BP: (125-151)/(52-60) 125/52  SpO2:  [94 %-98 %] 95 %    Physical Exam  Vitals signs reviewed.   Constitutional:       General: She is not in acute distress.     Appearance: Normal appearance. She is normal weight. She is not ill-appearing or diaphoretic.   HENT:      Head: Normocephalic and atraumatic.      Right Ear: External ear normal.      Left Ear: External ear normal.      Nose: Nose normal.      Mouth/Throat:      Mouth: Mucous membranes are moist.      Pharynx: No oropharyngeal exudate or posterior oropharyngeal erythema.   Eyes:      General: No scleral icterus.     Extraocular Movements: Extraocular movements intact.      Conjunctiva/sclera: Conjunctivae normal.      Pupils: Pupils are equal, round,  and reactive to light.   Neck:      Musculoskeletal: Normal range of motion and neck supple. No neck rigidity or muscular tenderness.      Comments: Trach in place  Cardiovascular:      Rate and Rhythm: Normal rate and regular rhythm.      Heart sounds: Normal heart sounds. No murmur.   Pulmonary:      Effort: Pulmonary effort is normal. No respiratory distress.      Breath sounds: Normal breath sounds. No stridor. No wheezing, rhonchi or rales.   Chest:      Chest wall: No tenderness.   Abdominal:      General: Bowel sounds are normal. There is no distension.      Palpations: Abdomen is soft. There is no mass.      Tenderness: There is no abdominal tenderness. There is no guarding or rebound.      Comments: PEG now in place   Musculoskeletal: Normal range of motion.         General: No swelling.      Right lower leg: No edema.      Left lower leg: No edema.   Lymphadenopathy:      Cervical: No cervical adenopathy.   Skin:     General: Skin is warm and dry.      Coloration: Skin is not jaundiced.      Findings: No rash.   Neurological:      Cranial Nerves: No cranial nerve deficit.      Comments: No changes in mentation  Eyes opens, and stares, but does not track. Non verbal, and unresponsive. Does not interact.    Psychiatric:      Comments: Unable to assess due to mentation           Fluids    Intake/Output Summary (Last 24 hours) at 2/17/2020 1100  Last data filed at 2/17/2020 0400  Gross per 24 hour   Intake 1280 ml   Output 730 ml   Net 550 ml       Laboratory  Recent Labs     02/15/20  0756   WBC 14.7*   RBC 2.86*   HEMOGLOBIN 9.0*   HEMATOCRIT 29.7*   .8*   MCH 31.5   MCHC 30.3*   RDW 70.2*   PLATELETCT 233   MPV 9.5     Recent Labs     02/15/20  0756 02/17/20  0330   SODIUM 139 136   POTASSIUM 3.7 3.3*   CHLORIDE 102 98   CO2 28 27   GLUCOSE 108* 169*   BUN 44* 76*   CREATININE 1.18 1.77*   CALCIUM 8.6 8.7                   Imaging  US-ABDOMEN LTD (SOFT TISSUE)   Final Result         1. Trace free  fluid in the pelvis. No abdominal ascites.      DX-ABDOMEN FOR TUBE PLACEMENT   Final Result         Feeding tube with tip projecting over the expected area of the stomach.      [id number] VIEW   Final Result      Feeding tube tip overlies the gastric antrum.      DX-CHEST-PORTABLE (1 VIEW)   Final Result         1.  Pulmonary edema and/or infiltrates are identified, which are stable since the prior exam.   2.  Cardiomegaly   3.  Atherosclerosis      DX-CHEST-PORTABLE (1 VIEW)   Final Result         1.  Pulmonary edema and/or infiltrates are identified, which are stable since the prior exam.   2.  Cardiomegaly   3.  Atherosclerosis      DX-CHEST-PORTABLE (1 VIEW)   Final Result      1.  Unchanged left lower lobe atelectasis or pneumonia.      2.  Clear right lung.      DX-CHEST-PORTABLE (1 VIEW)   Final Result      Unchanged LEFT basilar atelectasis and/or airspace disease      DX-CHEST-PORTABLE (1 VIEW)   Final Result      Left basilar atelectasis, hilar and cardiac silhouette enlargement as before      DX-CHEST-PORTABLE (1 VIEW)   Final Result      Interval removal of a left subclavian central line. Stable patchy bilateral infiltrates.      IR-PICC LINE PLACEMENT W/ GUIDANCE > AGE 5   Final Result                  Ultrasound-guided PICC placement performed by qualified nursing staff as    above.          DX-CHEST-PORTABLE (1 VIEW)   Final Result      1.  Multiple support devices present.      2.  Patchy bilateral atelectasis.      DX-CHEST-PORTABLE (1 VIEW)   Final Result      Stable chest with retrocardiac opacity from atelectasis and/or pleural fluid favored over consolidation      DX-CHEST-PORTABLE (1 VIEW)   Final Result      Stable chest with retrocardiac opacity from atelectasis and/or pleural fluid favored over consolidation      DX-CHEST-PORTABLE (1 VIEW)   Final Result         No significant change from prior.               DX-CHEST-PORTABLE (1 VIEW)   Final Result         1. Stable lines and tubes..    2. Stable retrocardiac and left perihilar atelectasis versus consolidation. Suspected small left pleural effusion.            DX-CHEST-PORTABLE (1 VIEW)   Final Result         1. Stable lines and tubes..   2. Stable retrocardiac atelectasis versus consolidation with increased left perihilar opacity. Suspected trace left pleural effusion.         [id number] VIEW   Final Result      1.  Enteric tube has been placed and the tip projects over the stomach.      2.  Pre-existing feeding tube tip projects at the gastroduodenal junction      DX-CHEST-PORTABLE (1 VIEW)   Final Result         1. Lines and tubes as above.   2. Stable retrocardiac atelectasis versus consolidation. Suspected trace left pleural effusion.         MR-BRAIN-WITH & W/O   Final Result      1.  Moderate cerebral atrophy.   2.  Evidence of intraventricular hemorrhage, indeterminate age. Possibly chronic intraventricular hemosiderin deposition.   3.  Extensive encephalomalacic change with hemosiderin deposition involving the right corona radiata, basal ganglia, posterior thalamus, subthalamic region, bordering the right cerebral peduncle. Associated ex vacuo dilatation of the body of the right    lateral ventricle. No change.   4.  Additional foci of old microhemorrhage most consistent with old hypertensive microhemorrhage or amyloid angiopathy in the left basal ganglia, left thalamus, and midline upper ventral abel.   5.  Punctate focus of acute infarction in the right parietal deep white matter. No change.   6.  Advanced supratentorial white matter disease most consistent with microvascular ischemic change.   7.  Encephalomalacic changes in the abel and right cerebral peduncle consistent with old infarction.   8.  Partially empty sella.   9.  Overall, no new findings and no significant change from 1/14/2020.      DX-CHEST-PORTABLE (1 VIEW)   Final Result         1. Stable lines and tubes.   2. Unchanged retrocardiac atelectasis versus  consolidation.   3. Stable trace left pleural effusion.         OUTSIDE IMAGES-DX CHEST   Final Result      OUTSIDE IMAGES-US VASCULAR   Final Result      OUTSIDE IMAGES-MR BRAIN   Final Result      OUTSIDE IMAGES-DX CHEST   Final Result      OUTSIDE IMAGES-CT HEAD   Final Result      EC-ECHOCARDIOGRAM COMPLETE W/O CONT   Final Result      DX-CHEST-FOR LINE PLACEMENT Perform procedure in: Patient's Room   Final Result         1.  Retrocardiac opacity concerning for infiltrate, stable.   2.  Trace left pleural effusion, stable   3.  Cardiomegaly   4.  Atherosclerosis   5.  Perihilar interstitial prominence and bronchial wall cuffing, appearance suggests changes of underlying bronchial inflammation, consider bronchitis.      DX-ABDOMEN FOR TUBE PLACEMENT   Final Result         1.  Nonspecific bowel gas pattern.   2.  Dobbhoff tube tip overlying the expected location of the pylorus or first duodenal segment.   3.  Left lung base atelectasis and/or small effusion      DX-CHEST-PORTABLE (1 VIEW)   Final Result         1.  Retrocardiac opacity concerning for infiltrate.   2.  Trace left pleural effusion, stable   3.  Cardiomegaly   4.  Atherosclerosis      [id number] FILM X-RAY   Final Result      OUTSIDE IMAGES-DX CHEST   Final Result           Assessment/Plan  * Status epilepticus (HCC)- (present on admission)  Assessment & Plan  -Was intubated for airway protection, now trached since 2/2.   -Continuing vimpat, topamax, depakote.  Repeat EEG here showed no seizures. Neurology gave the opinion that likelihood of patient returning to baseline was low, and that the likelihood of the patient returning to full independent function was essentially 0.    Acute respiratory failure with hypoxia (HCC)- (present on admission)  Assessment & Plan  - stable on trach/T-piece.   - continue RT protocol, O2 supplement.  -Continue hemodialysis to prevent fluid overload.    Hypokalemia  Assessment & Plan  -Replace with enteral  K-Dur.  BMP in the morning.    Severe protein-calorie malnutrition (HCC)- (present on admission)  Assessment & Plan  -Continue tube feeding.    Cerebrovascular accident (CVA) due to embolism of right middle cerebral artery (HCC)- (present on admission)  Assessment & Plan  -Continue statin and aspirin.    -No PT/OT needs as unable to participate, and likely at her new baseline.      End stage renal failure on dialysis (HCC)- (present on admission)  Assessment & Plan  -Continue hemodialysis MWF.  Nephrology on board.  - Likely permanent; poor prognosis per nephrology, they recommend comfort care.  - avoid nephrotoxins, and continue to renally dose all medications.    Normocytic anemia- (present on admission)  Assessment & Plan  -Likely anemia of chronic kidney disease.  FOBT is negative.   -Hgb stable.   -Continue to trend H&H, transfuse if drops below 7.      Hypertension- (present on admission)  Assessment & Plan  -Maintaining good control.  -Continue hydralazine, cardura, coreg (home meds).  Monitor blood pressure trend closely.        DM (diabetes mellitus) ([facility])- (present on admission)  Assessment & Plan  -No need for coverage.  Continue to monitor.     Goals of care, counseling/discussion- (present on admission)  Assessment & Plan  - There have been multiple discussions with palliative/treatment team and family, to no avail. Patient remains full code, has trach and PEG.    Pressure injury of sacrum, coccyx, stage 3 ([facility])- (present on admission)  Assessment & Plan  -Wound care per wound service. Continue pressure offloading strategies.    Hypomagnesemia- (present on admission)  Assessment & Plan  -Continue to monitor.     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 Maintain on aspiration precautions.    Gout- (present on admission)  Assessment & Plan  -Continue with allopurinol per the PEG tube.       VTE prophylaxis: SCDs

## 2020-02-17 NOTE — PROGRESS NOTES
[facility] Services Progress Note     Hemodialysis treatment ordered today per Dr. Ng x 3.5 hours.   Treatment initiated at 0905, ended at 1235.      Albumin 25% 25g IV given for BP support per order.   Pt stable post HD. See paper flow sheet for details.      Net UF 1,400 mL.      Post tx, CVC flushed with saline then locked with heparin 1000 units/mL per designated amount in each wing then clamped and capped. Aspirate heparin prior to next CVC use.     Report given to Madiha Sheehan RN.

## 2020-02-17 NOTE — PROGRESS NOTES
[facility] Nephrology Progress Note    Date of Service  2/17/2020    AUTHOR: MORRO Hernandez.  Supervising Physician: Dr Tanner Ng     Chief Complaint   Follow up ESRD    HPI  74 y.o. female admitted to [facility] on 1/14/20 with seizures.She was at a SNF.She had been previously hospitalized at [facility] and diaysis was initiated.  She was doing very poorly then and palliative care was discussed with the family,but they declined.She had very poor functional status and required Jimena lift to get into the dialysis chair.She was found to have  a CVA on MRI at [facility].Her seizures were not controlled and it was felt she was in status epilepticus.  She was getting HD at [facility].Last HD was on 1/13/20.She was seen by Dr Carcamo at [facility].Creatinine was 1.8 and dialysis was held. Neurology [place] that the patient needed continuous EEG monitoring and he was transferred to [facility]    DAILY NEPHROLOGY SUMMARY:  Please see note from 1/31 for prior summaries  2/01 - NAEO, another EEG being done this AM  2/02 - NAEO, scheduled for Trach today  2/03 - No events, underwent trach yesterday, no change in neuro status, BP stable overnight but low this am, given midodrine, to have HD today  2/04 - No events, tolerated HD yest with 2.1L UF, BP stable this am  2/05 - No events, BP low prior to starting HD, given albumin at the start of HD and BP stable at this time  2/06 - No events, tolerated HD yesterday with 2.5L UF, BP stable and high at times, still with dependent edema  2/07 - No events, tolerated PUF yesterday with 2.5L UF and did not require midodrine with treatment, BP stable today, still with dependent edema, to have HD today  2/08 - No overnight events or changes.  On TF at goal via NGT.  No neurologic change.  On vent/trach.   2/09 - No significant change.  No events.  Liquid stool from rectal tube.  On Vent/Trach  2/10-  No new overnight renal events. +trach.  2/11- S/p HD yesterday with net UF of 2 L.   2/12- No  new overnight renal events. HD today.  2/13 - HD yesterday with 1.7L net UF, plan for PEG placement Saturday, LTACH placement pending, some hypotension   2/14 - On HD.  2/15 - Non communicative, HD yesterday 700cc UF  2/17 - Seen on HD today. 's. No new events.     Review of Systems   Unable to perform ROS: Acuity of condition         Physical Exam  Temp:  [36.7 °C (98.1 °F)-37.6 °C (99.7 °F)] 37 °C (98.6 °F)  Pulse:  [71-77] 76  Resp:  [14-18] 16  BP: (128-151)/(52-60) 139/52  SpO2:  [94 %-98 %] 94 %    Physical Exam  Vitals signs and nursing note reviewed.   Constitutional:       General: She is not in acute distress.     Appearance: She is ill-appearing.   HENT:      Head: Normocephalic and atraumatic.      Right Ear: External ear normal.      Left Ear: External ear normal.      Nose: Nose normal. No congestion.      Mouth/Throat:      Mouth: Mucous membranes are moist.      Pharynx: No oropharyngeal exudate.   Eyes:      General: No scleral icterus.     Conjunctiva/sclera: Conjunctivae normal.      Pupils: Pupils are equal, round, and reactive to light.   Neck:      Musculoskeletal: Neck supple.      Comments: +Trach  Cardiovascular:      Rate and Rhythm: Normal rate and regular rhythm.      Heart sounds: Normal heart sounds.   Pulmonary:      Effort: Pulmonary effort is normal. No respiratory distress.      Breath sounds: Normal breath sounds.      Comments: T-piece, supp O2   Chest:      Chest wall: No tenderness.   Abdominal:      General: Bowel sounds are normal. There is no distension.      Palpations: Abdomen is soft.      Comments: Enteral nutrition    Genitourinary:     Comments: BMS  Musculoskeletal:         General: No swelling, tenderness or signs of injury.      Comments: +dependent edema   Lymphadenopathy:      Cervical: No cervical adenopathy.   Skin:     General: Skin is warm and dry.      Findings: No rash.      Comments: Heel float boots    Neurological:      Comments: Somnolent, opens  eyes to painful stimuli, withdraws to painful stimuli   Psychiatric:      Comments: Unable to assess       Fluids    Intake/Output Summary (Last 24 hours) at 2/17/2020 0844  Last data filed at 2/17/2020 0400  Gross per 24 hour   Intake 1360 ml   Output 730 ml   Net 630 ml     Laboratory  Recent Labs     02/15/20  0756   WBC 14.7*   RBC 2.86*   HEMOGLOBIN 9.0*   HEMATOCRIT 29.7*   .8*   MCH 31.5   MCHC 30.3*   RDW 70.2*   PLATELETCT 233   MPV 9.5     Recent Labs     02/15/20  0756 02/17/20  0330   SODIUM 139 136   POTASSIUM 3.7 3.3*   CHLORIDE 102 98   CO2 28 27   GLUCOSE 108* 169*   BUN 44* 76*   CREATININE 1.18 1.77*   CALCIUM 8.6 8.7     IMPRESSION:  # ESRD   Dialysis today    Maintenance dialysis qMWF as OP at [facility]   mCrCl 6 by 24 hour urine - repeat study 2/13 mCrCl 4   Dialysis Dependent                # Status epilepticus  # S/P acute lacunar infarct   Hx of previous CVA with significant deficits.  # HTN--BP variable often with intradialytic hypotension  # DM II  # Acute Hypoxic Respiratory Failure   CXR 3/3 still w/ pulm edema/infiltrates  # Hx of dysphagia  # Anemia of CKD.Ferritin previously significantly elevated. CAT with HD.  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    PLAN:    Maintenance dialysis qMWF and PRN.   UF w/ HD as tolerated.   Albumin PRN for BP support with HD    Seizure management per Neurology--dose adjust meds to accomodate dialysis   Enteral feedings, plan for PEG placement this weekend per GI   No dietary protein restrictions   Follow labs   Continue GOC discussions with family   Very poor prognosis, recommend comfort care, if and when family agreeable   Will see on MWF. Please call on other days w/ questions or  concerns    Thank you,

## 2020-02-18 NOTE — CARE PLAN
Problem: Oxygenation:  Goal: Maintain adequate oxygenation dependent on patient condition  Outcome: PROGRESSING AS EXPECTED     Problem: Bronchopulmonary Hygiene:  Goal: Increase mobilization of retained secretions  Outcome: PROGRESSING AS EXPECTED     7.0 portex Trach t-piece   4L 28%

## 2020-02-18 NOTE — PROGRESS NOTES
2 RN skin check performed with MIC Chaudhary.     Devices in place:  -Tracheostomy  -PEG tube  -Pulse ox probe  -Rectal tube  -River catheter  -SCDs  -PICC line  -HD catheter     Following skin breakdown noted:  -Extensive bruising to BUE  -Bruise to outer right leg   -Small scab and redness to right of trach under neck tie with foam in place.  -Stage III pressure ulcer to coccyx with scant purulent and serosanguineous drainage. Mepilex changed   -Generalized redness to perineum; blanching     Preventative measures in place:  -Q2 turns  -Routine dressing changes  -Barrier cream used around perineum   -Pillows used to float heels and elbows

## 2020-02-18 NOTE — PROGRESS NOTES
Two RN skin check completed with MIC Wilson    Bruising noted to left arm and abdomen  2-3+ edema right hand-elevated  Some irritation around trach site-foam patch in place  Scabs left chest, head, and right hand  Large area of discoloration to right ankle unable to verify if bruising or pressure injury  Wound to sacral/coccyx area with some yellow slough some purulent and serosanguineous drainage. Mepilex changed.    Patient repositioned Q2 hours

## 2020-02-18 NOTE — PROGRESS NOTES
2 rn skin assessment completed with MIC Toledo    Redness to perineum; stage 3 pressure ulcer to coccyx; bruising to babita upper extremity and right ankle; redness/scab to trach site are present.     Q2 turn, low airloss mattress with waffle overlay, heel float boots, barrier cream, and dressing changes are implemented    Tracheostomy; peg tube; hernandez; rectal tube; pulse ox, picc line, HD catheter, and scd's are in place

## 2020-02-18 NOTE — PROGRESS NOTES
Pt back frm dialysis, pt resting with eyes open but not tracking.  Spoke with daughter darren via phone and updated with plan of care.

## 2020-02-18 NOTE — CARE PLAN
Problem: Safety  Goal: Will remain free from injury  Outcome: PROGRESSING AS EXPECTED  Goal: Will remain free from falls  Outcome: PROGRESSING AS EXPECTED     Problem: Bowel/Gastric:  Goal: Normal bowel function is maintained or improved  Outcome: PROGRESSING AS EXPECTED     Problem: Skin Integrity  Goal: Risk for impaired skin integrity will decrease  Outcome: PROGRESSING AS EXPECTED     Problem: Communication  Goal: The ability to communicate needs accurately and effectively will improve  Outcome: PROGRESSING SLOWER THAN EXPECTED     Problem: Discharge Barriers/Planning  Goal: Patient's continuum of care needs will be met  Outcome: PROGRESSING SLOWER THAN EXPECTED

## 2020-02-18 NOTE — PROGRESS NOTES
Received report from night nurse at the bedside. Assume care of Pt at 0700. Assessment completed. Pt opens eyes spontaneously but does not track or follow commands. Rectal tube flushed. Trach care completed. Assistance of one. River in place, rectal tube in place, trach in place, PICC in place, Mepilex changed. Pt in bed, bed in lowest position, call light in place, treaded slipper socks on.

## 2020-02-19 NOTE — CARE PLAN
Problem: Venous Thromboembolism (VTW)/Deep Vein Thrombosis (DVT) Prevention:  Goal: Patient will participate in Venous Thrombosis (VTE)/Deep Vein Thrombosis (DVT)Prevention Measures  Outcome: PROGRESSING AS EXPECTED  Intervention: Ensure patient wears graduated elastic stockings (JUAN FRANCISCO hose) and/or SCDs, if ordered, when in bed or chair (Remove at least once per shift for skin check)  Note: SCDs are in use.      Problem: Skin Integrity  Goal: Risk for impaired skin integrity will decrease  Outcome: PROGRESSING AS EXPECTED  Intervention: Assess risk factors for impaired skin integrity and/or pressure ulcers  Note: Pt is at high risk for skin breakdown due to immobility.     Intervention: Implement precautions to protect skin integrity in collaboration with the interdisciplinary team  Note: Heel float boots, Q2 turns, low air loss mattress, mepilex, and barrier cream in use.

## 2020-02-19 NOTE — CARE PLAN
Problem: Communication  Goal: The ability to communicate needs accurately and effectively will improve  Outcome: PROGRESSING AS EXPECTED     Problem: Safety  Goal: Will remain free from injury  Outcome: PROGRESSING AS EXPECTED  Goal: Will remain free from falls  Outcome: PROGRESSING AS EXPECTED     Problem: Respiratory:  Goal: Respiratory status will improve  Outcome: PROGRESSING AS EXPECTED     Problem: Discharge Barriers/Planning  Goal: Patient's continuum of care needs will be met  Outcome: PROGRESSING SLOWER THAN EXPECTED

## 2020-02-19 NOTE — PROGRESS NOTES
Received report from night nurse at the bedside. Assume care of Pt at 0700. Assessment completed. LOPE[facility] orientation. Wound photos taken. Dressing in place to sacrum, wound photo taken, photos also taken of scab to left upper chest and to left neck. Q 2 turns in place, heel float boots on, pt on low air loss mattress. Lines in place include, rectal tube which was flushed with 180 ml of tap water through the irrigation port, hernandez catheter, trache,PEG, PICC and dialysis catheter. Pt in bed, bed in lowest position, call light in place, treaded slipper socks on.

## 2020-02-19 NOTE — PROGRESS NOTES
Hospital Medicine Daily Progress Note    Date of Service  2/18/2020    Chief Complaint  Altered mental status    Hospital Course    74 y.o. female who presented 1/16/2020 with a past medical history significant for end-stage renal disease on hemodialysis (followed by Dr. Cavazos) since November 2019 M/W/F, hypertension, hyperlipidemia and coronary artery disease.  She was recently admitted to [facility] for an extended period of time followed by placement in a SNF.  She has been very frail requiring a hoist to get her in and out of the dialysis chair with failure to thrive.  On January 14 while at the SNF patient had a witnessed seizure, with no history previously.  She was transferred to the emergency department where she had a second seizure with a prolonged postictal state.  She was intubated for airway protection and started on a propofol drip in the emergency department on January 14 at [facility].  She was admitted to the intensive care unit where she underwent an MRI of her brain showing an acute right parietal lacunar infarct with overall brain parenchymal loss.  Neurology was consulted and patient was loaded with Keppra and continued on the propofol drip. Despite this, EEGs continued to show evidence of focal seizure and Dilantin was started.  She underwent a lumbar puncture with unremarkable CSF and was being treated empirically with acyclovir, ampicillin, Rocephin, vancomycin and Decadron.  Given her persistent abnormal EEGs, the neurologist at the outside hospital recommend patient be transferred to a facility that can provide continuous EEG monitoring.  For this reason she was transferred to [facility].  She was trached on 2/2 and came off vent on 2/5, but remained unresponsive.  She is continued on Vimpat, Topamax, and Depakote.  EEG showed no seizures. Neurology was consulted, who felt that likelihood of patient returning to baseline was low, and that the  likelihood of the patient returning to full independent function was essentially 0.  Family has apparently not wanted to discuss changing CODE STATUS nor transitioning to comfort care and patient remains a full code.  She remains unresponsive. LTACH is being pursued.  PEG tube placement was desired by the family, and GI was consulted who then had PEG placed endoscopically.         Interval Problem Update  2/18: Patient was seen and examined by me today.  Found to be not interactive on examination today.  She does have a slight leukocytosis and will continue to trend this.    Consultants/Specialty  Intensivist  Nephrology  Neurology  Palliative Care  GI    Code Status  FULL    Disposition  Monitor on the neurology floors. LTACH placement.       Review of Systems  ROS     Unable to obtain due to vegetative state/mental status.       Physical Exam  Temp:  [36.3 °C (97.4 °F)-37.9 °C (100.3 °F)] 37.9 °C (100.3 °F)  Pulse:  [66-76] 76  Resp:  [16-20] 18  BP: (113-158)/(43-55) 119/43  SpO2:  [96 %-99 %] 96 %    Physical Exam  Vitals signs reviewed.   Constitutional:       General: She is not in acute distress.     Appearance: Normal appearance. She is normal weight. She is not ill-appearing or diaphoretic.   HENT:      Head: Normocephalic and atraumatic.      Right Ear: External ear normal.      Left Ear: External ear normal.      Nose: Nose normal.      Mouth/Throat:      Mouth: Mucous membranes are moist.      Pharynx: No oropharyngeal exudate or posterior oropharyngeal erythema.   Eyes:      General: No scleral icterus.     Extraocular Movements: Extraocular movements intact.      Conjunctiva/sclera: Conjunctivae normal.      Pupils: Pupils are equal, round, and reactive to light.   Neck:      Musculoskeletal: Normal range of motion and neck supple. No neck rigidity or muscular tenderness.      Comments: Trach in place  Cardiovascular:      Rate and Rhythm: Normal rate and regular rhythm.      Heart sounds: Normal heart  sounds. No murmur.   Pulmonary:      Effort: Pulmonary effort is normal. No respiratory distress.      Breath sounds: Normal breath sounds. No stridor. No wheezing, rhonchi or rales.   Chest:      Chest wall: No tenderness.   Abdominal:      General: Bowel sounds are normal. There is no distension.      Palpations: Abdomen is soft. There is no mass.      Tenderness: There is no abdominal tenderness. There is no guarding or rebound.      Comments: PEG now in place   Musculoskeletal: Normal range of motion.         General: No swelling.      Right lower leg: No edema.      Left lower leg: No edema.   Lymphadenopathy:      Cervical: No cervical adenopathy.   Skin:     General: Skin is warm and dry.      Coloration: Skin is not jaundiced.      Findings: No rash.   Neurological:      Cranial Nerves: No cranial nerve deficit.      Comments: No changes in mentation  Eyes opens, and stares, but does not track. Non verbal, and unresponsive. Does not interact.    Psychiatric:      Comments: Unable to assess due to mentation           Fluids    Intake/Output Summary (Last 24 hours) at 2/18/2020 1628  Last data filed at 2/18/2020 1100  Gross per 24 hour   Intake 1000 ml   Output 900 ml   Net 100 ml       Laboratory      Recent Labs     02/17/20  0330 02/18/20  0257   SODIUM 136 138   POTASSIUM 3.3* 3.7   CHLORIDE 98 101   CO2 27 28   GLUCOSE 169* 179*   BUN 76* 41*   CREATININE 1.77* 1.08   CALCIUM 8.7 8.4*                   Imaging  US-ABDOMEN LTD (SOFT TISSUE)   Final Result         1. Trace free fluid in the pelvis. No abdominal ascites.      DX-ABDOMEN FOR TUBE PLACEMENT   Final Result         Feeding tube with tip projecting over the expected area of the stomach.      [id number] VIEW   Final Result      Feeding tube tip overlies the gastric antrum.      DX-CHEST-PORTABLE (1 VIEW)   Final Result         1.  Pulmonary edema and/or infiltrates are identified, which are stable since the prior exam.   2.  Cardiomegaly   3.   Atherosclerosis      DX-CHEST-PORTABLE (1 VIEW)   Final Result         1.  Pulmonary edema and/or infiltrates are identified, which are stable since the prior exam.   2.  Cardiomegaly   3.  Atherosclerosis      DX-CHEST-PORTABLE (1 VIEW)   Final Result      1.  Unchanged left lower lobe atelectasis or pneumonia.      2.  Clear right lung.      DX-CHEST-PORTABLE (1 VIEW)   Final Result      Unchanged LEFT basilar atelectasis and/or airspace disease      DX-CHEST-PORTABLE (1 VIEW)   Final Result      Left basilar atelectasis, hilar and cardiac silhouette enlargement as before      DX-CHEST-PORTABLE (1 VIEW)   Final Result      Interval removal of a left subclavian central line. Stable patchy bilateral infiltrates.      IR-PICC LINE PLACEMENT W/ GUIDANCE > AGE 5   Final Result                  Ultrasound-guided PICC placement performed by qualified nursing staff as    above.          DX-CHEST-PORTABLE (1 VIEW)   Final Result      1.  Multiple support devices present.      2.  Patchy bilateral atelectasis.      DX-CHEST-PORTABLE (1 VIEW)   Final Result      Stable chest with retrocardiac opacity from atelectasis and/or pleural fluid favored over consolidation      DX-CHEST-PORTABLE (1 VIEW)   Final Result      Stable chest with retrocardiac opacity from atelectasis and/or pleural fluid favored over consolidation      DX-CHEST-PORTABLE (1 VIEW)   Final Result         No significant change from prior.               DX-CHEST-PORTABLE (1 VIEW)   Final Result         1. Stable lines and tubes..   2. Stable retrocardiac and left perihilar atelectasis versus consolidation. Suspected small left pleural effusion.            DX-CHEST-PORTABLE (1 VIEW)   Final Result         1. Stable lines and tubes..   2. Stable retrocardiac atelectasis versus consolidation with increased left perihilar opacity. Suspected trace left pleural effusion.         [id number] VIEW   Final Result      1.  Enteric tube has been placed and the tip  projects over the stomach.      2.  Pre-existing feeding tube tip projects at the gastroduodenal junction      DX-CHEST-PORTABLE (1 VIEW)   Final Result         1. Lines and tubes as above.   2. Stable retrocardiac atelectasis versus consolidation. Suspected trace left pleural effusion.         MR-BRAIN-WITH & W/O   Final Result      1.  Moderate cerebral atrophy.   2.  Evidence of intraventricular hemorrhage, indeterminate age. Possibly chronic intraventricular hemosiderin deposition.   3.  Extensive encephalomalacic change with hemosiderin deposition involving the right corona radiata, basal ganglia, posterior thalamus, subthalamic region, bordering the right cerebral peduncle. Associated ex vacuo dilatation of the body of the right    lateral ventricle. No change.   4.  Additional foci of old microhemorrhage most consistent with old hypertensive microhemorrhage or amyloid angiopathy in the left basal ganglia, left thalamus, and midline upper ventral abel.   5.  Punctate focus of acute infarction in the right parietal deep white matter. No change.   6.  Advanced supratentorial white matter disease most consistent with microvascular ischemic change.   7.  Encephalomalacic changes in the abel and right cerebral peduncle consistent with old infarction.   8.  Partially empty sella.   9.  Overall, no new findings and no significant change from 1/14/2020.      DX-CHEST-PORTABLE (1 VIEW)   Final Result         1. Stable lines and tubes.   2. Unchanged retrocardiac atelectasis versus consolidation.   3. Stable trace left pleural effusion.         OUTSIDE IMAGES-DX CHEST   Final Result      OUTSIDE IMAGES-US VASCULAR   Final Result      OUTSIDE IMAGES-MR BRAIN   Final Result      OUTSIDE IMAGES-DX CHEST   Final Result      OUTSIDE IMAGES-CT HEAD   Final Result      EC-ECHOCARDIOGRAM COMPLETE W/O CONT   Final Result      DX-CHEST-FOR LINE PLACEMENT Perform procedure in: Patient's Room   Final Result         1.  Retrocardiac  opacity concerning for infiltrate, stable.   2.  Trace left pleural effusion, stable   3.  Cardiomegaly   4.  Atherosclerosis   5.  Perihilar interstitial prominence and bronchial wall cuffing, appearance suggests changes of underlying bronchial inflammation, consider bronchitis.      DX-ABDOMEN FOR TUBE PLACEMENT   Final Result         1.  Nonspecific bowel gas pattern.   2.  Dobbhoff tube tip overlying the expected location of the pylorus or first duodenal segment.   3.  Left lung base atelectasis and/or small effusion      DX-CHEST-PORTABLE (1 VIEW)   Final Result         1.  Retrocardiac opacity concerning for infiltrate.   2.  Trace left pleural effusion, stable   3.  Cardiomegaly   4.  Atherosclerosis      [id number] FILM X-RAY   Final Result      OUTSIDE IMAGES-DX CHEST   Final Result           Assessment/Plan  * Status epilepticus (HCC)- (present on admission)  Assessment & Plan  -Was intubated for airway protection, now trached since 2/2.   -Continuing vimpat, topamax, depakote.  Repeat EEG here showed no seizures. Neurology gave the opinion that likelihood of patient returning to baseline was low, and that the likelihood of the patient returning to full independent function was essentially 0.    Acute respiratory failure with hypoxia (HCC)- (present on admission)  Assessment & Plan  - stable on trach/T-piece.   - continue RT protocol, O2 supplement.  -Continue hemodialysis to prevent fluid overload.    Hypokalemia  Assessment & Plan  -Replace with enteral K-Dur.  BMP in the morning.    Severe protein-calorie malnutrition (HCC)- (present on admission)  Assessment & Plan  -Continue tube feeding.    Cerebrovascular accident (CVA) due to embolism of right middle cerebral artery (HCC)- (present on admission)  Assessment & Plan  -Continue statin and aspirin.    -No PT/OT needs as unable to participate, and likely at her new baseline.      End stage renal failure on dialysis (HCC)- (present on  admission)  Assessment & Plan  -Continue hemodialysis MWF.  Nephrology on board.  - Likely permanent; poor prognosis per nephrology, they recommend comfort care.  - avoid nephrotoxins, and continue to renally dose all medications.    Normocytic anemia- (present on admission)  Assessment & Plan  -Likely anemia of chronic kidney disease.  FOBT is negative.   -Hgb stable.   -Continue to trend H&H, transfuse if drops below 7.      Hypertension- (present on admission)  Assessment & Plan  -Maintaining good control.  -Continue hydralazine, cardura, coreg (home meds).  Monitor blood pressure trend closely.        DM (diabetes mellitus) (HCC)- (present on admission)  Assessment & Plan  -No need for coverage.  Continue to monitor.     Goals of care, counseling/discussion- (present on admission)  Assessment & Plan  - There have been multiple discussions with palliative/treatment team and family, to no avail. Patient remains full code, has trach and PEG.    Pressure injury of sacrum, coccyx, stage 3 (HCC)- (present on admission)  Assessment & Plan  -Wound care per wound service. Continue pressure offloading strategies.    Hypomagnesemia- (present on admission)  Assessment & Plan  -Continue to monitor.     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 Maintain on aspiration precautions.    Gout- (present on admission)  Assessment & Plan  -Continue with allopurinol per the PEG tube.       VTE prophylaxis: SCDs

## 2020-02-19 NOTE — PROGRESS NOTES
Pharmacy Pharmacotherapy Consult for LOS >30 days    Admit Date: 1/16/2020      Medications were reviewed for appropriateness and ongoing need.     Current Facility-Administered Medications   Medication Dose Route Frequency Provider Last Rate Last Dose   • heparin injection 5,000 Units  5,000 Units Subcutaneous Q12HRS Kojo Alanis M.D.   5,000 Units at 02/19/20 0428   • doxazosin (CARDURA) tablet 6 mg  6 mg Per G Tube QHS MIKE Garcia   6 mg at 02/18/20 2018   • aspirin (ASA) chewable tab 81 mg  81 mg Enteral Tube DAILY Kojo Alanis M.D.   81 mg at 02/19/20 0428   • carvedilol (COREG) tablet 25 mg  25 mg Enteral Tube BID Kojo Alanis M.D.   25 mg at 02/19/20 0428   • hydrALAZINE (APRESOLINE) tablet 100 mg  100 mg Enteral Tube TID Shannan Guzman M.D.   100 mg at 02/19/20 0427   • omeprazole (FIRST-OMEPRAZOLE) 2 mg/mL oral susp 40 mg  40 mg Enteral Tube Q12HRS Shannan Guzman M.D.   40 mg at 02/19/20 0428   • divalproex (DEPAKOTE SPRINKLE) capsule 500 mg  500 mg Enteral Tube Q12HRS Fletcher Quiroz M.D.   500 mg at 02/19/20 0427   • albumin human 25% solution 25 g  25 g Intravenous ACUTE DIALYSIS PRN Saima Meneses M.D. 150 mL/hr at 02/17/20 0937 25 g at 02/17/20 0937   • lacosamide (VIMPAT) tablet 300 mg  300 mg Enteral Tube BID Sergei Uriarte M.D.   300 mg at 02/19/20 0427   • carboxymethylcellulose (REFRESH TEARS) 0.5 % ophthalmic drops 1 Drop  1 Drop Both Eyes Q2HRS PRN Tanner Louis M.D.   1 Drop at 01/31/20 0546   • vitamin B comp+C (ALLBEE WITH C) 1 Tab  1 Tab Enteral Tube DAILY Anurag Hein D.O.   1 Tab at 02/19/20 0427   • folic acid (FOLVITE) tablet 1 mg  1 mg Enteral Tube DAILY Anurag Hein D.O.   1 mg at 02/19/20 0427   • allopurinol (ZYLOPRIM) tablet 100 mg  100 mg Enteral Tube DAILY Anurag Hein D.O.   100 mg at 02/19/20 0428   • nystatin (MYCOSTATIN) powder   Topical BID ALONSO Giordano.O.       • hydrALAZINE (APRESOLINE) injection 10 mg  10 mg Intravenous Q4HRS PRN  Anurag Hein D.O.   10 mg at 02/10/20 0424   • topiramate (TOPAMAX) tablet 200 mg  200 mg Enteral Tube Q12HRS Catracho Bustos M.D.   200 mg at 02/19/20 0427   • atorvastatin (LIPITOR) tablet 40 mg  40 mg Enteral Tube DAILY Josep You M.D.   40 mg at 02/19/20 0427   • heparin injection 3,300 Units  3,300 Units Intracatheter PRN Lydia Carcamo M.D.   3,300 Units at 02/17/20 1237   • epoetin antoinette (EPOGEN/PROCRIT) injection 4,000 Units  4,000 Units Subcutaneous MO, WE + FR Payam Cavazos M.D.   4,000 Units at 02/17/20 1011   • Respiratory Care per Protocol   Nebulization Continuous RT Jeremy M Gonda, M.D.       • ipratropium-albuterol (DUONEB) nebulizer solution  3 mL Nebulization Q2HRS PRN (RT) Jeremy M Gonda, M.D.       • senna-docusate (PERICOLACE or SENOKOT S) 8.6-50 MG per tablet 2 Tab  2 Tab Enteral Tube BID Jeremy M Gonda, M.D.   2 Tab at 02/19/20 0428    And   • polyethylene glycol/lytes (MIRALAX) PACKET 1 Packet  1 Packet Enteral Tube QDAY PRN Jeremy M Gonda, M.D.        And   • bisacodyl (DULCOLAX) suppository 10 mg  10 mg Rectal QDAY PRN Jeremy M Gonda, M.D.       • Pharmacy Consult: Enteral tube insertion - review meds/change route/product selection  1 Each Other PHARMACY TO DOSE Jeremy M Gonda, M.D.       • labetalol (NORMODYNE/TRANDATE) injection 10 mg  10 mg Intravenous Q4HRS PRN Rina Matta M.D.   10 mg at 02/10/20 0026   • acetaminophen (TYLENOL) tablet 650 mg  650 mg Enteral Tube Q6HRS PRN Jeremy M Gonda, M.D.   650 mg at 02/18/20 1145       Recommendations:     Medication reviewed. No recommendations needed at this time. Continue current regimen.       Selina Borwn, Pharmacy Intern

## 2020-02-19 NOTE — DISCHARGE PLANNING
Anticipated Discharge Disposition:   LTACH    Action:   RN CM left voice messages for patient's daughter, Belen, concerning facility selection.  RN CM sent blanket referral for LTACH.    Barriers to Discharge:   LTACH acceptance      Plan:   RN CM faxed blanket choice to [place]

## 2020-02-19 NOTE — PROGRESS NOTES
Two RN skin check performed with MIC Wilson    Bruising noted to both forearms  Bruise/discoloration noted to right ankle  Scab and excoriation to right under neck tie (foam in place)  Pressure ulcer to coccyx with scant purulent serosanguineous drainage mepilex in place  Generalized blanching redness to perineum    Q2 turns  Mepilex  Barrier cream to perineum  Pillows used to float heels and elbows  Heel float boots

## 2020-02-19 NOTE — PROGRESS NOTES
[facility] Nephrology Progress Note    Date of Service  2/19/2020    AUTHOR: Tanner Ng M.D.  Supervising Physician: Dr Tanner Ng     Chief Complaint   Follow up ESRD    HPI  74 y.o. female admitted to [facility] on 1/14/20 with seizures.She was at a SNF.She had been previously hospitalized at [facility] and diaysis was initiated.  She was doing very poorly then and palliative care was discussed with the family,but they declined.She had very poor functional status and required Jimena lift to get into the dialysis chair.She was found to have  a CVA on MRI at [facility].Her seizures were not controlled and it was felt she was in status epilepticus.  She was getting HD at [facility].Last HD was on 1/13/20.She was seen by Dr Carcamo at [facility].Creatinine was 1.8 and dialysis was held. Neurology [place] that the patient needed continuous EEG monitoring and he was transferred to [facility]    DAILY NEPHROLOGY SUMMARY:  Please see note from 1/31 for prior summaries  2/01 - NAEO, another EEG being done this AM  2/02 - NAEO, scheduled for Trach today  2/03 - No events, underwent trach yesterday, no change in neuro status, BP stable overnight but low this am, given midodrine, to have HD today  2/04 - No events, tolerated HD yest with 2.1L UF, BP stable this am  2/05 - No events, BP low prior to starting HD, given albumin at the start of HD and BP stable at this time  2/06 - No events, tolerated HD yesterday with 2.5L UF, BP stable and high at times, still with dependent edema  2/07 - No events, tolerated PUF yesterday with 2.5L UF and did not require midodrine with treatment, BP stable today, still with dependent edema, to have HD today  2/08 - No overnight events or changes.  On TF at goal via NGT.  No neurologic change.  On vent/trach.   2/09 - No significant change.  No events.  Liquid stool from rectal tube.  On Vent/Trach  2/10-  No new overnight renal events. +trach.  2/11- S/p HD yesterday with net UF of 2 L.   2/12- No new  overnight renal events. HD today.  2/13 - HD yesterday with 1.7L net UF, plan for PEG placement Saturday, LTACH placement pending, some hypotension   2/14 - On HD.  2/15 - Non communicative, HD yesterday 700cc UF  2/17 - Seen on HD today. 's. No new events.   2/19- Non verbal, nothing reported    Review of Systems   Unable to perform ROS: Acuity of condition         Physical Exam  Temp:  [36.6 °C (97.8 °F)-37.9 °C (100.3 °F)] 36.6 °C (97.8 °F)  Pulse:  [69-76] 73  Resp:  [16-20] 16  BP: (117-152)/(43-55) 117/47  SpO2:  [93 %-100 %] 93 %    Physical Exam  Vitals signs and nursing note reviewed.   Constitutional:       General: She is not in acute distress.     Appearance: She is ill-appearing.   HENT:      Head: Normocephalic and atraumatic.      Right Ear: External ear normal.      Left Ear: External ear normal.      Nose: Nose normal. No congestion.      Mouth/Throat:      Mouth: Mucous membranes are moist.      Pharynx: No oropharyngeal exudate.   Eyes:      General: No scleral icterus.     Conjunctiva/sclera: Conjunctivae normal.      Pupils: Pupils are equal, round, and reactive to light.   Neck:      Musculoskeletal: Neck supple.      Comments: +Trach  Cardiovascular:      Rate and Rhythm: Normal rate and regular rhythm.      Heart sounds: Normal heart sounds.   Pulmonary:      Effort: Pulmonary effort is normal. No respiratory distress.      Breath sounds: Normal breath sounds.      Comments: T-piece, supp O2   Chest:      Chest wall: No tenderness.   Abdominal:      General: Bowel sounds are normal. There is no distension.      Palpations: Abdomen is soft.      Comments: Enteral nutrition    Genitourinary:     Comments: BMS  Musculoskeletal:         General: No swelling, tenderness or signs of injury.      Comments: +dependent edema   Lymphadenopathy:      Cervical: No cervical adenopathy.   Skin:     General: Skin is warm and dry.      Findings: No rash.      Comments: Heel float boots     Neurological:      Comments: Somnolent, opens eyes to painful stimuli, withdraws to painful stimuli   Psychiatric:      Comments: Unable to assess       Fluids    Intake/Output Summary (Last 24 hours) at 2/19/2020 0949  Last data filed at 2/19/2020 0738  Gross per 24 hour   Intake 1170 ml   Output 400 ml   Net 770 ml     Laboratory  Recent Labs     02/19/20  0357   WBC 13.8*   RBC 2.71*   HEMOGLOBIN 8.8*   HEMATOCRIT 28.2*   .1*   MCH 32.5   MCHC 31.2*   RDW 66.9*   PLATELETCT 265   MPV 9.4     Recent Labs     02/17/20  0330 02/18/20  0257   SODIUM 136 138   POTASSIUM 3.3* 3.7   CHLORIDE 98 101   CO2 27 28   GLUCOSE 169* 179*   BUN 76* 41*   CREATININE 1.77* 1.08   CALCIUM 8.7 8.4*     IMPRESSION:  # ESRD   Dialysis today    Maintenance dialysis qMWF as OP at [facility]   mCrCl 6 by 24 hour urine - repeat study 2/13 mCrCl 4   Dialysis Dependent                # Status epilepticus  # S/P acute lacunar infarct   Hx of previous CVA with significant deficits.  # HTN--BP variable often with intradialytic hypotension  # DM II  # Acute Hypoxic Respiratory Failure   CXR 3/3 still w/ pulm edema/infiltrates  # Hx of dysphagia  # Anemia of CKD.Ferritin previously significantly elevated. CAT with HD.  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    PLAN:    Maintenance dialysis qMWF and PRN.   UF w/ HD as tolerated.   Seizure management per Neurology   Enteral feedings   No dietary protein restrictions   Follow labs   Continue GOC discussions with family   Very poor prognosis, recommend comfort care, if and when family agreeable   Will see on MWF. Please call on other days w/ questions or concerns    Thank you,

## 2020-02-19 NOTE — CARE PLAN
Problem: Venous Thromboembolism (VTW)/Deep Vein Thrombosis (DVT) Prevention:  Goal: Patient will participate in Venous Thrombosis (VTE)/Deep Vein Thrombosis (DVT)Prevention Measures  Outcome: PROGRESSING AS EXPECTED  Intervention: Ensure patient wears graduated elastic stockings (JUAN FRANCISCO hose) and/or SCDs, if ordered, when in bed or chair (Remove at least once per shift for skin check)  Note: SCDs are in place      Problem: Skin Integrity  Goal: Risk for impaired skin integrity will decrease  Outcome: PROGRESSING SLOWER THAN EXPECTED  Intervention: Assess risk factors for impaired skin integrity and/or pressure ulcers  Note: Pt has pressure ulcer to sacrum.  Intervention: Implement precautions to protect skin integrity in collaboration with the interdisciplinary team  Note: Q 2 turns are in place, heel float boots on, low air loss mattress in use, pillows in use for support and positioning, barrier cream, rectal tube, and hernandez in use.

## 2020-02-19 NOTE — DISCHARGE PLANNING
Received Choice form at 1130  Agency/Facility Name: [facility]  Referral sent per Choice form @ 1177

## 2020-02-19 NOTE — WOUND TEAM
[facility]  Inpatient Services  Wound and Skin Care Progress Note    Admission Date:  1/16/2020   HPI, PMH, SH: Reviewed  Unit where seen by Wound Team: S196/02    WOUND TEAM FOLLOW UP: Sacrum    Self Report / Pain Level:no s/s of distress      OBJECTIVE: River & BMS in place. On NUBIA. Heel float boots. Split gauze under trach. Sacral Mepilex.     WOUND TYPE, LOCATION, CHARACTERISTICS (Pressure ulcers: location, stage, POA or date identified)     Pressure Injury 01/16/20 Sacrum;Coccyx stage 3 POA  (Active)   Wound Image   2/19/2020   Pressure Injury Stage 3    State of Healing Early/partial granulation    Site Assessment Red;Yellow    Periwound Assessment Red;Blanchable erythema;Scar tissue    Margins Defined edges    Wound Length (cm) 2 cm    Wound Width (cm) 3 cm    Wound Depth (cm) 0.1 cm    Wound Surface Area (cm^2) 6 cm^2    Wound Volume (cm^3) 0.6 cm^3    Tunneling (cm) 0 cm    Undermining (cm) 0 cm    Closure Secondary intention    Drainage Amount Scant    Drainage Description Serosanguineous    Non-staged Wound Description Not applicable    Treatments Cleansed;Site care    Wound Cleansing Normal Saline Irrigation    Periwound Protectant Viscopaste;Barrier Paste    Dressing Options Mepilex    Dressing Cleansing/Solutions Not Applicable    Dressing Changed Changed    Dressing Status Intact    Dressing Change/Treatment Frequency Every 48 hrs, and As Needed    NEXT Dressing Change/Treatment Date 02/21/20    NEXT Weekly Photo (Inpatient Only) 02/26/20    Wound Odor None    Exposed Structures None    WOUND NURSE ONLY - Tissue Type and Percentage 95% Pink/Red, 25% yellow      Moisture Associated Skin Damage (Active)   Wound Image   2/19/2020    NEXT Weekly Photo (Inpatient Only) 02/26/20    Drainage Amount None    Periwound Assessment Red;Pink;Blanchable erythema    IAD Cleansing Soap and Water    Periwound Protectant Barrier Paste;Viscopaste    IAD Containment Device Indwelling Catheter;Bowel  Management System        Lab Values:    Lab Results   Component Value Date/Time    WBC 13.8 (H) 02/19/2020 03:57 AM    RBC 2.71 (L) 02/19/2020 03:57 AM    HEMOGLOBIN 8.8 (L) 02/19/2020 03:57 AM    HEMATOCRIT 28.2 (L) 02/19/2020 03:57 AM        Results from last 7 days   Lab Units 02/17/20 2045   C REACTIVE PROTEIN 4596 mg/dL 4.28*           Lab Results   Component Value Date/Time    HBA1C 7.5 (H) 02/07/2019 01:20 PM         INTERVENTIONS BY WOUND TEAM: Pt turned to L side. Cleansed wound with NS. Barrier paste and viscopaste to wound bed, covered with mepilex. Pt repositioned.     Interdisciplinary consultation:  Pt, Primary RN, [facility] Nursing Student.    EVALUATION: Pt's moisture associated skin damage still has satellite lesions, resolving. Sacrococcygeal St 3 still present, more pink less yellow. Pt has BMS in place.     Goals:  Slow steady decrease in wound area and depth weekly    NURSING PLAN OF CARE:    Dressing changes: Continue previous Dressing Maintenance orders:   x     See new Dressing Maintenance orders:       Skin care: See Skin Care orders:        Rectal tube care: See Rectal Tube Care orders:    x  Other orders:           WOUND TEAM PLAN OF CARE (X):   NPWT change 3 x week:        Dressing changes:       Follow up as needed:  x     Other:    Anticipated discharge plans (X):  SNF:           Home Care:           Outpatient Wound Center:            Self Care:            Other: will need wound care  - Awaiting LTACH placement

## 2020-02-19 NOTE — PROGRESS NOTES
Hospital Medicine Daily Progress Note    Date of Service  2/19/2020    Chief Complaint  Altered mental status    Hospital Course    74 y.o. female who presented 1/16/2020 with a past medical history significant for end-stage renal disease on hemodialysis (followed by Dr. Cavazos) since November 2019 M/W/F, hypertension, hyperlipidemia and coronary artery disease.  She was recently admitted to [facility] for an extended period of time followed by placement in a SNF.  She has been very frail requiring a hoist to get her in and out of the dialysis chair with failure to thrive.  On January 14 while at the SNF patient had a witnessed seizure, with no history previously.  She was transferred to the emergency department where she had a second seizure with a prolonged postictal state.  She was intubated for airway protection and started on a propofol drip in the emergency department on January 14 at [facility].  She was admitted to the intensive care unit where she underwent an MRI of her brain showing an acute right parietal lacunar infarct with overall brain parenchymal loss.  Neurology was consulted and patient was loaded with Keppra and continued on the propofol drip. Despite this, EEGs continued to show evidence of focal seizure and Dilantin was started.  She underwent a lumbar puncture with unremarkable CSF and was being treated empirically with acyclovir, ampicillin, Rocephin, vancomycin and Decadron.  Given her persistent abnormal EEGs, the neurologist at the outside hospital recommend patient be transferred to a facility that can provide continuous EEG monitoring.  For this reason she was transferred to [facility].  She was trached on 2/2 and came off vent on 2/5, but remained unresponsive.  She is continued on Vimpat, Topamax, and Depakote.  EEG showed no seizures. Neurology was consulted, who felt that likelihood of patient returning to baseline was low, and that the  likelihood of the patient returning to full independent function was essentially 0.  Family has apparently not wanted to discuss changing CODE STATUS nor transitioning to comfort care and patient remains a full code.  She remains unresponsive. LTACH is being pursued.  PEG tube placement was desired by the family, and GI was consulted who then had PEG placed endoscopically.         Interval Problem Update  2/18: Patient was seen and examined by me today.  Found to be not interactive on examination today.  She does have a slight leukocytosis and will continue to trend this.  2/19: Patient open her eyes on examination today.  WBC count is decreasing since yesterday and will continue to monitor this.  She will undergo dialysis session today.    Consultants/Specialty  Intensivist  Nephrology  Neurology  Palliative Care  GI    Code Status  FULL    Disposition  Monitor on the neurology floors. LTACH placement.       Review of Systems  ROS     Unable to obtain due to vegetative state/mental status.       Physical Exam  Temp:  [36.6 °C (97.8 °F)-37.6 °C (99.7 °F)] 36.7 °C (98 °F)  Pulse:  [69-76] 74  Resp:  [16-18] 16  BP: (117-152)/(47-55) 138/53  SpO2:  [93 %-100 %] 96 %    Physical Exam  Vitals signs reviewed.   Constitutional:       General: She is not in acute distress.     Appearance: Normal appearance. She is normal weight. She is not ill-appearing or diaphoretic.   HENT:      Head: Normocephalic and atraumatic.      Right Ear: External ear normal.      Left Ear: External ear normal.      Nose: Nose normal.      Mouth/Throat:      Mouth: Mucous membranes are moist.      Pharynx: No oropharyngeal exudate or posterior oropharyngeal erythema.   Eyes:      General: No scleral icterus.     Extraocular Movements: Extraocular movements intact.      Conjunctiva/sclera: Conjunctivae normal.      Pupils: Pupils are equal, round, and reactive to light.   Neck:      Musculoskeletal: Normal range of motion and neck supple. No neck  rigidity or muscular tenderness.      Comments: Trach in place  Cardiovascular:      Rate and Rhythm: Normal rate and regular rhythm.      Heart sounds: Normal heart sounds. No murmur.   Pulmonary:      Effort: Pulmonary effort is normal. No respiratory distress.      Breath sounds: Normal breath sounds. No stridor. No wheezing, rhonchi or rales.   Chest:      Chest wall: No tenderness.   Abdominal:      General: Bowel sounds are normal. There is no distension.      Palpations: Abdomen is soft. There is no mass.      Tenderness: There is no abdominal tenderness. There is no guarding or rebound.      Comments: PEG now in place   Musculoskeletal: Normal range of motion.         General: No swelling.      Right lower leg: No edema.      Left lower leg: No edema.   Lymphadenopathy:      Cervical: No cervical adenopathy.   Skin:     General: Skin is warm and dry.      Coloration: Skin is not jaundiced.      Findings: No rash.   Neurological:      Cranial Nerves: No cranial nerve deficit.      Comments: No changes in mentation  Eyes opens, and stares, but does not track. Non verbal, and unresponsive. Does not interact.    Psychiatric:      Comments: Unable to assess due to mentation           Fluids    Intake/Output Summary (Last 24 hours) at 2/19/2020 1443  Last data filed at 2/19/2020 0738  Gross per 24 hour   Intake 890 ml   Output 400 ml   Net 490 ml       Laboratory  Recent Labs     02/19/20  0357   WBC 13.8*   RBC 2.71*   HEMOGLOBIN 8.8*   HEMATOCRIT 28.2*   .1*   MCH 32.5   MCHC 31.2*   RDW 66.9*   PLATELETCT 265   MPV 9.4     Recent Labs     02/17/20  0330 02/18/20  0257 02/19/20  1200   SODIUM 136 138 133*   POTASSIUM 3.3* 3.7 3.8   CHLORIDE 98 101 96   CO2 27 28 27   GLUCOSE 169* 179* 208*   BUN 76* 41* 73*   CREATININE 1.77* 1.08 1.69*   CALCIUM 8.7 8.4* 8.6                   Imaging  US-ABDOMEN LTD (SOFT TISSUE)   Final Result         1. Trace free fluid in the pelvis. No abdominal ascites.       DX-ABDOMEN FOR TUBE PLACEMENT   Final Result         Feeding tube with tip projecting over the expected area of the stomach.      [id number] VIEW   Final Result      Feeding tube tip overlies the gastric antrum.      DX-CHEST-PORTABLE (1 VIEW)   Final Result         1.  Pulmonary edema and/or infiltrates are identified, which are stable since the prior exam.   2.  Cardiomegaly   3.  Atherosclerosis      DX-CHEST-PORTABLE (1 VIEW)   Final Result         1.  Pulmonary edema and/or infiltrates are identified, which are stable since the prior exam.   2.  Cardiomegaly   3.  Atherosclerosis      DX-CHEST-PORTABLE (1 VIEW)   Final Result      1.  Unchanged left lower lobe atelectasis or pneumonia.      2.  Clear right lung.      DX-CHEST-PORTABLE (1 VIEW)   Final Result      Unchanged LEFT basilar atelectasis and/or airspace disease      DX-CHEST-PORTABLE (1 VIEW)   Final Result      Left basilar atelectasis, hilar and cardiac silhouette enlargement as before      DX-CHEST-PORTABLE (1 VIEW)   Final Result      Interval removal of a left subclavian central line. Stable patchy bilateral infiltrates.      IR-PICC LINE PLACEMENT W/ GUIDANCE > AGE 5   Final Result                  Ultrasound-guided PICC placement performed by qualified nursing staff as    above.          DX-CHEST-PORTABLE (1 VIEW)   Final Result      1.  Multiple support devices present.      2.  Patchy bilateral atelectasis.      DX-CHEST-PORTABLE (1 VIEW)   Final Result      Stable chest with retrocardiac opacity from atelectasis and/or pleural fluid favored over consolidation      DX-CHEST-PORTABLE (1 VIEW)   Final Result      Stable chest with retrocardiac opacity from atelectasis and/or pleural fluid favored over consolidation      DX-CHEST-PORTABLE (1 VIEW)   Final Result         No significant change from prior.               DX-CHEST-PORTABLE (1 VIEW)   Final Result         1. Stable lines and tubes..   2. Stable retrocardiac and left perihilar  atelectasis versus consolidation. Suspected small left pleural effusion.            DX-CHEST-PORTABLE (1 VIEW)   Final Result         1. Stable lines and tubes..   2. Stable retrocardiac atelectasis versus consolidation with increased left perihilar opacity. Suspected trace left pleural effusion.         [id number] VIEW   Final Result      1.  Enteric tube has been placed and the tip projects over the stomach.      2.  Pre-existing feeding tube tip projects at the gastroduodenal junction      DX-CHEST-PORTABLE (1 VIEW)   Final Result         1. Lines and tubes as above.   2. Stable retrocardiac atelectasis versus consolidation. Suspected trace left pleural effusion.         MR-BRAIN-WITH & W/O   Final Result      1.  Moderate cerebral atrophy.   2.  Evidence of intraventricular hemorrhage, indeterminate age. Possibly chronic intraventricular hemosiderin deposition.   3.  Extensive encephalomalacic change with hemosiderin deposition involving the right corona radiata, basal ganglia, posterior thalamus, subthalamic region, bordering the right cerebral peduncle. Associated ex vacuo dilatation of the body of the right    lateral ventricle. No change.   4.  Additional foci of old microhemorrhage most consistent with old hypertensive microhemorrhage or amyloid angiopathy in the left basal ganglia, left thalamus, and midline upper ventral abel.   5.  Punctate focus of acute infarction in the right parietal deep white matter. No change.   6.  Advanced supratentorial white matter disease most consistent with microvascular ischemic change.   7.  Encephalomalacic changes in the abel and right cerebral peduncle consistent with old infarction.   8.  Partially empty sella.   9.  Overall, no new findings and no significant change from 1/14/2020.      DX-CHEST-PORTABLE (1 VIEW)   Final Result         1. Stable lines and tubes.   2. Unchanged retrocardiac atelectasis versus consolidation.   3. Stable trace left pleural effusion.          OUTSIDE IMAGES-DX CHEST   Final Result      OUTSIDE IMAGES-US VASCULAR   Final Result      OUTSIDE IMAGES-MR BRAIN   Final Result      OUTSIDE IMAGES-DX CHEST   Final Result      OUTSIDE IMAGES-CT HEAD   Final Result      EC-ECHOCARDIOGRAM COMPLETE W/O CONT   Final Result      DX-CHEST-FOR LINE PLACEMENT Perform procedure in: Patient's Room   Final Result         1.  Retrocardiac opacity concerning for infiltrate, stable.   2.  Trace left pleural effusion, stable   3.  Cardiomegaly   4.  Atherosclerosis   5.  Perihilar interstitial prominence and bronchial wall cuffing, appearance suggests changes of underlying bronchial inflammation, consider bronchitis.      DX-ABDOMEN FOR TUBE PLACEMENT   Final Result         1.  Nonspecific bowel gas pattern.   2.  Dobbhoff tube tip overlying the expected location of the pylorus or first duodenal segment.   3.  Left lung base atelectasis and/or small effusion      DX-CHEST-PORTABLE (1 VIEW)   Final Result         1.  Retrocardiac opacity concerning for infiltrate.   2.  Trace left pleural effusion, stable   3.  Cardiomegaly   4.  Atherosclerosis      [id number] FILM X-RAY   Final Result      OUTSIDE IMAGES-DX CHEST   Final Result           Assessment/Plan  * Status epilepticus (HCC)- (present on admission)  Assessment & Plan  -Was intubated for airway protection, now trached since 2/2.   -Continuing vimpat, topamax, depakote.  Repeat EEG here showed no seizures. Neurology gave the opinion that likelihood of patient returning to baseline was low, and that the likelihood of the patient returning to full independent function was essentially 0.    Acute respiratory failure with hypoxia (HCC)- (present on admission)  Assessment & Plan  - stable on trach/T-piece.   - continue RT protocol, O2 supplement.  -Continue hemodialysis to prevent fluid overload.    Hypokalemia  Assessment & Plan  -Replace with enteral K-Dur.  BMP in the morning.    Severe protein-calorie  malnutrition (HCC)- (present on admission)  Assessment & Plan  -Continue tube feeding.    Cerebrovascular accident (CVA) due to embolism of right middle cerebral artery (HCC)- (present on admission)  Assessment & Plan  -Continue statin and aspirin.    -No PT/OT needs as unable to participate, and likely at her new baseline.      End stage renal failure on dialysis ([facility])- (present on admission)  Assessment & Plan  -Continue hemodialysis MWF.  Nephrology on board.  - Likely permanent; poor prognosis per nephrology, they recommend comfort care.  - avoid nephrotoxins, and continue to renally dose all medications.    Normocytic anemia- (present on admission)  Assessment & Plan  -Likely anemia of chronic kidney disease.  FOBT is negative.   -Hgb stable.   -Continue to trend H&H, transfuse if drops below 7.      Hypertension- (present on admission)  Assessment & Plan  -Maintaining good control.  -Continue hydralazine, cardura, coreg (home meds).  Monitor blood pressure trend closely.        DM (diabetes mellitus) ([facility])- (present on admission)  Assessment & Plan  -No need for coverage.  Continue to monitor.     Goals of care, counseling/discussion- (present on admission)  Assessment & Plan  - There have been multiple discussions with palliative/treatment team and family, to no avail. Patient remains full code, has trach and PEG.    Pressure injury of sacrum, coccyx, stage 3 ([facility])- (present on admission)  Assessment & Plan  -Wound care per wound service. Continue pressure offloading strategies.    Hypomagnesemia- (present on admission)  Assessment & Plan  -Continue to monitor.     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 Maintain on aspiration precautions.    Gout- (present on admission)  Assessment & Plan  -Continue with allopurinol per the PEG tube.       VTE prophylaxis: SCDs

## 2020-02-20 NOTE — PROGRESS NOTES
2 RN skin check performed with MIC Hernandez.     Devices in place:  -Tracheostomy  -PEG tube  -Pulse ox probe  -Rectal tube  -River catheter  -SCDs  -PICC line  -HD catheter     Following skin breakdown noted:  -Extensive bruising to BUE  -Bruise to outer right leg   -Small blanching redness under trach neck tie with guaze in place.  -Small scab to top of end toe on right foot.   -Stage III pressure ulcer to coccyx with scant purulent and serosanguineous drainage. Mepilex changed   -Generalized redness to perineum; blanching  -Peg site red and swollen with purulent drainage     Preventative measures in place:  -Q2 turns  -Routine dressing changes  -Barrier cream used around perineum   -Pillows used to float heels and elbows  -Low air loss mattress in use

## 2020-02-20 NOTE — CARE PLAN
Problem: Safety  Goal: Will remain free from falls  Outcome: PROGRESSING AS EXPECTED  Note: Bed alarm on, walker out of sight, nonskid socks on, call light within reach, personal belongings within reach, toileting offered.        Problem: Respiratory:  Goal: Respiratory status will improve  Outcome: PROGRESSING AS EXPECTED  Note: Frequent suctioning provided, oral care given.      Problem: Communication  Goal: The ability to communicate needs accurately and effectively will improve  Outcome: PROGRESSING SLOWER THAN EXPECTED  Note: Encouraged use of call light, assessed needs, encouraged pt to voice feelings.

## 2020-02-20 NOTE — PROGRESS NOTES
Assumed patient care at 1900 and received bedside report from RN. Unable to assess orientation, numbness and tingling, chest pain, shortness of breath, blurry/double vision. Patient unable to ambulate. Patient incontient of bowel and bladder, hernandez and bowel management system in place. Patient is tolerating diet. Plan of care discussed, education provided on all administered medications, hourly rounding, Q2 turns and Q4 neuro checks in place.

## 2020-02-20 NOTE — PROGRESS NOTES
Cathflow used on both lumens of PICC. Both are now flushing and drawing back with ease. Catheter occlusion policy was used in this process.    Patient's preferred pharmacy has been set up and verified. Per patient, there were no changes to dose, sig or quantity. PT states that she is very low and needs to pick this up tonight please.

## 2020-02-20 NOTE — CARE PLAN
Problem: Skin Integrity  Goal: Risk for impaired skin integrity will decrease  Outcome: PROGRESSING AS EXPECTED  Intervention: Assess risk factors for impaired skin integrity and/or pressure ulcers  Note: Pt is at high risk for skin breakdown due to immobility.     Intervention: Implement precautions to protect skin integrity in collaboration with the interdisciplinary team  Note: Q 2 turns, low air loss mattress, heel float boots, guaze under trach, and mepilex in use.        Problem: Infection  Goal: Will remain free from infection  Outcome: PROGRESSING SLOWER THAN EXPECTED  Intervention: Assess signs and symptoms of infection  Note: Pt has redness and swelling around PEG tube insertion site.   Will discuss with MD

## 2020-02-20 NOTE — PROGRESS NOTES
Received report from night nurse at the bedside. Assume care of Pt at 0700. Assessment completed. LOPEZ orientation pt does open eye spontaneously. LOPEZ numbness and tingling. Pt does not appear to be in pain,  Assist of 1 to turn.  Dressing in place to sacrum. River to gravity, BMS flushed, unable to flush PICC line. Pt in bed, bed in lowest position, call light in place, bed alarm is on, treaded slipper socks on.

## 2020-02-20 NOTE — PROGRESS NOTES
[facility] Progress Note       HD ordered by Dr. Ng. Treatment started at 1247 and ended at 1617.    Net UF removed: 1000mL.     Pt tolerated treatment well with no issues. See paper flow sheet for details. CVC patent, locked with Heparin 1,000 units. No s/s of infection present. Dressing in place, CDI.

## 2020-02-20 NOTE — PROGRESS NOTES
2 RN skin check performed with MIC Phipps.    Bruising to BUE. Bruising to R outer leg. Scab to R of trach. Sacb on top of last toe on R foot. Stage III pressure ulcer to sacrum, mepilex in place, CDI. Blanching redness to perineum.    Patient on low air loss mattress, Q2 turns in place, barrier cream in use, heel float boots in place, pillows used to float heels and elbows.

## 2020-02-20 NOTE — PROGRESS NOTES
Notified Dr. Irving about pt Peg tube site redness and purulent drainage. Wound culture ordered.

## 2020-02-20 NOTE — PROGRESS NOTES
Pt arrived back from dialysis. SCD's replaced, TF restarted, evening medicaitons provided. BP elevated noc meds included meds for BP. Pt back on wall suction and oxygen. Q 2 turns in place.

## 2020-02-20 NOTE — PROGRESS NOTES
2 RN skin check performed with MIC Meade.     Devices in place:  -Tracheostomy  -PEG tube  -Pulse ox probe  -Rectal tube  -River catheter  -SCDs  -PICC line  -HD catheter     Following skin breakdown noted:  -Extensive bruising to BUE  -Bruise to outer right leg   -Small scab and redness to right of trach under neck tie with foam in place.  -Small scab to top of end toe on right foot.   -Stage III pressure ulcer to coccyx with scant purulent and serosanguineous drainage. Mepilex changed   -Generalized redness to perineum; blanching     Preventative measures in place:  -Q2 turns  -Routine dressing changes  -Barrier cream used around perineum   -Pillows used to float heels and elbows

## 2020-02-21 NOTE — CARE PLAN
Problem: Safety  Goal: Will remain free from falls  Outcome: PROGRESSING AS EXPECTED  Note: Bed alarm on, walker out of sight, nonskid socks on, call light within reach, personal belongings within reach, toileting offered.        Problem: Venous Thromboembolism (VTW)/Deep Vein Thrombosis (DVT) Prevention:  Goal: Patient will participate in Venous Thrombosis (VTE)/Deep Vein Thrombosis (DVT)Prevention Measures  Outcome: PROGRESSING AS EXPECTED  Note: pharmacological prophylaxis in use, SCD's on, encouraged ROM exercises and ambulation and tolerated.        Problem: Communication  Goal: The ability to communicate needs accurately and effectively will improve  Outcome: PROGRESSING SLOWER THAN EXPECTED  Note: Encouraged use of call light, assessed needs, encouraged pt to voice feelings.

## 2020-02-21 NOTE — DIETARY
Nutrition support weekly update:  Day 36 of admit.  Cassandra Guzmán is a 74 y.o. female with admitting DX of seizures, ARF, and respiratory failure requiring intubation.      Tube feeding initiated on 1/17. Current TF via [employer] is Impact Peptide 1.5 @ 40 mL/hr, providing 1440 kcal, 90 gm protein, 134 gm CHO, and 739 mL of free water per day.  In addition, pt is receiving Nutrisource Fiber packets 6x/day, providing an additional 18 gm fiber and 90 kcal, for a total of 1530 kcal/day.      Spoke w/ daughter at bedside. Reports that pt did not tolerate previous trials of bolus feeds while at [facility] in November and is not comfortable with trying them again. Daughter states that pt has only tolerated peptide based formulas in the past and would like to continue with current TF regimen.     Assessment:  Weight was 59.3 kg on 2/15, which is consistent with admit wt of 59 kg on 1/16/20. Wt has fluctuated since admit as expected with HD tx.     Evaluation:   1. PEG placed on 2/16.   2. D/c pending placement.   3. Per MD note on 2/20, PEG site with purulent drainage, sending for wound culture.   4. 1+ edema (RLE, LUE, LLE), 2+ RUE generalized edema and labial perineal edema.   5. Skin: Per wound therapy note (2/11): Sacrococcygeal stage 3 wound still present, has more yellow tissue present, leaks on occasion.   6. Labs: Na 133, K 3.8, Glucose 208; (2/7) Phos 3.9;     7. Meds: Vit B complex + C, folic acid.   8. GI: Last BM 2/20 (watery).   9. Specialized low-volume, carbohydrate-controlled formula continues to be appropriate to meet pt's estimated nutrition needs.     Malnutrition risk: No new risks identified.     Recommendations/Plan:  1. Continue current TF formula and rate.   2. Continue Nutrisource Fiber packets 6x/day.   3. Fluids per MD.   4. Obtain new wt as able.     RD Following.

## 2020-02-21 NOTE — PROGRESS NOTES
Hospital Medicine Daily Progress Note    Date of Service  2/20/2020    Chief Complaint  Altered mental status    Hospital Course    74 y.o. female who presented 1/16/2020 with a past medical history significant for end-stage renal disease on hemodialysis (followed by Dr. Cavazos) since November 2019 M/W/F, hypertension, hyperlipidemia and coronary artery disease.  She was recently admitted to [facility] for an extended period of time followed by placement in a SNF.  She has been very frail requiring a hoist to get her in and out of the dialysis chair with failure to thrive.  On January 14 while at the SNF patient had a witnessed seizure, with no history previously.  She was transferred to the emergency department where she had a second seizure with a prolonged postictal state.  She was intubated for airway protection and started on a propofol drip in the emergency department on January 14 at [facility].  She was admitted to the intensive care unit where she underwent an MRI of her brain showing an acute right parietal lacunar infarct with overall brain parenchymal loss.  Neurology was consulted and patient was loaded with Keppra and continued on the propofol drip. Despite this, EEGs continued to show evidence of focal seizure and Dilantin was started.  She underwent a lumbar puncture with unremarkable CSF and was being treated empirically with acyclovir, ampicillin, Rocephin, vancomycin and Decadron.  Given her persistent abnormal EEGs, the neurologist at the outside hospital recommend patient be transferred to a facility that can provide continuous EEG monitoring.  For this reason she was transferred to [facility].  She was trached on 2/2 and came off vent on 2/5, but remained unresponsive.  She is continued on Vimpat, Topamax, and Depakote.  EEG showed no seizures. Neurology was consulted, who felt that likelihood of patient returning to baseline was low, and that the  likelihood of the patient returning to full independent function was essentially 0.  Family has apparently not wanted to discuss changing CODE STATUS nor transitioning to comfort care and patient remains a full code.  She remains unresponsive. LTACH is being pursued.  PEG tube placement was desired by the family, and GI was consulted who then had PEG placed endoscopically.         Interval Problem Update  2/18: Patient was seen and examined by me today.  Found to be not interactive on examination today.  She does have a slight leukocytosis and will continue to trend this.  2/19: Patient open her eyes on examination today.  WBC count is decreasing since yesterday and will continue to monitor this.  She will undergo dialysis session today.  2/20: Patient was seen and examined by me today.  Mental status continues to be the same.  Found to have purulent drainage from the PEG tube site and I will send this for wound culture.  Consultants/Specialty  Intensivist  Nephrology  Neurology  Palliative Care  GI    Code Status  FULL    Disposition  Monitor on the neurology floors. LTACH placement.       Review of Systems  ROS     Unable to obtain due to vegetative state/mental status.       Physical Exam  Temp:  [36.1 °C (97 °F)-37.3 °C (99.2 °F)] 36.9 °C (98.5 °F)  Pulse:  [71-80] 74  Resp:  [16-18] 18  BP: (126-168)/(43-73) 153/58  SpO2:  [94 %-99 %] 97 %    Physical Exam  Vitals signs reviewed.   Constitutional:       General: She is not in acute distress.     Appearance: Normal appearance. She is normal weight. She is not ill-appearing or diaphoretic.   HENT:      Head: Normocephalic and atraumatic.      Right Ear: External ear normal.      Left Ear: External ear normal.      Nose: Nose normal.      Mouth/Throat:      Mouth: Mucous membranes are moist.      Pharynx: No oropharyngeal exudate or posterior oropharyngeal erythema.   Eyes:      General: No scleral icterus.     Extraocular Movements: Extraocular movements intact.       Conjunctiva/sclera: Conjunctivae normal.      Pupils: Pupils are equal, round, and reactive to light.   Neck:      Musculoskeletal: Normal range of motion and neck supple. No neck rigidity or muscular tenderness.      Comments: Trach in place  Cardiovascular:      Rate and Rhythm: Normal rate and regular rhythm.      Heart sounds: Normal heart sounds. No murmur.   Pulmonary:      Effort: Pulmonary effort is normal. No respiratory distress.      Breath sounds: Normal breath sounds. No stridor. No wheezing, rhonchi or rales.   Chest:      Chest wall: No tenderness.   Abdominal:      General: Bowel sounds are normal. There is no distension.      Palpations: Abdomen is soft. There is no mass.      Tenderness: There is no abdominal tenderness. There is no guarding or rebound.      Comments: PEG now in place   Musculoskeletal: Normal range of motion.         General: No swelling.      Right lower leg: No edema.      Left lower leg: No edema.   Lymphadenopathy:      Cervical: No cervical adenopathy.   Skin:     General: Skin is warm and dry.      Coloration: Skin is not jaundiced.      Findings: No rash.   Neurological:      Cranial Nerves: No cranial nerve deficit.      Comments: No changes in mentation  Eyes opens, and stares, but does not track. Non verbal, and unresponsive. Does not interact.    Psychiatric:      Comments: Unable to assess due to mentation           Fluids    Intake/Output Summary (Last 24 hours) at 2/20/2020 1722  Last data filed at 2/20/2020 1400  Gross per 24 hour   Intake 910 ml   Output 200 ml   Net 710 ml       Laboratory  Recent Labs     02/19/20  0357   WBC 13.8*   RBC 2.71*   HEMOGLOBIN 8.8*   HEMATOCRIT 28.2*   .1*   MCH 32.5   MCHC 31.2*   RDW 66.9*   PLATELETCT 265   MPV 9.4     Recent Labs     02/18/20  0257 02/19/20  1200   SODIUM 138 133*   POTASSIUM 3.7 3.8   CHLORIDE 101 96   CO2 28 27   GLUCOSE 179* 208*   BUN 41* 73*   CREATININE 1.08 1.69*   CALCIUM 8.4* 8.6                    Imaging  US-ABDOMEN LTD (SOFT TISSUE)   Final Result         1. Trace free fluid in the pelvis. No abdominal ascites.      DX-ABDOMEN FOR TUBE PLACEMENT   Final Result         Feeding tube with tip projecting over the expected area of the stomach.      [id number] VIEW   Final Result      Feeding tube tip overlies the gastric antrum.      DX-CHEST-PORTABLE (1 VIEW)   Final Result         1.  Pulmonary edema and/or infiltrates are identified, which are stable since the prior exam.   2.  Cardiomegaly   3.  Atherosclerosis      DX-CHEST-PORTABLE (1 VIEW)   Final Result         1.  Pulmonary edema and/or infiltrates are identified, which are stable since the prior exam.   2.  Cardiomegaly   3.  Atherosclerosis      DX-CHEST-PORTABLE (1 VIEW)   Final Result      1.  Unchanged left lower lobe atelectasis or pneumonia.      2.  Clear right lung.      DX-CHEST-PORTABLE (1 VIEW)   Final Result      Unchanged LEFT basilar atelectasis and/or airspace disease      DX-CHEST-PORTABLE (1 VIEW)   Final Result      Left basilar atelectasis, hilar and cardiac silhouette enlargement as before      DX-CHEST-PORTABLE (1 VIEW)   Final Result      Interval removal of a left subclavian central line. Stable patchy bilateral infiltrates.      IR-PICC LINE PLACEMENT W/ GUIDANCE > AGE 5   Final Result                  Ultrasound-guided PICC placement performed by qualified nursing staff as    above.          DX-CHEST-PORTABLE (1 VIEW)   Final Result      1.  Multiple support devices present.      2.  Patchy bilateral atelectasis.      DX-CHEST-PORTABLE (1 VIEW)   Final Result      Stable chest with retrocardiac opacity from atelectasis and/or pleural fluid favored over consolidation      DX-CHEST-PORTABLE (1 VIEW)   Final Result      Stable chest with retrocardiac opacity from atelectasis and/or pleural fluid favored over consolidation      DX-CHEST-PORTABLE (1 VIEW)   Final Result         No significant change from prior.                DX-CHEST-PORTABLE (1 VIEW)   Final Result         1. Stable lines and tubes..   2. Stable retrocardiac and left perihilar atelectasis versus consolidation. Suspected small left pleural effusion.            DX-CHEST-PORTABLE (1 VIEW)   Final Result         1. Stable lines and tubes..   2. Stable retrocardiac atelectasis versus consolidation with increased left perihilar opacity. Suspected trace left pleural effusion.         [id number] VIEW   Final Result      1.  Enteric tube has been placed and the tip projects over the stomach.      2.  Pre-existing feeding tube tip projects at the gastroduodenal junction      DX-CHEST-PORTABLE (1 VIEW)   Final Result         1. Lines and tubes as above.   2. Stable retrocardiac atelectasis versus consolidation. Suspected trace left pleural effusion.         MR-BRAIN-WITH & W/O   Final Result      1.  Moderate cerebral atrophy.   2.  Evidence of intraventricular hemorrhage, indeterminate age. Possibly chronic intraventricular hemosiderin deposition.   3.  Extensive encephalomalacic change with hemosiderin deposition involving the right corona radiata, basal ganglia, posterior thalamus, subthalamic region, bordering the right cerebral peduncle. Associated ex vacuo dilatation of the body of the right    lateral ventricle. No change.   4.  Additional foci of old microhemorrhage most consistent with old hypertensive microhemorrhage or amyloid angiopathy in the left basal ganglia, left thalamus, and midline upper ventral abel.   5.  Punctate focus of acute infarction in the right parietal deep white matter. No change.   6.  Advanced supratentorial white matter disease most consistent with microvascular ischemic change.   7.  Encephalomalacic changes in the abel and right cerebral peduncle consistent with old infarction.   8.  Partially empty sella.   9.  Overall, no new findings and no significant change from 1/14/2020.      DX-CHEST-PORTABLE (1 VIEW)   Final Result         1.  Stable lines and tubes.   2. Unchanged retrocardiac atelectasis versus consolidation.   3. Stable trace left pleural effusion.         OUTSIDE IMAGES-DX CHEST   Final Result      OUTSIDE IMAGES-US VASCULAR   Final Result      OUTSIDE IMAGES-MR BRAIN   Final Result      OUTSIDE IMAGES-DX CHEST   Final Result      OUTSIDE IMAGES-CT HEAD   Final Result      EC-ECHOCARDIOGRAM COMPLETE W/O CONT   Final Result      DX-CHEST-FOR LINE PLACEMENT Perform procedure in: Patient's Room   Final Result         1.  Retrocardiac opacity concerning for infiltrate, stable.   2.  Trace left pleural effusion, stable   3.  Cardiomegaly   4.  Atherosclerosis   5.  Perihilar interstitial prominence and bronchial wall cuffing, appearance suggests changes of underlying bronchial inflammation, consider bronchitis.      DX-ABDOMEN FOR TUBE PLACEMENT   Final Result         1.  Nonspecific bowel gas pattern.   2.  Dobbhoff tube tip overlying the expected location of the pylorus or first duodenal segment.   3.  Left lung base atelectasis and/or small effusion      DX-CHEST-PORTABLE (1 VIEW)   Final Result         1.  Retrocardiac opacity concerning for infiltrate.   2.  Trace left pleural effusion, stable   3.  Cardiomegaly   4.  Atherosclerosis      [id number] FILM X-RAY   Final Result      OUTSIDE IMAGES-DX CHEST   Final Result           Assessment/Plan  * Status epilepticus (HCC)- (present on admission)  Assessment & Plan  -Was intubated for airway protection, now trached since 2/2.   -Continuing vimpat, topamax, depakote.  Repeat EEG here showed no seizures. Neurology gave the opinion that likelihood of patient returning to baseline was low, and that the likelihood of the patient returning to full independent function was essentially 0.    Acute respiratory failure with hypoxia (HCC)- (present on admission)  Assessment & Plan  - stable on trach/T-piece.   - continue RT protocol, O2 supplement.  -Continue hemodialysis to prevent fluid  overload.    Hypokalemia  Assessment & Plan  -Replace with enteral K-Dur.  BMP in the morning.    Severe protein-calorie malnutrition (HCC)- (present on admission)  Assessment & Plan  -Continue tube feeding.    Cerebrovascular accident (CVA) due to embolism of right middle cerebral artery (HCC)- (present on admission)  Assessment & Plan  -Continue statin and aspirin.    -No PT/OT needs as unable to participate, and likely at her new baseline.      End stage renal failure on dialysis ([facility])- (present on admission)  Assessment & Plan  -Continue hemodialysis MWF.  Nephrology on board.  - Likely permanent; poor prognosis per nephrology, they recommend comfort care.  - avoid nephrotoxins, and continue to renally dose all medications.    Normocytic anemia- (present on admission)  Assessment & Plan  -Likely anemia of chronic kidney disease.  FOBT is negative.   -Hgb stable.   -Continue to trend H&H, transfuse if drops below 7.      Hypertension- (present on admission)  Assessment & Plan  -Maintaining good control.  -Continue hydralazine, cardura, coreg (home meds).  Monitor blood pressure trend closely.        DM (diabetes mellitus) ([facility])- (present on admission)  Assessment & Plan  -No need for coverage.  Continue to monitor.     Goals of care, counseling/discussion- (present on admission)  Assessment & Plan  - There have been multiple discussions with palliative/treatment team and family, to no avail. Patient remains full code, has trach and PEG.    Pressure injury of sacrum, coccyx, stage 3 ([facility])- (present on admission)  Assessment & Plan  -Wound care per wound service. Continue pressure offloading strategies.    Hypomagnesemia- (present on admission)  Assessment & Plan  -Continue to monitor.     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 Maintain on aspiration precautions.    Gout- (present on admission)  Assessment & Plan  -Continue with  allopurinol per the PEG tube.       VTE prophylaxis: SCDs

## 2020-02-21 NOTE — PROGRESS NOTES
Hospital Medicine Daily Progress Note    Date of Service  2/21/2020    Chief Complaint  Altered mental status    Hospital Course    74 y.o. female who presented 1/16/2020 with a past medical history significant for end-stage renal disease on hemodialysis (followed by Dr. Cavazos) since November 2019 M/W/F, hypertension, hyperlipidemia and coronary artery disease.  She was recently admitted to [facility] for an extended period of time followed by placement in a SNF.  She has been very frail requiring a hoist to get her in and out of the dialysis chair with failure to thrive.  On January 14 while at the SNF patient had a witnessed seizure, with no history previously.  She was transferred to the emergency department where she had a second seizure with a prolonged postictal state.  She was intubated for airway protection and started on a propofol drip in the emergency department on January 14 at [facility].  She was admitted to the intensive care unit where she underwent an MRI of her brain showing an acute right parietal lacunar infarct with overall brain parenchymal loss.  Neurology was consulted and patient was loaded with Keppra and continued on the propofol drip. Despite this, EEGs continued to show evidence of focal seizure and Dilantin was started.  She underwent a lumbar puncture with unremarkable CSF and was being treated empirically with acyclovir, ampicillin, Rocephin, vancomycin and Decadron.  Given her persistent abnormal EEGs, the neurologist at the outside hospital recommend patient be transferred to a facility that can provide continuous EEG monitoring.  For this reason she was transferred to [facility].  She was trached on 2/2 and came off vent on 2/5, but remained unresponsive.  She is continued on Vimpat, Topamax, and Depakote.  EEG showed no seizures. Neurology was consulted, who felt that likelihood of patient returning to baseline was low, and that the  likelihood of the patient returning to full independent function was essentially 0.  Family has apparently not wanted to discuss changing CODE STATUS nor transitioning to comfort care and patient remains a full code.  She remains unresponsive. LTACH is being pursued.  PEG tube placement was desired by the family, and GI was consulted who then had PEG placed endoscopically.         Interval Problem Update  2/18: Patient was seen and examined by me today.  Found to be not interactive on examination today.  She does have a slight leukocytosis and will continue to trend this.  2/19: Patient open her eyes on examination today.  WBC count is decreasing since yesterday and will continue to monitor this.  She will undergo dialysis session today.  2/20: Patient was seen and examined by me today.  Mental status continues to be the same.  Found to have purulent drainage from the PEG tube site and I will send this for wound culture.  2/21: Wound cultures were positive for candidia but no other organisms were found.  Patient underwent dialysis session today.  We will continue to find placement.  Consultants/Specialty  Intensivist  Nephrology  Neurology  Palliative Care  GI    Code Status  FULL    Disposition  Monitor on the neurology floors. LTACH placement.       Review of Systems  ROS     Unable to obtain due to vegetative state/mental status.       Physical Exam  Temp:  [36.1 °C (97 °F)-36.9 °C (98.4 °F)] 36.2 °C (97.1 °F)  Pulse:  [62-78] 73  Resp:  [16-22] 22  BP: (112-167)/(44-77) 167/75  SpO2:  [97 %-100 %] 100 %    Physical Exam  Vitals signs reviewed.   Constitutional:       General: She is not in acute distress.     Appearance: Normal appearance. She is normal weight. She is not ill-appearing or diaphoretic.   HENT:      Head: Normocephalic and atraumatic.      Right Ear: External ear normal.      Left Ear: External ear normal.      Nose: Nose normal.      Mouth/Throat:      Mouth: Mucous membranes are moist.       Pharynx: No oropharyngeal exudate or posterior oropharyngeal erythema.   Eyes:      General: No scleral icterus.     Extraocular Movements: Extraocular movements intact.      Conjunctiva/sclera: Conjunctivae normal.      Pupils: Pupils are equal, round, and reactive to light.   Neck:      Musculoskeletal: Normal range of motion and neck supple. No neck rigidity or muscular tenderness.      Comments: Trach in place  Cardiovascular:      Rate and Rhythm: Normal rate and regular rhythm.      Heart sounds: Normal heart sounds. No murmur.   Pulmonary:      Effort: Pulmonary effort is normal. No respiratory distress.      Breath sounds: Normal breath sounds. No stridor. No wheezing, rhonchi or rales.   Chest:      Chest wall: No tenderness.   Abdominal:      General: Bowel sounds are normal. There is no distension.      Palpations: Abdomen is soft. There is no mass.      Tenderness: There is no abdominal tenderness. There is no guarding or rebound.      Comments: PEG now in place   Musculoskeletal: Normal range of motion.         General: No swelling.      Right lower leg: No edema.      Left lower leg: No edema.   Lymphadenopathy:      Cervical: No cervical adenopathy.   Skin:     General: Skin is warm and dry.      Coloration: Skin is not jaundiced.      Findings: No rash.   Neurological:      Cranial Nerves: No cranial nerve deficit.      Comments: No changes in mentation  Eyes opens, and stares, but does not track. Non verbal, and unresponsive. Does not interact.    Psychiatric:      Comments: Unable to assess due to mentation           Fluids    Intake/Output Summary (Last 24 hours) at 2/21/2020 1526  Last data filed at 2/21/2020 1159  Gross per 24 hour   Intake 1480 ml   Output 2700 ml   Net -1220 ml       Laboratory  Recent Labs     02/19/20  0357 02/21/20  0435   WBC 13.8* 14.0*   RBC 2.71* 2.73*   HEMOGLOBIN 8.8* 8.7*   HEMATOCRIT 28.2* 28.3*   .1* 103.7*   MCH 32.5 31.9   MCHC 31.2* 30.7*   RDW 66.9*  65.7*   PLATELETCT 265 225   MPV 9.4 9.2     Recent Labs     02/19/20  1200   SODIUM 133*   POTASSIUM 3.8   CHLORIDE 96   CO2 27   GLUCOSE 208*   BUN 73*   CREATININE 1.69*   CALCIUM 8.6                   Imaging  US-ABDOMEN LTD (SOFT TISSUE)   Final Result         1. Trace free fluid in the pelvis. No abdominal ascites.      DX-ABDOMEN FOR TUBE PLACEMENT   Final Result         Feeding tube with tip projecting over the expected area of the stomach.      [id number] VIEW   Final Result      Feeding tube tip overlies the gastric antrum.      DX-CHEST-PORTABLE (1 VIEW)   Final Result         1.  Pulmonary edema and/or infiltrates are identified, which are stable since the prior exam.   2.  Cardiomegaly   3.  Atherosclerosis      DX-CHEST-PORTABLE (1 VIEW)   Final Result         1.  Pulmonary edema and/or infiltrates are identified, which are stable since the prior exam.   2.  Cardiomegaly   3.  Atherosclerosis      DX-CHEST-PORTABLE (1 VIEW)   Final Result      1.  Unchanged left lower lobe atelectasis or pneumonia.      2.  Clear right lung.      DX-CHEST-PORTABLE (1 VIEW)   Final Result      Unchanged LEFT basilar atelectasis and/or airspace disease      DX-CHEST-PORTABLE (1 VIEW)   Final Result      Left basilar atelectasis, hilar and cardiac silhouette enlargement as before      DX-CHEST-PORTABLE (1 VIEW)   Final Result      Interval removal of a left subclavian central line. Stable patchy bilateral infiltrates.      IR-PICC LINE PLACEMENT W/ GUIDANCE > AGE 5   Final Result                  Ultrasound-guided PICC placement performed by qualified nursing staff as    above.          DX-CHEST-PORTABLE (1 VIEW)   Final Result      1.  Multiple support devices present.      2.  Patchy bilateral atelectasis.      DX-CHEST-PORTABLE (1 VIEW)   Final Result      Stable chest with retrocardiac opacity from atelectasis and/or pleural fluid favored over consolidation      DX-CHEST-PORTABLE (1 VIEW)   Final Result      Stable  chest with retrocardiac opacity from atelectasis and/or pleural fluid favored over consolidation      DX-CHEST-PORTABLE (1 VIEW)   Final Result         No significant change from prior.               DX-CHEST-PORTABLE (1 VIEW)   Final Result         1. Stable lines and tubes..   2. Stable retrocardiac and left perihilar atelectasis versus consolidation. Suspected small left pleural effusion.            DX-CHEST-PORTABLE (1 VIEW)   Final Result         1. Stable lines and tubes..   2. Stable retrocardiac atelectasis versus consolidation with increased left perihilar opacity. Suspected trace left pleural effusion.         [id number] VIEW   Final Result      1.  Enteric tube has been placed and the tip projects over the stomach.      2.  Pre-existing feeding tube tip projects at the gastroduodenal junction      DX-CHEST-PORTABLE (1 VIEW)   Final Result         1. Lines and tubes as above.   2. Stable retrocardiac atelectasis versus consolidation. Suspected trace left pleural effusion.         MR-BRAIN-WITH & W/O   Final Result      1.  Moderate cerebral atrophy.   2.  Evidence of intraventricular hemorrhage, indeterminate age. Possibly chronic intraventricular hemosiderin deposition.   3.  Extensive encephalomalacic change with hemosiderin deposition involving the right corona radiata, basal ganglia, posterior thalamus, subthalamic region, bordering the right cerebral peduncle. Associated ex vacuo dilatation of the body of the right    lateral ventricle. No change.   4.  Additional foci of old microhemorrhage most consistent with old hypertensive microhemorrhage or amyloid angiopathy in the left basal ganglia, left thalamus, and midline upper ventral abel.   5.  Punctate focus of acute infarction in the right parietal deep white matter. No change.   6.  Advanced supratentorial white matter disease most consistent with microvascular ischemic change.   7.  Encephalomalacic changes in the abel and right cerebral  peduncle consistent with old infarction.   8.  Partially empty sella.   9.  Overall, no new findings and no significant change from 1/14/2020.      DX-CHEST-PORTABLE (1 VIEW)   Final Result         1. Stable lines and tubes.   2. Unchanged retrocardiac atelectasis versus consolidation.   3. Stable trace left pleural effusion.         OUTSIDE IMAGES-DX CHEST   Final Result      OUTSIDE IMAGES-US VASCULAR   Final Result      OUTSIDE IMAGES-MR BRAIN   Final Result      OUTSIDE IMAGES-DX CHEST   Final Result      OUTSIDE IMAGES-CT HEAD   Final Result      EC-ECHOCARDIOGRAM COMPLETE W/O CONT   Final Result      DX-CHEST-FOR LINE PLACEMENT Perform procedure in: Patient's Room   Final Result         1.  Retrocardiac opacity concerning for infiltrate, stable.   2.  Trace left pleural effusion, stable   3.  Cardiomegaly   4.  Atherosclerosis   5.  Perihilar interstitial prominence and bronchial wall cuffing, appearance suggests changes of underlying bronchial inflammation, consider bronchitis.      DX-ABDOMEN FOR TUBE PLACEMENT   Final Result         1.  Nonspecific bowel gas pattern.   2.  Dobbhoff tube tip overlying the expected location of the pylorus or first duodenal segment.   3.  Left lung base atelectasis and/or small effusion      DX-CHEST-PORTABLE (1 VIEW)   Final Result         1.  Retrocardiac opacity concerning for infiltrate.   2.  Trace left pleural effusion, stable   3.  Cardiomegaly   4.  Atherosclerosis      [id number] FILM X-RAY   Final Result      OUTSIDE IMAGES-DX CHEST   Final Result           Assessment/Plan  * Status epilepticus (HCC)- (present on admission)  Assessment & Plan  -Was intubated for airway protection, now trached since 2/2.   -Continuing vimpat, topamax, depakote.  Repeat EEG here showed no seizures. Neurology gave the opinion that likelihood of patient returning to baseline was low, and that the likelihood of the patient returning to full independent function was essentially  0.    Acute respiratory failure with hypoxia (HCC)- (present on admission)  Assessment & Plan  - stable on trach/T-piece.   - continue RT protocol, O2 supplement.  -Continue hemodialysis to prevent fluid overload.    Hypokalemia  Assessment & Plan  -Replace with enteral K-Dur.  BMP in the morning.    Severe protein-calorie malnutrition (HCC)- (present on admission)  Assessment & Plan  -Continue tube feeding.    Cerebrovascular accident (CVA) due to embolism of right middle cerebral artery (HCC)- (present on admission)  Assessment & Plan  -Continue statin and aspirin.    -No PT/OT needs as unable to participate, and likely at her new baseline.      End stage renal failure on dialysis (HCC)- (present on admission)  Assessment & Plan  -Continue hemodialysis MWF.  Nephrology on board.  - Likely permanent; poor prognosis per nephrology, they recommend comfort care.  - avoid nephrotoxins, and continue to renally dose all medications.    Normocytic anemia- (present on admission)  Assessment & Plan  -Likely anemia of chronic kidney disease.  FOBT is negative.   -Hgb stable.   -Continue to trend H&H, transfuse if drops below 7.      Hypertension- (present on admission)  Assessment & Plan  -Maintaining good control.  -Continue hydralazine, cardura, coreg (home meds).  Monitor blood pressure trend closely.        DM (diabetes mellitus) ([facility])- (present on admission)  Assessment & Plan  -No need for coverage.  Continue to monitor.     Goals of care, counseling/discussion- (present on admission)  Assessment & Plan  - There have been multiple discussions with palliative/treatment team and family, to no avail. Patient remains full code, has trach and PEG.    Pressure injury of sacrum, coccyx, stage 3 (HCC)- (present on admission)  Assessment & Plan  -Wound care per wound service. Continue pressure offloading strategies.    Hypomagnesemia- (present on admission)  Assessment & Plan  -Continue to monitor.     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 Maintain on aspiration precautions.    Gout- (present on admission)  Assessment & Plan  -Continue with allopurinol per the PEG tube.       VTE prophylaxis: SCDs

## 2020-02-21 NOTE — PROGRESS NOTES
Spoke with pt's daughter Belen on the phone and updated on Cassandra's status including Peg tube side redness and swelling and that a culture was taken.

## 2020-02-21 NOTE — PROGRESS NOTES
[facility] Services Progress Note     Hemodialysis treatment ordered today per Dr. Ng x 3.5 hours.   Treatment initiated at 0829, ended at 1159.      Patient tolerated treatment well; see paper flow sheet for details.      Net UF 1,000 mL.      Post tx, CVC flushed with saline then locked with heparin 1000 units/mL per designated amount in each wing then clamped and capped. Aspirate heparin prior to next CVC use.     Report given to Primary RN.

## 2020-02-21 NOTE — PROGRESS NOTES
[facility] Nephrology Progress Note    Date of Service  2/21/2020    AUTHOR: Tanner Ng M.D.  Supervising Physician: Dr Tanner Ng     Chief Complaint   Follow up ESRD    HPI  74 y.o. female admitted to [facility] on 1/14/20 with seizures.She was at a SNF.She had been previously hospitalized at [facility] and diaysis was initiated.  She was doing very poorly then and palliative care was discussed with the family,but they declined.She had very poor functional status and required Jimena lift to get into the dialysis chair.She was found to have  a CVA on MRI at [facility].Her seizures were not controlled and it was felt she was in status epilepticus.  She was getting HD at [facility].Last HD was on 1/13/20.She was seen by Dr Carcamo at [facility].Creatinine was 1.8 and dialysis was held. Neurology Elliott that the patient needed continuous EEG monitoring and he was transferred to [facility] [place]    DAILY NEPHROLOGY SUMMARY:  Please see note from 1/31 for prior summaries  2/01 - NAEO, another EEG being done this AM  2/02 - NAEO, scheduled for Trach today  2/03 - No events, underwent trach yesterday, no change in neuro status, BP stable overnight but low this am, given midodrine, to have HD today  2/04 - No events, tolerated HD yest with 2.1L UF, BP stable this am  2/05 - No events, BP low prior to starting HD, given albumin at the start of HD and BP stable at this time  2/06 - No events, tolerated HD yesterday with 2.5L UF, BP stable and high at times, still with dependent edema  2/07 - No events, tolerated PUF yesterday with 2.5L UF and did not require midodrine with treatment, BP stable today, still with dependent edema, to have HD today  2/08 - No overnight events or changes.  On TF at goal via NGT.  No neurologic change.  On vent/trach.   2/09 - No significant change.  No events.  Liquid stool from rectal tube.  On Vent/Trach  2/10-  No new overnight renal events. +trach.  2/11- S/p HD yesterday with net UF of 2 L.   2/12- No new  overnight renal events. HD today.  2/13 - HD yesterday with 1.7L net UF, plan for PEG placement Saturday, LTACH placement pending, some hypotension   2/14 - On HD.  2/15 - Non communicative, HD yesterday 700cc UF  2/17 - Seen on HD today. 's. No new events.   2/19- Non verbal, nothing reported  2/21- Stage 3 decubitus ulcer    Review of Systems   Unable to perform ROS: Acuity of condition         Physical Exam  Temp:  [36.3 °C (97.4 °F)-37.1 °C (98.8 °F)] 36.3 °C (97.4 °F)  Pulse:  [67-78] 67  Resp:  [16-18] 18  BP: (120-153)/(43-77) 149/77  SpO2:  [97 %-99 %] 97 %    Physical Exam  Vitals signs and nursing note reviewed.   Constitutional:       General: She is not in acute distress.     Appearance: She is ill-appearing.   HENT:      Head: Normocephalic and atraumatic.      Nose: Nose normal. No congestion.      Mouth/Throat:      Mouth: Mucous membranes are moist.      Pharynx: No oropharyngeal exudate.   Eyes:      General: No scleral icterus.  Neck:      Comments: +Trach  Cardiovascular:      Rate and Rhythm: Normal rate and regular rhythm.      Heart sounds: Normal heart sounds.   Pulmonary:      Effort: Pulmonary effort is normal. No respiratory distress.      Breath sounds: Normal breath sounds.      Comments: T-piece, supp O2   Chest:      Chest wall: No tenderness.   Abdominal:      General: Bowel sounds are normal. There is no distension.      Palpations: Abdomen is soft.      Comments: Enteral nutrition    Genitourinary:     Comments: BMS  Musculoskeletal:         General: No swelling, tenderness or signs of injury.      Comments: +dependent edema   Lymphadenopathy:      Cervical: No cervical adenopathy.   Skin:     Findings: No rash.      Comments: Heel float boots   Stage 3 decubitus sacral not visualized   Neurological:      Comments: Not responsive   Psychiatric:      Comments: Unable to assess       Fluids    Intake/Output Summary (Last 24 hours) at 2/21/2020 0824  Last data filed at 2/21/2020  0600  Gross per 24 hour   Intake 1540 ml   Output 1200 ml   Net 340 ml     Laboratory  Recent Labs     02/19/20  0357 02/21/20  0435   WBC 13.8* 14.0*   RBC 2.71* 2.73*   HEMOGLOBIN 8.8* 8.7*   HEMATOCRIT 28.2* 28.3*   .1* 103.7*   MCH 32.5 31.9   MCHC 31.2* 30.7*   RDW 66.9* 65.7*   PLATELETCT 265 225   MPV 9.4 9.2     Recent Labs     02/19/20  1200   SODIUM 133*   POTASSIUM 3.8   CHLORIDE 96   CO2 27   GLUCOSE 208*   BUN 73*   CREATININE 1.69*   CALCIUM 8.6     IMPRESSION:  # ESRD   Dialysis today    Maintenance dialysis qMWF as OP at [facility]   mCrCl 6 by 24 hour urine - repeat study 2/13 mCrCl 4   Dialysis Dependent                # Status epilepticus  # S/P acute lacunar infarct   Hx of previous CVA with significant deficits.  # HTN--BP variable often with intradialytic hypotension  # DM II  # Acute Hypoxic Respiratory Failure   CXR 3/3 still w/ pulm edema/infiltrates  # Hx of dysphagia  # Anemia of CKD.Ferritin previously significantly elevated. CAT with HD.  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  3Stage 3 decubitus ulcer    PLAN:    Maintenance dialysis qMWF and PRN.   UF w/ HD as tolerated.   Seizure management per Neurology   Enteral feedings   No dietary protein restrictions   Follow labs   Continue GOC discussions with family   Very poor prognosis, recommend comfort care, if and when family agreeable   Will see on MWF. Please call on other days w/ questions or concerns    Thank you,

## 2020-02-21 NOTE — DISCHARGE PLANNING
Agency/Facility Name: [facility]   Outcome: Voice mail left regarding pending referral. Asked for a call back.    Agency/Facility Name: [facility] Continue care   Outcome: Unable to leave voice mail due to voice mail box not set up yet.

## 2020-02-21 NOTE — PROGRESS NOTES
2 RN skin check performed with MIC Tomas.     Bruising to BUE. Bruising to R outer leg. Scab to R of trach. Sacb on top of last toe on R foot. Stage III pressure ulcer to sacrum, mepilex in place, CDI. Blanching redness to perineum.     Patient on low air loss mattress, Q2 turns in place, barrier cream in use, heel float boots in place, pillows used to float heels and elbows.

## 2020-02-22 PROBLEM — Z22.7 TB LUNG, LATENT: Status: ACTIVE | Noted: 2020-01-01

## 2020-02-22 NOTE — CARE PLAN
Problem: Safety  Goal: Will remain free from falls  Outcome: PROGRESSING AS EXPECTED  Note: Bed alarm on, walker out of sight, nonskid socks on, call light within reach, personal belongings within reach, toileting offered.        Problem: Communication  Goal: The ability to communicate needs accurately and effectively will improve  Outcome: PROGRESSING SLOWER THAN EXPECTED  Note: Encouraged use of call light, assessed needs, encouraged pt to voice feelings.

## 2020-02-22 NOTE — PROGRESS NOTES
Assumed patient care at 1900 and received bedside report from RN. Unable to assess orientation, numbness and tingling, chest pain, shortness of breath, blurry/double vision. Patient unable to ambulate. Patient incontinent of bowel and bladder, hernandez catheter and bowel management system in place. Patient is tolerating tubefeed. Plan of care discussed, education provided on all administered medications, hourly rounding, Q2 turns and Q4 neuro checks in place.

## 2020-02-22 NOTE — CARE PLAN
Problem: Bronchopulmonary Hygiene:  Goal: Increase mobilization of retained secretions  Outcome: PROGRESSING AS EXPECTED  Note:     Respiratory Update    Treatment modality: Heated Aerosol T-piece 4l/28%  Frequency:    Pt tolerating current treatments well with no adverse reactions.

## 2020-02-22 NOTE — PROGRESS NOTES
Pt awake, does not follow with assessment but is responding minimally when family present. BMS leaking slightly, readjusted seal ring to 35ml. River and trach present. Trach suctioned occasionally. Q 2 hr trun continued. HD completed this am.  and SCDs in place

## 2020-02-22 NOTE — CARE PLAN
Problem: Venous Thromboembolism (VTW)/Deep Vein Thrombosis (DVT) Prevention:  Goal: Patient will participate in Venous Thrombosis (VTE)/Deep Vein Thrombosis (DVT)Prevention Measures  Outcome: PROGRESSING AS EXPECTED     Problem: Pain Management  Goal: Pain level will decrease to patient's comfort goal  Outcome: PROGRESSING AS EXPECTED   SCDs in place, no s/s of pain

## 2020-02-22 NOTE — PROGRESS NOTES
Hospital Medicine Daily Progress Note    Date of Service  2/22/2020    Chief Complaint  Altered mental status    Hospital Course    74 y.o. female who presented 1/16/2020 with a past medical history significant for end-stage renal disease on hemodialysis (followed by Dr. Cavazos) since November 2019 M/W/F, hypertension, hyperlipidemia and coronary artery disease.  She was recently admitted to [facility] for an extended period of time followed by placement in a SNF.  She has been very frail requiring a hoist to get her in and out of the dialysis chair with failure to thrive.  On January 14 while at the SNF patient had a witnessed seizure, with no history previously.  She was transferred to the emergency department where she had a second seizure with a prolonged postictal state.  She was intubated for airway protection and started on a propofol drip in the emergency department on January 14 at [facility].  She was admitted to the intensive care unit where she underwent an MRI of her brain showing an acute right parietal lacunar infarct with overall brain parenchymal loss.  Neurology was consulted and patient was loaded with Keppra and continued on the propofol drip. Despite this, EEGs continued to show evidence of focal seizure and Dilantin was started.  She underwent a lumbar puncture with unremarkable CSF and was being treated empirically with acyclovir, ampicillin, Rocephin, vancomycin and Decadron.  Given her persistent abnormal EEGs, the neurologist at the outside hospital recommend patient be transferred to a facility that can provide continuous EEG monitoring.  For this reason she was transferred to [facility].  She was trached on 2/2 and came off vent on 2/5, but remained unresponsive.  She is continued on Vimpat, Topamax, and Depakote.  EEG showed no seizures. Neurology was consulted, who felt that likelihood of patient returning to baseline was low, and that the  likelihood of the patient returning to full independent function was essentially 0.  Family has apparently not wanted to discuss changing CODE STATUS nor transitioning to comfort care and patient remains a full code.  She remains unresponsive. LTACH is being pursued.  PEG tube placement was desired by the family, and GI was consulted who then had PEG placed endoscopically.         Interval Problem Update  2/18: Patient was seen and examined by me today.  Found to be not interactive on examination today.  She does have a slight leukocytosis and will continue to trend this.  2/19: Patient open her eyes on examination today.  WBC count is decreasing since yesterday and will continue to monitor this.  She will undergo dialysis session today.  2/20: Patient was seen and examined by me today.  Mental status continues to be the same.  Found to have purulent drainage from the PEG tube site and I will send this for wound culture.  2/21: Wound cultures were positive for candidia but no other organisms were found.  Patient underwent dialysis session today.  We will continue to find placement.  2/22: Patient was seen and examined by me.  Mental status is remains the same.  Patient has an abdominal wound and will continue to be monitored which is likely from a pressure ulcer with PEG tube. Quantiferon was positive and active TB will need to be ruled out since she has opacities in the lung. I will order a CT chest to further evaluate this.  Consultants/Specialty  Intensivist  Nephrology  Neurology  Palliative Care  GI    Code Status  FULL    Disposition  Monitor on the neurology floors. LTACH placement.       Review of Systems  ROS     Unable to obtain due to vegetative state/mental status.       Physical Exam  Temp:  [36.2 °C (97.2 °F)-36.7 °C (98.1 °F)] 36.7 °C (98.1 °F)  Pulse:  [71-82] 82  Resp:  [16-18] 18  BP: (116-136)/(49-56) 116/49  SpO2:  [94 %-98 %] 97 %    Physical Exam  Vitals signs reviewed.   Constitutional:        General: She is not in acute distress.     Appearance: Normal appearance. She is normal weight. She is not ill-appearing or diaphoretic.   HENT:      Head: Normocephalic and atraumatic.      Right Ear: External ear normal.      Left Ear: External ear normal.      Nose: Nose normal.      Mouth/Throat:      Mouth: Mucous membranes are moist.      Pharynx: No oropharyngeal exudate or posterior oropharyngeal erythema.   Eyes:      General: No scleral icterus.     Extraocular Movements: Extraocular movements intact.      Conjunctiva/sclera: Conjunctivae normal.      Pupils: Pupils are equal, round, and reactive to light.   Neck:      Musculoskeletal: Normal range of motion and neck supple. No neck rigidity or muscular tenderness.      Comments: Trach in place  Cardiovascular:      Rate and Rhythm: Normal rate and regular rhythm.      Heart sounds: Normal heart sounds. No murmur.   Pulmonary:      Effort: Pulmonary effort is normal. No respiratory distress.      Breath sounds: Normal breath sounds. No stridor. No wheezing, rhonchi or rales.   Chest:      Chest wall: No tenderness.   Abdominal:      General: Bowel sounds are normal. There is no distension.      Palpations: Abdomen is soft. There is no mass.      Tenderness: There is no abdominal tenderness. There is no guarding or rebound.      Comments: PEG now in place   Musculoskeletal: Normal range of motion.         General: No swelling.      Right lower leg: No edema.      Left lower leg: No edema.   Lymphadenopathy:      Cervical: No cervical adenopathy.   Skin:     General: Skin is warm and dry.      Coloration: Skin is not jaundiced.      Findings: No rash.   Neurological:      Cranial Nerves: No cranial nerve deficit.      Comments: No changes in mentation  Eyes opens, and stares, but does not track. Non verbal, and unresponsive. Does not interact.    Psychiatric:      Comments: Unable to assess due to mentation           Fluids    Intake/Output Summary (Last 24  hours) at 2/22/2020 1607  Last data filed at 2/22/2020 0600  Gross per 24 hour   Intake 1840 ml   Output 400 ml   Net 1440 ml       Laboratory  Recent Labs     02/21/20  0435 02/22/20  0240   WBC 14.0* 13.8*   RBC 2.73* 2.89*   HEMOGLOBIN 8.7* 9.2*   HEMATOCRIT 28.3* 29.4*   .7* 101.7*   MCH 31.9 31.8   MCHC 30.7* 31.3*   RDW 65.7* 63.5*   PLATELETCT 225 232   MPV 9.2 9.4     Recent Labs     02/22/20  0240   SODIUM 132*   POTASSIUM 3.8   CHLORIDE 97   CO2 29   GLUCOSE 192*   BUN 38*   CREATININE 0.92   CALCIUM 8.5                   Imaging  US-ABDOMEN LTD (SOFT TISSUE)   Final Result         1. Trace free fluid in the pelvis. No abdominal ascites.      DX-ABDOMEN FOR TUBE PLACEMENT   Final Result         Feeding tube with tip projecting over the expected area of the stomach.      EC-ADHBUDZ-5 VIEW   Final Result      Feeding tube tip overlies the gastric antrum.      DX-CHEST-PORTABLE (1 VIEW)   Final Result         1.  Pulmonary edema and/or infiltrates are identified, which are stable since the prior exam.   2.  Cardiomegaly   3.  Atherosclerosis      DX-CHEST-PORTABLE (1 VIEW)   Final Result         1.  Pulmonary edema and/or infiltrates are identified, which are stable since the prior exam.   2.  Cardiomegaly   3.  Atherosclerosis      DX-CHEST-PORTABLE (1 VIEW)   Final Result      1.  Unchanged left lower lobe atelectasis or pneumonia.      2.  Clear right lung.      DX-CHEST-PORTABLE (1 VIEW)   Final Result      Unchanged LEFT basilar atelectasis and/or airspace disease      DX-CHEST-PORTABLE (1 VIEW)   Final Result      Left basilar atelectasis, hilar and cardiac silhouette enlargement as before      DX-CHEST-PORTABLE (1 VIEW)   Final Result      Interval removal of a left subclavian central line. Stable patchy bilateral infiltrates.      IR-PICC LINE PLACEMENT W/ GUIDANCE > AGE 5   Final Result                  Ultrasound-guided PICC placement performed by qualified nursing staff as    above.           DX-CHEST-PORTABLE (1 VIEW)   Final Result      1.  Multiple support devices present.      2.  Patchy bilateral atelectasis.      DX-CHEST-PORTABLE (1 VIEW)   Final Result      Stable chest with retrocardiac opacity from atelectasis and/or pleural fluid favored over consolidation      DX-CHEST-PORTABLE (1 VIEW)   Final Result      Stable chest with retrocardiac opacity from atelectasis and/or pleural fluid favored over consolidation      DX-CHEST-PORTABLE (1 VIEW)   Final Result         No significant change from prior.               DX-CHEST-PORTABLE (1 VIEW)   Final Result         1. Stable lines and tubes..   2. Stable retrocardiac and left perihilar atelectasis versus consolidation. Suspected small left pleural effusion.            DX-CHEST-PORTABLE (1 VIEW)   Final Result         1. Stable lines and tubes..   2. Stable retrocardiac atelectasis versus consolidation with increased left perihilar opacity. Suspected trace left pleural effusion.         MX-[id number]-0 VIEW   Final Result      1.  Enteric tube has been placed and the tip projects over the stomach.      2.  Pre-existing feeding tube tip projects at the gastroduodenal junction      DX-CHEST-PORTABLE (1 VIEW)   Final Result         1. Lines and tubes as above.   2. Stable retrocardiac atelectasis versus consolidation. Suspected trace left pleural effusion.         MR-BRAIN-WITH & W/O   Final Result      1.  Moderate cerebral atrophy.   2.  Evidence of intraventricular hemorrhage, indeterminate age. Possibly chronic intraventricular hemosiderin deposition.   3.  Extensive encephalomalacic change with hemosiderin deposition involving the right corona radiata, basal ganglia, posterior thalamus, subthalamic region, bordering the right cerebral peduncle. Associated ex vacuo dilatation of the body of the right    lateral ventricle. No change.   4.  Additional foci of old microhemorrhage most consistent with old hypertensive microhemorrhage or amyloid  angiopathy in the left basal ganglia, left thalamus, and midline upper ventral abel.   5.  Punctate focus of acute infarction in the right parietal deep white matter. No change.   6.  Advanced supratentorial white matter disease most consistent with microvascular ischemic change.   7.  Encephalomalacic changes in the abel and right cerebral peduncle consistent with old infarction.   8.  Partially empty sella.   9.  Overall, no new findings and no significant change from 1/14/2020.      DX-CHEST-PORTABLE (1 VIEW)   Final Result         1. Stable lines and tubes.   2. Unchanged retrocardiac atelectasis versus consolidation.   3. Stable trace left pleural effusion.         OUTSIDE IMAGES-DX CHEST   Final Result      OUTSIDE IMAGES-US VASCULAR   Final Result      OUTSIDE IMAGES-MR BRAIN   Final Result      OUTSIDE IMAGES-DX CHEST   Final Result      OUTSIDE IMAGES-CT HEAD   Final Result      EC-ECHOCARDIOGRAM COMPLETE W/O CONT   Final Result      DX-CHEST-FOR LINE PLACEMENT Perform procedure in: Patient's Room   Final Result         1.  Retrocardiac opacity concerning for infiltrate, stable.   2.  Trace left pleural effusion, stable   3.  Cardiomegaly   4.  Atherosclerosis   5.  Perihilar interstitial prominence and bronchial wall cuffing, appearance suggests changes of underlying bronchial inflammation, consider bronchitis.      DX-ABDOMEN FOR TUBE PLACEMENT   Final Result         1.  Nonspecific bowel gas pattern.   2.  Dobbhoff tube tip overlying the expected location of the pylorus or first duodenal segment.   3.  Left lung base atelectasis and/or small effusion      DX-CHEST-PORTABLE (1 VIEW)   Final Result         1.  Retrocardiac opacity concerning for infiltrate.   2.  Trace left pleural effusion, stable   3.  Cardiomegaly   4.  Atherosclerosis      BI-NIYLKYP-[id number] FILM X-RAY   Final Result      OUTSIDE IMAGES-DX CHEST   Final Result      CT-CHEST (THORAX) W/O    (Results Pending)        Assessment/Plan  *  Status epilepticus (HCC)- (present on admission)  Assessment & Plan  -Was intubated for airway protection, now trached since 2/2.   -Continuing vimpat, topamax, depakote.  Repeat EEG here showed no seizures. Neurology gave the opinion that likelihood of patient returning to baseline was low, and that the likelihood of the patient returning to full independent function was essentially 0.    Acute respiratory failure with hypoxia ([facility])- (present on admission)  Assessment & Plan  - stable on trach/T-piece.   - continue RT protocol, O2 supplement.  -Continue hemodialysis to prevent fluid overload.    Hypokalemia  Assessment & Plan  -Replace with enteral K-Dur.  BMP in the morning.    Severe protein-calorie malnutrition (HCC)- (present on admission)  Assessment & Plan  -Continue tube feeding.    Cerebrovascular accident (CVA) due to embolism of right middle cerebral artery ([facility])- (present on admission)  Assessment & Plan  -Continue statin and aspirin.    -No PT/OT needs as unable to participate, and likely at her new baseline.      End stage renal failure on dialysis ([facility])- (present on admission)  Assessment & Plan  -Continue hemodialysis MWF.  Nephrology on board.  - Likely permanent; poor prognosis per nephrology, they recommend comfort care.  - avoid nephrotoxins, and continue to renally dose all medications.    Normocytic anemia- (present on admission)  Assessment & Plan  -Likely anemia of chronic kidney disease.  FOBT is negative.   -Hgb stable.   -Continue to trend H&H, transfuse if drops below 7.      Hypertension- (present on admission)  Assessment & Plan  -Maintaining good control.  -Continue hydralazine, cardura, coreg (home meds).  Monitor blood pressure trend closely.        DM (diabetes mellitus) ([facility])- (present on admission)  Assessment & Plan  -No need for coverage.  Continue to monitor.     TB lung, latent  Assessment & Plan  quantiferon + and will have to rule out active TB  Pulmonary opacities seen on  last chest x ray and I will order a CT chest to confirm  Otherwise she will need to be treated to latent TB    Goals of care, counseling/discussion- (present on admission)  Assessment & Plan  - There have been multiple discussions with palliative/treatment team and family, to no avail. Patient remains full code, has trach and PEG.    Pressure injury of sacrum, coccyx, stage 3 (HCC)- (present on admission)  Assessment & Plan  -Wound care per wound service. Continue pressure offloading strategies.    Hypomagnesemia- (present on admission)  Assessment & Plan  -Continue to monitor.     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 Maintain on aspiration precautions.    Gout- (present on admission)  Assessment & Plan  -Continue with allopurinol per the PEG tube.       VTE prophylaxis: SCDs

## 2020-02-22 NOTE — PROGRESS NOTES
2 RN skin check performed with MIC Tomas.     Bruising to BUE. Bruising to R outer leg. Healing scab to R of trach. Scab on top of last toe on R foot. Stage III pressure ulcer to sacrum, mepilex in place, CDI. Blanching redness to perineum.     Patient on low air loss mattress, Q2 turns in place, barrier cream in use, heel float boots in place, pillows used to float heels and elbows.

## 2020-02-23 PROBLEM — J90 PLEURAL EFFUSION: Status: ACTIVE | Noted: 2020-01-01

## 2020-02-23 NOTE — CARE PLAN
Problem: Respiratory:  Goal: Respiratory status will improve  Outcome: PROGRESSING SLOWER THAN EXPECTED     Problem: Urinary Elimination:  Goal: Ability to reestablish a normal urinary elimination pattern will improve  Outcome: PROGRESSING SLOWER THAN EXPECTED     Problem: Safety  Goal: Will remain free from injury  Outcome: PROGRESSING AS EXPECTED     Problem: Infection  Goal: Will remain free from infection  Outcome: PROGRESSING AS EXPECTED     Problem: Venous Thromboembolism (VTW)/Deep Vein Thrombosis (DVT) Prevention:  Goal: Patient will participate in Venous Thrombosis (VTE)/Deep Vein Thrombosis (DVT)Prevention Measures  Outcome: PROGRESSING AS EXPECTED     Problem: Skin Integrity  Goal: Risk for impaired skin integrity will decrease  Outcome: PROGRESSING AS EXPECTED

## 2020-02-23 NOTE — PROGRESS NOTES
Hospital Medicine Daily Progress Note    Date of Service  2/23/2020    Chief Complaint  Altered mental status    Hospital Course    74 y.o. female who presented 1/16/2020 with a past medical history significant for end-stage renal disease on hemodialysis (followed by Dr. Cavazos) since November 2019 M/W/F, hypertension, hyperlipidemia and coronary artery disease.  She was recently admitted to [facility] for an extended period of time followed by placement in a SNF.  She has been very frail requiring a hoist to get her in and out of the dialysis chair with failure to thrive.  On January 14 while at the SNF patient had a witnessed seizure, with no history previously.  She was transferred to the emergency department where she had a second seizure with a prolonged postictal state.  She was intubated for airway protection and started on a propofol drip in the emergency department on January 14 at [facility].  She was admitted to the intensive care unit where she underwent an MRI of her brain showing an acute right parietal lacunar infarct with overall brain parenchymal loss.  Neurology was consulted and patient was loaded with Keppra and continued on the propofol drip. Despite this, EEGs continued to show evidence of focal seizure and Dilantin was started.  She underwent a lumbar puncture with unremarkable CSF and was being treated empirically with acyclovir, ampicillin, Rocephin, vancomycin and Decadron.  Given her persistent abnormal EEGs, the neurologist at the outside hospital recommend patient be transferred to a facility that can provide continuous EEG monitoring.  For this reason she was transferred to [facility].  She was trached on 2/2 and came off vent on 2/5, but remained unresponsive.  She is continued on Vimpat, Topamax, and Depakote.  EEG showed no seizures. Neurology was consulted, who felt that likelihood of patient returning to baseline was low, and that the  likelihood of the patient returning to full independent function was essentially 0.  Family has apparently not wanted to discuss changing CODE STATUS nor transitioning to comfort care and patient remains a full code.  She remains unresponsive. LTACH is being pursued.  PEG tube placement was desired by the family, and GI was consulted who then had PEG placed endoscopically.         Interval Problem Update  2/18: Patient was seen and examined by me today.  Found to be not interactive on examination today.  She does have a slight leukocytosis and will continue to trend this.  2/19: Patient open her eyes on examination today.  WBC count is decreasing since yesterday and will continue to monitor this.  She will undergo dialysis session today.  2/20: Patient was seen and examined by me today.  Mental status continues to be the same.  Found to have purulent drainage from the PEG tube site and I will send this for wound culture.  2/21: Wound cultures were positive for candidia but no other organisms were found.  Patient underwent dialysis session today.  We will continue to find placement.  2/22: Patient was seen and examined by me.  Mental status is remains the same.  Patient has an abdominal wound and will continue to be monitored which is likely from a pressure ulcer with PEG tube. Quantiferon was positive and active TB will need to be ruled out since she has opacities in the lung. I will order a CT chest to further evaluate this.  2/23: Patient was seen and examined by me.  CT chest found left-sided consolidation with pleural effusion.  No lymphadenopathy was noted or cavitary lesions are found.  Patient will need 2 additional AFB cultures to rule out active TB.  Additionally, we will send patient for thoracentesis.  Airborne isolation has been ordered    Consultants/Specialty  Intensivist  Nephrology  Neurology  Palliative Care  GI    Code Status  FULL    Disposition  Monitor on the neurology floors. LTACH placement.        Review of Systems  ROS     Unable to obtain due to vegetative state/mental status.       Physical Exam  Temp:  [36.1 °C (97 °F)-37.2 °C (99 °F)] 36.6 °C (97.9 °F)  Pulse:  [68-80] 78  Resp:  [16-18] 18  BP: (114-153)/(54-80) 114/54  SpO2:  [95 %-99 %] 98 %    Physical Exam  Vitals signs reviewed.   Constitutional:       General: She is not in acute distress.     Appearance: Normal appearance. She is normal weight. She is not ill-appearing or diaphoretic.   HENT:      Head: Normocephalic and atraumatic.      Right Ear: External ear normal.      Left Ear: External ear normal.      Nose: Nose normal.      Mouth/Throat:      Mouth: Mucous membranes are moist.      Pharynx: No oropharyngeal exudate or posterior oropharyngeal erythema.   Eyes:      General: No scleral icterus.     Extraocular Movements: Extraocular movements intact.      Conjunctiva/sclera: Conjunctivae normal.      Pupils: Pupils are equal, round, and reactive to light.   Neck:      Musculoskeletal: Normal range of motion and neck supple. No neck rigidity or muscular tenderness.      Comments: Trach in place  Cardiovascular:      Rate and Rhythm: Normal rate and regular rhythm.      Heart sounds: Normal heart sounds. No murmur.   Pulmonary:      Effort: Pulmonary effort is normal. No respiratory distress.      Breath sounds: Normal breath sounds. No stridor. No wheezing, rhonchi or rales.   Chest:      Chest wall: No tenderness.   Abdominal:      General: Bowel sounds are normal. There is no distension.      Palpations: Abdomen is soft. There is no mass.      Tenderness: There is no abdominal tenderness. There is no guarding or rebound.      Comments: PEG now in place   Musculoskeletal: Normal range of motion.         General: No swelling.      Right lower leg: No edema.      Left lower leg: No edema.   Lymphadenopathy:      Cervical: No cervical adenopathy.   Skin:     General: Skin is warm and dry.      Coloration: Skin is not jaundiced.       Findings: No rash.   Neurological:      Cranial Nerves: No cranial nerve deficit.      Comments: No changes in mentation  Eyes opens, and stares, but does not track. Non verbal, and unresponsive. Does not interact.    Psychiatric:      Comments: Unable to assess due to mentation           Fluids    Intake/Output Summary (Last 24 hours) at 2/23/2020 1516  Last data filed at 2/23/2020 1447  Gross per 24 hour   Intake 300 ml   Output 300 ml   Net 0 ml       Laboratory  Recent Labs     02/21/20  0435 02/22/20  0240   WBC 14.0* 13.8*   RBC 2.73* 2.89*   HEMOGLOBIN 8.7* 9.2*   HEMATOCRIT 28.3* 29.4*   .7* 101.7*   MCH 31.9 31.8   MCHC 30.7* 31.3*   RDW 65.7* 63.5*   PLATELETCT 225 232   MPV 9.2 9.4     Recent Labs     02/22/20  0240   SODIUM 132*   POTASSIUM 3.8   CHLORIDE 97   CO2 29   GLUCOSE 192*   BUN 38*   CREATININE 0.92   CALCIUM 8.5                   Imaging  CT-CHEST (THORAX) W/O   Final Result      1.  Bilateral atelectasis/consolidation, left greater than right.      2.  Moderate left and small right pleural effusion.      3.  Atherosclerotic vascular calcification.      4.  Ascites within the upper abdomen.            US-ABDOMEN LTD (SOFT TISSUE)   Final Result         1. Trace free fluid in the pelvis. No abdominal ascites.      DX-ABDOMEN FOR TUBE PLACEMENT   Final Result         Feeding tube with tip projecting over the expected area of the stomach.      [id number] VIEW   Final Result      Feeding tube tip overlies the gastric antrum.      DX-CHEST-PORTABLE (1 VIEW)   Final Result         1.  Pulmonary edema and/or infiltrates are identified, which are stable since the prior exam.   2.  Cardiomegaly   3.  Atherosclerosis      DX-CHEST-PORTABLE (1 VIEW)   Final Result         1.  Pulmonary edema and/or infiltrates are identified, which are stable since the prior exam.   2.  Cardiomegaly   3.  Atherosclerosis      DX-CHEST-PORTABLE (1 VIEW)   Final Result      1.  Unchanged left lower lobe  atelectasis or pneumonia.      2.  Clear right lung.      DX-CHEST-PORTABLE (1 VIEW)   Final Result      Unchanged LEFT basilar atelectasis and/or airspace disease      DX-CHEST-PORTABLE (1 VIEW)   Final Result      Left basilar atelectasis, hilar and cardiac silhouette enlargement as before      DX-CHEST-PORTABLE (1 VIEW)   Final Result      Interval removal of a left subclavian central line. Stable patchy bilateral infiltrates.      IR-PICC LINE PLACEMENT W/ GUIDANCE > AGE 5   Final Result                  Ultrasound-guided PICC placement performed by qualified nursing staff as    above.          DX-CHEST-PORTABLE (1 VIEW)   Final Result      1.  Multiple support devices present.      2.  Patchy bilateral atelectasis.      DX-CHEST-PORTABLE (1 VIEW)   Final Result      Stable chest with retrocardiac opacity from atelectasis and/or pleural fluid favored over consolidation      DX-CHEST-PORTABLE (1 VIEW)   Final Result      Stable chest with retrocardiac opacity from atelectasis and/or pleural fluid favored over consolidation      DX-CHEST-PORTABLE (1 VIEW)   Final Result         No significant change from prior.               DX-CHEST-PORTABLE (1 VIEW)   Final Result         1. Stable lines and tubes..   2. Stable retrocardiac and left perihilar atelectasis versus consolidation. Suspected small left pleural effusion.            DX-CHEST-PORTABLE (1 VIEW)   Final Result         1. Stable lines and tubes..   2. Stable retrocardiac atelectasis versus consolidation with increased left perihilar opacity. Suspected trace left pleural effusion.         [id number] VIEW   Final Result      1.  Enteric tube has been placed and the tip projects over the stomach.      2.  Pre-existing feeding tube tip projects at the gastroduodenal junction      DX-CHEST-PORTABLE (1 VIEW)   Final Result         1. Lines and tubes as above.   2. Stable retrocardiac atelectasis versus consolidation. Suspected trace left pleural effusion.          MR-BRAIN-WITH & W/O   Final Result      1.  Moderate cerebral atrophy.   2.  Evidence of intraventricular hemorrhage, indeterminate age. Possibly chronic intraventricular hemosiderin deposition.   3.  Extensive encephalomalacic change with hemosiderin deposition involving the right corona radiata, basal ganglia, posterior thalamus, subthalamic region, bordering the right cerebral peduncle. Associated ex vacuo dilatation of the body of the right    lateral ventricle. No change.   4.  Additional foci of old microhemorrhage most consistent with old hypertensive microhemorrhage or amyloid angiopathy in the left basal ganglia, left thalamus, and midline upper ventral abel.   5.  Punctate focus of acute infarction in the right parietal deep white matter. No change.   6.  Advanced supratentorial white matter disease most consistent with microvascular ischemic change.   7.  Encephalomalacic changes in the abel and right cerebral peduncle consistent with old infarction.   8.  Partially empty sella.   9.  Overall, no new findings and no significant change from 1/14/2020.      DX-CHEST-PORTABLE (1 VIEW)   Final Result         1. Stable lines and tubes.   2. Unchanged retrocardiac atelectasis versus consolidation.   3. Stable trace left pleural effusion.         OUTSIDE IMAGES-DX CHEST   Final Result      OUTSIDE IMAGES-US VASCULAR   Final Result      OUTSIDE IMAGES-MR BRAIN   Final Result      OUTSIDE IMAGES-DX CHEST   Final Result      OUTSIDE IMAGES-CT HEAD   Final Result      EC-ECHOCARDIOGRAM COMPLETE W/O CONT   Final Result      DX-CHEST-FOR LINE PLACEMENT Perform procedure in: Patient's Room   Final Result         1.  Retrocardiac opacity concerning for infiltrate, stable.   2.  Trace left pleural effusion, stable   3.  Cardiomegaly   4.  Atherosclerosis   5.  Perihilar interstitial prominence and bronchial wall cuffing, appearance suggests changes of underlying bronchial inflammation, consider bronchitis.       DX-ABDOMEN FOR TUBE PLACEMENT   Final Result         1.  Nonspecific bowel gas pattern.   2.  Dobbhoff tube tip overlying the expected location of the pylorus or first duodenal segment.   3.  Left lung base atelectasis and/or small effusion      DX-CHEST-PORTABLE (1 VIEW)   Final Result         1.  Retrocardiac opacity concerning for infiltrate.   2.  Trace left pleural effusion, stable   3.  Cardiomegaly   4.  Atherosclerosis      TR-ZODUUGF-OVEVIYN FILM X-RAY   Final Result      OUTSIDE IMAGES-DX CHEST   Final Result      US-THORACENTESIS PUNCTURE LEFT    (Results Pending)        Assessment/Plan  * Status epilepticus (HCC)- (present on admission)  Assessment & Plan  -Was intubated for airway protection, now trached since 2/2.   -Continuing vimpat, topamax, depakote.  Repeat EEG here showed no seizures. Neurology gave the opinion that likelihood of patient returning to baseline was low, and that the likelihood of the patient returning to full independent function was essentially 0.    Acute respiratory failure with hypoxia (HCC)- (present on admission)  Assessment & Plan  - stable on trach/T-piece.   - continue RT protocol, O2 supplement.  -Continue hemodialysis to prevent fluid overload.    Hypokalemia  Assessment & Plan  -Replace with enteral K-Dur.  BMP in the morning.    Severe protein-calorie malnutrition (HCC)- (present on admission)  Assessment & Plan  -Continue tube feeding.    Cerebrovascular accident (CVA) due to embolism of right middle cerebral artery (HCC)- (present on admission)  Assessment & Plan  -Continue statin and aspirin.    -No PT/OT needs as unable to participate, and likely at her new baseline.      End stage renal failure on dialysis (HCC)- (present on admission)  Assessment & Plan  -Continue hemodialysis MWF.  Nephrology on board.  - Likely permanent; poor prognosis per nephrology, they recommend comfort care.  - avoid nephrotoxins, and continue to renally dose all  medications.    Normocytic anemia- (present on admission)  Assessment & Plan  -Likely anemia of chronic kidney disease.  FOBT is negative.   -Hgb stable.   -Continue to trend H&H, transfuse if drops below 7.      Hypertension- (present on admission)  Assessment & Plan  -Maintaining good control.  -Continue hydralazine, cardura, coreg (home meds).  Monitor blood pressure trend closely.        DM (diabetes mellitus) ([facility])- (present on admission)  Assessment & Plan  -No need for coverage.  Continue to monitor.     Pleural effusion  Assessment & Plan  Left-sided thoracentesis with cell count and cultures have been ordered  AFB cultures have been ordered  We will follow this according to lights criteria    TB lung, latent  Assessment & Plan  quantiferon + and will have to rule out active TB  Pulmonary opacities seen on last chest x ray and I will order a CT chest to confirm  Chest CT found positive left-sided consolidation with pleural effusion  Otherwise she will need to be treated to latent TB  Patient had one BAL with negative AFB culture and we will need 2 additional cultures to rule out active TB  Airborne isolation  ID has been consulted    Goals of care, counseling/discussion- (present on admission)  Assessment & Plan  - There have been multiple discussions with palliative/treatment team and family, to no avail. Patient remains full code, has trach and PEG.    Pressure injury of sacrum, coccyx, stage 3 ([facility])- (present on admission)  Assessment & Plan  -Wound care per wound service. Continue pressure offloading strategies.    Hypomagnesemia- (present on admission)  Assessment & Plan  -Continue to monitor.     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 Maintain on aspiration precautions.    Gout- (present on admission)  Assessment & Plan  -Continue with allopurinol per the PEG tube.       VTE prophylaxis: SCDs

## 2020-02-23 NOTE — CONSULTS
Consults   INFECTIOUS DISEASES INPATIENT CONSULT NOTE     Date of Service: 2/23/2020    Consult Requested By: Aubree Irving M.D.    Reason for Consultation: Positive TB quant and opacities on chest x-ray    History of Present Illness:   Cassandra Guzmán is a 74 y.o.  admitted 1/16/2020. 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  She was admitted in 11/2019 abdominal pain with ascites and had an extended hospital course and then discharged to skilled facility.  She has been in a skilled facility in [place] with new onset seizure activity and was brought to [facility] where she was intubated for airway protection, new CVA per  MRI and concern for meningitis that she was given acyclovir, ampicillin, Rocephin and vancomycin with persistent abnormal EEGs..  She was transferred to [facility] for higher level of care.  Patient is nonverbal and no family at bedside so history obtained from chart and hospitalist.     Hospital Course:   She was initially admitted to the ICU, now on the general medicine floor. Neurology followed for seizures with improvement on antiepileptics.  LP on 1/24 with WBC of 39 and 76% polys, meningoencephalitis panel sent and all negative.  Unable to wean of ventilator, tracheostomy in place since 2/2/20. She underwent EGD with PEG tube placed on 2/15.  She has ongoing abdominal wound with cultures positive for Candida only.  She remains relatively unresponsive, oxygen dependent, and team is been attempting to get her to skilled facility.  Due to this a TB quant was ordered and has returned strongly positive.  ID consulted due to positive TB quant in the setting of multiple abnormal lung films.     Review Of Systems:  Review of Systems   Unable to perform ROS: Patient nonverbal         PMH:   Past Medical History:   Diagnosis Date   • Arthritis    • Chronic diastolic heart failure (HCC) 6/1/2016   • Clostridium difficile  diarrhea 6/2/2016   • CVA (cerebral vascular accident) ([facility])     L side weakness   • DM (diabetes mellitus) type II uncontrolled with renal manifestation 4/26/2014   • GERD (gastroesophageal reflux disease)    • GIB (gastrointestinal bleeding) 6/13/2016   • GOUT    • Hypertension    • ICH (intracerebral hemorrhage) ([facility]) 4/6/2016   • Indigestion    • Kidney failure    • RALF (obstructive sleep apnea)     Not compliant with oxygen or CPAP   • RALF (obstructive sleep apnea)    • Other and unspecified angina pectoris     hospitalized 8/13 chest pain   • Seizure ([facility]) 1/16/2020   • Unspecified cataract     right eye   • UTI (urinary tract infection) 6/1/2016       PSH:  Past Surgical History:   Procedure Laterality Date   • GASTROSCOPY N/A 2/15/2020    Procedure: GASTROSCOPY-PEG TUBE PLACEMENT;  Surgeon: Dionisio Hyde M.D.;  Location: SURGERY [facility];  Service: Gastroenterology   • GASTROSCOPY-ENDO  6/14/2016    Procedure: GASTROSCOPY-ENDO;  Surgeon: Oliver Macedo M.D.;  Location: ENDOSCOPY [facility];  Service:    • VENTRAL HERNIA REPAIR  4/9/2014    Performed by Trinity Bryan M.D. at SURGERY [facility]   • VITA BY LAPAROSCOPY  10/5/2013    Performed by Trinity Bryan M.D. at SURGERY [facility]   • TUBAL LIGATION  1977   • CATARACT EXTRACTION WITH IOL      left eye       FAMILY HX:  Family History   Problem Relation Age of Onset   • Diabetes Mother    • Diabetes Father    • Cancer Father         sarcoma       SOCIAL HX:  Social History     Socioeconomic History   • Marital status:      Spouse name: Not on file   • Number of children: Not on file   • Years of education: Not on file   • Highest education level: Not on file   Occupational History   • Not on file   Social Needs   • Financial resource strain: Not on file   • Food insecurity     Worry: Not on file     Inability: Not on file   • Transportation needs     Medical: Not on file     Non-medical: Not on file   Tobacco Use   • Smoking  status: Never Smoker   • Smokeless tobacco: Never Used   Substance and Sexual Activity   • Alcohol use: No   • Drug use: No   • Sexual activity: Not on file   Lifestyle   • Physical activity     Days per week: Not on file     Minutes per session: Not on file   • Stress: Not on file   Relationships   • Social connections     Talks on phone: Not on file     Gets together: Not on file     Attends Hinduism service: Not on file     Active member of club or organization: Not on file     Attends meetings of clubs or organizations: Not on file     Relationship status: Not on file   • Intimate partner violence     Fear of current or ex partner: Not on file     Emotionally abused: Not on file     Physically abused: Not on file     Forced sexual activity: Not on file   Other Topics Concern   • Not on file   Social History Narrative    Retired. 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 , buffet host    Lives with her daughter, dependent for most of ADLs after stroke gave L sided weakness     Social History     Tobacco Use   Smoking Status Never Smoker   Smokeless Tobacco Never Used     Social History     Substance and Sexual Activity   Alcohol Use No       Allergies/Intolerances:  Allergies   Allergen Reactions   • Amlodipine Besylate Cough   • Amlodipine Cough       History reviewed from chart    Other Current Medications:    Current Facility-Administered Medications:   •  heparin injection 5,000 Units, 5,000 Units, Subcutaneous, Q12HRS, Aubree Irving M.D., 5,000 Units at 02/23/20 0537  •  doxazosin (CARDURA) tablet 6 mg, 6 mg, Per G Tube, QHS, Aubree Irving M.D., 6 mg at 02/22/20 2253  •  aspirin (ASA) chewable tab 81 mg, 81 mg, Enteral Tube, DAILY, Aubree Irving M.D., 81 mg at 02/23/20 0540  •  carvedilol (COREG) tablet 25 mg, 25 mg, Enteral Tube, BID, Aubree Irving M.D., 25 mg at 02/23/20 0539  •  hydrALAZINE (APRESOLINE) tablet 100 mg, 100 mg, Enteral Tube, TID, Aubree Irving M.D., 100 mg at 02/23/20 0539  •   omeprazole (FIRST-OMEPRAZOLE) 2 mg/mL oral susp 40 mg, 40 mg, Enteral Tube, Q12HRS, Aubree Irving M.D., 40 mg at 02/23/20 0537  •  divalproex (DEPAKOTE SPRINKLE) capsule 500 mg, 500 mg, Enteral Tube, Q12HRS, Aubree Irving M.D., 500 mg at 02/23/20 0537  •  albumin human 25% solution 25 g, 25 g, Intravenous, ACUTE DIALYSIS PRN, Aubree Irving M.D., Last Rate: 150 mL/hr at 02/17/20 0937, 25 g at 02/17/20 0937  •  lacosamide (VIMPAT) tablet 300 mg, 300 mg, Enteral Tube, BID, Aubree Irvign M.D., 300 mg at 02/23/20 0538  •  carboxymethylcellulose (REFRESH TEARS) 0.5 % ophthalmic drops 1 Drop, 1 Drop, Both Eyes, Q2HRS PRN, Aubree Irving M.D., 1 Drop at 01/31/20 0546  •  vitamin B comp+C (ALLBEE WITH C) 1 Tab, 1 Tab, Enteral Tube, DAILY, Aubree Irving M.D., 1 Tab at 02/23/20 0537  •  folic acid (FOLVITE) tablet 1 mg, 1 mg, Enteral Tube, DAILY, Aubree Irving M.D., 1 mg at 02/23/20 0540  •  allopurinol (ZYLOPRIM) tablet 100 mg, 100 mg, Enteral Tube, DAILY, Aubree Irving M.D., 100 mg at 02/23/20 0539  •  nystatin (MYCOSTATIN) powder, , Topical, BID, Aubree Irving M.D.  •  hydrALAZINE (APRESOLINE) injection 10 mg, 10 mg, Intravenous, Q4HRS PRN, Aubree Irving M.D., 10 mg at 02/10/20 0424  •  topiramate (TOPAMAX) tablet 200 mg, 200 mg, Enteral Tube, Q12HRS, Aubree Irving M.D., 200 mg at 02/23/20 0538  •  atorvastatin (LIPITOR) tablet 40 mg, 40 mg, Enteral Tube, DAILY, Aubree Irving M.D., 40 mg at 02/23/20 0538  •  heparin injection 3,300 Units, 3,300 Units, Intracatheter, PRN, Aubree Irving M.D., 3,300 Units at 02/21/20 1201  •  epoetin antoinette (EPOGEN/PROCRIT) injection 4,000 Units, 4,000 Units, Subcutaneous, MO, WE + FR, Aubree Irving M.D., 4,000 Units at 02/21/20 0928  •  Respiratory Care per Protocol, , Nebulization, Continuous RT, Aubree Irving M.D.  •  ipratropium-albuterol (DUONEB) nebulizer solution, 3 mL, Nebulization, Q2HRS PRN (RT), Aubree Irving M.D.  •  senna-docusate (PERICOLACE or SENOKOT S) 8.6-50 MG per tablet 2 Tab,  "2 Tab, Enteral Tube, BID, 2 Tab at 20 0539 **AND** polyethylene glycol/lytes (MIRALAX) PACKET 1 Packet, 1 Packet, Enteral Tube, QDAY PRN, 1 Packet at 20 0018 **AND** [DISCONTINUED] magnesium hydroxide (MILK OF MAGNESIA) suspension 30 mL, 30 mL, Enteral Tube, QDAY PRN **AND** bisacodyl (DULCOLAX) suppository 10 mg, 10 mg, Rectal, QDAY PRN, Aubree Irving M.D.  •  Pharmacy Consult: Enteral tube insertion - review meds/change route/product selection, 1 Each, Other, PHARMACY TO DOSE, Aubree Irving M.D.  •  labetalol (NORMODYNE/TRANDATE) injection 10 mg, 10 mg, Intravenous, Q4HRS PRN, Aubree Irving M.D., 10 mg at 02/10/20 0026  •  acetaminophen (TYLENOL) tablet 650 mg, 650 mg, Enteral Tube, Q6HRS PRN, Aubree Irving M.D., 650 mg at 20 2158  [unfilled]    Most Recent Vital Signs:  /54   Pulse 76   Temp 36.6 °C (97.9 °F) (Temporal)   Resp 16   Ht 1.651 m (5' 5\")   Wt 54.5 kg (120 lb 2.4 oz)   SpO2 99%   BMI 19.99 kg/m²   Temp  Av.8 °C (98.3 °F)  Min: 35.6 °C (96.1 °F)  Max: 38.2 °C (100.7 °F)    Physical Exam:  Physical Exam   Constitutional: She is well-developed, well-nourished, and in no distress. No distress.   HENT:   Head: Normocephalic and atraumatic.   Tracheostomy in place   Eyes: Right eye exhibits no discharge. Left eye exhibits no discharge. No scleral icterus.   Cardiovascular: Normal rate, regular rhythm and normal heart sounds.   Pulmonary/Chest: Effort normal.   Coarse breath sounds bilaterally   Abdominal: Soft. Bowel sounds are normal. She exhibits no distension. There is abdominal tenderness. There is no rebound and no guarding.   Wound around PEG tube   Musculoskeletal:         General: No edema.   Neurological: She is alert.   Left side paracentesis, left arm contracture, nonverbal   Skin: Skin is warm and dry. She is not diaphoretic.   Sacral pressure ulcers, examined photos in media           Pertinent Lab Results:  Recent Labs     20  0435 " 02/22/20  0240   WBC 14.0* 13.8*      Recent Labs     02/21/20  0435 02/22/20  0240   HEMOGLOBIN 8.7* 9.2*   HEMATOCRIT 28.3* 29.4*   .7* 101.7*   MCH 31.9 31.8   PLATELETCT 225 232         Recent Labs     02/22/20  0240   SODIUM 132*   POTASSIUM 3.8   CHLORIDE 97   CO2 29   CREATININE 0.92        Recent Labs     02/22/20  0240   ALBUMIN 2.9*        Pertinent Micro:  Results     Procedure Component Value Units Date/Time    FLUID CULTURE W/GRAM STAIN [[id number]]     Order Status:  No result Specimen:  Body Fluid from Thoracentesis Fluid     AFB CULTURE [[id number]]     Order Status:  No result Specimen:  Body Fluid from Thoracentesis Fluid     FUNGAL CULTURE [[id number]]     Order Status:  No result Specimen:  Body Fluid from Thoracentesis Fluid     CULTURE WOUND W/ GRAM STAIN [[id number]]  (Abnormal) Collected:  02/20/20 1145    Order Status:  Completed Specimen:  Wound from Abdominal Updated:  02/22/20 0835     Significant Indicator POS     Source WND     Site ABDOMINAL     Culture Result Rare growth mixed skin shiva.     Gram Stain Result Moderate WBCs.  No organisms seen.       Culture Result Candida albicans  Moderate growth      Narrative:       Collected By:[id number] BASHIR BUI  Collected By:[id number] BASHIR BUI    GRAM STAIN [[id number]] Collected:  02/20/20 1145    Order Status:  Completed Specimen:  Wound Updated:  02/20/20 2200     Significant Indicator .     Source WND     Site ABDOMINAL     Gram Stain Result Moderate WBCs.  No organisms seen.      Narrative:       Collected By:[id number] BASHIR BUI  Collected By:[id number] BASHIR BUI        Blood Culture   Date Value Ref Range Status   06/01/2016 No growth after 5 days of incubation.  Final        Studies:  Ct-chest (thorax) W/o    Result Date: 2/22/2020 2/22/2020 6:25 PM HISTORY/REASON FOR EXAM: Pulmonary infiltrates. TECHNIQUE/EXAM DESCRIPTION: CT scan of the chest without contrast. Thin-section helical images were obtained  from the lung apices through the adrenal glands. Low dose optimization technique was utilized for this CT exam including automated exposure control and adjustment of the mA and/or kV according to patient size. COMPARISON: None FINDINGS: The study is limited due to non-use of intravenous contrast. The mediastinum and hilum is not well evaluated. There are bilateral areas of atelectasis/consolidation, left greater than right. There is a small right and a moderate left pleural effusion. There is extensive atherosclerotic vascular calcification. There is no evidence of mediastinal or hilar adenopathy. There is no evidence of pericardial effusion. There is ascites seen within the upper abdomen.     1.  Bilateral atelectasis/consolidation, left greater than right. 2.  Moderate left and small right pleural effusion. 3.  Atherosclerotic vascular calcification. 4.  Ascites within the upper abdomen.     [id number] View    Result Date: 2/7/2020 2/7/2020 6:49 PM HISTORY/REASON FOR EXAM: Feeding tube placement TECHNIQUE/EXAM DESCRIPTION AND NUMBER OF VIEWS: 1 supine views of the abdomen. COMPARISON: None FINDINGS: There is no evidence of bowel obstruction. Feeding tube tip overlies the gastric antrum. No abnormal calcifications are seen.     Feeding tube tip overlies the gastric antrum.    Dx-chest-portable (1 View)    Result Date: 2/3/2020    2/3/2020 12:19 AM HISTORY/REASON FOR EXAM: For indication of respiratory failure; For indication of respiratory failure TECHNIQUE/EXAM DESCRIPTION:  Single AP view of the chest. COMPARISON: Yesterday FINDINGS: Endotracheal tube has been removed in the interim.     Otherwise medical device position is stable. Cardiomegaly is observed. Atherosclerotic calcification of the aorta is noted.  The central  pulmonary vasculature appears prominent and indistinct. The lungs appear well expanded bilaterally.  Diffuse scattered hazy pulmonary parenchymal opacities are seen. No significant pleural  effusions are identified. The bony structures appear age-appropriate.     1.  Pulmonary edema and/or infiltrates are identified, which are stable since the prior exam. 2.  Cardiomegaly 3.  Atherosclerosis    Dx-chest-portable (1 View)    Result Date: 2/2/2020 2/2/2020 12:29 AM HISTORY/REASON FOR EXAM: For indication of respiratory failure; For indication of respiratory failure TECHNIQUE/EXAM DESCRIPTION:  Single AP view of the chest. COMPARISON: Yesterday FINDINGS: Position of medical devices appears stable. Cardiomegaly is observed. Atherosclerotic calcification of the aorta is noted.  The central  pulmonary vasculature appears prominent and indistinct. The lungs appear well expanded bilaterally.  Diffuse scattered hazy pulmonary parenchymal opacities are seen. No significant pleural effusions are identified. The bony structures appear age-appropriate.     1.  Pulmonary edema and/or infiltrates are identified, which are stable since the prior exam. 2.  Cardiomegaly 3.  Atherosclerosis    Dx-chest-portable (1 View)    Result Date: 2/1/2020 2/1/2020 12:11 AM HISTORY/REASON FOR EXAM:  Respiratory failure. Pr TECHNIQUE/EXAM DESCRIPTION AND NUMBER OF VIEWS: Single portable view of the chest. COMPARISON:  1/31/2020 FINDINGS: Support tubing is unchanged. The cardiac silhouette is within normal limits. There is unchanged atelectasis or pneumonia in the left lower lobe. The right lung is clear. No pleural effusion is noted.     1.  Unchanged left lower lobe atelectasis or pneumonia. 2.  Clear right lung.    Dx-chest-portable (1 View)    Result Date: 1/31/2020 1/31/2020 12:45 AM HISTORY/REASON FOR EXAM:  For indication of respiratory failure; For indication of respiratory failure TECHNIQUE/EXAM DESCRIPTION AND NUMBER OF VIEWS: Single portable view of the chest. COMPARISON: Yesterday FINDINGS: Hardware is stably positioned in its visualized extent. HEART: Stable size. There is atherosclerotic calcification in the aortic  arch. LUNGS: Lung volumes are low. There is LEFT medial basilar opacity. PLEURA: No effusion or pneumothorax.     Unchanged LEFT basilar atelectasis and/or airspace disease    Dx-chest-portable (1 View)    Result Date: 1/30/2020 1/30/2020 12:50 AM HISTORY/REASON FOR EXAM: For indication of respiratory failure; For indication of respiratory failure. TECHNIQUE/EXAM DESCRIPTION AND NUMBER OF VIEWS: Single AP view of the chest. COMPARISON: Yesterday FINDINGS: Hardware: No change. Endotracheal tube terminates above the rosalio. Tunneled right IJ line tip overlies the SVC. Right PICC line tip overlies the cavoatrial junction. Enteric tube terminates below the radiograph. Lungs: Some linear left basilar opacity most likely representing subsegmental atelectasis. Pleura:  No pleural space process is seen. Heart and mediastinum: There is stable aortic ectasia, hilar prominence and cardiac silhouette enlargement.     Left basilar atelectasis, hilar and cardiac silhouette enlargement as before    Dx-chest-portable (1 View)    Result Date: 1/29/2020 1/29/2020 1:58 AM HISTORY/REASON FOR EXAM: Respiratory distress TECHNIQUE/EXAM DESCRIPTION AND NUMBER OF VIEWS: Single portable view of the chest. COMPARISON: 1/27/2020 FINDINGS: There is been interval removal of a left subclavian central line. There are stable patchy bilateral infiltrates. There is no pleural effusion. The heart is normal in size.     Interval removal of a left subclavian central line. Stable patchy bilateral infiltrates.    Dx-chest-portable (1 View)    Result Date: 1/27/2020 1/27/2020 10:54 PM HISTORY/REASON FOR EXAM: Post intubation TECHNIQUE/EXAM DESCRIPTION AND NUMBER OF VIEWS: Single portable view of the chest. COMPARISON: 1/24/2020 FINDINGS: There is bilateral linear atelectasis. There is a feeding tube, left subclavian central line in a right IJ dialysis catheter present. Endotracheal tube is seen as well. There is small bilateral pleural effusion. The  heart is enlarged.     1.  Multiple support devices present. 2.  Patchy bilateral atelectasis.    Us-abdomen Ltd (soft Tissue)    Result Date: 2/12/2020 2/12/2020 2:04 PM HISTORY/REASON FOR EXAM:  Needs peg, hx of ascites TECHNIQUE/EXAM DESCRIPTION: Limited abdominal ultrasound. COMPARISON: None available. FINDINGS: Focus ultrasound of the 4 quadrants of the abdomen demonstrates trace free fluid in the pelvis adjacent to the urinary bladder. Decompressed urinary bladder by River catheter..     1. Trace free fluid in the pelvis. No abdominal ascites.    Dx-abdomen For Tube Placement    Result Date: 2/10/2020  2/10/2020 2:05 AM HISTORY/REASON FOR EXAM:  Tube evaluation. TECHNIQUE/EXAM DESCRIPTION AND NUMBER OF VIEWS:  1 view(s) of the abdomen. COMPARISON:  None. FINDINGS: Limited single view of the abdomen performed primarily to evaluate enteric tube position. The tip projects over the expected area of the stomach. The bowel gas pattern is within normal limits.     Feeding tube with tip projecting over the expected area of the stomach.    Ir-picc Line Placement W/ Guidance > Age 5    Result Date: 1/28/2020  HISTORY/REASON FOR EXAM:   PICC placement. TECHNIQUE/EXAM DESCRIPTION AND NUMBER OF VIEWS:   PICC line insertion with ultrasound guidance.  The procedure was performed using maximal sterile barrier technique including sterile gown, mask, cap, and donning of sterile gloves following appropriate hand hygiene and/or sterile scrub. Patient skin site was prepped with 2% Chlorhexidine solution. FINDINGS:  PICC line insertion with Ultrasound Guidance was performed by qualified nursing staff without the assistance of a Radiologist. PICC positioning appropriateness confirmed by 3CG technology; chest xray only needed in the instance 3CG unable to confirm placement.              Ultrasound-guided PICC placement performed by qualified nursing staff as above.       ASSESSMENT/PLAN:     74 y.o.  admitted 1/16/2020. Pt has a  past medical history of diabetes,  CAD, ESRD on HD, CVA with left-sided hemiparesis and expressive aphasia.  Resides in skilled facility in [place] with new onset seizure activity and was brought to [facility] where she was intubated for airway protection, new CVA per  MRI and concern for meningitis per notes.  She was given acyclovir, ampicillin, Rocephin and vancomycin with persistent abnormal EEGs..  She was transferred to [facility] for higher level of care.  Patient is nonverbal and no family at bedside so history obtained from chart and hospitalist.     Hospital Course:   She was initially admitted to the ICU, now on the general medicine floor. Neurology followed for seizures with improvement on antiepileptics.  LP on 1/24 with WBC of 39 and 76% polys, meningoencephalitis panel sent and all negative.  Unable to wean of ventilator, tracheostomy in place since 2/2/20. She underwent EGD with PEG tube placed on 2/15.  She has ongoing abdominal wound with cultures positive for Candida only.  She remains relatively unresponsive, oxygen dependent, and team is been attempting to get her to skilled facility.  Due to this a TB quant was ordered and has returned strongly positive.  ID consulted due to positive TB quant in the setting of multiple abnormal lung films.       Assessment     Positive TB quant, abnormal chest x-rays and CT-films have been abnormal since admit-likely due to    chronic aspiration rather than pulmonary TB but will need to rule-out-she has been afebrile and stable on 4 L oxygen's would not start any antibiotics for pneumonia unless new symptoms, will need to be judicious in use of antibiotics as the patient has a history of C. difficile infection   -Unknown risk factors, or if any past history of TB, will need to discuss further with family   -TB quant  2/18 -positive, nil 0.07, TB1 6.69, TB2o 6.25, mitogen >10  -CT chest on 2/22 -atelectasis/consolidation, moderate pleural effusion    Oxygen  dependent,unable to wean since intubation in late January, tracheostomy in place-stable on 4 L T-piece  Pleural effusion, plan for thoracentesis today  -Bronchoscopy with BAL on 2/2-BAL cultures negative including negative AFB    Dysphagia due to CVA and chronic aspiration    Leukocytosis, mild and present since admit-likely multifactorial in nature    Status epilepticus, improved, likely due to new stroke - LP was abnormal but manage encephalitis panel negative and she had been on prophylactic broad-spectrum antibiotics at outside facility    Acute on chronic CVA, expressive aphasia and left side hemiparesis    Abdominal wound, PEG tube placed on 2/15  -Cultures with Mayte albicans    ESRD on hemodialysis  -Right side tunneled line    Diabetes mellitus    Sacral and coccyx pressure ulcers  -Wound care following    History of C. difficile infection  -C. difficile positive in 2016    Failure to thrive, poor prognosis - per notes multiple discussions with family but remains full code    Plan     --- Place in airborne isolation and rule out active pulmonary tuberculosis-patient has a negative AFB from BAL so will need 2 more respiratory samples to rule out  --- Please obtain cultures from thoracentesis, bacterial, AFB and fungal  --- No antibiotics at this time, see discussion above  --- If active pulmonary TB is ruled out could consider treatment for latent if this helps with placemen and if in keeping with goals of care-would need to weigh risks and potential side effects carefully before starting any therapy  --- Follow labs    Plan of care discussed with RON Irving M.D.. Will continue to follow    Mary Roque M.D.

## 2020-02-23 NOTE — ASSESSMENT & PLAN NOTE
"Quantiferon + , not active TB. Chest CT found positive left-sided consolidation with pleural effusion.  - sputum cx/ AFB stain negative  - s/p thora on 2/25; f/u pathology negative  - ID consulted, signed off  - AFB stain negative x3; remove isolation  - Per ID, we will \"treat for LTBI with  mg PO daily and B6 25 mg PO daily for 9 months if AFB cxs all negative at 6 weeks\"  Completed antibiotics treatment.  "

## 2020-02-23 NOTE — ASSESSMENT & PLAN NOTE
Left-sided thoracentesis with cell count and cultures have been ordered - pending thoracentesis 2/24/2020  AFB cultures have been ordered  We will follow this according to lights criteria

## 2020-02-24 NOTE — PROGRESS NOTES
[facility] Nephrology Progress Note    Date of Service  2/24/2020    AUTHOR: SHAHIDA Garcia.  Supervising Physician: Dr Meneses     Chief Complaint   Follow up ESRD    HPI  74 y.o. female admitted to [facility] on 1/14/20 with seizures.She was at a SNF.She had been previously hospitalized at [facility] and diaysis was initiated.  She was doing very poorly then and palliative care was discussed with the family,but they declined.She had very poor functional status and required Jimena lift to get into the dialysis chair.She was found to have  a CVA on MRI at [facility].Her seizures were not controlled and it was felt she was in status epilepticus.  She was getting HD at [facility].Last HD was on 1/13/20.She was seen by Dr Carcamo at [facility].Creatinine was 1.8 and dialysis was held. Neurology [place] that the patient needed continuous EEG monitoring and he was [place] [facility]    DAILY NEPHROLOGY SUMMARY:  Please see note from 1/31 for prior summaries  2/01 - NAEO, another EEG being done this AM  2/02 - NAEO, scheduled for Trach today  2/03 - No events, underwent trach yesterday, no change in neuro status, BP stable overnight but low this am, given midodrine, to have HD today  2/04 - No events, tolerated HD yest with 2.1L UF, BP stable this am  2/05 - No events, BP low prior to starting HD, given albumin at the start of HD and BP stable at this time  2/06 - No events, tolerated HD yesterday with 2.5L UF, BP stable and high at times, still with dependent edema  2/07 - No events, tolerated PUF yesterday with 2.5L UF and did not require midodrine with treatment, BP stable today, still with dependent edema, to have HD today  2/08 - No overnight events or changes.  On TF at goal via NGT.  No neurologic change.  On vent/trach.   2/09 - No significant change.  No events.  Liquid stool from rectal tube.  On Vent/Trach  2/10-  No new overnight renal events. +trach.  2/11- S/p HD yesterday with net UF of 2 L.   2/12- No new  overnight renal events. HD today.  2/13 - HD yesterday with 1.7L net UF, plan for PEG placement Saturday, LTACH placement pending, some hypotension   2/14 - On HD.  2/15 - Non communicative, HD yesterday 700cc UF  2/17 - Seen on HD today. 's. No new events.   2/19- Non verbal, nothing reported  2/21- Stage 3 decubitus ulcer  2/24 - Pt in TB rule out isolation, ID on board, for HD today     Review of Systems   Unable to perform ROS: Acuity of condition         Physical Exam  Temp:  [36.2 °C (97.1 °F)-37.2 °C (98.9 °F)] 36.8 °C (98.3 °F)  Pulse:  [72-96] 88  Resp:  [16-18] 18  BP: (106-156)/(40-74) 142/74  SpO2:  [94 %-98 %] 95 %    Physical Exam  Vitals signs and nursing note reviewed.   Constitutional:       General: She is not in acute distress.     Appearance: She is ill-appearing.   HENT:      Head: Normocephalic and atraumatic.      Nose: Nose normal. No congestion.      Mouth/Throat:      Mouth: Mucous membranes are moist.      Pharynx: No oropharyngeal exudate.   Eyes:      General: No scleral icterus.  Neck:      Comments: +Trach  Cardiovascular:      Rate and Rhythm: Normal rate and regular rhythm.      Heart sounds: Normal heart sounds.   Pulmonary:      Effort: Pulmonary effort is normal. No respiratory distress.      Breath sounds: Normal breath sounds.      Comments: T-piece, supp O2   Chest:      Chest wall: No tenderness.   Abdominal:      General: Bowel sounds are normal. There is no distension.      Palpations: Abdomen is soft.      Comments: Enteral nutrition    Genitourinary:     Comments: BMS  Musculoskeletal:         General: No swelling, tenderness or signs of injury.      Comments: +dependent edema   Lymphadenopathy:      Cervical: No cervical adenopathy.   Skin:     Findings: No rash.      Comments: Heel float boots   Stage 3 decubitus sacral not visualized   Neurological:      Comments: Not responsive   Psychiatric:      Comments: Unable to assess       Fluids    Intake/Output Summary  (Last 24 hours) at 2/24/2020 1123  Last data filed at 2/24/2020 0937  Gross per 24 hour   Intake 1160 ml   Output 1100 ml   Net 60 ml     Laboratory  Recent Labs     02/22/20  0240   WBC 13.8*   RBC 2.89*   HEMOGLOBIN 9.2*   HEMATOCRIT 29.4*   .7*   MCH 31.8   MCHC 31.3*   RDW 63.5*   PLATELETCT 232   MPV 9.4     Recent Labs     02/22/20  0240 02/24/20  0415   SODIUM 132* 135   POTASSIUM 3.8 3.7   CHLORIDE 97 97   CO2 29 28   GLUCOSE 192* 218*   BUN 38* 90*   CREATININE 0.92 1.67*   CALCIUM 8.5 8.9     IMPRESSION:  # ESRD   Dialysis today (MON)   Maintenance dialysis qMWF as OP at [facility]   mCrCl 6 by 24 hour urine - repeat study 2/13 mCrCl 4   Dialysis Dependent                # Status epilepticus  # S/P acute lacunar infarct   Hx of previous CVA with significant deficits.  # HTN--BP variable often with intradialytic hypotension  # DM II  # Acute Hypoxic Respiratory Failure   CXR 3/3 still w/ pulm edema/infiltrates  # Hx of dysphagia  # Anemia of CKD.Ferritin previously significantly elevated. CAT with HD.  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  # Stage 3 decubitus ulcer  # TB rule out, + TB quant and abnormal CXR/CT, ID on board     PLAN:    Maintenance dialysis qMWF and PRN.   UF w/ HD as tolerated.   Seizure management per Neurology   Enteral feedings   No dietary protein restrictions   Follow labs   Continue GOC discussions with family   Very poor prognosis, recommend comfort care, if and when family agreeable   Will see on MWF. Please call on other days w/ questions or concerns    Thank you,

## 2020-02-24 NOTE — PROGRESS NOTES
[facility] Progress Note     Hemodialysis treatment ordered today per Dr. Meneses x 3.5 hours.   Treatment initiated at 1304 ended at 1634.      Patient tolerated tx; see paper flow sheet for details.      Net UF 2000 mL.      Post tx, CVC flushed with saline then locked with heparin 1000 units/mL per designated amount in each wing then clamped and capped. Aspirate heparin prior to next CVC use.     Report given to Primary RN.

## 2020-02-24 NOTE — DISCHARGE PLANNING
Outpatient Dialysis Note    Patient has been discharged from [facility], made HD clinic aware patient is possibly discharging to [facility] and they will continue to follow patient there.    Heather Ramírez- Dialysis Coordinator  [facility] # [phone]

## 2020-02-24 NOTE — PROGRESS NOTES
Infectious Disease Progress Note    Author: Estella Light M.D. Date & Time of service: 2020  2:09 PM    Chief Complaint:  Positive TB quant and abnormal chest x-ray       Interval History:  74 y.o. female admitted 2020. Pt has a past medical history of diabetes,  CAD, ESRD on HD, CVA with left-sided hemiparesis and expressive aphasia    AF WBC 13.8somnolent No acute evnts    Labs Reviewed, Medications Reviewed and Radiology Reviewed.    Review of Systems:  Review of Systems   Unable to perform ROS: Patient nonverbal       Hemodynamics:  Temp (24hrs), Av.6 °C (97.9 °F), Min:36.1 °C (97 °F), Max:37.2 °C (98.9 °F)  Temperature: 36.1 °C (97 °F)  Pulse  Av.4  Min: 16  Max: 111   Blood Pressure : 131/63       Physical Exam:  Physical Exam  Vitals signs and nursing note reviewed.   Constitutional:       General: She is not in acute distress.     Appearance: She is not toxic-appearing or diaphoretic.      Comments: Chronically ill  Looks older than stated age   HENT:      Nose: No rhinorrhea.   Eyes:      General: No scleral icterus.  Cardiovascular:      Rate and Rhythm: Normal rate.   Pulmonary:      Effort: Pulmonary effort is normal. No respiratory distress.      Breath sounds: No stridor. No wheezing.      Comments: Decreased BS  Abdominal:      General: There is distension.      Palpations: Abdomen is soft.      Tenderness: There is no abdominal tenderness.   Musculoskeletal:      Comments: PICC   Skin:     General: Skin is warm.   Neurological:      Comments: Expressive aphasia         Meds:    Current Facility-Administered Medications:   •  heparin  •  doxazosin  •  aspirin  •  carvedilol  •  hydrALAZINE  •  omeprazole  •  divalproex  •  albumin human 25%  •  lacosamide  •  carboxymethylcellulose  •  vitamin B comp+C  •  folic acid  •  allopurinol  •  nystatin  •  hydrALAZINE  •  topiramate  •  atorvastatin  •  heparin  •  epoetin antoinette  •  Respiratory Therapy Consult  •   ipratropium-albuterol  •  senna-docusate **AND** polyethylene glycol/lytes **AND** [DISCONTINUED] magnesium hydroxide **AND** bisacodyl  •  Pharmacy  •  labetalol  •  acetaminophen    Labs:  Recent Labs     02/22/20 0240   WBC 13.8*   RBC 2.89*   HEMOGLOBIN 9.2*   HEMATOCRIT 29.4*   .7*   MCH 31.8   RDW 63.5*   PLATELETCT 232   MPV 9.4   NEUTSPOLYS 62.40   LYMPHOCYTES 18.90*   MONOCYTES 15.20*   EOSINOPHILS 1.60   BASOPHILS 0.50     Recent Labs     02/22/20 0240 02/24/20  0415   SODIUM 132* 135   POTASSIUM 3.8 3.7   CHLORIDE 97 97   CO2 29 28   GLUCOSE 192* 218*   BUN 38* 90*     Recent Labs     02/22/20 0240 02/24/20  0415   ALBUMIN 2.9* 2.9*   TBILIRUBIN 0.3 0.4   ALKPHOSPHAT 72 77   TOTPROTEIN 6.1 6.0   ALTSGPT 25 31   ASTSGOT 45 43   CREATININE 0.92 1.67*       Imaging:  Ct-chest (thorax) W/o    Result Date: 2/22/2020 2/22/2020 6:25 PM HISTORY/REASON FOR EXAM: Pulmonary infiltrates. TECHNIQUE/EXAM DESCRIPTION: CT scan of the chest without contrast. Thin-section helical images were obtained from the lung apices through the adrenal glands. Low dose optimization technique was utilized for this CT exam including automated exposure control and adjustment of the mA and/or kV according to patient size. COMPARISON: None FINDINGS: The study is limited due to non-use of intravenous contrast. The mediastinum and hilum is not well evaluated. There are bilateral areas of atelectasis/consolidation, left greater than right. There is a small right and a moderate left pleural effusion. There is extensive atherosclerotic vascular calcification. There is no evidence of mediastinal or hilar adenopathy. There is no evidence of pericardial effusion. There is ascites seen within the upper abdomen.     1.  Bilateral atelectasis/consolidation, left greater than right. 2.  Moderate left and small right pleural effusion. 3.  Atherosclerotic vascular calcification. 4.  Ascites within the upper abdomen.     Pm-cubimoo-1  View    Result Date: 2/7/2020 2/7/2020 6:49 PM HISTORY/REASON FOR EXAM: Feeding tube placement TECHNIQUE/EXAM DESCRIPTION AND NUMBER OF VIEWS: 1 supine views of the abdomen. COMPARISON: None FINDINGS: There is no evidence of bowel obstruction. Feeding tube tip overlies the gastric antrum. No abnormal calcifications are seen.     Feeding tube tip overlies the gastric antrum.    Dx-chest-portable (1 View)    Result Date: 2/3/2020    2/3/2020 12:19 AM HISTORY/REASON FOR EXAM: For indication of respiratory failure; For indication of respiratory failure TECHNIQUE/EXAM DESCRIPTION:  Single AP view of the chest. COMPARISON: Yesterday FINDINGS: Endotracheal tube has been removed in the interim.     Otherwise medical device position is stable. Cardiomegaly is observed. Atherosclerotic calcification of the aorta is noted.  The central  pulmonary vasculature appears prominent and indistinct. The lungs appear well expanded bilaterally.  Diffuse scattered hazy pulmonary parenchymal opacities are seen. No significant pleural effusions are identified. The bony structures appear age-appropriate.     1.  Pulmonary edema and/or infiltrates are identified, which are stable since the prior exam. 2.  Cardiomegaly 3.  Atherosclerosis    Dx-chest-portable (1 View)    Result Date: 2/2/2020 2/2/2020 12:29 AM HISTORY/REASON FOR EXAM: For indication of respiratory failure; For indication of respiratory failure TECHNIQUE/EXAM DESCRIPTION:  Single AP view of the chest. COMPARISON: Yesterday FINDINGS: Position of medical devices appears stable. Cardiomegaly is observed. Atherosclerotic calcification of the aorta is noted.  The central  pulmonary vasculature appears prominent and indistinct. The lungs appear well expanded bilaterally.  Diffuse scattered hazy pulmonary parenchymal opacities are seen. No significant pleural effusions are identified. The bony structures appear age-appropriate.     1.  Pulmonary edema and/or infiltrates are  identified, which are stable since the prior exam. 2.  Cardiomegaly 3.  Atherosclerosis      Us-abdomen Ltd (soft Tissue)    Result Date: 2/12/2020 2/12/2020 2:04 PM HISTORY/REASON FOR EXAM:  Needs peg, hx of ascites TECHNIQUE/EXAM DESCRIPTION: Limited abdominal ultrasound. COMPARISON: None available. FINDINGS: Focus ultrasound of the 4 quadrants of the abdomen demonstrates trace free fluid in the pelvis adjacent to the urinary bladder. Decompressed urinary bladder by River catheter..     1. Trace free fluid in the pelvis. No abdominal ascites.      Micro:  Results     Procedure Component Value Units Date/Time    AFB Culture [[id number]]     Order Status:  No result Specimen:  Respirate from Tracheal Aspirate     AFB Culture [[id number]]     Order Status:  No result Specimen:  Respirate from Tracheal Aspirate     FLUID CULTURE W/GRAM STAIN [[id number]]     Order Status:  No result Specimen:  Body Fluid from Thoracentesis Fluid     AFB CULTURE [[id number]]     Order Status:  No result Specimen:  Body Fluid from Thoracentesis Fluid     FUNGAL CULTURE [[id number]]     Order Status:  No result Specimen:  Body Fluid from Thoracentesis Fluid     CULTURE WOUND W/ GRAM STAIN [[id number]]  (Abnormal) Collected:  02/20/20 1145    Order Status:  Completed Specimen:  Wound from Abdominal Updated:  02/22/20 0835     Significant Indicator POS     Source WND     Site ABDOMINAL     Culture Result Rare growth mixed skin shiva.     Gram Stain Result Moderate WBCs.  No organisms seen.       Culture Result Candida albicans  Moderate growth      Narrative:       Collected By:[id number] BASHIR BUI  Collected By:[id number] BASHIR BUI    GRAM STAIN [[id number]] Collected:  02/20/20 1145    Order Status:  Completed Specimen:  Wound Updated:  02/20/20 2200     Significant Indicator .     Source WND     Site ABDOMINAL     Gram Stain Result Moderate WBCs.  No organisms seen.      Narrative:       Collected By:[id number] BASHIR OLSEN  L  Collected By:[id number] [place] PRETTY HAO          Assessment:  Active Hospital Problems    Diagnosis   • *Status epilepticus ([facility]) [G40.901]   • Acute respiratory failure with hypoxia ([facility]) [J96.01]   • Hypokalemia [E87.6]   • End stage renal failure on dialysis ([facility]) [N18.6, Z99.2]   • Cerebrovascular accident (CVA) due to embolism of right middle cerebral artery ([facility]) [I63.411]   • Severe protein-calorie malnutrition ([facility]) [E43]   • Normocytic anemia [D64.9]   • DM (diabetes mellitus) ([facility]) [E11.9]   • Hypertension [I10]   • Pressure injury of sacrum, coccyx, stage 3 ([facility]) [L89.303]   • Hypomagnesemia [E83.42]   • Dysphagia as late effect of cerebrovascular accident (CVA) [I69.391]   • Gout [M10.9]   • Pleural effusion [J90]   • TB lung, latent [Z22.7]   • Vomiting [R11.10]   • Goals of care, counseling/discussion [Z71.89]       Plan:  Positive TB quant, abnormal chest x-rays and CT-  Abnormal since admit-more likely due to chronic aspiration rather than pulmonary TB but will need to rule-out  Afebrile and stable on 4 L oxygen's   Unknown risk factors, or if any past history of TB, will need to discuss further with family   TB quant  2/18 -positive, nil 0.07, TB1 6.69, TB2o 6.25, mitogen >10  CT chest on 2/22 -atelectasis/consolidation, moderate pleural effusion  Continue to FU sputums for AFB and cx     Oxygen dependent,unable to wean since intubation in late January, tracheostomy in place-stable on 4 L T-piece    Pleural effusion  S/p thoracentesis today  Bronchoscopy with BAL on 2/2-BAL   Cultures negative including negative AFB     Dysphagia due to CVA   Chronic aspiration     Leukocytosis  Multifactorial     Status epilepticus, improved  Acute on chronic CVA, expressive aphasia and left side hemiparesis     Abdominal wound, PEG tube placed on 2/15  -Cultures with Mayte albicans     ESRD on hemodialysis  -Right side tunneled line     Diabetes mellitus     Sacral and coccyx pressure ulcers  Wound care       History of C. difficile infection  C. difficile positive in 2016     Failure to thrive, poor prognosis - per notes multiple discussions with family but remains full code     Given poor prognosis risks of treatment for LTBI may outweigh benefit if AFB cxs all neg at 6 weeks

## 2020-02-24 NOTE — DISCHARGE PLANNING
Anticipated Discharge Disposition: TBD     Action: LSW received call from [facility]. [facility] is stating that they never received referral.     LSW notified [facility].     Barriers to Discharge: none     Plan: LSW will continue to assess for discharge needs.

## 2020-02-24 NOTE — DISCHARGE PLANNING
Anticipated Discharge Disposition: TBD     Action: LSW left vm for TOSHA. LSW called Harsha from [facility]. Harsha stated that he would call LSW back with an answer.     Barriers to Discharge: none     Plan: LSW will continue to assess for any discharge needs.

## 2020-02-24 NOTE — PROGRESS NOTES
2 RN skin check complete with RNCarrie.  Devices in place: trach, trach collar, scds, peg tube, rectal tube, hernandez  Skin assessed under devices: yes  Confirmed pressure ulcers found on:  Coccyx  New potential pressure ulcers noted on: NA.   The following interventions in place: q2 turns, mepilex with dressing changes q48 hrs, bms, barrier cream, keeping linens clean and dry.    - Blanching redness under trach collar  - Scab below right side of trach  - Scab on left upper chest  - Bilateral upper arm bruising  - PEG tube site red and hard  - Redness to panus, groin, and buttocks  - Wound to sacrum  - Discoloration area to right shin  - Heels are pink and boggy  - Second digit on right foot has a small scab

## 2020-02-24 NOTE — PROGRESS NOTES
HD has 2.5 hours left- spoke with Martin from ultrasound, plan for thoracentesis tomorrow. 2RN consent signed for thoracentisis with verbal permission via telephone from patient's daughter Belen Nguyễn.

## 2020-02-24 NOTE — PROGRESS NOTES
Hospital Medicine Daily Progress Note    Date of Service  2/24/2020    Chief Complaint  74 y.o. female admitted 1/16/2020 with altered mental status    Hospital Course    Ms. Beltran is a 74 y.o. female admitted to [facility] on 1/14/20 with seizures.She was at a SNF.She had been previously hospitalized at [facility] and diaysis was initiated.  She was doing very poorly then and palliative care was discussed with the family,but they declined.She had very poor functional status and required Jimena lift to get into the dialysis chair.She was found to have  a CVA on MRI at [facility].Her seizures were not controlled and it was felt she was in status epilepticus.  She was getting HD at [facility].Last HD was on 1/13/20.She was seen by Dr Carcamo at [facility].Creatinine was 1.8 and dialysis was held. Neurology Fitzhugh that the patient needed continuous EEG monitoring and he was transferred to [facility]. Patient intubated due to seizures and was eventually transitioned to tracheostomy on 2/2/2020 and off the vent on 2/5/2020. Family at this time does not want to change CODE status. Patient remains unresponsive. LTAC being pursued. PEG tube in place.         Interval Problem Update  -Patient seen and examined today. Reports of patient having emesis while on TF at 40mL/hr. Patient unresponsive and not responding to stimuli. I personally called Daughter, Belen Nguyễn [phone] to discuss care with patient.  -Plan: still on isolation d/t positive quatiferon gold; r/o TB on sputum culture; pending thoracentesis for pleural effusion.  -Lab work today unremarkable and expected; stable at this time.  -Will recheck CBC and CMP in the morning    Consultants/Specialty  -Palliative  -Critical care - signed off  -Neurology - signed off  -Nephrology  -ID     Code Status  FULL    Disposition  TBD    Review of Systems  Review of Systems   Unable to perform ROS: Other    Patient unresponsive and unable to participate d/t medical condition    Physical  Exam  Temp:  [36.1 °C (97 °F)-37.2 °C (98.9 °F)] 36.3 °C (97.3 °F)  Pulse:  [72-96] 74  Resp:  [16-18] 18  BP: (106-156)/(40-74) 137/65  SpO2:  [94 %-98 %] 98 %    Physical Exam  Vitals signs and nursing note reviewed.   HENT:      Head: Normocephalic.      Mouth/Throat:      Mouth: Mucous membranes are moist.   Cardiovascular:      Rate and Rhythm: Normal rate and regular rhythm.      Pulses: Normal pulses.      Heart sounds: Normal heart sounds.   Pulmonary:      Comments: Trach in place  Abdominal:      General: Bowel sounds are normal.      Comments: PEG tube in place TF at 40mL.hr at goal   Skin:     General: Skin is warm.      Capillary Refill: Capillary refill takes less than 2 seconds.         Fluids    Intake/Output Summary (Last 24 hours) at 2/24/2020 1425  Last data filed at 2/24/2020 1413  Gross per 24 hour   Intake 1190 ml   Output 1100 ml   Net 90 ml       Laboratory  Recent Labs     02/22/20  0240   WBC 13.8*   RBC 2.89*   HEMOGLOBIN 9.2*   HEMATOCRIT 29.4*   .7*   MCH 31.8   MCHC 31.3*   RDW 63.5*   PLATELETCT 232   MPV 9.4     Recent Labs     02/22/20  0240 02/24/20  0415   SODIUM 132* 135   POTASSIUM 3.8 3.7   CHLORIDE 97 97   CO2 29 28   GLUCOSE 192* 218*   BUN 38* 90*   CREATININE 0.92 1.67*   CALCIUM 8.5 8.9                   Imaging  CT-CHEST (THORAX) W/O   Final Result      1.  Bilateral atelectasis/consolidation, left greater than right.      2.  Moderate left and small right pleural effusion.      3.  Atherosclerotic vascular calcification.      4.  Ascites within the upper abdomen.            US-ABDOMEN LTD (SOFT TISSUE)   Final Result         1. Trace free fluid in the pelvis. No abdominal ascites.      DX-ABDOMEN FOR TUBE PLACEMENT   Final Result         Feeding tube with tip projecting over the expected area of the stomach.      [id number] VIEW   Final Result      Feeding tube tip overlies the gastric antrum.      DX-CHEST-PORTABLE (1 VIEW)   Final Result         1.  Pulmonary  edema and/or infiltrates are identified, which are stable since the prior exam.   2.  Cardiomegaly   3.  Atherosclerosis      DX-CHEST-PORTABLE (1 VIEW)   Final Result         1.  Pulmonary edema and/or infiltrates are identified, which are stable since the prior exam.   2.  Cardiomegaly   3.  Atherosclerosis      DX-CHEST-PORTABLE (1 VIEW)   Final Result      1.  Unchanged left lower lobe atelectasis or pneumonia.      2.  Clear right lung.      DX-CHEST-PORTABLE (1 VIEW)   Final Result      Unchanged LEFT basilar atelectasis and/or airspace disease      DX-CHEST-PORTABLE (1 VIEW)   Final Result      Left basilar atelectasis, hilar and cardiac silhouette enlargement as before      DX-CHEST-PORTABLE (1 VIEW)   Final Result      Interval removal of a left subclavian central line. Stable patchy bilateral infiltrates.      IR-PICC LINE PLACEMENT W/ GUIDANCE > AGE 5   Final Result                  Ultrasound-guided PICC placement performed by qualified nursing staff as    above.          DX-CHEST-PORTABLE (1 VIEW)   Final Result      1.  Multiple support devices present.      2.  Patchy bilateral atelectasis.      DX-CHEST-PORTABLE (1 VIEW)   Final Result      Stable chest with retrocardiac opacity from atelectasis and/or pleural fluid favored over consolidation      DX-CHEST-PORTABLE (1 VIEW)   Final Result      Stable chest with retrocardiac opacity from atelectasis and/or pleural fluid favored over consolidation      DX-CHEST-PORTABLE (1 VIEW)   Final Result         No significant change from prior.               DX-CHEST-PORTABLE (1 VIEW)   Final Result         1. Stable lines and tubes..   2. Stable retrocardiac and left perihilar atelectasis versus consolidation. Suspected small left pleural effusion.            DX-CHEST-PORTABLE (1 VIEW)   Final Result         1. Stable lines and tubes..   2. Stable retrocardiac atelectasis versus consolidation with increased left perihilar opacity. Suspected trace left pleural  effusion.         [id number] VIEW   Final Result      1.  Enteric tube has been placed and the tip projects over the stomach.      2.  Pre-existing feeding tube tip projects at the gastroduodenal junction      DX-CHEST-PORTABLE (1 VIEW)   Final Result         1. Lines and tubes as above.   2. Stable retrocardiac atelectasis versus consolidation. Suspected trace left pleural effusion.         MR-BRAIN-WITH & W/O   Final Result      1.  Moderate cerebral atrophy.   2.  Evidence of intraventricular hemorrhage, indeterminate age. Possibly chronic intraventricular hemosiderin deposition.   3.  Extensive encephalomalacic change with hemosiderin deposition involving the right corona radiata, basal ganglia, posterior thalamus, subthalamic region, bordering the right cerebral peduncle. Associated ex vacuo dilatation of the body of the right    lateral ventricle. No change.   4.  Additional foci of old microhemorrhage most consistent with old hypertensive microhemorrhage or amyloid angiopathy in the left basal ganglia, left thalamus, and midline upper ventral abel.   5.  Punctate focus of acute infarction in the right parietal deep white matter. No change.   6.  Advanced supratentorial white matter disease most consistent with microvascular ischemic change.   7.  Encephalomalacic changes in the abel and right cerebral peduncle consistent with old infarction.   8.  Partially empty sella.   9.  Overall, no new findings and no significant change from 1/14/2020.      DX-CHEST-PORTABLE (1 VIEW)   Final Result         1. Stable lines and tubes.   2. Unchanged retrocardiac atelectasis versus consolidation.   3. Stable trace left pleural effusion.         OUTSIDE IMAGES-DX CHEST   Final Result      OUTSIDE IMAGES-US VASCULAR   Final Result      OUTSIDE IMAGES-MR BRAIN   Final Result      OUTSIDE IMAGES-DX CHEST   Final Result      OUTSIDE IMAGES-CT HEAD   Final Result      EC-ECHOCARDIOGRAM COMPLETE W/O CONT   Final Result       DX-CHEST-FOR LINE PLACEMENT Perform procedure in: Patient's Room   Final Result         1.  Retrocardiac opacity concerning for infiltrate, stable.   2.  Trace left pleural effusion, stable   3.  Cardiomegaly   4.  Atherosclerosis   5.  Perihilar interstitial prominence and bronchial wall cuffing, appearance suggests changes of underlying bronchial inflammation, consider bronchitis.      DX-ABDOMEN FOR TUBE PLACEMENT   Final Result         1.  Nonspecific bowel gas pattern.   2.  Dobbhoff tube tip overlying the expected location of the pylorus or first duodenal segment.   3.  Left lung base atelectasis and/or small effusion      DX-CHEST-PORTABLE (1 VIEW)   Final Result         1.  Retrocardiac opacity concerning for infiltrate.   2.  Trace left pleural effusion, stable   3.  Cardiomegaly   4.  Atherosclerosis      [id number] FILM X-RAY   Final Result      OUTSIDE IMAGES-DX CHEST   Final Result      US-THORACENTESIS PUNCTURE LEFT    (Results Pending)        Assessment/Plan  * Status epilepticus (HCC)- (present on admission)  Assessment & Plan  -Was intubated for airway protection, now trached since 2/2.   -Continuing vimpat, topamax, depakote.  Repeat EEG here showed no seizures. Neurology gave the opinion that likelihood of patient returning to baseline was low, and that the likelihood of the patient returning to full independent function was essentially 0.    Acute respiratory failure with hypoxia (HCC)- (present on admission)  Assessment & Plan  - stable on trach/T-piece.   - continue RT protocol, O2 supplement.  -Continue hemodialysis to prevent fluid overload.    Hypokalemia  Assessment & Plan  -Replace with enteral K-Dur.  BMP in the morning.    Severe protein-calorie malnutrition (HCC)- (present on admission)  Assessment & Plan  -Continue tube feeding.    Cerebrovascular accident (CVA) due to embolism of right middle cerebral artery (HCC)- (present on admission)  Assessment & Plan  -Continue statin  and aspirin.    -No PT/OT needs as unable to participate, and likely at her new baseline.      End stage renal failure on dialysis ([facility])- (present on admission)  Assessment & Plan  -Continue hemodialysis MWF.  Nephrology on board.  - Likely permanent; poor prognosis per nephrology, they recommend comfort care.  - avoid nephrotoxins, and continue to renally dose all medications.    Normocytic anemia- (present on admission)  Assessment & Plan  -Likely anemia of chronic kidney disease.  FOBT is negative.   -Hgb stable.   -Continue to trend H&H, transfuse if drops below 7.      Hypertension- (present on admission)  Assessment & Plan  -Maintaining good control.  -Continue hydralazine, cardura, coreg (home meds).  Monitor blood pressure trend closely.        DM (diabetes mellitus) ([facility])- (present on admission)  Assessment & Plan  -No need for coverage.  Continue to monitor.     Pleural effusion  Assessment & Plan  Left-sided thoracentesis with cell count and cultures have been ordered - pending thoracentesis 2/24/2020  AFB cultures have been ordered  We will follow this according to lights criteria    TB lung, latent  Assessment & Plan  -quantiferon + and will have to rule out active TB; pending sputum cx  -Pulmonary opacities seen on last chest x ray and I will order a CT chest to confirm  -Chest CT found positive left-sided consolidation with pleural effusion  -Otherwise she will need to be treated to latent TB  -Patient had one BAL with negative AFB culture and we will need 2 additional cultures to rule out active TB  -Airborne isolation  -ID has been consulted    Goals of care, counseling/discussion- (present on admission)  Assessment & Plan  - There have been multiple discussions with palliative/treatment team and family, to no avail. Patient remains full code, has trach and PEG.    Pressure injury of sacrum, coccyx, stage 3 ([facility])- (present on admission)  Assessment & Plan  -Wound care per wound service. Continue  pressure offloading strategies.    Hypomagnesemia- (present on admission)  Assessment & Plan  -Continue to monitor.     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 Maintain on aspiration precautions.  -1 episode of emesis at night    Gout- (present on admission)  Assessment & Plan  -Continue with allopurinol per the PEG tube.       VTE prophylaxis: SCDs  -----------------------------------------------------------------------------------------------------------------------------------------------------------------------  Please note that this dictation was created using voice recognition software. I have made every reasonable attempt to correct obvious errors, but there may be errors of grammar and possibly content that I did not discover before finalizing the note.    Electronically signed by:  JONNIE Rowan, MSN, APRN, FNP-C  Hospitalist Services  [facility]  [phone]  [email or web address]  02/24/20    [id number]

## 2020-02-24 NOTE — DISCHARGE PLANNING
Anticipated Discharge Disposition: TBD     Action: LSW received vm from [facility]. [facility] stated that they could not take her.     Barriers to Discharge: none     Plan: LSW will continue to assess for any discharge needs.

## 2020-02-24 NOTE — PROGRESS NOTES
Received report from Day RN on neuro at shift change. Pt transferred to the floor at 2000 and placed in room on airborne precautions.  One episode of emesis shortly after arrival to the floor. No signs of distress. Suction setup. CPU in place. PEG tube not running when pt arrived to the floor, No Impact Peptide available on the floor and had to be ordered. PEG tube flushed, medications provided through PEG, and feeding was restarted at 25 mL/hr at 2330, will continue to advance as tolerated. PEG tube dressing changed.  2 RN skin check completed.  BMS flushed with 90 mL.  Wound cleansed and dressing changed to sacrum. Q2 turns in place.      Pt has been non responsive to RN.  She does not appear to be in any pain.  Family at beside for a couple hours this evening, with lots of questions and worries about patient being in such an isolated room.  Continuously asking when the isolation would be lifted and that [place] had done all this TB testing in December and that she was negative. RN tried to be supportive of family's needs and answer as many questions as able.

## 2020-02-25 NOTE — PROGRESS NOTES
Infectious Disease Progress Note    Author: Estella Light M.D. Date & Time of service: 2020  2:50 PM    Chief Complaint:  Positive TB quant and abnormal chest x-ray       Interval History:  74 y.o. female admitted 2020. 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 No clinical change    Labs Reviewed, Medications Reviewed and Radiology Reviewed.    Review of Systems:  Review of Systems   Unable to perform ROS: Patient nonverbal       Hemodynamics:  Temp (24hrs), Av.7 °C (98 °F), Min:36.3 °C (97.3 °F), Max:37 °C (98.6 °F)  Temperature: 37 °C (98.6 °F)  Pulse  Av.4  Min: 16  Max: 111   Blood Pressure : 129/68       Physical Exam:  Physical Exam  Vitals signs and nursing note reviewed.   Constitutional:       General: She is not in acute distress.     Appearance: She is ill-appearing. She is not toxic-appearing or diaphoretic.      Comments: Chronically ill  Looks older than stated age   Eyes:      General: No scleral icterus.     Extraocular Movements: Extraocular movements intact.      Pupils: Pupils are equal, round, and reactive to light.   Cardiovascular:      Rate and Rhythm: Normal rate.   Pulmonary:      Effort: Pulmonary effort is normal. No respiratory distress.      Breath sounds: No stridor. Rhonchi present. No wheezing.      Comments: Decreased BS  Abdominal:      General: There is distension.      Palpations: Abdomen is soft.      Tenderness: There is no abdominal tenderness.   Musculoskeletal:      Comments: PICC   Skin:     General: Skin is warm.      Coloration: Skin is not jaundiced.   Neurological:      Comments: Expressive aphasia         Meds:    Current Facility-Administered Medications:   •  heparin  •  doxazosin  •  aspirin  •  carvedilol  •  hydrALAZINE  •  omeprazole  •  divalproex  •  albumin human 25%  •   lacosamide  •  carboxymethylcellulose  •  vitamin B comp+C  •  folic acid  •  allopurinol  •  nystatin  •  hydrALAZINE  •  topiramate  •  atorvastatin  •  heparin  •  epoetin antoinette  •  Respiratory Therapy Consult  •  ipratropium-albuterol  •  senna-docusate **AND** polyethylene glycol/lytes **AND** [DISCONTINUED] magnesium hydroxide **AND** bisacodyl  •  Pharmacy  •  labetalol  •  acetaminophen    Labs:  Recent Labs     02/25/20  0530   WBC 14.5*   RBC 2.89*   HEMOGLOBIN 9.2*   HEMATOCRIT 29.1*   .7*   MCH 31.8   RDW 62.8*   PLATELETCT 193   MPV 9.3     Recent Labs     02/24/20  0415 02/25/20  0530   SODIUM 135 130*   POTASSIUM 3.7 3.9   CHLORIDE 97 93*   CO2 28 30   GLUCOSE 218* 218*   BUN 90* 53*     Recent Labs     02/24/20  0415 02/25/20  0530   ALBUMIN 2.9*  --    TBILIRUBIN 0.4  --    ALKPHOSPHAT 77  --    TOTPROTEIN 6.0  --    ALTSGPT 31  --    ASTSGOT 43  --    CREATININE 1.67* 1.06       Imaging:  Ct-chest (thorax) W/o    Result Date: 2/22/2020 2/22/2020 6:25 PM HISTORY/REASON FOR EXAM: Pulmonary infiltrates. TECHNIQUE/EXAM DESCRIPTION: CT scan of the chest without contrast. Thin-section helical images were obtained from the lung apices through the adrenal glands. Low dose optimization technique was utilized for this CT exam including automated exposure control and adjustment of the mA and/or kV according to patient size. COMPARISON: None FINDINGS: The study is limited due to non-use of intravenous contrast. The mediastinum and hilum is not well evaluated. There are bilateral areas of atelectasis/consolidation, left greater than right. There is a small right and a moderate left pleural effusion. There is extensive atherosclerotic vascular calcification. There is no evidence of mediastinal or hilar adenopathy. There is no evidence of pericardial effusion. There is ascites seen within the upper abdomen.     1.  Bilateral atelectasis/consolidation, left greater than right. 2.  Moderate left and small  right pleural effusion. 3.  Atherosclerotic vascular calcification. 4.  Ascites within the upper abdomen.     Gh-[id number] View    Result Date: 2/7/2020 2/7/2020 6:49 PM HISTORY/REASON FOR EXAM: Feeding tube placement TECHNIQUE/EXAM DESCRIPTION AND NUMBER OF VIEWS: 1 supine views of the abdomen. COMPARISON: None FINDINGS: There is no evidence of bowel obstruction. Feeding tube tip overlies the gastric antrum. No abnormal calcifications are seen.     Feeding tube tip overlies the gastric antrum.    Dx-chest-portable (1 View)    Result Date: 2/3/2020    2/3/2020 12:19 AM HISTORY/REASON FOR EXAM: For indication of respiratory failure; For indication of respiratory failure TECHNIQUE/EXAM DESCRIPTION:  Single AP view of the chest. COMPARISON: Yesterday FINDINGS: Endotracheal tube has been removed in the interim.     Otherwise medical device position is stable. Cardiomegaly is observed. Atherosclerotic calcification of the aorta is noted.  The central  pulmonary vasculature appears prominent and indistinct. The lungs appear well expanded bilaterally.  Diffuse scattered hazy pulmonary parenchymal opacities are seen. No significant pleural effusions are identified. The bony structures appear age-appropriate.     1.  Pulmonary edema and/or infiltrates are identified, which are stable since the prior exam. 2.  Cardiomegaly 3.  Atherosclerosis    Dx-chest-portable (1 View)    Result Date: 2/2/2020 2/2/2020 12:29 AM HISTORY/REASON FOR EXAM: For indication of respiratory failure; For indication of respiratory failure TECHNIQUE/EXAM DESCRIPTION:  Single AP view of the chest. COMPARISON: Yesterday FINDINGS: Position of medical devices appears stable. Cardiomegaly is observed. Atherosclerotic calcification of the aorta is noted.  The central  pulmonary vasculature appears prominent and indistinct. The lungs appear well expanded bilaterally.  Diffuse scattered hazy pulmonary parenchymal opacities are seen. No significant pleural  effusions are identified. The bony structures appear age-appropriate.     1.  Pulmonary edema and/or infiltrates are identified, which are stable since the prior exam. 2.  Cardiomegaly 3.  Atherosclerosis      Us-abdomen Ltd (soft Tissue)    Result Date: 2/12/2020 2/12/2020 2:04 PM HISTORY/REASON FOR EXAM:  Needs peg, hx of ascites TECHNIQUE/EXAM DESCRIPTION: Limited abdominal ultrasound. COMPARISON: None available. FINDINGS: Focus ultrasound of the 4 quadrants of the abdomen demonstrates trace free fluid in the pelvis adjacent to the urinary bladder. Decompressed urinary bladder by River catheter..     1. Trace free fluid in the pelvis. No abdominal ascites.      Micro:  Results     Procedure Component Value Units Date/Time    AFB Culture [[id number]] Collected:  02/25/20 0330    Order Status:  Completed Specimen:  Respirate Updated:  02/25/20 1450     Significant Indicator NEG     Source RESP     Site Tracheal Aspirate     Culture Result Culture in progress.     AFB Smear Results -    Narrative:       Ordered from hard req; order #: [id number].    AFB Culture [[id number]] Collected:  02/25/20 1100    Order Status:  Completed Specimen:  Body Fluid Updated:  02/25/20 1450     Significant Indicator NEG     Source BF     Site Pleural Fluid     Culture Result Culture in progress.     AFB Smear Results -    FLUID CULTURE W/GRAM STAIN [[id number]] Collected:  02/25/20 1100    Order Status:  Completed Specimen:  Other Updated:  02/25/20 1202    Fungal Culture [[id number]] Collected:  02/25/20 1100    Order Status:  Completed Specimen:  Other Updated:  02/25/20 1202    AFB Culture [[id number]]     Order Status:  No result Specimen:  Respirate from Tracheal Aspirate     AFB Culture [[id number]]     Order Status:  No result Specimen:  Respirate from Tracheal Aspirate     FLUID CULTURE W/GRAM STAIN [[id number]]     Order Status:  No result Specimen:  Body Fluid from Thoracentesis Fluid     AFB CULTURE [[id number]]     Order Status:   No result Specimen:  Body Fluid from Thoracentesis Fluid     FUNGAL CULTURE [[id number]]     Order Status:  No result Specimen:  Body Fluid from Thoracentesis Fluid     CULTURE WOUND W/ GRAM STAIN [[id number]]  (Abnormal) Collected:  02/20/20 1145    Order Status:  Completed Specimen:  Wound from Abdominal Updated:  02/22/20 0835     Significant Indicator POS     Source WND     Site ABDOMINAL     Culture Result Rare growth mixed skin shiva.     Gram Stain Result Moderate WBCs.  No organisms seen.       Culture Result Candida albicans  Moderate growth      Narrative:       Collected By:[id number] BASHIR BUI  Collected By:[id number] BASHIR BUI    GRAM STAIN [[id number]] Collected:  02/20/20 1145    Order Status:  Completed Specimen:  Wound Updated:  02/20/20 2200     Significant Indicator .     Source WND     Site ABDOMINAL     Gram Stain Result Moderate WBCs.  No organisms seen.      Narrative:       Collected By:[id number] BASHIR BUI  Collected By:[id number] BASHIRYA BUI          Assessment:  Active Hospital Problems    Diagnosis   • *Status epilepticus ([facility]) [G40.901]   • Acute respiratory failure with hypoxia ([facility]) [J96.01]   • Hypokalemia [E87.6]   • End stage renal failure on dialysis ([facility]) [N18.6, Z99.2]   • Cerebrovascular accident (CVA) due to embolism of right middle cerebral artery ([facility]) [I63.411]   • Severe protein-calorie malnutrition ([facility]) [E43]   • Normocytic anemia [D64.9]   • DM (diabetes mellitus) ([facility]) [E11.9]   • Hypertension [I10]   • Pressure injury of sacrum, coccyx, stage 3 ([facility]) [L89.303]   • Hypomagnesemia [E83.42]   • Dysphagia as late effect of cerebrovascular accident (CVA) [I69.391]   • Gout [M10.9]   • Pleural effusion [J90]   • TB lung, latent [Z22.7]   • Vomiting [R11.10]   • Goals of care, counseling/discussion [Z71.89]       Plan:  Positive TB quant, abnormal chest x-rays and CT-  Abnormal since admit-more likely due to chronic aspiration rather than pulmonary TB but  will need to rule-out  Afebrile   +leukocytosis  TB quant  2/18 -positive  CT chest on 2/22 -atelectasis/consolidation, moderate pleural effusion  Continue to FU sputums for AFB and cx  Treat for LTBI with  mg PO daily and B6 25 mg PO daily for 9 months if AFB cxs all negative at 6 weeks    Pleural effusion  S/p thoracentesis   Bronchoscopy with BAL on 2/2-BAL   Cultures negative including negative AFB to date     Dysphagia due to CVA   Chronic aspiration     Leukocytosis  Multifactorial     Status epilepticus, improved  Acute on chronic CVA, expressive aphasia and left side hemiparesis     Abdominal wound, PEG tube placed on 2/15  -Cultures with Mayte albicans     ESRD on hemodialysis  -Right side tunneled line     Diabetes mellitus  Keep BS under 150 to help control current infection     Sacral and coccyx pressure ulcers  Wound care      History of C. difficile infection  C. difficile positive in 2016     Failure to thrive, poor prognosis - per notes multiple discussions with family but remains full code and no plan for hospice

## 2020-02-25 NOTE — PROGRESS NOTES
2 RN skin check completed with MIC Cintron.   Devices in place: Permacath, PICC, trach with collar, peg tube, rectal tube, hernandez, SCDs, cortrak.  Skin assessed under devices: yes.  Confirmed pressure ulcers found: coccyx.  New potential pressure ulcers noted: none. Wound consult placed:  n/a.    Skin assessment: Generalized bruising, sacral wound, heels pink/boggy, small scab 2nd digit on right foot, right shin discoloration, sacrum/groin/pannus redness, PEG tube site hard/red, left upper chest scab, right side below trach scab, under trach collar red/blanching.    The following interventions in place: barrier cream, q2h turns, mepilex (dressing hcanges q48hrs), BMS, bed linens kept clean/dry and changed PRN. Pillows for support/positioning. Bilateral heel float boots.

## 2020-02-25 NOTE — PROGRESS NOTES
Pt fatigued, resting quietly in bed. LOPEZ to assess orientation, pt does not follow commands but does open her eyes when named called. River catheter and rectal tube patent and draining without difficulty. PEG tube patent and tolerating TF. Q2h turns in place. No s/sx pain/discomfort noted at this time. Family visiting with pt. Safety precautions and hourly rounding in place.

## 2020-02-25 NOTE — PROGRESS NOTES
LOPEZ pt orientation, opening eyes to speech but not answering questions or following commands. River and rectal tube in place. Using PEG for medications, dressing assessed, CDI. Tolerating TF at goal 40ml/hr. Dressing on sacrum removed and changed per order. Q2hr turns and repositioning. Airborne precautions in place. Hourly rounding completed, will continue to monitor. All needs met at this time. Family at bedside

## 2020-02-25 NOTE — PROGRESS NOTES
PHYSICIAN: DR OCHOA   TECH: VERONICA     PROCEDURE: ULTRASOUND GUIDED THORACENTESIS LT     PT PROCEDURE WAS DONE AT BEDSIDE. 400 ML OF FLUID WAS REMOVED FROM PT LT THORAX. 400 ML OF FLUID WAS SENT TO LAB. PT APPEARED TO BE IN STABLE CONDITION. VITALS WERE MONITORED AT BEDSIDE. XRAY POST THORA WAS ORDERED TO BE DONE AT BEDSIDE AFTER THE PROCEDURE.

## 2020-02-25 NOTE — DISCHARGE PLANNING
Anticipated Discharge Disposition: TBD     Action: LSW left vm for dtr.     Barriers to Discharge: placement     Plan: LSW will continue to assess for any discharge needs.

## 2020-02-25 NOTE — DISCHARGE PLANNING
Agency/Facility Name: [facility]  Outcome: Denied referral per JEAN Duncan.    Agency/Facility Name: [facility]  Spoke To: Harsha  Outcome: Decline referral.  Do not meet qualifications.    [facility] notified.

## 2020-02-25 NOTE — CARE PLAN
Problem: Safety  Goal: Will remain free from injury  Outcome: PROGRESSING AS EXPECTED  Goal: Will remain free from falls  Outcome: PROGRESSING AS EXPECTED    Pt bed alarm in place, safety maintained     Problem: Communication  Goal: The ability to communicate needs accurately and effectively will improve  Outcome: PROGRESSING SLOWER THAN EXPECTED    LOPEZ orientation     Problem: Knowledge Deficit  Goal: Knowledge of disease process/condition, treatment plan, diagnostic tests, and medications will improve  Outcome: PROGRESSING SLOWER THAN EXPECTED    LOPEZ pt orientation, explained POC and medication education but no evidence of learning

## 2020-02-25 NOTE — DISCHARGE PLANNING
"Anticipated Discharge Disposition: TBD     Action: LSW received a call back from dtr. Dtr stated that \"this is all sudden, I need a breather, I am not awake yet. I do not want to come down from [place] today.\"     Barriers to Discharge: placement     Plan: LSW will continue to assess for any discharge.     "

## 2020-02-25 NOTE — DISCHARGE PLANNING
Anticipated Discharge Disposition: TBD     Action: LSW placed call to dtr to discuss POC. Dtr reported that she would be at the hospital soon.     Barriers to Discharge: placement     Plan: LSW will continue to assess for any discharge needs.

## 2020-02-25 NOTE — DISCHARGE PLANNING
"Anticipated Discharge Disposition: TBD     Action: LSW had a discussion with pt's dtr and family about POC. LSW explored hospice as an option. Pt's dtr stated that she did not want pt to go on hospice and \"has family in the medical field and know how it goes.\" Pt's dtr not willing to make a decision on discharge plan yet.     Barriers to Discharge: placement     Plan: LSW will continue to assess for any discharge needs.     "

## 2020-02-25 NOTE — OR SURGEON
Immediate Post- Operative Note        PostOp Diagnosis: LEFT pleural effusion      Procedure(s): LEFT thoracentesis      Estimated Blood Loss: Less than 5 ml        Complications: None            2/25/2020     11:41 AM     Lb Morales M.D.

## 2020-02-25 NOTE — PROGRESS NOTES
Hospital Medicine Daily Progress Note    Date of Service  2/25/2020    Chief Complaint  74 y.o. female admitted 1/16/2020 with altered mental status    Hospital Course    Ms. Beltran is a 74 y.o. female admitted to [facility] on 1/14/20 with seizures.She was at a SNF.She had been previously hospitalized at [facility] and diaysis was initiated.  She was doing very poorly then and palliative care was discussed with the family,but they declined.She had very poor functional status and required Jimena lift to get into the dialysis chair.She was found to have  a CVA on MRI at [facility].Her seizures were not controlled and it was felt she was in status epilepticus.  She was getting HD at [facility].Last HD was on 1/13/20.She was seen by Dr Carcamo at [facility].Creatinine was 1.8 and dialysis was held. Neurology Worton that the patient needed continuous EEG monitoring and he was transferred to [facility]. Patient intubated due to seizures and was eventually transitioned to tracheostomy on 2/2/2020 and off the vent on 2/5/2020. Family at this time does not want to change CODE status. Patient remains unresponsive. LTAC being pursued. PEG tube in place.         Interval Problem Update  She opens eyes to verbal stimuli, not clearly tracking around the room. Still on isolation d/t positive quatiferon gold; r/o TB on sputum culture. Plan for thoracentesis today. No overnight events.     Consultants/Specialty  Palliative care  Critical care - signed off  Neurology - signed off  Nephrology  ID     Code Status  FULL    Disposition  TBD- declined by LTACH    Review of Systems  Review of Systems   Unable to perform ROS: Medical condition       Physical Exam  Temp:  [36.1 °C (97 °F)-37 °C (98.6 °F)] 37 °C (98.6 °F)  Pulse:  [73-88] 81  Resp:  [14-18] 16  BP: (131-156)/(63-83) 139/63  SpO2:  [95 %-98 %] 96 %    Physical Exam  Vitals signs reviewed.   Constitutional:       General: She is not in acute distress.     Appearance: She is ill-appearing.    HENT:      Head: Normocephalic.      Mouth/Throat:      Comments: Tacky mucous membranes  Eyes:      General: No scleral icterus.     Extraocular Movements: Extraocular movements intact.   Cardiovascular:      Rate and Rhythm: Normal rate and regular rhythm.      Pulses: Normal pulses.      Heart sounds: Normal heart sounds.   Pulmonary:      Comments: Trach in place  Abdominal:      General: Bowel sounds are normal. There is no distension.      Palpations: Abdomen is soft.      Tenderness: There is no abdominal tenderness. There is no guarding.      Comments: PEG tube in place; no tenderness or erythema   Skin:     General: Skin is warm.      Capillary Refill: Capillary refill takes less than 2 seconds.      Comments: Swelling of right hand; propped hand on a pillow to allow swelling to go down   Neurological:      Mental Status: She is easily aroused. She is lethargic.      Motor: Weakness (diffuse) present.   Psychiatric:         Attention and Perception: She is inattentive.         Behavior: Behavior is not agitated.         Fluids    Intake/Output Summary (Last 24 hours) at 2/25/2020 0641  Last data filed at 2/24/2020 1831  Gross per 24 hour   Intake 490 ml   Output 3100 ml   Net -2610 ml       Laboratory  Recent Labs     02/25/20  0530   WBC 14.5*   RBC 2.89*   HEMOGLOBIN 9.2*   HEMATOCRIT 29.1*   .7*   MCH 31.8   MCHC 31.6*   RDW 62.8*   PLATELETCT 193   MPV 9.3     Recent Labs     02/24/20  0415 02/25/20  0530   SODIUM 135 130*   POTASSIUM 3.7 3.9   CHLORIDE 97 93*   CO2 28 30   GLUCOSE 218* 218*   BUN 90* 53*   CREATININE 1.67* 1.06   CALCIUM 8.9 8.7                   Imaging  CT-CHEST (THORAX) W/O   Final Result      1.  Bilateral atelectasis/consolidation, left greater than right.      2.  Moderate left and small right pleural effusion.      3.  Atherosclerotic vascular calcification.      4.  Ascites within the upper abdomen.            US-ABDOMEN LTD (SOFT TISSUE)   Final Result         1.  Trace free fluid in the pelvis. No abdominal ascites.      DX-ABDOMEN FOR TUBE PLACEMENT   Final Result         Feeding tube with tip projecting over the expected area of the stomach.      [id number] VIEW   Final Result      Feeding tube tip overlies the gastric antrum.      DX-CHEST-PORTABLE (1 VIEW)   Final Result         1.  Pulmonary edema and/or infiltrates are identified, which are stable since the prior exam.   2.  Cardiomegaly   3.  Atherosclerosis      DX-CHEST-PORTABLE (1 VIEW)   Final Result         1.  Pulmonary edema and/or infiltrates are identified, which are stable since the prior exam.   2.  Cardiomegaly   3.  Atherosclerosis      DX-CHEST-PORTABLE (1 VIEW)   Final Result      1.  Unchanged left lower lobe atelectasis or pneumonia.      2.  Clear right lung.      DX-CHEST-PORTABLE (1 VIEW)   Final Result      Unchanged LEFT basilar atelectasis and/or airspace disease      DX-CHEST-PORTABLE (1 VIEW)   Final Result      Left basilar atelectasis, hilar and cardiac silhouette enlargement as before      DX-CHEST-PORTABLE (1 VIEW)   Final Result      Interval removal of a left subclavian central line. Stable patchy bilateral infiltrates.      IR-PICC LINE PLACEMENT W/ GUIDANCE > AGE 5   Final Result                  Ultrasound-guided PICC placement performed by qualified nursing staff as    above.          DX-CHEST-PORTABLE (1 VIEW)   Final Result      1.  Multiple support devices present.      2.  Patchy bilateral atelectasis.      DX-CHEST-PORTABLE (1 VIEW)   Final Result      Stable chest with retrocardiac opacity from atelectasis and/or pleural fluid favored over consolidation      DX-CHEST-PORTABLE (1 VIEW)   Final Result      Stable chest with retrocardiac opacity from atelectasis and/or pleural fluid favored over consolidation      DX-CHEST-PORTABLE (1 VIEW)   Final Result         No significant change from prior.               DX-CHEST-PORTABLE (1 VIEW)   Final Result         1. Stable lines and  tubes..   2. Stable retrocardiac and left perihilar atelectasis versus consolidation. Suspected small left pleural effusion.            DX-CHEST-PORTABLE (1 VIEW)   Final Result         1. Stable lines and tubes..   2. Stable retrocardiac atelectasis versus consolidation with increased left perihilar opacity. Suspected trace left pleural effusion.         [id number] VIEW   Final Result      1.  Enteric tube has been placed and the tip projects over the stomach.      2.  Pre-existing feeding tube tip projects at the gastroduodenal junction      DX-CHEST-PORTABLE (1 VIEW)   Final Result         1. Lines and tubes as above.   2. Stable retrocardiac atelectasis versus consolidation. Suspected trace left pleural effusion.         MR-BRAIN-WITH & W/O   Final Result      1.  Moderate cerebral atrophy.   2.  Evidence of intraventricular hemorrhage, indeterminate age. Possibly chronic intraventricular hemosiderin deposition.   3.  Extensive encephalomalacic change with hemosiderin deposition involving the right corona radiata, basal ganglia, posterior thalamus, subthalamic region, bordering the right cerebral peduncle. Associated ex vacuo dilatation of the body of the right    lateral ventricle. No change.   4.  Additional foci of old microhemorrhage most consistent with old hypertensive microhemorrhage or amyloid angiopathy in the left basal ganglia, left thalamus, and midline upper ventral abel.   5.  Punctate focus of acute infarction in the right parietal deep white matter. No change.   6.  Advanced supratentorial white matter disease most consistent with microvascular ischemic change.   7.  Encephalomalacic changes in the abel and right cerebral peduncle consistent with old infarction.   8.  Partially empty sella.   9.  Overall, no new findings and no significant change from 1/14/2020.      DX-CHEST-PORTABLE (1 VIEW)   Final Result         1. Stable lines and tubes.   2. Unchanged retrocardiac atelectasis versus  consolidation.   3. Stable trace left pleural effusion.         OUTSIDE IMAGES-DX CHEST   Final Result      OUTSIDE IMAGES-US VASCULAR   Final Result      OUTSIDE IMAGES-MR BRAIN   Final Result      OUTSIDE IMAGES-DX CHEST   Final Result      OUTSIDE IMAGES-CT HEAD   Final Result      EC-ECHOCARDIOGRAM COMPLETE W/O CONT   Final Result      DX-CHEST-FOR LINE PLACEMENT Perform procedure in: Patient's Room   Final Result         1.  Retrocardiac opacity concerning for infiltrate, stable.   2.  Trace left pleural effusion, stable   3.  Cardiomegaly   4.  Atherosclerosis   5.  Perihilar interstitial prominence and bronchial wall cuffing, appearance suggests changes of underlying bronchial inflammation, consider bronchitis.      DX-ABDOMEN FOR TUBE PLACEMENT   Final Result         1.  Nonspecific bowel gas pattern.   2.  Dobbhoff tube tip overlying the expected location of the pylorus or first duodenal segment.   3.  Left lung base atelectasis and/or small effusion      DX-CHEST-PORTABLE (1 VIEW)   Final Result         1.  Retrocardiac opacity concerning for infiltrate.   2.  Trace left pleural effusion, stable   3.  Cardiomegaly   4.  Atherosclerosis      [id number] FILM X-RAY   Final Result      OUTSIDE IMAGES-DX CHEST   Final Result      US-THORACENTESIS PUNCTURE LEFT    (Results Pending)        Assessment/Plan  * Status epilepticus (HCC)- (present on admission)  Assessment & Plan  Was intubated for airway protection, now trach since 2/2/20. No evidence of seizure activity. Neurology gave the opinion that likelihood of patient returning to baseline was low, and that the likelihood of the patient returning to full independent function was essentially 0.  - neurology evaluated  - Continuing vimpat, topamax, depakote  - Repeat EEG here showed no seizures    TB lung, latent- (present on admission)  Assessment & Plan  Quantiferon + and will have to rule out active TB. Chest CT found positive left-sided consolidation  "with pleural effusion  - pending sputum cx/ AFB stain  - pending thora today  - ID following, appreciate recs  - airborne isolation  - Per ID, \"Treat for LTBI with  mg PO daily and B6 25 mg PO daily for 9 months if AFB cxs all negative at 6 weeks\"    Acute respiratory failure with hypoxia (HCC)- (present on admission)  Assessment & Plan  - stable on trach/T-piece  - continue RT protocol    Hypokalemia  Assessment & Plan  Improved.   - Replace with enteral K-Dur  - magnesium level pending    Severe protein-calorie malnutrition (HCC)- (present on admission)  Assessment & Plan  -Continue tube feeding.    Cerebrovascular accident (CVA) due to embolism of right middle cerebral artery (HCC)- (present on admission)  Assessment & Plan  - continue statin and aspirin  - unable to participate with PT/OT    End stage renal failure on dialysis (HCC)- (present on admission)  Assessment & Plan  Poor prognosis per nephrology, they recommended comfort care. Cr down to 1.06 today  - Continue hemodialysis MWF  - nephrology following, appreciate recs  - avoid nephrotoxins and continue to renally dose all medications    Normocytic anemia- (present on admission)  Assessment & Plan  -Likely anemia of chronic kidney disease.  FOBT is negative.   -Hgb stable.   -Continue to trend H&H, transfuse if drops below 7.      Hypertension- (present on admission)  Assessment & Plan  Normotensive.   - continue coreg, hydralazine, caldura      DM (diabetes mellitus) ([facility])- (present on admission)  Assessment & Plan  A1C 7.5%, without hyperglycemia.   - No need for coverage  - monitor intermittently    Goals of care, counseling/discussion- (present on admission)  Assessment & Plan  - There have been multiple discussions with palliative/treatment team and family, to no avail. Patient remains full code, has trach and PEG.    Pressure injury of sacrum, coccyx, stage 3 ([facility])- (present on admission)  Assessment & Plan  -Wound care per wound service. " Continue pressure offloading strategies.    Hypomagnesemia- (present on admission)  Assessment & Plan  -Continue to monitor.     Dysphagia as late effect of cerebrovascular accident (CVA)- (present on admission)  Assessment & Plan  - PEG now in place  - aspiration precautions.    Gout- (present on admission)  Assessment & Plan  - Continue with allopurinol per the PEG tube       VTE prophylaxis: SCDs

## 2020-02-25 NOTE — CARE PLAN
Problem: Fluid Volume:  Goal: Will maintain balanced intake and output  Outcome: PROGRESSING AS EXPECTED     Problem: Pain Management  Goal: Pain level will decrease to patient's comfort goal  Outcome: PROGRESSING AS EXPECTED

## 2020-02-26 NOTE — CARE PLAN
Problem: Safety  Goal: Will remain free from injury  Outcome: PROGRESSING AS EXPECTED     Problem: Bowel/Gastric:  Goal: Normal bowel function is maintained or improved  Outcome: PROGRESSING AS EXPECTED     Problem: Fluid Volume:  Goal: Will maintain balanced intake and output  Outcome: PROGRESSING AS EXPECTED     Problem: Skin Integrity  Goal: Risk for impaired skin integrity will decrease  Outcome: PROGRESSING AS EXPECTED

## 2020-02-26 NOTE — PROGRESS NOTES
2 RN skin check complete with MIC Cintron.   Devices in place trache, PICC, HD catheter, peg tube, hernandez catheter, rectal tube, float heel boots, mepilex.  Skin assessed under devices yes.  Confirmed pressure ulcers found on coccyx.  New potential pressure ulcers noted on N/A. Wound consult placed, wound team following.    The following interventions in place 2 RN skin check, q2 repositions, viscopaste to coccyx, float heel boots, low air bed, mepilex, rectal tube, hernandez.    Skin Assessment-   Generalized - bruising  L upper chest - scab  Under trache collar - redness/blanching  Peg tube site - redness  Groin - redness  Coccyx - open area -pink white center/purple/red/dark edges  Heels - boggy/pink

## 2020-02-26 NOTE — PROGRESS NOTES
No blood return from PICC. Lab notified that a phleb will need to come obtain sample. Will have RRT induce sputum for AFB test.

## 2020-02-26 NOTE — CARE PLAN
Problem: Communication  Goal: The ability to communicate needs accurately and effectively will improve  Outcome: NOT MET  Note: Unable to make needs known. Staff to anticipate wants and needs.      Problem: Bowel/Gastric:  Goal: Normal bowel function is maintained or improved  Outcome: PROGRESSING AS EXPECTED  Note: Rectal tube in place with adequate output.

## 2020-02-26 NOTE — PROGRESS NOTES
[facility] Nephrology Progress Note    Date of Service  2/26/2020    AUTHOR: Saima Meneses M.D.    Chief Complaint   Follow up ESRD    HPI  74 y.o. female admitted to [facility] on 1/14/20 with seizures.She was at a SNF.She had been previously hospitalized at [facility] and diaysis was initiated.  She was doing very poorly then and palliative care was discussed with the family,but they declined.She had very poor functional status and required Jimena lift to get into the dialysis chair.She was found to have  a CVA on MRI at [facility].Her seizures were not controlled and it was felt she was in status epilepticus.  She was getting HD at [facility].Last HD was on 1/13/20.She was seen by Dr Carcamo at [facility].Creatinine was 1.8 and dialysis was held. Neurology Dietrich that the patient needed continuous EEG monitoring and he was transferred to [facility]    DAILY NEPHROLOGY SUMMARY:  Please see note from 1/31 for prior summaries  2/01 - NAEO, another EEG being done this AM  2/02 - NAEO, scheduled for Trach today  2/03 - No events, underwent trach yesterday, no change in neuro status, BP stable overnight but low this am, given midodrine, to have HD today  2/04 - No events, tolerated HD yest with 2.1L UF, BP stable this am  2/05 - No events, BP low prior to starting HD, given albumin at the start of HD and BP stable at this time  2/06 - No events, tolerated HD yesterday with 2.5L UF, BP stable and high at times, still with dependent edema  2/07 - No events, tolerated PUF yesterday with 2.5L UF and did not require midodrine with treatment, BP stable today, still with dependent edema, to have HD today  2/08 - No overnight events or changes.  On TF at goal via NGT.  No neurologic change.  On vent/trach.   2/09 - No significant change.  No events.  Liquid stool from rectal tube.  On Vent/Trach  2/10-  No new overnight renal events. +trach.  2/11- S/p HD yesterday with net UF of 2 L.   2/12- No new overnight renal events. HD today.  2/13 -  HD yesterday with 1.7L net UF, plan for PEG placement Saturday, LTACH placement pending, some hypotension   2/14 - On HD.  2/15 - Non communicative, HD yesterday 700cc UF  2/17 - Seen on HD today. 's. No new events.   2/19- Non verbal, nothing reported  2/21- Stage 3 decubitus ulcer  2/24 - Pt in TB rule out isolation, ID on board, for HD today   2/26 - No events, seen on dialysis, BP stable, still in isolation    Review of Systems   Unable to perform ROS: Acuity of condition         Physical Exam  Temp:  [36.3 °C (97.3 °F)-36.7 °C (98 °F)] 36.7 °C (98 °F)  Pulse:  [74-90] 74  Resp:  [16-18] 17  BP: (128-151)/(67-78) 128/67  SpO2:  [94 %-97 %] 95 %    Physical Exam  Vitals signs and nursing note reviewed.   Constitutional:       General: She is not in acute distress.     Appearance: She is ill-appearing.   HENT:      Head: Normocephalic and atraumatic.      Right Ear: External ear normal.      Left Ear: External ear normal.      Nose: Nose normal. No congestion.      Mouth/Throat:      Mouth: Mucous membranes are moist.      Pharynx: No oropharyngeal exudate.   Eyes:      General: No scleral icterus.     Conjunctiva/sclera: Conjunctivae normal.   Neck:      Musculoskeletal: No muscular tenderness.      Vascular: No carotid bruit.      Comments: +Trach  Cardiovascular:      Rate and Rhythm: Normal rate and regular rhythm.      Heart sounds: Normal heart sounds. No murmur.   Pulmonary:      Effort: Pulmonary effort is normal. No respiratory distress.      Breath sounds: Normal breath sounds.      Comments: T-piece, supp O2   Chest:      Chest wall: No tenderness.   Abdominal:      General: Bowel sounds are normal. There is no distension.      Palpations: Abdomen is soft.      Tenderness: There is no abdominal tenderness.      Comments: Enteral nutrition    Genitourinary:     Comments: BMS  Musculoskeletal:         General: No tenderness or signs of injury.      Comments: +dependent edema   Lymphadenopathy:       Cervical: No cervical adenopathy.   Skin:     General: Skin is warm and dry.      Findings: No erythema or rash.      Comments: Heel float boots   Stage 3 decubitus sacral not visualized   Neurological:      Comments: Not responsive   Psychiatric:      Comments: Unable to assess       Fluids    Intake/Output Summary (Last 24 hours) at 2/26/2020 1445  Last data filed at 2/26/2020 1047  Gross per 24 hour   Intake 120 ml   Output 350 ml   Net -230 ml     Laboratory  Recent Labs     02/25/20  0530 02/26/20  1122   WBC 14.5* 17.1*   RBC 2.89* 2.88*   HEMOGLOBIN 9.2* 9.1*   HEMATOCRIT 29.1* 29.0*   .7* 100.7*   MCH 31.8 31.6   MCHC 31.6* 31.4*   RDW 62.8* 61.2*   PLATELETCT 193 164   MPV 9.3 9.3     Recent Labs     02/24/20  0415 02/25/20  0530 02/26/20  1122   SODIUM 135 130* 132*   POTASSIUM 3.7 3.9 4.1   CHLORIDE 97 93* 94*   CO2 28 30 26   GLUCOSE 218* 218* 184*   BUN 90* 53* 78*   CREATININE 1.67* 1.06 1.65*   CALCIUM 8.9 8.7 8.5     IMPRESSION:  # ESRD   Dialysis today (WED)   Maintenance dialysis qMWF as OP at [facility]   mCrCl 6 by 24 hour urine - repeat study 2/13 mCrCl 4   Dialysis Dependent                # Status epilepticus  # S/P acute lacunar infarct   Hx of previous CVA with significant deficits.  # HTN--BP variable often with intradialytic hypotension  # DM II  # Acute Hypoxic Respiratory Failure   +Trach and T-piece  # Hx of dysphagia  # Anemia of CKD.Ferritin previously significantly elevated. CAT with HD.  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  # Stage 3 decubitus ulcer  # TB rule out, + TB quant and abnormal CXR/CT, ID on board  # Leukocytosis     PLAN:    Maintenance dialysis today (WED) and qMWF and PRN.   UF w/ HD as tolerated.   ID  following, continue TB rule out   Continue Epogen with HD   Seizure management per Neurology   Enteral feedings   No dietary protein restrictions   Follow labs   Continue GOC discussions with family   Very poor prognosis, recommend comfort care, if and when family agreeable   Will see on MWF. Please call on other days w/ questions or concerns    Thank you,

## 2020-02-26 NOTE — PROGRESS NOTES
No acute events overnight. Pt remains nonverbal, trache in place with continuous pulse ox. Repositioning q2. Flexiseal irrigated; hernandez catheter in place. No signs of distress. Tube feeds running at goal rate. Safety precautions maintained. Will continue to monitor and report to the oncoming nurse.

## 2020-02-26 NOTE — PROGRESS NOTES
2 RN skin check completed Kate HAILE.   Devices in place: trach, trach collar, hernandez, rectal tube, permacath, PICC, peg tube  Skin assessed under devices: yes  Confirmed pressure ulcers found: yes, coccyx  New potential pressure ulcers noted: NA       Skin assessment: Scab on L upper chest, redness/blanching under trach collar, bruising on bilateral arms, redness to groin, panus, buttocks, PEG site red,blanching (dressing in place), R shin discoloration, bilateral heels pink, boggy, wound on coccyx - dark edges, pink/white center.    The following interventions in place: Coccyx dressing changed (viscopaste, NS, mepilex), heel float boots in place, q2hr turns and repositioning, dressings changed as needed at PEG site, trach dressing changed as needed

## 2020-02-26 NOTE — PROGRESS NOTES
"Patient not awake or alert. Opens eye and eyes move but gaze is not purposeful. Nonverbal. RRT to collect sputum got AFB test. Will need 2 samples 8 hour apart. No SOB at rest. Humidified O2 by t-piece to trach. SQ Epogen given this AM. Q2 turns in place. Wound care to bring a new piece of equipment to assist with patient turns. New dressing change orders received. PICC does not have blood return but flushes easily. Lab notified that sample with be changed to \"lab to collect\". River to dependant drainage. Rectal tube draining. Continues on airborne precautions. Tube feed at goal rate. No family at bedside at this time. Staff to anticipate wants and needs .  "

## 2020-02-26 NOTE — PROGRESS NOTES
"Hospital Medicine Daily Progress Note    Date of Service  2/26/2020    Chief Complaint  74 y.o. female admitted 1/16/2020 with altered mental status    Hospital Course    Ms. Beltran is a 74 y.o. female admitted to [facility] on 1/14/20 with seizures.She was at a SNF.She had been previously hospitalized at [facility] and diaysis was initiated.  She was doing very poorly then and palliative care was discussed with the family,but they declined.She had very poor functional status and required Jimena lift to get into the dialysis chair.She was found to have  a CVA on MRI at [facility].Her seizures were not controlled and it was felt she was in status epilepticus.  She was getting HD at [facility].Last HD was on 1/13/20.She was seen by Dr Carcamo at [facility].Creatinine was 1.8 and dialysis was held. Neurology [place] that the patient needed continuous EEG monitoring and he was transferred to [facility]. Patient intubated due to seizures and was eventually transitioned to tracheostomy on 2/2/2020 and off the vent on 2/5/2020. Family at this time does not want to change CODE status. Patient remains unresponsive. LTAC being pursued. PEG tube in place.         Interval Problem Update  Laying in bed, no distress. Doesn't open eyes to verbal stimuli today. Family wants the patient to stay here and they are still hoping she will \"get better\". No overnight events.     Consultants/Specialty  Palliative care  Critical care - signed off  Neurology - signed off  Nephrology  ID     Code Status  FULL    Disposition  TBD- declined by LTACH    Review of Systems  Review of Systems   Unable to perform ROS: Medical condition       Physical Exam  Temp:  [36.3 °C (97.3 °F)-36.9 °C (98.4 °F)] 36.4 °C (97.6 °F)  Pulse:  [74-90] 84  Resp:  [16-18] 16  BP: (129-151)/(68-78) 131/69  SpO2:  [94 %-98 %] 94 %    Physical Exam  Vitals signs reviewed.   Constitutional:       General: She is not in acute distress.     Appearance: She is ill-appearing.   HENT:      " Head: Normocephalic.      Mouth/Throat:      Comments: Tacky mucous membranes  Eyes:      General: No scleral icterus.     Extraocular Movements: Extraocular movements intact.   Cardiovascular:      Rate and Rhythm: Normal rate and regular rhythm.      Pulses: Normal pulses.      Heart sounds: Normal heart sounds.   Pulmonary:      Comments: Trach in place  Abdominal:      General: Bowel sounds are normal. There is no distension.      Palpations: Abdomen is soft.      Tenderness: There is no abdominal tenderness. There is no guarding.      Comments: PEG tube in place; no tenderness or erythema   Skin:     General: Skin is warm.      Capillary Refill: Capillary refill takes less than 2 seconds.      Comments: Swelling of right hand; propped hand on a pillow to allow swelling to go down   Neurological:      Mental Status: She is easily aroused. She is lethargic.      Motor: Weakness (diffuse) present.   Psychiatric:         Attention and Perception: She is inattentive.         Behavior: Behavior is not agitated.     Patient seen and examined today on 2/26, unchanged from 2/25.     Fluids    Intake/Output Summary (Last 24 hours) at 2/26/2020 0637  Last data filed at 2/26/2020 0441  Gross per 24 hour   Intake 210 ml   Output 350 ml   Net -140 ml       Laboratory  Recent Labs     02/25/20  0530   WBC 14.5*   RBC 2.89*   HEMOGLOBIN 9.2*   HEMATOCRIT 29.1*   .7*   MCH 31.8   MCHC 31.6*   RDW 62.8*   PLATELETCT 193   MPV 9.3     Recent Labs     02/24/20  0415 02/25/20  0530   SODIUM 135 130*   POTASSIUM 3.7 3.9   CHLORIDE 97 93*   CO2 28 30   GLUCOSE 218* 218*   BUN 90* 53*   CREATININE 1.67* 1.06   CALCIUM 8.9 8.7                   Imaging  DX-CHEST-PORTABLE (1 VIEW)   Final Result      1.  Apparent improvement of LEFT pleural effusion and basilar consolidation.   2.  No pneumothorax.   3.  Supportive tubing as described above.      US-THORACENTESIS PUNCTURE LEFT   Final Result      1. Ultrasound guided left sided  diagnostic and therapeutic thoracentesis.      2. 400 mL of fluid withdrawn.      CT-CHEST (THORAX) W/O   Final Result      1.  Bilateral atelectasis/consolidation, left greater than right.      2.  Moderate left and small right pleural effusion.      3.  Atherosclerotic vascular calcification.      4.  Ascites within the upper abdomen.            US-ABDOMEN LTD (SOFT TISSUE)   Final Result         1. Trace free fluid in the pelvis. No abdominal ascites.      DX-ABDOMEN FOR TUBE PLACEMENT   Final Result         Feeding tube with tip projecting over the expected area of the stomach.      NV-[id number]-7 VIEW   Final Result      Feeding tube tip overlies the gastric antrum.      DX-CHEST-PORTABLE (1 VIEW)   Final Result         1.  Pulmonary edema and/or infiltrates are identified, which are stable since the prior exam.   2.  Cardiomegaly   3.  Atherosclerosis      DX-CHEST-PORTABLE (1 VIEW)   Final Result         1.  Pulmonary edema and/or infiltrates are identified, which are stable since the prior exam.   2.  Cardiomegaly   3.  Atherosclerosis      DX-CHEST-PORTABLE (1 VIEW)   Final Result      1.  Unchanged left lower lobe atelectasis or pneumonia.      2.  Clear right lung.      DX-CHEST-PORTABLE (1 VIEW)   Final Result      Unchanged LEFT basilar atelectasis and/or airspace disease      DX-CHEST-PORTABLE (1 VIEW)   Final Result      Left basilar atelectasis, hilar and cardiac silhouette enlargement as before      DX-CHEST-PORTABLE (1 VIEW)   Final Result      Interval removal of a left subclavian central line. Stable patchy bilateral infiltrates.      IR-PICC LINE PLACEMENT W/ GUIDANCE > AGE 5   Final Result                  Ultrasound-guided PICC placement performed by qualified nursing staff as    above.          DX-CHEST-PORTABLE (1 VIEW)   Final Result      1.  Multiple support devices present.      2.  Patchy bilateral atelectasis.      DX-CHEST-PORTABLE (1 VIEW)   Final Result      Stable chest with  retrocardiac opacity from atelectasis and/or pleural fluid favored over consolidation      DX-CHEST-PORTABLE (1 VIEW)   Final Result      Stable chest with retrocardiac opacity from atelectasis and/or pleural fluid favored over consolidation      DX-CHEST-PORTABLE (1 VIEW)   Final Result         No significant change from prior.               DX-CHEST-PORTABLE (1 VIEW)   Final Result         1. Stable lines and tubes..   2. Stable retrocardiac and left perihilar atelectasis versus consolidation. Suspected small left pleural effusion.            DX-CHEST-PORTABLE (1 VIEW)   Final Result         1. Stable lines and tubes..   2. Stable retrocardiac atelectasis versus consolidation with increased left perihilar opacity. Suspected trace left pleural effusion.         [id number] VIEW   Final Result      1.  Enteric tube has been placed and the tip projects over the stomach.      2.  Pre-existing feeding tube tip projects at the gastroduodenal junction      DX-CHEST-PORTABLE (1 VIEW)   Final Result         1. Lines and tubes as above.   2. Stable retrocardiac atelectasis versus consolidation. Suspected trace left pleural effusion.         MR-BRAIN-WITH & W/O   Final Result      1.  Moderate cerebral atrophy.   2.  Evidence of intraventricular hemorrhage, indeterminate age. Possibly chronic intraventricular hemosiderin deposition.   3.  Extensive encephalomalacic change with hemosiderin deposition involving the right corona radiata, basal ganglia, posterior thalamus, subthalamic region, bordering the right cerebral peduncle. Associated ex vacuo dilatation of the body of the right    lateral ventricle. No change.   4.  Additional foci of old microhemorrhage most consistent with old hypertensive microhemorrhage or amyloid angiopathy in the left basal ganglia, left thalamus, and midline upper ventral abel.   5.  Punctate focus of acute infarction in the right parietal deep white matter. No change.   6.  Advanced  supratentorial white matter disease most consistent with microvascular ischemic change.   7.  Encephalomalacic changes in the abel and right cerebral peduncle consistent with old infarction.   8.  Partially empty sella.   9.  Overall, no new findings and no significant change from 1/14/2020.      DX-CHEST-PORTABLE (1 VIEW)   Final Result         1. Stable lines and tubes.   2. Unchanged retrocardiac atelectasis versus consolidation.   3. Stable trace left pleural effusion.         OUTSIDE IMAGES-DX CHEST   Final Result      OUTSIDE IMAGES-US VASCULAR   Final Result      OUTSIDE IMAGES-MR BRAIN   Final Result      OUTSIDE IMAGES-DX CHEST   Final Result      OUTSIDE IMAGES-CT HEAD   Final Result      EC-ECHOCARDIOGRAM COMPLETE W/O CONT   Final Result      DX-CHEST-FOR LINE PLACEMENT Perform procedure in: Patient's Room   Final Result         1.  Retrocardiac opacity concerning for infiltrate, stable.   2.  Trace left pleural effusion, stable   3.  Cardiomegaly   4.  Atherosclerosis   5.  Perihilar interstitial prominence and bronchial wall cuffing, appearance suggests changes of underlying bronchial inflammation, consider bronchitis.      DX-ABDOMEN FOR TUBE PLACEMENT   Final Result         1.  Nonspecific bowel gas pattern.   2.  Dobbhoff tube tip overlying the expected location of the pylorus or first duodenal segment.   3.  Left lung base atelectasis and/or small effusion      DX-CHEST-PORTABLE (1 VIEW)   Final Result         1.  Retrocardiac opacity concerning for infiltrate.   2.  Trace left pleural effusion, stable   3.  Cardiomegaly   4.  Atherosclerosis      [id number] FILM X-RAY   Final Result      OUTSIDE IMAGES-DX CHEST   Final Result           Assessment/Plan  * Status epilepticus (HCC)- (present on admission)  Assessment & Plan  Was intubated for airway protection, now trach since 2/2/20. No evidence of seizure activity. Neurology gave the opinion that likelihood of patient returning to baseline  "was low, and that the likelihood of the patient returning to full independent function was essentially 0.  - neurology evaluated, appreciate recs  - Continuing vimpat, topamax, depakote  - Repeat EEG  showed no seizures    TB lung, latent- (present on admission)  Assessment & Plan  Quantiferon + and will have to rule out active TB. Chest CT found positive left-sided consolidation with pleural effusion.  - pending sputum cx/ AFB stain  - s/p thora on 2/25; f/u pathology   - ID following, appreciate recs  - airborne isolation  - Per ID, \"treat for LTBI with  mg PO daily and B6 25 mg PO daily for 9 months if AFB cxs all negative at 6 weeks\"    Acute respiratory failure with hypoxia (HCC)- (present on admission)  Assessment & Plan  With moderate secretions, needing suctioning.   - stable on trach/T-piece  - continue RT protocol    Hypokalemia  Assessment & Plan  Improved.   - Replace with enteral K-Dur  - magnesium level pending    Severe protein-calorie malnutrition (HCC)- (present on admission)  Assessment & Plan  - Continue tube feeding    Cerebrovascular accident (CVA) due to embolism of right middle cerebral artery (HCC)- (present on admission)  Assessment & Plan  - continue statin and aspirin  - unable to participate with PT/OT    End stage renal failure on dialysis (HCC)- (present on admission)  Assessment & Plan  Poor prognosis per nephrology, they recommended comfort care. Cr down to 1.06 today  - Continue hemodialysis MWF  - nephrology following, appreciate recs  - avoid nephrotoxins and continue to renally dose all medications    Normocytic anemia- (present on admission)  Assessment & Plan  -Likely anemia of chronic kidney disease.  FOBT is negative.   -Hgb stable.   -Continue to trend H&H, transfuse if drops below 7    Hypertension- (present on admission)  Assessment & Plan  Normotensive.   - continue coreg, hydralazine, caldura    Type 2 diabetes mellitus, with long-term current use of insulin (HCC)- " (present on admission)  Assessment & Plan  A1C 7.5%, without hyperglycemia.   - patient has not needed coverage  - monitor intermittently    Goals of care, counseling/discussion- (present on admission)  Assessment & Plan  - There have been multiple discussions with palliative/treatment team and family, to no avail. Patient remains full code, has trach and PEG.    Pressure injury of sacrum, coccyx, stage 3 (HCC)- (present on admission)  Assessment & Plan  -Wound care per wound service. Continue pressure offloading strategies.    Hypomagnesemia- (present on admission)  Assessment & Plan  -Continue to monitor.     Dysphagia as late effect of cerebrovascular accident (CVA)- (present on admission)  Assessment & Plan  - PEG now in place  - aspiration precautions.    Gout- (present on admission)  Assessment & Plan  - Continue with allopurinol per the PEG tube       VTE prophylaxis: SCDs

## 2020-02-27 NOTE — PROGRESS NOTES
Report received from day shift nurse. Assumed care @ 1900.     Unable to assess mentation, patient just stares back when asked. Pt has trache as endorsed. Assessment partially completed. Denies any pain and discomfort at this time, pt shook head when asked if in pain. On O2 at 4LPM via T-piece,  in place and functioning, O2 sat at 97%. Permacath in place, PICC in place, flushed but no blood return as endorsed. PEG tube in place, dressing CDI, on tube feeding of Impact peptide 1.5 at 40 mL/hr tolerating well, HOB maintained at 30 degrees. River catheter in place, draining to gravity, rectal tube in place as endorsed, flushed as ordered. Patient educated regarding plan of care. On Airborne precautions. On NUBIA mattress, all 4 siderails up.    Bed locked, in lowest position, treaded socks on. Needs attended. No further needs at this time. Communication board updated. Call light and personal belongings within reach.

## 2020-02-27 NOTE — CARE PLAN
Problem: Pain Management  Goal: Pain level will decrease to patient's comfort goal  Outcome: PROGRESSING AS EXPECTED  Flowsheets (Taken 2/27/2020 0750)  Pain Rating Scale (NPRS): 0  Note: No S/Sx of pain or discomfort. Q2 turns effective for pain mgmt and prevention.      Problem: Knowledge Deficit  Goal: Knowledge of disease process/condition, treatment plan, diagnostic tests, and medications will improve  Note: No family at bedside. Patient unable to understand teaching or POC.

## 2020-02-27 NOTE — PROGRESS NOTES
2 RN skin check completed with MIC Pa.   Devices in place: Trache, PICC, HD catheter, peg tube, hernandez catheter, rectal tube, float heel boots, mepilex.  Skin assessed under devices: Yes.  Confirmed pressure ulcers found on coccyx.  New potential pressure ulcers noted on: N/A. Wound consult in place, wound team following.     The following interventions in place: 2 RN skin check, q2 turns, heel float boots, NUBIA mattress, mepilex, rectal tube, hernandez catheter.       Redness noted under trache collar, blanching. Dressing in place, CDI.  Scab noted on L upper chest.  Bruising, redness noted on BUE.  Edema noted on right arm.  Redness noted on PEG tube site.  Redness noted on groin area, blanching.  Dressing noted on upper back, CDI.  Dressing noted on coccyx area noted, CDI.  Redness noted on sacral area, blanching.  Heels boggy/pink.    Unable to do dressing change, dakins solution on back order and will be available today as endorsed.

## 2020-02-27 NOTE — PROGRESS NOTES
Hospital Medicine Daily Progress Note    Date of Service  2/27/2020    Chief Complaint  74 y.o. female admitted 1/16/2020 with altered mental status    Hospital Course    Ms. Beltran is a 74 y.o. female admitted to [facility] on 1/14/20 with seizures.She was at a SNF.She had been previously hospitalized at [facility] and diaysis was initiated.  She was doing very poorly then and palliative care was discussed with the family,but they declined.She had very poor functional status and required Jimena lift to get into the dialysis chair.She was found to have  a CVA on MRI at [facility].Her seizures were not controlled and it was felt she was in status epilepticus.  She was getting HD at [facility].Last HD was on 1/13/20.She was seen by Dr Carcamo at [facility].Creatinine was 1.8 and dialysis was held. Neurology [place] that the patient needed continuous EEG monitoring and he was transferred to [facility]. Patient intubated due to seizures and was eventually transitioned to tracheostomy on 2/2/2020 and off the vent on 2/5/2020. Family at this time does not want to change CODE status. Patient remains unresponsive. LTAC being pursued. PEG tube in place.         Interval Problem Update  Laying in bed, no distress. Eyes open today, follows voice but no meaningful interaction. No overnight events.     Consultants/Specialty  Palliative care  Critical care - signed off  Neurology - signed off  Nephrology  ID     Code Status  FULL    Disposition  TBD- declined by LTACH. Pending meeting with family, SW, and palliative to re-address goals and discharge plan.     Review of Systems  Review of Systems   Unable to perform ROS: Medical condition       Physical Exam  Temp:  [36.7 °C (98 °F)-36.8 °C (98.2 °F)] 36.8 °C (98.2 °F)  Pulse:  [74-86] 80  Resp:  [17-18] 18  BP: (128-151)/(67-71) 151/71  SpO2:  [94 %-97 %] 94 %    Physical Exam  Vitals signs reviewed.   Constitutional:       General: She is not in acute distress.     Appearance: She is  ill-appearing. She is not diaphoretic.   HENT:      Head: Normocephalic.      Mouth/Throat:      Comments: Tacky mucous membranes  Eyes:      General:         Right eye: No discharge.         Left eye: No discharge.      Extraocular Movements: Extraocular movements intact.   Cardiovascular:      Rate and Rhythm: Normal rate and regular rhythm.      Pulses: Normal pulses.      Heart sounds: Normal heart sounds.   Pulmonary:      Comments: Trach in place  Abdominal:      General: Bowel sounds are normal. There is no distension.      Palpations: Abdomen is soft.      Tenderness: There is no abdominal tenderness. There is no guarding.      Comments: PEG tube in place; no tenderness or erythema   Skin:     General: Skin is warm.      Comments: Swelling of right hand; slightly improved   Neurological:      Mental Status: She is alert.      Motor: Weakness (diffuse) present.   Psychiatric:         Attention and Perception: She is inattentive.         Behavior: Behavior is not agitated.         Fluids    Intake/Output Summary (Last 24 hours) at 2/27/2020 0653  Last data filed at 2/27/2020 0400  Gross per 24 hour   Intake 840 ml   Output 2400 ml   Net -1560 ml       Laboratory  Recent Labs     02/25/20  0530 02/26/20  1122 02/27/20  0335   WBC 14.5* 17.1* 13.2*   RBC 2.89* 2.88* 2.77*   HEMOGLOBIN 9.2* 9.1* 8.8*   HEMATOCRIT 29.1* 29.0* 27.9*   .7* 100.7* 100.7*   MCH 31.8 31.6 31.8   MCHC 31.6* 31.4* 31.5*   RDW 62.8* 61.2* 61.0*   PLATELETCT 193 164 157*   MPV 9.3 9.3 9.5     Recent Labs     02/25/20  0530 02/26/20  1122 02/27/20  0335   SODIUM 130* 132* 135   POTASSIUM 3.9 4.1 3.2*   CHLORIDE 93* 94* 97   CO2 30 26 29   GLUCOSE 218* 184* 151*   BUN 53* 78* 38*   CREATININE 1.06 1.65* 0.89   CALCIUM 8.7 8.5 8.5                   Imaging  DX-CHEST-PORTABLE (1 VIEW)   Final Result      1.  Apparent improvement of LEFT pleural effusion and basilar consolidation.   2.  No pneumothorax.   3.  Supportive tubing as  described above.      US-THORACENTESIS PUNCTURE LEFT   Final Result      1. Ultrasound guided left sided diagnostic and therapeutic thoracentesis.      2. 400 mL of fluid withdrawn.      CT-CHEST (THORAX) W/O   Final Result      1.  Bilateral atelectasis/consolidation, left greater than right.      2.  Moderate left and small right pleural effusion.      3.  Atherosclerotic vascular calcification.      4.  Ascites within the upper abdomen.            US-ABDOMEN LTD (SOFT TISSUE)   Final Result         1. Trace free fluid in the pelvis. No abdominal ascites.      DX-ABDOMEN FOR TUBE PLACEMENT   Final Result         Feeding tube with tip projecting over the expected area of the stomach.      [id number] VIEW   Final Result      Feeding tube tip overlies the gastric antrum.      DX-CHEST-PORTABLE (1 VIEW)   Final Result         1.  Pulmonary edema and/or infiltrates are identified, which are stable since the prior exam.   2.  Cardiomegaly   3.  Atherosclerosis      DX-CHEST-PORTABLE (1 VIEW)   Final Result         1.  Pulmonary edema and/or infiltrates are identified, which are stable since the prior exam.   2.  Cardiomegaly   3.  Atherosclerosis      DX-CHEST-PORTABLE (1 VIEW)   Final Result      1.  Unchanged left lower lobe atelectasis or pneumonia.      2.  Clear right lung.      DX-CHEST-PORTABLE (1 VIEW)   Final Result      Unchanged LEFT basilar atelectasis and/or airspace disease      DX-CHEST-PORTABLE (1 VIEW)   Final Result      Left basilar atelectasis, hilar and cardiac silhouette enlargement as before      DX-CHEST-PORTABLE (1 VIEW)   Final Result      Interval removal of a left subclavian central line. Stable patchy bilateral infiltrates.      IR-PICC LINE PLACEMENT W/ GUIDANCE > AGE 5   Final Result                  Ultrasound-guided PICC placement performed by qualified nursing staff as    above.          DX-CHEST-PORTABLE (1 VIEW)   Final Result      1.  Multiple support devices present.      2.   Patchy bilateral atelectasis.      DX-CHEST-PORTABLE (1 VIEW)   Final Result      Stable chest with retrocardiac opacity from atelectasis and/or pleural fluid favored over consolidation      DX-CHEST-PORTABLE (1 VIEW)   Final Result      Stable chest with retrocardiac opacity from atelectasis and/or pleural fluid favored over consolidation      DX-CHEST-PORTABLE (1 VIEW)   Final Result         No significant change from prior.               DX-CHEST-PORTABLE (1 VIEW)   Final Result         1. Stable lines and tubes..   2. Stable retrocardiac and left perihilar atelectasis versus consolidation. Suspected small left pleural effusion.            DX-CHEST-PORTABLE (1 VIEW)   Final Result         1. Stable lines and tubes..   2. Stable retrocardiac atelectasis versus consolidation with increased left perihilar opacity. Suspected trace left pleural effusion.         [id number] VIEW   Final Result      1.  Enteric tube has been placed and the tip projects over the stomach.      2.  Pre-existing feeding tube tip projects at the gastroduodenal junction      DX-CHEST-PORTABLE (1 VIEW)   Final Result         1. Lines and tubes as above.   2. Stable retrocardiac atelectasis versus consolidation. Suspected trace left pleural effusion.         MR-BRAIN-WITH & W/O   Final Result      1.  Moderate cerebral atrophy.   2.  Evidence of intraventricular hemorrhage, indeterminate age. Possibly chronic intraventricular hemosiderin deposition.   3.  Extensive encephalomalacic change with hemosiderin deposition involving the right corona radiata, basal ganglia, posterior thalamus, subthalamic region, bordering the right cerebral peduncle. Associated ex vacuo dilatation of the body of the right    lateral ventricle. No change.   4.  Additional foci of old microhemorrhage most consistent with old hypertensive microhemorrhage or amyloid angiopathy in the left basal ganglia, left thalamus, and midline upper ventral abel.   5.  Punctate focus  of acute infarction in the right parietal deep white matter. No change.   6.  Advanced supratentorial white matter disease most consistent with microvascular ischemic change.   7.  Encephalomalacic changes in the abel and right cerebral peduncle consistent with old infarction.   8.  Partially empty sella.   9.  Overall, no new findings and no significant change from 1/14/2020.      DX-CHEST-PORTABLE (1 VIEW)   Final Result         1. Stable lines and tubes.   2. Unchanged retrocardiac atelectasis versus consolidation.   3. Stable trace left pleural effusion.         OUTSIDE IMAGES-DX CHEST   Final Result      OUTSIDE IMAGES-US VASCULAR   Final Result      OUTSIDE IMAGES-MR BRAIN   Final Result      OUTSIDE IMAGES-DX CHEST   Final Result      OUTSIDE IMAGES-CT HEAD   Final Result      EC-ECHOCARDIOGRAM COMPLETE W/O CONT   Final Result      DX-CHEST-FOR LINE PLACEMENT Perform procedure in: Patient's Room   Final Result         1.  Retrocardiac opacity concerning for infiltrate, stable.   2.  Trace left pleural effusion, stable   3.  Cardiomegaly   4.  Atherosclerosis   5.  Perihilar interstitial prominence and bronchial wall cuffing, appearance suggests changes of underlying bronchial inflammation, consider bronchitis.      DX-ABDOMEN FOR TUBE PLACEMENT   Final Result         1.  Nonspecific bowel gas pattern.   2.  Dobbhoff tube tip overlying the expected location of the pylorus or first duodenal segment.   3.  Left lung base atelectasis and/or small effusion      DX-CHEST-PORTABLE (1 VIEW)   Final Result         1.  Retrocardiac opacity concerning for infiltrate.   2.  Trace left pleural effusion, stable   3.  Cardiomegaly   4.  Atherosclerosis      [id number] FILM X-RAY   Final Result      OUTSIDE IMAGES-DX CHEST   Final Result           Assessment/Plan  * Status epilepticus (HCC)- (present on admission)  Assessment & Plan  Was intubated for airway protection, now trach since 2/2/20. No evidence of seizure  "activity. Neurology gave the opinion that likelihood of patient returning to baseline was low, and that the likelihood of the patient returning to full independent function was essentially 0.  - neurology evaluated, appreciate recs  - continuing vimpat, topamax, depakote  - repeat EEG showed no seizures    TB lung, latent- (present on admission)  Assessment & Plan  Quantiferon + and will have to rule out active TB. Chest CT found positive left-sided consolidation with pleural effusion.  - pending sputum cx/ AFB stain to rule out active disease before initiating  - s/p thora on 2/25; f/u pathology   - ID following, appreciate recs  - airborne isolation  - Per ID, we will \"treat for LTBI with  mg PO daily and B6 25 mg PO daily for 9 months if AFB cxs all negative at 6 weeks\"    Acute respiratory failure with hypoxia (HCC)- (present on admission)  Assessment & Plan  With moderate secretions, needing suctioning.   - stable on trach/T-piece  - continue RT protocol    Hypokalemia  Assessment & Plan  Improved.   - Replace with enteral K-Dur  - magnesium level pending    Severe protein-calorie malnutrition (HCC)- (present on admission)  Assessment & Plan  - Continue tube feeding    Cerebrovascular accident (CVA) due to embolism of right middle cerebral artery (HCC)- (present on admission)  Assessment & Plan  - continue statin and aspirin  - unable to participate with PT/OT    End stage renal failure on dialysis (HCC)- (present on admission)  Assessment & Plan  Poor prognosis per nephrology, recommending comfort care.   - Continue hemodialysis MWF  - nephrology following, appreciate recs  - avoid nephrotoxins and continue to renally dose all medications    Normocytic anemia- (present on admission)  Assessment & Plan  Likely anemia of chronic kidney disease.  FOBT is negative.   -Continue to trend H&H, transfuse if drops below 7    Hypertension- (present on admission)  Assessment & Plan  Normotensive.   - continue " coreg, hydralazine, caldura    Type 2 diabetes mellitus, with long-term current use of insulin (HCC)- (present on admission)  Assessment & Plan  A1C 7.5%, without hyperglycemia.   - patient has not needed coverage  - monitor intermittently    Goals of care, counseling/discussion- (present on admission)  Assessment & Plan  - There have been multiple discussions with palliative/treatment team and family, to no avail. Patient remains full code, has trach and PEG.    Pressure injury of sacrum, coccyx, stage 3 (HCC)- (present on admission)  Assessment & Plan  -Wound care per wound service. Continue pressure offloading strategies.    Hypomagnesemia- (present on admission)  Assessment & Plan  -Continue to monitor.     Dysphagia as late effect of cerebrovascular accident (CVA)- (present on admission)  Assessment & Plan  - PEG now in place  - aspiration precautions.    Gout- (present on admission)  Assessment & Plan  - Continue with allopurinol per the PEG tube       VTE prophylaxis: SCDs

## 2020-02-27 NOTE — PROGRESS NOTES
Hemodialysis done today, started @ 1231 and ended @ 1602 with net UF= 1500ml. Report given to MIC Ni. See flow sheet for details.

## 2020-02-27 NOTE — PROGRESS NOTES
Patient noted to have leaking from rectum around rectal tube. BMS changed with wound care nurse. Patient tolerated well.

## 2020-02-27 NOTE — PROGRESS NOTES
2 RN skin check complete with MIC Love.   Devices in place: trach, PICC, HD catheter, peg tube, hernandez catheter, rectal tube, float heel boots, mepilex.  Skin assessed under devices yes.  Confirmed pressure ulcers: Coccyx.  New potential pressure ulcers noted on N/A. Wound consult in place.   The following interventions in place 2 RN skin check, q2 repositions, float heel boots, low air bed, mepilex, rectal tube, hernandez.     Skin Assessment  Generalized bruising  Under trach collar - redness  Peg tube site - redness  Groin - redness  Coccyx - open area -pink/white wound bed + purple/red/dark edges. Hydrocolloid dressing.  Heels - soft + pink

## 2020-02-27 NOTE — PROGRESS NOTES
Patient not awake or alert. Opens eye and eyes move but not able to follow staff in room. Remains nonverbal. RRT to collect sputum for final AFB test. No SOB at rest. Humidified O2 by t-piece to trach. Q2 side-to-side turns in place. Left arm elevated on pillow. Wound care to assess today. River to dependant drainage. Rectal tube draining soft stool. Continues on airborne precautions. Tube feed at goal rate. Family updated via telephone. Staff to anticipate wants and needs .

## 2020-02-28 NOTE — PROGRESS NOTES
Hospital Medicine Daily Progress Note    Date of Service  2/28/2020    Chief Complaint  74 y.o. female admitted 1/16/2020 with altered mental status    Hospital Course    Ms. Beltran is a 74 y.o. female admitted to [facility] on 1/14/20 with seizures.She was at a SNF.She had been previously hospitalized at [facility] and diaysis was initiated.  She was doing very poorly then and palliative care was discussed with the family,but they declined.She had very poor functional status and required Jimena lift to get into the dialysis chair.She was found to have  a CVA on MRI at [facility].Her seizures were not controlled and it was felt she was in status epilepticus.  She was getting HD at [facility].Last HD was on 1/13/20.She was seen by Dr Carcamo at [facility].Creatinine was 1.8 and dialysis was held. Neurology [place] that the patient needed continuous EEG monitoring and he was transferred to [facility]. Patient intubated due to seizures and was eventually transitioned to tracheostomy on 2/2/2020 and off the vent on 2/5/2020. Family at this time does not want to change CODE status. Patient remains unresponsive. LTAC being pursued. PEG tube in place.         Interval Problem Update  No overnight events. Per infection control, can remove airborne precautions now. No meaningful interactions today.     Consultants/Specialty  Palliative care  Critical care - signed off  Neurology - signed off  Nephrology  ID     Code Status  FULL    Disposition  TBD- declined by LTACH. Pending meeting with family, SW, and palliative to re-address goals and discharge plan.     Review of Systems  Review of Systems   Unable to perform ROS: Medical condition       Physical Exam  Temp:  [36.7 °C (98 °F)-36.8 °C (98.2 °F)] 36.8 °C (98.2 °F)  Pulse:  [72-95] 76  Resp:  [16-18] 18  BP: (116-187)/(66-83) 159/83  SpO2:  [94 %-98 %] 97 %    Physical Exam  Vitals signs reviewed.   Constitutional:       General: She is not in acute distress.     Appearance: She is  ill-appearing. She is not diaphoretic.   HENT:      Head: Normocephalic.      Mouth/Throat:      Comments: Tacky mucous membranes  Eyes:      General:         Right eye: No discharge.         Left eye: No discharge.      Extraocular Movements: Extraocular movements intact.   Cardiovascular:      Rate and Rhythm: Normal rate and regular rhythm.      Pulses: Normal pulses.      Heart sounds: Normal heart sounds.      Comments: Tunneled HD catheter  Pulmonary:      Comments: Trach in place  Abdominal:      General: Bowel sounds are normal. There is no distension.      Palpations: Abdomen is soft.      Tenderness: There is no abdominal tenderness. There is no guarding.      Comments: PEG tube in place; no tenderness or erythema   Genitourinary:     Comments: River in place  Skin:     General: Skin is warm.      Comments: Swelling of right hand; slightly improved   Neurological:      Mental Status: She is alert.      Motor: Weakness (diffuse) present.   Psychiatric:         Attention and Perception: She is inattentive.         Behavior: Behavior is not agitated.     Patient seen and examined today on 2/28, unchanged from 2/27.     Fluids    Intake/Output Summary (Last 24 hours) at 2/28/2020 0630  Last data filed at 2/28/2020 0500  Gross per 24 hour   Intake 430 ml   Output 400 ml   Net 30 ml       Laboratory  Recent Labs     02/26/20  1122 02/27/20  0335 02/28/20  0320   WBC 17.1* 13.2* 12.2*   RBC 2.88* 2.77* 2.71*   HEMOGLOBIN 9.1* 8.8* 8.8*   HEMATOCRIT 29.0* 27.9* 27.1*   .7* 100.7* 100.0*   MCH 31.6 31.8 32.5   MCHC 31.4* 31.5* 32.5*   RDW 61.2* 61.0* 59.4*   PLATELETCT 164 157* 165   MPV 9.3 9.5 9.9     Recent Labs     02/26/20  1122 02/27/20  0335 02/28/20  0320   SODIUM 132* 135 129*   POTASSIUM 4.1 3.2* 3.6   CHLORIDE 94* 97 92*   CO2 26 29 27   GLUCOSE 184* 151* 171*   BUN 78* 38* 53*   CREATININE 1.65* 0.89 1.27   CALCIUM 8.5 8.5 8.5                   Imaging  DX-CHEST-PORTABLE (1 VIEW)   Final Result       1.  Apparent improvement of LEFT pleural effusion and basilar consolidation.   2.  No pneumothorax.   3.  Supportive tubing as described above.      US-THORACENTESIS PUNCTURE LEFT   Final Result      1. Ultrasound guided left sided diagnostic and therapeutic thoracentesis.      2. 400 mL of fluid withdrawn.      CT-CHEST (THORAX) W/O   Final Result      1.  Bilateral atelectasis/consolidation, left greater than right.      2.  Moderate left and small right pleural effusion.      3.  Atherosclerotic vascular calcification.      4.  Ascites within the upper abdomen.            US-ABDOMEN LTD (SOFT TISSUE)   Final Result         1. Trace free fluid in the pelvis. No abdominal ascites.      DX-ABDOMEN FOR TUBE PLACEMENT   Final Result         Feeding tube with tip projecting over the expected area of the stomach.      HA-WQSBOUE-3 VIEW   Final Result      Feeding tube tip overlies the gastric antrum.      DX-CHEST-PORTABLE (1 VIEW)   Final Result         1.  Pulmonary edema and/or infiltrates are identified, which are stable since the prior exam.   2.  Cardiomegaly   3.  Atherosclerosis      DX-CHEST-PORTABLE (1 VIEW)   Final Result         1.  Pulmonary edema and/or infiltrates are identified, which are stable since the prior exam.   2.  Cardiomegaly   3.  Atherosclerosis      DX-CHEST-PORTABLE (1 VIEW)   Final Result      1.  Unchanged left lower lobe atelectasis or pneumonia.      2.  Clear right lung.      DX-CHEST-PORTABLE (1 VIEW)   Final Result      Unchanged LEFT basilar atelectasis and/or airspace disease      DX-CHEST-PORTABLE (1 VIEW)   Final Result      Left basilar atelectasis, hilar and cardiac silhouette enlargement as before      DX-CHEST-PORTABLE (1 VIEW)   Final Result      Interval removal of a left subclavian central line. Stable patchy bilateral infiltrates.      IR-PICC LINE PLACEMENT W/ GUIDANCE > AGE 5   Final Result                  Ultrasound-guided PICC placement performed by qualified  nursing staff as    above.          DX-CHEST-PORTABLE (1 VIEW)   Final Result      1.  Multiple support devices present.      2.  Patchy bilateral atelectasis.      DX-CHEST-PORTABLE (1 VIEW)   Final Result      Stable chest with retrocardiac opacity from atelectasis and/or pleural fluid favored over consolidation      DX-CHEST-PORTABLE (1 VIEW)   Final Result      Stable chest with retrocardiac opacity from atelectasis and/or pleural fluid favored over consolidation      DX-CHEST-PORTABLE (1 VIEW)   Final Result         No significant change from prior.               DX-CHEST-PORTABLE (1 VIEW)   Final Result         1. Stable lines and tubes..   2. Stable retrocardiac and left perihilar atelectasis versus consolidation. Suspected small left pleural effusion.            DX-CHEST-PORTABLE (1 VIEW)   Final Result         1. Stable lines and tubes..   2. Stable retrocardiac atelectasis versus consolidation with increased left perihilar opacity. Suspected trace left pleural effusion.         [id number] VIEW   Final Result      1.  Enteric tube has been placed and the tip projects over the stomach.      2.  Pre-existing feeding tube tip projects at the gastroduodenal junction      DX-CHEST-PORTABLE (1 VIEW)   Final Result         1. Lines and tubes as above.   2. Stable retrocardiac atelectasis versus consolidation. Suspected trace left pleural effusion.         MR-BRAIN-WITH & W/O   Final Result      1.  Moderate cerebral atrophy.   2.  Evidence of intraventricular hemorrhage, indeterminate age. Possibly chronic intraventricular hemosiderin deposition.   3.  Extensive encephalomalacic change with hemosiderin deposition involving the right corona radiata, basal ganglia, posterior thalamus, subthalamic region, bordering the right cerebral peduncle. Associated ex vacuo dilatation of the body of the right    lateral ventricle. No change.   4.  Additional foci of old microhemorrhage most consistent with old hypertensive  microhemorrhage or amyloid angiopathy in the left basal ganglia, left thalamus, and midline upper ventral abel.   5.  Punctate focus of acute infarction in the right parietal deep white matter. No change.   6.  Advanced supratentorial white matter disease most consistent with microvascular ischemic change.   7.  Encephalomalacic changes in the abel and right cerebral peduncle consistent with old infarction.   8.  Partially empty sella.   9.  Overall, no new findings and no significant change from 1/14/2020.      DX-CHEST-PORTABLE (1 VIEW)   Final Result         1. Stable lines and tubes.   2. Unchanged retrocardiac atelectasis versus consolidation.   3. Stable trace left pleural effusion.         OUTSIDE IMAGES-DX CHEST   Final Result      OUTSIDE IMAGES-US VASCULAR   Final Result      OUTSIDE IMAGES-MR BRAIN   Final Result      OUTSIDE IMAGES-DX CHEST   Final Result      OUTSIDE IMAGES-CT HEAD   Final Result      EC-ECHOCARDIOGRAM COMPLETE W/O CONT   Final Result      DX-CHEST-FOR LINE PLACEMENT Perform procedure in: Patient's Room   Final Result         1.  Retrocardiac opacity concerning for infiltrate, stable.   2.  Trace left pleural effusion, stable   3.  Cardiomegaly   4.  Atherosclerosis   5.  Perihilar interstitial prominence and bronchial wall cuffing, appearance suggests changes of underlying bronchial inflammation, consider bronchitis.      DX-ABDOMEN FOR TUBE PLACEMENT   Final Result         1.  Nonspecific bowel gas pattern.   2.  Dobbhoff tube tip overlying the expected location of the pylorus or first duodenal segment.   3.  Left lung base atelectasis and/or small effusion      DX-CHEST-PORTABLE (1 VIEW)   Final Result         1.  Retrocardiac opacity concerning for infiltrate.   2.  Trace left pleural effusion, stable   3.  Cardiomegaly   4.  Atherosclerosis      [id number] FILM X-RAY   Final Result      OUTSIDE IMAGES-DX CHEST   Final Result           Assessment/Plan  * Status epilepticus  "(HCC)- (present on admission)  Assessment & Plan  Was intubated for airway protection, now trach since 2/2/20. No evidence of seizure activity. Neurology gave the opinion that likelihood of patient returning to baseline was low, and that the likelihood of the patient returning to full independent function was essentially 0.  - neurology evaluated, appreciate recs  - continuing regimen of vimpat, topamax, depakote  - repeat EEG showed no seizures    TB lung, latent- (present on admission)  Assessment & Plan  Quantiferon + and will have to rule out active TB. Chest CT found positive left-sided consolidation with pleural effusion.  - pending sputum cx/ AFB stain to rule out active disease before initiating  - s/p thora on 2/25; f/u pathology   - ID following, appreciate recs  - AFB stain negative x3; remove isolation  - Per ID, we will \"treat for LTBI with  mg PO daily and B6 25 mg PO daily for 9 months if AFB cxs all negative at 6 weeks\"    Acute respiratory failure with hypoxia (HCC)- (present on admission)  Assessment & Plan  - stable on trach/T-piece  - continue RT protocol    Hypokalemia  Assessment & Plan  Improved. In the setting of hypomag.  - Replace with enteral K-Dur  - monitor and replete    Severe protein-calorie malnutrition (HCC)- (present on admission)  Assessment & Plan  - Continue tube feeding    Cerebrovascular accident (CVA) due to embolism of right middle cerebral artery (HCC)- (present on admission)  Assessment & Plan  Very poor prognosis.  - continue statin and aspirin  - unable to participate with PT/OT    End stage renal failure on dialysis (HCC)- (present on admission)  Assessment & Plan  Poor prognosis per nephrology, recommending comfort care.   - Continue hemodialysis MWF  - nephrology following, appreciate recs  - avoid nephrotoxins and continue to renally dose all medications    Normocytic anemia- (present on admission)  Assessment & Plan  Likely anemia of chronic kidney disease.  " FOBT is negative.   - trend H&H  - transfuse if drops below 7    Hypertension- (present on admission)  Assessment & Plan  Normotensive but now recently labile  - continue coreg, hydralazine, caldura (with holding parameters)    Type 2 diabetes mellitus, with long-term current use of insulin (HCC)- (present on admission)  Assessment & Plan  A1C 7.5%, without hyperglycemia.   - patient has not needed coverage  - monitor intermittently    Goals of care, counseling/discussion- (present on admission)  Assessment & Plan  There have been multiple discussions with palliative/treatment team and family, to no avail. Patient remains full code, has trach and PEG.  - SW and palliative care following    Pressure injury of sacrum, coccyx, stage 3 (HCC)- (present on admission)  Assessment & Plan  -Wound care per wound service. Continue pressure offloading strategies.    Hypomagnesemia- (present on admission)  Assessment & Plan  -Continue to monitor.     Dysphagia as late effect of cerebrovascular accident (CVA)- (present on admission)  Assessment & Plan  - PEG in place  - aspiration precautions    Gout- (present on admission)  Assessment & Plan  - Continue with allopurinol per the PEG tube       VTE prophylaxis: SCDs

## 2020-02-28 NOTE — PROGRESS NOTES
Infectious Disease Progress Note    Author: Estella Light M.D. Date & Time of service: 2020  3:40 PM    Chief Complaint:  Positive TB quant and abnormal chest x-ray       Interval History:  74 y.o. female admitted 2020. 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 No clinical change   AF getting HD-pleural and AFB neg to date. Isolation discontinued    Labs Reviewed, Medications Reviewed and Radiology Reviewed.    Review of Systems:  Review of Systems   Unable to perform ROS: Patient nonverbal   Constitutional: Negative for fever.       Hemodynamics:  Temp (24hrs), Av.7 °C (98.1 °F), Min:36.3 °C (97.3 °F), Max:37.1 °C (98.7 °F)  Temperature: 36.6 °C (97.8 °F)  Pulse  Av.5  Min: 16  Max: 111   Blood Pressure : 134/68       Physical Exam:  Physical Exam  Vitals signs and nursing note reviewed.   Constitutional:       General: She is not in acute distress.     Appearance: She is ill-appearing. She is not toxic-appearing or diaphoretic.      Comments: Chronically ill  Looks older than stated age   Eyes:      General: No scleral icterus.        Right eye: No discharge.         Left eye: No discharge.   Cardiovascular:      Rate and Rhythm: Normal rate.   Pulmonary:      Effort: Pulmonary effort is normal. No respiratory distress.      Breath sounds: No stridor. Rhonchi present. No wheezing.      Comments: Decreased BS  Abdominal:      Palpations: Abdomen is soft.      Tenderness: There is no abdominal tenderness. There is no guarding.   Musculoskeletal:         General: Swelling present.      Comments: PICC   Skin:     General: Skin is warm.      Coloration: Skin is not jaundiced.      Findings: Bruising present.   Neurological:      Comments: Expressive aphasia  somnolent         Meds:    Current Facility-Administered Medications:   •   magnesium sulfate  •  dakins 0.125% (1/4 strength)  •  heparin  •  doxazosin  •  aspirin  •  carvedilol  •  hydrALAZINE  •  omeprazole  •  divalproex  •  albumin human 25%  •  lacosamide  •  carboxymethylcellulose  •  vitamin B comp+C  •  folic acid  •  allopurinol  •  nystatin  •  hydrALAZINE  •  topiramate  •  atorvastatin  •  heparin  •  epoetin antoinette  •  Respiratory Therapy Consult  •  ipratropium-albuterol  •  senna-docusate **AND** polyethylene glycol/lytes **AND** [DISCONTINUED] magnesium hydroxide **AND** bisacodyl  •  Pharmacy  •  labetalol  •  acetaminophen    Labs:  Recent Labs     02/26/20  1122 02/27/20  0335 02/28/20  0320   WBC 17.1* 13.2* 12.2*   RBC 2.88* 2.77* 2.71*   HEMOGLOBIN 9.1* 8.8* 8.8*   HEMATOCRIT 29.0* 27.9* 27.1*   .7* 100.7* 100.0*   MCH 31.6 31.8 32.5   RDW 61.2* 61.0* 59.4*   PLATELETCT 164 157* 165   MPV 9.3 9.5 9.9   NEUTSPOLYS 71.40 67.80  --    LYMPHOCYTES 17.00* 17.30*  --    MONOCYTES 9.70 12.30  --    EOSINOPHILS 0.90 1.30  --    BASOPHILS 0.40 0.50  --      Recent Labs     02/26/20  1122 02/27/20  0335 02/28/20  0320   SODIUM 132* 135 129*   POTASSIUM 4.1 3.2* 3.6   CHLORIDE 94* 97 92*   CO2 26 29 27   GLUCOSE 184* 151* 171*   BUN 78* 38* 53*     Recent Labs     02/26/20  1122 02/27/20  0335 02/28/20  0320   CREATININE 1.65* 0.89 1.27       Imaging:  Ct-chest (thorax) W/o    Result Date: 2/22/2020 2/22/2020 6:25 PM HISTORY/REASON FOR EXAM: Pulmonary infiltrates. TECHNIQUE/EXAM DESCRIPTION: CT scan of the chest without contrast. Thin-section helical images were obtained from the lung apices through the adrenal glands. Low dose optimization technique was utilized for this CT exam including automated exposure control and adjustment of the mA and/or kV according to patient size. COMPARISON: None FINDINGS: The study is limited due to non-use of intravenous contrast. The mediastinum and hilum is not well evaluated. There are bilateral areas of atelectasis/consolidation, left  greater than right. There is a small right and a moderate left pleural effusion. There is extensive atherosclerotic vascular calcification. There is no evidence of mediastinal or hilar adenopathy. There is no evidence of pericardial effusion. There is ascites seen within the upper abdomen.     1.  Bilateral atelectasis/consolidation, left greater than right. 2.  Moderate left and small right pleural effusion. 3.  Atherosclerotic vascular calcification. 4.  Ascites within the upper abdomen.     [id number] View    Result Date: 2/7/2020 2/7/2020 6:49 PM HISTORY/REASON FOR EXAM: Feeding tube placement TECHNIQUE/EXAM DESCRIPTION AND NUMBER OF VIEWS: 1 supine views of the abdomen. COMPARISON: None FINDINGS: There is no evidence of bowel obstruction. Feeding tube tip overlies the gastric antrum. No abnormal calcifications are seen.     Feeding tube tip overlies the gastric antrum.    Dx-chest-portable (1 View)    Result Date: 2/3/2020    2/3/2020 12:19 AM HISTORY/REASON FOR EXAM: For indication of respiratory failure; For indication of respiratory failure TECHNIQUE/EXAM DESCRIPTION:  Single AP view of the chest. COMPARISON: Yesterday FINDINGS: Endotracheal tube has been removed in the interim.     Otherwise medical device position is stable. Cardiomegaly is observed. Atherosclerotic calcification of the aorta is noted.  The central  pulmonary vasculature appears prominent and indistinct. The lungs appear well expanded bilaterally.  Diffuse scattered hazy pulmonary parenchymal opacities are seen. No significant pleural effusions are identified. The bony structures appear age-appropriate.     1.  Pulmonary edema and/or infiltrates are identified, which are stable since the prior exam. 2.  Cardiomegaly 3.  Atherosclerosis    Dx-chest-portable (1 View)    Result Date: 2/2/2020 2/2/2020 12:29 AM HISTORY/REASON FOR EXAM: For indication of respiratory failure; For indication of respiratory failure TECHNIQUE/EXAM  DESCRIPTION:  Single AP view of the chest. COMPARISON: Yesterday FINDINGS: Position of medical devices appears stable. Cardiomegaly is observed. Atherosclerotic calcification of the aorta is noted.  The central  pulmonary vasculature appears prominent and indistinct. The lungs appear well expanded bilaterally.  Diffuse scattered hazy pulmonary parenchymal opacities are seen. No significant pleural effusions are identified. The bony structures appear age-appropriate.     1.  Pulmonary edema and/or infiltrates are identified, which are stable since the prior exam. 2.  Cardiomegaly 3.  Atherosclerosis      Us-abdomen Ltd (soft Tissue)    Result Date: 2/12/2020 2/12/2020 2:04 PM HISTORY/REASON FOR EXAM:  Needs peg, hx of ascites TECHNIQUE/EXAM DESCRIPTION: Limited abdominal ultrasound. COMPARISON: None available. FINDINGS: Focus ultrasound of the 4 quadrants of the abdomen demonstrates trace free fluid in the pelvis adjacent to the urinary bladder. Decompressed urinary bladder by River catheter..     1. Trace free fluid in the pelvis. No abdominal ascites.      Micro:  Results     Procedure Component Value Units Date/Time    FLUID CULTURE W/GRAM STAIN [[id number]] Collected:  02/25/20 1100    Order Status:  Completed Specimen:  Body Fluid Updated:  02/28/20 0807     Significant Indicator NEG     Source BF     Site Pleural Fluid     Culture Result No growth at 72 hours.     Gram Stain Result No organisms seen.    AFB Culture [[id number]] Collected:  02/25/20 1100    Order Status:  Completed Specimen:  Body Fluid Updated:  02/28/20 0807     Significant Indicator NEG     Source BF     Site Pleural Fluid     Culture Result Culture in progress.     AFB Smear Results No acid fast bacilli seen.    Fungal Culture [[id number]] Collected:  02/25/20 1100    Order Status:  Completed Specimen:  Body Fluid Updated:  02/28/20 0807     Significant Indicator NEG     Source BF     Site Pleural Fluid     Culture Result Culture in progress.      Acid Fast Stain [[id number]] Collected:  02/26/20 2140    Order Status:  Completed Specimen:  Respirate Updated:  02/27/20 1654     Significant Indicator NEG     Source RESP     Site Tracheal Aspirate     AFB Smear Results No acid fast bacilli seen.    Narrative:       Ordered from hard req #: [id number].  Limited volume of fluid received results maybe compromised.    AFB Culture [[id number]] Collected:  02/26/20 2140    Order Status:  Completed Specimen:  Respirate Updated:  02/27/20 1654     Significant Indicator NEG     Source RESP     Site Tracheal Aspirate     Culture Result Culture in progress.     AFB Smear Results No acid fast bacilli seen.    Narrative:       Ordered from hard req #: [id number].  Limited volume of fluid received results maybe compromised.    AFB Culture [[id number]] Collected:  02/27/20 1214    Order Status:  Completed Specimen:  Respirate from Tracheal Aspirate Updated:  02/27/20 1229    Narrative:       [id number] SUPAK JEN    Acid Fast Stain [[id number]] Collected:  02/25/20 1715    Order Status:  Completed Specimen:  Respirate Updated:  02/26/20 1751     Significant Indicator NEG     Source RESP     Site Tracheal Aspirate     AFB Smear Results No acid fast bacilli seen.    Narrative:       AirborneRESRDE RIVERS IRVIN D  AirborneRESANDERSONE SILVESTRE GUPTA    Acid Fast Stain [[id number]] Collected:  02/26/20 1050    Order Status:  Completed Specimen:  Respirate Updated:  02/26/20 1751     Significant Indicator NEG     Source RESP     Site TRACHEAL ASPIRATE     AFB Smear Results No acid fast bacilli seen.    Narrative:       [id number] SUPAK JEN  [id number] SUPAK JEN    AFB CULTURE [[id number]] Collected:  02/25/20 1715    Order Status:  Completed Specimen:  Respirate from Thoracentesis Fluid Updated:  02/26/20 1751     Significant Indicator NEG     Source RESP     Site Tracheal Aspirate     Culture Result Culture in progress.     AFB Smear Results No acid fast bacilli  seen.    Narrative:       AirborneRESRDE SILVESTRE BRYAN D  AirborneRESRDMARIAN GUPTA    AFB Culture [[id number]] Collected:  02/26/20 1050    Order Status:  Completed Specimen:  Respirate from Tracheal Aspirate Updated:  02/26/20 1751     Significant Indicator NEG     Source RESP     Site TRACHEAL ASPIRATE     Culture Result Culture in progress.     AFB Smear Results No acid fast bacilli seen.    Narrative:       [id number] SUPAK JEN  [id number] SUPAK JEN    Acid Fast Stain [[id number]] Collected:  02/25/20 1100    Order Status:  Completed Specimen:  Body Fluid Updated:  02/25/20 2227     Significant Indicator NEG     Source BF     Site Pleural Fluid     AFB Smear Results No acid fast bacilli seen.    GRAM STAIN [[id number]] Collected:  02/25/20 1100    Order Status:  Completed Specimen:  Body Fluid Updated:  02/25/20 2227     Significant Indicator .     Source BF     Site Pleural Fluid     Gram Stain Result No organisms seen.    Acid Fast Stain [[id number]] Collected:  02/25/20 0330    Order Status:  Completed Specimen:  Respirate Updated:  02/25/20 1828     Significant Indicator NEG     Source RESP     Site Tracheal Aspirate     AFB Smear Results No acid fast bacilli seen.    Narrative:       Ordered from hard req; order #: [id number].    AFB Culture [[id number]] Collected:  02/25/20 0330    Order Status:  Completed Specimen:  Respirate Updated:  02/25/20 1827     Significant Indicator NEG     Source RESP     Site Tracheal Aspirate     Culture Result Culture in progress.     AFB Smear Results No acid fast bacilli seen.    Narrative:       Ordered from hard req; order #: [id number].    FLUID CULTURE W/GRAM STAIN [[id number]]     Order Status:  No result Specimen:  Body Fluid from Thoracentesis Fluid     FUNGAL CULTURE [[id number]]     Order Status:  No result Specimen:  Body Fluid from Thoracentesis Fluid     CULTURE WOUND W/ GRAM STAIN [[id number]]  (Abnormal) Collected:  02/20/20 1145    Order Status:   Completed Specimen:  Wound from Abdominal Updated:  02/22/20 0835     Significant Indicator POS     Source WND     Site ABDOMINAL     Culture Result Rare growth mixed skin shiva.     Gram Stain Result Moderate WBCs.  No organisms seen.       Culture Result Candida albicans  Moderate growth      Narrative:       Collected By:[id number] BASHIR BUI  Collected By:[id number] BASHIR BUI          Assessment:  Active Hospital Problems    Diagnosis   • *Status epilepticus (Former[facility]) [G40.901]   • Acute respiratory failure with hypoxia ([facility]) [J96.01]   • Hypokalemia [E87.6]   • End stage renal failure on dialysis ([facility]) [N18.6, Z99.2]   • Cerebrovascular accident (CVA) due to embolism of right middle cerebral artery ([facility]) [I63.411]   • Severe protein-calorie malnutrition ([facility]ly [facility]) [E43]   • Normocytic anemia [D64.9]   • DM (diabetes mellitus) ([facility]) [E11.9]   • Hypertension [I10]   • Pressure injury of sacrum, coccyx, stage 3 ([facility]) [L89.303]   • Hypomagnesemia [E83.42]   • Dysphagia as late effect of cerebrovascular accident (CVA) [I69.391]   • Gout [M10.9]   • Pleural effusion [J90]   • TB lung, latent [Z22.7]   • Vomiting [R11.10]   • Goals of care, counseling/discussion [Z71.89]       Plan:  Positive TB quant, abnormal chest x-rays and CT-  Abnormal since admit-more likely due to chronic aspiration rather than pulmonary TB but will need to rule-out  Afebrile   +leukocytosis, mild  TB quant  2/18 -positive  CT chest on 2/22 -atelectasis/consolidation, moderate pleural effusion  Continue to FU sputums for AFB and cx  Treat for LTBI with  mg PO daily and B6 25 mg PO daily for 9 months if AFB cxs all negative at 6 weeks  [facility]    Pleural effusion  S/p thoracentesis   Bronchoscopy with BAL on 2/2-BAL   Cultures negative including negative AFB to date     Dysphagia due to CVA   Chronic aspiration     Leukocytosis  Multifactorial     Status epilepticus, improved  Acute on chronic CVA, expressive aphasia and left  side hemiparesis     Abdominal wound, PEG tube placed on 2/15  -Cultures with Mayte albicans     ESRD on hemodialysis  -Right side tunneled line     Diabetes mellitus  Keep BS under 150 to help control current infection     Sacral and coccyx pressure ulcers  Wound care      History of C. difficile infection  C. difficile positive in 2016     Failure to thrive, poor prognosis - per notes multiple discussions with family but remains full code and no plan for hospice     WIll sign off-please reconsult if needed

## 2020-02-28 NOTE — PROGRESS NOTES
2 RN skin check completed with MIC Ingram.   Devices in place: Trache, PICC, HD catheter, peg tube, hernandez catheter, rectal tube, SCDs, heel float boots, mepilex.  Skin assessed under devices: Yes.  Confirmed pressure ulcers found on coccyx.  New potential pressure ulcers noted on: N/A. Wound consult in place, wound team following.     The following interventions in place: 2 RN skin check q shift, q2 turns, heel float boots, NUBIA mattress, mepilex, rectal tube, hernandez catheter.        Redness under trache collar, blanching. Dressing in place, CDI.  Scab on L upper chest.  Bruising, redness on BUE.  Edema on right arm.  Redness on PEG tube site.  Redness on groin area, blanching.  Redness on upper back noted, blanching.  Dressing with dakins solution on coccyx area changed as per NATALIE TANG.  Redness on on sacral area, blanching.  Discoloration noted on right lower leg.  Heels boggy, pink.

## 2020-02-28 NOTE — DIETARY
Nutrition Support Weekly Update: Day 43 of admit.  Cassandra Guzmán is a 74 y.o. female with admitting DX of Seizures, Ventilatory respiratory failure, Acute renal failure, Respiratory failure requiring intubation. Tube feeding initiated on 1/17. Current TF via PEG is Impact Peptide 1.5 @ 40 mL/hr, providing 1440 kcal, 90 gm protein, 134 gm CHO, and 739 mL of free water per day. Pt is also receiving Nutrisource Fiber packets 6x/day, providing an additional 18 gm fiber and 90 kcal, for a total of 1530 kcal/day.       Assessment:  Wt 2/22: 54.5 kg via bed scale - wt loss noted since admit. Wt loss percent is 9.9% in a month, which is severe. Wt loss could be related to fluids (per I/Os pt -4 L) and prolonged hospitalization.     Evaluation:   1. TF running @ goal  2. Pt has a trach with t-piece   3. Pt receiving dialysis today and every MWF per nephrology note  4. Edema: 1+ edema RUE, RLE, LUE, LLE - Mild fluid accumulation.  5. Labs: Na 129, Glucose 171, BUN 53  6. Meds: zyloprim, depakote sprinkle, epogen, folvite, vimpat, first-omeprazole, pericolace, topamax, allbee with C  7. Skin: Wound team note 2/11: Sacrococcygeal stage 3 wound still present, has more yellow tissue present, leaks on occasion.   8. Last BM: 2/27  9. Current feeding remains appropriate at this time.      Malnutrition Risk: Pt at risk due to severe wt loss of 9.9 in about a month, no other criteria noted at this time.      Recommendations/Plan:  1. Continue TF formula and rate   2. Continue Nutrisource Fiber packets  3. Fluids per MD  4. Monitor weight.      RD following

## 2020-02-28 NOTE — WOUND TEAM
[facility]  Inpatient Services  Wound and Skin Care Progress Note    Admission Date:  1/16/2020   HPI, PMH, SH: Reviewed  Unit where seen by Wound Team: [id number]    WOUND TEAM FOLLOW UP: Sacral ulcer    Self Report / Pain Level:no s/s of distress      OBJECTIVE: River & BMS in place. On NUBIA. Heel float boots. Hydrocolloid to coccyx. Wedge in place for repositioning.    WOUND TYPE, LOCATION, CHARACTERISTICS (Pressure ulcers: location, stage, POA or date identified)        Pressure Injury 01/16/20 Sacrum;Coccyx stage 3 POA  (Active)   Wound Image     2/27/2020 12:00 PM   Pressure Injury Stage 3    State of Healing Early/partial granulation    Site Assessment Purple;Red;Drainage    Periwound Assessment Red;Purple    Margins Attached edges    Wound Length (cm) 3 cm    Wound Width (cm) 2 cm    Wound Depth (cm) 0.5 cm    Wound Surface Area (cm^2) 6 cm^2    Wound Volume (cm^3) 3 cm^3    Tunneling (cm) 0 cm    Undermining (cm) 0 cm    Closure Secondary intention    Drainage Amount Scant    Drainage Description Serosanguineous    Non-staged Wound Description Partial thickness    Treatments Cleansed;Site care    Wound Cleansing Dakin's Solution    Periwound Protectant Stoma Powder;Skin Protectant Wipes to Periwound    Dressing Options Dry Roll Gauze    Dressing Cleansing/Solutions 1/4 Strength Dakin's Solution    Dressing Changed Changed    Dressing Status Clean;Dry;Intact    Dressing Change/Treatment Frequency Every 48 hrs, and As Needed    NEXT Dressing Change/Treatment Date 02/29/20    NEXT Weekly Photo (Inpatient Only) 03/05/20    Wound Odor None    Exposed Structures None    WOUND NURSE ONLY - Tissue Type and Percentage Pink red purple             Lab Values:    Lab Results   Component Value Date/Time    WBC 13.2 (H) 02/27/2020 03:35 AM    RBC 2.77 (L) 02/27/2020 03:35 AM    HEMOGLOBIN 8.8 (L) 02/27/2020 03:35 AM    HEMATOCRIT 27.9 (L) 02/27/2020 03:35 AM            Lab Results   Component Value Date/Time    HBA1C 7.5 (H) 02/07/2019 01:20 PM         INTERVENTIONS BY WOUND TEAM: Pt turned to Right side. Wound cleansed with cleanser and gauze. Periwound crusted with ostomy powder and no sting barrier. Dakins-moistened roll gauze applied to sacrococcygeal ulcer and secured with ABD pad and hypafix tape. BMS replaced d/t leaking. Pt assessed, noted to be incontinent of loose brown stool. Sacrum/buttocks pink and intact. Sphincter tone determined to be adequate. BMS placed without difficulty, 40 mL of tap water placed in retention balloon, irrigated with  60 mL warm tap water. Confirmed irrigant/stool output in tube yes. Nursing to irrigate q shift with 60 mL-120 mL warm tap water or until patent. ABD replaced with sacral mepilex. Soiled chucks changed and pt repositioned.        Interdisciplinary consultation:  Pt, [place] RN    EVALUATION:  Sacrococcygeal Stg 3 still present with greater area of red/pink tissue. Will benefit from ongoing dakin's treatment and BMS to maintain clean wound bed.    Goals:  Slow steady decrease in wound area and depth weekly    NURSING PLAN OF CARE:    Dressing changes: Continue previous Dressing Maintenance orders:   x     See new Dressing Maintenance orders:       Skin care: See Skin Care orders:        Rectal tube care: See Rectal Tube Care orders:    x  Other orders:           WOUND TEAM PLAN OF CARE (X):   NPWT change 3 x week:        Dressing changes:     Nursing to perform dressing care, WT to follow weekly.  Follow up as needed:    Other:    Anticipated discharge plans (X):  SNF:           Home Care:           Outpatient Wound Center:            Self Care:            Other: will need wound care  - Awaiting LTACH placement

## 2020-02-28 NOTE — PROGRESS NOTES
[facility] Nephrology Progress Note    Date of Service  2/28/2020    AUTHOR: Saima Meneses M.D.    Chief Complaint   Follow up ESRD    HPI  74 y.o. female admitted to [facility] on 1/14/20 with seizures.She was at a SNF.She had been previously hospitalized at [facility] and diaysis was initiated.  She was doing very poorly then and palliative care was discussed with the family,but they declined.She had very poor functional status and required Jimena lift to get into the dialysis chair.She was found to have  a CVA on MRI at [facility].Her seizures were not controlled and it was felt she was in status epilepticus.  She was getting HD at [facility].Last HD was on 1/13/20.She was seen by Dr Carcamo at [facility].Creatinine was 1.8 and dialysis was held. Neurology [place] that the patient needed continuous EEG monitoring and he was transferred to [facility]    DAILY NEPHROLOGY SUMMARY:  Please see note from 1/31 for prior summaries  2/01 - NAEO, another EEG being done this AM  2/02 - NAEO, scheduled for Trach today  2/03 - No events, underwent trach yesterday, no change in neuro status, BP stable overnight but low this am, given midodrine, to have HD today  2/04 - No events, tolerated HD yest with 2.1L UF, BP stable this am  2/05 - No events, BP low prior to starting HD, given albumin at the start of HD and BP stable at this time  2/06 - No events, tolerated HD yesterday with 2.5L UF, BP stable and high at times, still with dependent edema  2/07 - No events, tolerated PUF yesterday with 2.5L UF and did not require midodrine with treatment, BP stable today, still with dependent edema, to have HD today  2/08 - No overnight events or changes.  On TF at goal via NGT.  No neurologic change.  On vent/trach.   2/09 - No significant change.  No events.  Liquid stool from rectal tube.  On Vent/Trach  2/10-  No new overnight renal events. +trach.  2/11- S/p HD yesterday with net UF of 2 L.   2/12- No new overnight renal events. HD today.  2/13 -  HD yesterday with 1.7L net UF, plan for PEG placement Saturday, LTACH placement pending, some hypotension   2/14 - On HD.  2/15 - Non communicative, HD yesterday 700cc UF  2/17 - Seen on HD today. 's. No new events.   2/19- Non verbal, nothing reported  2/21- Stage 3 decubitus ulcer  2/24 - Pt in TB rule out isolation, ID on board, for HD today   2/26 - No events, seen on dialysis, BP stable, still in isolation  2/28 - No events, no change in mental status, still in isolation, BP variable, T-piece in place    Review of Systems   Unable to perform ROS: Acuity of condition         Physical Exam  Temp:  [36.7 °C (98 °F)-37.1 °C (98.7 °F)] 37.1 °C (98.7 °F)  Pulse:  [67-95] 81  Resp:  [16-18] 16  BP: ()/(55-83) 93/55  SpO2:  [93 %-98 %] 93 %    Physical Exam  Vitals signs and nursing note reviewed.   Constitutional:       General: She is not in acute distress.     Appearance: She is ill-appearing.   HENT:      Head: Normocephalic and atraumatic.      Right Ear: External ear normal.      Left Ear: External ear normal.      Nose: Nose normal. No congestion.      Mouth/Throat:      Mouth: Mucous membranes are moist.      Pharynx: No oropharyngeal exudate.   Eyes:      General: No scleral icterus.     Conjunctiva/sclera: Conjunctivae normal.   Neck:      Musculoskeletal: No muscular tenderness.      Vascular: No carotid bruit.      Comments: +Trach  Cardiovascular:      Rate and Rhythm: Normal rate and regular rhythm.      Heart sounds: Normal heart sounds. No murmur.   Pulmonary:      Effort: Pulmonary effort is normal. No respiratory distress.      Breath sounds: Normal breath sounds.      Comments: T-piece, supp O2   Chest:      Chest wall: No tenderness.   Abdominal:      General: Bowel sounds are normal. There is no distension.      Palpations: Abdomen is soft.      Tenderness: There is no abdominal tenderness.      Comments: Enteral nutrition    Genitourinary:     Comments: BMS  Musculoskeletal:          General: No tenderness or signs of injury.      Comments: +dependent edema   Lymphadenopathy:      Cervical: No cervical adenopathy.   Skin:     General: Skin is warm and dry.      Findings: No erythema or rash.      Comments: Heel float boots   Stage 3 decubitus sacral not visualized   Neurological:      Comments: Not responsive   Psychiatric:      Comments: Unable to assess       Fluids    Intake/Output Summary (Last 24 hours) at 2/28/2020 1057  Last data filed at 2/28/2020 1016  Gross per 24 hour   Intake 430 ml   Output 400 ml   Net 30 ml     Laboratory  Recent Labs     02/26/20  1122 02/27/20  0335 02/28/20  0320   WBC 17.1* 13.2* 12.2*   RBC 2.88* 2.77* 2.71*   HEMOGLOBIN 9.1* 8.8* 8.8*   HEMATOCRIT 29.0* 27.9* 27.1*   .7* 100.7* 100.0*   MCH 31.6 31.8 32.5   MCHC 31.4* 31.5* 32.5*   RDW 61.2* 61.0* 59.4*   PLATELETCT 164 157* 165   MPV 9.3 9.5 9.9     Recent Labs     02/26/20  1122 02/27/20  0335 02/28/20  0320   SODIUM 132* 135 129*   POTASSIUM 4.1 3.2* 3.6   CHLORIDE 94* 97 92*   CO2 26 29 27   GLUCOSE 184* 151* 171*   BUN 78* 38* 53*   CREATININE 1.65* 0.89 1.27   CALCIUM 8.5 8.5 8.5     IMPRESSION:  # ESRD   Dialysis today (WED)   Maintenance dialysis qMWF as OP at [facility]   mCrCl 6 by 24 hour urine - repeat study 2/13 mCrCl 4   Dialysis Dependent                # Status epilepticus  # S/P acute lacunar infarct   Hx of previous CVA with significant deficits.  # HTN--BP variable often with intradialytic hypotension  # DM II  # Acute Hypoxic Respiratory Failure   +Trach and T-piece  # Hx of dysphagia  # Anemia of CKD.Ferritin previously significantly elevated. CAT with HD.  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  # Stage 3 decubitus ulcer  #  TB rule out, + TB quant and abnormal CXR/CT, ID on board  # Leukocytosis     PLAN:    Maintenance dialysis today (FRI) and qMWF and PRN.   UF w/ HD as tolerated.   ID following, continue TB rule out   Continue Epogen with HD   Seizure management per Neurology   Enteral feedings   No dietary protein restrictions   Follow labs   Continue GOC discussions with family   Very poor prognosis, recommend comfort care, if and when family agreeable   Will see on MWF. Please call on other days w/ questions or concerns    Thank you,

## 2020-02-28 NOTE — PROGRESS NOTES
Patient not awake or alert. Opens eye and eyes move not following staff or stimuli. Remains nonverbal. No SOB at rest. Humidified O2 by t-piece to trach. Q2 side-to-side turns in place. Left arm elevated on pillow. River to dependant drainage; urine is hazy with sediment noted. Rectal tube draining soft stool. Continues on airborne precautions. Tube feed at goal rate; 40ml/hr. Staff to anticipate wants and needs .

## 2020-02-28 NOTE — PROGRESS NOTES
Patient on bed resting comfortably.     Assessment partially completed. On O2 at 4LPM via T-piece,  in place and functioning, O2 sat at 98%. Permacath in place, PICC in place, flushed but no blood return. PEG tube in place, dressing CDI, on tube feeding of Impact Peptide 1.5 at 40 mL/hr tolerating well, HOB maintained at 30 degrees. River catheter in place, draining to gravity, rectal tube remains in place, no leaking noted, flushed as ordered. Patient educated regarding plan of care. On Airborne precautions. On NUBIA mattress, all 4 siderails up. Q 2 turns in place. Heel float boots on.     Bed locked, in lowest position, treaded socks on. Needs attended. No further needs at this time. Communication board updated. Call light and personal belongings within reach. Room near nursing station. Hourly rounding in place.

## 2020-02-28 NOTE — PROGRESS NOTES
2 RN skin check complete..   Devices in place: trach, PICC, HD catheter, peg tube, hernandez catheter, rectal tube, float heel boots, mepilex.  Skin assessed under devices yes.  Confirmed pressure ulcers: Coccyx.  New potential pressure ulcers noted on N/A. Wound consult in place.   The following interventions in place 2 RN skin check, q2 repositions, float heel boots, low air bed, mepilex, rectal tube, hernandez.     Skin Assessment  Generalized bruising  Under trach collar - redness  Peg tube site - redness  Groin - redness  Coccyx - open area -pink/white wound bed + purple/red/dark edges. Dressing changed with wound care nurse (daikins sol,kerlix, mepilex).  Heels - soft + pink

## 2020-02-28 NOTE — CARE PLAN
Problem: Knowledge Deficit  Goal: Knowledge of disease process/condition, treatment plan, diagnostic tests, and medications will improve  Outcome: PROGRESSING AS EXPECTED  Note: Patient unable to comprehend teaching. No family at bedside at this time.      Problem: Bowel/Gastric:  Goal: Normal bowel function is maintained or improved  Outcome: PROGRESSING SLOWER THAN EXPECTED  Note: Rectal tube in place. No leaking at this time. Soft stools.

## 2020-02-28 NOTE — PROGRESS NOTES
[facility] Progress Note     Hemodialysis treatment ordered today per Dr. Camp x 3.5 hours.   Treatment initiated at 1210 ended at 1540.      Patient tolerated tx; see paper flow sheet for details.      Net UF 2000 mL.      Post tx, CVC flushed with saline then locked with heparin 1000 units/mL per designated amount in each wing then clamped and capped. Aspirate heparin prior to next CVC use.     Report given to Primary RN.

## 2020-02-29 PROBLEM — E83.42 HYPOMAGNESEMIA: Status: RESOLVED | Noted: 2020-01-01 | Resolved: 2020-01-01

## 2020-02-29 NOTE — PROGRESS NOTES
[facility] Daily Progress Note    Date of Service  2/29/2020    Chief Complaint  Follow up ESRD    Interval Problem Update  74 y.o. female admitted to [facility] on 1/14/20 with seizures.She was at a SNF.She had been previously hospitalized at [facility] and diaysis was initiated.  She was doing very poorly then and palliative care was discussed with the family,but they declined.She had very poor functional status and required Jimena lift to get into the dialysis chair.She was found to have  a CVA on MRI at [facility].Her seizures were not controlled and it was felt she was in status epilepticus.  She was getting HD at [facility].Last HD was on 1/13/20.She was seen by Dr Carcamo at [facility].Creatinine was 1.8 and dialysis was held. Neurology [place] that the patient needed continuous EEG monitoring and he was transferred to [facility]     DAILY NEPHROLOGY SUMMARY:  Please see note from 1/31 for prior summaries  2/01 - NAEO, another EEG being done this AM  2/02 - NAEO, scheduled for Trach today  2/03 - No events, underwent trach yesterday, no change in neuro status, BP stable overnight but low this am, given midodrine, to have HD today  2/04 - No events, tolerated HD yest with 2.1L UF, BP stable this am  2/05 - No events, BP low prior to starting HD, given albumin at the start of HD and BP stable at this time  2/06 - No events, tolerated HD yesterday with 2.5L UF, BP stable and high at times, still with dependent edema  2/07 - No events, tolerated PUF yesterday with 2.5L UF and did not require midodrine with treatment, BP stable today, still with dependent edema, to have HD today  2/08 - No overnight events or changes.  On TF at goal via NGT.  No neurologic change.  On vent/trach.   2/09 - No significant change.  No events.  Liquid stool from rectal tube.  On Vent/Trach  2/10-  No new overnight renal events. +trach.  2/11- S/p HD yesterday with net UF of 2 L.   2/12- No new overnight renal events. HD today.  2/13  - HD yesterday with 1.7L net UF, plan for PEG placement Saturday, LTACH placement pending, some hypotension   2/14 - On HD.  2/15 - Non communicative, HD yesterday 700cc UF  2/17 - Seen on HD today. 's. No new events.   2/19- Non verbal, nothing reported  2/21- Stage 3 decubitus ulcer  2/24 - Pt in TB rule out isolation, ID on board, for HD today   2/26 - No events, seen on dialysis, BP stable, still in isolation  2/28 - No events, no change in mental status, still in isolation, BP variable, T-piece in place  2/29 - Had HD yesterday with 2 L UF. VSS. Patient awake and alert, but does not attempt to communicate. Still with trach / T-piece.    Review of Systems  ROS   Unable to perform ROS due to medical condition     Physical Exam  Temp:  [36.2 °C (97.1 °F)-36.9 °C (98.4 °F)] 36.3 °C (97.4 °F)  Pulse:  [70-92] 79  Resp:  [16-18] 16  BP: (112-171)/(57-88) 112/57  SpO2:  [90 %-99 %] 97 %    Physical Exam  Vitals signs and nursing note reviewed.   Constitutional:       General: She is not in acute distress.     Appearance: She is chronically ill-appearing.   HENT:      Head: Normocephalic and atraumatic.      Right Ear: External ear normal.      Left Ear: External ear normal.      Nose: Nose normal. No congestion.      Mouth/Throat:      Mouth: Mucous membranes are moist.      Pharynx: No oropharyngeal exudate.   Eyes:      General: No scleral icterus.     Conjunctiva/sclera: Conjunctivae normal.   Neck:      Musculoskeletal: No muscular tenderness.      Vascular: No carotid bruit.      Comments: +Trach  Cardiovascular:      Rate and Rhythm: Normal rate and regular rhythm.      Heart sounds: Normal heart sounds. No murmur.   Pulmonary:      Effort: Pulmonary effort is normal. No respiratory distress.      Breath sounds: Normal breath sounds.      Comments: T-piece, supp O2   Chest:      Chest wall: No tenderness.   Abdominal:      General: Bowel sounds are normal. There is no distension.      Palpations: Abdomen  is soft.      Tenderness: There is no abdominal tenderness.      Comments: Enteral nutrition via PEG, +Rectal tube  Genitourinary:     Comments: BMS  Musculoskeletal:         General: No tenderness or signs of injury.      Comments: +dependent edema   Lymphadenopathy:      Cervical: No cervical adenopathy.   Skin:     General: Skin is warm and dry.      Findings: No erythema or rash.      Comments: Heel float boots   Stage 3 decubitus sacral not visualized   Neurological:      Comments: Awake and alert, LUE contracted, does not attempt to communicate  Psychiatric:      Comments: Unable to assess   +River       Fluids    Intake/Output Summary (Last 24 hours) at 2/29/2020 1217  Last data filed at 2/29/2020 1212  Gross per 24 hour   Intake 910 ml   Output 2350 ml   Net -1440 ml       Laboratory  Recent Labs     02/27/20  0335 02/28/20  0320   WBC 13.2* 12.2*   RBC 2.77* 2.71*   HEMOGLOBIN 8.8* 8.8*   HEMATOCRIT 27.9* 27.1*   .7* 100.0*   MCH 31.8 32.5   MCHC 31.5* 32.5*   RDW 61.0* 59.4*   PLATELETCT 157* 165   MPV 9.5 9.9     Recent Labs     02/27/20  0335 02/28/20  0320 02/29/20  0420   SODIUM 135 129* 133*   POTASSIUM 3.2* 3.6 3.5*   CHLORIDE 97 92* 97   CO2 29 27 27   GLUCOSE 151* 171* 176*   BUN 38* 53* 27*   CREATININE 0.89 1.27 0.92   CALCIUM 8.5 8.5 8.4*                 Imaging       Assessment/Plan   IMPRESSION:  # ESRD              Maintenance dialysis qMWF as OP at [facility], but has been discharged due to >30 days and will need a  new HD chair once discharged.              mCrCl 6 by 24 hour urine - repeat study 2/13 mCrCl 4              Dialysis Dependent                # Status epilepticus  # S/P acute lacunar infarct              Hx of previous CVA with significant deficits.  # HTN--BP variable often with intradialytic hypotension  # DM II   Management per primary svc  # Acute Hypoxic Respiratory Failure              +Trach and T-piece  # Hx of dysphagia  # Anemia of CKD.Ferritin previously  significantly elevated. CAT with HD.  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   PTH, Vit D, Phos pending result  # CAD  # DLD  # Gout,on Allopurinol  # Hx of PEG tube  # Hx of RALF  # Hx of UTI  # COPD  # Edema/Anasarca--improved  # Hypophosphatemia, improved   # Hyponatremia, improved   # Prognosis poor              Family expectations and goals for patient are not in line with prognosis.  # Stage 3 decubitus ulcer  # TB rule out, + TB quant and abnormal CXR/CT, ID on board and managing  # Leukocytosis      PLAN:               No need for HD today, maintenance iHD qMWF and PRN.              UF w/ HD as tolerated.   Will check Vit D, PTH, and phos 3/2 with labs              ID following, continue TB rule out              Continue Epogen with HD              Seizure management per Neurology              Enteral feedings              No dietary protein restrictions              Follow labs              Continue GOC discussions with family              Very poor prognosis, recommend comfort care, if and when family agreeable              Will see on MWF. Please call on other days w/ questions or concerns

## 2020-02-29 NOTE — CARE PLAN
Problem: Bowel/Gastric:  Goal: Normal bowel function is maintained or improved  Outcome: PROGRESSING SLOWER THAN EXPECTED  Note: BMS system in place, found to be leaking at 1300. Will follow wound care protocols. Rectal tube flushed per protocol with 145 ml during shift.     Problem: Respiratory:  Goal: Respiratory status will improve  Outcome: PROGRESSING AS EXPECTED  Note: Lung sounds and respiratory effort assessed regularly. Pt had congestion and course crackles. Trach was suctioned by this RN.  in place SPO2% 97% Lung sounds improved after suctioning. HOB is elevated at 45 degrees.

## 2020-02-29 NOTE — PROGRESS NOTES
Pt awake at this time. Nonverbal. Inner cannula changed on trach. River in place and draining. Rectal tube in place. Tube feed running at 40ml/hr at goal. Family updated on plan of care and medications. Fall precautions in place. Call bell and bedside table within reach. Hourly rounding completed. Will continue to monitor.

## 2020-02-29 NOTE — PROGRESS NOTES
Rectal tube was flushed with 45 ml, rectal tube has 300 ml output. Found to be leaking.Tube was found to be twisted, pt was repositioned and rectal tube is no longer leaking. This RN checked retention balloon 40 ml present in  Balloon,  Will fill to 45 ml. Pt has stage 3 pressure injury on coccyx. Pt was cleaned up and dressing was changed.

## 2020-02-29 NOTE — PROGRESS NOTES
Received bedside report and accepted care of pt. Pt currently resting in bed in no visible signs of distress. Pt is non-responsive and non-verbal with assessment, pt is awake.  Trach is in place. Lung sounds and respiratory effort assessed regularly. Pt had congestion and course crackles. Trach was suctioned by this RN.  in place SPO2% 97% Lung sounds improved after suctioning. HOB is elevated at 45 degrees. Tube feeding is running at 40 ml/hr goal rate with impact peptide.Tube flushed with tap water 30 ml Q4 hours and after medication administration.Pt also has Nutrient Flush, Nutrisourcce Fiber packets ad,imistered via gastric tube TID. Wound dressing to sacrum changed today using dakins solution per wound care and mepilex in place over stage 3 pressure injury on sacrum. Q 2 hr turns in place.

## 2020-02-29 NOTE — CARE PLAN
Problem: Safety  Goal: Will remain free from injury  Outcome: PROGRESSING AS EXPECTED    Bed in locked and lowest position. Nonskid socks in place. Call bell and bedside table within reach. Will continue to monitor.      Problem: Pain Management  Goal: Pain level will decrease to patient's comfort goal  Outcome: PROGRESSING AS EXPECTED     Pt resting in bed comfortably at this time. Will continue to monitor throughout shift.

## 2020-02-29 NOTE — PROGRESS NOTES
2 RN skin check complete Duncan RN.   Devices in place: trach, PICC, HD catheter, peg tube, hernandez catheter, rectal tube, float heel boots, mepilex.  Skin assessed under devices yes.  Confirmed pressure ulcers: Coccyx.  New potential pressure ulcers noted on N/A. Wound consult in place.   The following interventions in place 2 RN skin check, q2 repositions, float heel boots, low air bed, mepilex, rectal tube, hernandez.     Skin Assessment  Generalized bruising  Under trach collar - erythema  Peg tube site - erythema  Groin - erythema  Coccyx - open area -necrotic wound bed + purple/red/dark edges. Dressing changed as ordered (daikins sol,kerlix, mepilex).  Heels - soft + pink

## 2020-02-29 NOTE — PROGRESS NOTES
Hospital Medicine Daily Progress Note    Date of Service  2/29/2020    Chief Complaint  74 y.o. female admitted 1/16/2020 with altered mental status    Hospital Course    Ms. Beltran is a 74 y.o. female admitted to [facility] on 1/14/20 with seizures.She was at a SNF.She had been previously hospitalized at [facility] and diaysis was initiated.  She was doing very poorly then and palliative care was discussed with the family,but they declined.She had very poor functional status and required Jimena lift to get into the dialysis chair.She was found to have  a CVA on MRI at [facility].Her seizures were not controlled and it was felt she was in status epilepticus.  She was getting HD at [facility].Last HD was on 1/13/20.She was seen by Dr Carcamo at [facility].Creatinine was 1.8 and dialysis was held. Neurology [place] that the patient needed continuous EEG monitoring and he was transferred to [facility]. Patient intubated due to seizures and was eventually transitioned to tracheostomy on 2/2/2020 and off the vent on 2/5/2020. Family at this time does not want to change CODE status. Patient remains unresponsive. LTAC being pursued. PEG tube in place.         Interval Problem Update  No overnight events. Sounds coarse this morning, moderate amount of secretions suctioned. BP elevated today.     Consultants/Specialty  Palliative care  Critical care - signed off  Neurology - signed off  Nephrology  ID     Code Status  FULL    Disposition  TBD- declined by LTACH. Pending meeting with family, SW, and palliative to re-address goals and discharge plan.     Review of Systems  Review of Systems   Unable to perform ROS: Medical condition       Physical Exam  Temp:  [36.2 °C (97.1 °F)-37.1 °C (98.7 °F)] 36.9 °C (98.4 °F)  Pulse:  [67-92] 78  Resp:  [16-18] 18  BP: ()/(55-88) 143/81  SpO2:  [93 %-99 %] 96 %    Physical Exam  Vitals signs reviewed.   Constitutional:       General: She is not in acute distress.     Appearance: She is  ill-appearing. She is not diaphoretic.   HENT:      Head: Normocephalic.      Mouth/Throat:      Comments: Tacky mucous membranes  Eyes:      General:         Right eye: No discharge.         Left eye: No discharge.      Extraocular Movements: Extraocular movements intact.   Cardiovascular:      Rate and Rhythm: Normal rate and regular rhythm.      Pulses: Normal pulses.      Heart sounds: Normal heart sounds.      Comments: Tunneled HD catheter  Pulmonary:      Comments: Trach in place  Abdominal:      General: Bowel sounds are normal. There is no distension.      Palpations: Abdomen is soft.      Tenderness: There is no abdominal tenderness. There is no guarding.      Comments: PEG tube in place; no tenderness or erythema   Genitourinary:     Comments: River in place  Skin:     General: Skin is warm.      Comments: Swelling of right hand; slightly improved   Neurological:      Mental Status: She is alert.      Motor: Weakness (diffuse) present.   Psychiatric:         Attention and Perception: She is inattentive.         Behavior: Behavior is not agitated.     Patient seen and examined today on 2/29, unchanged from 2/28.     Fluids    Intake/Output Summary (Last 24 hours) at 2/29/2020 0634  Last data filed at 2/28/2020 1812  Gross per 24 hour   Intake 450 ml   Output 2350 ml   Net -1900 ml       Laboratory  Recent Labs     02/26/20  1122 02/27/20  0335 02/28/20  0320   WBC 17.1* 13.2* 12.2*   RBC 2.88* 2.77* 2.71*   HEMOGLOBIN 9.1* 8.8* 8.8*   HEMATOCRIT 29.0* 27.9* 27.1*   .7* 100.7* 100.0*   MCH 31.6 31.8 32.5   MCHC 31.4* 31.5* 32.5*   RDW 61.2* 61.0* 59.4*   PLATELETCT 164 157* 165   MPV 9.3 9.5 9.9     Recent Labs     02/27/20  0335 02/28/20  0320 02/29/20  0420   SODIUM 135 129* 133*   POTASSIUM 3.2* 3.6 3.5*   CHLORIDE 97 92* 97   CO2 29 27 27   GLUCOSE 151* 171* 176*   BUN 38* 53* 27*   CREATININE 0.89 1.27 0.92   CALCIUM 8.5 8.5 8.4*                   Imaging  DX-CHEST-PORTABLE (1 VIEW)   Final  Result      1.  Apparent improvement of LEFT pleural effusion and basilar consolidation.   2.  No pneumothorax.   3.  Supportive tubing as described above.      US-THORACENTESIS PUNCTURE LEFT   Final Result      1. Ultrasound guided left sided diagnostic and therapeutic thoracentesis.      2. 400 mL of fluid withdrawn.      CT-CHEST (THORAX) W/O   Final Result      1.  Bilateral atelectasis/consolidation, left greater than right.      2.  Moderate left and small right pleural effusion.      3.  Atherosclerotic vascular calcification.      4.  Ascites within the upper abdomen.            US-ABDOMEN LTD (SOFT TISSUE)   Final Result         1. Trace free fluid in the pelvis. No abdominal ascites.      DX-ABDOMEN FOR TUBE PLACEMENT   Final Result         Feeding tube with tip projecting over the expected area of the stomach.      DO-[id number]-8 VIEW   Final Result      Feeding tube tip overlies the gastric antrum.      DX-CHEST-PORTABLE (1 VIEW)   Final Result         1.  Pulmonary edema and/or infiltrates are identified, which are stable since the prior exam.   2.  Cardiomegaly   3.  Atherosclerosis      DX-CHEST-PORTABLE (1 VIEW)   Final Result         1.  Pulmonary edema and/or infiltrates are identified, which are stable since the prior exam.   2.  Cardiomegaly   3.  Atherosclerosis      DX-CHEST-PORTABLE (1 VIEW)   Final Result      1.  Unchanged left lower lobe atelectasis or pneumonia.      2.  Clear right lung.      DX-CHEST-PORTABLE (1 VIEW)   Final Result      Unchanged LEFT basilar atelectasis and/or airspace disease      DX-CHEST-PORTABLE (1 VIEW)   Final Result      Left basilar atelectasis, hilar and cardiac silhouette enlargement as before      DX-CHEST-PORTABLE (1 VIEW)   Final Result      Interval removal of a left subclavian central line. Stable patchy bilateral infiltrates.      IR-PICC LINE PLACEMENT W/ GUIDANCE > AGE 5   Final Result                  Ultrasound-guided PICC placement performed by  qualified nursing staff as    above.          DX-CHEST-PORTABLE (1 VIEW)   Final Result      1.  Multiple support devices present.      2.  Patchy bilateral atelectasis.      DX-CHEST-PORTABLE (1 VIEW)   Final Result      Stable chest with retrocardiac opacity from atelectasis and/or pleural fluid favored over consolidation      DX-CHEST-PORTABLE (1 VIEW)   Final Result      Stable chest with retrocardiac opacity from atelectasis and/or pleural fluid favored over consolidation      DX-CHEST-PORTABLE (1 VIEW)   Final Result         No significant change from prior.               DX-CHEST-PORTABLE (1 VIEW)   Final Result         1. Stable lines and tubes..   2. Stable retrocardiac and left perihilar atelectasis versus consolidation. Suspected small left pleural effusion.            DX-CHEST-PORTABLE (1 VIEW)   Final Result         1. Stable lines and tubes..   2. Stable retrocardiac atelectasis versus consolidation with increased left perihilar opacity. Suspected trace left pleural effusion.         [id number] VIEW   Final Result      1.  Enteric tube has been placed and the tip projects over the stomach.      2.  Pre-existing feeding tube tip projects at the gastroduodenal junction      DX-CHEST-PORTABLE (1 VIEW)   Final Result         1. Lines and tubes as above.   2. Stable retrocardiac atelectasis versus consolidation. Suspected trace left pleural effusion.         MR-BRAIN-WITH & W/O   Final Result      1.  Moderate cerebral atrophy.   2.  Evidence of intraventricular hemorrhage, indeterminate age. Possibly chronic intraventricular hemosiderin deposition.   3.  Extensive encephalomalacic change with hemosiderin deposition involving the right corona radiata, basal ganglia, posterior thalamus, subthalamic region, bordering the right cerebral peduncle. Associated ex vacuo dilatation of the body of the right    lateral ventricle. No change.   4.  Additional foci of old microhemorrhage most consistent with old  hypertensive microhemorrhage or amyloid angiopathy in the left basal ganglia, left thalamus, and midline upper ventral abel.   5.  Punctate focus of acute infarction in the right parietal deep white matter. No change.   6.  Advanced supratentorial white matter disease most consistent with microvascular ischemic change.   7.  Encephalomalacic changes in the abel and right cerebral peduncle consistent with old infarction.   8.  Partially empty sella.   9.  Overall, no new findings and no significant change from 1/14/2020.      DX-CHEST-PORTABLE (1 VIEW)   Final Result         1. Stable lines and tubes.   2. Unchanged retrocardiac atelectasis versus consolidation.   3. Stable trace left pleural effusion.         OUTSIDE IMAGES-DX CHEST   Final Result      OUTSIDE IMAGES-US VASCULAR   Final Result      OUTSIDE IMAGES-MR BRAIN   Final Result      OUTSIDE IMAGES-DX CHEST   Final Result      OUTSIDE IMAGES-CT HEAD   Final Result      EC-ECHOCARDIOGRAM COMPLETE W/O CONT   Final Result      DX-CHEST-FOR LINE PLACEMENT Perform procedure in: Patient's Room   Final Result         1.  Retrocardiac opacity concerning for infiltrate, stable.   2.  Trace left pleural effusion, stable   3.  Cardiomegaly   4.  Atherosclerosis   5.  Perihilar interstitial prominence and bronchial wall cuffing, appearance suggests changes of underlying bronchial inflammation, consider bronchitis.      DX-ABDOMEN FOR TUBE PLACEMENT   Final Result         1.  Nonspecific bowel gas pattern.   2.  Dobbhoff tube tip overlying the expected location of the pylorus or first duodenal segment.   3.  Left lung base atelectasis and/or small effusion      DX-CHEST-PORTABLE (1 VIEW)   Final Result         1.  Retrocardiac opacity concerning for infiltrate.   2.  Trace left pleural effusion, stable   3.  Cardiomegaly   4.  Atherosclerosis      [id number] FILM X-RAY   Final Result      OUTSIDE IMAGES-DX CHEST   Final Result           Assessment/Plan  * Status  "epilepticus (HCC)- (present on admission)  Assessment & Plan  Was intubated for airway protection, now trach since 2/2/20. No evidence of seizure activity. Neurology gave the opinion that likelihood of patient returning to baseline was low, and that the likelihood of the patient returning to full independent function was essentially 0. Very poor prognosis, comfort care recommended.  - neurology evaluated, appreciate recs  - continuing regimen of vimpat, topamax, depakote  - repeat EEG showed no seizures    TB lung, latent- (present on admission)  Assessment & Plan  Quantiferon + and will have to rule out active TB. Chest CT found positive left-sided consolidation with pleural effusion.  - pending sputum cx/ AFB stain to rule out active disease before initiating  - s/p thora on 2/25; f/u pathology   - ID following, appreciate recs  - AFB stain negative x3; remove isolation  - Per ID, we will \"treat for LTBI with  mg PO daily and B6 25 mg PO daily for 9 months if AFB cxs all negative at 6 weeks\"    Acute respiratory failure with hypoxia (HCC)- (present on admission)  Assessment & Plan  - stable on trach/T-piece  - continue RT protocol    Hypokalemia  Assessment & Plan  Improved. In the setting of hypomag which has now resolved.   - monitor and replete    Severe protein-calorie malnutrition (HCC)- (present on admission)  Assessment & Plan  - Continue nutrition via tube feeding    Cerebrovascular accident (CVA) due to embolism of right middle cerebral artery (HCC)- (present on admission)  Assessment & Plan  Very poor prognosis.  - continue statin and aspirin  - unable to participate with PT/OT    End stage renal failure on dialysis (HCC)- (present on admission)  Assessment & Plan  Poor prognosis per nephrology, recommending comfort care.   - continue hemodialysis MWF  - nephrology following, appreciate recs  - avoid nephrotoxins and continue to renally dose all medications    Normocytic anemia- (present on " admission)  Assessment & Plan  Likely anemia of chronic kidney disease.  FOBT is negative.   - trend H&H  - transfuse if drops below 7    Hypertension- (present on admission)  Assessment & Plan  BP elevated today  - continue coreg, hydralazine, caldura (with holding parameters)  - start low dose lisinopril  - IV labetalol and vasotec PRN    Type 2 diabetes mellitus, with long-term current use of insulin (HCC)- (present on admission)  Assessment & Plan  A1C 7.5%, moderately well controlled.  - patient has not needed coverage  - monitor intermittently    Goals of care, counseling/discussion- (present on admission)  Assessment & Plan  There have been multiple discussions with palliative/treatment team and family, to no avail. Patient remains full code, has trach and PEG.  - SW and palliative care following    Pressure injury of sacrum, coccyx, stage 3 (HCC)- (present on admission)  Assessment & Plan  -Wound care per wound service. Continue pressure offloading strategies.    Dysphagia as late effect of cerebrovascular accident (CVA)- (present on admission)  Assessment & Plan  - PEG in place  - aspiration precautions    Gout- (present on admission)  Assessment & Plan  - Continue with allopurinol per the PEG tube       VTE prophylaxis: SCDs

## 2020-02-29 NOTE — CARE PLAN
Problem: Bronchopulmonary Hygiene:  Goal: Increase mobilization of retained secretions  Outcome: PROGRESSING AS EXPECTED  Note: T-piece; 4l/27% minimal suctioning

## 2020-02-29 NOTE — PROGRESS NOTES
2 RN skin check completed with Miguelito HAILE.  Devices in place Trach, rectal tube, PEG tube, hernandez PICC.  Skin assessed under devices yes.  Confirmed pressure ulcers found on coccyx.  The following interventions in place: Q2H turns. Mepliex in place. Low air loss bed. Float heel boots.     Scattered bruising. Redness under trach collar and peg tube site. Groin red and blanching. Open area to coccyx. Dressing changed this shift.

## 2020-02-29 NOTE — PROGRESS NOTES
Dr. Roque was paged regarding pt's /88. PRN medications available for elevated BP are only via IV route. Pt does not have IV access at this time. Oral scheduled hydralazine medication was administered per MAR.  Pt does not appear to be in distress or discomfort. Pt is not symptomatic and appears comfortable resting in bed at this time.   will place orders for other oral BP medications. Will re-check BP in 1 hr.

## 2020-03-01 NOTE — CARE PLAN
Problem: Bronchopulmonary Hygiene:  Goal: Increase mobilization of retained secretions  Outcome: PROGRESSING AS EXPECTED  Note: 4L/28% t-piece

## 2020-03-01 NOTE — PROGRESS NOTES
Pt resting in bed throughout shift. Nonverbal. Inner cannula changed on trach. River in place and draining. Rectal tube in place. Tube feed running at 40ml/hr at goal. Family updated on plan of care and medications. Fall precautions in place. Call bell and bedside table within reach. Hourly rounding completed. Will continue to monitor.

## 2020-03-01 NOTE — CARE PLAN
Problem: Safety  Goal: Will remain free from injury  Outcome: PROGRESSING AS EXPECTED    Bed in locked and lowest position. Nonskid socks in place. Bed alarm on. Will continue to monitor.     Problem: Knowledge Deficit  Goal: Knowledge of disease process/condition, treatment plan, diagnostic tests, and medications will improve  Outcome: PROGRESSING AS EXPECTED    Updated family on plan of care and medications.

## 2020-03-01 NOTE — PROGRESS NOTES
2 RN skin check complete with Duncan HAILE.   Devices in place Trach, rectal tube, hernandez catheter, PEG tube, PICC.  Skin assessed under devices yes.  Confirmed pressure ulcers found on coccyx.  wound consult placed yes, wound team is following.  The following interventions in place  Q 2 hr turns, pillows in use for support and repositioning, heel float boots in place, Wound care on coccyx completed per wound care orders. Mepilex in place over open area on coccyx. Low air loss bed, Rectal tube monitored for leaking and pt was cleaned, linens changed and new mepilex in place.    Scattered bruising on Bilateral upper extremities. Elbows are red and blanching. Redness under trach collar and redness near peg tube site. Lucina area is red and blanching, nystatin powder was applied. Bilateral heels are red, boggy blanching, open wound to coccyx stage 3 pressure ulcer. Dressing changed this shift. Pillows in use for offloading pressure to sacrum and coccyx area.

## 2020-03-02 NOTE — PROGRESS NOTES
2 RN skin check completed with Corby HAILE.  Devices in place Trach, rectal tube, PEG tube, hernandez PICC.  Skin assessed under devices yes.  Confirmed pressure ulcers found on coccyx.  The following interventions in place: Q2H turns. Mepliex in place. Low air loss bed. Float heel boots.      Scattered bruising. Redness under trach collar and peg tube site. Groin red and blanching. Open area to coccyx. Dressing changed this shift.

## 2020-03-02 NOTE — CARE PLAN
Problem: Respiratory:  Goal: Respiratory status will improve  Outcome: PROGRESSING AS EXPECTED  Note: Lung sounds and respiratory effort assessed regularly. Pt suctioned throughout shift.       Problem: Skin Integrity  Goal: Risk for impaired skin integrity will decrease  Outcome: PROGRESSING AS EXPECTED  Note: Pt turned and positioned every two hours. Dressing was changed this shift per wound care orders.

## 2020-03-02 NOTE — PROGRESS NOTES
[facility] Daily Progress Note    Date of Service  3/2/2020    Chief Complaint  Follow up ESRD    Interval Problem Update  74 y.o. female admitted to [facility] on 1/14/20 with seizures.She was at a SNF.She had been previously hospitalized at [facility] and diaysis was initiated.  She was doing very poorly then and palliative care was discussed with the family,but they declined.She had very poor functional status and required Jimena lift to get into the dialysis chair.She was found to have  a CVA on MRI at [facility].Her seizures were not controlled and it was felt she was in status epilepticus.  She was getting HD at [facility].Last HD was on 1/13/20.She was seen by Dr Carcamo at [facility].Creatinine was 1.8 and dialysis was held. Neurology [place] that the patient needed continuous EEG monitoring and [place] [facility] [place]     DAILY NEPHROLOGY SUMMARY:  Please see note from 1/31 for prior summaries  2/01 - NAEO, another EEG being done this AM  2/02 - NAEO, scheduled for Trach today  2/03 - No events, underwent trach yesterday, no change in neuro status, BP stable overnight but low this am, given midodrine, to have HD today  2/04 - No events, tolerated HD yest with 2.1L UF, BP stable this am  2/05 - No events, BP low prior to starting HD, given albumin at the start of HD and BP stable at this time  2/06 - No events, tolerated HD yesterday with 2.5L UF, BP stable and high at times, still with dependent edema  2/07 - No events, tolerated PUF yesterday with 2.5L UF and did not require midodrine with treatment, BP stable today, still with dependent edema, to have HD today  2/08 - No overnight events or changes.  On TF at goal via NGT.  No neurologic change.  On vent/trach.   2/09 - No significant change.  No events.  Liquid stool from rectal tube.  On Vent/Trach  2/10-  No new overnight renal events. +trach.  2/11- S/p HD yesterday with net UF of 2 L.   2/12- No new overnight renal events. HD today.  2/13  - HD yesterday with 1.7L net UF, plan for PEG placement Saturday, LTACH placement pending, some hypotension   2/14 - On HD.  2/15 - Non communicative, HD yesterday 700cc UF  2/17 - Seen on HD today. 's. No new events.   2/19- Non verbal, nothing reported  2/21- Stage 3 decubitus ulcer  2/24 - Pt in TB rule out isolation, ID on board, for HD today   2/26 - No events, seen on dialysis, BP stable, still in isolation  2/28 - No events, no change in mental status, still in isolation, BP variable, T-piece in place  2/29 - Had HD yesterday with 2 L UF. VSS. Patient awake and alert, but does not attempt to communicate. Still with trach / T-piece.  3/02 - No new events.No interaction.For HD today.    Review of Systems  ROS   Unable to perform ROS due to medical condition     Physical Exam  Temp:  [36.4 °C (97.5 °F)-37.3 °C (99.2 °F)] 37 °C (98.6 °F)  Pulse:  [72-80] 75  Resp:  [14-18] 18  BP: ()/(39-64) 92/39  SpO2:  [94 %-97 %] 97 %    Physical Exam  Vitals signs and nursing note reviewed.   Constitutional:       General: She is not in acute distress.     Appearance: She is chronically ill-appearing.   HENT:      Head: Normocephalic and atraumatic.      Right Ear: External ear normal.      Left Ear: External ear normal.      Nose: Nose normal. No congestion.      Mouth/Throat:      Mouth: Mucous membranes are moist.      Pharynx: No oropharyngeal exudate.   Eyes:      General: No scleral icterus.     Conjunctiva/sclera: Conjunctivae normal.   Neck:      Musculoskeletal: No muscular tenderness.      Vascular: No carotid bruit.      Comments: +Trach  Cardiovascular:      Rate and Rhythm: Normal rate and regular rhythm.      Heart sounds: Normal heart sounds. No murmur.   Pulmonary:      Effort: Pulmonary effort is normal. No respiratory distress.      Breath sounds: Normal breath sounds.      Comments: T-piece, supp O2   Chest:      Chest wall: No tenderness.   Abdominal:      General: Bowel sounds are normal.  There is no distension.      Palpations: Abdomen is soft.      Tenderness: There is no abdominal tenderness.      Comments: Enteral nutrition via PEG, +Rectal tube  Genitourinary:     Comments: BMS  Musculoskeletal:         General: No tenderness or signs of injury.      Comments: +dependent edema   Lymphadenopathy:      Cervical: No cervical adenopathy.   Skin:     General: Skin is warm and dry.      Findings: No erythema or rash.      Comments: Heel float boots   Stage 3 decubitus sacral not visualized   Neurological:      Comments: Awake and alert, LUE contracted, does not attempt to communicate  Psychiatric:      Comments: Unable to assess   +River       Fluids    Intake/Output Summary (Last 24 hours) at 3/2/2020 1021  Last data filed at 3/2/2020 0532  Gross per 24 hour   Intake 460 ml   Output 200 ml   Net 260 ml       Laboratory  Recent Labs     03/02/20  0435   WBC 13.5*   RBC 2.63*   HEMOGLOBIN 8.3*   HEMATOCRIT 26.3*   .0*   MCH 31.6   MCHC 31.6*   RDW 57.1*   PLATELETCT 177   MPV 9.5     Recent Labs     02/29/20  0420 03/02/20  0435   SODIUM 133* 129*   POTASSIUM 3.5* 4.1   CHLORIDE 97 93*   CO2 27 28   GLUCOSE 176* 183*   BUN 27* 69*   CREATININE 0.92 1.36   CALCIUM 8.4* 8.3*                           IMPRESSION:  # ESRD              Maintenance dialysis qMWF as OP at [facility], but has been discharged due to >30 days and will need a  new HD chair once discharged.              mCrCl 6 by 24 hour urine - repeat study 2/13 mCrCl 4              Dialysis Dependent                # Status epilepticus  # S/P acute lacunar infarct              Hx of previous CVA with significant deficits.  # HTN--BP variable often with intradialytic hypotension  # DM II   Management per primary svc  # Acute Hypoxic Respiratory Failure              +Trach and T-piece  # Hx of dysphagia  # Anemia of CKD.Ferritin previously significantly elevated. CAT with HD.  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   PTH, Vit D, Phos pending result  #  CAD  # DLD  # Gout,on Allopurinol  # Hx of PEG tube  # Hx of RALF  # Hx of UTI  # COPD  # Edema/Anasarca--improved  # Hypophosphatemia, improved   # Hyponatremia, improved   # Prognosis poor              Family expectations and goals for patient are not in line with prognosis.  # Stage 3 decubitus ulcer  # TB rule out, + TB quant and abnormal CXR/CT, ID on board and managing  # Leukocytosis      PLAN:               Maintenance iHD qMWF and PRN.              UF w/ HD as tolerated.              ID following, continue TB rule out              Continue Epogen with HD              Seizure management per Neurology              Enteral feedings              No dietary protein restrictions              Follow labs              Continue GOC discussions with family              Very poor prognosis, recommend comfort care, if and when family agreeable              Will see on MWF. Please call on other days w/ questions or concerns

## 2020-03-02 NOTE — PROGRESS NOTES
2 RN skin check complete with Joana HAILE.   Devices in place Trach, rectal tube, hernandez catheter, PEG tube, PICC.  Skin assessed under devices yes.  Confirmed pressure ulcers found on coccyx.  wound consult placed yes, wound team is following.  The following interventions in place  Q 2 hr turns, pillows in use for support and repositioning, heel float boots in place, Wound care on coccyx completed per wound care orders. Mepilex in place over open area on coccyx. Low air loss bed, linens changed and new mepilex in place.     Scattered bruising on Bilateral upper extremities. Elbows are red and blanching. Redness under trach collar and redness near peg tube site. Lucina area is red and blanching, nystatin powder was applied. Bilateral heels are red, boggy blanching, open wound to coccyx stage 3 pressure ulcer. Dressing changed this shift. Pillows in use for offloading pressure to sacrum and coccyx area.

## 2020-03-02 NOTE — CARE PLAN
Problem: Communication  Goal: The ability to communicate needs accurately and effectively will improve  Outcome: NOT MET  Note: Non-verbal      Problem: Knowledge Deficit  Goal: Knowledge of disease process/condition, treatment plan, diagnostic tests, and medications will improve  Outcome: NOT MET  Note: Engage another learner, unable to ainsley learning needs

## 2020-03-02 NOTE — PROGRESS NOTES
Pt resting in bed throughout shift. Family at bedside at this time. Pt resting comfortably. Family updated on plan of care and medications. All questions answered at the bedside. Family verbalized understanding. Trach collar and inner cannula changed. PEG tube dressing changes. Tube feed running at 40ml/hr which is goal. Rectal tube in place. River in place and draining. Fall precautions in place. Hourly rounding completed. Will continue to monitor.

## 2020-03-02 NOTE — CARE PLAN
Problem: Safety  Goal: Will remain free from injury  Outcome: PROGRESSING AS EXPECTED  Note: Fall precautions in place. Bed in lowest position. Mobility sign on door.  Goal: Will remain free from falls  Outcome: PROGRESSING AS EXPECTED     Problem: Bowel/Gastric:  Goal: Normal bowel function is maintained or improved  Outcome: PROGRESSING AS EXPECTED  Note: BMS in place, Rectal tube was flushed today per protocol.

## 2020-03-02 NOTE — CARE PLAN
Problem: Safety  Goal: Will remain free from injury  Outcome: PROGRESSING AS EXPECTED    Bed in locked and lowest position. Nonskid socks in place. Hourly rounding completed.      Problem: Knowledge Deficit  Goal: Knowledge of disease process/condition, treatment plan, diagnostic tests, and medications will improve  Outcome: PROGRESSING AS EXPECTED    Family updated on plan of care and medications. All questions answered at bedside. Family verbalized understanding.

## 2020-03-02 NOTE — PROGRESS NOTES
Hospital Medicine Daily Progress Note    Date of Service  3/2/2020    Chief Complaint  74 y.o. female admitted 1/16/2020 with altered mental status    Hospital Course    Ms. Beltran is a 74 y.o. female admitted to [facility] on 1/14/20 with seizures.She was at a SNF.She had been previously hospitalized at [facility] and diaysis was initiated.  She was doing very poorly then and palliative care was discussed with the family,but they declined.She had very poor functional status and required Jimena lift to get into the dialysis chair.She was found to have  a CVA on MRI at [facility].Her seizures were not controlled and it was felt she was in status epilepticus.  She was getting HD at [facility].Last HD was on 1/13/20.She was seen by Dr Carcamo at [facility].Creatinine was 1.8 and dialysis was held. Neurology [place] that the patient needed continuous EEG monitoring and he was transferred to [facility]. Patient intubated due to seizures and was eventually transitioned to tracheostomy on 2/2/2020 and off the vent on 2/5/2020. Family at this time does not want to change CODE status. Patient remains unresponsive. LTAC being pursued. PEG tube in place.         Interval Problem Update  Sleeping, no distress. Not communicative. No overnight events. Per , palliative was to have meeting with family on GOC and dispo. Family expressed that they want her to stay at [facility] until she passes away. Plan for HD today.     Consultants/Specialty  Palliative care  Critical care - signed off  Neurology - signed off  Nephrology  ID     Code Status  FULL    Disposition  TBD- declined by LTACH. Care conference date and time pending.       Review of Systems  Review of Systems   Unable to perform ROS: Medical condition       Physical Exam  Temp:  [36.4 °C (97.5 °F)-37.3 °C (99.2 °F)] 36.4 °C (97.5 °F)  Pulse:  [70-80] 77  Resp:  [14-18] 16  BP: (120-152)/(49-64) 120/57  SpO2:  [94 %-97 %] 97 %    Physical Exam  Vitals signs reviewed.   Constitutional:        General: She is not in acute distress.     Appearance: She is ill-appearing. She is not diaphoretic.   HENT:      Head: Normocephalic.      Nose: No congestion.      Mouth/Throat:      Comments: Tacky mucous membranes  Eyes:      General:         Right eye: No discharge.         Left eye: No discharge.      Extraocular Movements: Extraocular movements intact.   Cardiovascular:      Rate and Rhythm: Normal rate and regular rhythm.      Pulses: Normal pulses.      Comments: Tunneled HD catheter  Pulmonary:      Breath sounds: Rales (coarse upper airway noises improved) present.      Comments: Trach in place  Abdominal:      General: Bowel sounds are normal. There is no distension.      Palpations: Abdomen is soft.      Tenderness: There is no abdominal tenderness. There is no guarding.      Comments: PEG tube in place; no tenderness or erythema   Genitourinary:     Comments: River and rectal tube in place  Skin:     General: Skin is warm.      Comments: Swelling of right hand; slightly improved   Neurological:      Mental Status: She is alert.      Motor: Weakness (diffuse) present.   Psychiatric:         Attention and Perception: She is inattentive.         Behavior: Behavior is not agitated.     Patient seen and examined today on 3/2, unchanged from 3/1.     Fluids    Intake/Output Summary (Last 24 hours) at 3/2/2020 0752  Last data filed at 3/2/2020 0532  Gross per 24 hour   Intake 820 ml   Output 725 ml   Net 95 ml       Laboratory  Recent Labs     03/02/20  0435   WBC 13.5*   RBC 2.63*   HEMOGLOBIN 8.3*   HEMATOCRIT 26.3*   .0*   MCH 31.6   MCHC 31.6*   RDW 57.1*   PLATELETCT 177   MPV 9.5     Recent Labs     02/29/20  0420 03/02/20  0435   SODIUM 133* 129*   POTASSIUM 3.5* 4.1   CHLORIDE 97 93*   CO2 27 28   GLUCOSE 176* 183*   BUN 27* 69*   CREATININE 0.92 1.36   CALCIUM 8.4* 8.3*                   Imaging  DX-CHEST-LIMITED (1 VIEW)   Final Result         No significant interval change.       DX-CHEST-PORTABLE (1 VIEW)   Final Result      1.  Apparent improvement of LEFT pleural effusion and basilar consolidation.   2.  No pneumothorax.   3.  Supportive tubing as described above.      US-THORACENTESIS PUNCTURE LEFT   Final Result      1. Ultrasound guided left sided diagnostic and therapeutic thoracentesis.      2. 400 mL of fluid withdrawn.      CT-CHEST (THORAX) W/O   Final Result      1.  Bilateral atelectasis/consolidation, left greater than right.      2.  Moderate left and small right pleural effusion.      3.  Atherosclerotic vascular calcification.      4.  Ascites within the upper abdomen.            US-ABDOMEN LTD (SOFT TISSUE)   Final Result         1. Trace free fluid in the pelvis. No abdominal ascites.      DX-ABDOMEN FOR TUBE PLACEMENT   Final Result         Feeding tube with tip projecting over the expected area of the stomach.      KG-OLEFOWA-0 VIEW   Final Result      Feeding tube tip overlies the gastric antrum.      DX-CHEST-PORTABLE (1 VIEW)   Final Result         1.  Pulmonary edema and/or infiltrates are identified, which are stable since the prior exam.   2.  Cardiomegaly   3.  Atherosclerosis      DX-CHEST-PORTABLE (1 VIEW)   Final Result         1.  Pulmonary edema and/or infiltrates are identified, which are stable since the prior exam.   2.  Cardiomegaly   3.  Atherosclerosis      DX-CHEST-PORTABLE (1 VIEW)   Final Result      1.  Unchanged left lower lobe atelectasis or pneumonia.      2.  Clear right lung.      DX-CHEST-PORTABLE (1 VIEW)   Final Result      Unchanged LEFT basilar atelectasis and/or airspace disease      DX-CHEST-PORTABLE (1 VIEW)   Final Result      Left basilar atelectasis, hilar and cardiac silhouette enlargement as before      DX-CHEST-PORTABLE (1 VIEW)   Final Result      Interval removal of a left subclavian central line. Stable patchy bilateral infiltrates.      IR-PICC LINE PLACEMENT W/ GUIDANCE > AGE 5   Final Result                   Ultrasound-guided PICC placement performed by qualified nursing staff as    above.          DX-CHEST-PORTABLE (1 VIEW)   Final Result      1.  Multiple support devices present.      2.  Patchy bilateral atelectasis.      DX-CHEST-PORTABLE (1 VIEW)   Final Result      Stable chest with retrocardiac opacity from atelectasis and/or pleural fluid favored over consolidation      DX-CHEST-PORTABLE (1 VIEW)   Final Result      Stable chest with retrocardiac opacity from atelectasis and/or pleural fluid favored over consolidation      DX-CHEST-PORTABLE (1 VIEW)   Final Result         No significant change from prior.               DX-CHEST-PORTABLE (1 VIEW)   Final Result         1. Stable lines and tubes..   2. Stable retrocardiac and left perihilar atelectasis versus consolidation. Suspected small left pleural effusion.            DX-CHEST-PORTABLE (1 VIEW)   Final Result         1. Stable lines and tubes..   2. Stable retrocardiac atelectasis versus consolidation with increased left perihilar opacity. Suspected trace left pleural effusion.         MD-[id number]-0 VIEW   Final Result      1.  Enteric tube has been placed and the tip projects over the stomach.      2.  Pre-existing feeding tube tip projects at the gastroduodenal junction      DX-CHEST-PORTABLE (1 VIEW)   Final Result         1. Lines and tubes as above.   2. Stable retrocardiac atelectasis versus consolidation. Suspected trace left pleural effusion.         MR-BRAIN-WITH & W/O   Final Result      1.  Moderate cerebral atrophy.   2.  Evidence of intraventricular hemorrhage, indeterminate age. Possibly chronic intraventricular hemosiderin deposition.   3.  Extensive encephalomalacic change with hemosiderin deposition involving the right corona radiata, basal ganglia, posterior thalamus, subthalamic region, bordering the right cerebral peduncle. Associated ex vacuo dilatation of the body of the right    lateral ventricle. No change.   4.  Additional foci of  old microhemorrhage most consistent with old hypertensive microhemorrhage or amyloid angiopathy in the left basal ganglia, left thalamus, and midline upper ventral abel.   5.  Punctate focus of acute infarction in the right parietal deep white matter. No change.   6.  Advanced supratentorial white matter disease most consistent with microvascular ischemic change.   7.  Encephalomalacic changes in the abel and right cerebral peduncle consistent with old infarction.   8.  Partially empty sella.   9.  Overall, no new findings and no significant change from 1/14/2020.      DX-CHEST-PORTABLE (1 VIEW)   Final Result         1. Stable lines and tubes.   2. Unchanged retrocardiac atelectasis versus consolidation.   3. Stable trace left pleural effusion.         OUTSIDE IMAGES-DX CHEST   Final Result      OUTSIDE IMAGES-US VASCULAR   Final Result      OUTSIDE IMAGES-MR BRAIN   Final Result      OUTSIDE IMAGES-DX CHEST   Final Result      OUTSIDE IMAGES-CT HEAD   Final Result      EC-ECHOCARDIOGRAM COMPLETE W/O CONT   Final Result      DX-CHEST-FOR LINE PLACEMENT Perform procedure in: Patient's Room   Final Result         1.  Retrocardiac opacity concerning for infiltrate, stable.   2.  Trace left pleural effusion, stable   3.  Cardiomegaly   4.  Atherosclerosis   5.  Perihilar interstitial prominence and bronchial wall cuffing, appearance suggests changes of underlying bronchial inflammation, consider bronchitis.      DX-ABDOMEN FOR TUBE PLACEMENT   Final Result         1.  Nonspecific bowel gas pattern.   2.  Dobbhoff tube tip overlying the expected location of the pylorus or first duodenal segment.   3.  Left lung base atelectasis and/or small effusion      DX-CHEST-PORTABLE (1 VIEW)   Final Result         1.  Retrocardiac opacity concerning for infiltrate.   2.  Trace left pleural effusion, stable   3.  Cardiomegaly   4.  Atherosclerosis      [id number] FILM X-RAY   Final Result      OUTSIDE IMAGES-DX CHEST  "  Final Result           Assessment/Plan  * Status epilepticus (HCC)- (present on admission)  Assessment & Plan  On admission, she was intubated for airway protection, now trach since 2/2/20. No evidence of seizure activity. Neurology gave the opinion that likelihood of patient returning to baseline was low, and that the likelihood of the patient returning to full independent function was essentially 0. Very poor prognosis, comfort care recommended.  - neurology evaluated, appreciate recs  - continuing regimen of vimpat, topamax, depakote  - repeat EEG showed no seizures    TB lung, latent- (present on admission)  Assessment & Plan  Quantiferon + and will have to rule out active TB. Chest CT found positive left-sided consolidation with pleural effusion.  - pending sputum cx/ AFB stain to rule out active disease before initiating  - s/p thora on 2/25; f/u pathology   - ID following, appreciate recs  - AFB stain negative x3; remove isolation  - Per ID, we will \"treat for LTBI with  mg PO daily and B6 25 mg PO daily for 9 months if AFB cxs all negative at 6 weeks\"    Acute respiratory failure with hypoxia (HCC)- (present on admission)  Assessment & Plan  - stable on trach/T-piece  - continue RT protocol    Hypokalemia  Assessment & Plan  In the setting of hypomag which has now resolved.   - monitor and replete    Severe protein-calorie malnutrition (HCC)- (present on admission)  Assessment & Plan  - Continue nutrition via tube feeding    Cerebrovascular accident (CVA) due to embolism of right middle cerebral artery (HCC)- (present on admission)  Assessment & Plan  Very poor prognosis.  - continue statin and aspirin  - unable to participate with PT/OT    End stage renal failure on dialysis (HCC)- (present on admission)  Assessment & Plan  Poor prognosis per nephrology, recommending comfort care.   - continue hemodialysis MWF  - nephrology following, appreciate recs  - avoid nephrotoxins and continue to renally " dose all medications    Normocytic anemia- (present on admission)  Assessment & Plan  Likely anemia of chronic kidney disease.  FOBT is negative.   - trend H&H  - transfuse if drops below 7    Hypertension- (present on admission)  Assessment & Plan  BP elevated, now improving  - continue coreg, hydralazine, caldura (with holding parameters)  - started low dose lisinopril  - IV labetalol and vasotec PRN    Type 2 diabetes mellitus, with long-term current use of insulin ([facility])- (present on admission)  Assessment & Plan  A1C 7.5%, blood sugars have been ~170. Was taking insulin at home.   - start sensitive sliding scale  - hypoglycemia protocol    Goals of care, counseling/discussion- (present on admission)  Assessment & Plan  There have been multiple discussions with palliative/treatment team and family, to no avail. Patient remains full code, has trach and PEG.  - SW and palliative care following    Pressure injury of sacrum, coccyx, stage 3 (HCC)- (present on admission)  Assessment & Plan  - Wound care  - Continue pressure offloading strategies    Dysphagia as late effect of cerebrovascular accident (CVA)- (present on admission)  Assessment & Plan  - PEG in place  - aspiration precautions    Gout- (present on admission)  Assessment & Plan  - Continue with allopurinol via PEG tube       VTE prophylaxis: SCDs

## 2020-03-02 NOTE — PROGRESS NOTES
Received bedside report and accepted care of pt. Pt currently resting in bed in no visible signs of distress. Trach is in place at 4 L O2, pt was suctioned Lung sounds and respiratory effort assessed regularly. RT was called regarding setting up portable oxygen for trach during transportation to dialysis.Q 2 turns in place, River catheter in place. Rectal tube in place and was flushed per protocol. TF runnning with Imptact peptide 1.5 @ 40 ml/hr goal rate. HOB elevated above 30 degrees at all times. Bed controls on and bed in locked and lowest position. Nutrient Flush with Nutrisource Fiber administered during shift. POC is for pt to have dialysis today.

## 2020-03-03 PROBLEM — D72.829 LEUKOCYTOSIS: Status: ACTIVE | Noted: 2020-01-01

## 2020-03-03 NOTE — PROGRESS NOTES
Patient is nonverbal,assumed care given at shift changed,turn every 2 hours with permission,peg tube, hernandez patent,rectal tube is leaking since yesterday from day shift till now despite irrigating the rectal tube ,trach care given with new inner cannula placed,sacrum mepilex changed.

## 2020-03-03 NOTE — CARE PLAN
Problem: Oxygenation:  Goal: Maintain adequate oxygenation dependent on patient condition  Intervention: Manage oxygen therapy by monitoring pulse oximetry and/or ABG values  Note: T piece 4L 28%

## 2020-03-03 NOTE — CARE PLAN
Problem: Safety  Goal: Will remain free from injury  Outcome: PROGRESSING AS EXPECTED  Goal: Will remain free from falls  Outcome: PROGRESSING AS EXPECTED     Problem: Safety  Goal: Will remain free from falls  Outcome: PROGRESSING AS EXPECTED     Problem: Bowel/Gastric:  Goal: Normal bowel function is maintained or improved    Outcome: PROGRESSING AS EXPECTED  Patient has a rectal tube and hernandez catheterpatent  Goal: Will not experience complications related to bowel motility  Outcome: PROGRESSING AS EXPECTED     Problem: Bowel/Gastric:  Goal: Will not experience complications related to bowel motility  Outcome: PROGRESSING AS EXPECTED

## 2020-03-03 NOTE — PROGRESS NOTES
2 RN skin check complete with Amparo RN  Devices in place trach,rectal tube,Peg tube,hernandez  Skin assessed under devices yes  Confirmed pressure ulcers found on coccyx.  New potential pressure ulcers noted on N/A. Wound consult placed N/A  The following interventions in place:every 2 hours turn,mepilex in placed,low air loss bed,float heel boots.    Redness under trach collar and around peg tube site,groin red and blanching,open area to coccyx with dressing in placed.

## 2020-03-03 NOTE — PROGRESS NOTES
2 RN skin check complete with Roxi HAILE.   Devices in place Trach, rectal tube, hernandez catheter, PEG tube, PICC.  Skin assessed under devices yes.  Confirmed pressure ulcers found on coccyx.  wound consult placed yes, wound team is following.  The following interventions in place  Q 2 hr turns, pillows in use for support and repositioning, heel float boots in place, Wound care on coccyx completed per wound care orders during night shift 3/1/20. Mepilex in place over open area on coccyx. Low air loss bed, linens changed and new mepilex in place.     Scattered bruising on Bilateral upper extremities. Elbows are red and blanching. Redness under trach collar and redness near peg tube site. Lucina area is red and blanching, nystatin powder was applied. Bilateral heels are red, boggy blanching, open wound to coccyx stage 3 pressure ulcer. Dressing is clean, dry and intact. Pillows in use for offloading pressure to sacrum and coccyx area.

## 2020-03-03 NOTE — PROGRESS NOTES
"Pulmonary Progress Note    Date of admission  1/16/2020    Chief Complaint  74 y.o. female admitted 1/16/2020 with status epelipticus    Hospital Course    \"Ms. Beltran is a 74 y.o. female admitted to [facility] on 1/14/20 with seizures.She was at a SNF.She had been previously hospitalized at [facility] and diaysis was initiated.  She was doing very poorly then and palliative care was discussed with the family,but they declined.She had very poor functional status and required Jimena lift to get into the dialysis chair.She was found to have  a CVA on MRI at [facility].Her seizures were not controlled and it was felt she was in status epilepticus.  She was getting HD at [facility].Last HD was on 1/13/20.She was seen by Dr Carcamo at [facility].Creatinine was 1.8 and dialysis was held. [facility] that the patient needed continuous EEG monitoring and [place] [facility] Patient intubated due to seizures and was eventually transitioned to tracheostomy on 2/2/2020 and off the vent on 2/5/2020. Family at this time does not want to change CODE status. Patient remains unresponsive. LTAC being pursued. PEG tube in place. \"      Interval Problem Update  Reviewed last 24 hour events:  T piece 4 l    Review of Systems  ROS   Unable to obtain as non verbal    Vital Signs for last 24 hours   Temp:  [36.2 °C (97.1 °F)-36.9 °C (98.4 °F)] 36.6 °C (97.8 °F)  Pulse:  [76-88] 76  Resp:  [17-20] 18  BP: ()/(45-66) 95/45  SpO2:  [94 %-99 %] 94 %     Physical Exam   Physical Exam  HENT:      Head: Normocephalic and atraumatic.      Mouth/Throat:      Mouth: Mucous membranes are moist.   Eyes:      Extraocular Movements: Extraocular movements intact.      Pupils: Pupils are equal, round, and reactive to light.   Neck:      Musculoskeletal: Neck supple.      Comments: Tracheostomy with granulation tissue  Cardiovascular:      Rate and Rhythm: Normal rate.   Pulmonary:      Effort: Pulmonary effort is normal. No respiratory distress. "      Breath sounds: No wheezing.   Abdominal:      General: Abdomen is flat.      Comments: PEG tube clean   Musculoskeletal:      Right lower leg: No edema.      Left lower leg: No edema.   Skin:     General: Skin is warm and dry.   Neurological:      Mental Status: She is alert.      Comments: Non verbal  Turning to voice  Lifting eyebrows  Flaccid right side  Left hand in daughters not moving left leg  Left arm not rigid         Medications  Current Facility-Administered Medications   Medication Dose Route Frequency Provider Last Rate Last Dose   • Valproate Sodium (DEPAKENE) oral solution 500 mg  500 mg Enteral Tube Q12HRS Aubree Irving M.D.   500 mg at 03/03/20 0540   • insulin regular (HUMULIN R) injection 1-6 Units  1-6 Units Subcutaneous Q6HRS Judy Roque M.D.   2 Units at 03/03/20 0601    And   • glucose 4 g chewable tablet 16 g  16 g Oral Q15 MIN PRN Judy Roque M.D.        And   • DEXTROSE 10% BOLUS 250 mL  250 mL Intravenous Q15 MIN PRN Judy Roque M.D.       • lisinopril (PRINIVIL) tablet 5 mg  5 mg Enteral Tube Q DAY Judy Roque M.D.   5 mg at 03/03/20 0539   • dakins 0.125% (1/4 strength) topical soln   Topical BID Judy Roque M.D.   473 mL at 03/03/20 0555   • heparin injection 5,000 Units  5,000 Units Subcutaneous Q12HRS Aubree Irving M.D.   5,000 Units at 03/03/20 0534   • doxazosin (CARDURA) tablet 6 mg  6 mg Per G Tube QHS Aubree Irving M.D.   6 mg at 03/02/20 2008   • aspirin (ASA) chewable tab 81 mg  81 mg Enteral Tube DAILY Aubree Irving M.D.   81 mg at 03/03/20 0536   • carvedilol (COREG) tablet 25 mg  25 mg Enteral Tube BID Aubree Irving M.D.   25 mg at 03/03/20 0530   • hydrALAZINE (APRESOLINE) tablet 100 mg  100 mg Enteral Tube TID Aubree Irving M.D.   100 mg at 03/03/20 0539   • omeprazole (FIRST-OMEPRAZOLE) 2 mg/mL oral susp 40 mg  40 mg Enteral Tube Q12HRS Aubree Irving M.D.   40 mg at 03/03/20 0534   • albumin human 25% solution 25 g  25 g Intravenous  ACUTE DIALYSIS PRN Aubree Irving M.D. 150 mL/hr at 02/26/20 1245 25 g at 02/26/20 1245   • lacosamide (VIMPAT) tablet 300 mg  300 mg Enteral Tube BID Aubree Irving M.D.   300 mg at 03/03/20 0538   • carboxymethylcellulose (REFRESH TEARS) 0.5 % ophthalmic drops 1 Drop  1 Drop Both Eyes Q2HRS PRN Aubree Irving M.D.   1 Drop at 02/26/20 0434   • vitamin B comp+C (ALLBEE WITH C) 1 Tab  1 Tab Enteral Tube DAILY Aubree Irving M.D.   1 Tab at 03/03/20 0540   • folic acid (FOLVITE) tablet 1 mg  1 mg Enteral Tube DAILY Aubree Irving M.D.   1 mg at 03/03/20 0536   • allopurinol (ZYLOPRIM) tablet 100 mg  100 mg Enteral Tube DAILY Aubree Irving M.D.   100 mg at 03/03/20 0536   • nystatin (MYCOSTATIN) powder   Topical BID Aubree Irving M.D.       • hydrALAZINE (APRESOLINE) injection 10 mg  10 mg Intravenous Q4HRS PRN Judy Rouqe M.D.   10 mg at 02/10/20 0424   • topiramate (TOPAMAX) tablet 200 mg  200 mg Enteral Tube Q12HRS Aubree Irving M.D.   200 mg at 03/03/20 0536   • atorvastatin (LIPITOR) tablet 40 mg  40 mg Enteral Tube DAILY Aubree Irving M.D.   40 mg at 03/03/20 0536   • heparin injection 3,300 Units  3,300 Units Intracatheter PRN Aubree Irving M.D.   3,300 Units at 03/02/20 1731   • epoetin antoinette (EPOGEN/PROCRIT) injection 4,000 Units  4,000 Units Subcutaneous MO, WE + FR Aubree Irving M.D.   4,000 Units at 03/02/20 1411   • Respiratory Care per Protocol   Nebulization Continuous RT Aubree Irving M.D.       • ipratropium-albuterol (DUONEB) nebulizer solution  3 mL Nebulization Q2HRS PRN (RT) Aubree Irving M.D.       • senna-docusate (PERICOLACE or SENOKOT S) 8.6-50 MG per tablet 2 Tab  2 Tab Enteral Tube BID Aubree Irving M.D.   Stopped at 03/03/20 0600    And   • polyethylene glycol/lytes (MIRALAX) PACKET 1 Packet  1 Packet Enteral Tube QDAY PRN Aubree Irving M.D.   1 Packet at 02/21/20 0018    And   • bisacodyl (DULCOLAX) suppository 10 mg  10 mg Rectal QDAY PRN Aubree Irving M.D.       • Pharmacy Consult:  Enteral tube insertion - review meds/change route/product selection  1 Each Other PHARMACY TO DOSE Aubree Irving M.D.       • labetalol (NORMODYNE/TRANDATE) injection 10 mg  10 mg Intravenous Q4HRS PRN Judy Roque M.D.   10 mg at 02/10/20 0026   • acetaminophen (TYLENOL) tablet 650 mg  650 mg Enteral Tube Q6HRS PRN Aubree Irving M.D.   650 mg at 02/20/20 2158       Fluids    Intake/Output Summary (Last 24 hours) at 3/3/2020 1132  Last data filed at 3/3/2020 0400  Gross per 24 hour   Intake 620 ml   Output 2100 ml   Net -1480 ml       Laboratory          Recent Labs     03/02/20 0435 03/03/20  0342   SODIUM 129* 134*   POTASSIUM 4.1 3.8   CHLORIDE 93* 97   CO2 28 28   BUN 69* 41*   CREATININE 1.36 1.01   MAGNESIUM 1.9  --    PHOSPHORUS 2.5  --    CALCIUM 8.3* 8.4*     Recent Labs     03/02/20 0435 03/03/20  0342   ALTSGPT 29  --    ASTSGOT 39  --    ALKPHOSPHAT 86  --    TBILIRUBIN 0.3  --    GLUCOSE 183* 197*     Recent Labs     03/02/20 0435 03/03/20  0342   WBC 13.5* 14.5*   NEUTSPOLYS 63.40  --    LYMPHOCYTES 19.60*  --    MONOCYTES 12.20  --    EOSINOPHILS 3.60  --    BASOPHILS 0.40  --    ASTSGOT 39  --    ALTSGPT 29  --    ALKPHOSPHAT 86  --    TBILIRUBIN 0.3  --      Recent Labs     03/02/20 0435 03/03/20  0342   RBC 2.63* 2.82*   HEMOGLOBIN 8.3* 8.9*   HEMATOCRIT 26.3* 27.8*   PLATELETCT 177 187       Imaging  2/29/20  cxr personally reviewed and shows tracheostomy tube, HD catheter and PICC line  B/l hilar infiltrates suggestive of fluid overload    Assessment/Plan    Acute respiratory failure with hypoxia (HCC)- (present on admission)  Assessment & Plan  Intubated date: 1/14/2020 s/p trach 2/2 Dr Devine  At this time will down size to 6 cuffed shiley  Want to ensure she can swallow her saliva  Minimal secretions  D/w Rt  trial speaking valve after  D/w Dr. Varun FLOREZ have performed a physical exam and reviewed and updated ROS and Plan today (3/3/2020). In review of yesterday's note  (3/2/2020), there are no changes except as documented above.

## 2020-03-03 NOTE — PROGRESS NOTES
[facility] Progress Note     Hemodialysis treatment ordered today per Dr. Cavazos x 3.5 hours.   Treatment initiated at 1400, ended at 1730.      Patient tolerated treatment; see paper flow sheet for details.      Net UF 1,500 mL.      Post tx, CVC flushed with saline then locked with heparin 1000 units/mL per designated amount in each wing then clamped and capped. Aspirate heparin prior to next CVC use.     Report given to Abhijit Ayers RN.

## 2020-03-04 NOTE — PROGRESS NOTES
Patient is nonverbal with eyes open ,assumed care given at shift changed,cleaned  hernandez care done,rectal tube patent and irrigated rectal tube irrigated and so far it leak just a scant amount of loose stool,bed in low position,every hour check.

## 2020-03-04 NOTE — CARE PLAN
Problem: Oxygenation:  Goal: Maintain adequate oxygenation dependent on patient condition  Outcome: PROGRESSING AS EXPECTED  Pt remains on T-piece 4L/28%

## 2020-03-04 NOTE — PROGRESS NOTES
Assumed care of patient at bedside report from NOC RN. Updated on POC. Patient currently A & O x 0, pt is non verbal no signs or symptoms of discomfort or distress; on 4L trach at 28% FiO2, sterile saline replaced; up max assist, unable to follow commands. BMS flushed. PEG flushed. River in place and draining. Call light within reach. Whiteboard updated. Fall precautions in place. Bed locked and in lowest position. All questions answered. No other needs indicated at this time.

## 2020-03-04 NOTE — RESPIRATORY CARE
7.0 Portex exchanged for a #6 Cuffed Shiley with BVM/Suction/O2 on standby. Stoma appeared intact and clean and atraumatic post insertion of #6 Shiley. Spare trach at bedside.

## 2020-03-04 NOTE — PROGRESS NOTES
Hospital Medicine Daily Progress Note    Date of Service  3/3/2020    Chief Complaint  74 y.o. female admitted 1/16/2020 with altered mental status    Hospital Course    Ms. Beltran is a 74 y.o. female admitted to [facility] on 1/14/20 with seizures.She was at a SNF.She had been previously hospitalized at [facility] and diaysis was initiated.  She was doing very poorly then and palliative care was discussed with the family,but they declined.She had very poor functional status and required Jimena lift to get into the dialysis chair.She was found to have  a CVA on MRI at [facility].Her seizures were not controlled and it was felt she was in status epilepticus.  She was getting HD at [facility].Last HD was on 1/13/20.She was seen by Dr Carcamo at [facility].Creatinine was 1.8 and dialysis was held. Neurology [place] that the patient needed continuous EEG monitoring and he was transferred to [facility]. Patient intubated due to seizures and was eventually transitioned to tracheostomy on 2/2/2020 and off the vent on 2/5/2020. Family at this time does not want to change CODE status. Patient remains unresponsive. LTAC being pursued. PEG tube in place.         Interval Problem Update  Patient is in bed, does not follow commands, she just stares at you, vital signs stable, had long discussion with patient's family regarding plan of care and goals of care, discussed regarding about placement and probably getting therapy, this time patient family is okay with therapy but they want to continue with full code and give her more time to see if she is able to recover, discussed regarding previous evaluation by previous doctors who have expressed poor prognosis, patient's daughter expressed understanding but she would like to continue with full treatment, I have discussed with nurse staff and .    Consultants/Specialty  Palliative care  Critical care - signed off  Neurology - signed off  Nephrology  ID     Code  Status  FULL    Disposition  TBD- declined by LTACH. Care conference date and time pending.       Review of Systems  Review of Systems   Unable to perform ROS: Medical condition       Physical Exam  Temp:  [36.2 °C (97.1 °F)-36.7 °C (98 °F)] 36.6 °C (97.8 °F)  Pulse:  [76-88] 77  Resp:  [17-20] 18  BP: ()/(45-66) 109/54  SpO2:  [94 %-99 %] 96 %    Physical Exam  Vitals signs reviewed.   Constitutional:       General: She is not in acute distress.     Appearance: She is not ill-appearing or diaphoretic.   HENT:      Head: Normocephalic.      Nose: No congestion or rhinorrhea.      Mouth/Throat:      Comments: Tacky mucous membranes  Eyes:      General: No scleral icterus.     Extraocular Movements: Extraocular movements intact.   Neck:      Musculoskeletal: Normal range of motion.      Comments: Trach in place  Cardiovascular:      Rate and Rhythm: Normal rate and regular rhythm.      Pulses: Normal pulses.      Comments: Tunneled HD catheter  Pulmonary:      Effort: Pulmonary effort is normal.      Breath sounds: Rales present.   Abdominal:      General: Bowel sounds are normal. There is no distension.      Palpations: Abdomen is soft.      Tenderness: There is no abdominal tenderness. There is no guarding.      Comments: PEG tube in place.   Genitourinary:     Comments: River and rectal tube in place  Skin:     General: Skin is warm.      Comments: Swelling of right hand; slightly improved   Neurological:      Mental Status: She is alert.      Motor: Weakness (diffuse) present.   Psychiatric:         Attention and Perception: She is inattentive.         Behavior: Behavior is not agitated.     Patient seen and examined today on 3/2, unchanged from 3/1.     Fluids    Intake/Output Summary (Last 24 hours) at 3/3/2020 1607  Last data filed at 3/3/2020 0400  Gross per 24 hour   Intake 500 ml   Output 2100 ml   Net -1600 ml       Laboratory  Recent Labs     03/02/20  0435 03/03/20  0342   WBC 13.5* 14.5*   RBC  2.63* 2.82*   HEMOGLOBIN 8.3* 8.9*   HEMATOCRIT 26.3* 27.8*   .0* 98.6*   MCH 31.6 31.6   MCHC 31.6* 32.0*   RDW 57.1* 57.3*   PLATELETCT 177 187   MPV 9.5 9.5     Recent Labs     03/02/20  0435 03/03/20  0342   SODIUM 129* 134*   POTASSIUM 4.1 3.8   CHLORIDE 93* 97   CO2 28 28   GLUCOSE 183* 197*   BUN 69* 41*   CREATININE 1.36 1.01   CALCIUM 8.3* 8.4*                   Imaging  DX-CHEST-LIMITED (1 VIEW)   Final Result         No significant interval change.      DX-CHEST-PORTABLE (1 VIEW)   Final Result      1.  Apparent improvement of LEFT pleural effusion and basilar consolidation.   2.  No pneumothorax.   3.  Supportive tubing as described above.      US-THORACENTESIS PUNCTURE LEFT   Final Result      1. Ultrasound guided left sided diagnostic and therapeutic thoracentesis.      2. 400 mL of fluid withdrawn.      CT-CHEST (THORAX) W/O   Final Result      1.  Bilateral atelectasis/consolidation, left greater than right.      2.  Moderate left and small right pleural effusion.      3.  Atherosclerotic vascular calcification.      4.  Ascites within the upper abdomen.            US-ABDOMEN LTD (SOFT TISSUE)   Final Result         1. Trace free fluid in the pelvis. No abdominal ascites.      DX-ABDOMEN FOR TUBE PLACEMENT   Final Result         Feeding tube with tip projecting over the expected area of the stomach.      DM-[id number]-6 VIEW   Final Result      Feeding tube tip overlies the gastric antrum.      DX-CHEST-PORTABLE (1 VIEW)   Final Result         1.  Pulmonary edema and/or infiltrates are identified, which are stable since the prior exam.   2.  Cardiomegaly   3.  Atherosclerosis      DX-CHEST-PORTABLE (1 VIEW)   Final Result         1.  Pulmonary edema and/or infiltrates are identified, which are stable since the prior exam.   2.  Cardiomegaly   3.  Atherosclerosis      DX-CHEST-PORTABLE (1 VIEW)   Final Result      1.  Unchanged left lower lobe atelectasis or pneumonia.      2.  Clear right lung.       DX-CHEST-PORTABLE (1 VIEW)   Final Result      Unchanged LEFT basilar atelectasis and/or airspace disease      DX-CHEST-PORTABLE (1 VIEW)   Final Result      Left basilar atelectasis, hilar and cardiac silhouette enlargement as before      DX-CHEST-PORTABLE (1 VIEW)   Final Result      Interval removal of a left subclavian central line. Stable patchy bilateral infiltrates.      IR-PICC LINE PLACEMENT W/ GUIDANCE > AGE 5   Final Result                  Ultrasound-guided PICC placement performed by qualified nursing staff as    above.          DX-CHEST-PORTABLE (1 VIEW)   Final Result      1.  Multiple support devices present.      2.  Patchy bilateral atelectasis.      DX-CHEST-PORTABLE (1 VIEW)   Final Result      Stable chest with retrocardiac opacity from atelectasis and/or pleural fluid favored over consolidation      DX-CHEST-PORTABLE (1 VIEW)   Final Result      Stable chest with retrocardiac opacity from atelectasis and/or pleural fluid favored over consolidation      DX-CHEST-PORTABLE (1 VIEW)   Final Result         No significant change from prior.               DX-CHEST-PORTABLE (1 VIEW)   Final Result         1. Stable lines and tubes..   2. Stable retrocardiac and left perihilar atelectasis versus consolidation. Suspected small left pleural effusion.            DX-CHEST-PORTABLE (1 VIEW)   Final Result         1. Stable lines and tubes..   2. Stable retrocardiac atelectasis versus consolidation with increased left perihilar opacity. Suspected trace left pleural effusion.         [id number]-[id number]-2 VIEW   Final Result      1.  Enteric tube has been placed and the tip projects over the stomach.      2.  Pre-existing feeding tube tip projects at the gastroduodenal junction      DX-CHEST-PORTABLE (1 VIEW)   Final Result         1. Lines and tubes as above.   2. Stable retrocardiac atelectasis versus consolidation. Suspected trace left pleural effusion.         MR-BRAIN-WITH & W/O   Final Result      1.   Moderate cerebral atrophy.   2.  Evidence of intraventricular hemorrhage, indeterminate age. Possibly chronic intraventricular hemosiderin deposition.   3.  Extensive encephalomalacic change with hemosiderin deposition involving the right corona radiata, basal ganglia, posterior thalamus, subthalamic region, bordering the right cerebral peduncle. Associated ex vacuo dilatation of the body of the right    lateral ventricle. No change.   4.  Additional foci of old microhemorrhage most consistent with old hypertensive microhemorrhage or amyloid angiopathy in the left basal ganglia, left thalamus, and midline upper ventral abel.   5.  Punctate focus of acute infarction in the right parietal deep white matter. No change.   6.  Advanced supratentorial white matter disease most consistent with microvascular ischemic change.   7.  Encephalomalacic changes in the abel and right cerebral peduncle consistent with old infarction.   8.  Partially empty sella.   9.  Overall, no new findings and no significant change from 1/14/2020.      DX-CHEST-PORTABLE (1 VIEW)   Final Result         1. Stable lines and tubes.   2. Unchanged retrocardiac atelectasis versus consolidation.   3. Stable trace left pleural effusion.         OUTSIDE IMAGES-DX CHEST   Final Result      OUTSIDE IMAGES-US VASCULAR   Final Result      OUTSIDE IMAGES-MR BRAIN   Final Result      OUTSIDE IMAGES-DX CHEST   Final Result      OUTSIDE IMAGES-CT HEAD   Final Result      EC-ECHOCARDIOGRAM COMPLETE W/O CONT   Final Result      DX-CHEST-FOR LINE PLACEMENT Perform procedure in: Patient's Room   Final Result         1.  Retrocardiac opacity concerning for infiltrate, stable.   2.  Trace left pleural effusion, stable   3.  Cardiomegaly   4.  Atherosclerosis   5.  Perihilar interstitial prominence and bronchial wall cuffing, appearance suggests changes of underlying bronchial inflammation, consider bronchitis.      DX-ABDOMEN FOR TUBE PLACEMENT   Final Result        "  1.  Nonspecific bowel gas pattern.   2.  Dobbhoff tube tip overlying the expected location of the pylorus or first duodenal segment.   3.  Left lung base atelectasis and/or small effusion      DX-CHEST-PORTABLE (1 VIEW)   Final Result         1.  Retrocardiac opacity concerning for infiltrate.   2.  Trace left pleural effusion, stable   3.  Cardiomegaly   4.  Atherosclerosis      DH-[id number] FILM X-RAY   Final Result      OUTSIDE IMAGES-DX CHEST   Final Result           Assessment/Plan  * Status epilepticus (HCC)- (present on admission)  Assessment & Plan  On admission, she was intubated for airway protection, now trach since 2/2/20. No evidence of seizure activity. Neurology gave the opinion that likelihood of patient returning to baseline was low, and that the likelihood of the patient returning to full independent function was essentially 0. Very poor prognosis, comfort care recommended.  - neurology evaluated, appreciate recs  - continuing regimen of vimpat, topamax, depakote  - repeat EEG showed no seizures  Stable.  Continue antiseizure medications, seizure precautions    TB lung, latent- (present on admission)  Assessment & Plan  Quantiferon + and will have to rule out active TB. Chest CT found positive left-sided consolidation with pleural effusion.  - pending sputum cx/ AFB stain to rule out active disease before initiating  - s/p thora on 2/25; f/u pathology   - ID following, appreciate recs  - AFB stain negative x3; remove isolation  - Per ID, we will \"treat for LTBI with  mg PO daily and B6 25 mg PO daily for 9 months if AFB cxs all negative at 6 weeks\"  Completed antibiotics treatment    Acute respiratory failure with hypoxia (HCC)- (present on admission)  Assessment & Plan   on trach/T-piece, pulmonology consulted to change trach today      Hypokalemia  Assessment & Plan  Improved, continue monitoring    Severe protein-calorie malnutrition (HCC)- (present on admission)  Assessment & " Plan  - Continue nutrition via tube feeding.    Cerebrovascular accident (CVA) due to embolism of right middle cerebral artery (HCC)- (present on admission)  Assessment & Plan  Very poor prognosis.  Continue aspirin and statin, PT OT evaluation    End stage renal failure on dialysis (HCC)- (present on admission)  Assessment & Plan  Poor prognosis per nephrology, recommending comfort care.   Continue hemodialysis Monday Wednesday Friday    Normocytic anemia- (present on admission)  Assessment & Plan  Likely anemia of chronic kidney disease.  FOBT is negative.   - transfuse if drops below 7  Hemoglobin stable    Hypertension- (present on admission)  Assessment & Plan  Improved, continue Coreg, hydralazine, stopped lisinopril due to low blood pressure    Type 2 diabetes mellitus, with long-term current use of insulin ([facility])- (present on admission)  Assessment & Plan  A1C 7.5%, blood sugars have been ~170. Was taking insulin at home.   Continue sliding scale insulin    Leukocytosis  Assessment & Plan  No other signs of infection, continue monitoring, will check procalcitonin.    Goals of care, counseling/discussion- (present on admission)  Assessment & Plan  Discussed again with patient's daughter, they want to continue full code, daughter wants to give more time to see if patient able to recover, will get PT OT evaluation.    Pressure injury of sacrum, coccyx, stage 3 (HCC)- (present on admission)  Assessment & Plan  - Wound care  - Continue pressure offloading strategies  Changes in position every 2 hours    Dysphagia as late effect of cerebrovascular accident (CVA)- (present on admission)  Assessment & Plan  PEG tube in place    Gout- (present on admission)  Assessment & Plan  On allopurinol via PEG tube       VTE prophylaxis: SCDs    Case and plan of care discussed with nurse staff  Case and plan of care discussed during multidisciplinary rounds    40 minutes spent on this this encounter by myself today, greater than  50% on counseling and coordination of care, discussing with patient's daughter,, answering questions, discussing plan of care.

## 2020-03-04 NOTE — THERAPY
PT contact note:    Received and completed PT orders. Attempted PT eval, pt not following any commands or demonstrating voluntary movement. Therapist is familiar with patient from past facility. Pt previously requiring 24/7 care giving. PT collaborated with OT, nursing and  regarding appropriateness for skilled therapy. Pt not appropriate for skilled PT at this time. Patient will not be actively followed for physical therapy services at this time, however may be seen if requested by physician for 1 more visit within 30 days to address any discharge or equipment needs.    Isamar Almeida PT  Pager [phone]

## 2020-03-04 NOTE — PROGRESS NOTES
2 RN skin check complete with  Nataly RN  Devices in place trach,rectal tube,Peg tube,hernandez  Skin assessed under devices yes  Confirmed pressure ulcers found on coccyx.  New potential pressure ulcers noted on N/A. Wound consult placed N/A  The following interventions in place:every 2 hours turn,mepilex in placed,low air loss bed,float heel boots.     Redness under trach collar and around peg tube site,groin red and blanching,open area to coccyx with dressing in placed.

## 2020-03-04 NOTE — CARE PLAN
Problem: Communication  Goal: The ability to communicate needs accurately and effectively will improve  Outcome: PROGRESSING SLOWER THAN EXPECTED  Note: Patient is non-verbal but starting to follow simple commands like blink once for yes and twice for no.       Problem: Fluid Volume:  Goal: Will maintain balanced intake and output  Outcome: PROGRESSING AS EXPECTED  Note: Patient on tube feeding at 40 mLs/hr.

## 2020-03-04 NOTE — PROGRESS NOTES
Hospital Medicine Daily Progress Note    Date of Service  3/4/2020    Chief Complaint  74 y.o. female admitted 1/16/2020 with altered mental status    Hospital Course    Ms. Beltran is a 74 y.o. female admitted to [facility] on 1/14/20 with seizures.She was at a SNF.She had been previously hospitalized at [facility] and diaysis was initiated.  She was doing very poorly then and palliative care was discussed with the family,but they declined.She had very poor functional status and required Jimena lift to get into the dialysis chair.She was found to have  a CVA on MRI at [facility].Her seizures were not controlled and it was felt she was in status epilepticus.  She was getting HD at [facility].Last HD was on 1/13/20.She was seen by Dr Carcamo at [facility].Creatinine was 1.8 and dialysis was held. Neurology [place] that the patient needed continuous EEG monitoring and [place] [facility] [place] intubated due to seizures and was eventually transitioned to tracheostomy on 2/2/2020 and off the vent on 2/5/2020. Family at this time does not want to change CODE status. Patient remains unresponsive. LTAC being pursued. PEG tube in place.         Interval Problem Update  Patient is, not in distress, looks comfortable, does not follow commands, does not look to be in pain, PT OT evaluation today patient is not a candidate for skilled nursing patient is max assistance for ADLs, discussed with , continue monitoring, continue discussing with patient's family regarding plan of care.    Consultants/Specialty  Palliative care  Critical care - signed off  Neurology - signed off  Nephrology  ID     Code Status  FULL    Disposition  TBD probably need long-term care placement      Review of Systems  Review of Systems   Unable to perform ROS: Medical condition       Physical Exam  Temp:  [36.2 °C (97.2 °F)-37.1 °C (98.7 °F)] 36.7 °C (98 °F)  Pulse:  [77-88] 78  Resp:  [17-18] 17  BP: (101-128)/(41-55) 101/41  SpO2:  [94 %-98  %] 95 %    Physical Exam  Vitals signs reviewed.   Constitutional:       General: She is not in acute distress.     Appearance: She is not ill-appearing.   HENT:      Head: Normocephalic.      Nose: No congestion or rhinorrhea.   Eyes:      General: No scleral icterus.     Extraocular Movements: Extraocular movements intact.   Neck:      Musculoskeletal: Normal range of motion.      Comments: Trach in place  Cardiovascular:      Rate and Rhythm: Normal rate and regular rhythm.      Pulses: Normal pulses.      Comments: Tunneled HD catheter  Pulmonary:      Effort: Pulmonary effort is normal.      Breath sounds: Rales present.   Abdominal:      General: Bowel sounds are normal. There is no distension.      Palpations: Abdomen is soft.      Tenderness: There is no abdominal tenderness.      Comments: PEG tube in place.   Genitourinary:     Comments: River and rectal tube in place  Skin:     General: Skin is warm.      Comments: Swelling of right hand; slightly improved   Neurological:      Mental Status: She is alert.      Motor: Weakness (diffuse) present.   Psychiatric:         Attention and Perception: She is inattentive.         Behavior: Behavior is not agitated.     Patient seen and examined today on 3/2, unchanged from 3/1.     Fluids    Intake/Output Summary (Last 24 hours) at 3/4/2020 1259  Last data filed at 3/4/2020 1047  Gross per 24 hour   Intake 60 ml   Output 450 ml   Net -390 ml       Laboratory  Recent Labs     03/02/20 0435 03/03/20 0342 03/04/20 0414   WBC 13.5* 14.5* 17.2*   RBC 2.63* 2.82* 2.71*   HEMOGLOBIN 8.3* 8.9* 8.6*   HEMATOCRIT 26.3* 27.8* 27.5*   .0* 98.6* 101.5*   MCH 31.6 31.6 31.7   MCHC 31.6* 32.0* 31.3*   RDW 57.1* 57.3* 59.5*   PLATELETCT 177 187 210   MPV 9.5 9.5 9.6     Recent Labs     03/02/20 0435 03/03/20 0342 03/04/20  0414   SODIUM 129* 134* 134*   POTASSIUM 4.1 3.8 4.1   CHLORIDE 93* 97 97   CO2 28 28 28   GLUCOSE 183* 197* 211*   BUN 69* 41* 67*   CREATININE  1.36 1.01 1.39   CALCIUM 8.3* 8.4* 8.6                   Imaging  DX-CHEST-LIMITED (1 VIEW)   Final Result         No significant interval change.      DX-CHEST-PORTABLE (1 VIEW)   Final Result      1.  Apparent improvement of LEFT pleural effusion and basilar consolidation.   2.  No pneumothorax.   3.  Supportive tubing as described above.      US-THORACENTESIS PUNCTURE LEFT   Final Result      1. Ultrasound guided left sided diagnostic and therapeutic thoracentesis.      2. 400 mL of fluid withdrawn.      CT-CHEST (THORAX) W/O   Final Result      1.  Bilateral atelectasis/consolidation, left greater than right.      2.  Moderate left and small right pleural effusion.      3.  Atherosclerotic vascular calcification.      4.  Ascites within the upper abdomen.            US-ABDOMEN LTD (SOFT TISSUE)   Final Result         1. Trace free fluid in the pelvis. No abdominal ascites.      DX-ABDOMEN FOR TUBE PLACEMENT   Final Result         Feeding tube with tip projecting over the expected area of the stomach.      [id number] VIEW   Final Result      Feeding tube tip overlies the gastric antrum.      DX-CHEST-PORTABLE (1 VIEW)   Final Result         1.  Pulmonary edema and/or infiltrates are identified, which are stable since the prior exam.   2.  Cardiomegaly   3.  Atherosclerosis      DX-CHEST-PORTABLE (1 VIEW)   Final Result         1.  Pulmonary edema and/or infiltrates are identified, which are stable since the prior exam.   2.  Cardiomegaly   3.  Atherosclerosis      DX-CHEST-PORTABLE (1 VIEW)   Final Result      1.  Unchanged left lower lobe atelectasis or pneumonia.      2.  Clear right lung.      DX-CHEST-PORTABLE (1 VIEW)   Final Result      Unchanged LEFT basilar atelectasis and/or airspace disease      DX-CHEST-PORTABLE (1 VIEW)   Final Result      Left basilar atelectasis, hilar and cardiac silhouette enlargement as before      DX-CHEST-PORTABLE (1 VIEW)   Final Result      Interval removal of a left  subclavian central line. Stable patchy bilateral infiltrates.      IR-PICC LINE PLACEMENT W/ GUIDANCE > AGE 5   Final Result                  Ultrasound-guided PICC placement performed by qualified nursing staff as    above.          DX-CHEST-PORTABLE (1 VIEW)   Final Result      1.  Multiple support devices present.      2.  Patchy bilateral atelectasis.      DX-CHEST-PORTABLE (1 VIEW)   Final Result      Stable chest with retrocardiac opacity from atelectasis and/or pleural fluid favored over consolidation      DX-CHEST-PORTABLE (1 VIEW)   Final Result      Stable chest with retrocardiac opacity from atelectasis and/or pleural fluid favored over consolidation      DX-CHEST-PORTABLE (1 VIEW)   Final Result         No significant change from prior.               DX-CHEST-PORTABLE (1 VIEW)   Final Result         1. Stable lines and tubes..   2. Stable retrocardiac and left perihilar atelectasis versus consolidation. Suspected small left pleural effusion.            DX-CHEST-PORTABLE (1 VIEW)   Final Result         1. Stable lines and tubes..   2. Stable retrocardiac atelectasis versus consolidation with increased left perihilar opacity. Suspected trace left pleural effusion.         [id number] VIEW   Final Result      1.  Enteric tube has been placed and the tip projects over the stomach.      2.  Pre-existing feeding tube tip projects at the gastroduodenal junction      DX-CHEST-PORTABLE (1 VIEW)   Final Result         1. Lines and tubes as above.   2. Stable retrocardiac atelectasis versus consolidation. Suspected trace left pleural effusion.         MR-BRAIN-WITH & W/O   Final Result      1.  Moderate cerebral atrophy.   2.  Evidence of intraventricular hemorrhage, indeterminate age. Possibly chronic intraventricular hemosiderin deposition.   3.  Extensive encephalomalacic change with hemosiderin deposition involving the right corona radiata, basal ganglia, posterior thalamus, subthalamic region, bordering the  right cerebral peduncle. Associated ex vacuo dilatation of the body of the right    lateral ventricle. No change.   4.  Additional foci of old microhemorrhage most consistent with old hypertensive microhemorrhage or amyloid angiopathy in the left basal ganglia, left thalamus, and midline upper ventral abel.   5.  Punctate focus of acute infarction in the right parietal deep white matter. No change.   6.  Advanced supratentorial white matter disease most consistent with microvascular ischemic change.   7.  Encephalomalacic changes in the abel and right cerebral peduncle consistent with old infarction.   8.  Partially empty sella.   9.  Overall, no new findings and no significant change from 1/14/2020.      DX-CHEST-PORTABLE (1 VIEW)   Final Result         1. Stable lines and tubes.   2. Unchanged retrocardiac atelectasis versus consolidation.   3. Stable trace left pleural effusion.         OUTSIDE IMAGES-DX CHEST   Final Result      OUTSIDE IMAGES-US VASCULAR   Final Result      OUTSIDE IMAGES-MR BRAIN   Final Result      OUTSIDE IMAGES-DX CHEST   Final Result      OUTSIDE IMAGES-CT HEAD   Final Result      EC-ECHOCARDIOGRAM COMPLETE W/O CONT   Final Result      DX-CHEST-FOR LINE PLACEMENT Perform procedure in: Patient's Room   Final Result         1.  Retrocardiac opacity concerning for infiltrate, stable.   2.  Trace left pleural effusion, stable   3.  Cardiomegaly   4.  Atherosclerosis   5.  Perihilar interstitial prominence and bronchial wall cuffing, appearance suggests changes of underlying bronchial inflammation, consider bronchitis.      DX-ABDOMEN FOR TUBE PLACEMENT   Final Result         1.  Nonspecific bowel gas pattern.   2.  Dobbhoff tube tip overlying the expected location of the pylorus or first duodenal segment.   3.  Left lung base atelectasis and/or small effusion      DX-CHEST-PORTABLE (1 VIEW)   Final Result         1.  Retrocardiac opacity concerning for infiltrate.   2.  Trace left pleural  "effusion, stable   3.  Cardiomegaly   4.  Atherosclerosis      [id number] FILM X-RAY   Final Result      OUTSIDE IMAGES-DX CHEST   Final Result           Assessment/Plan  * Status epilepticus (HCC)- (present on admission)  Assessment & Plan  On admission, she was intubated for airway protection, now trach since 2/2/20. No evidence of seizure activity. Neurology gave the opinion that likelihood of patient returning to baseline was low, and that the likelihood of the patient returning to full independent function was essentially 0. Very poor prognosis, comfort care recommended.  - neurology evaluated, appreciate recs  - continuing regimen of vimpat, topamax, depakote  - repeat EEG showed no seizures  Stable.  Continue antiseizure medications, seizure precautions.  No more seizures    TB lung, latent- (present on admission)  Assessment & Plan  Quantiferon + and will have to rule out active TB. Chest CT found positive left-sided consolidation with pleural effusion.  - pending sputum cx/ AFB stain to rule out active disease before initiating  - s/p thora on 2/25; f/u pathology   - ID following, appreciate recs  - AFB stain negative x3; remove isolation  - Per ID, we will \"treat for LTBI with  mg PO daily and B6 25 mg PO daily for 9 months if AFB cxs all negative at 6 weeks\"  Completed antibiotics treatment.    Acute respiratory failure with hypoxia (HCC)- (present on admission)  Assessment & Plan   on trach/T-piece, down size to 6 cuffed shiley by pulmonology      Hypokalemia  Assessment & Plan  Improved, continue monitoring    Severe protein-calorie malnutrition (HCC)- (present on admission)  Assessment & Plan  - Continue nutrition via tube feeding.    Cerebrovascular accident (CVA) due to embolism of right middle cerebral artery (HCC)- (present on admission)  Assessment & Plan  Very poor prognosis.  Continue aspirin and statin, PT OT evaluation    End stage renal failure on dialysis (HCC)- (present on " admission)  Assessment & Plan  Poor prognosis per nephrology, recommending comfort care.   Continue hemodialysis Monday Wednesday Friday    Normocytic anemia- (present on admission)  Assessment & Plan  Likely anemia of chronic kidney disease.  FOBT is negative.   - transfuse if drops below 7  Hemoglobin stable    Hypertension- (present on admission)  Assessment & Plan  Improved, continue Coreg, hydralazine, stopped lisinopril due to low blood pressure  Continue monitoring    Type 2 diabetes mellitus, with long-term current use of insulin ([facility])- (present on admission)  Assessment & Plan  A1C 7.5%, blood sugars have been ~170. Was taking insulin at home.   Continue sliding scale insulin.  Continue tube feeding  .    Leukocytosis  Assessment & Plan  No other signs of infection, continue monitoring, will check procalcitonin.    Goals of care, counseling/discussion- (present on admission)  Assessment & Plan  Discussed again with patient's daughter, they want to continue full code, daughter wants to give more time to see if patient able to recover, will get PT OT evaluation patient is close to baseline requiring max assistance for ADLs.    Pressure injury of sacrum, coccyx, stage 3 ([facility])- (present on admission)  Assessment & Plan  - Wound care  - Continue pressure offloading strategies  Changes in position every 2 hours    Dysphagia as late effect of cerebrovascular accident (CVA)- (present on admission)  Assessment & Plan  PEG tube in place    Gout- (present on admission)  Assessment & Plan  On allopurinol via PEG tube       VTE prophylaxis: SCDs    Case and plan of care discussed with nurse staff  Case and plan of care discussed during multidisciplinary rounds

## 2020-03-04 NOTE — PROGRESS NOTES
[facility] Daily Progress Note    Date of Service  3/4/2020    Chief Complaint  Follow up ESRD    Interval Problem Update  74 y.o. female admitted to [facility] on 1/14/20 with seizures.She was at a SNF.She had been previously hospitalized at [facility] and diaysis was initiated.  She was doing very poorly then and palliative care was discussed with the family,but they declined.She had very poor functional status and required Jimena lift to get into the dialysis chair.She was found to have  a CVA on MRI at [facility].Her seizures were not controlled and it was felt she was in status epilepticus.  She was getting HD at [facility].Last HD was on 1/13/20.She was seen by Dr Carcamo at [facility].Creatinine was 1.8 and dialysis was held. Neurology [place] that the patient needed continuous EEG monitoring and [place] [facility] [place]     DAILY NEPHROLOGY SUMMARY:  Please see note from 1/31 for prior summaries  2/01 - NAEO, another EEG being done this AM  2/02 - NAEO, scheduled for Trach today  2/03 - No events, underwent trach yesterday, no change in neuro status, BP stable overnight but low this am, given midodrine, to have HD today  2/04 - No events, tolerated HD yest with 2.1L UF, BP stable this am  2/05 - No events, BP low prior to starting HD, given albumin at the start of HD and BP stable at this time  2/06 - No events, tolerated HD yesterday with 2.5L UF, BP stable and high at times, still with dependent edema  2/07 - No events, tolerated PUF yesterday with 2.5L UF and did not require midodrine with treatment, BP stable today, still with dependent edema, to have HD today  2/08 - No overnight events or changes.  On TF at goal via NGT.  No neurologic change.  On vent/trach.   2/09 - No significant change.  No events.  Liquid stool from rectal tube.  On Vent/Trach  2/10-  No new overnight renal events. +trach.  2/11- S/p HD yesterday with net UF of 2 L.   2/12- No new overnight renal events. HD today.  2/13  - HD yesterday with 1.7L net UF, plan for PEG placement Saturday, LTACH placement pending, some hypotension   2/14 - On HD.  2/15 - Non communicative, HD yesterday 700cc UF  2/17 - Seen on HD today. 's. No new events.   2/19- Non verbal, nothing reported  2/21- Stage 3 decubitus ulcer  2/24 - Pt in TB rule out isolation, ID on board, for HD today   2/26 - No events, seen on dialysis, BP stable, still in isolation  2/28 - No events, no change in mental status, still in isolation, BP variable, T-piece in place  2/29 - Had HD yesterday with 2 L UF. VSS. Patient awake and alert, but does not attempt to communicate. Still with trach / T-piece.  3/02 - No new events.No interaction.For HD today.  3/04 - No new developments.For HD today.No interaction.    Review of Systems  ROS   Unable to perform ROS due to medical condition     Physical Exam  Temp:  [36.2 °C (97.2 °F)-37.1 °C (98.7 °F)] 36.7 °C (98 °F)  Pulse:  [77-88] 79  Resp:  [17-18] 17  BP: (109-128)/(48-55) 109/50  SpO2:  [94 %-98 %] 96 %    Physical Exam  Vitals signs and nursing note reviewed.   Constitutional:       General: She is not in acute distress.     Appearance: She is chronically ill-appearing.   HENT:      Head: Normocephalic and atraumatic.      Right Ear: External ear normal.      Left Ear: External ear normal.      Nose: Nose normal. No congestion.      Mouth/Throat:      Mouth: Mucous membranes are moist.      Pharynx: No oropharyngeal exudate.   Eyes:      General: No scleral icterus.     Conjunctiva/sclera: Conjunctivae normal.   Neck:      Musculoskeletal: No muscular tenderness.      Vascular: No carotid bruit.      Comments: +Trach.T piece.  Cardiovascular:      Rate and Rhythm: Normal rate and regular rhythm.      Heart sounds: Normal heart sounds. No murmur.   Pulmonary:      Effort: Pulmonary effort is normal. No respiratory distress.      Breath sounds: Normal breath sounds.      Comments: T-piece, supp O2   Chest:      Chest wall:  No tenderness.   Abdominal:      General: Bowel sounds are normal. There is no distension.      Palpations: Abdomen is soft.      Tenderness: There is no abdominal tenderness.      Comments: Enteral nutrition via PEG, +Rectal tube  Genitourinary:     Comments: BMS  Musculoskeletal:         General: No tenderness or signs of injury.      Comments: +dependent edema   Lymphadenopathy:      Cervical: No cervical adenopathy.   Skin:     General: Skin is warm and dry.      Findings: No erythema or rash.      Comments: Heel float boots   Stage 3 decubitus sacral not visualized   Neurological:      Comments: Awake and alert, LUE contracted, does not attempt to communicate  Psychiatric:      Comments: Unable to assess   +River       Fluids    Intake/Output Summary (Last 24 hours) at 3/4/2020 0817  Last data filed at 3/4/2020 0600  Gross per 24 hour   Intake --   Output 450 ml   Net -450 ml       Laboratory  Recent Labs     03/02/20 0435 03/03/20  0342 03/04/20  0414   WBC 13.5* 14.5* 17.2*   RBC 2.63* 2.82* 2.71*   HEMOGLOBIN 8.3* 8.9* 8.6*   HEMATOCRIT 26.3* 27.8* 27.5*   .0* 98.6* 101.5*   MCH 31.6 31.6 31.7   MCHC 31.6* 32.0* 31.3*   RDW 57.1* 57.3* 59.5*   PLATELETCT 177 187 210   MPV 9.5 9.5 9.6     Recent Labs     03/02/20  0435 03/03/20  0342 03/04/20  0414   SODIUM 129* 134* 134*   POTASSIUM 4.1 3.8 4.1   CHLORIDE 93* 97 97   CO2 28 28 28   GLUCOSE 183* 197* 211*   BUN 69* 41* 67*   CREATININE 1.36 1.01 1.39   CALCIUM 8.3* 8.4* 8.6                           IMPRESSION:  # ESRD              Maintenance dialysis qMWF as OP at [facility], but has been discharged due to >30 days and will need a  new HD chair once discharged.              mCrCl 6 by 24 hour urine - repeat study 2/13 mCrCl 4              Dialysis Dependent                # Status epilepticus  # S/P acute lacunar infarct              Hx of previous CVA with significant deficits.  # HTN--BP variable often with intradialytic hypotension  # DM  II   Management per primary svc  # Acute Hypoxic Respiratory Failure              +Trach and T-piece  # Hx of dysphagia  # Anemia of CKD.Ferritin previously significantly elevated. CAT with HD.  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   PTH, Vit D, Phos pending result  # CAD  # DLD  # Gout,on Allopurinol  # Hx of PEG tube  # Hx of RALF  # Hx of UTI  # COPD  # Edema/Anasarca--improved  # Hypophosphatemia, improved   # Hyponatremia, improved   # Prognosis poor              Family expectations and goals for patient are not in line with prognosis.  # Stage 3 decubitus ulcer  # TB rule out, + TB quant and abnormal CXR/CT, ID on board and managing  # Leukocytosis      PLAN:               Maintenance iHD qMWF and PRN.              UF w/ HD as tolerated.              ID following, continue TB rule out              Continue Epogen with HD              Seizure management per Neurology              Enteral feedings              No dietary protein restrictions              Follow labs              Continue GOC discussions with family              Very poor prognosis, recommend comfort care, if and when family agreeable              Will see on MWF. Please call on other days w/ questions or concerns

## 2020-03-04 NOTE — THERAPY
"OT consult received, attempted, pt is not following commands or localizing to sound. Collaborated with PT, who's familiar with pt from SNF, reports pt has L sided deficits from prior CVA, non-ambulatory and max a for ADLs and t/f's baseline. Pt appears to be close to her functional baseline and does not appear appropriate for skilled OT services at this time. SW on floor was updated on finding's and will \"complete\" the order as formal evaluation is not indicated at this time.   "

## 2020-03-04 NOTE — PROGRESS NOTES
Received report from night shift RN and assumed care of patient. Patient lying in bed with eyes closed during bedside report. Patient's orientation is unable to be assessed as patient is non-verbal. Unsure if patient was following cues to blink once for yes and twice for no, but family member stated patient is following her commands which is an improvement. Family at bedside performing range of motion exercises. Family requested to see the wound on the patient's sacrum. RN told family member that the dressing had just been changed so I didn't want to remove, but family member persisted. I rechanged dressing to show daughter the wound. Daughter stated it is worse than before and questioned how the dressing was being done. I explained the dressing instructions and changed dressing again, but failed to put the date on the new mepilex. Un sure if patient is in pain, but patient blinked once for yes, so given Tylenol. Patient's family asked if they could stay the night. RN spoke to the charge nurse, who stated one cot could be in the room. RN passed information on to night RN as family had already left. Patient resting in bed listening to music at this time.  No other signs of distress at this time. Bed is in lowest, locked position, call light and belongings are within reach. Patient does not call for assistance and bed alarm is off.

## 2020-03-04 NOTE — DISCHARGE PLANNING
Anticipated Discharge Disposition: TBD    Action: SW spoke to Dr Tee prior to talking to patient's daughter, Belen.  Dr. Tee stated family wants patient to be full code and go to a LTAC.  SW requested PT/OT assessment  SW spoke to Belen who is adamant that her mother will get better and only then can they take her home.  Belen wants her mother to go to an LTAC not a skilled.  Belen then stated she has been in contact with the facility in [place] and was told the patient is not medically stable to d/c.  ESPERANZA stated the patient is medically clear, that she can not stay at [facility]. That the patient needs to transfer to a lower level of care, as the patient is no longer acute.      Barriers to Discharge: discharge plan with family    Plan: follow up with family

## 2020-03-04 NOTE — ASSESSMENT & PLAN NOTE
No other signs of infection, probably reactive  White blood cell count remains high  chest x-ray is unchanged, procalcitonin is slightly elevated but patient is end-stage renal disease patient has no fever, UA positive for leukocyte esterase but no other signs of infection.   C. difficile negative

## 2020-03-04 NOTE — CARE PLAN
Problem: Communication  Goal: The ability to communicate needs accurately and effectively will improve  Outcome: PROGRESSING AS EXPECTED  Intervention: Educate patient and significant other/support system about the plan of care, procedures, treatments, medications and allow for questions  Note: Discussed POC at length with family. Whiteboard updated.      Problem: Skin Integrity  Goal: Risk for impaired skin integrity will decrease  Outcome: PROGRESSING AS EXPECTED  Note: Skin breakdown prevention measures in place. Q2 turns; barrier cream; low air loss mattress; moisturizer at bedside

## 2020-03-05 NOTE — DIETARY
Nutrition Support Weekly Update: Day 49 of admit.  Cassandra Guzmán is a 74 y.o. female with admitting DX of Seizures, Ventilatory respiratory failure, Acute renal failure, Respiratory failure requiring intubation.     Tube feeding initiated on 1/17. Current TF via PEG is Impact Peptide 1.5 @ 40 mL/hr, providing 1440 kcal, 90 gm protein, 134 gm CHO, and 739 mL of free water per day. Pt is also receiving Nutrisource Fiber packets 6x/day, providing an additional 18 gm fiber and 90 kcal, for a total of 1530 kcal/day.       Assessment:  Wt 2/22: 54.5 kg via bed scale - No updated weight since last weekly update.     Evaluation:   1. TF running @ goal  2. Pt has a trach with t-piece   3. Pt receiving dialysis every MWF per nephrology note  4. Edema: generalized edema RUE, RLE, LUE, LLE   5. Labs (3/4): Na 134, Glucose 211, BUN 67  6. Meds: lipitor, epogen, folic acid, SSI, omeprazole, allbee with C  7. Skin: Wound team note 2/11: Sacrococcygeal stage 3 wound still present, has more yellow tissue present, leaks on occasion.   8. Last BM: 3/4  9. D/C planning: most recent note 3/4 - looking for LTAC placement   10. No changes to current feedings - see RD note 2/21 documenting family declining bolus feeds or a more standard formula      Malnutrition Risk: Pt at risk due to severe wt loss of 9.9 in about a month, no other criteria noted at this time.      Recommendations/Plan:  1. Continue TF formula and rate   2. Continue Nutrisource Fiber packets  3. Fluids per MD  4. Monitor weight.      RD following

## 2020-03-05 NOTE — PROGRESS NOTES
Report received from day shift nurse. Assumed care @ 1900.      Unable to assess mentation, patient just stares back when asked. Assessment partially completed. No signs and symptoms of discomfort noted at this time. On O2 at 4LPM via T-piece,  in place and functioning, O2 sat at 97%. Permacath in place, PICC in place, flushed, blood return noted. PEG tube in place, on tube feeding of Impact Peptide 1.5 at 40 mL/hr tolerating well, HOB maintained at 30 degrees. River catheter in place, draining to gravity, rectal tube in place, flushed as ordered. Patient educated regarding plan of care. On NUBIA mattress, all 4 siderails up.     Bed locked, in lowest position, treaded socks on. Needs attended. No further needs at this time. Communication board updated. Call light and personal belongings within reach.

## 2020-03-05 NOTE — PROGRESS NOTES
HEMODIALYSIS NOTES:     HD today x 3.5 hours per Dr. Cavazos . Initiated at 1545 and ended at 1916. Patient monitored accordingly. O2 sat 94-98%. Vss. Remain awake but not following commands. Patient tolerated treatment. See paper flowsheet for details.    UF Net: 1700 mL    R permacath intact and patent with good flow during dialysis. No s/sx of infection, no redness nor bleeding noted on the CVC site. CVC dressing clean, dry and intact. Blood returned and CVC port flushed with NS and Heparin 1000 units/mL used  to lock catheter given per designated amount. CVC port clamped and capped.     Report given to Primary RN.

## 2020-03-05 NOTE — PROGRESS NOTES
Hospital Medicine Daily Progress Note    Date of Service  3/5/2020    Chief Complaint  74 y.o. female admitted 1/16/2020 with altered mental status    Hospital Course    Ms. Beltran is a 74 y.o. female admitted to [facility] on 1/14/20 with seizures.She was at a SNF.She had been previously hospitalized at [facility] and diaysis was initiated.  She was doing very poorly then and palliative care was discussed with the family,but they declined.She had very poor functional status and required Jimena lift to get into the dialysis chair.She was found to have  a CVA on MRI at [facility].Her seizures were not controlled and it was felt she was in status epilepticus.  She was getting HD at [facility].Last HD was on 1/13/20.She was seen by Dr Carcamo at [facility].Creatinine was 1.8 and dialysis was held. Neurology [place] that the patient needed continuous EEG monitoring and he was transferred to [facility]. Patient intubated due to seizures and was eventually transitioned to tracheostomy on 2/2/2020 and off the vent on 2/5/2020. Family at this time does not want to change CODE status. Patient remains unresponsive. LTAC being pursued. PEG tube in place.         Interval Problem Update  Patient is resting in bed, no new events, does not look to be in distress from pain, does not follows commands tolerating tube feedings.    Consultants/Specialty  Palliative care  Critical care - signed off  Neurology - signed off  Nephrology  ID     Code Status  FULL    Disposition  TBD probably need long-term care placement      Review of Systems  Review of Systems   Unable to perform ROS: Medical condition       Physical Exam  Temp:  [36.1 °C (97 °F)-36.6 °C (97.9 °F)] 36.3 °C (97.3 °F)  Pulse:  [75-88] 78  Resp:  [16-19] 17  BP: ()/(42-84) (P) 114/52  SpO2:  [94 %-98 %] 97 %    Physical Exam  Vitals signs reviewed.   Constitutional:       General: She is not in acute distress.     Appearance: She is not ill-appearing.   HENT:      Head: Normocephalic.       Nose: No congestion or rhinorrhea.   Eyes:      General: No scleral icterus.     Extraocular Movements: Extraocular movements intact.      Conjunctiva/sclera: Conjunctivae normal.   Neck:      Musculoskeletal: Normal range of motion.      Comments: Trach in place  Cardiovascular:      Rate and Rhythm: Normal rate and regular rhythm.      Pulses: Normal pulses.      Comments: Tunneled HD catheter  Pulmonary:      Effort: Pulmonary effort is normal.      Breath sounds: Rales present.   Abdominal:      General: Bowel sounds are normal. There is no distension.      Palpations: Abdomen is soft.      Tenderness: There is no abdominal tenderness.      Comments: PEG tube in place.   Genitourinary:     Comments: River and rectal tube in place  Skin:     General: Skin is warm.      Comments: Swelling of right hand; slightly improved   Neurological:      Mental Status: She is alert.      Motor: Weakness (diffuse) present.   Psychiatric:         Attention and Perception: She is inattentive.         Behavior: Behavior is not agitated.     Patient seen and examined today on 3/2, unchanged from 3/1.     Fluids    Intake/Output Summary (Last 24 hours) at 3/5/2020 1546  Last data filed at 3/5/2020 0609  Gross per 24 hour   Intake 830 ml   Output 2250 ml   Net -1420 ml       Laboratory  Recent Labs     03/03/20  0342 03/04/20  0414 03/05/20  0315   WBC 14.5* 17.2* 16.1*   RBC 2.82* 2.71* 2.58*   HEMOGLOBIN 8.9* 8.6* 8.4*   HEMATOCRIT 27.8* 27.5* 25.8*   MCV 98.6* 101.5* 100.0*   MCH 31.6 31.7 32.6   MCHC 32.0* 31.3* 32.6*   RDW 57.3* 59.5* 59.3*   PLATELETCT 187 210 199   MPV 9.5 9.6 9.2     Recent Labs     03/03/20  0342 03/04/20  0414   SODIUM 134* 134*   POTASSIUM 3.8 4.1   CHLORIDE 97 97   CO2 28 28   GLUCOSE 197* 211*   BUN 41* 67*   CREATININE 1.01 1.39   CALCIUM 8.4* 8.6                   Imaging  DX-CHEST-PORTABLE (1 VIEW)   Final Result      1.  Unchanged perihilar and LEFT greater than RIGHT basilar opacities  compatible with atelectasis. Superimposed airspace disease is possible particularly in the LEFT lower lobe.   2.  Persistently enlarged cardiac silhouette      DX-CHEST-LIMITED (1 VIEW)   Final Result         No significant interval change.      DX-CHEST-PORTABLE (1 VIEW)   Final Result      1.  Apparent improvement of LEFT pleural effusion and basilar consolidation.   2.  No pneumothorax.   3.  Supportive tubing as described above.      US-THORACENTESIS PUNCTURE LEFT   Final Result      1. Ultrasound guided left sided diagnostic and therapeutic thoracentesis.      2. 400 mL of fluid withdrawn.      CT-CHEST (THORAX) W/O   Final Result      1.  Bilateral atelectasis/consolidation, left greater than right.      2.  Moderate left and small right pleural effusion.      3.  Atherosclerotic vascular calcification.      4.  Ascites within the upper abdomen.            US-ABDOMEN LTD (SOFT TISSUE)   Final Result         1. Trace free fluid in the pelvis. No abdominal ascites.      DX-ABDOMEN FOR TUBE PLACEMENT   Final Result         Feeding tube with tip projecting over the expected area of the stomach.      [id number] VIEW   Final Result      Feeding tube tip overlies the gastric antrum.      DX-CHEST-PORTABLE (1 VIEW)   Final Result         1.  Pulmonary edema and/or infiltrates are identified, which are stable since the prior exam.   2.  Cardiomegaly   3.  Atherosclerosis      DX-CHEST-PORTABLE (1 VIEW)   Final Result         1.  Pulmonary edema and/or infiltrates are identified, which are stable since the prior exam.   2.  Cardiomegaly   3.  Atherosclerosis      DX-CHEST-PORTABLE (1 VIEW)   Final Result      1.  Unchanged left lower lobe atelectasis or pneumonia.      2.  Clear right lung.      DX-CHEST-PORTABLE (1 VIEW)   Final Result      Unchanged LEFT basilar atelectasis and/or airspace disease      DX-CHEST-PORTABLE (1 VIEW)   Final Result      Left basilar atelectasis, hilar and cardiac silhouette enlargement  as before      DX-CHEST-PORTABLE (1 VIEW)   Final Result      Interval removal of a left subclavian central line. Stable patchy bilateral infiltrates.      IR-PICC LINE PLACEMENT W/ GUIDANCE > AGE 5   Final Result                  Ultrasound-guided PICC placement performed by qualified nursing staff as    above.          DX-CHEST-PORTABLE (1 VIEW)   Final Result      1.  Multiple support devices present.      2.  Patchy bilateral atelectasis.      DX-CHEST-PORTABLE (1 VIEW)   Final Result      Stable chest with retrocardiac opacity from atelectasis and/or pleural fluid favored over consolidation      DX-CHEST-PORTABLE (1 VIEW)   Final Result      Stable chest with retrocardiac opacity from atelectasis and/or pleural fluid favored over consolidation      DX-CHEST-PORTABLE (1 VIEW)   Final Result         No significant change from prior.               DX-CHEST-PORTABLE (1 VIEW)   Final Result         1. Stable lines and tubes..   2. Stable retrocardiac and left perihilar atelectasis versus consolidation. Suspected small left pleural effusion.            DX-CHEST-PORTABLE (1 VIEW)   Final Result         1. Stable lines and tubes..   2. Stable retrocardiac atelectasis versus consolidation with increased left perihilar opacity. Suspected trace left pleural effusion.         [id number] VIEW   Final Result      1.  Enteric tube has been placed and the tip projects over the stomach.      2.  Pre-existing feeding tube tip projects at the gastroduodenal junction      DX-CHEST-PORTABLE (1 VIEW)   Final Result         1. Lines and tubes as above.   2. Stable retrocardiac atelectasis versus consolidation. Suspected trace left pleural effusion.         MR-BRAIN-WITH & W/O   Final Result      1.  Moderate cerebral atrophy.   2.  Evidence of intraventricular hemorrhage, indeterminate age. Possibly chronic intraventricular hemosiderin deposition.   3.  Extensive encephalomalacic change with hemosiderin deposition involving the  right corona radiata, basal ganglia, posterior thalamus, subthalamic region, bordering the right cerebral peduncle. Associated ex vacuo dilatation of the body of the right    lateral ventricle. No change.   4.  Additional foci of old microhemorrhage most consistent with old hypertensive microhemorrhage or amyloid angiopathy in the left basal ganglia, left thalamus, and midline upper ventral abel.   5.  Punctate focus of acute infarction in the right parietal deep white matter. No change.   6.  Advanced supratentorial white matter disease most consistent with microvascular ischemic change.   7.  Encephalomalacic changes in the abel and right cerebral peduncle consistent with old infarction.   8.  Partially empty sella.   9.  Overall, no new findings and no significant change from 1/14/2020.      DX-CHEST-PORTABLE (1 VIEW)   Final Result         1. Stable lines and tubes.   2. Unchanged retrocardiac atelectasis versus consolidation.   3. Stable trace left pleural effusion.         OUTSIDE IMAGES-DX CHEST   Final Result      OUTSIDE IMAGES-US VASCULAR   Final Result      OUTSIDE IMAGES-MR BRAIN   Final Result      OUTSIDE IMAGES-DX CHEST   Final Result      OUTSIDE IMAGES-CT HEAD   Final Result      EC-ECHOCARDIOGRAM COMPLETE W/O CONT   Final Result      DX-CHEST-FOR LINE PLACEMENT Perform procedure in: Patient's Room   Final Result         1.  Retrocardiac opacity concerning for infiltrate, stable.   2.  Trace left pleural effusion, stable   3.  Cardiomegaly   4.  Atherosclerosis   5.  Perihilar interstitial prominence and bronchial wall cuffing, appearance suggests changes of underlying bronchial inflammation, consider bronchitis.      DX-ABDOMEN FOR TUBE PLACEMENT   Final Result         1.  Nonspecific bowel gas pattern.   2.  Dobbhoff tube tip overlying the expected location of the pylorus or first duodenal segment.   3.  Left lung base atelectasis and/or small effusion      DX-CHEST-PORTABLE (1 VIEW)   Final  "Result         1.  Retrocardiac opacity concerning for infiltrate.   2.  Trace left pleural effusion, stable   3.  Cardiomegaly   4.  Atherosclerosis      [id number] FILM X-RAY   Final Result      OUTSIDE IMAGES-DX CHEST   Final Result           Assessment/Plan  * Status epilepticus (HCC)- (present on admission)  Assessment & Plan  On admission, she was intubated for airway protection, now trach since 2/2/20. No evidence of seizure activity. Neurology gave the opinion that likelihood of patient returning to baseline was low, and that the likelihood of the patient returning to full independent function was essentially 0. Very poor prognosis, comfort care recommended.  - neurology evaluated, appreciate recs  - continuing regimen of vimpat, topamax, depakote  - repeat EEG showed no seizures  Stable.  Continue antiseizure medications, seizure precautions.  No more seizures    TB lung, latent- (present on admission)  Assessment & Plan  Quantiferon + and will have to rule out active TB. Chest CT found positive left-sided consolidation with pleural effusion.  - pending sputum cx/ AFB stain to rule out active disease before initiating  - s/p thora on 2/25; f/u pathology   - ID following, appreciate recs  - AFB stain negative x3; remove isolation  - Per ID, we will \"treat for LTBI with  mg PO daily and B6 25 mg PO daily for 9 months if AFB cxs all negative at 6 weeks\"  Completed antibiotics treatment.    Acute respiratory failure with hypoxia (HCC)- (present on admission)  Assessment & Plan   on trach/T-piece, down size to 6 cuffed shiley by pulmonology      Hypokalemia  Assessment & Plan  Resolved, continue monitoring    Severe protein-calorie malnutrition (HCC)- (present on admission)  Assessment & Plan  - Continue nutrition via tube feeding.    Cerebrovascular accident (CVA) due to embolism of right middle cerebral artery (HCC)- (present on admission)  Assessment & Plan  Very poor prognosis.  Continue " aspirin and statin, PT OT evaluation    End stage renal failure on dialysis (HCC)- (present on admission)  Assessment & Plan  Poor prognosis per nephrology, recommending comfort care.   Continue hemodialysis Monday Wednesday Friday    Normocytic anemia- (present on admission)  Assessment & Plan  Likely anemia of chronic kidney disease.  FOBT is negative.   - transfuse if drops below 7  Hemoglobin stable    Hypertension- (present on admission)  Assessment & Plan  Improved, continue Coreg, hydralazine, stopped lisinopril due to low blood pressure  Continue monitoring    Type 2 diabetes mellitus, with long-term current use of insulin ([facility])- (present on admission)  Assessment & Plan  A1C 7.5%, blood sugars have been ~170. Was taking insulin at home.   Continue sliding scale insulin.  Continue tube feeding  .    Leukocytosis  Assessment & Plan  No other signs of infection, probably reactive, leukocytosis trending down, chest x-ray is unchanged, procalcitonin is slightly elevated but patient is end-stage renal disease patient has no fever, UA positive for leukocyte esterase but no other signs of infection, since leukocytosis trending down and patient does not have any fever will hold on antibiotics for now and continue monitoring.    Goals of care, counseling/discussion- (present on admission)  Assessment & Plan  Family continue wanting full code, daughter wants to give more time to see if patient able to recover,  PT OT recommended long-term care placement patient is close to baseline requiring max assistance for ADLs.    Pressure injury of sacrum, coccyx, stage 3 ([facility])- (present on admission)  Assessment & Plan  - Wound care  - Continue pressure offloading strategies  Changes in position every 2 hours    Dysphagia as late effect of cerebrovascular accident (CVA)- (present on admission)  Assessment & Plan  PEG tube in place    Gout- (present on admission)  Assessment & Plan  On allopurinol via PEG tube       VTE  prophylaxis: SCDs    Case and plan of care discussed with nurse staff  Case and plan of care discussed during multidisciplinary rounds

## 2020-03-05 NOTE — PROGRESS NOTES
2 RN skin check completed with MIC Ingram.   Devices in place: Trache, PICC, HD catheter, peg tube, hernandez catheter, rectal tube, SCDs, heel float boots, mepilex, foam dressings for protection.  Skin assessed under devices: Yes.  Confirmed pressure ulcers found on coccyx.  New potential pressure ulcers noted on: N/A. Wound team following.     The following interventions in place: 2 RN skin check q shift, q2 turns, heel float boots, NUBIA mattress, mepilex, foam dressing, rectal tube, hernandez catheter.        Redness under trache collar, blanching. Dressing in place, CDI.  Bruising, redness on BUE.  Edema on right arm.  Redness on PEG tube site.  Redness on groin area, blanching.  Dressing with dakins solution on coccyx area changed, CDI.  Redness on on sacral area, blanching.  Discoloration noted on right lower leg.  Heels boggy, pink.

## 2020-03-06 NOTE — PROGRESS NOTES
Hospital Medicine Daily Progress Note    Date of Service  3/6/2020    Chief Complaint  74 y.o. female admitted 1/16/2020 with altered mental status    Hospital Course    Ms. Beltran is a 74 y.o. female admitted to [facility] on 1/14/20 with seizures.She was at a SNF.She had been previously hospitalized at [facility] and diaysis was initiated.  She was doing very poorly then and palliative care was discussed with the family,but they declined.She had very poor functional status and required Jimena lift to get into the dialysis chair.She was found to have  a CVA on MRI at [facility].Her seizures were not controlled and it was felt she was in status epilepticus.  She was getting HD at [facility].Last HD was on 1/13/20.She was seen by Dr Carcamo at [facility].Creatinine was 1.8 and dialysis was held. Neurology [place] that the patient needed continuous EEG monitoring and he was transferred to [facility]. Patient intubated due to seizures and was eventually transitioned to tracheostomy on 2/2/2020 and off the vent on 2/5/2020. Family at this time does not want to change CODE status. Patient remains unresponsive. LTAC being pursued. PEG tube in place.         Interval Problem Update  Patient in bed, does not look in distress or in pain, no fever or chills.   HD today.   No new events overnight.       Consultants/Specialty  Palliative care  Critical care - signed off  Neurology - signed off  Nephrology  ID     Code Status  FULL    Disposition  TBD probably need long-term care placement      Review of Systems  Review of Systems   Unable to perform ROS: Medical condition       Physical Exam  Temp:  [36.3 °C (97.3 °F)-36.8 °C (98.3 °F)] 36.8 °C (98.3 °F)  Pulse:  [72-88] 76  Resp:  [16-18] 16  BP: (108-114)/(41-52) 110/43  SpO2:  [94 %-99 %] 94 %    Physical Exam  Vitals signs reviewed.   Constitutional:       General: She is not in acute distress.     Appearance: She is not ill-appearing.   HENT:      Head: Normocephalic.      Nose: No  congestion or rhinorrhea.   Eyes:      General:         Right eye: No discharge.         Left eye: No discharge.      Conjunctiva/sclera: Conjunctivae normal.      Pupils: Pupils are equal, round, and reactive to light.   Neck:      Musculoskeletal: Normal range of motion.      Comments: Trach in place  Cardiovascular:      Rate and Rhythm: Normal rate and regular rhythm.      Pulses: Normal pulses.      Comments: Tunneled HD catheter  Pulmonary:      Effort: Pulmonary effort is normal.      Breath sounds: Rales present.   Abdominal:      General: Bowel sounds are normal. There is no distension.      Palpations: Abdomen is soft.      Tenderness: There is no guarding.      Comments: PEG tube in place.   Genitourinary:     Comments: River and rectal tube in place  Skin:     General: Skin is warm.      Comments: Swelling of right hand; slightly improved   Neurological:      Mental Status: She is alert.      Motor: Weakness (diffuse) present.   Psychiatric:         Attention and Perception: She is inattentive.         Behavior: Behavior is not agitated.     Patient seen and examined today on 3/2, unchanged from 3/1.     Fluids    Intake/Output Summary (Last 24 hours) at 3/6/2020 1153  Last data filed at 3/6/2020 0900  Gross per 24 hour   Intake 1200 ml   Output 120 ml   Net 1080 ml       Laboratory  Recent Labs     03/04/20  0414 03/05/20  0315 03/06/20  0235   WBC 17.2* 16.1* 15.9*   RBC 2.71* 2.58* 2.57*   HEMOGLOBIN 8.6* 8.4* 8.1*   HEMATOCRIT 27.5* 25.8* 25.8*   .5* 100.0* 100.4*   MCH 31.7 32.6 31.5   MCHC 31.3* 32.6* 31.4*   RDW 59.5* 59.3* 58.7*   PLATELETCT 210 199 193   MPV 9.6 9.2 9.1     Recent Labs     03/04/20  0414   SODIUM 134*   POTASSIUM 4.1   CHLORIDE 97   CO2 28   GLUCOSE 211*   BUN 67*   CREATININE 1.39   CALCIUM 8.6                   Imaging  DX-CHEST-PORTABLE (1 VIEW)   Final Result      1.  Unchanged perihilar and LEFT greater than RIGHT basilar opacities compatible with atelectasis.  Superimposed airspace disease is possible particularly in the LEFT lower lobe.   2.  Persistently enlarged cardiac silhouette      DX-CHEST-LIMITED (1 VIEW)   Final Result         No significant interval change.      DX-CHEST-PORTABLE (1 VIEW)   Final Result      1.  Apparent improvement of LEFT pleural effusion and basilar consolidation.   2.  No pneumothorax.   3.  Supportive tubing as described above.      US-THORACENTESIS PUNCTURE LEFT   Final Result      1. Ultrasound guided left sided diagnostic and therapeutic thoracentesis.      2. 400 mL of fluid withdrawn.      CT-CHEST (THORAX) W/O   Final Result      1.  Bilateral atelectasis/consolidation, left greater than right.      2.  Moderate left and small right pleural effusion.      3.  Atherosclerotic vascular calcification.      4.  Ascites within the upper abdomen.            US-ABDOMEN LTD (SOFT TISSUE)   Final Result         1. Trace free fluid in the pelvis. No abdominal ascites.      DX-ABDOMEN FOR TUBE PLACEMENT   Final Result         Feeding tube with tip projecting over the expected area of the stomach.      [id number] VIEW   Final Result      Feeding tube tip overlies the gastric antrum.      DX-CHEST-PORTABLE (1 VIEW)   Final Result         1.  Pulmonary edema and/or infiltrates are identified, which are stable since the prior exam.   2.  Cardiomegaly   3.  Atherosclerosis      DX-CHEST-PORTABLE (1 VIEW)   Final Result         1.  Pulmonary edema and/or infiltrates are identified, which are stable since the prior exam.   2.  Cardiomegaly   3.  Atherosclerosis      DX-CHEST-PORTABLE (1 VIEW)   Final Result      1.  Unchanged left lower lobe atelectasis or pneumonia.      2.  Clear right lung.      DX-CHEST-PORTABLE (1 VIEW)   Final Result      Unchanged LEFT basilar atelectasis and/or airspace disease      DX-CHEST-PORTABLE (1 VIEW)   Final Result      Left basilar atelectasis, hilar and cardiac silhouette enlargement as before       DX-CHEST-PORTABLE (1 VIEW)   Final Result      Interval removal of a left subclavian central line. Stable patchy bilateral infiltrates.      IR-PICC LINE PLACEMENT W/ GUIDANCE > AGE 5   Final Result                  Ultrasound-guided PICC placement performed by qualified nursing staff as    above.          DX-CHEST-PORTABLE (1 VIEW)   Final Result      1.  Multiple support devices present.      2.  Patchy bilateral atelectasis.      DX-CHEST-PORTABLE (1 VIEW)   Final Result      Stable chest with retrocardiac opacity from atelectasis and/or pleural fluid favored over consolidation      DX-CHEST-PORTABLE (1 VIEW)   Final Result      Stable chest with retrocardiac opacity from atelectasis and/or pleural fluid favored over consolidation      DX-CHEST-PORTABLE (1 VIEW)   Final Result         No significant change from prior.               DX-CHEST-PORTABLE (1 VIEW)   Final Result         1. Stable lines and tubes..   2. Stable retrocardiac and left perihilar atelectasis versus consolidation. Suspected small left pleural effusion.            DX-CHEST-PORTABLE (1 VIEW)   Final Result         1. Stable lines and tubes..   2. Stable retrocardiac atelectasis versus consolidation with increased left perihilar opacity. Suspected trace left pleural effusion.         [id number] VIEW   Final Result      1.  Enteric tube has been placed and the tip projects over the stomach.      2.  Pre-existing feeding tube tip projects at the gastroduodenal junction      DX-CHEST-PORTABLE (1 VIEW)   Final Result         1. Lines and tubes as above.   2. Stable retrocardiac atelectasis versus consolidation. Suspected trace left pleural effusion.         MR-BRAIN-WITH & W/O   Final Result      1.  Moderate cerebral atrophy.   2.  Evidence of intraventricular hemorrhage, indeterminate age. Possibly chronic intraventricular hemosiderin deposition.   3.  Extensive encephalomalacic change with hemosiderin deposition involving the right corona  radiata, basal ganglia, posterior thalamus, subthalamic region, bordering the right cerebral peduncle. Associated ex vacuo dilatation of the body of the right    lateral ventricle. No change.   4.  Additional foci of old microhemorrhage most consistent with old hypertensive microhemorrhage or amyloid angiopathy in the left basal ganglia, left thalamus, and midline upper ventral abel.   5.  Punctate focus of acute infarction in the right parietal deep white matter. No change.   6.  Advanced supratentorial white matter disease most consistent with microvascular ischemic change.   7.  Encephalomalacic changes in the abel and right cerebral peduncle consistent with old infarction.   8.  Partially empty sella.   9.  Overall, no new findings and no significant change from 1/14/2020.      DX-CHEST-PORTABLE (1 VIEW)   Final Result         1. Stable lines and tubes.   2. Unchanged retrocardiac atelectasis versus consolidation.   3. Stable trace left pleural effusion.         OUTSIDE IMAGES-DX CHEST   Final Result      OUTSIDE IMAGES-US VASCULAR   Final Result      OUTSIDE IMAGES-MR BRAIN   Final Result      OUTSIDE IMAGES-DX CHEST   Final Result      OUTSIDE IMAGES-CT HEAD   Final Result      EC-ECHOCARDIOGRAM COMPLETE W/O CONT   Final Result      DX-CHEST-FOR LINE PLACEMENT Perform procedure in: Patient's Room   Final Result         1.  Retrocardiac opacity concerning for infiltrate, stable.   2.  Trace left pleural effusion, stable   3.  Cardiomegaly   4.  Atherosclerosis   5.  Perihilar interstitial prominence and bronchial wall cuffing, appearance suggests changes of underlying bronchial inflammation, consider bronchitis.      DX-ABDOMEN FOR TUBE PLACEMENT   Final Result         1.  Nonspecific bowel gas pattern.   2.  Dobbhoff tube tip overlying the expected location of the pylorus or first duodenal segment.   3.  Left lung base atelectasis and/or small effusion      DX-CHEST-PORTABLE (1 VIEW)   Final Result         1.   "Retrocardiac opacity concerning for infiltrate.   2.  Trace left pleural effusion, stable   3.  Cardiomegaly   4.  Atherosclerosis      [id number] FILM X-RAY   Final Result      OUTSIDE IMAGES-DX CHEST   Final Result           Assessment/Plan  * Status epilepticus (HCC)- (present on admission)  Assessment & Plan  On admission, she was intubated for airway protection, now trach since 2/2/20. No evidence of seizure activity. Neurology gave the opinion that likelihood of patient returning to baseline was low, and that the likelihood of the patient returning to full independent function was essentially 0. Very poor prognosis, comfort care recommended.  - neurology evaluated, appreciate recs  - continuing regimen of vimpat, topamax, depakote  - repeat EEG showed no seizures  Stable.  Continue antiseizure medications, seizure precautions.  No more seizures    TB lung, latent- (present on admission)  Assessment & Plan  Quantiferon + and will have to rule out active TB. Chest CT found positive left-sided consolidation with pleural effusion.  - pending sputum cx/ AFB stain to rule out active disease before initiating  - s/p thora on 2/25; f/u pathology   - ID following, appreciate recs  - AFB stain negative x3; remove isolation  - Per ID, we will \"treat for LTBI with  mg PO daily and B6 25 mg PO daily for 9 months if AFB cxs all negative at 6 weeks\"  Completed antibiotics treatment.    Acute respiratory failure with hypoxia (HCC)- (present on admission)  Assessment & Plan   on trach/T-piece, down size to 6 cuffed shiley by pulmonology      Hypokalemia  Assessment & Plan  Resolved, continue monitoring    Severe protein-calorie malnutrition (HCC)- (present on admission)  Assessment & Plan  - Continue nutrition via tube feeding.  Dietitian consulted     Cerebrovascular accident (CVA) due to embolism of right middle cerebral artery (HCC)- (present on admission)  Assessment & Plan  Very poor prognosis.  Continue " aspirin and statin, PT OT evaluation    End stage renal failure on dialysis (HCC)- (present on admission)  Assessment & Plan  Poor prognosis per nephrology, recommending comfort care.   Continue hemodialysis Monday Wednesday Friday    Normocytic anemia- (present on admission)  Assessment & Plan  Likely anemia of chronic kidney disease.  FOBT is negative.   - transfuse if drops below 7  Hemoglobin stable    Hypertension- (present on admission)  Assessment & Plan  Improved, continue Coreg, hydralazine, stopped lisinopril due to low blood pressure  Continue monitoring  Stable .    Type 2 diabetes mellitus, with long-term current use of insulin ([facility])- (present on admission)  Assessment & Plan  A1C 7.5%, blood sugars have been ~170. Was taking insulin at home.   Continue sliding scale insulin.  Continue tube feeding.   .    Leukocytosis  Assessment & Plan  No other signs of infection, probably reactive, leukocytosis trending down, chest x-ray is unchanged, procalcitonin is slightly elevated but patient is end-stage renal disease patient has no fever, UA positive for leukocyte esterase but no other signs of infection.   Leukocytosis is trending down.    Goals of care, counseling/discussion- (present on admission)  Assessment & Plan  Family continue wanting full code, daughter wants to give more time to see if patient able to recover,  PT OT recommended long-term care placement patient is close to baseline requiring max assistance for ADLs.  Discussed with SW today. Patient requires LTC placement.     Pressure injury of sacrum, coccyx, stage 3 ([facility])- (present on admission)  Assessment & Plan  - Wound care  - Continue pressure offloading strategies  Changes in position every 2 hours  Will add vit C and MTV   Discussed with wound care on 3/5/2020.     Dysphagia as late effect of cerebrovascular accident (CVA)- (present on admission)  Assessment & Plan  PEG tube in place    Gout- (present on admission)  Assessment & Plan  On  allopurinol via PEG tube       VTE prophylaxis: SCDs    Case and plan of care discussed with nurse staff  Case and plan of care discussed during multidisciplinary rounds  Poor prognosis.

## 2020-03-06 NOTE — PROGRESS NOTES
Assumed care of pt at shift change.  Pt is nonverbal. Vital is stable. Dressing of trach changed. Flushed Peg tube 30 ml Q4. Flushed rectal tube 120ml. Perineal care provided.  Nutri source fiber given per order.       All needs met at this time. Bed in lowest position, treaded socks on, personal belongings and call light within reach, instructed to call for any assistance, hourly rounding in place.

## 2020-03-06 NOTE — PROGRESS NOTES
2 RN skin check completed with MIC Ingram.   Devices in place: Trache, PICC, HD catheter, peg tube, hernandez catheter, SCDs, heel float boots, mepilex, foam dressings for protection.  Skin assessed under devices: Yes.  Confirmed pressure ulcers found on coccyx.  New potential pressure ulcers noted on: N/A. Wound team following.     The following interventions in place: 2 RN skin check q shift, q2 turns, heel float boots, NUBIA mattress, mepilex, foam dressing, hernandez catheter.        Redness under trache collar. Dressing in place, CDI.  Bruising on BUE.  Edema on right arm.  Redness on PEG tube site.  Redness on groin area, blanching.  Dressing on coccyx area, CDI.  Discoloration noted on right lower leg.  Heels boggy, pink.

## 2020-03-06 NOTE — WOUND TEAM
[facility]  Inpatient Services  Wound and Skin Care Progress Note    Admission Date:  1/16/2020   HPI, PMH, SH: Reviewed  Unit where seen by Wound Team: [id number]    WOUND TEAM FOLLOW UP: Sacral ulcer    Self Report / Pain Level:no s/s of distress      OBJECTIVE: River & BMS in place. On NUBIA. Heel float boots. Hydrocolloid to coccyx. Wedge in place for repositioning.    WOUND TYPE, LOCATION, CHARACTERISTICS (Pressure ulcers: location, stage, POA or date identified)    Skin Risk/Reinier   Sensory Perception: Very Limited  Moisture: Occasionally Moist  Activity: Bedfast  Mobility: Completely Immobile  Nutrition: Adequate  Friction and Shear: Problem  Total Score: 11  Skin Breakdown Risk: 10-12 High Risk for Skin Breakdown    Sensory Interventions  Bed Types: Low Air Loss Mattress  Skin Preventative Measures: Pillows in Use for Support / Positioning  Skin Preventative Dressing: Mepilex  Moisturizers: Barrier Paste  PT / OT Involved in Care: Physical Therapy Involved, Occupational Therapy Involved  Activity : Bed  Patient Turns / Repositioning: Supine  Patient is Receiving Nutrition: Tube Feeding Nutrition  Nutrition Consult Ordered: No, Consult has Already been Placed  Vitamin Therapy in Use: Yes  Friction Interventions: Draw Sheet / Pad Used for Repositioning       Pressure Injury 01/16/20 Sacrum;Coccyx stage 3 POA  (Active)   Wound Image       2/27/2020 12:00 PM   Pressure Injury Stage 4 3/5/2020  6:00 PM   State of Healing Non-healing 3/5/2020  6:00 PM   Site Assessment LOPEZ;Other (Comment) 3/5/2020 10:30 PM   Periwound Assessment Red;Purple 3/5/2020  6:00 PM   Margins Unattached edges 3/5/2020  6:00 PM   Wound Length (cm) 1.5 cm 3/5/2020  6:00 PM   Wound Width (cm) 8.5 cm 3/5/2020  6:00 PM   Wound Depth (cm) 0.5 cm 2/27/2020 12:00 PM   Wound Surface Area (cm^2) 12.75 cm^2 3/5/2020  6:00 PM   Wound Volume (cm^3) 3 cm^3 2/27/2020 12:00 PM   Tunneling (cm) 3.5 cm 3/5/2020  6:00 PM   Undermining (cm) 0 cm  2/27/2020 12:00 PM   Closure Secondary intention 3/5/2020  6:00 PM   Drainage Amount Scant 3/5/2020  6:00 PM   Drainage Description Serosanguineous 3/5/2020  6:00 PM   Non-staged Wound Description Partial thickness 3/5/2020  6:00 PM   Treatments Cleansed 3/4/2020  4:00 AM   Wound Cleansing Dakin's Solution 3/5/2020  9:29 AM   Periwound Protectant Skin Protectant Wipes to Periwound 3/5/2020  6:00 PM   Dressing Options Dry Roll Gauze 3/5/2020  6:00 PM   Dressing Cleansing/Solutions 1/4 Strength Dakin's Solution 3/5/2020  6:00 PM   Dressing Changed Observed 3/5/2020 10:30 PM   Dressing Status Clean;Dry;Intact 3/5/2020 10:30 PM   Dressing Change/Treatment Frequency Every 48 hrs, and As Needed 3/5/2020 10:30 PM   NEXT Dressing Change/Treatment Date 03/07/20 3/5/2020 10:30 PM   NEXT Weekly Photo (Inpatient Only) 03/11/20 3/5/2020 10:30 PM   Wound Odor Foul 3/5/2020  6:00 PM   Exposed Structures Bowel;Tendon 3/5/2020  6:00 PM   WOUND NURSE ONLY - Tissue Type and Percentage 60 3/5/2020  6:00 PM   WOUND NURSE ONLY - Time Spent with Patient (mins) 30 2/19/2020 11:00 AM            Lab Values:    Lab Results   Component Value Date/Time    WBC 15.9 (H) 03/06/2020 02:35 AM    RBC 2.57 (L) 03/06/2020 02:35 AM    HEMOGLOBIN 8.1 (L) 03/06/2020 02:35 AM    HEMATOCRIT 25.8 (L) 03/06/2020 02:35 AM            Lab Results   Component Value Date/Time    HBA1C 7.5 (H) 02/07/2019 01:20 PM         INTERVENTIONS BY WOUND TEAM: Pt turned to Right side. Wound cleansed with cleanser and gauze. Sharp debrided < 20 cm 2 with scissors and tweezers.   Periwound protected with skin prep.  Dakins-moistened roll gauze applied to sacrococcygeal ulcer and secured with mepilex. BMS in place.         Interdisciplinary consultation:  Pt's daughter, Osbaldo Osbaldo RN, Dr Apppntes-Bond (continue with conservative tx, sx risky given fragile medical condition)     EVALUATION:  Wound is evolving, expect continued tissue die off and odor. Will benefit from a few more  days of dakins then consider VAC vera rocco.  ongoing dakin's treatment and BMS to maintain clean wound bed.    Goals:  Slow steady decrease in wound area and depth weekly    NURSING PLAN OF CARE:    Dressing changes: Continue previous Dressing Maintenance orders:   x     See new Dressing Maintenance orders:       Skin care: See Skin Care orders:        Rectal tube care: See Rectal Tube Care orders:    x  Other orders:           WOUND TEAM PLAN OF CARE (X):   NPWT change 3 x week:        Dressing changes:     Nursing to perform dressing care, WT to follow weekly.  Follow up as needed:    Other:    Anticipated discharge plans (X):  SNF:           Home Care:           Outpatient Wound Center:            Self Care:            Other: will need wound care  - Awaiting LTACH placement

## 2020-03-06 NOTE — PROGRESS NOTES
Report received from day shift nurse. Assumed care @ 1900.      Patient on bed resting. Unable to assess mentation, patient non verbal. Assessment partially completed. No signs of pain and discomfort noted at this time. On O2 at 4LPM via T-piece,  in place and functioning, O2 sat at 98%. Permacath in place, PICC in place, flushed, blood return noted. PEG tube in place, on tube feeding of Impact Peptide 1.5 at 40 mL/hr tolerating well, HOB maintained at 30 degrees. River catheter in place, draining to gravity, rectal tube in place, flushed as ordered. Patient educated regarding plan of care. On NUBIA mattress, all 4 siderails up.     Bed locked, in lowest position, treaded socks on. Needs attended. No further needs at this time. Communication board updated. Call light and personal belongings within reach.

## 2020-03-06 NOTE — PROGRESS NOTES
[facility] Daily Progress Note    Date of Service  3/6/2020    Chief Complaint  Follow up ESRD    Interval Problem Update  74 y.o. female admitted to [facility] on 1/14/20 with seizures.She was at a SNF.She had been previously hospitalized at [facility] and diaysis was initiated.  She was doing very poorly then and palliative care was discussed with the family,but they declined.She had very poor functional status and required Jimena lift to get into the dialysis chair.She was found to have  a CVA on MRI at [facility].Her seizures were not controlled and it was felt she was in status epilepticus.  She was getting HD at [facility].Last HD was on 1/13/20.She was seen by Dr Carcamo at [facility].Creatinine was 1.8 and dialysis was held. Neurology [place] that the patient needed continuous EEG monitoring and he was transferred to [facility]     DAILY NEPHROLOGY SUMMARY:  Please see note from 1/31 for prior summaries  2/01 - NAEO, another EEG being done this AM  2/02 - NAEO, scheduled for Trach today  2/03 - No events, underwent trach yesterday, no change in neuro status, BP stable overnight but low this am, given midodrine, to have HD today  2/04 - No events, tolerated HD yest with 2.1L UF, BP stable this am  2/05 - No events, BP low prior to starting HD, given albumin at the start of HD and BP stable at this time  2/06 - No events, tolerated HD yesterday with 2.5L UF, BP stable and high at times, still with dependent edema  2/07 - No events, tolerated PUF yesterday with 2.5L UF and did not require midodrine with treatment, BP stable today, still with dependent edema, to have HD today  2/08 - No overnight events or changes.  On TF at goal via NGT.  No neurologic change.  On vent/trach.   2/09 - No significant change.  No events.  Liquid stool from rectal tube.  On Vent/Trach  2/10-  No new overnight renal events. +trach.  2/11- S/p HD yesterday with net UF of 2 L.   2/12- No new overnight renal events. HD today.  2/13  - HD yesterday with 1.7L net UF, plan for PEG placement Saturday, LTACH placement pending, some hypotension   2/14 - On HD.  2/15 - Non communicative, HD yesterday 700cc UF  2/17 - Seen on HD today. 's. No new events.   2/19- Non verbal, nothing reported  2/21- Stage 3 decubitus ulcer  2/24 - Pt in TB rule out isolation, ID on board, for HD today   2/26 - No events, seen on dialysis, BP stable, still in isolation  2/28 - No events, no change in mental status, still in isolation, BP variable, T-piece in place  2/29 - Had HD yesterday with 2 L UF. VSS. Patient awake and alert, but does not attempt to communicate. Still with trach / T-piece.  3/02 - No new events.No interaction.For HD today.  3/04 - No new developments.For HD today.No interaction.  3/06 - No new developments.Seen on dialysis.    Review of Systems  ROS   Unable to perform ROS due to medical condition     Physical Exam  Temp:  [36.3 °C (97.3 °F)-36.8 °C (98.3 °F)] 36.8 °C (98.3 °F)  Pulse:  [72-88] 76  Resp:  [16-18] 16  BP: (108-114)/(41-52) 110/43  SpO2:  [94 %-99 %] 94 %    Physical Exam  Vitals signs and nursing note reviewed.   Constitutional:       General: She is not in acute distress.     Appearance: She is chronically ill-appearing.   HENT:      Head: Normocephalic and atraumatic.      Right Ear: External ear normal.      Left Ear: External ear normal.      Nose: Nose normal. No congestion.      Mouth/Throat:      Mouth: Mucous membranes are moist.      Pharynx: No oropharyngeal exudate.   Eyes:      General: No scleral icterus.     Conjunctiva/sclera: Conjunctivae normal.   Neck:      Musculoskeletal: No muscular tenderness.      Vascular: No carotid bruit.      Comments: +Trach.T piece.  Cardiovascular:      Rate and Rhythm: Normal rate and regular rhythm.      Heart sounds: Normal heart sounds. No murmur.   Pulmonary:      Effort: Pulmonary effort is normal. No respiratory distress.      Breath sounds: Normal breath sounds.       Comments: T-piece, supp O2   Chest:      Chest wall: No tenderness.   Abdominal:      General: Bowel sounds are normal. There is no distension.      Palpations: Abdomen is soft.      Tenderness: There is no abdominal tenderness.      Comments: Enteral nutrition via PEG, +Rectal tube  Genitourinary:     Comments: BMS  Musculoskeletal:         General: No tenderness or signs of injury.      Comments: +dependent edema   Lymphadenopathy:      Cervical: No cervical adenopathy.   Skin:     General: Skin is warm and dry.      Findings: No erythema or rash.      Comments: Heel float boots   Stage 3 decubitus sacral not visualized   Neurological:      Comments: Awake and alert, LUE contracted, does not attempt to communicate  Psychiatric:      Comments: Unable to assess   +River       Fluids    Intake/Output Summary (Last 24 hours) at 3/6/2020 0913  Last data filed at 3/6/2020 0000  Gross per 24 hour   Intake 1200 ml   Output 120 ml   Net 1080 ml       Laboratory  Recent Labs     03/04/20  0414 03/05/20  0315 03/06/20  0235   WBC 17.2* 16.1* 15.9*   RBC 2.71* 2.58* 2.57*   HEMOGLOBIN 8.6* 8.4* 8.1*   HEMATOCRIT 27.5* 25.8* 25.8*   .5* 100.0* 100.4*   MCH 31.7 32.6 31.5   MCHC 31.3* 32.6* 31.4*   RDW 59.5* 59.3* 58.7*   PLATELETCT 210 199 193   MPV 9.6 9.2 9.1     Recent Labs     03/04/20  0414   SODIUM 134*   POTASSIUM 4.1   CHLORIDE 97   CO2 28   GLUCOSE 211*   BUN 67*   CREATININE 1.39   CALCIUM 8.6                           IMPRESSION:  # ESRD              Maintenance dialysis qMWF as OP at [facility], but has been discharged due to >30 days and will need a  new HD chair once discharged.              mCrCl 6 by 24 hour urine - repeat study 2/13 mCrCl 4              Dialysis Dependent                # Status epilepticus  # S/P acute lacunar infarct              Hx of previous CVA with significant deficits.  # HTN--BP variable often with intradialytic hypotension  # DM II   Management per primary svc  # Acute Hypoxic  Respiratory Failure              +Trach and T-piece  # Hx of dysphagia  # Anemia of CKD.Ferritin previously significantly elevated. CAT with HD.  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   PTH, Vit D, Phos pending result  # CAD  # DLD  # Gout,on Allopurinol  # Hx of PEG tube  # Hx of RALF  # Hx of UTI  # COPD  # Edema/Anasarca--improved  # Hypophosphatemia, improved   # Hyponatremia, improved   # Prognosis poor              Family expectations and goals for patient are not in line with prognosis.  # Stage 3 decubitus ulcer  # TB rule out, + TB quant and abnormal CXR/CT, ID on board and managing  # Leukocytosis      PLAN:               Maintenance iHD qMWF and PRN.              UF w/ HD as tolerated.              ID following              Continue Epogen with HD              Seizure management per Neurology              Enteral feedings              No dietary protein restrictions              Follow labs              Continue GOC discussions with family              Very poor prognosis, recommend comfort care, if and when family agreeable              Will see on MWF. Please call on other days w/ questions or concerns

## 2020-03-06 NOTE — DISCHARGE PLANNING
Medical Social Work  PC to patient's daughter,  [phone] [place], will be in to see her mom in about an hour

## 2020-03-06 NOTE — CARE PLAN
Problem: Oxygenation:  Goal: Maintain adequate oxygenation dependent on patient condition  Note: 4L 28% T-piece     Problem: Bronchopulmonary Hygiene:  Goal: Increase mobilization of retained secretions  Note: Suction as tolerated

## 2020-03-06 NOTE — CARE PLAN
Problem: Oxygenation:  Goal: Maintain adequate oxygenation dependent on patient condition  Outcome: PROGRESSING AS EXPECTED     Problem: Bronchopulmonary Hygiene:  Goal: Increase mobilization of retained secretions  Outcome: PROGRESSING AS EXPECTED   Q4 aerosol check   4LPM, 28%

## 2020-03-06 NOTE — PROGRESS NOTES
2 RN skin check completed with MIC Jay.   Devices in place: Trache, PICC, HD catheter, peg tube, hernandez catheter, SCDs, heel float boots, mepilex, foam dressings for protection.  Skin assessed under devices: Yes.  Confirmed pressure ulcers found on coccyx.  New potential pressure ulcers noted on: N/A. Wound team following.     The following interventions in place: 2 RN skin check q shift, q2 turns, heel float boots, NUBIA mattress, mepilex, foam dressing, hernandez catheter.        Redness under trache collar. Dressing in place, CDI.  Bruising on arms and legs.  Edema on right arm.  Redness on PEG tube site.  Redness on groin area, blanching.  Dressing on coccyx area, CDI.  Discoloration noted on right lower leg.  Heels boggy, pink.

## 2020-03-06 NOTE — PROGRESS NOTES
Hemodialysis ordered by Dr. Estevez.  Treatment started at 0856 and ended at 1156. Pt stable, vss, no c/o post tx. See flow sheets for details. Net UF 1.5 L. Reported to EFRAIN Gtz RN.

## 2020-03-06 NOTE — PROGRESS NOTES
2 RN skin check complete with MIC Goldman  Devices in place trach,rectal tube,Peg tube,hernandez  Skin assessed under devices yes  Confirmed pressure ulcers found on coccyx.  New potential pressure ulcers noted on N/A. Wound consult placed N/A  The following interventions in place:every 2 hours turn,mepilex in placed,low air loss bed,float heel boots.     Redness under trach collar, collar loosened slightly   and around peg tube site, kept tube dry and clean and not under pt.   groin red and blanching, barrier cream applied.  open area to coccyx with dressing in placed. Dressing changed.  Elbows pink and blanching. Mepilex applied.  Heel red, dry and blanching. Mepilex applied. Heel float boots are in place.

## 2020-03-06 NOTE — CARE PLAN
Problem: Safety  Goal: Will remain free from falls  Outcome: PROGRESSING AS EXPECTED  Intervention: Implement fall precautions  Note:  Bed in lowest position, treaded socks on, personal belongings and call light within reach, hourly rounding in place.      Problem: Skin Integrity  Goal: Risk for impaired skin integrity will decrease  Outcome: PROGRESSING AS EXPECTED  Intervention: Implement precautions to protect skin integrity in collaboration with the interdisciplinary team  Note: every 2 hours turn,mepilex in placed,low air loss bed,float heel boots.     Problem: Skin Integrity  Goal: Risk for impaired skin integrity will decrease  Outcome: PROGRESSING AS EXPECTED  Intervention: Implement precautions to protect skin integrity in collaboration with the interdisciplinary team  Note: every 2 hours turn,mepilex in placed,low air loss bed,float heel boots.

## 2020-03-07 NOTE — RESPIRATORY CARE
Manual capping trial initiated per Dr Lafleur's request.   Pt rapidly started to have increased work of breathing, along with an increase in RR and HR. Cap trial stopped at approx 20 seconds and pt returned to t piece at 4L 28%

## 2020-03-07 NOTE — PROGRESS NOTES
Pt resting in bed throughout shift. Pt resting comfortably. Family updated on plan of care and medications. Family verbalized understanding. Inner cannula changed. PEG tube dressing changes. Tube feed running at 40ml/hr at goal. Rectal tube in place. River in place and draining. Fall precautions in place. Hourly rounding completed. Will continue to monitor.

## 2020-03-07 NOTE — PROGRESS NOTES
Hospital Medicine Daily Progress Note    Date of Service  3/7/2020    Chief Complaint  74 y.o. female admitted 1/16/2020 with altered mental status    Hospital Course    Ms. Beltran is a 74 y.o. female admitted to [facility] on 1/14/20 with seizures.She was at a SNF.She had been previously hospitalized at [facility] and diaysis was initiated.  She was doing very poorly then and palliative care was discussed with the family,but they declined.She had very poor functional status and required Jimena lift to get into the dialysis chair.She was found to have  a CVA on MRI at [facility].Her seizures were not controlled and it was felt she was in status epilepticus.  She was getting HD at [facility] ([place], [place]).Last HD was on 1/13/20.She was seen by Dr Carcamo at [facility].Creatinine was 1.8 and dialysis was held. Neurology Karthaus that the patient needed continuous EEG monitoring and he was [place] [facility] [place] Patient intubated due to seizures and was eventually transitioned to tracheostomy on 2/2/2020 and off the vent on 2/5/2020. Family at this time does not want to change CODE status. Patient remains unresponsive. LTAC being pursued. PEG tube in place.         Interval Problem Update  Patient is resting in bed, she has her eyes open, does not look in distress or in pain, tolerating tube feedings, does not follow commands.  No new events.      Consultants/Specialty  Palliative care  Critical care - signed off  Neurology - signed off  Nephrology  ID     Code Status  FULL    Disposition  TBD probably need long-term care placement      Review of Systems  Review of Systems   Unable to perform ROS: Medical condition       Physical Exam  Temp:  [36.6 °C (97.9 °F)-37.2 °C (99 °F)] 36.6 °C (97.9 °F)  Pulse:  [68-88] 80  Resp:  [16-22] 18  BP: (104-138)/(44-64) 112/51  SpO2:  [95 %-100 %] 97 %    Physical Exam  Vitals signs reviewed.   Constitutional:       General: She is not in acute distress.     Appearance: She is not ill-appearing.   HENT:       Head: Normocephalic.      Nose: Nose normal. No rhinorrhea.   Eyes:      General: No scleral icterus.     Conjunctiva/sclera: Conjunctivae normal.      Pupils: Pupils are equal, round, and reactive to light.   Neck:      Musculoskeletal: Normal range of motion.      Comments: Trach in place  Cardiovascular:      Rate and Rhythm: Normal rate and regular rhythm.      Pulses: Normal pulses.      Comments: Tunneled HD catheter  Pulmonary:      Effort: Pulmonary effort is normal. No respiratory distress.   Abdominal:      General: Bowel sounds are normal. There is no distension.      Palpations: Abdomen is soft.      Tenderness: There is no abdominal tenderness.      Comments: PEG tube in place.   Genitourinary:     Comments: River and rectal tube in place  Skin:     General: Skin is warm.      Comments: Swelling of right hand; slightly improved   Neurological:      Mental Status: She is alert.      Motor: Weakness (diffuse) present.   Psychiatric:         Attention and Perception: She is inattentive.         Behavior: Behavior is not agitated.     Patient seen and examined today on 3/2, unchanged from 3/1.     Fluids    Intake/Output Summary (Last 24 hours) at 3/7/2020 1245  Last data filed at 3/7/2020 0325  Gross per 24 hour   Intake 0 ml   Output 280 ml   Net -280 ml       Laboratory  Recent Labs     03/05/20  0315 03/06/20  0235 03/07/20  0450   WBC 16.1* 15.9* 14.9*   RBC 2.58* 2.57* 2.63*   HEMOGLOBIN 8.4* 8.1* 8.4*   HEMATOCRIT 25.8* 25.8* 26.3*   .0* 100.4* 100.0*   MCH 32.6 31.5 31.9   MCHC 32.6* 31.4* 31.9*   RDW 59.3* 58.7* 59.7*   PLATELETCT 199 193 202   MPV 9.2 9.1 9.0     Recent Labs     03/07/20  0450   SODIUM 135   POTASSIUM 3.5*   CHLORIDE 98   CO2 29   GLUCOSE 236*   BUN 48*   CREATININE 1.15   CALCIUM 8.6                   Imaging  DX-CHEST-PORTABLE (1 VIEW)   Final Result      1.  Unchanged perihilar and LEFT greater than RIGHT basilar opacities compatible with atelectasis. Superimposed  airspace disease is possible particularly in the LEFT lower lobe.   2.  Persistently enlarged cardiac silhouette      DX-CHEST-LIMITED (1 VIEW)   Final Result         No significant interval change.      DX-CHEST-PORTABLE (1 VIEW)   Final Result      1.  Apparent improvement of LEFT pleural effusion and basilar consolidation.   2.  No pneumothorax.   3.  Supportive tubing as described above.      US-THORACENTESIS PUNCTURE LEFT   Final Result      1. Ultrasound guided left sided diagnostic and therapeutic thoracentesis.      2. 400 mL of fluid withdrawn.      CT-CHEST (THORAX) W/O   Final Result      1.  Bilateral atelectasis/consolidation, left greater than right.      2.  Moderate left and small right pleural effusion.      3.  Atherosclerotic vascular calcification.      4.  Ascites within the upper abdomen.            US-ABDOMEN LTD (SOFT TISSUE)   Final Result         1. Trace free fluid in the pelvis. No abdominal ascites.      DX-ABDOMEN FOR TUBE PLACEMENT   Final Result         Feeding tube with tip projecting over the expected area of the stomach.      [id number] VIEW   Final Result      Feeding tube tip overlies the gastric antrum.      DX-CHEST-PORTABLE (1 VIEW)   Final Result         1.  Pulmonary edema and/or infiltrates are identified, which are stable since the prior exam.   2.  Cardiomegaly   3.  Atherosclerosis      DX-CHEST-PORTABLE (1 VIEW)   Final Result         1.  Pulmonary edema and/or infiltrates are identified, which are stable since the prior exam.   2.  Cardiomegaly   3.  Atherosclerosis      DX-CHEST-PORTABLE (1 VIEW)   Final Result      1.  Unchanged left lower lobe atelectasis or pneumonia.      2.  Clear right lung.      DX-CHEST-PORTABLE (1 VIEW)   Final Result      Unchanged LEFT basilar atelectasis and/or airspace disease      DX-CHEST-PORTABLE (1 VIEW)   Final Result      Left basilar atelectasis, hilar and cardiac silhouette enlargement as before      DX-CHEST-PORTABLE (1 VIEW)    Final Result      Interval removal of a left subclavian central line. Stable patchy bilateral infiltrates.      IR-PICC LINE PLACEMENT W/ GUIDANCE > AGE 5   Final Result                  Ultrasound-guided PICC placement performed by qualified nursing staff as    above.          DX-CHEST-PORTABLE (1 VIEW)   Final Result      1.  Multiple support devices present.      2.  Patchy bilateral atelectasis.      DX-CHEST-PORTABLE (1 VIEW)   Final Result      Stable chest with retrocardiac opacity from atelectasis and/or pleural fluid favored over consolidation      DX-CHEST-PORTABLE (1 VIEW)   Final Result      Stable chest with retrocardiac opacity from atelectasis and/or pleural fluid favored over consolidation      DX-CHEST-PORTABLE (1 VIEW)   Final Result         No significant change from prior.               DX-CHEST-PORTABLE (1 VIEW)   Final Result         1. Stable lines and tubes..   2. Stable retrocardiac and left perihilar atelectasis versus consolidation. Suspected small left pleural effusion.            DX-CHEST-PORTABLE (1 VIEW)   Final Result         1. Stable lines and tubes..   2. Stable retrocardiac atelectasis versus consolidation with increased left perihilar opacity. Suspected trace left pleural effusion.         [id number] VIEW   Final Result      1.  Enteric tube has been placed and the tip projects over the stomach.      2.  Pre-existing feeding tube tip projects at the gastroduodenal junction      DX-CHEST-PORTABLE (1 VIEW)   Final Result         1. Lines and tubes as above.   2. Stable retrocardiac atelectasis versus consolidation. Suspected trace left pleural effusion.         MR-BRAIN-WITH & W/O   Final Result      1.  Moderate cerebral atrophy.   2.  Evidence of intraventricular hemorrhage, indeterminate age. Possibly chronic intraventricular hemosiderin deposition.   3.  Extensive encephalomalacic change with hemosiderin deposition involving the right corona radiata, basal ganglia,  posterior thalamus, subthalamic region, bordering the right cerebral peduncle. Associated ex vacuo dilatation of the body of the right    lateral ventricle. No change.   4.  Additional foci of old microhemorrhage most consistent with old hypertensive microhemorrhage or amyloid angiopathy in the left basal ganglia, left thalamus, and midline upper ventral abel.   5.  Punctate focus of acute infarction in the right parietal deep white matter. No change.   6.  Advanced supratentorial white matter disease most consistent with microvascular ischemic change.   7.  Encephalomalacic changes in the abel and right cerebral peduncle consistent with old infarction.   8.  Partially empty sella.   9.  Overall, no new findings and no significant change from 1/14/2020.      DX-CHEST-PORTABLE (1 VIEW)   Final Result         1. Stable lines and tubes.   2. Unchanged retrocardiac atelectasis versus consolidation.   3. Stable trace left pleural effusion.         OUTSIDE IMAGES-DX CHEST   Final Result      OUTSIDE IMAGES-US VASCULAR   Final Result      OUTSIDE IMAGES-MR BRAIN   Final Result      OUTSIDE IMAGES-DX CHEST   Final Result      OUTSIDE IMAGES-CT HEAD   Final Result      EC-ECHOCARDIOGRAM COMPLETE W/O CONT   Final Result      DX-CHEST-FOR LINE PLACEMENT Perform procedure in: Patient's Room   Final Result         1.  Retrocardiac opacity concerning for infiltrate, stable.   2.  Trace left pleural effusion, stable   3.  Cardiomegaly   4.  Atherosclerosis   5.  Perihilar interstitial prominence and bronchial wall cuffing, appearance suggests changes of underlying bronchial inflammation, consider bronchitis.      DX-ABDOMEN FOR TUBE PLACEMENT   Final Result         1.  Nonspecific bowel gas pattern.   2.  Dobbhoff tube tip overlying the expected location of the pylorus or first duodenal segment.   3.  Left lung base atelectasis and/or small effusion      DX-CHEST-PORTABLE (1 VIEW)   Final Result         1.  Retrocardiac opacity  "concerning for infiltrate.   2.  Trace left pleural effusion, stable   3.  Cardiomegaly   4.  Atherosclerosis      [id number] FILM X-RAY   Final Result      OUTSIDE IMAGES-DX CHEST   Final Result           Assessment/Plan  * Status epilepticus (HCC)- (present on admission)  Assessment & Plan  On admission, she was intubated for airway protection, now trach since 2/2/20. No evidence of seizure activity. Neurology gave the opinion that likelihood of patient returning to baseline was low, and that the likelihood of the patient returning to full independent function was essentially 0. Very poor prognosis, comfort care recommended.  - neurology evaluated, appreciate recs  - continuing regimen of vimpat, topamax, depakote  - repeat EEG showed no seizures  Stable.  Continue antiseizure medications, seizure precautions.  No more seizures    TB lung, latent- (present on admission)  Assessment & Plan  Quantiferon + and will have to rule out active TB. Chest CT found positive left-sided consolidation with pleural effusion.  - pending sputum cx/ AFB stain to rule out active disease before initiating  - s/p thora on 2/25; f/u pathology   - ID following, appreciate recs  - AFB stain negative x3; remove isolation  - Per ID, we will \"treat for LTBI with  mg PO daily and B6 25 mg PO daily for 9 months if AFB cxs all negative at 6 weeks\"  Completed antibiotics treatment.    Acute respiratory failure with hypoxia (HCC)- (present on admission)  Assessment & Plan   on trach/T-piece, down size to 6 cuffed shiley by pulmonology      Hypokalemia  Assessment & Plan  Patient is an essential disease on hemodialysis, continue monitoring.    Severe protein-calorie malnutrition (HCC)- (present on admission)  Assessment & Plan  Continue nutrition via tube feeding.  Dietitian consulted     Cerebrovascular accident (CVA) due to embolism of right middle cerebral artery (HCC)- (present on admission)  Assessment & Plan  Very poor " prognosis.  Continue aspirin and statin, PT OT evaluation    End stage renal failure on dialysis (HCC)- (present on admission)  Assessment & Plan  Poor prognosis per nephrology, recommending comfort care.   Continue hemodialysis Monday Wednesday Friday    Normocytic anemia- (present on admission)  Assessment & Plan  Likely anemia of chronic kidney disease.  FOBT is negative.   - transfuse if drops below 7  Hemoglobin stable    Hypertension- (present on admission)  Assessment & Plan  Improved, continue Coreg, hydralazine, stopped lisinopril due to low blood pressure  Continue monitoring  Stable .    Type 2 diabetes mellitus, with long-term current use of insulin ([facility])- (present on admission)  Assessment & Plan  A1C 7.5%, blood sugars have been ~170. Was taking insulin at home.   Continue sliding scale insulin.  Continue tube feeding.   .    Leukocytosis  Assessment & Plan  No other signs of infection, probably reactive, leukocytosis trending down, chest x-ray is unchanged, procalcitonin is slightly elevated but patient is end-stage renal disease patient has no fever, UA positive for leukocyte esterase but no other signs of infection.   Leukocytosis is trending down.  No signs of infection    Goals of care, counseling/discussion- (present on admission)  Assessment & Plan  Family continue wanting full code, daughter wants to give more time to see if patient able to recover,  PT OT recommended long-term care placement patient is close to baseline requiring max assistance for ADLs.  Discussed with SW today. Patient requires LTC placement.     Pressure injury of sacrum, coccyx, stage 3 ([facility])- (present on admission)  Assessment & Plan  - Wound care  Needs to continue aggressive pressure offloading strategies  Changes in position every 2 hours  Added Vit C and MTV   Discussed with wound care on 3/5/2020.     Dysphagia as late effect of cerebrovascular accident (CVA)- (present on admission)  Assessment & Plan  PEG tube in  place    Gout- (present on admission)  Assessment & Plan  On allopurinol via PEG tube       VTE prophylaxis: SCDs    Case and plan of care discussed with nurse staff  Case and plan of care discussed during multidisciplinary rounds  Poor prognosis.

## 2020-03-07 NOTE — CARE PLAN
Problem: Safety  Goal: Will remain free from injury  Outcome: PROGRESSING AS EXPECTED    Bed in locked and lowest position. Nonskid socks in place. Will continue to monitor.     Problem: Knowledge Deficit  Goal: Knowledge of disease process/condition, treatment plan, diagnostic tests, and medications will improve  Outcome: PROGRESSING AS EXPECTED     Updated family on plan of care and medications. Family verbalized understanding.

## 2020-03-07 NOTE — PROGRESS NOTES
[facility] Daily Progress Note      Author: ESPERANZA Lackey  Collaborating Physician: Dr Maliha Patterson    Date of Service  3/7/2020    Chief Complaint  Follow up ESRD    Interval Problem Update  74 y.o. female admitted to [facility] on 1/14/20 with seizures.She was at a SNF.She had been previously hospitalized at [facility] and diaysis was initiated.  She was doing very poorly then and palliative care was discussed with the family,but they declined.She had very poor functional status and required Jimena lift to get into the dialysis chair.She was found to have  a CVA on MRI at [facility].Her seizures were not controlled and it was felt she was in status epilepticus.  She was getting HD at [facility].Last HD was on 1/13/20.She was seen by Dr Carcamo at [facility].Creatinine was 1.8 and dialysis was held. Neurology Henlawson that the patient needed continuous EEG monitoring and [place] [facility] [place]     DAILY NEPHROLOGY SUMMARY:  Please see note from 1/31 for prior summaries  2/01 - NAEO, another EEG being done this AM  2/02 - NAEO, scheduled for Trach today  2/03 - No events, underwent trach yesterday, no change in neuro status, BP stable overnight but low this am, given midodrine, to have HD today  2/04 - No events, tolerated HD yest with 2.1L UF, BP stable this am  2/05 - No events, BP low prior to starting HD, given albumin at the start of HD and BP stable at this time  2/06 - No events, tolerated HD yesterday with 2.5L UF, BP stable and high at times, still with dependent edema  2/07 - No events, tolerated PUF yesterday with 2.5L UF and did not require midodrine with treatment, BP stable today, still with dependent edema, to have HD today  2/08 - No overnight events or changes.  On TF at goal via NGT.  No neurologic change.  On vent/trach.   2/09 - No significant change.  No events.  Liquid stool from rectal tube.  On Vent/Trach  2/10-  No new overnight renal events. +trach.  2/11- S/p HD  yesterday with net UF of 2 L.   2/12- No new overnight renal events. HD today.  2/13 - HD yesterday with 1.7L net UF, plan for PEG placement Saturday, LTACH placement pending, some hypotension   2/14 - On HD.  2/15 - Non communicative, HD yesterday 700cc UF  2/17 - Seen on HD today. 's. No new events.   2/19- Non verbal, nothing reported  2/21- Stage 3 decubitus ulcer  2/24 - Pt in TB rule out isolation, ID on board, for HD today   2/26 - No events, seen on dialysis, BP stable, still in isolation  2/28 - No events, no change in mental status, still in isolation, BP variable, T-piece in place  2/29 - Had HD yesterday with 2 L UF. VSS. Patient awake and alert, but does not attempt to communicate. Still with trach / T-piece.  3/02 - No new events.No interaction.For HD today.  3/04 - No new developments.For HD today.No interaction.  3/06 - No new developments.Seen on dialysis.  3/07 - NO overnight renal events, No HD today (Sat).  Trache with T piece in place.      Review of Systems  ROS   Unable to perform ROS due to medical condition     Physical Exam  Temp:  [36.2 °C (97.2 °F)-37.2 °C (99 °F)] 36.6 °C (97.9 °F)  Pulse:  [68-88] 78  Resp:  [16-22] 20  BP: (104-150)/(44-70) 112/51  SpO2:  [95 %-100 %] 98 %    Physical Exam  Vitals signs and nursing note reviewed.   Constitutional:       General: She is not in acute distress.     Appearance: She is chronically ill-appearing.   HENT:      Head: Normocephalic and atraumatic.      Right Ear: External ear normal.      Left Ear: External ear normal.      Nose: Nose normal. No congestion.      Mouth/Throat:      Mouth: Mucous membranes are moist.      Pharynx: No oropharyngeal exudate.   Eyes:      General: No scleral icterus.     Conjunctiva/sclera: Conjunctivae normal.   Neck:      Musculoskeletal: No muscular tenderness.      Vascular: No carotid bruit.      Comments: +Trach.T piece.  Cardiovascular:      Rate and Rhythm: Normal rate and regular rhythm.      Heart  sounds: Normal heart sounds. No murmur.   Pulmonary:      Effort: Pulmonary effort is normal. No respiratory distress.      Breath sounds: Normal breath sounds.      Comments: T-piece, supp O2   Chest:      Chest wall: No tenderness.   Abdominal:      General: Bowel sounds are normal. There is no distension.      Palpations: Abdomen is soft.      Tenderness: There is no abdominal tenderness.      Comments: Enteral nutrition via PEG, +Rectal tube  Genitourinary:     Comments: BMS  Musculoskeletal:         General: No tenderness or signs of injury.      Comments: +dependent edema   Lymphadenopathy:      Cervical: No cervical adenopathy.   Skin:     General: Skin is warm and dry.      Findings: No erythema or rash.      Comments: Heel float boots   Stage 3 decubitus sacral not visualized   Neurological:      Comments: Awake and alert, LUE contracted, does not attempt to communicate  Psychiatric:      Comments: Unable to assess   +River       Fluids    Intake/Output Summary (Last 24 hours) at 3/7/2020 1110  Last data filed at 3/7/2020 0325  Gross per 24 hour   Intake 500 ml   Output 2280 ml   Net -1780 ml       Laboratory  Recent Labs     03/05/20  0315 03/06/20  0235 03/07/20  0450   WBC 16.1* 15.9* 14.9*   RBC 2.58* 2.57* 2.63*   HEMOGLOBIN 8.4* 8.1* 8.4*   HEMATOCRIT 25.8* 25.8* 26.3*   .0* 100.4* 100.0*   MCH 32.6 31.5 31.9   MCHC 32.6* 31.4* 31.9*   RDW 59.3* 58.7* 59.7*   PLATELETCT 199 193 202   MPV 9.2 9.1 9.0     Recent Labs     03/07/20  0450   SODIUM 135   POTASSIUM 3.5*   CHLORIDE 98   CO2 29   GLUCOSE 236*   BUN 48*   CREATININE 1.15   CALCIUM 8.6                           IMPRESSION:  # ESRD              Maintenance dialysis qMWF as OP at [facility], but has been discharged due to >30 days and will need a  new HD chair once discharged.   No HD today (Sat)               mCrCl 6 by 24 hour urine - repeat study 2/13 mCrCl 4              Dialysis Dependent                # Status epilepticus  # S/P acute  lacunar infarct              Hx of previous CVA with significant deficits.  # HTN--BP variable often with intradialytic hypotension  # DM II   Management per primary svc  # Acute Hypoxic Respiratory Failure              +Trach and T-piece  # Hx of dysphagia  # Anemia of CKD.Ferritin previously significantly elevated. CAT with HD.  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   PTH 26.2 , Vit D 53 , Phos  2.5   # CAD  # DLD  # Gout,on Allopurinol  # Hx of PEG tube  # Hx of RALF  # Hx of UTI  # COPD  # Edema/Anasarca--improved  # Hypophosphatemia, improved   # Hyponatremia, improved   # Prognosis poor              Family expectations and goals for patient are not in line with prognosis.  # Stage 3 decubitus ulcer  # TB rule out, + TB quant and abnormal CXR/CT, ID on board and managing  # Leukocytosis      PLAN:               No HD Today (SAT)   Maintenance iHD qMWF and PRN.              UF w/ HD as tolerated.              ID following              Continue Epogen with HD              Seizure management per Neurology              Enteral feedings              No dietary protein restrictions              Follow labs              Continue GOC discussions with family              Very poor prognosis, recommend comfort care, if and when family agreeable              Will see on MWF. Please call on other days w/ questions or concerns

## 2020-03-07 NOTE — PROGRESS NOTES
Assessment complete. Pt non verbal. Trach care complete. Dressing complete. PT taken to dialysis today. 1.5 L off. Peg tube flushed with water.

## 2020-03-07 NOTE — PROGRESS NOTES
2 RN skin check completed with Miguelito HAILE.  Devices in place Trach, rectal tube, PEG tube, hernandez PICC.  Skin assessed under devices yes.  Confirmed pressure ulcers found on coccyx.  The following interventions in place: Q2H turns. Mepliex in place. Low air loss bed. Float heel boots.      Scattered bruising. Redness under trach collar and peg tube site. Groin red and blanching. Open area to coccyx. Heels pink and boggy.

## 2020-03-07 NOTE — WOUND TEAM
Wound team note:   Pt seen for placement of BMS as it was dislodged during the night shift.  With staff assistance turned patient to left side, noted to be incontinent of loose brown stool. Sacrum/buttocks pink and intact. Sphincter tone determined to be fair.  BMS placed without difficulty, 40 mL of tap water placed in retention balloon, irrigated with 90 mL warm tap water. Nursing to irrigate q shift with 60 mL-120 mL warm tap water or until patent.

## 2020-03-07 NOTE — PROGRESS NOTES
"Pulmonary Progress Note    Date of admission  1/16/2020    Chief Complaint  74 y.o. female admitted 1/16/2020 with status epelipticus    Hospital Course    \"Ms. Beltran is a 74 y.o. female admitted to [facility] on 1/14/20 with seizures.She was at a SNF.She had been previously hospitalized at [facility] and diaysis was initiated.  She was doing very poorly then and palliative care was discussed with the family,but they declined.She had very poor functional status and required Jimena lift to get into the dialysis chair.She was found to have  a CVA on MRI at [facility].Her seizures were not controlled and it was felt she was in status epilepticus.  She was getting HD at [facility].Last HD was on 1/13/20.She was seen by Dr Carcamo at [facility].Creatinine was 1.8 and dialysis was held. Neurology [place] that the patient needed continuous EEG monitoring and he was transferred to [facility]. Patient intubated due to seizures and was eventually transitioned to tracheostomy on 2/2/2020 and off the vent on 2/5/2020. Family at this time does not want to change CODE status. Patient remains unresponsive. LTAC being pursued. PEG tube in place. \"      Interval Problem Update  Reviewed last 24 hour events:  T piece 4 l  Has tolerated the 6 shiley cuffed  Some secretions    Review of Systems  ROS   Unable to obtain as non verbal    Vital Signs for last 24 hours   Temp:  [36.6 °C (97.9 °F)-37.2 °C (99 °F)] 36.6 °C (97.9 °F)  Pulse:  [68-88] 80  Resp:  [16-22] 18  BP: (104-138)/(44-64) 112/51  SpO2:  [95 %-100 %] 97 %     Physical Exam   Physical Exam  HENT:      Head: Normocephalic and atraumatic.      Mouth/Throat:      Mouth: Mucous membranes are moist.   Eyes:      Extraocular Movements: Extraocular movements intact.      Pupils: Pupils are equal, round, and reactive to light.   Neck:      Musculoskeletal: Neck supple.      Comments: Tracheostomy site clean  Cardiovascular:      Rate and Rhythm: Normal rate.   Pulmonary:      Effort: Pulmonary " effort is normal. No respiratory distress.      Breath sounds: No wheezing.   Abdominal:      General: Abdomen is flat.      Comments: PEG tube clean   Musculoskeletal:      Right lower leg: No edema.      Left lower leg: No edema.   Skin:     General: Skin is warm and dry.   Neurological:      Mental Status: She is alert.      Comments: Non verbal  Turning to voice  Lifting eyebrows         Medications  Current Facility-Administered Medications   Medication Dose Route Frequency Provider Last Rate Last Dose   • [START ON 3/8/2020] ascorbic acid tablet 500 mg  500 mg Enteral Tube DAILY Yaakov Pringle M.D.       • [START ON 3/8/2020] therapeutic multivitamin-minerals (THERAGRAN-M) tablet 1 Tab  1 Tab Enteral Tube DAILY Yaakov Pringle M.D.       • Valproate Sodium (DEPAKENE) oral solution 500 mg  500 mg Enteral Tube Q12HRS Aubree Irving M.D.   500 mg at 03/07/20 0443   • insulin regular (HUMULIN R) injection 1-6 Units  1-6 Units Subcutaneous Q6HRS Judy Roque M.D.   1 Units at 03/07/20 1107    And   • glucose 4 g chewable tablet 16 g  16 g Oral Q15 MIN PRN Judy Roque M.D.        And   • DEXTROSE 10% BOLUS 250 mL  250 mL Intravenous Q15 MIN PRN Judy Roque M.D.       • dakins 0.125% (1/4 strength) topical soln   Topical BID Judy Roque M.D.   Stopped at 03/07/20 0600   • heparin injection 5,000 Units  5,000 Units Subcutaneous Q12HRS Aubree Irving M.D.   5,000 Units at 03/07/20 0438   • doxazosin (CARDURA) tablet 6 mg  6 mg Per G Tube QHS Aubree Irving M.D.   6 mg at 03/06/20 2014   • aspirin (ASA) chewable tab 81 mg  81 mg Enteral Tube DAILY Aubree Irving M.D.   81 mg at 03/07/20 0438   • carvedilol (COREG) tablet 25 mg  25 mg Enteral Tube BID Aubree Irving M.D.   25 mg at 03/07/20 0438   • hydrALAZINE (APRESOLINE) tablet 100 mg  100 mg Enteral Tube TID Aubree Irving M.D.   100 mg at 03/07/20 1053   • omeprazole (FIRST-OMEPRAZOLE) 2 mg/mL oral susp 40 mg  40 mg Enteral Tube  Q12HRS Aubree Irving M.D.   40 mg at 03/07/20 0443   • albumin human 25% solution 25 g  25 g Intravenous ACUTE DIALYSIS PRN Aubree Irving M.D. 150 mL/hr at 02/26/20 1245 25 g at 02/26/20 1245   • lacosamide (VIMPAT) tablet 300 mg  300 mg Enteral Tube BID Aubree Irving M.D.   300 mg at 03/07/20 0437   • carboxymethylcellulose (REFRESH TEARS) 0.5 % ophthalmic drops 1 Drop  1 Drop Both Eyes Q2HRS PRN Aubree Irving M.D.   1 Drop at 02/26/20 0434   • vitamin B comp+C (ALLBEE WITH C) 1 Tab  1 Tab Enteral Tube DAILY Aubree Irving M.D.   1 Tab at 03/07/20 0437   • folic acid (FOLVITE) tablet 1 mg  1 mg Enteral Tube DAILY Aubree Irving M.D.   1 mg at 03/07/20 0438   • allopurinol (ZYLOPRIM) tablet 100 mg  100 mg Enteral Tube DAILY Aubree Irving M.D.   100 mg at 03/07/20 0437   • nystatin (MYCOSTATIN) powder   Topical BID Aubree Irving M.D.       • hydrALAZINE (APRESOLINE) injection 10 mg  10 mg Intravenous Q4HRS PRN Judy Roque M.D.   10 mg at 02/10/20 0424   • topiramate (TOPAMAX) tablet 200 mg  200 mg Enteral Tube Q12HRS Aubree Irving M.D.   200 mg at 03/07/20 0437   • atorvastatin (LIPITOR) tablet 40 mg  40 mg Enteral Tube DAILY Aubree Irving M.D.   40 mg at 03/07/20 0438   • heparin injection 3,300 Units  3,300 Units Intracatheter PRN Aubree Irving M.D.   3,300 Units at 03/06/20 1156   • epoetin antoinette (EPOGEN/PROCRIT) injection 4,000 Units  4,000 Units Subcutaneous MO, WE + FR Aubree Irving M.D.   4,000 Units at 03/06/20 1154   • Respiratory Care per Protocol   Nebulization Continuous RT Aubree Irving M.D.       • ipratropium-albuterol (DUONEB) nebulizer solution  3 mL Nebulization Q2HRS PRN (RT) Aubree Irving M.D.       • senna-docusate (PERICOLACE or SENOKOT S) 8.6-50 MG per tablet 2 Tab  2 Tab Enteral Tube BID Aubree Irving M.D.   2 Tab at 03/07/20 0438    And   • polyethylene glycol/lytes (MIRALAX) PACKET 1 Packet  1 Packet Enteral Tube QDAY PRN Aubree Irving M.D.   1 Packet at 02/21/20 0018    And   •  bisacodyl (DULCOLAX) suppository 10 mg  10 mg Rectal QDAY PRN Aubree Irving M.D.       • Pharmacy Consult: Enteral tube insertion - review meds/change route/product selection  1 Each Other PHARMACY TO DOSE Aubree Irving M.D.       • labetalol (NORMODYNE/TRANDATE) injection 10 mg  10 mg Intravenous Q4HRS PRN Judy Roque M.D.   10 mg at 02/10/20 0026   • acetaminophen (TYLENOL) tablet 650 mg  650 mg Enteral Tube Q6HRS PRN Aubree Irving M.D.   650 mg at 03/03/20 1327       Fluids    Intake/Output Summary (Last 24 hours) at 3/7/2020 1253  Last data filed at 3/7/2020 0325  Gross per 24 hour   Intake 0 ml   Output 280 ml   Net -280 ml       Laboratory          Recent Labs     03/07/20  0450   SODIUM 135   POTASSIUM 3.5*   CHLORIDE 98   CO2 29   BUN 48*   CREATININE 1.15   CALCIUM 8.6     Recent Labs     03/07/20  0450   GLUCOSE 236*     Recent Labs     03/05/20  0315 03/06/20  0235 03/07/20  0450   WBC 16.1* 15.9* 14.9*   NEUTSPOLYS 71.50 70.30 67.80   LYMPHOCYTES 13.70* 14.40* 17.20*   MONOCYTES 11.70 11.50 11.60   EOSINOPHILS 1.70 2.30 1.80   BASOPHILS 0.40 0.30 0.30     Recent Labs     03/05/20  0315 03/06/20  0235 03/07/20  0450   RBC 2.58* 2.57* 2.63*   HEMOGLOBIN 8.4* 8.1* 8.4*   HEMATOCRIT 25.8* 25.8* 26.3*   PLATELETCT 199 193 202       Imaging  2/29/20  cxr personally reviewed and shows tracheostomy tube, HD catheter and PICC line  B/l hilar infiltrates suggestive of fluid overload    Assessment/Plan    Acute respiratory failure with hypoxia (HCC)- (present on admission)  Assessment & Plan  Intubated date: 1/14/2020 s/p trach 2/2 Dr Devine  Down size to 6 cuffed shiley 3/3  Some secretions  D/w DR. Bond and RT trial of capping and assess if we need to change to uncuffed  Not vocalizing       I have performed a physical exam and reviewed and updated ROS and Plan today (3/7/2020). In review of yesterday's note (3/6/2020), there are no changes except as documented above.

## 2020-03-08 NOTE — PROGRESS NOTES
Assessment complete. Pt non verbal. Trach care complete. Dressing complete. Wound vac placed today.. Peg tube flushed with water. Feeding 40ml/hr.

## 2020-03-08 NOTE — CARE PLAN
Problem: Safety  Goal: Will remain free from injury  Outcome: PROGRESSING AS EXPECTED    Bed in lowest and locked position. Nonskid socks in place. Will continue to monitor.      Problem: Knowledge Deficit  Goal: Knowledge of disease process/condition, treatment plan, diagnostic tests, and medications will improve  Outcome: PROGRESSING AS EXPECTED    Updated family on plan of care and medications. Family verbalized understanding.

## 2020-03-08 NOTE — WOUND TEAM
[facility]  Inpatient Services  Wound and Skin Care Progress Note    Admission Date:  1/16/2020   HPI, PMH, SH: Reviewed  Unit where seen by Wound Team: [id number]    WOUND TEAM FOLLOW UP: Sacral ulcer    Self Report / Pain Level:no s/s of distress      OBJECTIVE: River & BMS in place. On NUBIA. Heel float boots. Hydrocolloid to coccyx. Wedge in place for repositioning.    WOUND TYPE, LOCATION, CHARACTERISTICS (Pressure ulcers: location, stage, POA or date identified)        Pressure Injury 01/16/20 Sacrum;Coccyx stage 3 POA  (Active)   Wound Image   2/27/2020 12:00 PM   Pressure Injury Stage 4 3/7/2020  5:00 PM   State of Healing Non-healing 3/7/2020  5:00 PM   Site Assessment Purple;Red;Drainage 3/7/2020  5:00 PM   Periwound Assessment Red;Purple 3/7/2020  5:00 PM   Margins Unattached edges 3/7/2020  5:00 PM   Wound Length (cm) 1.5 cm 3/5/2020  6:00 PM   Wound Width (cm) 8.5 cm 3/5/2020  6:00 PM   Wound Depth (cm) 0.5 cm 2/27/2020 12:00 PM   Wound Surface Area (cm^2) 12.75 cm^2 3/5/2020  6:00 PM   Wound Volume (cm^3) 3 cm^3 2/27/2020 12:00 PM   Tunneling (cm) 3.5 cm 3/5/2020  6:00 PM   Undermining (cm) 0 cm 2/27/2020 12:00 PM   Closure Secondary intention 3/7/2020  5:00 PM   Drainage Amount Scant 3/7/2020  5:00 PM   Drainage Description Serosanguineous 3/7/2020  5:00 PM   Non-staged Wound Description Partial thickness 3/7/2020  5:00 PM   Treatments Site care;Cleansed 3/7/2020  5:00 PM   Wound Cleansing Approved Wound Cleanser 3/7/2020  5:00 PM   Periwound Protectant Skin Protectant Wipes to Periwound 3/7/2020  5:00 PM   Dressing Options Wound Vac 3/7/2020  5:00 PM   Dressing Cleansing/Solutions Normal Saline 3/7/2020  5:00 PM   Dressing Changed Changed 3/7/2020  5:00 PM   Dressing Status Clean;Dry;Intact 3/7/2020  9:00 AM   Dressing Change/Treatment Frequency Tuesday, Thursday, Saturday, and As Needed 3/7/2020  5:00 PM   NEXT Dressing Change/Treatment Date 03/10/20 3/7/2020  5:00 PM   NEXT Weekly Photo  (Inpatient Only) 03/12/20 3/7/2020  5:00 PM   Wound Odor Mild 3/7/2020  5:00 PM   Exposed Structures Bone;Tendon 3/7/2020  5:00 PM   WOUND NURSE ONLY - Tissue Type and Percentage 60 red, 40 yellow 3/7/2020  5:00 PM   WOUND NURSE ONLY - Time Spent with Patient (mins) 60 3/7/2020  5:00 PM               Lab Values:    Lab Results   Component Value Date/Time    WBC 14.9 (H) 03/07/2020 04:50 AM    RBC 2.63 (L) 03/07/2020 04:50 AM    HEMOGLOBIN 8.4 (L) 03/07/2020 04:50 AM    HEMATOCRIT 26.3 (L) 03/07/2020 04:50 AM            Lab Results   Component Value Date/Time    HBA1C 7.5 (H) 02/07/2019 01:20 PM         INTERVENTIONS BY WOUND TEAM: Pt turned to Right side. Wound cleansed with cleanser and gauze. Placed waffle layer of cleanse choice snaked into undermining and surface of wound, covered with thin gray foam, covered with drape, cut hole, covered with button of thin gray foam and trac pad.  Set to instill 8 mL normal saline, for 10 minutes every 2 hours.  Achieved NPWT 125 mmHg continuous, 2 mepilex under tubing and tubing secured to mepilex        Interdisciplinary consultation:  Avtar HAILE,   EVALUATION:  Wound is evolving, expect continued tissue die off and odor. Vera rocco cleanse choice to assist with slough removal and granulation tissue formation.      Goals:  Slow steady decrease in wound area and depth weekly    NURSING PLAN OF CARE:    Dressing changes: Continue previous Dressing Maintenance orders:   x     See new Dressing Maintenance orders:       Skin care: See Skin Care orders:        Rectal tube care: See Rectal Tube Care orders:    x  Other orders:           WOUND TEAM PLAN OF CARE (X):   NPWT change 3 x week:      X  Dressing changes:      Follow up as needed:    Other:    Anticipated discharge plans (X):  SNF:           Home Care:           Outpatient Wound Center:            Self Care:            Other: will need wound care for sacral pressure injury  - Awaiting LTACH placement

## 2020-03-08 NOTE — PROGRESS NOTES
Pt resting in bed at this time. Family updated on plan of care and medications. PICC line dressing changed. Inner cannula changed. Tube feed running at 40ml/hr at goal. Rectal tube in place. River in place and draining. Suctioning performed as needed. Fall precautions in place. Hourly rounding completed. Will continue to monitor.

## 2020-03-09 NOTE — CARE PLAN
Problem: Communication  Goal: The ability to communicate needs accurately and effectively will improve  Outcome: PROGRESSING SLOWER THAN EXPECTED  Note: Patient is non-verbal. Patient does not follow commands.     Problem: Safety  Goal: Will remain free from injury  Outcome: PROGRESSING AS EXPECTED  Note: Fall precautions in place. Bed in lowest position. Non-skid socks in place. Personal possessions within reach. Mobility sign on door. Bed-alarm on. Call light within reach. Family educated regarding precautions.

## 2020-03-09 NOTE — PROGRESS NOTES
"[facility] -  PROGRESS NOTE               Author: Lydia Carcamo M.D. Date & Time: 3/9/2020  2:14 PM     HPI:  74 y.o. female admitted to [facility] on 1/14/20 with seizures.She was at a SNF.She had been previously hospitalized at [facility] and diaysis was initiated.  She was doing very poorly then and palliative care was discussed with the family,but they declined.She had very poor functional status and required Jimena lift to get into the dialysis chair.She was found to have  a CVA on MRI at [facility].Her seizures were not controlled and it was felt she was in status epilepticus.  She was getting HD at [facility].Last HD was on 1/13/20.She was seen by Dr Carcamo at [facility].Creatinine was 1.8 and dialysis was held. Neurology [place] that the patient needed continuous EEG monitoring and he was transferred to [facility]    DAILY NEPHROLOGY SUMMARY:  Please see note from 1/31 for prior summaries  Please see note from 2/29 for prior summaries  3/02 - No new events.No interaction.For HD today.  3/04 - No new developments.For HD today.No interaction.  3/06 - No new developments.Seen on dialysis.  3/07 - NO overnight renal events, No HD today (Sat).  Trache with T piece in place.   3/09 - NAEO, no changes    REVIEW OF SYSTEMS:    Review of Systems   Unable to perform ROS: Acuity of condition       PAST FAMILY HISTORY: Reviewed and unchanged since admission  PAST SURGICAL HISTORY:  Reviewed and unchanged since admission  SOCIAL HISTORY:  Reviewed and unchanged since admission  FAMILY HISTORY: Reviewed and unchanged since admission  CURRENT MEDICATIONS: Reviewed since admission to present  IMAGING STUDIES: Reviewed since admission to present  LABORATORY STUDIES: Reviewed since admission to present    PHYSICAL EXAM:  VS:  BP (!) 93/49 Comment: rn notified  Pulse 72   Temp 36.5 °C (97.7 °F) (Temporal)   Resp 16   Ht 1.651 m (5' 5\")   Wt 54.5 kg (120 lb 2.4 oz)   SpO2 98%   BMI 19.99 kg/m²   Physical " Exam  Vitals signs and nursing note reviewed.   Constitutional:       General: She is awake. She is not in acute distress.     Appearance: Normal appearance. She is ill-appearing.   HENT:      Head: Normocephalic and atraumatic.      Right Ear: External ear normal.      Left Ear: External ear normal.      Nose: Nose normal. No congestion.      Mouth/Throat:      Mouth: Mucous membranes are moist.      Pharynx: No oropharyngeal exudate.   Eyes:      General:         Right eye: No discharge.         Left eye: No discharge.      Extraocular Movements: Extraocular movements intact.   Neck:      Musculoskeletal: Neck supple. No muscular tenderness.      Comments: +Trach  Cardiovascular:      Rate and Rhythm: Normal rate and regular rhythm.      Heart sounds: Normal heart sounds.   Pulmonary:      Effort: Pulmonary effort is normal.      Breath sounds: Normal breath sounds.   Chest:      Chest wall: No tenderness.   Abdominal:      General: Bowel sounds are normal. There is no distension.      Palpations: Abdomen is soft.   Musculoskeletal:         General: No swelling or tenderness.   Skin:     General: Skin is warm and dry.      Findings: No rash.   Neurological:      Mental Status: She is unresponsive.      Motor: Weakness present. No seizure activity.      Gait: Gait abnormal.   Psychiatric:         Mood and Affect: Mood normal.     Fluids:  In: -   Out: 300     LABS:  Recent Results (from the past 24 hour(s))   ACCU-CHEK GLUCOSE    Collection Time: 03/08/20  4:48 PM   Result Value Ref Range    Glucose - Accu-Ck 216 (H) 65 - 99 mg/dL   ACCU-CHEK GLUCOSE    Collection Time: 03/08/20 11:43 PM   Result Value Ref Range    Glucose - Accu-Ck 207 (H) 65 - 99 mg/dL   Comp Metabolic Panel (CMP) - Every Monday    Collection Time: 03/09/20  4:30 AM   Result Value Ref Range    Sodium 137 135 - 145 mmol/L    Potassium 3.8 3.6 - 5.5 mmol/L    Chloride 98 96 - 112 mmol/L    Co2 30 20 - 33 mmol/L    Anion Gap 9.0 0.0 - 11.9     Glucose 200 (H) 65 - 99 mg/dL    Bun 97 (HH) 8 - 22 mg/dL    Creatinine 1.80 (H) 0.50 - 1.40 mg/dL    Calcium 8.9 8.5 - 10.5 mg/dL    AST(SGOT) 32 12 - 45 U/L    ALT(SGPT) 25 2 - 50 U/L    Alkaline Phosphatase 93 30 - 99 U/L    Total Bilirubin 0.3 0.1 - 1.5 mg/dL    Albumin 2.5 (L) 3.2 - 4.9 g/dL    Total Protein 5.9 (L) 6.0 - 8.2 g/dL    Globulin 3.4 1.9 - 3.5 g/dL    A-G Ratio 0.7 g/dL   CBC WITH DIFFERENTIAL    Collection Time: 03/09/20  4:30 AM   Result Value Ref Range    WBC 16.8 (H) 4.8 - 10.8 K/uL    RBC 2.56 (L) 4.20 - 5.40 M/uL    Hemoglobin 8.0 (L) 12.0 - 16.0 g/dL    Hematocrit 25.7 (L) 37.0 - 47.0 %    .4 (H) 81.4 - 97.8 fL    MCH 31.3 27.0 - 33.0 pg    MCHC 31.1 (L) 33.6 - 35.0 g/dL    RDW 59.8 (H) 35.9 - 50.0 fL    Platelet Count 195 164 - 446 K/uL    MPV 9.2 9.0 - 12.9 fL    Neutrophils-Polys 70.70 44.00 - 72.00 %    Lymphocytes 15.60 (L) 22.00 - 41.00 %    Monocytes 9.70 0.00 - 13.40 %    Eosinophils 1.90 0.00 - 6.90 %    Basophils 0.50 0.00 - 1.80 %    Immature Granulocytes 1.60 (H) 0.00 - 0.90 %    Nucleated RBC 0.00 /100 WBC    Neutrophils (Absolute) 11.85 (H) 2.00 - 7.15 K/uL    Lymphs (Absolute) 2.61 1.00 - 4.80 K/uL    Monos (Absolute) 1.63 (H) 0.00 - 0.85 K/uL    Eos (Absolute) 0.32 0.00 - 0.51 K/uL    Baso (Absolute) 0.08 0.00 - 0.12 K/uL    Immature Granulocytes (abs) 0.27 (H) 0.00 - 0.11 K/uL    NRBC (Absolute) 0.00 K/uL   ESTIMATED GFR    Collection Time: 03/09/20  4:30 AM   Result Value Ref Range    GFR If  33 (A) >60 mL/min/1.73 m 2    GFR If Non  28 (A) >60 mL/min/1.73 m 2   ACCU-CHEK GLUCOSE    Collection Time: 03/09/20  5:48 AM   Result Value Ref Range    Glucose - Accu-Ck 180 (H) 65 - 99 mg/dL   ACCU-CHEK GLUCOSE    Collection Time: 03/09/20 11:39 AM   Result Value Ref Range    Glucose - Accu-Ck 194 (H) 65 - 99 mg/dL       (click the triangle to expand results)    IMPRESSION:  # ESRD   MWF iHD as OP, but D/C from clinic due to > 30 days  inpatient stay   New Placement if ever stable for home D/C          # Status epilepticus  # S/P acute lacunar infarct              Hx of previous CVA with significant deficits.  # HTN--BP variable often with intradialytic hypotension  # DM II              Management per primary svc  # Acute Hypoxic Respiratory Failure              +Trach and T-piece  # Hx of dysphagia  # Anemia of CKD.Ferritin previously significantly elevated. CAT with HD.  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              PTH 26.2 , Vit D 53 , Phos  2.5   # CAD  # DLD  # Gout,on Allopurinol  # Hx of PEG tube  # Hx of RALF  # Hx of UTI  # COPD  # Edema/Anasarca--improved  # Hypophosphatemia, improved   # Hyponatremia, improved   # Prognosis poor              Family expectations and goals for patient are not in line with prognosis   Comfort care recommended by multiple services  # Stage 3 decubitus ulcer  # TB rule out, + TB quant and abnormal CXR/CT, ID on board and managing  # Leukocytosis      PLAN:               MWF iHD              UF w/ HD as tolerated              Continue Epogen with HD              Seizure management per Neurology              Enteral feedings              No dietary protein restrictions              Follow labs              Continue GOC discussions with family              Very poor prognosis, recommend comfort care, if and when family agreeable   May need to consider ethics consultation if this is drawn out too long              Will see on MWF with HD, please call on other days w/ questions or concerns    All prior notes form other doctors and RN staff were reviewed from admission to current day to help me make my clinical decisions

## 2020-03-09 NOTE — PROGRESS NOTES
2 RN skin check complete with MIC Goldman  Devices in place trach,rectal tube,Peg tube,hernandez, wound vac  Skin assessed under devices yes  Confirmed pressure ulcers found on coccyx. With wound vac in place  New potential pressure ulcers noted on N/A. Wound consult placed N/A  The following interventions in place:every 2 hours turn,mepilex in placed,low air loss bed,float heel boots.     Redness under trach collar, collar loosened slightly   Redness around peg tube site, kept tube dry and clean and not under pt.   groin red and blanching, barrier cream applied.  open area to coccyx with dressing in placed. Dressing changed.  Elbows pink and blanching. Mepilex applied.  Heel red, dry and blanching. Mepilex applied. Heel float boots are in place.

## 2020-03-09 NOTE — CARE PLAN
Problem: Knowledge Deficit  Goal: Knowledge of disease process/condition, treatment plan, diagnostic tests, and medications will improve  Outcome: PROGRESSING AS EXPECTED  Intervention: Explain information regarding disease process/condition, treatment plan, diagnostic tests, and medications and document in education  Note: Updates given to family member.      Problem: Skin Integrity  Goal: Risk for impaired skin integrity will decrease  Outcome: PROGRESSING AS EXPECTED  Intervention: Implement precautions to protect skin integrity in collaboration with the interdisciplinary team  Note: Q 2 turn, waffle overlay in place. Miranda cream applied. Frequent check for incontinence. Keep pt dry and clean. 2 RN skin check in place.   Mepilex applied.

## 2020-03-09 NOTE — PROGRESS NOTES
Received report from night shift RN and assumed care of patient. Patient lying in bed with eyes closed during bedside report. Family member sleeping on chairs in room. Unable to assess orientation because patient is non-verbal. Per family, patient was talking and following commands previously. Patient has not followed commands for this RN during the shift. It is the patient's birthday today, so multiple family members have been present. Daughter is still very concerned about the medications being given especially for blood pressure as patient's blood pressure has been low lately. RN reassured family about the parameters for giving BP medications and took patient's BP in front of them. Patient's family declined the hydralazine and heparin during 1800 med pass. Patient's family also concerned because patient used to get lisinopril and losartan and patient is no longer receiving those. Patient's family requests list of current meds being given so they may go through the list. Family also wants to speak to the doctor regarding medications. Family also has concerns about how long the PICC line should be in place. Asked night charge RN who will look into it. Patient is resting comfortably in bed. Trach care performed. Patient suctioned frequently throughout day. Rectal tube irrigated and stool sample collected to rule out c.Diff. C. Diff is negative, no further precautions needed. No other signs of distress at this time. Bed is in lowest, locked position, call light and belongings are within reach. Patient does not call for assistance and bed alarm is off.  All other needs met.

## 2020-03-09 NOTE — PROGRESS NOTES
2 RN skin check complete with MIC Song.   Devices in place PICC line, River, Rectal Tube, & PEG tube.  Skin assessed under devices yes.  Confirmed pressure ulcers found on sacrum; wound vac in place. Surrounding skin purple.  New potential pressure ulcers noted on n/a. Wound consult placed Yes  The following interventions in place pillows in place for support, wound vac to sacrum, heel float books, nystatin to groin.     Heels boggy but blanching  Elbows pink, blanching  Ears intact  Lucina area red, blanching  Right lower extremity discolored  Bruising to the upper extremities

## 2020-03-09 NOTE — CARE PLAN
Problem: Safety  Goal: Will remain free from injury  Outcome: PROGRESSING AS EXPECTED    Bed in locked and lowest position. Nonskid socks in place. Will continue to monitor.      Problem: Knowledge Deficit  Goal: Knowledge of disease process/condition, treatment plan, diagnostic tests, and medications will improve  Outcome: PROGRESSING AS EXPECTED    Updated on plan of care and medications with family. Family verbalized understanding.

## 2020-03-09 NOTE — PROGRESS NOTES
Hospital Medicine Daily Progress Note    Date of Service  3/9/2020    Chief Complaint  74 y.o. female admitted 1/16/2020 with altered mental status    Hospital Course    Ms. Beltran is a 74 y.o. female admitted to [facility] on 1/14/20 with seizures.She was at a SNF.She had been previously hospitalized at [facility] and diaysis was initiated.  She was doing very poorly then and palliative care was discussed with the family,but they declined.She had very poor functional status and required Jimena lift to get into the dialysis chair.She was found to have  a CVA on MRI at [facility].Her seizures were not controlled and it was felt she was in status epilepticus.  She was getting HD at [facility].Last HD was on 1/13/20.She was seen by Dr Carcamo at [facility].Creatinine was 1.8 and dialysis was held. Neurology Tyrone that the patient needed continuous EEG monitoring and [place] [facility] Patient intubated due to seizures and was eventually transitioned to tracheostomy on 2/2/2020 and off the vent on 2/5/2020. Family at this time does not want to change CODE status. Patient remains unresponsive. LTAC being pursued. PEG tube in place.         Interval Problem Update  Patient resting in bed, no new events, she is in no distress does not look to be in pain, tolerating tube feedings, no fever.  Leukocytosis but no other signs of infection, C. difficile is negative.    Consultants/Specialty  Palliative care signed off  Critical care - signed off  Neurology - signed off  Nephrology  ID signed off  Pulmonology    Code Status  FULL    Disposition  TBD probably need long-term care placement      Review of Systems  Review of Systems   Unable to perform ROS: Medical condition       Physical Exam  Temp:  [36.5 °C (97.7 °F)-37.2 °C (98.9 °F)] 36.5 °C (97.7 °F)  Pulse:  [70-88] 72  Resp:  [14-16] 16  BP: ()/(47-68) 93/49  SpO2:  [97 %-99 %] 98 %    Physical Exam  Vitals signs reviewed.   Constitutional:       General: She is not  in acute distress.     Appearance: She is not ill-appearing.   HENT:      Head: Normocephalic.      Nose: Nose normal.      Mouth/Throat:      Mouth: Mucous membranes are dry.   Eyes:      General: No scleral icterus.     Pupils: Pupils are equal, round, and reactive to light.   Neck:      Musculoskeletal: Normal range of motion.      Comments: Trach in place  Cardiovascular:      Rate and Rhythm: Normal rate and regular rhythm.      Pulses: Normal pulses.      Comments: Tunneled HD catheter  Pulmonary:      Effort: Pulmonary effort is normal. No respiratory distress.   Abdominal:      General: Bowel sounds are normal. There is no distension.      Palpations: Abdomen is soft.      Tenderness: There is no guarding.      Comments: PEG tube in place.   Genitourinary:     Comments: River and rectal tube in place  Skin:     General: Skin is warm.      Comments: Swelling of right hand; slightly improved   Neurological:      Mental Status: She is alert.      Motor: Weakness (diffuse) present.   Psychiatric:         Attention and Perception: She is inattentive.         Behavior: Behavior is not agitated.     Patient seen and examined today on 3/2, unchanged from 3/1.     Fluids    Intake/Output Summary (Last 24 hours) at 3/9/2020 1235  Last data filed at 3/9/2020 0600  Gross per 24 hour   Intake --   Output 300 ml   Net -300 ml       Laboratory  Recent Labs     03/07/20 0450 03/08/20  0420 03/09/20  0430   WBC 14.9* 15.6* 16.8*   RBC 2.63* 2.65* 2.56*   HEMOGLOBIN 8.4* 8.2* 8.0*   HEMATOCRIT 26.3* 26.9* 25.7*   .0* 101.5* 100.4*   MCH 31.9 30.9 31.3   MCHC 31.9* 30.5* 31.1*   RDW 59.7* 60.3* 59.8*   PLATELETCT 202 208 195   MPV 9.0 9.2 9.2     Recent Labs     03/07/20 0450 03/08/20  0420 03/09/20  0430   SODIUM 135 134* 137   POTASSIUM 3.5* 3.6 3.8   CHLORIDE 98 98 98   CO2 29 29 30   GLUCOSE 236* 224* 200*   BUN 48* 77* 97*   CREATININE 1.15 1.55* 1.80*   CALCIUM 8.6 8.8 8.9                    Imaging  DX-CHEST-PORTABLE (1 VIEW)   Final Result      1.  Unchanged perihilar and LEFT greater than RIGHT basilar opacities compatible with atelectasis. Superimposed airspace disease is possible particularly in the LEFT lower lobe.   2.  Persistently enlarged cardiac silhouette      DX-CHEST-LIMITED (1 VIEW)   Final Result         No significant interval change.      DX-CHEST-PORTABLE (1 VIEW)   Final Result      1.  Apparent improvement of LEFT pleural effusion and basilar consolidation.   2.  No pneumothorax.   3.  Supportive tubing as described above.      US-THORACENTESIS PUNCTURE LEFT   Final Result      1. Ultrasound guided left sided diagnostic and therapeutic thoracentesis.      2. 400 mL of fluid withdrawn.      CT-CHEST (THORAX) W/O   Final Result      1.  Bilateral atelectasis/consolidation, left greater than right.      2.  Moderate left and small right pleural effusion.      3.  Atherosclerotic vascular calcification.      4.  Ascites within the upper abdomen.            US-ABDOMEN LTD (SOFT TISSUE)   Final Result         1. Trace free fluid in the pelvis. No abdominal ascites.      DX-ABDOMEN FOR TUBE PLACEMENT   Final Result         Feeding tube with tip projecting over the expected area of the stomach.      NB-ICBHICF-2 VIEW   Final Result      Feeding tube tip overlies the gastric antrum.      DX-CHEST-PORTABLE (1 VIEW)   Final Result         1.  Pulmonary edema and/or infiltrates are identified, which are stable since the prior exam.   2.  Cardiomegaly   3.  Atherosclerosis      DX-CHEST-PORTABLE (1 VIEW)   Final Result         1.  Pulmonary edema and/or infiltrates are identified, which are stable since the prior exam.   2.  Cardiomegaly   3.  Atherosclerosis      DX-CHEST-PORTABLE (1 VIEW)   Final Result      1.  Unchanged left lower lobe atelectasis or pneumonia.      2.  Clear right lung.      DX-CHEST-PORTABLE (1 VIEW)   Final Result      Unchanged LEFT basilar atelectasis and/or  airspace disease      DX-CHEST-PORTABLE (1 VIEW)   Final Result      Left basilar atelectasis, hilar and cardiac silhouette enlargement as before      DX-CHEST-PORTABLE (1 VIEW)   Final Result      Interval removal of a left subclavian central line. Stable patchy bilateral infiltrates.      IR-PICC LINE PLACEMENT W/ GUIDANCE > AGE 5   Final Result                  Ultrasound-guided PICC placement performed by qualified nursing staff as    above.          DX-CHEST-PORTABLE (1 VIEW)   Final Result      1.  Multiple support devices present.      2.  Patchy bilateral atelectasis.      DX-CHEST-PORTABLE (1 VIEW)   Final Result      Stable chest with retrocardiac opacity from atelectasis and/or pleural fluid favored over consolidation      DX-CHEST-PORTABLE (1 VIEW)   Final Result      Stable chest with retrocardiac opacity from atelectasis and/or pleural fluid favored over consolidation      DX-CHEST-PORTABLE (1 VIEW)   Final Result         No significant change from prior.               DX-CHEST-PORTABLE (1 VIEW)   Final Result         1. Stable lines and tubes..   2. Stable retrocardiac and left perihilar atelectasis versus consolidation. Suspected small left pleural effusion.            DX-CHEST-PORTABLE (1 VIEW)   Final Result         1. Stable lines and tubes..   2. Stable retrocardiac atelectasis versus consolidation with increased left perihilar opacity. Suspected trace left pleural effusion.         HP-[id number] VIEW   Final Result      1.  Enteric tube has been placed and the tip projects over the stomach.      2.  Pre-existing feeding tube tip projects at the gastroduodenal junction      DX-CHEST-PORTABLE (1 VIEW)   Final Result         1. Lines and tubes as above.   2. Stable retrocardiac atelectasis versus consolidation. Suspected trace left pleural effusion.         MR-BRAIN-WITH & W/O   Final Result      1.  Moderate cerebral atrophy.   2.  Evidence of intraventricular hemorrhage, indeterminate age.  Possibly chronic intraventricular hemosiderin deposition.   3.  Extensive encephalomalacic change with hemosiderin deposition involving the right corona radiata, basal ganglia, posterior thalamus, subthalamic region, bordering the right cerebral peduncle. Associated ex vacuo dilatation of the body of the right    lateral ventricle. No change.   4.  Additional foci of old microhemorrhage most consistent with old hypertensive microhemorrhage or amyloid angiopathy in the left basal ganglia, left thalamus, and midline upper ventral abel.   5.  Punctate focus of acute infarction in the right parietal deep white matter. No change.   6.  Advanced supratentorial white matter disease most consistent with microvascular ischemic change.   7.  Encephalomalacic changes in the abel and right cerebral peduncle consistent with old infarction.   8.  Partially empty sella.   9.  Overall, no new findings and no significant change from 1/14/2020.      DX-CHEST-PORTABLE (1 VIEW)   Final Result         1. Stable lines and tubes.   2. Unchanged retrocardiac atelectasis versus consolidation.   3. Stable trace left pleural effusion.         OUTSIDE IMAGES-DX CHEST   Final Result      OUTSIDE IMAGES-US VASCULAR   Final Result      OUTSIDE IMAGES-MR BRAIN   Final Result      OUTSIDE IMAGES-DX CHEST   Final Result      OUTSIDE IMAGES-CT HEAD   Final Result      EC-ECHOCARDIOGRAM COMPLETE W/O CONT   Final Result      DX-CHEST-FOR LINE PLACEMENT Perform procedure in: Patient's Room   Final Result         1.  Retrocardiac opacity concerning for infiltrate, stable.   2.  Trace left pleural effusion, stable   3.  Cardiomegaly   4.  Atherosclerosis   5.  Perihilar interstitial prominence and bronchial wall cuffing, appearance suggests changes of underlying bronchial inflammation, consider bronchitis.      DX-ABDOMEN FOR TUBE PLACEMENT   Final Result         1.  Nonspecific bowel gas pattern.   2.  Dobbhoff tube tip overlying the expected location of  "the pylorus or first duodenal segment.   3.  Left lung base atelectasis and/or small effusion      DX-CHEST-PORTABLE (1 VIEW)   Final Result         1.  Retrocardiac opacity concerning for infiltrate.   2.  Trace left pleural effusion, stable   3.  Cardiomegaly   4.  Atherosclerosis      OA-CVEBFSA-MNOCHDP FILM X-RAY   Final Result      OUTSIDE IMAGES-DX CHEST   Final Result           Assessment/Plan  * Status epilepticus (HCC)- (present on admission)  Assessment & Plan  On admission, she was intubated for airway protection, now trach since 2/2/20. No evidence of seizure activity. Neurology gave the opinion that likelihood of patient returning to baseline was low, and that the likelihood of the patient returning to full independent function was essentially 0. Very poor prognosis, comfort care recommended.  - neurology evaluated, appreciate recs  - continuing regimen of vimpat, topamax, depakote  - repeat EEG showed no seizures  Stable.  Continue antiseizure medications, seizure precautions.  No more seizures    TB lung, latent- (present on admission)  Assessment & Plan  Quantiferon + and will have to rule out active TB. Chest CT found positive left-sided consolidation with pleural effusion.  - pending sputum cx/ AFB stain to rule out active disease before initiating  - s/p thora on 2/25; f/u pathology   - ID following, appreciate recs  - AFB stain negative x3; remove isolation  - Per ID, we will \"treat for LTBI with  mg PO daily and B6 25 mg PO daily for 9 months if AFB cxs all negative at 6 weeks\"  Completed antibiotics treatment.    Acute respiratory failure with hypoxia (HCC)- (present on admission)  Assessment & Plan   on trach/T-piece,  pulmonology following      Hypokalemia  Assessment & Plan  Patient is end-stage renal disease on hemodialysis, potassium improved.    Severe protein-calorie malnutrition (HCC)- (present on admission)  Assessment & Plan  Continue nutrition via tube feeding.  Dietitian " consulted     Cerebrovascular accident (CVA) due to embolism of right middle cerebral artery (HCC)- (present on admission)  Assessment & Plan  Very poor prognosis.  Continue aspirin and statin, PT OT evaluation    End stage renal failure on dialysis (HCC)- (present on admission)  Assessment & Plan  Poor prognosis per nephrology, recommending comfort care.   Continue hemodialysis Monday Wednesday Friday    Normocytic anemia- (present on admission)  Assessment & Plan  Likely anemia of chronic kidney disease.  FOBT is negative.   - transfuse if drops below 7  Hemoglobin stable    Hypertension- (present on admission)  Assessment & Plan  Improved, continue Coreg, hydralazine, stopped lisinopril due to low blood pressure  Continue monitoring  Stable .    Type 2 diabetes mellitus, with long-term current use of insulin ([facility])- (present on admission)  Assessment & Plan  A1C 7.5%, blood sugars have been ~170. Was taking insulin at home.   Continue sliding scale insulin.  Continue tube feeding.   Blood sugar is stable.  .    Leukocytosis  Assessment & Plan  No other signs of infection, probably reactive, leukocytosis trending down, chest x-ray is unchanged, procalcitonin is slightly elevated but patient is end-stage renal disease patient has no fever, UA positive for leukocyte esterase but no other signs of infection.   Leukocytosis stable, patient having some diarrhea, C. difficile negative     Goals of care, counseling/discussion- (present on admission)  Assessment & Plan  Family continue wanting full code, daughter wants to give more time to see if patient able to recover,  PT OT recommended long-term care placement patient is close to baseline requiring max assistance for ADLs.  Discussed with SW today.  Requires placement    Pressure injury of sacrum, coccyx, stage 3 (HCC)- (present on admission)  Assessment & Plan  - Wound care  Needs to continue aggressive pressure offloading strategies  Changes in position every 2  hours  Added Vit C and MTV   Discussed with wound care on 3/5/2020.     Dysphagia as late effect of cerebrovascular accident (CVA)- (present on admission)  Assessment & Plan  PEG tube in place    Gout- (present on admission)  Assessment & Plan  On allopurinol via PEG tube       VTE prophylaxis: SCDs    Case and plan of care discussed with nurse staff  Case and plan of care discussed during multidisciplinary rounds  Poor prognosis.

## 2020-03-09 NOTE — PROGRESS NOTES
3hr HD started @ 1312 and completed @ 1613,tx well tolerated,VSS,net UF = 1100ml.RIJ TDC dressing changed CDI,report given to Dennis Gotti RN.Waiting for transport .

## 2020-03-09 NOTE — PROGRESS NOTES
Pt resting in bed at this time. Family updated on plan of care and medications. Inner cannula changed. Tube feed running at 40ml/hr at goal. Rectal tube in place. River in place and draining. Suctioning performed as needed. Fall precautions in place. Hourly rounding completed. Will continue to monitor.

## 2020-03-10 NOTE — PROGRESS NOTES
Pt awake and resting today. Trach care completed, oxygen saturation remains 98%. Wound team to replace wound vac today, has been wet to dry since overnight when it became dislodged. Rectal tube flushed and draining, hernandez draining to gravity, tube feed to PEG running at goal. VSS and no acute changes today.

## 2020-03-10 NOTE — PROGRESS NOTES
Pt resting in bed at this time. Family updated on plan of care and medications. Inner cannula changed. Tube feed running at 40ml/hr at goal. Rectal tube in place. River in place and draining. Suctioning performed as needed. Wound vac no longer has a seal. Wet to dry dressing placed. Will pass on to day shift nurse for wound to see pt. Fall precautions in place. Hourly rounding completed. Will continue to monitor.

## 2020-03-10 NOTE — PROGRESS NOTES
Assumed care from RN at 1500.  Pt is alert/nonverbal, patient skilled for daily nursing care and observation on my shift. Pt resting in bed, eyes open spontaneously. Trach in place, rectal tube in place, tube feed at goal running 40mL/hour, hernandez in place. Wound vac changed by wound nurse, no adverse s/s noted. Call bell within reach, safety maintained on my shift.

## 2020-03-10 NOTE — PROGRESS NOTES
2 RN skin check complete with MIC Tavarez  Devices in place trach,rectal tube,Peg tube,hernandez, wound vac  Skin assessed under devices yes  Confirmed pressure ulcers found on coccyx. With wound vac in place  New potential pressure ulcers noted on N/A. Wound consult placed N/A  The following interventions in place:every 2 hours turn,mepilex in placed,low air loss bed,float heel boots.     Redness under trach collar, cleansed and gauze changed along with inner cannula  Redness around peg tube site, kept tube dry and clean and not under pt.   groin red and blanching, barrier cream applied.  Elbows pink and blanching. Mepilex applied.  Heel red, dry and blanching. Mepilex applied. Heel float boots are in place.

## 2020-03-10 NOTE — CARE PLAN
Problem: Oxygenation:  Goal: Maintain adequate oxygenation dependent on patient condition  Outcome: PROGRESSING AS EXPECTED     Problem: Bronchopulmonary Hygiene:  Goal: Increase mobilization of retained secretions  Outcome: PROGRESSING AS EXPECTED    Aerosol 4LPM/28%

## 2020-03-10 NOTE — PROGRESS NOTES
Hospital Medicine Daily Progress Note    Date of Service  3/10/2020    Chief Complaint  75 y.o. female admitted 1/16/2020 with altered mental status    Hospital Course    Ms. Guzmán has a past medical history of hypertension, coronary disease, diabetes, and end-stage renal disease on dialysis brought to the emergency room after apparently she had seizure type activity at her assisted living facility.  She was admitted and underwent continuous EEG monitoring followed by neurology.  Her mentation did not improve and she underwent a tracheostomy on 2/2/2020.  A PEG tube was placed 2/15/2020.  He has had a persistent, severe encephalopathy.      Interval Problem Update  10/2020: Patient seen and evaluated on the medical floor.  Patient does track but does not follow commands.    Consultants/Specialty  Critical care  Neurology  Infectious disease  Palliative care  Code Status  Full code per family    Disposition  Echo    Review of Systems  Review of Systems   Unable to perform ROS: Medical condition        Physical Exam  Temp:  [36.4 °C (97.6 °F)-37.3 °C (99.2 °F)] 37.3 °C (99.2 °F)  Pulse:  [71-81] 81  Resp:  [14-22] 18  BP: ()/(42-69) 98/42  SpO2:  [96 %-99 %] 99 %    Physical Exam  Vitals signs and nursing note reviewed.   Constitutional:       Comments: Chronically ill appearing   HENT:      Mouth/Throat:      Mouth: Mucous membranes are dry.   Eyes:      General: No scleral icterus.     Conjunctiva/sclera: Conjunctivae normal.   Neck:      Comments: Right tunneled dialysis cath  trach  Cardiovascular:      Rate and Rhythm: Normal rate and regular rhythm.   Pulmonary:      Effort: Pulmonary effort is normal.      Breath sounds: Normal breath sounds.      Comments: Poor air movement  Abdominal:      General: There is distension.      Tenderness: There is no abdominal tenderness.      Comments: Feeding tube   Genitourinary:     Comments: River cath  Musculoskeletal:      Right lower leg: No edema.       "Left lower leg: No edema.      Comments: Poor muscle tone   Neurological:      Comments: She tracks but does not follow commands          Fluids    Intake/Output Summary (Last 24 hours) at 3/10/2020 1214  Last data filed at 3/9/2020 1826  Gross per 24 hour   Intake 1370 ml   Output 2020 ml   Net -650 ml       Laboratory  Recent Labs     03/08/20  0420 03/09/20  0430 03/10/20  0440   WBC 15.6* 16.8* 16.9*   RBC 2.65* 2.56* 2.75*   HEMOGLOBIN 8.2* 8.0* 8.6*   HEMATOCRIT 26.9* 25.7* 27.6*   .5* 100.4* 100.4*   MCH 30.9 31.3 31.3   MCHC 30.5* 31.1* 31.2*   RDW 60.3* 59.8* 59.7*   PLATELETCT 208 195 197   MPV 9.2 9.2 9.2     Recent Labs     03/08/20  0420 03/09/20  0430   SODIUM 134* 137   POTASSIUM 3.6 3.8   CHLORIDE 98 98   CO2 29 30   GLUCOSE 224* 200*   BUN 77* 97*   CREATININE 1.55* 1.80*   CALCIUM 8.8 8.9                     Assessment/Plan  * Status epilepticus (HCC)- (present on admission)  Assessment & Plan  On admission, she was intubated for airway protection, now trach since 2/2/20. No evidence of seizure activity.  He appears now to have a severe, intractable encephalopathy  - continuing regimen of vimpat, topamax, depakote  - repeat EEG showed no seizures  Stable.  Continue antiseizure medications, seizure precautions.  No more seizures    TB lung, latent- (present on admission)  Assessment & Plan  Quantiferon + and will have to rule out active TB. Chest CT found positive left-sided consolidation with pleural effusion.  - pending sputum cx/ AFB stain to rule out active disease before initiating  - s/p thora on 2/25; f/u pathology   - ID following, appreciate recs  - AFB stain negative x3; remove isolation  - Per ID, we will \"treat for LTBI with  mg PO daily and B6 25 mg PO daily for 9 months if AFB cxs all negative at 6 weeks\"  Completed antibiotics treatment.    Acute respiratory failure with hypoxia (HCC)- (present on admission)  Assessment & Plan   on trach/T-piece,  pulmonology " following  Acute on chronic condition now      Hypokalemia  Assessment & Plan  Patient is end-stage renal disease on hemodialysis, potassium improved.    Severe protein-calorie malnutrition (HCC)- (present on admission)  Assessment & Plan  Continue nutrition via tube feeding.  Dietitian consulted     Cerebrovascular accident (CVA) due to embolism of right middle cerebral artery (HCC)- (present on admission)  Assessment & Plan  Very poor prognosis.  Continue aspirin and statin, PT OT evaluation    End stage renal failure on dialysis (HCC)- (present on admission)  Assessment & Plan  Dialysis via a right-sided tunneled catheter  Continue hemodialysis Monday Wednesday Friday    Normocytic anemia- (present on admission)  Assessment & Plan  Likely anemia of chronic kidney disease.  FOBT is negative.   - transfuse if drops below 7  Hemoglobin stable    Hypertension- (present on admission)  Assessment & Plan  Improved, continue Coreg 25 mg twice a day, Cardura 6 mg daily, hydralazine 100 mg 3 times daily  Blood pressure is been low and these will be held with parameters  .    Type 2 diabetes mellitus, with long-term current use of insulin ([facility])- (present on admission)  Assessment & Plan  A1C 7.5%, blood sugars have been ~170. Was taking insulin at home.   Continue sliding scale insulin.  Continue tube feeding.   Blood sugar is stable.  .    Leukocytosis  Assessment & Plan  No other signs of infection, probably reactive  White blood cell count remains high  , chest x-ray is unchanged, procalcitonin is slightly elevated but patient is end-stage renal disease patient has no fever, UA positive for leukocyte esterase but no other signs of infection.    C. difficile negative     Goals of care, counseling/discussion- (present on admission)  Assessment & Plan  Overall neurologic and physical prognosis is quite poor given her severe, unrelenting encephalopathy and dialysis  DNR, DNI as appropriate and comfort care would be an  appropriate next step such as cessation of dialysis.    Pressure injury of sacrum, coccyx, stage 3 (HCC)- (present on admission)  Assessment & Plan  Wound care  Given her ability to move spontaneously, diabetes, dialysis, and poor condition, she is extremely high risk of decubitus ulcers    Dysphagia as late effect of cerebrovascular accident (CVA)- (present on admission)  Assessment & Plan  PEG tube in place    Gout- (present on admission)  Assessment & Plan  On allopurinol via PEG tube       VTE prophylaxis: Heparin

## 2020-03-10 NOTE — PROGRESS NOTES
"Pulmonary Progress Note    Date of admission  1/16/2020    Chief Complaint  74 y.o. female admitted 1/16/2020 with status epelipticus    Hospital Course    \"Ms. Beltran is a 74 y.o. female admitted to [facility] on 1/14/20 with seizures.She was at a SNF.She had been previously hospitalized at [facility] and diaysis was initiated.  She was doing very poorly then and palliative care was discussed with the family,but they declined.She had very poor functional status and required Jimena lift to get into the dialysis chair.She was found to have  a CVA on MRI at [facility].Her seizures were not controlled and it was felt she was in status epilepticus.  She was getting HD at [facility].Last HD was on 1/13/20.She was seen by Dr Carcamo at [facility].Creatinine was 1.8 and dialysis was held. Neurology [place] that the patient needed continuous EEG monitoring and [place] [facility] [place] intubated due to seizures and was eventually transitioned to tracheostomy on 2/2/2020 and off the vent on 2/5/2020. Family at this time does not want to change CODE status. Patient remains unresponsive. LTAC being pursued. PEG tube in place. \"      Interval Problem Update  Reviewed last 24 hour events:  T piece 4 l  Has tolerated the 6 shiley cuffed  Some secretions    Review of Systems  ROS   Unable to obtain as non verbal    Vital Signs for last 24 hours   Temp:  [36.4 °C (97.6 °F)-37.1 °C (98.7 °F)] 36.4 °C (97.6 °F)  Pulse:  [70-75] 74  Resp:  [14-18] 18  BP: ()/(49-69) 110/57  SpO2:  [96 %-99 %] 98 %     Physical Exam   Physical Exam  HENT:      Head: Normocephalic and atraumatic.      Mouth/Throat:      Mouth: Mucous membranes are moist.   Eyes:      Extraocular Movements: Extraocular movements intact.      Pupils: Pupils are equal, round, and reactive to light.   Neck:      Musculoskeletal: Neck supple.      Comments: Tracheostomy site clean  Cardiovascular:      Rate and Rhythm: Normal rate.   Pulmonary:      Effort: Pulmonary " effort is normal. No respiratory distress.      Breath sounds: No wheezing.   Abdominal:      General: Abdomen is flat.      Comments: PEG tube clean   Musculoskeletal:      Right lower leg: No edema.      Left lower leg: No edema.   Skin:     General: Skin is warm and dry.   Neurological:      Mental Status: She is alert.      Comments: Non verbal  Turning to voice  Lifting eyebrows     Not agitted    Medications  Current Facility-Administered Medications   Medication Dose Route Frequency Provider Last Rate Last Dose   • ascorbic acid tablet 500 mg  500 mg Enteral Tube DAILY Yaakov Pringle M.D.   500 mg at 03/10/20 0429   • therapeutic multivitamin-minerals (THERAGRAN-M) tablet 1 Tab  1 Tab Enteral Tube DAILY Yaakov Pringle M.D.   1 Tab at 03/10/20 0428   • Valproate Sodium (DEPAKENE) oral solution 500 mg  500 mg Enteral Tube Q12HRS Aubree Irving M.D.   500 mg at 03/09/20 1827   • insulin regular (HUMULIN R) injection 1-6 Units  1-6 Units Subcutaneous Q6HRS Judy Roque M.D.   1 Units at 03/10/20 0623    And   • glucose 4 g chewable tablet 16 g  16 g Oral Q15 MIN PRN Judy Roque M.D.        And   • DEXTROSE 10% BOLUS 250 mL  250 mL Intravenous Q15 MIN PRN Judy Roque M.D.       • dakins 0.125% (1/4 strength) topical soln   Topical BID Judy Roque M.D.   Stopped at 03/08/20 0759   • heparin injection 5,000 Units  5,000 Units Subcutaneous Q12HRS Aubree Irving M.D.   5,000 Units at 03/09/20 1826   • doxazosin (CARDURA) tablet 6 mg  6 mg Per G Tube QHS Aubree Irving M.D.   6 mg at 03/09/20 1925   • aspirin (ASA) chewable tab 81 mg  81 mg Enteral Tube DAILY Aubree Irving M.D.   81 mg at 03/10/20 0429   • carvedilol (COREG) tablet 25 mg  25 mg Enteral Tube BID Aubree Irving M.D.   25 mg at 03/10/20 0428   • hydrALAZINE (APRESOLINE) tablet 100 mg  100 mg Enteral Tube TID Aubree Irving M.D.   100 mg at 03/10/20 0429   • omeprazole (FIRST-OMEPRAZOLE) 2 mg/mL oral susp 40 mg  40 mg  Enteral Tube Q12HRS Aubree Irving M.D.   40 mg at 03/10/20 0429   • albumin human 25% solution 25 g  25 g Intravenous ACUTE DIALYSIS PRN Aubree Irving M.D. 150 mL/hr at 02/26/20 1245 25 g at 02/26/20 1245   • lacosamide (VIMPAT) tablet 300 mg  300 mg Enteral Tube BID Aubree Irving M.D.   300 mg at 03/10/20 0428   • carboxymethylcellulose (REFRESH TEARS) 0.5 % ophthalmic drops 1 Drop  1 Drop Both Eyes Q2HRS PRN Aubree Irving M.D.   1 Drop at 02/26/20 0434   • vitamin B comp+C (ALLBEE WITH C) 1 Tab  1 Tab Enteral Tube DAILY Aubree Irving M.D.   1 Tab at 03/10/20 0428   • folic acid (FOLVITE) tablet 1 mg  1 mg Enteral Tube DAILY Aubree Irving M.D.   1 mg at 03/10/20 0428   • allopurinol (ZYLOPRIM) tablet 100 mg  100 mg Enteral Tube DAILY Aubree Irving M.D.   100 mg at 03/10/20 0428   • nystatin (MYCOSTATIN) powder   Topical BID Aubree Irving M.D.       • hydrALAZINE (APRESOLINE) injection 10 mg  10 mg Intravenous Q4HRS PRN Judy Roque M.D.   10 mg at 02/10/20 0424   • topiramate (TOPAMAX) tablet 200 mg  200 mg Enteral Tube Q12HRS Aubree Irving M.D.   200 mg at 03/10/20 0428   • atorvastatin (LIPITOR) tablet 40 mg  40 mg Enteral Tube DAILY Aubree Irving M.D.   40 mg at 03/10/20 0428   • heparin injection 3,300 Units  3,300 Units Intracatheter PRN Aubree Irving M.D.   3,300 Units at 03/09/20 1616   • epoetin antoinette (EPOGEN/PROCRIT) injection 4,000 Units  4,000 Units Subcutaneous MO, WE + FR Aubree Irving M.D.   4,000 Units at 03/09/20 1224   • Respiratory Care per Protocol   Nebulization Continuous RT Aubree Irving M.D.       • ipratropium-albuterol (DUONEB) nebulizer solution  3 mL Nebulization Q2HRS PRN (RT) Aubree Irving M.D.       • Pharmacy Consult: Enteral tube insertion - review meds/change route/product selection  1 Each Other PHARMACY TO DOSE Aubree Irving M.D.       • labetalol (NORMODYNE/TRANDATE) injection 10 mg  10 mg Intravenous Q4HRS PRN Judy Roque M.D.   10 mg at 02/10/20 0026   •  acetaminophen (TYLENOL) tablet 650 mg  650 mg Enteral Tube Q6HRS PRN Aubree Irving M.D.   650 mg at 03/03/20 1327       Fluids    Intake/Output Summary (Last 24 hours) at 3/10/2020 0752  Last data filed at 3/9/2020 1826  Gross per 24 hour   Intake 1550 ml   Output 2025 ml   Net -475 ml       Laboratory          Recent Labs     03/08/20 0420 03/09/20  0430   SODIUM 134* 137   POTASSIUM 3.6 3.8   CHLORIDE 98 98   CO2 29 30   BUN 77* 97*   CREATININE 1.55* 1.80*   CALCIUM 8.8 8.9     Recent Labs     03/08/20 0420 03/09/20  0430   ALTSGPT  --  25   ASTSGOT  --  32   ALKPHOSPHAT  --  93   TBILIRUBIN  --  0.3   PREALBUMIN  --  13.0*   GLUCOSE 224* 200*     Recent Labs     03/08/20 0420 03/09/20  0430 03/10/20  0440   WBC 15.6* 16.8* 16.9*   NEUTSPOLYS 69.90 70.70 72.20*   LYMPHOCYTES 16.10* 15.60* 14.80*   MONOCYTES 10.10 9.70 9.50   EOSINOPHILS 2.10 1.90 1.70   BASOPHILS 0.40 0.50 0.30   ASTSGOT  --  32  --    ALTSGPT  --  25  --    ALKPHOSPHAT  --  93  --    TBILIRUBIN  --  0.3  --      Recent Labs     03/08/20 0420 03/09/20  0430 03/10/20  0440   RBC 2.65* 2.56* 2.75*   HEMOGLOBIN 8.2* 8.0* 8.6*   HEMATOCRIT 26.9* 25.7* 27.6*   PLATELETCT 208 195 197       Imaging  2/29/20  cxr personally reviewed and shows tracheostomy tube, HD catheter and PICC line  B/l hilar infiltrates suggestive of fluid overload    Assessment/Plan    Acute respiratory failure with hypoxia (HCC)- (present on admission)  Assessment & Plan  Intubated date: 1/14/2020 s/p trach 2/2 Dr Devine  Down size to 6 cuffed shiley 3/3  Some secretions  D/w DR. Bond and RT trial of capping and assess if we need to change to uncuffed  Not vocalizing       I have performed a physical exam and reviewed and updated ROS and Plan today (3/10/2020). In review of yesterday's note (3/9/2020), there are no changes except as documented above.     Eileen Miller MD , FCCP, Pulmonary Service

## 2020-03-10 NOTE — CARE PLAN
Problem: Safety  Goal: Will remain free from injury  Outcome: PROGRESSING AS EXPECTED    Bed in locked and lowest position. Nonskid socks in place. Will continue to monitor.      Problem: Knowledge Deficit  Goal: Knowledge of disease process/condition, treatment plan, diagnostic tests, and medications will improve  Outcome: PROGRESSING AS EXPECTED    Updated on plan of care and medications. Family verbalized understanding.

## 2020-03-10 NOTE — PROGRESS NOTES
Assumed care of pt at shift change.  Pt is nonverbal. Vital is stable. Dressing of trach changed. Inner cannula changed. Flushed Peg tube 30 ml Q4. Flushed rectal tube 120ml. Perineal care provided.  Nutri source fiber given per order.       All needs met at this time. Bed in lowest position, treaded socks on, personal belongings and call light within reach, instructed to call for any assistance, hourly rounding in place.

## 2020-03-11 NOTE — CARE PLAN
Problem: Discharge Barriers/Planning  Goal: Patient's continuum of care needs will be met  Outcome: PROGRESSING AS EXPECTED  Intervention: Assess potential discharge barriers on admission and throughout hospital stay  Note: 500 ml bolus this morning administered for hypotension, effective.   HD today  Tube and trach care, wound vac dressing intact, to suction and functioning properly     Problem: Skin Integrity  Goal: Risk for impaired skin integrity will decrease  Outcome: PROGRESSING AS EXPECTED  Intervention: Implement precautions to protect skin integrity in collaboration with the interdisciplinary team  Note: Q2 turns, 2RN skin checks per shift, dressings CDI, hernandez and BMS in use, low air loss bed and extra pillows in use

## 2020-03-11 NOTE — PALLIATIVE CARE
Palliative Care follow-up  Recieved update from Unit ESPERANZA Lehman that family meeting arranged with ESPERANZA Chavez, and pts family in S105 conference room at 14:00.     Updated: Palliative Team    Plan: Family meeting at 14:00 at S105 Conference room.     Thank you for allowing Palliative Care to support this patient and family. Contact [phone] for additional assistance, change in patient status, or with any questions/concerns.

## 2020-03-11 NOTE — CARE PLAN
Problem: Oxygenation:  Goal: Maintain adequate oxygenation dependent on patient condition  Outcome: PROGRESSING AS EXPECTED     Problem: Bronchopulmonary Hygiene:  Goal: Increase mobilization of retained secretions  Outcome: PROGRESSING AS EXPECTED     4L 28%

## 2020-03-11 NOTE — PROGRESS NOTES
Pt. Is alert/nonverbal. River in place, draining bloody urine. Notified MD Carmen w/ hospitalist. CBC ordered. Trach in place, trach care done. Rectal tube in place and irrigated per order. PEG tube in place, tube feed running at 40mL/hr, goal. WV in place and working. Will continue to monitor.

## 2020-03-11 NOTE — WOUND TEAM
[facility]  Inpatient Services  Wound and Skin Care Progress note    Admission Date: 1/16/2020     Last order of IP CONSULT TO WOUND CARE was found on 3/6/2020 from Hospital Encounter on 1/16/2020     HPI, PMH, SH: Reviewed    Unit where seen by Wound Team: [id number]/[id number]     WOUND CONSULT/FOLLOW-UP RELATED TO:  NPWT Change    Self Report / Pain Level:  Premedicated with Tylenol       OBJECTIVE:  Wet to dry drsg in place    WOUND TYPE, LOCATION, CHARACTERISTICS (Pressure Injuries: location, stage, POA or date identified)      Pressure Injury 01/16/20 Sacrum;Coccyx stage 4 POA  (Active)      3/7/2020    Pressure Injury Stage 4    State of Healing Non-healing    Site Assessment Yellow    Periwound Assessment Red;Purple;Denuded    Margins Unattached edges    Wound Length (cm) 1.5 cm 3/5/2020  6:00 PM   Wound Width (cm) 8.5 cm 3/5/2020  6:00 PM   Wound Depth (cm) 0.5 cm 2/27/2020 12:00 PM   Wound Surface Area (cm^2) 12.75 cm^2 3/5/2020  6:00 PM   Wound Volume (cm^3) 3 cm^3 2/27/2020 12:00 PM   Tunneling (cm) 3.5 cm 3/5/2020  6:00 PM   Undermining (cm) 0 cm 2/27/2020 12:00 PM   Closure Secondary intention    Drainage Amount Scant    Drainage Description Serosanguineous    Non-staged Wound Description Partial thickness    Treatments Cleansed    Wound Cleansing Approved Wound Cleanser    Periwound Protectant Skin Protectant Wipes to Periwound;Hydrocolloid;Paste Ring    Dressing Options Wound Vac    Dressing Cleansing/Solutions Normal Saline    Dressing Changed Changed    Dressing Status Intact    Dressing Change/Treatment Frequency Tuesday, Thursday, Saturday, and As Needed    NEXT Dressing Change/Treatment Date 03/12/20    NEXT Weekly Photo (Inpatient Only) 03/14/20    Wound Odor None    Exposed Structures Bone;Tendon     Tissue Type and Percentage edges red, base yellow      Negative Pressure Wound Therapy 03/07/20 Pressure injury: stage 4 (Active)   Dressing Type Gray Foam (Cleanse Choice)    Number of Foam  Pieces Used 2    Canister Changed No    NEXT Dressing Change/Treatment Date 03/12/20    VAC VeraFlo Irrigant Normal Saline    VAC VeraFlo Soak Time (mins) 10    VAC VeraFlo Instill Volume (ml) 8    VAC VeraFlo - Therapy Time (hrs) 2    VAC VeraFlo Pressure (mm/Hg) Continuous;125 mmHg        Vascular:    EDOUARD:   No results found.      Lab Values:    Lab Results   Component Value Date/Time    WBC 16.9 (H) 03/10/2020 04:40 AM    RBC 2.75 (L) 03/10/2020 04:40 AM    HEMOGLOBIN 8.6 (L) 03/10/2020 04:40 AM    HEMATOCRIT 27.6 (L) 03/10/2020 04:40 AM    CREACTPROT 9.13 (H) 03/09/2020 04:30 AM    SEDRATEWES 68 (H) 08/08/2019 12:48 PM    HBA1C 7.5 (H) 02/07/2019 01:20 PM          Culture: na  Culture Results show:  Recent Results (from the past 720 hour(s))   CULTURE WOUND W/ GRAM STAIN    Collection Time: 02/20/20 11:45 AM   Result Value Ref Range    Significant Indicator POS (POS)     Source WND     Site ABDOMINAL     Culture Result Rare growth mixed skin shiva. (A)     Gram Stain Result Moderate WBCs.  No organisms seen.       Culture Result Candida albicans  Moderate growth   (A)          INTERVENTIONS BY WOUND TEAM:  Pt turned to L side, drsg removed. Cleaned wound with wound cleanser, dried, Hydrocolloid and paste ring to suleman-wound skin. Cleanse choice waffle foam to wound bed, covered with thin grey foam. Trac pad placed. Veraflo started @ 8 ml for 10 min with 2 H 125 mmHg. Paded skin to R hip with mepilex and attached tubing. Pt repositioned after cleaning BM.   Interdisciplinary consultation: Patient, Bedside RN      EVALUATION:pt has St 4 pressure injury with yellow slough and bone present. Suleman-wound skin red and denuded with open skin. Bowel tone poor causing BMS to leak.   Goals: Slow steady decrease in non-viable tissue weekly.    NURSING PLAN OF CARE ORDERS (X):     Dressing changes: Continue previous Dressing Maintenance orders:  x      See new Dressing Maintenance orders:       Skin care: See Skin Care orders:         Rectal tube care: See Rectal Tube Care orders:      Other orders:           WOUND TEAM PLAN OF CARE:   Dressing changes by wound team:          Follow up 1-2 times weekly:               Follow up 3 times weekly:                NPWT change 3 times weekly:   x  Follow up as needed:       Other (explain):     Anticipated discharge plans:   LTACH:        SNF/Rehab:                  Home Care:           Outpatient Wound Center:            Self Care:           Other:  Needs wound VA + drsg changes if going to DC

## 2020-03-11 NOTE — CARE PLAN
Problem: Communication  Goal: The ability to communicate needs accurately and effectively will improve  3/11/2020 0708 by JAYSON MensahN.  Outcome: PROGRESSING AS EXPECTED  3/11/2020 0655 by JAYSON MensahN.  Outcome: PROGRESSING AS EXPECTED     Problem: Safety  Goal: Will remain free from injury  3/11/2020 0708 by JAYSON MensahN.  Outcome: PROGRESSING AS EXPECTED  3/11/2020 0655 by JAYSON MensahN.  Outcome: PROGRESSING AS EXPECTED  Goal: Will remain free from falls  3/11/2020 0708 by JAYSON MensahN.  Outcome: PROGRESSING AS EXPECTED  3/11/2020 0655 by JAYSON MensahN.  Outcome: PROGRESSING AS EXPECTED     Problem: Infection  Goal: Will remain free from infection  3/11/2020 0708 by JAYSON MensahN.  Outcome: PROGRESSING AS EXPECTED  3/11/2020 0655 by JAYSON MensahN.  Outcome: PROGRESSING AS EXPECTED  Notify MD of temp greater than 101.5     Problem: Venous Thromboembolism (VTW)/Deep Vein Thrombosis (DVT) Prevention:  Goal: Patient will participate in Venous Thrombosis (VTE)/Deep Vein Thrombosis (DVT)Prevention Measures  3/11/2020 0708 by Debbie Rodriguez R.N.  Outcome: PROGRESSING AS EXPECTED  3/11/2020 0655 by JAYSON MensahN.  Outcome: PROGRESSING AS EXPECTED     Problem: Bowel/Gastric:  Goal: Normal bowel function is maintained or improved  3/11/2020 0708 by JAYSON MensahN.  Outcome: PROGRESSING AS EXPECTED  3/11/2020 0655 by JAYSON MensahN.  Outcome: PROGRESSING AS EXPECTED  Goal: Will not experience complications related to bowel motility  3/11/2020 0708 by ÁNGEL Mensah.N.  Outcome: PROGRESSING AS EXPECTED  3/11/2020 0655 by JAYSON MensahN.  Outcome: PROGRESSING AS EXPECTED     Problem: Knowledge Deficit  Goal: Knowledge of disease process/condition, treatment plan, diagnostic tests, and medications will improve  3/11/2020 0708 by Debbie Rodriguez R.N.  Outcome: PROGRESSING AS EXPECTED  3/11/2020 0655 by JAYSON MensahN.  Outcome: PROGRESSING AS EXPECTED  Goal: Knowledge of the prescribed  therapeutic regimen will improve  3/11/2020 0708 by JAYSON MensahN.  Outcome: PROGRESSING AS EXPECTED  3/11/2020 0655 by Debbie Rodriguez R.N.  Outcome: PROGRESSING AS EXPECTED     Problem: Discharge Barriers/Planning  Goal: Patient's continuum of care needs will be met  3/11/2020 0708 by JAYSON MensahN.  Outcome: PROGRESSING AS EXPECTED  3/11/2020 0655 by JAYSON MensahN.  Outcome: PROGRESSING AS EXPECTED     Problem: Fluid Volume:  Goal: Will maintain balanced intake and output  3/11/2020 0708 by JAYSON MensahN.  Outcome: PROGRESSING AS EXPECTED  3/11/2020 0655 by JAYSON MensahN.  Outcome: PROGRESSING AS EXPECTED     Problem: Pain Management  Goal: Pain level will decrease to patient's comfort goal  3/11/2020 0708 by JAYSON MensahN.  Outcome: PROGRESSING AS EXPECTED  3/11/2020 0655 by JAYSON MensahN.  Outcome: PROGRESSING AS EXPECTED  Keep pain less than 4 on pain scale per pt. MAR     Problem: Safety - Medical Restraint  Goal: Remains free of injury from restraints (Restraint for Interference with Medical Device)  Description: INTERVENTIONS:  1. Determine that other, less restrictive measures have been tried or would not be effective before applying the restraint  2. Evaluate the patient's condition at the time of restraint application  3. Inform patient/family regarding the reason for restraint  4. Q2H: Monitor safety, psychosocial status, comfort, nutrition and hydration  3/11/2020 0708 by Debbie Rodriguez R.N.  Outcome: PROGRESSING AS EXPECTED  3/11/2020 0655 by JAYSON MensahN.  Outcome: PROGRESSING AS EXPECTED  Goal: Free from restraint(s) (Restraint for Interference with Medical Device)  Description: INTERVENTIONS:  1. ONCE/SHIFT or MINIMUM Q12H: Assess and document the continuing need for restraints  2. Q24H: Continued use of restraint requires LIP to perform face to face examination and written order  3. Identify and implement measures to help patient regain control  3/11/2020 0708 by Debbie Rodriguez  JAYSONN.  Outcome: PROGRESSING AS EXPECTED  3/11/2020 0655 by JAYSON MensahN.  Outcome: PROGRESSING AS EXPECTED     Problem: Respiratory:  Goal: Respiratory status will improve  3/11/2020 0708 by Debbie Rodriguez R.N.  Outcome: PROGRESSING AS EXPECTED  3/11/2020 0655 by JAYSON MensahN.  Outcome: PROGRESSING AS EXPECTED     Problem: Urinary Elimination:  Goal: Ability to reestablish a normal urinary elimination pattern will improve  3/11/2020 0708 by Debbie Rodriguez R.N.  Outcome: PROGRESSING AS EXPECTED  3/11/2020 0655 by JAYSON MensahN.  Outcome: PROGRESSING AS EXPECTED     Problem: Skin Integrity  Goal: Risk for impaired skin integrity will decrease  3/11/2020 0708 by Debbie Rodriguez R.N.  Outcome: PROGRESSING AS EXPECTED  3/11/2020 0655 by JAYSON MensahN.  Outcome: PROGRESSING AS EXPECTED     Problem: Psychosocial Needs:  Goal: Level of anxiety will decrease  3/11/2020 0708 by Debbie Rodriguez R.N.  Outcome: PROGRESSING AS EXPECTED  3/11/2020 0655 by Debbie Rodriguez R.N.  Outcome: PROGRESSING AS EXPECTED     Problem: Mobility  Goal: Risk for activity intolerance will decrease  3/11/2020 0708 by Debbie Rodriguez R.N.  Outcome: PROGRESSING AS EXPECTED  3/11/2020 0655 by ÁNGEL Mensah.N.  Outcome: PROGRESSING AS EXPECTED

## 2020-03-11 NOTE — CARE PLAN
Problem: Oxygenation:  Goal: Maintain adequate oxygenation dependent on patient condition  Outcome: PROGRESSING AS EXPECTED     Problem: Bronchopulmonary Hygiene:  Goal: Increase mobilization of retained secretions  Outcome: PROGRESSING AS EXPECTED     Tpiece aerosol 4L/28%

## 2020-03-11 NOTE — DISCHARGE PLANNING
Medical Social Work  PC to patient's daughter, Belen [phone]; Called to let her know that attending physician, Dr. Leggett will like to have a meeting with her to discuss patient's medical condition and d/c planning. Informed Belen that the meeting will be in the [facility] Conference Room S105  2-3:00    [employer] Text to Dr. Leggett with above information

## 2020-03-11 NOTE — PROGRESS NOTES
2 RN skin check complete.   Devices in place PEG, Tach, Rectal tube, WV.  Skin assessed under devices intact.  Confirmed pressure ulcers found on n/a.  New potential pressure ulcers noted on n/a. Wound consult placed n/a.  The following interventions in place q.2 turns, heel protects on, mepliex in place.       Redness around PEG noted, area cleaned and new guaze placed. WV in place, unable to assess.

## 2020-03-11 NOTE — PROGRESS NOTES
Hospital Medicine Daily Progress Note    Date of Service  3/11/2020    Chief Complaint  75 y.o. female admitted 1/16/2020 with altered mental status    Hospital Course    Ms. Guzmán has a past medical history of hypertension, coronary disease, diabetes, and end-stage renal disease on dialysis brought to the emergency room after apparently she had seizure type activity at her assisted living facility.  She was admitted and underwent continuous EEG monitoring followed by neurology.  Her mentation did not improve and she underwent a tracheostomy on 2/2/2020.  A PEG tube was placed 2/15/2020.  He has had a persistent, severe encephalopathy.      Interval Problem Update  3/10/2020: Patient seen and evaluated on the medical floor.  Patient does track but does not follow commands.  3/11: Ms. Guzmán was evaluated and examined on the medical floor.  This morning her blood pressure dropped into the 70 systolic therefore her blood pressure meds have been held and a 500 mL IV saline bolus was ordered stat.  Spoke with her  and we are setting up a family conference for tomorrow.  Consultants/Specialty  Critical care  Neurology  Infectious disease  Palliative care  Code Status  Full code per family    Disposition  Medical    Review of Systems  Review of Systems   Unable to perform ROS: Medical condition        Physical Exam  Temp:  [36.4 °C (97.5 °F)-37.3 °C (99.2 °F)] 36.4 °C (97.5 °F)  Pulse:  [72-88] 72  Resp:  [18-22] 18  BP: ()/(42-45) 100/45  SpO2:  [95 %-99 %] 97 %    Physical Exam  Vitals signs and nursing note reviewed.   Constitutional:       Comments: Chronically ill appearing   HENT:      Mouth/Throat:      Mouth: Mucous membranes are dry.   Eyes:      General: No scleral icterus.     Conjunctiva/sclera: Conjunctivae normal.   Neck:      Comments: Right tunneled dialysis cath  trach  Cardiovascular:      Rate and Rhythm: Normal rate and regular rhythm.   Pulmonary:      Effort:  Pulmonary effort is normal.      Breath sounds: Normal breath sounds.      Comments: Poor air movement  Abdominal:      General: There is distension.      Tenderness: There is no abdominal tenderness.      Comments: Feeding tube   Genitourinary:     Comments: River cath  Rectal tube  Musculoskeletal:      Right lower leg: No edema.      Left lower leg: No edema.      Comments: Poor muscle tone  Wound VAC right hip   Neurological:      Comments: She does not track and does not follow command         Fluids    Intake/Output Summary (Last 24 hours) at 3/11/2020 0659  Last data filed at 3/11/2020 0600  Gross per 24 hour   Intake 120 ml   Output 150 ml   Net -30 ml       Laboratory  Recent Labs     03/09/20  0430 03/10/20  0440 03/10/20  2315   WBC 16.8* 16.9* 17.9*   RBC 2.56* 2.75* 2.60*   HEMOGLOBIN 8.0* 8.6* 8.1*   HEMATOCRIT 25.7* 27.6* 26.3*   .4* 100.4* 101.2*   MCH 31.3 31.3 31.2   MCHC 31.1* 31.2* 30.8*   RDW 59.8* 59.7* 60.2*   PLATELETCT 195 197 178   MPV 9.2 9.2 9.3     Recent Labs     03/09/20  0430   SODIUM 137   POTASSIUM 3.8   CHLORIDE 98   CO2 30   GLUCOSE 200*   BUN 97*   CREATININE 1.80*   CALCIUM 8.9                     Assessment/Plan  * Status epilepticus (HCC)- (present on admission)  Assessment & Plan  On admission, she was intubated for airway protection, now trach since 2/2/20. No evidence of seizure activity.  He appears now to have a severe, intractable encephalopathy  - continuing regimen of vimpat, topamax, depakote  - repeat EEG showed no seizures  Stable.  Continue antiseizure medications, seizure precautions.  No more seizures    TB lung, latent- (present on admission)  Assessment & Plan  Quantiferon + and will have to rule out active TB. Chest CT found positive left-sided consolidation with pleural effusion.  - pending sputum cx/ AFB stain to rule out active disease before initiating  - s/p thora on 2/25; f/u pathology   - ID following, appreciate recs  - AFB stain negative x3;  "remove isolation  - Per ID, we will \"treat for LTBI with  mg PO daily and B6 25 mg PO daily for 9 months if AFB cxs all negative at 6 weeks\"  Completed antibiotics treatment.    Acute respiratory failure with hypoxia (HCC)- (present on admission)  Assessment & Plan   on trach/T-piece,  pulmonology following  Acute on chronic condition now      Hypokalemia  Assessment & Plan  Patient is end-stage renal disease on hemodialysis, potassium improved.    Severe protein-calorie malnutrition (HCC)- (present on admission)  Assessment & Plan  Continue nutrition via tube feeding.  Dietitian consulted     Cerebrovascular accident (CVA) due to embolism of right middle cerebral artery (HCC)- (present on admission)  Assessment & Plan  Very poor prognosis.  Continue aspirin and statin, PT OT evaluation    End stage renal failure on dialysis (HCC)- (present on admission)  Assessment & Plan  Dialysis via a right-sided tunneled catheter  Continue hemodialysis Monday Wednesday Friday    Normocytic anemia- (present on admission)  Assessment & Plan  Likely anemia of chronic kidney disease.  FOBT is negative.   - transfuse if drops below 7  Hemoglobin stable    Hypertension- (present on admission)  Assessment & Plan  She was on Coreg 25 mg twice a day, Cardura 6 mg daily, hydralazine 100 mg 3 times daily  Blood pressure is been low and all BP meds have been stopped  500 mL IV bolus was ordered on 3/11/2020 due to hypotension    Type 2 diabetes mellitus, with long-term current use of insulin ([facility])- (present on admission)  Assessment & Plan  A1C 7.5%, blood sugars have been ~170. Was taking insulin at home.   Continue sliding scale insulin.  Continue tube feeding.   Blood sugar is stable.  .    Leukocytosis  Assessment & Plan  No other signs of infection, probably reactive  White blood cell count remains high  , chest x-ray is unchanged, procalcitonin is slightly elevated but patient is end-stage renal disease patient has no fever, " UA positive for leukocyte esterase but no other signs of infection.    C. difficile negative     Goals of care, counseling/discussion- (present on admission)  Assessment & Plan  Overall neurologic and physical prognosis is quite poor given her severe, unrelenting encephalopathy and dialysis  DNR, DNI as appropriate and comfort care would be an appropriate next step such as cessation of dialysis.  Family meeting is being set up for 3/12/2020    Pressure injury of sacrum, coccyx, stage 3 (HCC)- (present on admission)  Assessment & Plan  Wound care  Given her ability to move spontaneously, diabetes, dialysis, and poor condition, she is extremely high risk of decubitus ulcers    Dysphagia as late effect of cerebrovascular accident (CVA)- (present on admission)  Assessment & Plan  PEG tube in place    Gout- (present on admission)  Assessment & Plan  On allopurinol via PEG tube       VTE prophylaxis: Heparin

## 2020-03-11 NOTE — CARE PLAN
Problem: Communication  Goal: The ability to communicate needs accurately and effectively will improve  Outcome: PROGRESSING AS EXPECTED     Problem: Safety  Goal: Will remain free from injury  Outcome: PROGRESSING AS EXPECTED  Goal: Will remain free from falls  Outcome: PROGRESSING AS EXPECTED  Keep pt. Free from harm throughout shift     Problem: Infection  Goal: Will remain free from infection  Outcome: PROGRESSING AS EXPECTED  Notify MD of temp greater than 101.5     Problem: Venous Thromboembolism (VTW)/Deep Vein Thrombosis (DVT) Prevention:  Goal: Patient will participate in Venous Thrombosis (VTE)/Deep Vein Thrombosis (DVT)Prevention Measures  Outcome: PROGRESSING AS EXPECTED     Problem: Bowel/Gastric:  Goal: Normal bowel function is maintained or improved  Outcome: PROGRESSING AS EXPECTED  Goal: Will not experience complications related to bowel motility  Outcome: PROGRESSING AS EXPECTED     Problem: Knowledge Deficit  Goal: Knowledge of disease process/condition, treatment plan, diagnostic tests, and medications will improve  Outcome: PROGRESSING AS EXPECTED  Goal: Knowledge of the prescribed therapeutic regimen will improve  Outcome: PROGRESSING AS EXPECTED     Problem: Discharge Barriers/Planning  Goal: Patient's continuum of care needs will be met  Outcome: PROGRESSING AS EXPECTED     Problem: Fluid Volume:  Goal: Will maintain balanced intake and output  Outcome: PROGRESSING AS EXPECTED     Problem: Pain Management  Goal: Pain level will decrease to patient's comfort goal  Outcome: PROGRESSING AS EXPECTED     Problem: Safety - Medical Restraint  Goal: Remains free of injury from restraints (Restraint for Interference with Medical Device)  Description: INTERVENTIONS:  1. Determine that other, less restrictive measures have been tried or would not be effective before applying the restraint  2. Evaluate the patient's condition at the time of restraint application  3. Inform patient/family regarding the  reason for restraint  4. Q2H: Monitor safety, psychosocial status, comfort, nutrition and hydration  Outcome: PROGRESSING AS EXPECTED  Goal: Free from restraint(s) (Restraint for Interference with Medical Device)  Description: INTERVENTIONS:  1. ONCE/SHIFT or MINIMUM Q12H: Assess and document the continuing need for restraints  2. Q24H: Continued use of restraint requires LIP to perform face to face examination and written order  3. Identify and implement measures to help patient regain control  Outcome: PROGRESSING AS EXPECTED     Problem: Respiratory:  Goal: Respiratory status will improve  Outcome: PROGRESSING AS EXPECTED     Problem: Urinary Elimination:  Goal: Ability to reestablish a normal urinary elimination pattern will improve  Outcome: PROGRESSING AS EXPECTED     Problem: Skin Integrity  Goal: Risk for impaired skin integrity will decrease  Outcome: PROGRESSING AS EXPECTED     Problem: Psychosocial Needs:  Goal: Level of anxiety will decrease  Outcome: PROGRESSING AS EXPECTED     Problem: Mobility  Goal: Risk for activity intolerance will decrease  Outcome: PROGRESSING AS EXPECTED

## 2020-03-11 NOTE — PROGRESS NOTES
"[facility] -  PROGRESS NOTE               Author: Lydia Carcamo M.D. Date & Time: 3/11/2020  1:29 PM     HPI:  74 y.o. female admitted to [facility] on 1/14/20 with seizures.She was at a SNF.She had been previously hospitalized at [facility] and diaysis was initiated.  She was doing very poorly then and palliative care was discussed with the family,but they declined.She had very poor functional status and required Jimena lift to get into the dialysis chair.She was found to have  a CVA on MRI at [facility].Her seizures were not controlled and it was felt she was in status epilepticus.  She was getting HD at [facility].Last HD was on 1/13/20.She was seen by Dr Carcamo at [facility].Creatinine was 1.8 and dialysis was held. Neurology Macfarlan that the patient needed continuous EEG monitoring and he was transferred to [facility]    DAILY NEPHROLOGY SUMMARY:  Please see note from 1/31 for prior summaries  Please see note from 2/29 for prior summaries  3/02 - No new events.No interaction.For HD today.  3/04 - No new developments.For HD today.No interaction.  3/06 - No new developments.Seen on dialysis.  3/07 - NO overnight renal events, No HD today (Sat).  Trache with T piece in place.   3/09 - NAEO, no changes  3/11 - NAEO, hypotensive and receiving IVF bolus today    REVIEW OF SYSTEMS:    Review of Systems   Unable to perform ROS: Acuity of condition       PAST FAMILY HISTORY: Reviewed and unchanged since admission  PAST SURGICAL HISTORY:  Reviewed and unchanged since admission  SOCIAL HISTORY:  Reviewed and unchanged since admission  FAMILY HISTORY: Reviewed and unchanged since admission  CURRENT MEDICATIONS: Reviewed since admission to present  IMAGING STUDIES: Reviewed since admission to present  LABORATORY STUDIES: Reviewed since admission to present    PHYSICAL EXAM:  VS:  /48   Pulse 80   Temp 37.3 °C (99.2 °F) (Temporal)   Resp 16   Ht 1.651 m (5' 5\")   Wt 54.5 kg (120 lb 2.4 oz)   SpO2 95%   BMI " 19.99 kg/m²   Physical Exam  Vitals signs and nursing note reviewed.   Constitutional:       General: She is awake. She is not in acute distress.     Appearance: Normal appearance. She is ill-appearing.   HENT:      Head: Normocephalic and atraumatic.      Right Ear: External ear normal.      Left Ear: External ear normal.      Nose: Nose normal. No congestion.      Mouth/Throat:      Mouth: Mucous membranes are moist.      Pharynx: No oropharyngeal exudate.   Eyes:      General:         Right eye: No discharge.         Left eye: No discharge.      Extraocular Movements: Extraocular movements intact.   Neck:      Musculoskeletal: Neck supple. No muscular tenderness.      Comments: +Trach  Cardiovascular:      Rate and Rhythm: Normal rate and regular rhythm.      Heart sounds: Normal heart sounds.   Pulmonary:      Effort: Pulmonary effort is normal.      Breath sounds: Normal breath sounds.   Chest:      Chest wall: No tenderness.   Abdominal:      General: Bowel sounds are normal. There is no distension.      Palpations: Abdomen is soft.   Musculoskeletal:         General: No swelling or tenderness.   Skin:     General: Skin is warm and dry.      Findings: No rash.   Neurological:      Mental Status: She is unresponsive.      Motor: Weakness present. No seizure activity.      Gait: Gait abnormal.   Psychiatric:         Mood and Affect: Mood normal.     Fluids:  In: 150 [Enteral:150]  Out: 1150     LABS:  Recent Results (from the past 24 hour(s))   ACCU-CHEK GLUCOSE    Collection Time: 03/10/20  5:52 PM   Result Value Ref Range    Glucose - Accu-Ck 197 (H) 65 - 99 mg/dL   CBC WITHOUT DIFFERENTIAL    Collection Time: 03/10/20 11:15 PM   Result Value Ref Range    WBC 17.9 (H) 4.8 - 10.8 K/uL    RBC 2.60 (L) 4.20 - 5.40 M/uL    Hemoglobin 8.1 (L) 12.0 - 16.0 g/dL    Hematocrit 26.3 (L) 37.0 - 47.0 %    .2 (H) 81.4 - 97.8 fL    MCH 31.2 27.0 - 33.0 pg    MCHC 30.8 (L) 33.6 - 35.0 g/dL    RDW 60.2 (H) 35.9 - 50.0  fL    Platelet Count 178 164 - 446 K/uL    MPV 9.3 9.0 - 12.9 fL   ACCU-CHEK GLUCOSE    Collection Time: 03/11/20  6:32 AM   Result Value Ref Range    Glucose - Accu-Ck 213 (H) 65 - 99 mg/dL   ACCU-CHEK GLUCOSE    Collection Time: 03/11/20 12:10 PM   Result Value Ref Range    Glucose - Accu-Ck 176 (H) 65 - 99 mg/dL       (click the triangle to expand results)    IMPRESSION:  # ESRD   MWF iHD as OP, but D/C from clinic due to > 30 days inpatient stay   New Placement if ever stable for home D/C          # Status epilepticus   Now resolved, but mentally unresponsive  # S/P acute lacunar infarct              Hx of previous CVA with significant deficits.  # HTN--BP variable often with intradialytic hypotension  # DM II              Management per primary svc  # Acute Hypoxic Respiratory Failure              +Trach and T-piece  # Hx of dysphagia  # Anemia of CKD.Ferritin previously significantly elevated. CAT with HD.  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              PTH 26.2 , Vit D 53 , Phos  2.5   # CAD  # DLD  # Gout,on Allopurinol  # Hx of PEG tube  # Hx of RALF  # Hx of UTI  # COPD  # Edema/Anasarca--improved  # Hypophosphatemia, improved   # Hyponatremia, improved   # Prognosis poor              Family expectations and goals for patient are not in line with prognosis   Comfort care recommended by multiple services  # Stage 3 decubitus ulcer  # TB rule out, + TB quant and abnormal CXR/CT, ID on board and managing  # Leukocytosis      PLAN:               MWF iHD              No UF today due to hypotension              Continue Epogen with HD              Enteral feedings              No dietary protein restrictions              Follow labs              Very poor prognosis, recommend comfort care, if and when family agreeable   May need to consider ethics consultation if this is drawn out too long              Will see on MWF with HD, please call on other days w/ questions or concerns    All prior notes form other doctors  and RN staff were reviewed from admission to current day to help me make my clinical decisions

## 2020-03-11 NOTE — PROGRESS NOTES
Pt hypotensive this morning, initial BP 87/42, follow up re-assment 84/42; then 78/38. Dr Leggett notified, 500 ml bolus ordered and running now.

## 2020-03-12 NOTE — CARE PLAN
Problem: Oxygenation:  Goal: Maintain adequate oxygenation dependent on patient condition  Outcome: PROGRESSING AS EXPECTED     Problem: Bronchopulmonary Hygiene:  Goal: Increase mobilization of retained secretions  3/12/2020 0343 by Pamela Allan, RRT  Outcome: PROGRESSING AS EXPECTED     Aerosol/Tpiece. 4l/28%

## 2020-03-12 NOTE — WOUND TEAM
[facility]  Inpatient Services  Wound and Skin Care Progress Note    Admission Date:  1/16/2020   HPI, PMH, SH: Reviewed  Unit where seen by Wound Team: [id number]    WOUND TEAM FOLLOW UP: Sacral ulcer    Self Report / Pain Level:no s/s of distress      OBJECTIVE: River & BMS in place. On NUBIA. Heel float boots. Hydrocolloid to coccyx.     WOUND TYPE, LOCATION, CHARACTERISTICS (Pressure ulcers: location, stage, POA or date identified)      Pressure Injury 01/16/20 Sacrum;Coccyx stage 3 POA  (Active)   Wound Image   2/27/2020 12:00 PM   Pressure Injury Stage 4 3/12/2020 12:00 PM   State of Healing Non-healing 3/12/2020 12:00 PM   Site Assessment Yellow 3/12/2020 12:00 PM   Periwound Assessment Red;Purple;Denuded 3/12/2020 12:00 PM   Margins Unattached edges 3/12/2020 12:00 PM   Wound Length (cm) 10 cm 3/12/2020 12:00 PM   Wound Width (cm) 8 cm 3/12/2020 12:00 PM   Wound Depth (cm) 0.5 cm 2/27/2020 12:00 PM   Wound Surface Area (cm^2) 80 cm^2 3/12/2020 12:00 PM   Wound Volume (cm^3) 3 cm^3 2/27/2020 12:00 PM   Tunneling (cm) 3.5 cm 3/5/2020  6:00 PM   Undermining (cm) 3 cm 3/12/2020 12:00 PM   Closure Secondary intention 3/12/2020 12:00 PM   Drainage Amount Scant 3/12/2020 12:00 PM   Drainage Description Serosanguineous 3/12/2020 12:00 PM   Non-staged Wound Description Partial thickness 3/7/2020  5:00 PM   Treatments Other (Comment) 3/12/2020  9:21 AM   Wound Cleansing Approved Wound Cleanser 3/12/2020 12:00 PM   Periwound Protectant Skin Protectant Wipes to Periwound;Hydrocolloid;Paste Ring 3/12/2020 12:00 PM   Dressing Options Wound Vac 3/12/2020 12:00 PM   Dressing Cleansing/Solutions Normal Saline 3/12/2020 12:00 PM   Dressing Changed Changed 3/12/2020  9:21 AM   Dressing Status Intact 3/12/2020 12:00 PM   Dressing Change/Treatment Frequency Tuesday, Thursday, Saturday, and As Needed 3/12/2020 12:00 PM   NEXT Dressing Change/Treatment Date 03/12/20 3/10/2020  3:15 PM   NEXT Weekly Photo (Inpatient Only)  03/14/20 3/10/2020  3:15 PM   Wound Odor None 3/12/2020 12:00 PM   Exposed Structures Bone;Tendon 3/12/2020 12:00 PM   WOUND NURSE ONLY - Tissue Type and Percentage edges red, base yellow 3/12/2020 12:00 PM   WOUND NURSE ONLY - Time Spent with Patient (mins) 60 3/12/2020 12:00 PM           Negative Pressure Wound Therapy 03/07/20 Pressure injury: stage 4 (Active)   NPWT Pump Mode / Pressure Setting 125 mmHg 3/12/2020 12:29 PM   Dressing Type Black Foam (Veraflo) 3/12/2020 12:29 PM   Number of Foam Pieces Used 2 3/12/2020 12:29 PM   Canister Changed No 3/10/2020  9:45 PM   Output (mL) 350 mL 3/12/2020  3:00 AM   VAC VeraFlo Irrigant Normal Saline 3/12/2020 12:29 PM   VAC VeraFlo Soak Time (mins) 10 3/12/2020 12:29 PM   VAC VeraFlo Instill Volume (ml) 8 3/12/2020 12:29 PM   VAC VeraFlo - Therapy Time (hrs) 2 3/12/2020 12:29 PM   VAC VeraFlo Pressure (mm/Hg) Continuous;125 mmHg 3/12/2020 12:29 PM               Lab Values:    Lab Results   Component Value Date/Time    WBC 17.9 (H) 03/10/2020 11:15 PM    RBC 2.60 (L) 03/10/2020 11:15 PM    HEMOGLOBIN 8.1 (L) 03/10/2020 11:15 PM    HEMATOCRIT 26.3 (L) 03/10/2020 11:15 PM      Results from last 7 days   Lab Units 03/09/20  0430   C REACTIVE PROTEIN 4596 mg/dL 9.13*       Lab Results   Component Value Date/Time    HBA1C 7.5 (H) 02/07/2019 01:20 PM         INTERVENTIONS BY WOUND TEAM: Pt turned to Right side. Wound cleansed with cleanser and gauze. Covered distal half of wound with aquacel, hydrocolloid and drape.  Placed veraflo black foam snake to fill wound bed and into undermining and surface of wound,  covered with drape, cut hole, covered with button of black foam and trac pad.  Set to instill 8 mL normal saline, for 10 minutes every 2 hours.  Achieved NPWT 125 mmHg continuous, 2 mepilex under tubing and tubing secured to mepilex x2        Interdisciplinary consultation: bedside RN,   EVALUATION:  Wound not supporting granular tissue.  Wound continues to evolve expect  continued tissue die off and odor. Vera rocco cleanse choice to assist with slough removal and granulation tissue formation.      Goals:  Slow steady decrease in wound area and depth weekly    NURSING PLAN OF CARE:    Dressing changes: Continue previous Dressing Maintenance orders:   x     See new Dressing Maintenance orders:       Skin care: See Skin Care orders:        Rectal tube care: See Rectal Tube Care orders:    x  Other orders:           WOUND TEAM PLAN OF CARE (X):   NPWT change 3 x week:      X  Dressing changes:      Follow up as needed:    Other:    Anticipated discharge plans (X):  SNF:           Home Care:           Outpatient Wound Center:            Self Care:            Other: will need wound care for sacral pressure injury  - Awaiting LTACH placement

## 2020-03-12 NOTE — PROGRESS NOTES
2 RN skin check complete.   Devices in place trach, peg tube, hernandez, rectal tube   Skin assessed under devices  Yes   Confirmed pressure ulcers found on : sacrum (award in chart)  New potential pressure ulcers noted on n/a Wound consult placed: yes   The following interventions in place: Q2 turns, wound consult in place/wound RN following, pressure ulcer on sacrum- wound vac placed by wound nurse today. Podus boots to bilateral feet.

## 2020-03-12 NOTE — PROGRESS NOTES
2 RN skin check complete with Kim HAILE.   Devices in place PEG, River, Rectal tube.  Skin assessed under devices yes.  Confirmed pressure ulcers found on scarum (noted in chart).  New potential pressure ulcers noted on n/a. Wound consult placed yes.  The following interventions in place q. 2 turns, pillows in place to for positioning/support, heel protectors, mepliex on heels and elbows, 2 RN skin check.    Redness noted around PEG tube, wound consult placed. Pressure ulcer on sacrum is noted to not be fully covered by the WV dressing, more dressing applied to cover bottom of wound.

## 2020-03-12 NOTE — PROGRESS NOTES
3hr HD started @ 1324 and completed @ 1625,tx well tolerated,VSS,net UF = 1100ml.RIJ TDC dressing CDI,report given to Demetrice Zepeda RN.

## 2020-03-12 NOTE — CARE PLAN
Problem: Communication  Goal: The ability to communicate needs accurately and effectively will improve  Outcome: NOT MET  Note: Unable to make needs known. Aphasia present baseline chronic in nature      Problem: Discharge Barriers/Planning  Goal: Patient's continuum of care needs will be met  Outcome: NOT MET

## 2020-03-12 NOTE — PROGRESS NOTES
Patient alert- occasionally tracks with eyes but not on command. Non verbal. Trach in place- respiratory following. Performed trach care- inner cannula changed. PEG tube remains in place- tube feed at 40/hr which is goal. River and rectal tube in place. Wound vac changed by wound care RN this morning. Family meeting to happen today with palliative care.  2 RN skin check performed with Joana BUI RN.

## 2020-03-12 NOTE — PROGRESS NOTES
Pt. Alert, non-verbal. Trach in place, skin c/d/i underneath. Trach care performed, inner cannula changed. PEG tube in place, redness and excoriation around PEG, wound care consult placed. Tube feed running at 40mL/hr at goal. River in place, urine remains bloody. Rectal tube in place and intact, irrigated per order. WV not suctioning, removed WV and placed wet to dry dressing per order, wound care notified. No issues, will continue to monitor.

## 2020-03-12 NOTE — CARE PLAN
Problem: Oxygenation:  Goal: Maintain adequate oxygenation dependent on patient condition  Outcome: PROGRESSING AS EXPECTED    Problem: Bronchopulmonary Hygiene:  Goal: Increase mobilization of retained secretions  Outcome: PROGRESSING AS EXPECTED   4lpm/28% aerosol

## 2020-03-12 NOTE — CARE PLAN
Problem: Communication  Goal: The ability to communicate needs accurately and effectively will improve  Outcome: PROGRESSING AS EXPECTED     Problem: Safety  Goal: Will remain free from injury  Outcome: PROGRESSING AS EXPECTED  Goal: Will remain free from falls  Outcome: PROGRESSING AS EXPECTED  Keep pt. Free from harm throughout shift     Problem: Infection  Goal: Will remain free from infection  Outcome: PROGRESSING AS EXPECTED     Problem: Venous Thromboembolism (VTW)/Deep Vein Thrombosis (DVT) Prevention:  Goal: Patient will participate in Venous Thrombosis (VTE)/Deep Vein Thrombosis (DVT)Prevention Measures  Outcome: PROGRESSING AS EXPECTED     Problem: Bowel/Gastric:  Goal: Normal bowel function is maintained or improved  Outcome: PROGRESSING AS EXPECTED  Goal: Will not experience complications related to bowel motility  Outcome: PROGRESSING AS EXPECTED     Problem: Knowledge Deficit  Goal: Knowledge of disease process/condition, treatment plan, diagnostic tests, and medications will improve  Outcome: PROGRESSING AS EXPECTED  Goal: Knowledge of the prescribed therapeutic regimen will improve  Outcome: PROGRESSING AS EXPECTED     Problem: Discharge Barriers/Planning  Goal: Patient's continuum of care needs will be met  Outcome: PROGRESSING AS EXPECTED     Problem: Fluid Volume:  Goal: Will maintain balanced intake and output  Outcome: PROGRESSING AS EXPECTED     Problem: Pain Management  Goal: Pain level will decrease to patient's comfort goal  Outcome: PROGRESSING AS EXPECTED  Keep pain less than 4 on pain scale per pt MAR     Problem: Safety - Medical Restraint  Goal: Remains free of injury from restraints (Restraint for Interference with Medical Device)  Description: INTERVENTIONS:  1. Determine that other, less restrictive measures have been tried or would not be effective before applying the restraint  2. Evaluate the patient's condition at the time of restraint application  3. Inform patient/family  regarding the reason for restraint  4. Q2H: Monitor safety, psychosocial status, comfort, nutrition and hydration  Outcome: PROGRESSING AS EXPECTED  Goal: Free from restraint(s) (Restraint for Interference with Medical Device)  Description: INTERVENTIONS:  1. ONCE/SHIFT or MINIMUM Q12H: Assess and document the continuing need for restraints  2. Q24H: Continued use of restraint requires LIP to perform face to face examination and written order  3. Identify and implement measures to help patient regain control  Outcome: PROGRESSING AS EXPECTED     Problem: Respiratory:  Goal: Respiratory status will improve  Outcome: PROGRESSING AS EXPECTED     Problem: Urinary Elimination:  Goal: Ability to reestablish a normal urinary elimination pattern will improve  Outcome: PROGRESSING AS EXPECTED     Problem: Skin Integrity  Goal: Risk for impaired skin integrity will decrease  Outcome: PROGRESSING AS EXPECTED     Problem: Psychosocial Needs:  Goal: Level of anxiety will decrease  Outcome: PROGRESSING AS EXPECTED     Problem: Mobility  Goal: Risk for activity intolerance will decrease  Outcome: PROGRESSING AS EXPECTED

## 2020-03-12 NOTE — PROGRESS NOTES
Hospital Medicine Daily Progress Note    Date of Service  3/12/2020    Chief Complaint  75 y.o. female admitted 1/16/2020 with altered mental status    Hospital Course    Ms. Guzmán has a past medical history of hypertension, coronary disease, diabetes, and end-stage renal disease on dialysis brought to the emergency room after apparently she had seizure type activity at her assisted living facility.  She was admitted and underwent continuous EEG monitoring followed by neurology.  Her mentation did not improve and she underwent a tracheostomy on 2/2/2020.  A PEG tube was placed 2/15/2020.  He has had a persistent, severe encephalopathy.      Interval Problem Update  3/10/2020: Patient seen and evaluated on the medical floor.  Patient does track but does not follow commands.  3/11: Ms. Guzmán was evaluated and examined on the medical floor.  This morning her blood pressure dropped into the 70 systolic therefore her blood pressure meds have been held and a 500 mL IV saline bolus was ordered stat.  Spoke with her  and we are setting up a family conference for tomorrow.  3/12/2020; patient seen and evaluated on the medical floor.  Her status from remains about the same though her blood pressure remains low and we have stopped her blood pressure medications.  Does not have fever.  I extensive discussion with her daughter and son along with Dodie Turner from the palliative care/ethics committee.  Her daughter effectively wants full code full treat and for us to do everything to keep her mother alive as she feels her mother is getting better and will be able to go home and live with her again.  Discussed that her mother is been here about 51 days and that her mental status has been extremely poor and even her multiple comorbidities her mortality is effectively 100%.  Her son and daughter will call there are other siblings in the [facility] and discuss further and we will reconvene on Monday 35  minutes were spent that of which over 50% time was spent in counseling about end-of-life  Consultants/Specialty  Critical care  Neurology  Infectious disease  Palliative care  Code Status  Full code per family    Disposition  Medical    Review of Systems  Review of Systems   Unable to perform ROS: Medical condition        Physical Exam  Temp:  [36.3 °C (97.3 °F)-36.7 °C (98.1 °F)] 36.7 °C (98.1 °F)  Pulse:  [80-88] 85  Resp:  [16-18] 16  BP: ()/() 97/52  SpO2:  [96 %-100 %] 98 %    Physical Exam  Vitals signs and nursing note reviewed.   Constitutional:       Comments: Chronically ill appearing   HENT:      Mouth/Throat:      Mouth: Mucous membranes are dry.   Eyes:      General: No scleral icterus.     Conjunctiva/sclera: Conjunctivae normal.   Neck:      Comments: Right tunneled dialysis cath  trach  Cardiovascular:      Rate and Rhythm: Normal rate and regular rhythm.   Pulmonary:      Effort: Pulmonary effort is normal.      Breath sounds: Normal breath sounds.      Comments: Poor air movement  Abdominal:      General: There is no distension.      Tenderness: There is no abdominal tenderness.      Comments: Feeding tube   Genitourinary:     Comments: River cath  Rectal tube  Musculoskeletal:      Right lower leg: No edema.      Left lower leg: No edema.      Comments: Poor muscle tone  Wound VAC right hip   Skin:     General: Skin is dry.   Neurological:      Comments: She does not track and does not follow commands  Slight withdraw to pain         Fluids    Intake/Output Summary (Last 24 hours) at 3/12/2020 1545  Last data filed at 3/12/2020 1047  Gross per 24 hour   Intake 620 ml   Output 1950 ml   Net -1330 ml       Laboratory  Recent Labs     03/10/20  0440 03/10/20  2315   WBC 16.9* 17.9*   RBC 2.75* 2.60*   HEMOGLOBIN 8.6* 8.1*   HEMATOCRIT 27.6* 26.3*   .4* 101.2*   MCH 31.3 31.2   MCHC 31.2* 30.8*   RDW 59.7* 60.2*   PLATELETCT 197 178   MPV 9.2 9.3                    "      Assessment/Plan  * Status epilepticus (HCC)- (present on admission)  Assessment & Plan  On admission, she was intubated for airway protection, now trach since 2/2/20. No evidence of seizure activity.  He appears now to have a severe, intractable encephalopathy  - continuing regimen of vimpat, topamax, depakote  - repeat EEG showed no seizures  Stable.  Continue antiseizure medications, seizure precautions.  No more seizures    TB lung, latent- (present on admission)  Assessment & Plan  Quantiferon + and will have to rule out active TB. Chest CT found positive left-sided consolidation with pleural effusion.  - pending sputum cx/ AFB stain to rule out active disease before initiating  - s/p thora on 2/25; f/u pathology   - ID following, appreciate recs  - AFB stain negative x3; remove isolation  - Per ID, we will \"treat for LTBI with  mg PO daily and B6 25 mg PO daily for 9 months if AFB cxs all negative at 6 weeks\"  Completed antibiotics treatment.    Acute respiratory failure with hypoxia (HCC)- (present on admission)  Assessment & Plan   on trach/T-piece,  pulmonology following  Acute on chronic condition now      Hypokalemia  Assessment & Plan  Patient is end-stage renal disease on hemodialysis, potassium improved.    Severe protein-calorie malnutrition (HCC)- (present on admission)  Assessment & Plan  Continue nutrition via tube feeding.  Dietitian consulted     Cerebrovascular accident (CVA) due to embolism of right middle cerebral artery (HCC)- (present on admission)  Assessment & Plan  Very poor prognosis.  Continue aspirin and statin,     End stage renal failure on dialysis (HCC)- (present on admission)  Assessment & Plan  Dialysis via a right-sided tunneled catheter  Continue hemodialysis Monday Wednesday Friday    Normocytic anemia- (present on admission)  Assessment & Plan  Likely anemia of chronic kidney disease.  FOBT is negative.   - transfuse if drops below 7  Hemoglobin " stable    Hypertension- (present on admission)  Assessment & Plan  She was on Coreg 25 mg twice a day, Cardura 6 mg daily, hydralazine 100 mg 3 times daily  Blood pressure is been low and all BP meds have been stopped  500 mL IV bolus was ordered on 3/11/2020 due to hypotension  Blood pressure remains low    Type 2 diabetes mellitus, with long-term current use of insulin ([facility])- (present on admission)  Assessment & Plan  A1C 7.5%, blood sugars have been ~170. Was taking insulin at home.   Continue sliding scale insulin.  Continue tube feeding.   Blood sugar is stable.  .    Leukocytosis  Assessment & Plan  No other signs of infection, probably reactive  White blood cell count remains high  , chest x-ray is unchanged, procalcitonin is slightly elevated but patient is end-stage renal disease patient has no fever, UA positive for leukocyte esterase but no other signs of infection.    C. difficile negative     Goals of care, counseling/discussion- (present on admission)  Assessment & Plan  Overall neurologic and physical prognosis is quite poor given her severe, unrelenting encephalopathy and dialysis  DNR, DNI as appropriate and comfort care would be an appropriate next step such as cessation of dialysis.  Family meeting was had on 3/12/2020    Pressure injury of sacrum, coccyx, stage 3 ([facility])- (present on admission)  Assessment & Plan  Wound care  Given her ability to move spontaneously, diabetes, dialysis, and poor condition, she is extremely high risk of decubitus ulcers    Dysphagia as late effect of cerebrovascular accident (CVA)- (present on admission)  Assessment & Plan  PEG tube in place    Gout- (present on admission)  Assessment & Plan  On allopurinol via PEG tube       VTE prophylaxis: Heparin

## 2020-03-12 NOTE — PROGRESS NOTES
Palliative Care follow-up  Care conference held with patient's daughter/DPOA-HC Belen, patient's son Catracho and his wife, Dominik Albarran and this writer.  Introductions made and purpose of care conference explained.  Dr. Leggett provided a detailed overview of the patient's current clinical condition, prognosis and treatments.  Patient is not improving with eye tracking the only neurological response.  Patient is trached, has tube feedings, dialysis  and significant wounds. Belen reports that she believes pt is getting better but very slowly.  Belen reports that pt is aware of her presence and attempts to verbalize.  Belen reports she would like to take the patient home but that she is unable to provide the care her mother needs.  Discussed patient's long length of stay and continued poor prognosis.  Stopping dialysis and changing the goal of care to comfort was presented, including that family could then take pt home on hospice. Belen again stated that she is not able to care for her mother in her current condition.  Discussed patients values and what the patient would want to happen in this situation.  Discussed previous conversations with family who stated pt would want to be at home, be able to interact with family, to cook and eat and sing.  Patient is not able to do any of these things.  Suggested Belen talk to her siblings about what would be best of for their mother given her current condition.  Also advised Belen that although she is the agent and makes decisions for her mother the decisions should be consistent with the values and choices the patient would make. In addition the team especially the medical staff are a part of shared decision making.  Belen's role as advocate and also her love and care for her mother was acknowledge.  Support provided.  Belen will speak to her siblings and a care conference will occur the beginning of next week.     Updated: Dominik Albarran    Plan: Dominik hill  coordinate a care conference with family for Tuesday 3/17/2020.  Based on the outcome of the care conference an ethics consultation may be requested.    Thank you for allowing Palliative Care to support this patient and family. Contact [phone] for additional assistance, change in patient status, or with any questions/concerns.

## 2020-03-13 NOTE — DIETARY
Nutrition Support Weekly Update: Day 57 of admit.  Cassandra Guzmán is a 75 y.o. female with admitting DX of Seizures, Ventilatory respiratory failure, Acute renal failure, Respiratory failure requiring intubation.     Tube feeding initiated on 1/17. Current TF via PEG is Impact Peptide 1.5 @ 40 mL/hr, providing 1440 kcal, 90 gm protein, 134 gm CHO, and 739 mL of free water per day. Pt is also receiving Nutrisource Fiber packets 6x/day, providing an additional 18 gm fiber and 90 kcal, for a total of 1530 kcal/day.       Assessment:  Wt 2/22: 54.5 kg via bed scale - No updated weight x 3 weeks.     Evaluation:   1. TF running @ goal  2. Pt has a trach with t-piece   3. Pt receiving dialysis every MWF per nephrology note - however held on Wednesday d/t hypotension   4. Edema: generalized edema throughout   5. Labs (3/9): Glucose 200, BUN 97, creat 1.80, pre-alb 13.0 (down from 16.0 on 2/17), CRP 9.13 (up from 4.28 on 2/17)  6. Meds: ascorbic acid, folic acid, SSI, omeprazole, theragran-m, allbee with c  7. Skin: Wound team note 3/10: Sacrococcygeal stage now advanced from stage 3 wound to stage 4 - bone exposed - wound vac in place   8. Last BM: BMS in place   9. D/C planning: care conference planned for 3/17/2020  10. No changes to current feedings - see RD note 2/21 documenting family declining bolus feeds or a more standard formula      Malnutrition Risk: Unable to update without an updated weight.      Recommendations/Plan:  1. Currently on 2 different types of multivitamins (allbee with C since 1/25 but theragran-m added 3/8) - recommend to d/c theragran-m and continue allbee with C  2. Continue ascorbic acid   3. Continue current TF rate and formula   4. Continue Nutrisource Fiber packets  5. Fluids per MD  6. Monitor weight - please obtain an updated weight as able      RD following

## 2020-03-13 NOTE — CARE PLAN
Problem: Safety  Goal: Will remain free from injury  Outcome: PROGRESSING AS EXPECTED  Goal: Will remain free from falls  Outcome: PROGRESSING AS EXPECTED     Problem: Venous Thromboembolism (VTW)/Deep Vein Thrombosis (DVT) Prevention:  Goal: Patient will participate in Venous Thrombosis (VTE)/Deep Vein Thrombosis (DVT)Prevention Measures  Outcome: PROGRESSING AS EXPECTED     Problem: Bowel/Gastric:  Goal: Normal bowel function is maintained or improved  Outcome: PROGRESSING AS EXPECTED

## 2020-03-13 NOTE — PROGRESS NOTES
"[facility] -  PROGRESS NOTE               Author: Lydia Carcamo M.D. Date & Time: 3/13/2020  10:56 AM     HPI:  74 y.o. female admitted to [facility] on 1/14/20 with seizures.She was at a SNF.She had been previously hospitalized at [facility] and diaysis was initiated.  She was doing very poorly then and palliative care was discussed with the family,but they declined.She had very poor functional status and required Jimena lift to get into the dialysis chair.She was found to have  a CVA on MRI at [facility].Her seizures were not controlled and it was felt she was in status epilepticus.  She was getting HD at [facility].Last HD was on 1/13/20.She was seen by Dr Carcamo at [facility].Creatinine was 1.8 and dialysis was held. Neurology [place] that the patient needed continuous EEG monitoring and he was transferred to [facility]    DAILY NEPHROLOGY SUMMARY:  Please see note from 1/31 for prior summaries  Please see note from 2/29 for prior summaries  3/02 - No new events.No interaction.For HD today.  3/04 - No new developments.For HD today.No interaction.  3/06 - No new developments.Seen on dialysis.  3/07 - NO overnight renal events, No HD today (Sat).  Trache with T piece in place.   3/09 - NAEO, no changes  3/11 - NAEO, hypotensive and receiving IVF bolus today  3/13 - NAEO, SOD, BP low, UF off today    REVIEW OF SYSTEMS:    Review of Systems   Unable to perform ROS: Acuity of condition     PAST FAMILY HISTORY: Reviewed and unchanged since admission  PAST SURGICAL HISTORY:  Reviewed and unchanged since admission  SOCIAL HISTORY:  Reviewed and unchanged since admission  FAMILY HISTORY: Reviewed and unchanged since admission  CURRENT MEDICATIONS: Reviewed since admission to present  IMAGING STUDIES: Reviewed since admission to present  LABORATORY STUDIES: Reviewed since admission to present    PHYSICAL EXAM:  VS:  /52   Pulse 88   Temp 36.9 °C (98.4 °F) (Temporal)   Resp 18   Ht 1.651 m (5' 5\")   Wt 54.5 " kg (120 lb 2.4 oz)   SpO2 93%   BMI 19.99 kg/m²   Physical Exam  Vitals signs and nursing note reviewed.   Constitutional:       General: She is awake. She is not in acute distress.     Appearance: Normal appearance. She is ill-appearing.   HENT:      Head: Normocephalic and atraumatic.      Right Ear: External ear normal.      Left Ear: External ear normal.      Nose: Nose normal. No congestion.      Mouth/Throat:      Mouth: Mucous membranes are moist.      Pharynx: No oropharyngeal exudate.   Eyes:      General:         Right eye: No discharge.         Left eye: No discharge.      Extraocular Movements: Extraocular movements intact.   Neck:      Musculoskeletal: Neck supple. No muscular tenderness.      Comments: +Trach  Cardiovascular:      Rate and Rhythm: Normal rate and regular rhythm.      Heart sounds: Normal heart sounds.   Pulmonary:      Effort: Pulmonary effort is normal.      Breath sounds: Normal breath sounds.   Chest:      Chest wall: No tenderness.   Abdominal:      General: Bowel sounds are normal. There is no distension.      Palpations: Abdomen is soft.   Musculoskeletal:         General: No swelling or tenderness.   Skin:     General: Skin is warm and dry.      Findings: No rash.   Neurological:      Mental Status: She is unresponsive.      Motor: Weakness present. No seizure activity.      Gait: Gait abnormal.   Psychiatric:         Mood and Affect: Mood normal.     Fluids:  In: 290 [Other:200; Enteral:90]  Out: -     LABS:  Recent Results (from the past 24 hour(s))   ACCU-CHEK GLUCOSE    Collection Time: 03/12/20 12:12 PM   Result Value Ref Range    Glucose - Accu-Ck 201 (H) 65 - 99 mg/dL   ACCU-CHEK GLUCOSE    Collection Time: 03/12/20  5:45 PM   Result Value Ref Range    Glucose - Accu-Ck 173 (H) 65 - 99 mg/dL   ACCU-CHEK GLUCOSE    Collection Time: 03/13/20 12:17 AM   Result Value Ref Range    Glucose - Accu-Ck 153 (H) 65 - 99 mg/dL   ACCU-CHEK GLUCOSE    Collection Time: 03/13/20  5:32  AM   Result Value Ref Range    Glucose - Accu-Ck 159 (H) 65 - 99 mg/dL       (click the triangle to expand results)    IMPRESSION:  # ESRD   MWF iHD as OP, but D/C from clinic due to > 30 days inpatient stay   New Placement if ever stable for home D/C          # Status epilepticus   Now resolved, but mentally unresponsive  # S/P acute lacunar infarct              Hx of previous CVA with significant deficits.  # HTN--BP variable often with intradialytic hypotension  # DM II              Management per primary svc  # Acute Hypoxic Respiratory Failure              +Trach and T-piece  # Hx of dysphagia  # Anemia of CKD.Ferritin previously significantly elevated. CAT with HD.  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              PTH 26.2 , Vit D 53 , Phos  2.5   # CAD  # DLD  # Gout,on Allopurinol  # Hx of PEG tube  # Hx of RALF  # Hx of UTI  # COPD  # Edema/Anasarca--improved  # Hypophosphatemia, improved   # Hyponatremia, improved   # Prognosis poor              Family expectations and goals for patient are not in line with prognosis   Comfort care recommended by multiple services  # Stage 3 decubitus ulcer  # TB rule out, + TB quant and abnormal CXR/CT, ID on board and managing  # Leukocytosis      PLAN:               MWF iHD              No UF today due to hypotension              Continue Epogen with HD              Enteral feedings              No dietary protein restrictions              Follow labs              Very poor prognosis, recommend comfort care, if and when family agreeable   Ethics consult placed yesterday by hospitalist, agree its the right way to move forward              Will see on MWF with HD, please call on other days w/ questions or concerns    All prior notes form other doctors and RN staff were reviewed from admission to current day to help me make my clinical decisions

## 2020-03-13 NOTE — PROGRESS NOTES
Hospital Medicine Daily Progress Note    Date of Service  3/13/2020    Chief Complaint  75 y.o. female admitted 1/16/2020 with altered mental status    Hospital Course    Ms. Guzmán has a past medical history of hypertension, coronary disease, diabetes, and end-stage renal disease on dialysis brought to the emergency room after apparently she had seizure type activity at her assisted living facility.  She was admitted and underwent continuous EEG monitoring followed by neurology.  Her mentation did not improve and she underwent a tracheostomy on 2/2/2020.  A PEG tube was placed 2/15/2020.  He has had a persistent, severe encephalopathy.      Interval Problem Update  3/10/2020: Patient seen and evaluated on the medical floor.  Patient does track but does not follow commands.  3/11: Ms. Guzmán was evaluated and examined on the medical floor.  This morning her blood pressure dropped into the 70 systolic therefore her blood pressure meds have been held and a 500 mL IV saline bolus was ordered stat.  Spoke with her  and we are setting up a family conference for tomorrow.  3/12/2020; patient seen and evaluated on the medical floor.  Her status from remains about the same though her blood pressure remains low and we have stopped her blood pressure medications.  Does not have fever.  I extensive discussion with her daughter and son along with Dodie Turner from the palliative care/ethics committee.  Her daughter effectively wants full code full treat and for us to do everything to keep her mother alive as she feels her mother is getting better and will be able to go home and live with her again.  Discussed that her mother is been here about 51 days and that her mental status has been extremely poor and even her multiple comorbidities her mortality is effectively 100%.  Her son and daughter will call there are other siblings in the [facility] and discuss further and we will reconvene on  Monday.  3/13: patient seen and evaluated on the medical floor.  I discussed her condition with the wound care nurse and she is very concerned about the degree of wound and consideration of pain for the patient.  Consultants/Specialty  Critical care  Neurology  Infectious disease  Palliative care  Code Status  Full code per family    Disposition  Medical    Review of Systems  Review of Systems   Unable to perform ROS: Medical condition        Physical Exam  Temp:  [36.2 °C (97.1 °F)-36.9 °C (98.4 °F)] 36.9 °C (98.4 °F)  Pulse:  [80-91] 88  Resp:  [16-20] 18  BP: ()/(50-54) 100/52  SpO2:  [93 %-98 %] 93 %    Physical Exam  Vitals signs and nursing note reviewed.   Constitutional:       Comments: Chronically ill appearing   HENT:      Mouth/Throat:      Mouth: Mucous membranes are dry.   Eyes:      Conjunctiva/sclera: Conjunctivae normal.   Neck:      Comments: Right tunneled dialysis cath  trach  Cardiovascular:      Rate and Rhythm: Normal rate and regular rhythm.   Pulmonary:      Effort: Pulmonary effort is normal.      Breath sounds: Normal breath sounds.      Comments: Poor air movement  Abdominal:      General: There is no distension.      Tenderness: There is no abdominal tenderness.      Comments: PEG tube   Genitourinary:     Comments: River cath  Rectal tube  Musculoskeletal:      Right lower leg: No edema.      Left lower leg: No edema.      Comments: Poor muscle tone  Wound VAC right hip   Skin:     General: Skin is dry.   Neurological:      Comments: She does not track and does not follow commands  + withdraw to pain  She does open her eyes to stimulation         Fluids    Intake/Output Summary (Last 24 hours) at 3/13/2020 0710  Last data filed at 3/12/2020 1754  Gross per 24 hour   Intake 290 ml   Output --   Net 290 ml       Laboratory  Recent Labs     03/10/20  2313   WBC 17.9*   RBC 2.60*   HEMOGLOBIN 8.1*   HEMATOCRIT 26.3*   .2*   MCH 31.2   MCHC 30.8*   RDW 60.2*   PLATELETCT 178  "  MPV 9.3                         Assessment/Plan  * Status epilepticus (HCC)- (present on admission)  Assessment & Plan  On admission, she was intubated for airway protection, now trach since 2/2/20. No evidence of seizure activity.  He appears now to have a severe, intractable encephalopathy  - continuing regimen of vimpat, topamax, depakote  - repeat EEG showed no seizures  Stable.  Continue antiseizure medications, seizure precautions.  No more seizures    TB lung, latent- (present on admission)  Assessment & Plan  Quantiferon + and will have to rule out active TB. Chest CT found positive left-sided consolidation with pleural effusion.  - pending sputum cx/ AFB stain to rule out active disease before initiating  - s/p thora on 2/25; f/u pathology   - ID following, appreciate recs  - AFB stain negative x3; remove isolation  - Per ID, we will \"treat for LTBI with  mg PO daily and B6 25 mg PO daily for 9 months if AFB cxs all negative at 6 weeks\"  Completed antibiotics treatment.    Acute respiratory failure with hypoxia (HCC)- (present on admission)  Assessment & Plan   on trach/T-piece,  pulmonology following  Acute on chronic condition now      Hypokalemia  Assessment & Plan  Patient is end-stage renal disease on hemodialysis, potassium improved.    Severe protein-calorie malnutrition (HCC)- (present on admission)  Assessment & Plan  Continue nutrition via tube feeding.  Dietitian consulted     Cerebrovascular accident (CVA) due to embolism of right middle cerebral artery (HCC)- (present on admission)  Assessment & Plan  Very poor prognosis.  Continue aspirin and statin,     End stage renal failure on dialysis (HCC)- (present on admission)  Assessment & Plan  Dialysis via a right-sided tunneled catheter  Continue hemodialysis Monday Wednesday Friday    Normocytic anemia- (present on admission)  Assessment & Plan  Likely anemia of chronic kidney disease.  FOBT is negative.   - transfuse if drops below " 7  Hemoglobin stable    Hypertension- (present on admission)  Assessment & Plan  She was on Coreg 25 mg twice a day, Cardura 6 mg daily, hydralazine 100 mg 3 times daily  Blood pressure is been low and all BP meds have been stopped  500 mL IV bolus was ordered on 3/11/2020 due to hypotension  Blood pressure remains low    Type 2 diabetes mellitus, with long-term current use of insulin ([facility])- (present on admission)  Assessment & Plan  A1C 7.5%, blood sugars have been ~170. Was taking insulin at home.   Continue sliding scale insulin.  Continue tube feeding.   Blood sugar is stable.  .    Leukocytosis  Assessment & Plan  No other signs of infection, probably reactive  White blood cell count remains high  , chest x-ray is unchanged, procalcitonin is slightly elevated but patient is end-stage renal disease patient has no fever, UA positive for leukocyte esterase but no other signs of infection.    C. difficile negative     Goals of care, counseling/discussion- (present on admission)  Assessment & Plan  Overall neurologic and physical prognosis is quite poor given her severe, unrelenting encephalopathy and dialysis  DNR, DNI as appropriate and comfort care would be an appropriate next step such as cessation of dialysis.  Family meeting was had on 3/12/2020    Pressure injury of sacrum, coccyx, stage 3 ([facility])- (present on admission)  Assessment & Plan  He has a substantial right hip decubitus ulcer with a wound VAC followed by wound care  Given her ability to move spontaneously, diabetes, dialysis, and poor condition, she is extremely high risk of developing further decubitus ulcers    Dysphagia as late effect of cerebrovascular accident (CVA)- (present on admission)  Assessment & Plan  PEG tube in place with tube feeding    Gout- (present on admission)  Assessment & Plan  On allopurinol via PEG tube       VTE prophylaxis: Heparin

## 2020-03-13 NOTE — PROGRESS NOTES
2 RN Skin Check    2 RN skin check complete with Amparo HAILE.   Devices in place: SCDs and River, Rectal tube, Trach, PEG tube, Dialysis port.  Skin assessed under devices: yes.  Confirmed pressure ulcers found on: coccyx.  New potential pressure ulcers noted on N/A. Wound consult placed Yes.  The following interventions in place Pillows, Mepilex, Barrier cream and Moon boots, Q2 turns, pillows for repositioning.

## 2020-03-13 NOTE — PROGRESS NOTES
[facility] Progress Note     Hemodialysis treatment ordered today per Dr. YAIR Carcamo x 3 hours.   Treatment initiated at 0749 ended at 1049.      Patient tolerated tx; see paper flow sheet for details.      Net UF 1000 mL.      Needles removed from access site. Dressings applied and sites held x 10 minutes; verified no bleeding. Positive bruit/thrill post tx. Staff RN to monitor AVF for breakthrough bleeding. Should breakthrough bleeding occur, staff RN to apply pressure to access sites until bleeding resolved. Notify Dialysis and Nephrologist for follow-up.     Report given to Primary RN.
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
## 2020-03-14 NOTE — CARE PLAN
Problem: Venous Thromboembolism (VTW)/Deep Vein Thrombosis (DVT) Prevention:  Goal: Patient will participate in Venous Thrombosis (VTE)/Deep Vein Thrombosis (DVT)Prevention Measures  Outcome: PROGRESSING AS EXPECTED  Intervention: Assess and monitor for anticoagulation complications  Note: Scds on     Problem: Respiratory:  Goal: Respiratory status will improve  Outcome: PROGRESSING AS EXPECTED  Intervention: Assess and monitor pulmonary status  Note: Monitor respiratory status, breath sounds,lung sounds. RR, pulse ox, suction as needed

## 2020-03-14 NOTE — PROGRESS NOTES
"Pulmonary Progress Note    Date of admission  1/16/2020    Chief Complaint  74 y.o. female admitted 1/16/2020 with status epelipticus    Hospital Course    \"Ms. Beltran is a 74 y.o. female admitted to [facility] on 1/14/20 with seizures.She was at a SNF.She had been previously hospitalized at [facility] and diaysis was initiated.  She was doing very poorly then and palliative care was discussed with the family,but they declined.She had very poor functional status and required Jimena lift to get into the dialysis chair.She was found to have  a CVA on MRI at [facility].Her seizures were not controlled and it was felt she was in status epilepticus.  She was getting HD at [facility].Last HD was on 1/13/20.She was seen by Dr Carcamo at [facility].Creatinine was 1.8 and dialysis was held. Neurology [place] that the patient needed continuous EEG monitoring and he was transferred to [facility]. Patient intubated due to seizures and was eventually transitioned to tracheostomy on 2/2/2020 and off the vent on 2/5/2020. Family at this time does not want to change CODE status. Patient remains unresponsive. LTAC being pursued. PEG tube in place. \"      Interval Problem Update  Chart review from the past 24 hours includes imaging, laboratory studies, vital signs and notes available.  Pertinent data for today's visit includes no fever resp stable trach clean no purulence    Review of Systems  ROS   Unable to obtain as non verbal    Vital Signs for last 24 hours   Temp:  [36.4 °C (97.6 °F)-37.2 °C (98.9 °F)] 36.8 °C (98.2 °F)  Pulse:  [72-88] 78  Resp:  [16-20] 20  BP: (127-143)/(53-61) 127/61  SpO2:  [93 %-100 %] 96 %     Physical Exam   Physical Exam  HENT:      Head: Normocephalic and atraumatic.      Mouth/Throat:      Mouth: Mucous membranes are moist.   Eyes:      Extraocular Movements: Extraocular movements intact.      Pupils: Pupils are equal, round, and reactive to light.   Neck:      Musculoskeletal: Neck supple.      Comments: " Tracheostomy site clean  Cardiovascular:      Rate and Rhythm: Normal rate.   Pulmonary:      Effort: Pulmonary effort is normal. No respiratory distress.      Breath sounds: No wheezing.   Abdominal:      General: Abdomen is flat.      Comments: PEG tube clean   Musculoskeletal:      Right lower leg: No edema.      Left lower leg: No edema.   Skin:     General: Skin is warm and dry.   Neurological:      Mental Status: She is alert.      Comments: Non verbal  Turning to voice  Lifting eyebrows     Not agitted    Medications  Current Facility-Administered Medications   Medication Dose Route Frequency Provider Last Rate Last Dose   • ascorbic acid tablet 500 mg  500 mg Enteral Tube DAILY Yaakov Pringle M.D.   500 mg at 03/14/20 0528   • therapeutic multivitamin-minerals (THERAGRAN-M) tablet 1 Tab  1 Tab Enteral Tube DAILY Yaakov Pringle M.D.   1 Tab at 03/14/20 0528   • Valproate Sodium (DEPAKENE) oral solution 500 mg  500 mg Enteral Tube Q12HRS Aubree Irving M.D.   500 mg at 03/14/20 0539   • insulin regular (HUMULIN R) injection 1-6 Units  1-6 Units Subcutaneous Q6HRS Judy Roque M.D.   1 Units at 03/14/20 0602    And   • glucose 4 g chewable tablet 16 g  16 g Oral Q15 MIN PRN Judy Roque M.D.        And   • DEXTROSE 10% BOLUS 250 mL  250 mL Intravenous Q15 MIN PRN Judy Roque M.D.       • dakins 0.125% (1/4 strength) topical soln   Topical BID Judy Roque M.D.   Stopped at 03/13/20 0600   • heparin injection 5,000 Units  5,000 Units Subcutaneous Q12HRS Aubree Irving M.D.   5,000 Units at 03/14/20 0545   • aspirin (ASA) chewable tab 81 mg  81 mg Enteral Tube DAILY Aubree Irving M.D.   81 mg at 03/14/20 0528   • omeprazole (FIRST-OMEPRAZOLE) 2 mg/mL oral susp 40 mg  40 mg Enteral Tube Q12HRS Aubree Irving M.D.   40 mg at 03/14/20 0539   • albumin human 25% solution 25 g  25 g Intravenous ACUTE DIALYSIS PRN Aubree Irving M.D. 150 mL/hr at 03/13/20 0845 12.5 g at 03/13/20 0845    • lacosamide (VIMPAT) tablet 300 mg  300 mg Enteral Tube BID Aubree Irving M.D.   300 mg at 03/14/20 0607   • carboxymethylcellulose (REFRESH TEARS) 0.5 % ophthalmic drops 1 Drop  1 Drop Both Eyes Q2HRS PRN Aubree Irving M.D.   1 Drop at 02/26/20 0434   • folic acid (FOLVITE) tablet 1 mg  1 mg Enteral Tube DAILY Aubree Irving M.D.   1 mg at 03/14/20 0528   • allopurinol (ZYLOPRIM) tablet 100 mg  100 mg Enteral Tube DAILY Aubree Irving M.D.   100 mg at 03/14/20 0528   • nystatin (MYCOSTATIN) powder   Topical BID Aubree Irving M.D.       • hydrALAZINE (APRESOLINE) injection 10 mg  10 mg Intravenous Q4HRS PRN Judy Roque M.D.   10 mg at 02/10/20 0424   • topiramate (TOPAMAX) tablet 200 mg  200 mg Enteral Tube Q12HRS Aubree Irving M.D.   200 mg at 03/14/20 0528   • atorvastatin (LIPITOR) tablet 40 mg  40 mg Enteral Tube DAILY Aubree Irving M.D.   40 mg at 03/14/20 0528   • heparin injection 3,300 Units  3,300 Units Intracatheter PRN Aubree Irving M.D.   3,300 Units at 03/13/20 1049   • epoetin antoinette (EPOGEN/PROCRIT) injection 4,000 Units  4,000 Units Subcutaneous MO, WE + FR Aubree Irving M.D.   4,000 Units at 03/13/20 1008   • Respiratory Care per Protocol   Nebulization Continuous RT Aubree Irving M.D.       • ipratropium-albuterol (DUONEB) nebulizer solution  3 mL Nebulization Q2HRS PRN (RT) Aubree Irving M.D.       • Pharmacy Consult: Enteral tube insertion - review meds/change route/product selection  1 Each Other PHARMACY TO DOSE Aubree Irving M.D.       • labetalol (NORMODYNE/TRANDATE) injection 10 mg  10 mg Intravenous Q4HRS PRN Judy Roque M.D.   10 mg at 02/10/20 0026   • acetaminophen (TYLENOL) tablet 650 mg  650 mg Enteral Tube Q6HRS PRN Aubree Irving M.D.   650 mg at 03/10/20 1311       Fluids    Intake/Output Summary (Last 24 hours) at 3/14/2020 0802  Last data filed at 3/13/2020 1600  Gross per 24 hour   Intake 630 ml   Output 2800 ml   Net -2170 ml       Laboratory          No results for  input(s): SODIUM, POTASSIUM, CHLORIDE, CO2, BUN, CREATININE, MAGNESIUM, PHOSPHORUS, CALCIUM in the last 72 hours.  No results for input(s): ALTSGPT, ASTSGOT, ALKPHOSPHAT, TBILIRUBIN, DBILIRUBIN, GAMMAGT, AMYLASE, LIPASE, ALB, PREALBUMIN, GLUCOSE in the last 72 hours.      No results for input(s): RBC, HEMOGLOBIN, HEMATOCRIT, PLATELETCT, PROTHROMBTM, APTT, INR, IRON, FERRITIN, TOTIRONBC in the last 72 hours.    Imaging  2/29/20  cxr personally reviewed and shows tracheostomy tube, HD catheter and PICC line  B/l hilar infiltrates suggestive of fluid overload    Assessment/Plan    Acute respiratory failure with hypoxia (HCC)- (present on admission)  Assessment & Plan  Intubated date: 1/14/2020 s/p trach 2/2 Dr Devine  Down size to 6 cuffed shiley 3/3  Some secretions  Not vocalizing    Stable . See biweekly unless new problems    I have performed a physical exam and reviewed and updated ROS and Plan today (3/14/2020). In review of yesterday's note (3/13/2020), there are no changes except as documented above.     Eileen Miller MD , FCCP, Pulmonary Service

## 2020-03-14 NOTE — PROGRESS NOTES
2 RN skin check complete with Duncan FLOREZ.   Devices in place hernandez, rectal tube, trach, scds/podus boots, peg tube, dialysis port, picc line   Skin assessed under devices: yes   Confirmed pressure ulcers found on n/a  New potential pressure ulcers noted on n/a. Wound consult placed - already in place  The following interventions in place       Pillow support, q2 turns/repositioning, barrier cream, mepilexes for support/coverage

## 2020-03-14 NOTE — PROGRESS NOTES
Pt is nonverbal. Pt is resting in bed, no signs of labored breathing or pain. Pt on 4L via Trach T-piece,  in use, O2 sat at 96%. Trach care performed via protocol as ordered. Suctioning performed PRN.  Permacath in place, PICC line in place; dressings are clean dry and intact with no signs of infection. PEG tube in place with Impact Peptide 1.5 running at 40mL/hr, pt appears to be tolerating feed well; no signs of discomfort. HOB at 30 degrees. River catheter in place, secured to bed below bladder, and draining via gravity. Rectal tube in place and flushing as ordered, no signs of discomfort. Wound vac in place and running; no signs of discomfort; dressing is clean dry and intact. Treaded socks on, bilateral float boots and SCDs in place, mepilex applied bilaterally to heels and to the coccyx region for prophylaxis. Pt Q2 turns and 2 RN skin check Qshift in place. Pt on low air loss mattress. Fall precautions in place and education provided on how to utilize call light. Call light & personal belongings within reach, bed in lowest position & locked, and bed alarm is on. Pt updated on plan of care for the day. Pt declines any additional needs at this time. Will continue to monitor.

## 2020-03-14 NOTE — PROGRESS NOTES
Report received from day shift nurse. Assumed care @ 1930.      Patient on bed resting. Assessment partially completed. No signs and symptoms of discomfort noted at this time. On O2 at 4LPM via T-piece,  in place and functioning, O2 sat at 98%. Permacath in place, PICC in place, flushed, blood return noted. PEG tube in place, on tube feeding of Impact Peptide 1.5 at 40 mL/hr tolerating well, HOB maintained at 30 degrees. River catheter in place, draining to gravity, rectal tube in place, flushed as ordered, wound vac in place and functioning. Patient educated regarding plan of care. On NUBIA mattress, all 4 siderails up. Trache care and suctioning done.     Bed locked, in lowest position, treaded socks on. Needs attended. No further needs at this time. Communication board updated. Call light and personal belongings within reach.

## 2020-03-14 NOTE — PROGRESS NOTES
2 RN skin check completed with MIC Farley.   Devices in place: Trache, PICC, HD catheter, peg tube, hernandez catheter, rectal tube, wound vac, SCDs, heel float boots, mepilex, foam dressings for protection.  Skin assessed under devices: Yes.  Confirmed pressure ulcers found on coccyx.  New potential pressure ulcers noted on: N/A. Wound team following, wound vac in place.     The following interventions in place: 2 RN skin check q shift, q2 turns, heel float boots, NUBIA mattress, mepilex, foam dressing, hernandez catheter, rectal tube, wound vac.        Redness under trache collar. Dressing in place, CDI.  Redness on elbows, blanching. Mepilex in place.  Bruising on BUE.  Redness on PEG tube site. Kept dry.  Redness on groin area, blanching.  Wound vac on coccyx area, CDI.  Discoloration on lower leg.  Heels pink, dry, blanching. Mepilex in place.

## 2020-03-14 NOTE — PROGRESS NOTES
Patient alert- tracks with eyes occasionally. Trach in place- needs to be suctioned occasionally. Had dialysis this morning- per dialysis RN pulled 1L of fluid off.  Trach care completed. TF infusing at 40/hr- patients goal. PEG tube in place, dressing clean dry and intact. Wound vac in place and working adequately. Rectal and hernandez bags in place- care provided. 2nd RN skin check performed with Duncan FLOREZ. Repositioning every 2 hours. Bed in locked and lowest position.

## 2020-03-15 NOTE — CARE PLAN
Problem: Safety  Goal: Will remain free from injury  Outcome: PROGRESSING AS EXPECTED  Goal: Will remain free from falls  Outcome: PROGRESSING AS EXPECTED     Problem: Infection  Goal: Will remain free from infection  Outcome: PROGRESSING AS EXPECTED     Problem: Venous Thromboembolism (VTW)/Deep Vein Thrombosis (DVT) Prevention:  Goal: Patient will participate in Venous Thrombosis (VTE)/Deep Vein Thrombosis (DVT)Prevention Measures  Outcome: PROGRESSING AS EXPECTED     Problem: Bowel/Gastric:  Goal: Normal bowel function is maintained or improved  Outcome: PROGRESSING AS EXPECTED     Problem: Communication  Goal: The ability to communicate needs accurately and effectively will improve  Outcome: PROGRESSING SLOWER THAN EXPECTED

## 2020-03-15 NOTE — PROGRESS NOTES
Hospital Medicine Daily Progress Note    Date of Service  3/14/2020    Chief Complaint  75 y.o. female admitted 1/16/2020 with altered mental status    Hospital Course    Ms. Guzmán has a past medical history of hypertension, coronary disease, diabetes, and end-stage renal disease on dialysis brought to the emergency room after apparently she had seizure type activity at her assisted living facility.  She was admitted and underwent continuous EEG monitoring followed by neurology.  Her mentation did not improve and she underwent a tracheostomy on 2/2/2020.  A PEG tube was placed 2/15/2020.  He has had a persistent, severe encephalopathy.      Interval Problem Update  3/10/2020: Patient seen and evaluated on the medical floor.  Patient does track but does not follow commands.  3/11: Ms. Guzmán was evaluated and examined on the medical floor.  This morning her blood pressure dropped into the 70 systolic therefore her blood pressure meds have been held and a 500 mL IV saline bolus was ordered stat.  Spoke with her  and we are setting up a family conference for tomorrow.  3/12/2020; patient seen and evaluated on the medical floor.  Her status from remains about the same though her blood pressure remains low and we have stopped her blood pressure medications.  Does not have fever.  I extensive discussion with her daughter and son along with Dodie Turner from the palliative care/ethics committee.  Her daughter effectively wants full code full treat and for us to do everything to keep her mother alive as she feels her mother is getting better and will be able to go home and live with her again.  Discussed that her mother is been here about 51 days and that her mental status has been extremely poor and even her multiple comorbidities her mortality is effectively 100%.  Her son and daughter will call there are other siblings in the [facility] and discuss further and we will reconvene on  Monday.  3/13: patient seen and evaluated on the medical floor.  I discussed her condition with the wound care nurse and she is very concerned about the degree of wound and consideration of pain for the patient.  3/14/2020: Patient seen and evaluated on the medical floor.  When I evaluated her she did eye contact to the left though did not track and did not follow commands.  Wound care continues to follow the wound VAC.  Her blood pressure is gone up substantially now 154/77 is a few days ago had been quite low.  Consultants/Specialty  Critical care  Neurology  Infectious disease  Palliative care  Code Status  Full code per family    Disposition  Medical    Review of Systems  Review of Systems   Unable to perform ROS: Medical condition        Physical Exam  Temp:  [36.8 °C (98.2 °F)-37.4 °C (99.4 °F)] 37.4 °C (99.4 °F)  Pulse:  [72-90] 84  Resp:  [16-20] 18  BP: (127-154)/(53-77) 154/77  SpO2:  [95 %-97 %] 95 %    Physical Exam  Vitals signs and nursing note reviewed.   Constitutional:       Comments: Chronically ill appearing   HENT:      Mouth/Throat:      Mouth: Mucous membranes are dry.   Eyes:      Conjunctiva/sclera: Conjunctivae normal.   Neck:      Comments: Right tunneled dialysis cath  trach  Cardiovascular:      Rate and Rhythm: Normal rate and regular rhythm.   Pulmonary:      Effort: Pulmonary effort is normal.      Breath sounds: Normal breath sounds.      Comments: Poor air movement  Abdominal:      General: There is no distension.      Tenderness: There is no abdominal tenderness.      Comments: PEG tube   Genitourinary:     Comments: River cath  Rectal tube  Musculoskeletal:      Right lower leg: No edema.      Left lower leg: No edema.      Comments: Poor muscle tone  Wound VAC right hip   Skin:     General: Skin is dry.   Neurological:      Comments: She does not track and does not follow commands  + withdraw to pain  She is looking to the left   Psychiatric:      Comments: She is nonverbal  "        Fluids    Intake/Output Summary (Last 24 hours) at 3/14/2020 1811  Last data filed at 3/14/2020 1756  Gross per 24 hour   Intake 750 ml   Output --   Net 750 ml       Laboratory                          Assessment/Plan  * Status epilepticus (HCC)- (present on admission)  Assessment & Plan  On admission, she was intubated for airway protection, now trach since 2/2/20. No evidence of seizure activity.  He appears now to have a severe, intractable encephalopathy  - continuing regimen of vimpat, topamax, depakote  - repeat EEG showed no seizures  Stable.  Continue antiseizure medications, seizure precautions.  No more seizures    TB lung, latent- (present on admission)  Assessment & Plan  Quantiferon + and will have to rule out active TB. Chest CT found positive left-sided consolidation with pleural effusion.  - pending sputum cx/ AFB stain to rule out active disease before initiating  - s/p thora on 2/25; f/u pathology   - ID following, appreciate recs  - AFB stain negative x3; remove isolation  - Per ID, we will \"treat for LTBI with  mg PO daily and B6 25 mg PO daily for 9 months if AFB cxs all negative at 6 weeks\"  Completed antibiotics treatment.    Acute respiratory failure with hypoxia (HCC)- (present on admission)  Assessment & Plan   on trach/T-piece,  pulmonology following  Acute on chronic condition now      Hypokalemia  Assessment & Plan  Patient is end-stage renal disease on hemodialysis, potassium improved.    Severe protein-calorie malnutrition (HCC)- (present on admission)  Assessment & Plan  Continue nutrition via tube feeding.  Dietitian consulted     Cerebrovascular accident (CVA) due to embolism of right middle cerebral artery (HCC)- (present on admission)  Assessment & Plan  Very poor prognosis.  Continue aspirin and statin,     End stage renal failure on dialysis (HCC)- (present on admission)  Assessment & Plan  Dialysis via a right-sided tunneled catheter  Continue hemodialysis " Monday Wednesday Friday    Normocytic anemia- (present on admission)  Assessment & Plan  Likely anemia of chronic kidney disease.  FOBT is negative.   - transfuse if drops below 7  Hemoglobin stable    Hypertension- (present on admission)  Assessment & Plan  She was on Coreg 25 mg twice a day, Cardura 6 mg daily, hydralazine 100 mg 3 times daily  Blood pressure is been low and all BP meds have been stopped  500 mL IV bolus was ordered on 3/11/2020 due to hypotension  Blood pressure has now trended up to 150 systolic and blood pressure medication will be restarted on 3/15/2020 if her blood pressure remains elevated.  She will get dialysis on 3/16/2020 which may drop her blood pressure again    Type 2 diabetes mellitus, with long-term current use of insulin ([facility])- (present on admission)  Assessment & Plan  A1C 7.5%, blood sugars have been ~170. Was taking insulin at home.   Continue sliding scale insulin.  Continue tube feeding.   Blood sugar is stable.  .    Leukocytosis  Assessment & Plan  No other signs of infection, probably reactive  White blood cell count remains high  , chest x-ray is unchanged, procalcitonin is slightly elevated but patient is end-stage renal disease patient has no fever, UA positive for leukocyte esterase but no other signs of infection.    C. difficile negative     Goals of care, counseling/discussion- (present on admission)  Assessment & Plan  Overall neurologic and physical prognosis is quite poor given her severe, unrelenting encephalopathy and dialysis  DNR, DNI as appropriate and comfort care would be an appropriate next step such as cessation of dialysis.  Family meeting was had on 3/12/2020    Pressure injury of sacrum, coccyx, stage 3 ([facility])- (present on admission)  Assessment & Plan  He has a substantial right hip decubitus ulcer with a wound VAC followed by wound care  Given her ability to move spontaneously, diabetes, dialysis, and poor condition, she is extremely high risk of  developing further decubitus ulcers    Dysphagia as late effect of cerebrovascular accident (CVA)- (present on admission)  Assessment & Plan  PEG tube in place with tube feeding    Gout- (present on admission)  Assessment & Plan  On allopurinol via PEG tube       VTE prophylaxis: Heparin

## 2020-03-15 NOTE — WOUND TEAM
[facility]  Inpatient Services  Wound and Skin Care Progress Note    Admission Date:  1/16/2020   HPI, PMH, SH: Reviewed  Unit where seen by Wound Team: [id number]    WOUND TEAM FOLLOW UP: VAC change    Patient nonverbal. Trached.    Self Report / Pain Level:no s/s of distress      OBJECTIVE:     WOUND TYPE, LOCATION, CHARACTERISTICS (Pressure ulcers: location, stage, POA or date identified)      Pressure Injury 01/16/20 Sacrum;Coccyx stage 4 POA  (Active)   Wound Image   3/12/20   Pressure Injury Stage 4    State of Healing Non-healing    Site Assessment Yellow;Slough;Brown;Light Purple    Periwound Assessment Denuded;Black;Purple    Margins Defined edges    Wound Length (cm) 10 cm 3/12/2020   Wound Width (cm) 8 cm    Wound Depth (cm) 0.5 cm    Wound Surface Area (cm^2) 80 cm^2    Wound Volume (cm^3) 3 cm^3    Tunneling (cm) 3.5 cm    Undermining (cm) 3 cm    Drainage Amount Scant    Drainage Description Serosanguineous    Treatments Cleansed    Wound Cleansing Approved Wound Cleanser    Periwound Protectant Skin Protectant Wipes to Periwound;Hydrocolloid;Paste Ring    Dressing Options Hydrofiber;Wound Vac    Dressing Cleansing/Solutions Normal Saline    Dressing Changed Changed    Dressing Change/Treatment Frequency Tuesday, Thursday, Saturday, and As Needed    NEXT Dressing Change/Treatment Date 03/17/20    NEXT Weekly Photo (Inpatient Only) 03/19/20    Wound Odor None    Exposed Structures Bone    WOUND NURSE ONLY - Tissue Type and Percentage see photo      Negative Pressure Wound Therapy 03/07/20 Pressure injury: stage 4 (Active)   NPWT Pump Mode / Pressure Setting 125 mmHg;Ulta    Dressing Type Black Foam (Veraflo)    Number of Foam Pieces Used 1    Canister Changed No    Output (mL) 350 mL    VAC VeraFlo Irrigant Normal Saline    VAC VeraFlo Soak Time (mins) 5    VAC VeraFlo Instill Volume (ml) 6    VAC VeraFlo - Therapy Time (hrs) 1.5    VAC VeraFlo Pressure (mm/Hg) 125 mmHg;Continuous       Lab  Values:    Lab Results   Component Value Date/Time    WBC 17.9 (H) 03/10/2020 11:15 PM    RBC 2.60 (L) 03/10/2020 11:15 PM    HEMOGLOBIN 8.1 (L) 03/10/2020 11:15 PM    HEMATOCRIT 26.3 (L) 03/10/2020 11:15 PM      Results from last 7 days   Lab Units 03/09/20  0430   C REACTIVE PROTEIN 4596 mg/dL 9.13*       Lab Results   Component Value Date/Time    HBA1C 7.5 (H) 02/07/2019 01:20 PM         INTERVENTIONS BY WOUND TEAM: patient turned to R side with RN. Removed previous VAC dressing. Clean wound and periwound skin with wound cleanser. Applied No sting skin prep to denuded periwound skin, then covered this area with Aquacel hydrofiber. Applied paste ring around wound. Sealed all with drape, cut drape out over wound, then filled wound bed with black Veraflo foam, attached TRAC pad, and sealed edges with drape. Changed Veraflo setting to 6 ml NS, soak time 5 min every 1.5 hours.    Interdisciplinary consultation: RN    EVALUATION: bone covered by tan slough. Periwound denuded, breaking down, and along wound edges turning purple as the wound continues to evolve. Prior dressing had hydrocolloid over the hydrofiber, but it was found to be too moist, and loosening up. Trying hydrofiber with just drape to seal to see if it will not become so soggy. Decrease NS volume, but increased frequency to help stimulate clean up of wound bed.    Factors afftecting wound healing: immobile, elevated A1C, ESRD, altered mental status  Goals:  Slow steady decrease in wound area and depth weekly    NURSING PLAN OF CARE:    Dressing changes: Continue previous Dressing Maintenance orders:   x     See new Dressing Maintenance orders:       Skin care: See Skin Care orders:        Rectal tube care: See Rectal Tube Care orders:    x  Other orders:           WOUND TEAM PLAN OF CARE (X):   NPWT change 3 x week:      X  Dressing changes:      Follow up as needed:    Other:    Anticipated discharge plans (X):  SNF:           Home Care:            Outpatient Wound Center:            Self Care:            Other: will need wound care for sacral pressure injury  - Awaiting LTACH placement

## 2020-03-15 NOTE — PROGRESS NOTES
Pt nonverbal, tracks with eyes around room @ times. Non-responsive to commands. Trach care done, inner cannula changed per protocol. Suctioning done PRN. River catheter in place, yellow, hazy urine noted. Secured below bladder. Rectal tube in place, flushed per protocol. Brown, loose stool noted. PEG tube in place running Impact Peptide 1.5 @ goal rate of 40ml/hr, tolerating well. HOB elevated @ 30 degrees. PICC dressing changed per protocol, blood return noted in both lumens. CVC HD cath dressing changed per protocol. Sterile technique used, no s/s infection noted. Sacral wound vac in place w/ no leaks, clamping noted. Wound location covered in several mepilex dressings for padding. Elbows, feet padded with mepilex as well. Guaman boots in place, SCDs on. Pt turned Q2hrs, NUBIA mattress in place. Bed in lowest position, locked. Currently resting in bed @ this time w/ no further issues noted.

## 2020-03-15 NOTE — PROGRESS NOTES
Hospital Medicine Daily Progress Note    Date of Service  3/15/2020    Chief Complaint  75 y.o. female admitted 1/16/2020 with altered mental status    Hospital Course    Ms. Guzmán has a past medical history of hypertension, coronary disease, diabetes, and end-stage renal disease on dialysis brought to the emergency room after apparently she had seizure type activity at her assisted living facility.  She was admitted and underwent continuous EEG monitoring followed by neurology.  Her mentation did not improve and she underwent a tracheostomy on 2/2/2020.  A PEG tube was placed 2/15/2020.  He has had a persistent, severe encephalopathy.      Interval Problem Update  3/10/2020: Patient seen and evaluated on the medical floor.  Patient does track but does not follow commands.  3/11: Ms. Guzmán was evaluated and examined on the medical floor.  This morning her blood pressure dropped into the 70 systolic therefore her blood pressure meds have been held and a 500 mL IV saline bolus was ordered stat.  Spoke with her  and we are setting up a family conference for tomorrow.  3/12/2020; patient seen and evaluated on the medical floor.  Her status from remains about the same though her blood pressure remains low and we have stopped her blood pressure medications.  Does not have fever.  I extensive discussion with her daughter and son along with Dodie Turner from the palliative care/ethics committee.  Her daughter effectively wants full code full treat and for us to do everything to keep her mother alive as she feels her mother is getting better and will be able to go home and live with her again.  Discussed that her mother is been here about 51 days and that her mental status has been extremely poor and even her multiple comorbidities her mortality is effectively 100%.  Her son and daughter will call there are other siblings in the [facility] and discuss further and we will reconvene on  Monday.  3/13: patient seen and evaluated on the medical floor.  I discussed her condition with the wound care nurse and she is very concerned about the degree of wound and consideration of pain for the patient.  3/14/2020: Patient seen and evaluated on the medical floor.  When I evaluated her she did eye contact to the left though did not track and did not follow commands.  Wound care continues to follow the wound VAC.  Her blood pressure is gone up substantially now 154/77 is a few days ago had been quite low.  3/15: Ms. Guzmán was evaluated and examined on the medical floor.  She remains incredibly encephalopathic.  Her blood pressure now is down to 102/57 where it had been high yesterday.  I spoke with Dodie Turner from the ethics committee and a subcommittee will be formed to discuss this further specifically comfort care measures.  Consultants/Specialty  Critical care  Neurology  Infectious disease  Palliative care  Code Status  Full code per family    Disposition  Medical    Review of Systems  Review of Systems   Unable to perform ROS: Medical condition        Physical Exam  Temp:  [36.9 °C (98.4 °F)-37.2 °C (98.9 °F)] 37 °C (98.6 °F)  Pulse:  [80-88] 88  Resp:  [16-20] 18  BP: (112-138)/(49-60) 138/60  SpO2:  [95 %-97 %] 96 %    Physical Exam  Vitals signs and nursing note reviewed.   Constitutional:       Comments: Chronically ill appearing   HENT:      Mouth/Throat:      Mouth: Mucous membranes are dry.   Neck:      Comments: Right tunneled dialysis cath  trach  Cardiovascular:      Rate and Rhythm: Normal rate and regular rhythm.   Pulmonary:      Effort: Pulmonary effort is normal.      Breath sounds: Normal breath sounds.      Comments: Poor air movement  Abdominal:      General: There is distension.      Tenderness: There is no abdominal tenderness.      Comments: PEG tube   Genitourinary:     Comments: River cath  Rectal tube  Musculoskeletal:      Right lower leg: No edema.      Left lower  "leg: No edema.      Comments: Poor muscle tone  Wound VAC right hip   Skin:     General: Skin is warm and dry.   Neurological:      Comments: She does not track and does not follow commands  She is quite sleepy   Psychiatric:      Comments: She is nonverbal         Fluids    Intake/Output Summary (Last 24 hours) at 3/15/2020 0747  Last data filed at 3/15/2020 0700  Gross per 24 hour   Intake 1570 ml   Output 500 ml   Net 1070 ml       Laboratory                          Assessment/Plan  * Status epilepticus (HCC)- (present on admission)  Assessment & Plan  On admission, she was intubated for airway protection, now trach since 2/2/20. No evidence of seizure activity.  He appears now to have a severe, intractable encephalopathy  - continuing regimen of vimpat, topamax, depakote  - repeat EEG showed no seizures  Stable.  Continue antiseizure medications, seizure precautions.  No more seizures    TB lung, latent- (present on admission)  Assessment & Plan  Quantiferon + and will have to rule out active TB. Chest CT found positive left-sided consolidation with pleural effusion.  - pending sputum cx/ AFB stain to rule out active disease before initiating  - s/p thora on 2/25; f/u pathology   - ID following, appreciate recs  - AFB stain negative x3; remove isolation  - Per ID, we will \"treat for LTBI with  mg PO daily and B6 25 mg PO daily for 9 months if AFB cxs all negative at 6 weeks\"  Completed antibiotics treatment.    Acute respiratory failure with hypoxia (HCC)- (present on admission)  Assessment & Plan   on trach/T-piece,  pulmonology following  Acute on chronic condition now      Hypokalemia  Assessment & Plan  Patient is end-stage renal disease on hemodialysis, potassium improved.    Severe protein-calorie malnutrition (HCC)- (present on admission)  Assessment & Plan  Continue nutrition via tube feeding.  Dietitian consulted     Cerebrovascular accident (CVA) due to embolism of right middle cerebral artery " ([facility])- (present on admission)  Assessment & Plan  Very poor prognosis.  Continue aspirin and statin,     End stage renal failure on dialysis ([facility])- (present on admission)  Assessment & Plan  Dialysis via a right-sided tunneled catheter  Continue hemodialysis Monday Wednesday Friday    Normocytic anemia- (present on admission)  Assessment & Plan  Likely anemia of chronic kidney disease.  FOBT is negative.   - transfuse if drops below 7  Hemoglobin stable    Hypertension- (present on admission)  Assessment & Plan  She was on Coreg 25 mg twice a day, Cardura 6 mg daily, hydralazine 100 mg 3 times daily  Blood pressure is been low and all BP meds have been stopped  500 mL IV bolus was ordered on 3/11/2020 due to hypotension  Blood pressure was up to the 150s on 3/14/2020 now down to the low 100s on 3/15/20  Continue to hold her blood pressure medicines    Type 2 diabetes mellitus, with long-term current use of insulin ([facility])- (present on admission)  Assessment & Plan  A1C 7.5%, blood sugars have been ~170. Was taking insulin at home.   Continue sliding scale insulin.  Continue tube feeding.   Blood sugar is stable.  .    Leukocytosis  Assessment & Plan  No other signs of infection, probably reactive  White blood cell count remains high  , chest x-ray is unchanged, procalcitonin is slightly elevated but patient is end-stage renal disease patient has no fever, UA positive for leukocyte esterase but no other signs of infection.    C. difficile negative     Goals of care, counseling/discussion- (present on admission)  Assessment & Plan  Overall neurologic and physical prognosis is quite poor given her severe, unrelenting encephalopathy and dialysis  DNR, DNI as appropriate and comfort care would be an appropriate next step such as cessation of dialysis.  Family meeting was had on 3/12/2020  Ethics committee consultation to provide physician backup for initiation of comfort care specifically withdrawal of dialysis and comfort  care measures    Pressure injury of sacrum, coccyx, stage 3 (HCC)- (present on admission)  Assessment & Plan  He has a substantial right hip decubitus ulcer with a wound VAC followed by wound care  Given her ability to move spontaneously, diabetes, dialysis, and poor condition, she is extremely high risk of developing further decubitus ulcers    Dysphagia as late effect of cerebrovascular accident (CVA)- (present on admission)  Assessment & Plan  PEG tube in place with tube feeding    Gout- (present on admission)  Assessment & Plan  On allopurinol via PEG tube       VTE prophylaxis: Heparin

## 2020-03-15 NOTE — PROGRESS NOTES
2 RN skin check complete with MIC Mark  Devices in place: Trach, PICC, HD catheter, peg, BMS, hernandez catheter, wound vac, SCDs, heel float boots, mepilex  Skin assessed under devices: yes  Confirmed pressure ulcers found: sacrum with wound vac in place  New potential pressure ulcers found: none  The following interventions in place: bilateral mepilex to elbows and heels, mepilex over wound vac dressing, heel float boots, low airloss mattress, q 2 turns, BMS, hernandez catheter    Notes: bruise on right medial lower extremity; elbows red and blanching, heels pink and blanching, crusting around peg tube access, wound to sacrum with wound vac in place, skin under trach collar pink and blanching

## 2020-03-15 NOTE — PROGRESS NOTES
Bedside report received.  Night RN suctioned trach during report. Patient resting comfortably in bed.

## 2020-03-15 NOTE — CARE PLAN
Problem: Venous Thromboembolism (VTW)/Deep Vein Thrombosis (DVT) Prevention:  Goal: Patient will participate in Venous Thrombosis (VTE)/Deep Vein Thrombosis (DVT)Prevention Measures  Outcome: PROGRESSING AS EXPECTED  Intervention: Ensure patient wears graduated elastic stockings (JUAN FRANCISCO hose) and/or SCDs, if ordered, when in bed or chair (Remove at least once per shift for skin check)  Flowsheets  Taken 3/15/2020 1608  Mechanical Prophylaxis: SCDs, Sequential Compression Device  Taken 3/15/2020 1037  SCDs, Sequential Compression Device: On     Problem: Skin Integrity  Goal: Risk for impaired skin integrity will decrease  Outcome: PROGRESSING AS EXPECTED  Intervention: Assess and monitor skin integrity, appearance and/or temperature  Note: 2 RN skin check completed.

## 2020-03-16 NOTE — DISCHARGE PLANNING
Anticipated Discharge Disposition: LTAC    Action: PC to patient's daughter, Belen to talk to her about her conversation with other family members.  Belen stated she has talked to a few of them, but not all.  ESPERANZA asked what the consensus is of the family members.  Belen stated she wants her mother to continue being full code and on dialysis. ESPERANZA stated that he will respect her desire for her mother to continue being full code.  But, patient can not continue staying at [facility].  That ESPERANZA heard Belen when she stated that her mother's needs are to great for her to take patient home.  ESPERANZA stated that he will be submitting referrals to either skilled or LTACS to see if they will accept the patient.  Belen requested clarification on what a LTAC does in comparison to a skilled. ESPERANZA provided information to Belen.      ESPERANZA informed Dr. Ortega, requested an order.  Choice faxed to [facility]    Barriers to Discharge: acceptance    Plan: follow up with [facility] for acceptance

## 2020-03-16 NOTE — PROGRESS NOTES
HEMODIALYSIS NOTES:     HD today x 3 hours per Dr. Jackson . Initiated at 0817 and ended at 1117. Patient tolerated treatment. See paper flowsheet for details.    UF Net: 1800 mL    R permacath intact and patent with good flow during dialysis. No s/sx of infection, no redness nor bleeding noted on the CVC site. CVC dressing clean, dry and intact. Blood returned and CVC port flushed with NS and Heparin 1000 units/mL used  to lock catheter given per designated amount. CVC port clamped and capped.     Report given to EFRAIN Benedict RN.

## 2020-03-16 NOTE — PROGRESS NOTES
Hospital Medicine Daily Progress Note    Date of Service  3/16/2020    Chief Complaint  75 y.o. female admitted 1/16/2020 with altered mental status    Hospital Course    Ms. Guzmán has a past medical history of hypertension, coronary disease, diabetes, and end-stage renal disease on dialysis brought to the emergency room after apparently she had seizure type activity at her assisted living facility.  She was admitted and underwent continuous EEG monitoring followed by neurology.  Her mentation did not improve and she underwent a tracheostomy on 2/2/2020.  A PEG tube was placed 2/15/2020.  He has had a persistent, severe encephalopathy.      Interval Problem Update  Encephalopathy-remains very encephalopathic and partially tracks of her eyes.  End-stage renal disease-underwent dialysis today    Consultants/Specialty  Critical care  Neurology  Infectious disease  Palliative care    Code Status  Full code per family    Disposition  Medical    Review of Systems  Review of Systems   Unable to perform ROS: Medical condition        Physical Exam  Temp:  [36.9 °C (98.4 °F)-37.2 °C (98.9 °F)] 36.9 °C (98.4 °F)  Pulse:  [68-87] 74  Resp:  [16-20] 16  BP: (111-153)/(58-63) 113/58  SpO2:  [94 %-97 %] 94 %    Physical Exam  Vitals signs and nursing note reviewed.   Constitutional:       Comments: Chronically ill appearing   HENT:      Mouth/Throat:      Mouth: Mucous membranes are dry.   Neck:      Comments: Right tunneled dialysis cath  trach  Cardiovascular:      Rate and Rhythm: Normal rate and regular rhythm.   Pulmonary:      Effort: Pulmonary effort is normal.      Breath sounds: Normal breath sounds.      Comments: Poor air movement  Abdominal:      General: There is distension.      Tenderness: There is no abdominal tenderness.      Comments: PEG tube   Genitourinary:     Comments: River cath  Rectal tube  Musculoskeletal:      Right lower leg: No edema.      Left lower leg: No edema.      Comments: Poor muscle  "tone  Wound VAC right hip   Skin:     General: Skin is warm and dry.   Neurological:      Comments: She does not track and does not follow commands  She is quite sleepy   Psychiatric:      Comments: She is nonverbal     No changes in physical exam on March 16, 2020    Fluids    Intake/Output Summary (Last 24 hours) at 3/16/2020 1409  Last data filed at 3/16/2020 1200  Gross per 24 hour   Intake 1526 ml   Output 1020 ml   Net 506 ml       Laboratory      Recent Labs     03/16/20  0258   SODIUM 132*   POTASSIUM 4.1   CHLORIDE 94*   CO2 28   GLUCOSE 147*   BUN 93*   CREATININE 1.84*   CALCIUM 8.9                     Assessment/Plan  * Status epilepticus (HCC)- (present on admission)  Assessment & Plan  On admission, she was intubated for airway protection, now trach since 2/2/20. No evidence of seizure activity.  He appears now to have a severe, intractable encephalopathy  - continuing regimen of vimpat, topamax, depakote  - repeat EEG showed no seizures  Stable.  Continue antiseizure medications, seizure precautions.  No more seizures    TB lung, latent- (present on admission)  Assessment & Plan  Quantiferon + and will have to rule out active TB. Chest CT found positive left-sided consolidation with pleural effusion.  - pending sputum cx/ AFB stain to rule out active disease before initiating  - s/p thora on 2/25; f/u pathology   - ID following, appreciate recs  - AFB stain negative x3; remove isolation  - Per ID, we will \"treat for LTBI with  mg PO daily and B6 25 mg PO daily for 9 months if AFB cxs all negative at 6 weeks\"  Completed antibiotics treatment.    Acute respiratory failure with hypoxia (HCC)- (present on admission)  Assessment & Plan   on trach/T-piece,  pulmonology following  Acute on chronic condition now      Hypokalemia  Assessment & Plan  Patient is end-stage renal disease on hemodialysis, potassium improved.    Severe protein-calorie malnutrition (HCC)- (present on admission)  Assessment & " Plan  Continue nutrition via tube feeding.  Dietitian consulted     Cerebrovascular accident (CVA) due to embolism of right middle cerebral artery (HCC)- (present on admission)  Assessment & Plan  Very poor prognosis.  Continue aspirin and statin,     End stage renal failure on dialysis ([facility])- (present on admission)  Assessment & Plan  Dialysis via a right-sided tunneled catheter  Continue hemodialysis Monday Wednesday Friday    Normocytic anemia- (present on admission)  Assessment & Plan  Likely anemia of chronic kidney disease.  FOBT is negative.   - transfuse if drops below 7  Hemoglobin stable    Hypertension- (present on admission)  Assessment & Plan  She was on Coreg 25 mg twice a day, Cardura 6 mg daily, hydralazine 100 mg 3 times daily  Blood pressure is been low and all BP meds have been stopped  500 mL IV bolus was ordered on 3/11/2020 due to hypotension  Blood pressure was up to the 150s on 3/14/2020 now down to the low 100s on 3/15/20  Continue to hold her blood pressure medicines    Type 2 diabetes mellitus, with long-term current use of insulin ([facility])- (present on admission)  Assessment & Plan  A1C 7.5%, blood sugars have been ~170. Was taking insulin at home.   Continue sliding scale insulin.  Continue tube feeding.   Blood sugar is stable.  .    Leukocytosis  Assessment & Plan  No other signs of infection, probably reactive  White blood cell count remains high  , chest x-ray is unchanged, procalcitonin is slightly elevated but patient is end-stage renal disease patient has no fever, UA positive for leukocyte esterase but no other signs of infection.    C. difficile negative     Goals of care, counseling/discussion- (present on admission)  Assessment & Plan  Overall neurologic and physical prognosis is quite poor given her severe, unrelenting encephalopathy and dialysis, she remains unchanged and planning to have additional family meeting tomorrow  DNR, DNI as appropriate and comfort care would be an  appropriate next step such as cessation of dialysis.  Family meeting was had on 3/12/2020, as noted above  Ethics committee consultation to provide physician backup for initiation of comfort care specifically withdrawal of dialysis and comfort care measures    Pressure injury of sacrum, coccyx, stage 3 (HCC)- (present on admission)  Assessment & Plan  He has a substantial right hip decubitus ulcer with a wound VAC followed by wound care  Given her ability to move spontaneously, diabetes, dialysis, and poor condition, she is extremely high risk of developing further decubitus ulcers    Dysphagia as late effect of cerebrovascular accident (CVA)- (present on admission)  Assessment & Plan  PEG tube in place with tube feeding    Gout- (present on admission)  Assessment & Plan  On allopurinol via PEG tube       VTE prophylaxis: Heparin

## 2020-03-16 NOTE — CARE PLAN
Problem: Safety  Goal: Will remain free from injury  Outcome: PROGRESSING AS EXPECTED  Note: Bed in lowest and locked position. Room by nurses' station.       Problem: Discharge Barriers/Planning  Goal: Patient's continuum of care needs will be met  Outcome: PROGRESSING AS EXPECTED  Note: Updated family on POC.

## 2020-03-16 NOTE — PROGRESS NOTES
Received report and assumed care of pt. Assessment complete on 4L O2 via trach t-piece.  in use, O2 sat is 97%. Trach care performed via protocol as ordered. Suctioning performed PRN. PEG tube in place with Impact Peptide 1.5 running at goal of 40ml/hr. HOB at 30 degrees. River catheter in place draining via gravity. Rectal tube in place. Wound vac in place and running. Pt is nonverbal. No signs of discomfort at this time. All pt needs met at this time. Safety precautions and hourly rounding in place.

## 2020-03-16 NOTE — PROGRESS NOTES
[facility] -  PROGRESS NOTE               Author: Alex Jackson M.D. Date & Time: 3/16/2020  8:48 AM     HPI:  74 y.o. female admitted to [facility] on 1/14/20 with seizures.She was at a SNF.She had been previously hospitalized at [facility] and diaysis was initiated.  She was doing very poorly then and palliative care was discussed with the family,but they declined.She had very poor functional status and required Jimena lift to get into the dialysis chair.She was found to have  a CVA on MRI at [facility].Her seizures were not controlled and it was felt she was in status epilepticus.  She was getting HD at [facility].Last HD was on 1/13/20.She was seen by Dr Carcamo at [facility].Creatinine was 1.8 and dialysis was held. Neurology Bertrand that the patient needed continuous EEG monitoring and he was transferred to [facility]    DAILY NEPHROLOGY SUMMARY:  Please see note from 1/31 for prior summaries  Please see note from 2/29 for prior summaries  3/02 - No new events.No interaction.For HD today.  3/04 - No new developments.For HD today.No interaction.  3/06 - No new developments.Seen on dialysis.  3/07 - NO overnight renal events, No HD today (Sat).  Trache with T piece in place.   3/09 - NAEO, no changes  3/11 - NAEO, hypotensive and receiving IVF bolus today  3/13 - NAEO, SOD, BP low, UF off today  3/16: Seen on dialysis, no interaction    REVIEW OF SYSTEMS:    Review of Systems   Unable to perform ROS: Acuity of condition     PAST FAMILY HISTORY: Reviewed and unchanged since admission  PAST SURGICAL HISTORY:  Reviewed and unchanged since admission  SOCIAL HISTORY:  Reviewed and unchanged since admission  FAMILY HISTORY: Reviewed and unchanged since admission  CURRENT MEDICATIONS: Reviewed since admission to present  IMAGING STUDIES: Reviewed since admission to present  LABORATORY STUDIES: Reviewed since admission to present    PHYSICAL EXAM:  VS:  /58   Pulse 74   Temp 36.9 °C (98.4 °F) (Temporal)    "Resp 16   Ht 1.651 m (5' 5\")   Wt 54.5 kg (120 lb 2.4 oz)   SpO2 94%   BMI 19.99 kg/m²   Physical Exam  Vitals signs and nursing note reviewed.   Constitutional:       General: She is awake. She is not in acute distress.     Appearance: Normal appearance. She is ill-appearing.   HENT:      Head: Normocephalic and atraumatic.      Right Ear: External ear normal.      Left Ear: External ear normal.      Nose: Nose normal. No congestion.      Mouth/Throat:      Mouth: Mucous membranes are moist.      Pharynx: No oropharyngeal exudate.   Eyes:      General:         Right eye: No discharge.         Left eye: No discharge.      Extraocular Movements: Extraocular movements intact.   Neck:      Musculoskeletal: Neck supple. No muscular tenderness.      Comments: +Trach  Cardiovascular:      Rate and Rhythm: Normal rate and regular rhythm.      Heart sounds: Normal heart sounds.   Pulmonary:      Effort: Pulmonary effort is normal.      Breath sounds: Normal breath sounds.   Chest:      Chest wall: No tenderness.   Abdominal:      General: Bowel sounds are normal. There is no distension.      Palpations: Abdomen is soft.   Musculoskeletal:         General: No swelling or tenderness.   Skin:     General: Skin is warm and dry.      Findings: No rash.   Neurological:      Mental Status: She is unresponsive.      Motor: Weakness present. No seizure activity.      Gait: Gait abnormal.   Psychiatric:         Mood and Affect: Mood normal.     Fluids:  In: 910 [Enteral:430]  Out: 2020     LABS:  Recent Results (from the past 24 hour(s))   ACCU-CHEK GLUCOSE    Collection Time: 03/15/20 12:19 PM   Result Value Ref Range    Glucose - Accu-Ck 185 (H) 65 - 99 mg/dL   ACCU-CHEK GLUCOSE    Collection Time: 03/15/20  5:55 PM   Result Value Ref Range    Glucose - Accu-Ck 153 (H) 65 - 99 mg/dL   ACCU-CHEK GLUCOSE    Collection Time: 03/16/20 12:06 AM   Result Value Ref Range    Glucose - Accu-Ck 152 (H) 65 - 99 mg/dL   Comp Metabolic " Panel (CMP) - Every Monday    Collection Time: 03/16/20  2:58 AM   Result Value Ref Range    Sodium 132 (L) 135 - 145 mmol/L    Potassium 4.1 3.6 - 5.5 mmol/L    Chloride 94 (L) 96 - 112 mmol/L    Co2 28 20 - 33 mmol/L    Anion Gap 10.0 7.0 - 16.0    Glucose 147 (H) 65 - 99 mg/dL    Bun 93 (HH) 8 - 22 mg/dL    Creatinine 1.84 (H) 0.50 - 1.40 mg/dL    Calcium 8.9 8.5 - 10.5 mg/dL    AST(SGOT) 32 12 - 45 U/L    ALT(SGPT) 25 2 - 50 U/L    Alkaline Phosphatase 94 30 - 99 U/L    Total Bilirubin 0.3 0.1 - 1.5 mg/dL    Albumin 2.5 (L) 3.2 - 4.9 g/dL    Total Protein 5.9 (L) 6.0 - 8.2 g/dL    Globulin 3.4 1.9 - 3.5 g/dL    A-G Ratio 0.7 g/dL   ESTIMATED GFR    Collection Time: 03/16/20  2:58 AM   Result Value Ref Range    GFR If  32 (A) >60 mL/min/1.73 m 2    GFR If Non  27 (A) >60 mL/min/1.73 m 2   ACCU-CHEK GLUCOSE    Collection Time: 03/16/20  5:30 AM   Result Value Ref Range    Glucose - Accu-Ck 140 (H) 65 - 99 mg/dL       (click the triangle to expand results)    IMPRESSION:  # ESRD   MWF iHD as OP, but D/C from clinic due to > 30 days inpatient stay   New Placement if ever stable for home D/C          # Status epilepticus   Now resolved, but mentally unresponsive  # S/P acute lacunar infarct              Hx of previous CVA with significant deficits.  # HTN--BP variable often with intradialytic hypotension  # DM II              Management per primary svc  # Acute Hypoxic Respiratory Failure              +Trach and T-piece  # Hx of dysphagia  # Anemia of CKD.Ferritin previously significantly elevated. CAT with HD.  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              PTH 26.2 , Vit D 53 , Phos  2.5   # CAD  # DLD  # Gout,on Allopurinol  # Hx of PEG tube  # Hx of RALF  # Hx of UTI  # COPD  # Edema/Anasarca--improved  # Hypophosphatemia, improved   # Hyponatremia, improved   # Prognosis poor              Family expectations and goals for patient are not in line with prognosis   Comfort care  recommended by multiple services  # Stage 3 decubitus ulcer  # TB rule out, + TB quant and abnormal CXR/CT, ID on board and managing  # Leukocytosis      PLAN:               MWF iHD, no UF for now given I/Os              Continue Epogen with HD              Enteral feedings              No dietary protein restrictions              Follow labs              Very poor prognosis, recommend comfort care, Ethics consult placed by  hospitalist              Will see on MWF with HD, please call on other days w/ questions or concerns    All prior notes form other doctors and RN staff were reviewed from admission to current day to help me make my clinical decisions

## 2020-03-16 NOTE — PROGRESS NOTES
2 RN skin check complete with Miguelito RN  Devices in place: Trach, PICC, HD catheter, PEG, BMS, hernandez catheter, wound vac, SCDs, heel float boots, mepilex  Skin assessed under devices: yes  Confirmed pressure ulcers found: sacrum with wound vac in place  New potential pressure ulcers found: none      BUE bruising. Bruise on right medial lower extremity. Elbows red and blanching, heels pink and blanching, PEG tube site with old drainage, site cleansed. Wound to sacrum with wound vac in place, skin under trach collar pink and blanching.      The following interventions in place: bilateral mepilex to elbows and heels, mepilex over wound vac dressing, heel float boots, low airloss mattress, q2 turns, BMS, hernandez catheter. Inner cannula changed and pt suctioned.

## 2020-03-17 NOTE — PROGRESS NOTES
[facility] Daily Progress Note    Date of Service  3/17/2020    Chief Complaint  altered mental status    Hospital Course    75 y.o. female admitted 1/16/2020 with a past medical history of hypertension, coronary disease, diabetes, and end-stage renal disease on dialysis brought to the emergency room after apparently she had seizure type activity at her assisted living facility.  She was admitted and underwent continuous EEG monitoring followed by neurology.  Her mentation did not improve and she underwent a tracheostomy on 2/2/2020.  A PEG tube was placed 2/15/2020.  He has had a persistent, severe encephalopathy.      Interval Problem Update  Encephalopathy-remains very encephalopathic and partially tracks of her eyes.  End-stage renal disease, continuing dialysis per family preference    Consultants/Specialty  Critical care  Neurology  Infectious disease  Palliative care  Nephrology    Code Status  Full code per family    Disposition  Declined by long term care facilities, family unwilling to take patient home. Family does not agree to hospice.    Review of Systems  Review of Systems   Unable to perform ROS: Medical condition        Physical Exam  Temp:  [36.3 °C (97.3 °F)-36.8 °C (98.2 °F)] 36.3 °C (97.3 °F)  Pulse:  [80-91] 88  Resp:  [16-18] 18  BP: (121-149)/(54-68) 129/63  SpO2:  [96 %-98 %] 96 %    Physical Exam  Vitals signs and nursing note reviewed.   Constitutional:       Comments: Chronically ill appearing   HENT:      Mouth/Throat:      Mouth: Mucous membranes are dry.   Neck:      Comments: Right tunneled dialysis cath  trach  Cardiovascular:      Rate and Rhythm: Normal rate and regular rhythm.   Pulmonary:      Effort: Pulmonary effort is normal.      Breath sounds: Normal breath sounds.      Comments: Poor air movement  Abdominal:      General: There is distension.      Tenderness: There is no abdominal tenderness.      Comments: PEG tube   Genitourinary:     Comments: River cath  Rectal  tube  Musculoskeletal:      Right lower leg: No edema.      Left lower leg: No edema.      Comments: Poor muscle tone  Wound VAC right hip   Skin:     General: Skin is warm and dry.   Neurological:      Comments: She does not track and does not follow commands  She is quite sleepy   Psychiatric:      Comments: She is nonverbal     All systems reviewed and exam is unchanged from yesterday.    Fluids    Intake/Output Summary (Last 24 hours) at 3/17/2020 1539  Last data filed at 3/17/2020 0600  Gross per 24 hour   Intake 877 ml   Output 1550 ml   Net -673 ml       Laboratory      Recent Labs     03/16/20  0258   SODIUM 132*   POTASSIUM 4.1   CHLORIDE 94*   CO2 28   GLUCOSE 147*   BUN 93*   CREATININE 1.84*   CALCIUM 8.9                 Assessment/Plan  * Status epilepticus (HCC)- (present on admission)  Assessment & Plan  On admission, she was intubated for airway protection, now trach since 2/2/20. No evidence of seizure activity.  He appears now to have a severe, intractable encephalopathy  - continuing regimen of vimpat, topamax, depakote  - repeat EEG showed no seizures  Stable.  Continue antiseizure medications, seizure precautions.  No more seizures    Acute respiratory failure with hypoxia (HCC)- (present on admission)  Assessment & Plan   on trach/T-piece,  pulmonology following  Acute on chronic condition now    Goals of care, counseling/discussion- (present on admission)  Assessment & Plan  Overall neurologic and physical prognosis is quite poor given her severe, unrelenting encephalopathy and dialysis, she remains unchanged and planning to have additional family meeting tomorrow  DNR, DNI as appropriate and comfort care would be an appropriate next step such as cessation of dialysis.  Family meeting was had on 3/12/2020, as noted above  Ethics committee consultation to provide physician backup for initiation of comfort care specifically withdrawal of dialysis and comfort care  measures    Hypokalemia  Assessment & Plan  Patient is end-stage renal disease on hemodialysis, potassium improved.    Severe protein-calorie malnutrition (HCC)- (present on admission)  Assessment & Plan  Continue nutrition via tube feeding.  Dietitian consulted     Cerebrovascular accident (CVA) due to embolism of right middle cerebral artery (HCC)- (present on admission)  Assessment & Plan  Very poor prognosis.  Continue aspirin and statin    End stage renal failure on dialysis ([facility])- (present on admission)  Assessment & Plan  Dialysis via a right-sided tunneled catheter  Continue hemodialysis Monday Wednesday Friday    Normocytic anemia- (present on admission)  Assessment & Plan  Likely anemia of chronic kidney disease.  FOBT is negative.   - transfuse if drops below 7  Hemoglobin stable    Hypertension- (present on admission)  Assessment & Plan  She was on Coreg 25 mg twice a day, Cardura 6 mg daily, hydralazine 100 mg 3 times daily  Blood pressure is been low and all BP meds have been stopped  500 mL IV bolus was ordered on 3/11/2020 due to hypotension  Blood pressure was up to the 150s on 3/14/2020 now down to the low 100s on 3/15/20  Continue to hold her blood pressure medicines    Type 2 diabetes mellitus, with long-term current use of insulin ([facility])- (present on admission)  Assessment & Plan  A1C 7.5%, blood sugars have been ~170. Was taking insulin at home.   Continue sliding scale insulin.  Continue tube feeding.   Blood sugar is stable.    Leukocytosis  Assessment & Plan  No other signs of infection, probably reactive  White blood cell count remains high  chest x-ray is unchanged, procalcitonin is slightly elevated but patient is end-stage renal disease patient has no fever, UA positive for leukocyte esterase but no other signs of infection.   C. difficile negative     TB lung, latent- (present on admission)  Assessment & Plan  Quantiferon + and will have to rule out active TB. Chest CT found positive  "left-sided consolidation with pleural effusion.  - pending sputum cx/ AFB stain to rule out active disease before initiating  - s/p thora on 2/25; f/u pathology negative  - ID following, appreciate recs  - AFB stain negative x3; remove isolation  - Per ID, we will \"treat for LTBI with  mg PO daily and B6 25 mg PO daily for 9 months if AFB cxs all negative at 6 weeks\"  Completed antibiotics treatment.    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    "

## 2020-03-17 NOTE — PROGRESS NOTES
2 RN skin check complete with Dunia HAILE.   Devices in place: trach, PICC, HD catheter, PEG tube, BMS, hernandez catheter, wound vac, heel float boots, mepilex.  Skin assessed under devices yes.  Confirmed pressure ulcers found: sacrum with wound vac in place.  New potential pressure ulcers noted on none. Wound consult placed no.  The following interventions in place: bilateral mepilex to elbows and heels, mepilex over wound vac dressing, heel float boots, low airloss mattress, q2 turns, BMS, hernandez catheter. Inner cannula changed and pt suctioned.      Assessed: Bruise on right medial lower extremity. Elbows red and blanching, heels pink and blanching, PEG tube site with old drainage, site cleansed. Wound to sacrum with wound vac in place. Mepilex over wound vac on sacrum had bleeding under mepilex- mepilex and dressing reinforced and wound notified. Skin under trach collar pink and blanching

## 2020-03-17 NOTE — CARE PLAN
Problem: Oxygenation:  Goal: Maintain adequate oxygenation dependent on patient condition  Outcome: PROGRESSING AS EXPECTED   Q4 aerosol checks. Patient receiving 4 LPM, 28%.

## 2020-03-17 NOTE — DISCHARGE PLANNING
Received Choice form at 8706 on 3/16/2020  Agency[facility]Facility Name: [facility]  Referral sent per Choice form @ 1669

## 2020-03-17 NOTE — DISCHARGE PLANNING
Agency[facility]ility [facility]: [facility]  Outcome: Left VM regarding referral. Requested call back.    Agency/Facility Name: [facility]  Outcome: Left VM regarding referral. Requested call back.     JEAN Lehman notified.

## 2020-03-17 NOTE — PROGRESS NOTES
2 RN skin check complete with MIC Matias.   Devices in place: trach, PICC, HD catheter, PEG tube, BMS, hernandez catheter, wound vac, heel float boots, mepilex.  Skin assessed under devices yes.  Confirmed pressure ulcers found: sacrum with wound vac in place.  New potential pressure ulcers noted on none. Wound consult placed no.  The following interventions in place: bilateral mepilex to elbows and heels, mepilex over wound vac dressing, heel float boots, low airloss mattress, q2 turns, BMS, hernandez catheter. Inner cannula changed and pt suctioned.      Assessed: Bruise on right medial lower extremity. Elbows red and blanching, heels pink and blanching, PEG tube site with old drainage, site cleansed. Wound to sacrum with wound vac in place. Mepilex over wound vac on sacrum had bleeding under mepilex- mepilex and dressing reinforced and wound notified. Skin under trach collar pink and blanching

## 2020-03-17 NOTE — WOUND TEAM
[facility]  Inpatient Services  Wound and Skin Care Progress Note    Admission Date:  1/16/2020   HPI, PMH, SH: Reviewed  Unit where seen by Wound Team: MAIDA Nolan    WOUND TEAM FOLLOW UP: schedule NPWT dressing change    Self Report / Pain Level: seems in no  distress      OBJECTIVE: trached; non verbal; eye contact    WOUND TYPE, LOCATION, CHARACTERISTICS (Pressure ulcers: location, stage, POA or date identified)        Pressure Injury 01/16/20 Sacrum;Coccyx stage 3 POA  (Active)   Wound Image   2/27/2020 12:00 PM   Pressure Injury Stage 4 3/17/2020 12:50 PM   State of Healing Non-healing 3/17/2020 12:50 PM   Site Assessment Brown;Jenkins 3/17/2020 12:50 PM   Periwound Assessment Bleeding;Purple;Denuded 3/17/2020 12:50 PM   Margins Unattached edges 3/17/2020 12:50 PM   Wound Length (cm) 10 cm 3/12/2020 12:00 PM   Wound Width (cm) 8 cm 3/12/2020 12:00 PM   Wound Depth (cm)     Wound Surface Area (cm^2)     Wound Volume (cm^3)     Tunneling (cm)     Undermining (cm)     Closure Secondary intention 3/17/2020 12:50 PM   Drainage Amount Scant 3/17/2020 12:50 PM   Drainage Description Serosanguineous 3/17/2020 12:50 PM   Non-staged Wound Description     Treatments Cleansed 3/17/2020 12:50 PM   Wound Cleansing Approved Wound Cleanser 3/17/2020 12:50 PM   Periwound Protectant Hydrofiber;Paste Ring;Skin Protectant Wipes to Periwound 3/17/2020 12:50 PM   Dressing Options Hydrofiber Silver;Wound Vac 3/17/2020 12:50 PM   Dressing Cleansing/Solutions Normal Saline 3/17/2020 12:50 PM   Dressing Changed Changed 3/17/2020 12:50 PM   Dressing Status Intact 3/17/2020 12:50 PM   Dressing Change/Treatment Frequency Tuesday, Thursday, Saturday, and As Needed 3/17/2020 12:50 PM   NEXT Dressing Change/Treatment Date 03/19/20 3/17/2020 12:50 PM   NEXT Weekly Photo (Inpatient Only) 03/19/20 3/17/2020 12:50 PM   Wound Odor None 3/17/2020 12:50 PM   Exposed Structures Bone 3/17/2020 12:50 PM   WOUND NURSE ONLY - Tissue Type and  Percentage tan/brown/purple 3/17/2020 12:50 PM   WOUND NURSE ONLY - Time Spent with Patient (mins) 60 3/17/2020 12:50 PM           Negative Pressure Wound Therapy 03/07/20 Pressure injury: stage 4 (Active)   NPWT Pump Mode / Pressure Setting     Dressing Type Black Foam (Veraflo);Small 3/17/2020 12:50 PM   Number of Foam Pieces Used 1 3/17/2020 12:50 PM   Canister Changed Yes 3/17/2020 12:50 PM   NEXT Dressing Change/Treatment Date 03/19/20 3/17/2020 12:50 PM   VAC VeraFlo Irrigant Normal Saline 3/17/2020 12:50 PM   VAC VeraFlo Soak Time (mins) 5 3/17/2020 12:50 PM   VAC VeraFlo Instill Volume (ml) 6 3/17/2020 12:50 PM   VAC VeraFlo - Therapy Time (hrs) 1.5 3/17/2020 12:50 PM   VAC VeraFlo Pressure (mm/Hg) Continuous;125 mmHg 3/17/2020 12:50 PM        Lab Values:    Lab Results   Component Value Date/Time    WBC 17.9 (H) 03/10/2020 11:15 PM    RBC 2.60 (L) 03/10/2020 11:15 PM    HEMOGLOBIN 8.1 (L) 03/10/2020 11:15 PM    HEMATOCRIT 26.3 (L) 03/10/2020 11:15 PM      Results from last 7 days   Lab Units 03/16/20  2142   C REACTIVE PROTEIN 4596 mg/dL 7.65*       Lab Results   Component Value Date/Time    HBA1C 7.5 (H) 02/07/2019 01:20 PM         INTERVENTIONS BY WOUND TEAM: With staff assistance, turned patient right side and removed old dressing from sacrum.  Note that staff is adding foam dressing to distal end of dressings due to leaking.  Cleansed wound and suleman wound with wound cleanser and gauze noting that suleman wound and wound unchanged from last description.  Silver hydrofiber applied to denuded suleman wound and skin prep to intact skin.  Secured silver hydrofiber with adhesive foam and thin hydrocolloid.  Paste ring applied to wound edges and filled wound with one piece veraflo foam.  Secured all with drape and resumed NPWT at above settings and total foam piece=1 piece.  Mepilex x 2 applied and VAC tubing secured.  Re positioned patient with staff assistance and discussed with staff RN.     Interdisciplinary  consultation: staff RN, SAVANA    EVALUATION:  Wound remains as noted last visit.  Suleman wound evolving and appears clean.  Will try to evaluate suleman wound dressings to manage drainage.    Factors afftecting wound healing: immobile, elevated A1C, ESRD, altered mental status  Goals:  Slow steady decrease in wound area and depth weekly    NURSING PLAN OF CARE:    Dressing changes: Continue previous Dressing Maintenance orders:   X     See new Dressing Maintenance orders:       Skin care: See Skin Care orders:        Rectal tube care: See Rectal Tube Care orders:    X  Other orders:           WOUND TEAM PLAN OF CARE (X):   NPWT change 3 x week:      X  Dressing changes:      Follow up as needed:    Other:    Anticipated discharge plans (X):  SNF:           Home Care:           Outpatient Wound Center:            Self Care:            Other: will need wound care for sacral pressure injury  - Awaiting LTACH placement

## 2020-03-17 NOTE — CARE PLAN
Problem: Oxygenation:  Goal: Maintain adequate oxygenation dependent on patient condition  Note: Heated t piece 4L 28%

## 2020-03-17 NOTE — DISCHARGE PLANNING
@1112  [facility]  Outcome: YAKOV from [facility], patient is out of days and has no discharge plan after rehab.    @4308  Agency/Facility Name[facility]  Outcome:VM from [facility], patient  Is out on inpatient days.

## 2020-03-17 NOTE — PROGRESS NOTES
2 RN skin check complete with Amparo HAILE  Devices in place: Trach, PICC, HD catheter, PEG, BMS, hernandez catheter, wound vac, SCDs, heel float boots, mepilex  Skin assessed under devices: yes  Confirmed pressure ulcers found: sacrum with wound vac in place  New potential pressure ulcers found: none        BUE bruising. Bruise on right medial lower extremity. Elbows red and blanching, heels pink and blanching, PEG tube site intact with crusting. Wound to sacrum with wound vac in place. Mepilex over sacrum wound. Blood under mepilex. Skin under trach collar pink and blanching.        The following interventions in place: bilateral mepilex to elbows and heels, mepilex over wound vac dressing, heel float boots, low airloss mattress, q2 turns, BMS, hernandez catheter.

## 2020-03-18 NOTE — PROGRESS NOTES
VSS this AM.  Trach with t-piece per RT with minimal sputum.  No signs of respiratory distress.  PAINAD 0.  PEG tube with split gauze dressing c/d/i.  Impact peptide 1.5 running at 40 ml/hr with q4 hour water flushes.  Patient also has hernandez catheter, bowel management system, right PICC line, dialysis catheter, and wound vac at 125.  Patient is non-verbal and is unable to follow any direction.  Patient is a q2 hour turn assist and 2RN skin check.  HD this AM.  Family updated via phone.  Will continue to monitor patient closely.

## 2020-03-18 NOTE — CARE PLAN
Problem: Fluid Volume:  Goal: Will maintain balanced intake and output  Outcome: PROGRESSING AS EXPECTED  Note: Enteral feeding at 40 mls/hr which is goal.     Problem: Skin Integrity  Goal: Risk for impaired skin integrity will decrease  Outcome: PROGRESSING SLOWER THAN EXPECTED  Note: Patient has stage 4 pressure ulcer on coccyx. Wound vac in place. Dressing changed today.

## 2020-03-18 NOTE — CARE PLAN
Problem: Safety  Goal: Will remain free from injury  Outcome: PROGRESSING AS EXPECTED  Note: Fall precautions in place.     Problem: Skin Integrity  Goal: Risk for impaired skin integrity will decrease  Outcome: PROGRESSING AS EXPECTED  Note: Wound team following, wound vac in place.

## 2020-03-18 NOTE — PROGRESS NOTES
Received report from night shift RN and assumed care of patient. Patient is A&O x 1. Unable to assess orientation as patient is nonverbal. Patient does not display signs of pain at this time. Breathing is unlabored. Patient's expression is calm.  Trach care performed, but inner canula was not replaced. The right kind were not in the room. RN called RT who stated he put an order in, but they had not come up. They were not on the floor at end of shift. Night RN notified.  Patient face-timed with family. No other signs of distress at this time. Bed is in lowest, locked position, call light and belongings are within reach. Patient does not call for assistance and bed alarm is on.  All other needs met.

## 2020-03-18 NOTE — PROGRESS NOTES
2 RN skin check completed with MIC Matias.   Devices in place: trach with velcro tie and t-piece, right arm PICC, right HD catheter, PEG tube, bowel management system, hernandez catheter, wound vac to coccyx, heel float boots, mepilex, SCDs.  Skin assessed under devices: yes.  Confirmed pressure ulcers found: sacrum with wound vac in place.  New potential pressure ulcers noted on: N/A.   Wound consult placed: N/A, already following.  The following interventions in place: bilateral mepilex to elbows and heels, mepilex over wound vac dressing, heel float boots, low airloss mattress, q2 turns with barrier cream PRN, BMS, hernandez catheter, inner cannula change and suction PRN     Skin assessment:   -bruise on right medial lower leg  -elbows red and blanching, intact  -heels pink and blanching, intact  -PEG tube site with small amount of redness, gauze dressing  -wound to sacrum with wound vac and mepilex coccyx/sacrum  -skin under trach collar pink and blanching, intact

## 2020-03-18 NOTE — PROGRESS NOTES
"Pulmonary Progress Note    Date of admission  1/16/2020    Chief Complaint  74 y.o. female admitted 1/16/2020 with status epelipticus    Hospital Course    \"Ms. Beltran is a 74 y.o. female admitted to [facility] on 1/14/20 with seizures.She was at a SNF.She had been previously hospitalized at [facility] and diaysis was initiated.  She was doing very poorly then and palliative care was discussed with the family,but they declined.She had very poor functional status and required Jimena lift to get into the dialysis chair.She was found to have  a CVA on MRI at [facility].Her seizures were not controlled and it was felt she was in status epilepticus.  She was getting HD at [facility].Last HD was on 1/13/20.She was seen by Dr Carcamo at [facility].Creatinine was 1.8 and dialysis was held. Neurology [place] that the patient needed continuous EEG monitoring and he was transferred to [facility]. Patient intubated due to seizures and was eventually transitioned to tracheostomy on 2/2/2020 and off the vent on 2/5/2020. Family at this time does not want to change CODE status. Patient remains unresponsive. LTAC being pursued. PEG tube in place. \"      Interval Problem Update  Chart review from the past 24 hours includes imaging, laboratory studies, vital signs and notes available.  Pertinent data for today's visit includes   Afebrile  Stable 28% T-piece  No significant changes      Review of Systems  ROS   Unable to obtain as non verbal    Vital Signs for last 24 hours   Temp:  [36.3 °C (97.3 °F)-36.8 °C (98.2 °F)] 36.3 °C (97.3 °F)  Pulse:  [80-91] 88  Resp:  [16-18] 18  BP: (121-135)/(55-68) 129/63  SpO2:  [96 %-98 %] 96 %     Physical Exam   Physical Exam  HENT:      Head: Normocephalic and atraumatic.      Mouth/Throat:      Mouth: Mucous membranes are moist.   Eyes:      Extraocular Movements: Extraocular movements intact.      Pupils: Pupils are equal, round, and reactive to light.   Neck:      Musculoskeletal: Neck supple.      Comments: " 6.0 [facility] DCT cuff deflated stoma site clean  Cardiovascular:      Rate and Rhythm: Normal rate.   Pulmonary:      Effort: Pulmonary effort is normal. No respiratory distress.      Breath sounds: No wheezing.   Abdominal:      General: Abdomen is flat.      Comments: PEG tube clean, tolerating tube feeds at goal   Musculoskeletal:      Right lower leg: No edema.      Left lower leg: No edema.   Skin:     General: Skin is warm and dry.      Comments: Right PICC line subclavian insertion site clean dry and intact, dressing clean, no erythema   Neurological:      Mental Status: She is alert.      Comments: Non verbal  Turning to voice  Lifting eyebrows     Not agitated    Medications  Current Facility-Administered Medications   Medication Dose Route Frequency Provider Last Rate Last Dose   • ascorbic acid tablet 500 mg  500 mg Enteral Tube DAILY Yaakov Pringle M.D.   500 mg at 03/17/20 0418   • therapeutic multivitamin-minerals (THERAGRAN-M) tablet 1 Tab  1 Tab Enteral Tube DAILY Yaakov Pringle M.D.   1 Tab at 03/17/20 0418   • Valproate Sodium (DEPAKENE) oral solution 500 mg  500 mg Enteral Tube Q12HRS Aubree Irving M.D.   500 mg at 03/17/20 1852   • insulin regular (HUMULIN R) injection 1-6 Units  1-6 Units Subcutaneous Q6HRS Judy Roque M.D.   1 Units at 03/17/20 1704    And   • glucose 4 g chewable tablet 16 g  16 g Oral Q15 MIN PRN Judy Roque M.D.        And   • DEXTROSE 10% BOLUS 250 mL  250 mL Intravenous Q15 MIN PRN Judy Roque M.D.       • dakins 0.125% (1/4 strength) topical soln   Topical BID Judy Roque M.D.   Stopped at 03/13/20 0600   • heparin injection 5,000 Units  5,000 Units Subcutaneous Q12HRS Aubree Irving M.D.   5,000 Units at 03/17/20 1720   • aspirin (ASA) chewable tab 81 mg  81 mg Enteral Tube DAILY Aubree Irving M.D.   81 mg at 03/17/20 0419   • omeprazole (FIRST-OMEPRAZOLE) 2 mg/mL oral susp 40 mg  40 mg Enteral Tube Q12HRS Aubree Irving M.D.   40 mg  at 03/17/20 0418   • albumin human 25% solution 25 g  25 g Intravenous ACUTE DIALYSIS PRN Aubree Irving M.D. 150 mL/hr at 03/13/20 0845 12.5 g at 03/13/20 0845   • lacosamide (VIMPAT) tablet 300 mg  300 mg Enteral Tube BID Aubree Irving M.D.   300 mg at 03/17/20 0419   • carboxymethylcellulose (REFRESH TEARS) 0.5 % ophthalmic drops 1 Drop  1 Drop Both Eyes Q2HRS PRN Aubree Irving M.D.   1 Drop at 02/26/20 0434   • folic acid (FOLVITE) tablet 1 mg  1 mg Enteral Tube DAILY Aubree Irving M.D.   1 mg at 03/17/20 0421   • allopurinol (ZYLOPRIM) tablet 100 mg  100 mg Enteral Tube DAILY Aubree Irving M.D.   100 mg at 03/17/20 0418   • nystatin (MYCOSTATIN) powder   Topical BID Aubree Irving M.D.       • hydrALAZINE (APRESOLINE) injection 10 mg  10 mg Intravenous Q4HRS PRN Judy Roque M.D.   10 mg at 02/10/20 0424   • topiramate (TOPAMAX) tablet 200 mg  200 mg Enteral Tube Q12HRS Aubree Irving M.D.   200 mg at 03/17/20 1707   • atorvastatin (LIPITOR) tablet 40 mg  40 mg Enteral Tube DAILY Aubree Irving M.D.   40 mg at 03/17/20 0419   • heparin injection 3,300 Units  3,300 Units Intracatheter PRN Aubree Irving M.D.   3,300 Units at 03/16/20 1117   • epoetin antoinette (EPOGEN/PROCRIT) injection 4,000 Units  4,000 Units Subcutaneous MO, WE + FR Aubree Irving M.D.   4,000 Units at 03/16/20 1027   • Respiratory Care per Protocol   Nebulization Continuous RT Aubree Irving M.D.       • ipratropium-albuterol (DUONEB) nebulizer solution  3 mL Nebulization Q2HRS PRN (RT) Aubree Irving M.D.       • Pharmacy Consult: Enteral tube insertion - review meds/change route/product selection  1 Each Other PHARMACY TO DOSE Aubree Irving M.D.       • labetalol (NORMODYNE/TRANDATE) injection 10 mg  10 mg Intravenous Q4HRS PRN Judy Roque M.D.   10 mg at 02/10/20 0026   • acetaminophen (TYLENOL) tablet 650 mg  650 mg Enteral Tube Q6HRS PRN Aubree Irving M.D.   650 mg at 03/10/20 1311       Fluids    Intake/Output Summary (Last 24 hours)  at 3/17/2020 1953  Last data filed at 3/17/2020 0600  Gross per 24 hour   Intake 727 ml   Output 550 ml   Net 177 ml       Laboratory          Recent Labs     03/16/20  0258   SODIUM 132*   POTASSIUM 4.1   CHLORIDE 94*   CO2 28   BUN 93*   CREATININE 1.84*   CALCIUM 8.9     Recent Labs     03/16/20 0258 03/16/20  2142   ALTSGPT 25  --    ASTSGOT 32  --    ALKPHOSPHAT 94  --    TBILIRUBIN 0.3  --    PREALBUMIN  --  15.0*   GLUCOSE 147*  --      Recent Labs     03/16/20  0258   ASTSGOT 32   ALTSGPT 25   ALKPHOSPHAT 94   TBILIRUBIN 0.3     No results for input(s): RBC, HEMOGLOBIN, HEMATOCRIT, PLATELETCT, PROTHROMBTM, APTT, INR, IRON, FERRITIN, TOTIRONBC in the last 72 hours.    Imaging  3/4/2020  cxr personally reviewed and shows tracheostomy tube, HD catheter and PICC line  Continued perihilar, left greater than right basilar opacities    Assessment/Plan    Acute respiratory failure with hypoxia (HCC)- (present on admission)  Assessment & Plan  Intubated date: 1/14/2020 s/p trach 2/2 Dr Devine  Down size to 6 cuffed shiley 3/3  Minimal secretions  No vocalizing  No progression however stable    I have performed a physical exam and reviewed and updated ROS and Plan today (3/17/2020). In review of last note, there are no changes except as documented above.     This note was generated using voice recognition software which has a chance of producing errors of grammar and content.  I have made every reasonable attempt to find and correct any errors, but it should be expected that some may not be found prior to finalization of this note.  __________  Oliver Hanson MD  Pulmonary and Critical Care Medicine  [facility]

## 2020-03-18 NOTE — PROGRESS NOTES
[facility] Progress Note     Hemodialysis treatment ordered today per Dr. Jackson x 3 hours.   Treatment initiated at 0835, ended at 1135.       Heart rate elevated during treatment.  See paper flow sheet for details.      Net UF 1,000 mL.      Post tx, CVC flushed with saline then locked with heparin 1000 units/mL per designated amount in each wing then clamped and capped.   Aspirate heparin prior to next CVC use.     Report given to Primary RN.

## 2020-03-18 NOTE — PROGRESS NOTES
-VSS throughout the night, no signs of pain or discomfort throughout the shift. Impact peptide running at 40 mL/hr. q4 flushes provided through PEG tube.  BMS flushed with 120 mL, no signs of leakage. Q2 turns in place. Family updated on patient status during the night.

## 2020-03-18 NOTE — PROGRESS NOTES
[facility] -  PROGRESS NOTE               Author: Alex Jackson M.D. Date & Time: 3/18/2020  9:00 AM     HPI:  74 y.o. female admitted to [facility] on 1/14/20 with seizures.She was at a SNF.She had been previously hospitalized at [facility] and diaysis was initiated.  She was doing very poorly then and palliative care was discussed with the family,but they declined.She had very poor functional status and required Jimena lift to get into the dialysis chair.She was found to have  a CVA on MRI at [facility].Her seizures were not controlled and it was felt she was in status epilepticus.  She was getting HD at [facility].Last HD was on 1/13/20.She was seen by Dr Carcamo at [facility].Creatinine was 1.8 and dialysis was held. Neurology [place] that the patient needed continuous EEG monitoring and he was transferred to [facility]    DAILY NEPHROLOGY SUMMARY:  Please see note from 1/31 for prior summaries  Please see note from 2/29 for prior summaries  3/02 - No new events.No interaction.For HD today.  3/04 - No new developments.For HD today.No interaction.  3/06 - No new developments.Seen on dialysis.  3/07 - NO overnight renal events, No HD today (Sat).  Trache with T piece in place.   3/09 - NAEO, no changes  3/11 - NAEO, hypotensive and receiving IVF bolus today  3/13 - NAEO, SOD, BP low, UF off today  3/16: Seen on dialysis, no interaction  3/18: seen on dialysis, no recent changes or updates    REVIEW OF SYSTEMS:    Review of Systems   Unable to perform ROS: Acuity of condition     PAST FAMILY HISTORY: Reviewed and unchanged since admission  PAST SURGICAL HISTORY:  Reviewed and unchanged since admission  SOCIAL HISTORY:  Reviewed and unchanged since admission  FAMILY HISTORY: Reviewed and unchanged since admission  CURRENT MEDICATIONS: Reviewed since admission to present  IMAGING STUDIES: Reviewed since admission to present  LABORATORY STUDIES: Reviewed since admission to present    PHYSICAL EXAM:  VS:  /60  "  Pulse 93   Temp 36.4 °C (97.6 °F) (Temporal)   Resp 16   Ht 1.651 m (5' 5\")   Wt 54.5 kg (120 lb 2.4 oz)   SpO2 94%   BMI 19.99 kg/m²    GENERAL: Ill-appearing  CV: RRR, trace edema  RESP: Trach in place, coarse breath sounds bilaterally  GI: Soft  MSK: No obvious joint deformities   SKIN: No concerning rashes  NEURO: Diffuse weakness, no previous focal deficit    Physical ExamFluids:  In: 30 [Enteral:30]  Out: 300     LABS:  Recent Results (from the past 24 hour(s))   ACCU-CHEK GLUCOSE    Collection Time: 03/17/20 11:36 AM   Result Value Ref Range    Glucose - Accu-Ck 154 (H) 65 - 99 mg/dL   ACCU-CHEK GLUCOSE    Collection Time: 03/17/20  5:03 PM   Result Value Ref Range    Glucose - Accu-Ck 182 (H) 65 - 99 mg/dL   ACCU-CHEK GLUCOSE    Collection Time: 03/18/20 12:52 AM   Result Value Ref Range    Glucose - Accu-Ck 149 (H) 65 - 99 mg/dL   ACCU-CHEK GLUCOSE    Collection Time: 03/18/20  6:09 AM   Result Value Ref Range    Glucose - Accu-Ck 196 (H) 65 - 99 mg/dL       (click the triangle to expand results)    IMPRESSION:  # ESRD   MWF iHD as OP, but D/C from clinic due to > 30 days inpatient stay   New Placement if ever stable for home D/C          # Status epilepticus   Now resolved, but mentally unresponsive  # S/P acute lacunar infarct              Hx of previous CVA with significant deficits.  # HTN--BP variable often with intradialytic hypotension  # DM II              Management per primary svc  # Acute Hypoxic Respiratory Failure              +Trach and T-piece  # Hx of dysphagia  # Anemia of CKD.Ferritin previously significantly elevated. CAT with HD.  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              PTH 26.2 , Vit D 53 , Phos  2.5   # CAD  # DLD  # Gout,on Allopurinol  # Hx of PEG tube  # Hx of RALF  # Hx of UTI  # COPD  # Edema/Anasarca--improved  # Hypophosphatemia, improved   # Hyponatremia, improved   # Prognosis poor              Family expectations and goals for patient are not in line with " prognosis   Comfort care recommended by multiple services  # Stage 3 decubitus ulcer  # TB rule out, + TB quant and abnormal CXR/CT, ID on board and managing  # Leukocytosis      PLAN:               MWF iHD, no UF for now given I/Os              Continue Epogen with HD              Enteral feedings              No dietary protein restrictions              Follow labs              Very poor prognosis, recommend comfort care, Ethics consult placed by  hospitalist              Will see on MWF with HD, please call on other days w/ questions or concerns    All prior notes form other doctors and RN staff were reviewed from admission to current day to help me make my clinical decisions

## 2020-03-18 NOTE — DISCHARGE PLANNING
Medical Social Work  Voice message from TOSHA Negrete declined as the patient does not have many days left and there is not a solid d/c plan.     Tiger Text to Dr. Turner, provided an update and recommendations  Tiger Text from Dr. Turner, requested an ethics consult be placed  [place] Text to ESPERANZA Soni, requested an ethics consult be placed.

## 2020-03-18 NOTE — CARE PLAN
Problem: Oxygenation:  Goal: Maintain adequate oxygenation dependent on patient condition  Outcome: PROGRESSING AS EXPECTED     Problem: Bronchopulmonary Hygiene:  Goal: Increase mobilization of retained secretions  Outcome: PROGRESSING AS EXPECTED   Q4 aerosol checks. Patient receiving 4 LPM, 28%.

## 2020-03-19 NOTE — CARE PLAN
Problem: Oxygenation:  Goal: Maintain adequate oxygenation dependent on patient condition  Outcome: PROGRESSING AS EXPECTED     Problem: Bronchopulmonary Hygiene:  Goal: Increase mobilization of retained secretions  Outcome: PROGRESSING AS EXPECTED   Q4 aerosol checks. 4 LPM, 28%.

## 2020-03-19 NOTE — CARE PLAN
Problem: Oxygenation:  Goal: Maintain adequate oxygenation dependent on patient condition  Outcome: PROGRESSING AS EXPECTED   Continue to provide oxygen support and maintain airway.

## 2020-03-19 NOTE — PROGRESS NOTES
VSS this AM.  Trach with t-piece per RT with minimal yellow sputum.  No signs of respiratory distress.  PAINAD 0, but patient was medicated with PRN Oxycodone for wound vac change.  PEG tube with split gauze dressing c/d/i.  Impact peptide 1.5 running at 40 ml/hr with q4 hour water flushes.  Patient also has hernandez catheter, bowel management system, right PICC line, dialysis catheter, and wound vac at 125.  Patient is non-verbal and is unable to follow any directions.  Patient is tracking with her eyes and does open her eyes when spoken to.  Patient is a q2 hour turn assist and 2RN skin check.  Family updated via phone.  Will continue to monitor patient closely.

## 2020-03-19 NOTE — CARE PLAN
Problem: Fluid Volume:  Goal: Will maintain balanced intake and output  Outcome: PROGRESSING AS EXPECTED  Note: Tube feed nutrition with q4 hour water flushes.  HD MWF.     Problem: Bowel/Gastric:  Goal: Normal bowel function is maintained or improved  Outcome: PROGRESSING SLOWER THAN EXPECTED  Note: Still with watery stool.  BMS in place.

## 2020-03-19 NOTE — WOUND TEAM
Pt seen with Rina Miles RN.  Sharp debrided with scissors, forceps <20cm2 yellow and brown slough from sacral wound.  See Rina's note for details.

## 2020-03-19 NOTE — WOUND TEAM
[facility]  Inpatient Services  Initial Wound and Skin Care Evaluation    Admission Date: 1/16/2020     Last order of IP CONSULT TO WOUND CARE was found on 3/12/2020 from Hospital Encounter on 1/16/2020     HPI, PMH, SH: Reviewed    Unit where seen by Wound Team: [id number]     WOUND CONSULT/FOLLOW UP RELATED TO:  Sacral veraflow wound vac     Self Report / Pain Level:  Pre-medication given by bedside RN. No s/sx of pain       OBJECTIVE:  Patient lying comfortably in bed. veraflow wound vac on, no leaks present. Heels in heel float boots. Low airloss mattress.    WOUND TYPE, LOCATION, CHARACTERISTICS (Pressure Injuries: location, stage, POA or date identified)         Pressure Injury 01/16/20 Sacrum;Coccyx stage 3 POA  (Active)   Wound Image       State of Healing Undermining;Non-healing;Eschar    Site Assessment Tan;Drainage;Eschar;Undermining;Pink    Periwound Assessment Purple;Denuded;Bleeding;Fragile    Margins Unattached edges    Wound Length (cm) 5.5 cm    Wound Width (cm) 3.8 cm    Wound Depth (cm) 1.5 cm    Wound Surface Area (cm^2) 20.9 cm^2    Wound Volume (cm^3) 31.35 cm^3    Undermining (cm) 2.5 cm    Closure Secondary intention    Drainage Amount Small    Drainage Description Serosanguineous    Non-staged Wound Description Partial thickness    Treatments Cleansed;CSWD - Conservative Sharp Wound Debridement;Site care;Tape change;Direct pressure    Wound Cleansing Approved Wound Cleanser    Periwound Protectant Benzoin;Hydrocolloid;Hydrofiber;Paste Ring    Dressing Options Hydrofiber Silver;Hydrocolloid Thin;Wound Vac    Dressing Cleansing/Solutions Normal Saline    Dressing Changed Changed    Dressing Status Clean;Dry;Intact    Dressing Change/Treatment Frequency Tuesday, Thursday, Saturday, and As Needed    NEXT Dressing Change/Treatment Date 03/21/20    NEXT Weekly Photo (Inpatient Only) 03/26/20    Wound Odor None    Exposed Structures Ligament    WOUND NURSE ONLY - Tissue Type and  Percentage tan/brown/purple            Negative Pressure Wound Therapy 03/07/20 Pressure injury: stage 4 (Active)   NPWT Pump Mode / Pressure Setting 125 mmHg;Continuous    Dressing Type Black Foam (Veraflo)    Number of Foam Pieces Used 2    Canister Changed No    Output (mL) 50 mL    NEXT Dressing Change/Treatment Date 03/21/20    VAC VeraFlo Irrigant Normal Saline    VAC VeraFlo Soak Time (mins) 5    VAC VeraFlo Instill Volume (ml) 6    VAC VeraFlo - Therapy Time (hrs) 1.5    VAC VeraFlo Pressure (mm/Hg) Continuous;125 mmHg    WOUND NURSE ONLY - Time Spent with Patient (mins) 90                Vascular:    EDOUARD:   No results found.      Lab Values:    Lab Results   Component Value Date/Time    WBC 17.9 (H) 03/10/2020 11:15 PM    RBC 2.60 (L) 03/10/2020 11:15 PM    HEMOGLOBIN 8.1 (L) 03/10/2020 11:15 PM    HEMATOCRIT 26.3 (L) 03/10/2020 11:15 PM    CREACTPROT 7.65 (H) 03/16/2020 09:42 PM    SEDRATEWES 68 (H) 08/08/2019 12:48 PM    HBA1C 7.5 (H) 02/07/2019 01:20 PM          Culture:   Obtained N/A,   Culture Results show:  Recent Results (from the past 720 hour(s))   CULTURE WOUND W/ GRAM STAIN    Collection Time: 02/20/20 11:45 AM   Result Value Ref Range    Significant Indicator POS (POS)     Source WND     Site ABDOMINAL     Culture Result Rare growth mixed skin shiva. (A)     Gram Stain Result Moderate WBCs.  No organisms seen.       Culture Result Candida albicans  Moderate growth   (A)          INTERVENTIONS BY WOUND TEAM:  Patient and chart reviewed. Sacral dressing still in place, no leaks present. With assistance of wound rn Anusha, patient was turned to her right side to assess the sacrum and remove current wound vac dressing. Dressing was removed from sacrum. Cleansed wound and suleman-wound with wound cleanser and gauze, some bleeding present on periwound. Periwound appeared slightly improved. Wound bed has no granular tissue, with  ligaments, eschar present. Non viable tissue debrided away using forceps  and scissors. <20 cm2 debrided. Minimal bleeding noted, controlled with manual pressure. Cleansed again, pictures and measurements obtained and in chart. Undermining present: 12 oclock: 2.5cm, 3 oclock: 2.5cm, 6 oclock: 1.5cm, 9 oclock: 2.0cm. At least 1.5cm in depth all around. Periwound was then prepped with benzoin. Hidrofiber silver applied to parts of periwound that were bleeding/denuded. A piece of hydrocolloid thin was then placed over entire periwound. Then paste ring was applied on periwound close to edges. One piece of black foam applied into wound bed, filling undermining areas as well, then one button black foam placed on top for trak pad (2 foam pieces total). Trak pad secured in place with drape. Mepilex placed under tubing to reduce pressure and friction of the tube on the skin. Veraflo turned back on, test cycle completed, see settings in LDA, no leaks present, adequate seal. Patient sheets were then changed, patient turned to left side with pillows. Bedside RN updated.    Interdisciplinary consultation: Patient, Bedside RN (Thao), Wound PT ESPERANZA Tavares    EVALUATION: Patient is elderly woman with multiple interval problems and appears chronically ill. Sacral wound has no granular tissue in the wound bed, it is questionable if this patient will be able to form and support vascular tissue in the area. Hydrofiber silver and hydrocolloid used on the periwound to help absorb drainage and keep periwound dry so that the drape and seal will stick and stay, also helps with decreasing infection. Continuing veraflo therapy to help clean up the wound bed and decreasing the bacterial load with hopes of decreasing the size of the wound. Ethics involved, patient possibly to be placed on comfort care per APN, APN visualized wound with wound RN's and agreed the patient's possibility of healing the wound is very low. Wound team to continue to follow and change veraflo vac according to schedule 3x/week.     Goals:  Steady decrease in wound area and depth weekly.    NURSING PLAN OF CARE ORDERS (X):    Dressing changes: See Dressing Care orders: X  Skin care: See Skin Care orders: X  Rectal tube care: See Rectal Tube Care orders:   Other orders:      WOUND TEAM PLAN OF CARE:   Dressing changes by wound team:          Follow up 1-2 times weekly:               Follow up 3 times weekly:                NPWT change 3 times weekly:  X - sacral veraflo   Follow up as needed:       Other (explain):     Anticipated discharge plans: Will need extensive wound care for sacrum if discharges.  LTACH:        SNF/Rehab:                  Home Care:           Outpatient Wound Center:            Self Care:

## 2020-03-19 NOTE — CARE PLAN
Problem: Safety  Goal: Will remain free from injury  Outcome: PROGRESSING SLOWER THAN EXPECTED  Note: Call bell within reach; sr up x3; bed alarm on; pt is non verbal; pt does not attempt to get out of the bed     Problem: Knowledge Deficit  Goal: Knowledge of disease process/condition, treatment plan, diagnostic tests, and medications will improve  Outcome: PROGRESSING SLOWER THAN EXPECTED  Note: Pt is non verbal; altered mental status

## 2020-03-19 NOTE — PROGRESS NOTES
2 RN skin check completed with MIC Lozano.   Devices in place: trach with velcro tie and t-piece, right arm PICC, right HD catheter, PEG tube, bowel management system, hernandez catheter, wound vac to coccyx, heel float boots, mepilex, SCDs.  Skin assessed under devices: yes, LOPEZ under wound vac.  Confirmed pressure ulcers found: sacrum with wound vac in place.  New potential pressure ulcers noted on: N/A.   Wound consult placed: N/A, already following.  The following interventions in place: bilateral mepilex to heels, mepilex over wound vac dressing, heel float boots, low airloss mattress, q2 turns with barrier cream PRN, BMS, hernandez catheter, inner cannula change and suction PRN.     Skin assessment:   -scattered bruises to BLE  -elbows pink and blanching, intact  -heels pink and blanching, intact  -PEG tube site with small amount of redness, gauze dressing  -wound to sacrum with wound vac and mepilex over top  -skin under trach collar pink and blanching, intact

## 2020-03-19 NOTE — PROGRESS NOTES
"[facility] Medicine Daily Progress Note    Date of Service  3/18/2020    Chief Complaint  altered mental status    Hospital Course    75 y.o. female admitted 1/16/2020 with a past medical history of hypertension, coronary disease, diabetes, and end-stage renal disease on dialysis brought to the emergency room after apparently she had seizure type activity at her assisted living facility.  She was admitted and underwent continuous EEG monitoring followed by neurology.  Her mentation did not improve and she underwent a tracheostomy on 2/2/2020.  A PEG tube was placed 2/15/2020.  He has had a persistent, severe encephalopathy. End-stage renal disease, continuing dialysis per family preference.      Interval Problem Update  Patient is lying in bed, awake and tracking with her eyes.  She does blink on command and when asked to blink \"yes\" to questions.  She blinks once when asked if she is in pain.  She also blinks at a pain level of 6.  She does not blink when asked if she wants to continue dialysis.  She does blink when she is asked if she wants to stop dialysis.  Ethics consult pending.  Palliative following.     Consultants/Specialty  Critical care, signed off  Neurology, signed off  Infectious disease, signed off  Palliative care, following  Nephrology, following    Code Status  Full code per family, ethics subcommittee evaluating    Disposition  Declined by long term care facilities, family unwilling to take patient home. Family does not agree to hospice.    Review of Systems  Review of Systems   Unable to perform ROS: Medical condition        Physical Exam  Temp:  [36.1 °C (97 °F)-37.1 °C (98.8 °F)] 36.8 °C (98.3 °F)  Pulse:  [67-97] 89  Resp:  [16-18] 18  BP: (117-154)/(60-77) 137/77  SpO2:  [94 %-100 %] 95 %    Physical Exam  Vitals signs and nursing note reviewed.   Constitutional:       Comments: Chronically ill appearing   HENT:      Mouth/Throat:      Mouth: Mucous membranes are dry.   Neck:      Comments: " Right tunneled dialysis cath  trach  Cardiovascular:      Rate and Rhythm: Normal rate and regular rhythm.   Pulmonary:      Effort: Pulmonary effort is normal.      Breath sounds: Normal breath sounds.      Comments: Poor air movement  Abdominal:      General: There is distension.      Tenderness: There is no abdominal tenderness.      Comments: PEG tube   Genitourinary:     Comments: River cath  Rectal tube  Musculoskeletal:      Right lower leg: No edema.      Left lower leg: No edema.      Comments: Poor muscle tone  Wound VAC right hip   Skin:     General: Skin is warm and dry.   Neurological:      Comments: She follows blinking command   Psychiatric:      Comments: She is nonverbal       Fluids    Intake/Output Summary (Last 24 hours) at 3/18/2020 1700  Last data filed at 3/18/2020 1615  Gross per 24 hour   Intake 1770 ml   Output 1610 ml   Net 160 ml       Laboratory      Recent Labs     03/16/20  0258   SODIUM 132*   POTASSIUM 4.1   CHLORIDE 94*   CO2 28   GLUCOSE 147*   BUN 93*   CREATININE 1.84*   CALCIUM 8.9                 Assessment/Plan  * Status epilepticus (HCC)- (present on admission)  Assessment & Plan  On admission, she was intubated for airway protection, now trach since 2/2/20. No evidence of seizure activity.  Appears now to have a severe, intractable encephalopathy  - continuing regimen of vimpat, topamax, depakote  - repeat EEG showed no seizures  Stable.  Continue antiseizure medications, seizure precautions.  No more seizures    Acute respiratory failure with hypoxia (HCC)- (present on admission)  Assessment & Plan   on trach/T-piece,  pulmonology following  Acute on chronic condition now    Goals of care, counseling/discussion- (present on admission)  Assessment & Plan  Overall neurologic and physical prognosis is quite poor given her severe, unrelenting encephalopathy and dialysis, she remains unchanged and planning to have additional family meeting tomorrow  DNR, DNI as appropriate and  comfort care would be an appropriate next step such as cessation of dialysis.  Family meeting was had on 3/12/2020, as noted above  Ethics committee consultation to provide physician backup for initiation of comfort care specifically withdrawal of dialysis and comfort care measures    Severe protein-calorie malnutrition (HCC)- (present on admission)  Assessment & Plan  Continue nutrition via tube feeding.  Dietitian consulted     Cerebrovascular accident (CVA) due to embolism of right middle cerebral artery (HCC)- (present on admission)  Assessment & Plan  Very poor prognosis.  Continue aspirin and statin  Pain management    End stage renal failure on dialysis (HCC)- (present on admission)  Assessment & Plan  Dialysis via a right-sided tunneled catheter  Continue hemodialysis Monday Wednesday Friday    Normocytic anemia- (present on admission)  Assessment & Plan  Likely anemia of chronic kidney disease.  FOBT is negative.   - transfuse if drops below 7  Hemoglobin stable    Hypertension- (present on admission)  Assessment & Plan  She was on Coreg 25 mg twice a day, Cardura 6 mg daily, hydralazine 100 mg 3 times daily  Blood pressure is been low and all BP meds have been stopped  500 mL IV bolus was ordered on 3/11/2020 due to hypotension  Systolic blood pressure now averaging 130s  Continue to hold her blood pressure medicines    Type 2 diabetes mellitus, with long-term current use of insulin ([facility])- (present on admission)  Assessment & Plan  A1C 7.5%, blood sugars have been ~170. Was taking insulin at home.   Continue sliding scale insulin.  Continue tube feeding.   Blood sugar is stable.    Leukocytosis  Assessment & Plan  No other signs of infection, probably reactive  White blood cell count remains high  chest x-ray is unchanged, procalcitonin is slightly elevated but patient is end-stage renal disease patient has no fever, UA positive for leukocyte esterase but no other signs of infection.   C. difficile  "negative     Pressure injury of sacrum, coccyx, stage 3 (HCC)- (present on admission)  Assessment & Plan  He has a substantial right hip decubitus ulcer with a wound VAC followed by wound care  Given her ability to move spontaneously, diabetes, dialysis, and poor condition, she is extremely high risk of developing further decubitus ulcers    Dysphagia as late effect of cerebrovascular accident (CVA)- (present on admission)  Assessment & Plan  PEG tube in place with tube feeding    Gout- (present on admission)  Assessment & Plan  On allopurinol via PEG tube    TB lung, latent- (present on admission)  Assessment & Plan  Quantiferon + and will have to rule out active TB. Chest CT found positive left-sided consolidation with pleural effusion.  - pending sputum cx/ AFB stain to rule out active disease before initiating  - s/p thora on 2/25; f/u pathology negative  - ID following, appreciate recs  - AFB stain negative x3; remove isolation  - Per ID, we will \"treat for LTBI with  mg PO daily and B6 25 mg PO daily for 9 months if AFB cxs all negative at 6 weeks\"  Completed antibiotics treatment.    Hypokalemia  Assessment & Plan  Patient is end-stage renal disease on hemodialysis, potassium improved.  resolved       VTE prophylaxis: Heparin    SHAHIDA Riley.    "

## 2020-03-19 NOTE — DIETARY
Nutrition Support Weekly Update: Day 63 of admit.  Cassandra Guzmán is a 75 y.o. female with admitting DX of Seizures, Ventilatory respiratory failure, Acute renal failure, Respiratory failure requiring intubation.     Tube feeding initiated on 1/17. Current TF via PEG is Impact Peptide 1.5 @ 40 mL/hr, providing 1440 kcal, 90 gm protein, 134 gm CHO, and 739 mL of free water per day. Pt is also receiving Nutrisource Fiber packets 6x/day, providing an additional 18 gm fiber and 90 kcal, for a total of 1530 kcal/day.       Assessment:  Wt 2/22: 54.5 kg via bed scale - No updated weight x 4 weeks.     Evaluation:   1. TF running @ goal  2. Pt has a trach with t-piece   3. Pt receiving dialysis every MWF per nephrology note  4. Edema: generalized edema throughout   5. Labs (3/16): Na 132, glucose 147, BUN 93, creat 1.84, pre-albumin 15.0 (slightly up from 13.0 on 3/9), CRP 7.65 (slightly down from 9.13 on 3/9)  6. Meds: ascorbic acid, folic acid, SSI, omeprazole, theragran-m  7. Skin: Wound team note 3/17: Sacrococcygeal wound appears to have returned to stage 3, NPWT in place  8. D/C planning: ethics consulted - continues to be full code at this time   9. No changes to current feedings - see RD note 2/21 documenting family declining bolus feeds or a more standard formula      Malnutrition Risk: Unable to update without an updated weight.      Recommendations/Plan:  1. Continue current TF rate and formula   2. Continue Nutrisource Fiber packets  3. Continue vitamin therapy for wound  4. Fluids per MD  5. Monitor weight - please obtain an updated weight as able      RD following

## 2020-03-20 NOTE — CARE PLAN
Problem: Communication  Goal: The ability to communicate needs accurately and effectively will improve  Outcome: PROGRESSING AS EXPECTED     Problem: Safety  Goal: Will remain free from injury  Outcome: PROGRESSING AS EXPECTED  Goal: Will remain free from falls  Outcome: PROGRESSING AS EXPECTED    Ongoing care. Patient nonverbal, Patient looks comfortable at this time. Checked all the lines and wounds. Patient turned q2 today and tonight planned for continuing care and monitoring as ordered.      0700 Patient safely transported to HD. No acute issues over night. Trach canula changed with no problems. Family called and all questions answered to the best of my knowledge.

## 2020-03-20 NOTE — PROGRESS NOTES
[facility] -  PROGRESS NOTE               Author: Alex Jackson M.D. Date & Time: 3/20/2020  8:15 AM     HPI:  74 y.o. female [facility] with seizures.She was at a SNF.She had been previously hospitalized at [facility] and diaysis was initiated.  She was doing very poorly then and palliative care was discussed with the family,but they declined.She had very poor functional status and required Jimena lift to get into the dialysis chair.She was found to have  a CVA on MRI at [facility].Her seizures were not controlled and it was felt she was in status epilepticus.  She was getting HD at [facility].Last HD was on 1/13/20.She was seen by Dr Carcamo at [facility].Creatinine was 1.8 and dialysis was held. Neurology [place] that the patient needed continuous EEG monitoring and he was transferred to [facility]    DAILY NEPHROLOGY SUMMARY:  Please see note from 1/31 for prior summaries  Please see note from 2/29 for prior summaries  3/02 - No new events.No interaction.For HD today.  3/04 - No new developments.For HD today.No interaction.  3/06 - No new developments.Seen on dialysis.  3/07 - NO overnight renal events, No HD today (Sat).  Trache with T piece in place.   3/09 - NAEO, no changes  3/11 - NAEO, hypotensive and receiving IVF bolus today  3/13 - NAEO, SOD, BP low, UF off today  3/16: Seen on dialysis, no interaction  3/18: seen on dialysis, no recent changes or updates  3/20: no updated regarding GOC, seen on HD    REVIEW OF SYSTEMS:    Review of Systems   Unable to perform ROS: Acuity of condition     PAST FAMILY HISTORY: Reviewed and unchanged since admission  PAST SURGICAL HISTORY:  Reviewed and unchanged since admission  SOCIAL HISTORY:  Reviewed and unchanged since admission  FAMILY HISTORY: Reviewed and unchanged since admission  CURRENT MEDICATIONS: Reviewed since admission to present  IMAGING STUDIES: Reviewed since admission to present  LABORATORY STUDIES: Reviewed since admission  "to present    PHYSICAL EXAM:  VS:  /70   Pulse 88   Temp 36.9 °C (98.4 °F) (Temporal)   Resp 18   Ht 1.651 m (5' 5\")   Wt 54.5 kg (120 lb 2.4 oz)   SpO2 98%   BMI 19.99 kg/m²    GENERAL: Ill-appearing  CV: RRR, trace edema  RESP: Trach in place, coarse breath sounds bilaterally  GI: Soft  MSK: No obvious joint deformities   SKIN: No concerning rashes  NEURO: Diffuse weakness, no previous focal deficit    Physical ExamFluids:  In: 1250 [Other:360]  Out: 425     LABS:  Recent Results (from the past 24 hour(s))   ACCU-CHEK GLUCOSE    Collection Time: 03/19/20 12:16 PM   Result Value Ref Range    Glucose - Accu-Ck 134 (H) 65 - 99 mg/dL   ACCU-CHEK GLUCOSE    Collection Time: 03/19/20  4:59 PM   Result Value Ref Range    Glucose - Accu-Ck 172 (H) 65 - 99 mg/dL   ACCU-CHEK GLUCOSE    Collection Time: 03/19/20 11:41 PM   Result Value Ref Range    Glucose - Accu-Ck 155 (H) 65 - 99 mg/dL   ACCU-CHEK GLUCOSE    Collection Time: 03/20/20  5:47 AM   Result Value Ref Range    Glucose - Accu-Ck 164 (H) 65 - 99 mg/dL       (click the triangle to expand results)    IMPRESSION:  # ESRD   MWF iHD as OP, but D/C from clinic due to > 30 days inpatient stay   New Placement if ever stable for home D/C          # Status epilepticus   Now resolved, but mentally unresponsive  # S/P acute lacunar infarct              Hx of previous CVA with significant deficits.  # HTN--BP variable often with intradialytic hypotension  # DM II              Management per primary svc  # Acute Hypoxic Respiratory Failure              +Trach and T-piece  # Hx of dysphagia  # Anemia of CKD.Ferritin previously significantly elevated. CAT with HD.  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              PTH 26.2 , Vit D 53 , Phos  2.5   # CAD  # DLD  # Gout,on Allopurinol  # Hx of PEG tube  # Hx of RALF  # Hx of UTI  # COPD  # Edema/Anasarca--improved  # Hypophosphatemia, improved   # Hyponatremia, improved   # Prognosis poor              Family expectations and " goals for patient are not in line with prognosis   Comfort care recommended by multiple services  # Stage 3 decubitus ulcer  # TB rule out, + TB quant and abnormal CXR/CT, ID on board and managing  # Leukocytosis      PLAN:   -MWF iHD, no UF for now given I/Os  -Continue Epogen with HD  -Enteral feedings  -No dietary protein restrictions  -Follow labs  -Very poor prognosis, recommend comfort care, Ethics consult placed by hospitalist    All prior notes form other doctors and RN staff were reviewed from admission to current day to help me make my clinical decisions    Thank you

## 2020-03-20 NOTE — PROGRESS NOTES
[facility] Medicine Daily Progress Note    Date of Service  3/20/2020    Chief Complaint  altered mental status    Hospital Course    75 y.o. female admitted 1/16/2020 with a past medical history of hypertension, coronary disease, diabetes, and end-stage renal disease on dialysis brought to the emergency room after apparently she had seizure type activity at her assisted living facility.  She was admitted and underwent continuous EEG monitoring followed by neurology.  Her mentation did not improve and she underwent a tracheostomy on 2/2/2020.  A PEG tube was placed 2/15/2020.  He has had a persistent, severe encephalopathy. End-stage renal disease, continuing dialysis per family preference.  Wound showing very little to no granulation, somewhat worsened per wound care team assessment.      Interval Problem Update  Patient is lying in bed, awake and tracking with her eyes. She does not appear to respond to any commands, even blinking.  Dialysis completed today.  Ethics meeting meeting today regarding CODE STATUS change to DNR/DNI.  Full CODE STATUS was determined to be inappropriate to the standard of care with almost certain significant possibility of harm to the patient and highly unlikely to provide any benefit. This very difficult decision was made with careful and thorough deliberation.  The patient's CODE STATUS was changed to DNAR/DNI.  Ethics committee representative to communicate status change to family.    Consultants/Specialty  Critical care, signed off  Neurology, signed off  Infectious disease, signed off  Palliative care, following  Nephrology, following    Code Status  Full code per family, ethics subcommittee following    Disposition  Declined by long term care facilities. Family unwilling to take patient home given level of patient care needs. Family does not agree to hospice.    Review of Systems  Review of Systems   Unable to perform ROS: Medical condition        Physical Exam  Temp:  [36.4 °C (97.5  °F)-36.9 °C (98.4 °F)] 36.4 °C (97.5 °F)  Pulse:  [82-88] 85  Resp:  [16-20] 16  BP: (119-132)/(56-70) 132/56  SpO2:  [96 %-98 %] 97 %    Physical Exam  Vitals signs and nursing note reviewed.   Constitutional:       Comments: Chronically ill appearing   HENT:      Mouth/Throat:      Mouth: Mucous membranes are dry.   Neck:      Comments: Right tunneled dialysis cath  trach  Cardiovascular:      Rate and Rhythm: Normal rate and regular rhythm.   Pulmonary:      Effort: Pulmonary effort is normal.      Breath sounds: Normal breath sounds.      Comments: Poor air movement  Abdominal:      General: There is distension.      Tenderness: There is no abdominal tenderness.      Comments: PEG tube   Genitourinary:     Comments: River cath  Rectal tube  Musculoskeletal:      Right lower leg: No edema.      Left lower leg: No edema.      Comments: Poor muscle tone  Wound VAC right hip   Skin:     General: Skin is warm and dry.             Comments: Stage IV decubitus ulcer   Neurological:      Comments: She no longer follows blinking command   Psychiatric:      Comments: She is nonverbal       Fluids    Intake/Output Summary (Last 24 hours) at 3/20/2020 1605  Last data filed at 3/20/2020 1016  Gross per 24 hour   Intake 675 ml   Output 1800 ml   Net -1125 ml       Laboratory                      Assessment/Plan  * Status epilepticus (HCC)- (present on admission)  Assessment & Plan  On admission, she was intubated for airway protection, now trach since 2/2/20. No evidence of seizure activity.  Appears now to have a severe, intractable encephalopathy  - continuing regimen of vimpat, topamax, depakote  - repeat EEG showed no seizures  Stable.  Continue antiseizure medications, seizure precautions.  No more seizures    Acute respiratory failure with hypoxia (HCC)- (present on admission)  Assessment & Plan   on trach/T-piece,  pulmonology following  Acute on chronic condition now    Goals of care, counseling/discussion- (present  on admission)  Assessment & Plan  Overall neurologic and physical prognosis is quite poor given her severe, unrelenting encephalopathy and dialysis, she remains unchanged   family meeting 3/12/2020 with no subsequent change of plan of care.  Patient changed to DNR, DNI CODE STATUS per ethics committee meeting 3/20 as appropriate.  Ethics committee to relay this status change to family.  comfort care would be an appropriate next step such as cessation of dialysis.  Nephrology has also documented this opinion.  Ethics committee to confer with nephrology, appreciate any additional recommendations.  Ethics committee consultation to provide physician backup for initiation of comfort care specifically withdrawal of dialysis and comfort care measures    Severe protein-calorie malnutrition (HCC)- (present on admission)  Assessment & Plan  Continue nutrition via tube feeding.  Dietitian consulted     Cerebrovascular accident (CVA) due to embolism of right middle cerebral artery (HCC)- (present on admission)  Assessment & Plan  Very poor prognosis.  Severe debility.  Continue aspirin and statin  Pain management    End stage renal failure on dialysis (HCC)- (present on admission)  Assessment & Plan  Dialysis via a right-sided tunneled catheter  hemodialysis Monday Wednesday Friday    Normocytic anemia- (present on admission)  Assessment & Plan  Likely anemia of chronic kidney disease.  FOBT is negative.   - transfuse if drops below 7  Hemoglobin stable    Hypertension- (present on admission)  Assessment & Plan  She was on Coreg 25 mg twice a day, Cardura 6 mg daily, hydralazine 100 mg 3 times daily  Blood pressure is been low and all BP meds have been stopped  500 mL IV bolus was ordered on 3/11/2020 due to hypotension  Systolic blood pressure now averaging 130s  Continue to hold her blood pressure medicines    Type 2 diabetes mellitus, with long-term current use of insulin ([facility])- (present on admission)  Assessment & Plan  A1C  "7.5%, blood sugars have been ~170. Was taking insulin at home.   Continue sliding scale insulin.  Continue tube feeding.   Blood sugar is stable.    Leukocytosis  Assessment & Plan  No other signs of infection, probably reactive  White blood cell count remains high  chest x-ray is unchanged, procalcitonin is slightly elevated but patient is end-stage renal disease patient has no fever, UA positive for leukocyte esterase but no other signs of infection.   C. difficile negative     Pressure injury of sacrum, coccyx, stage 3 (HCC)- (present on admission)  Assessment & Plan  He has a substantial right hip decubitus ulcer with a wound VAC followed by wound care, poor tissue integrity and minimal if any wound healing.  Given her ability to move spontaneously, diabetes, dialysis, and poor condition, she is extremely high risk of developing further decubitus ulcers    Dysphagia as late effect of cerebrovascular accident (CVA)- (present on admission)  Assessment & Plan  PEG tube in place with tube feeding    Gout- (present on admission)  Assessment & Plan  On allopurinol via PEG tube    TB lung, latent- (present on admission)  Assessment & Plan  Quantiferon + and will have to rule out active TB. Chest CT found positive left-sided consolidation with pleural effusion.  - pending sputum cx/ AFB stain to rule out active disease before initiating  - s/p thora on 2/25; f/u pathology negative  - ID consulted, signed off  - AFB stain negative x3; remove isolation  - Per ID, we will \"treat for LTBI with  mg PO daily and B6 25 mg PO daily for 9 months if AFB cxs all negative at 6 weeks\"  Completed antibiotics treatment.    Hypokalemia  Assessment & Plan  Patient is end-stage renal disease on hemodialysis, potassium improved.  resolved       VTE prophylaxis: Heparin    Becky Soni A.P.R.N.    "

## 2020-03-20 NOTE — PROGRESS NOTES
2 RN skin check completed with Osbaldo Roblero RN.   Devices in place: trach with velcro tie and t-piece, right arm PICC, right HD catheter, PEG tube, bowel management system, hernandez catheter, wound vac to coccyx, heel float boots, mepilex, SCDs.  Skin assessed under devices: yes, LOPEZ under wound vac.  Confirmed pressure ulcers found: sacrum   New potential pressure ulcers noted on: N/A.   Wound consult placed: N/A  The following interventions in place: bilateral mepilex to heels, mepilex over wound vac dressing, heel float boots, low airloss mattress, q2 turns with barrier cream PRN, BMS, hernandez catheter, inner cannula change and suction PRN.     Skin assessment:   -scattered bruises to BLE  -heels pink and blanching, intact  -PEG tube site with small amount of redness, gauze dressing  -wound to sacrum with wound vac and mepilex over top  -skin under trach collar pink and blanching, intact  Rectum is pink,red and blanching

## 2020-03-20 NOTE — PROGRESS NOTES
[facility] Progress Note       HD ordered by Dr. Jackson. Treatment started at 0716 and ended at 1016.    Net UF removed: 1000mL.     Pt tolerated treatment well with no issues. See paper flow sheet for details. CVC patent, locked with Heparin 1,000 units. No s/s of infection present. Dressing in place, CDI. Report given to JOSH Hinkle RN.

## 2020-03-20 NOTE — PROGRESS NOTES
Assumed care of pt. Pt back onto unit at 10:30 from dialysis. Pt is non verbal, doesn't follow commands other than to opening her eyes when RN says her name at times. Pt has a trach with T- piece.  PEG tube with split gauze dressing c/d/i.  Impact peptide 1.5 running at 40 ml/hr with q4 hour water flushes. River catheter and BMS assessed. RUE PICC and R chest permicath, dressings are clean dry and intact. Pt also has wound vac to sacrum. Q2h turns in place and requiring 2 RN skin check.

## 2020-03-20 NOTE — CARE PLAN
Problem: Bowel/Gastric:  Goal: Normal bowel function is maintained or improved  Outcome: PROGRESSING AS EXPECTED  Intervention: Educate patient and significant other/support system about diet, fluid intake, medications and activity to promote bowel function  Note: BMS is in place, RN assessed. RN will continue to assess during Q2h turns.      Problem: Discharge Barriers/Planning  Goal: Patient's continuum of care needs will be met  Outcome: PROGRESSING AS EXPECTED  Note: Pt is currently awaiting placement.

## 2020-03-20 NOTE — CARE PLAN
Problem: Oxygenation:  Goal: Maintain adequate oxygenation dependent on patient condition  Outcome: PROGRESSING AS EXPECTED     Problem: Bronchopulmonary Hygiene:  Goal: Increase mobilization of retained secretions  Outcome: PROGRESSING AS EXPECTED   Continue cuurent THERAPY AEROSOL THERAPY, AND SUCTION OF ARTIFICIAL AIRWAY

## 2020-03-20 NOTE — PROGRESS NOTES
[facility] Medicine Daily Progress Note    Date of Service  3/19/2020    Chief Complaint  altered mental status    Hospital Course    75 y.o. female admitted 1/16/2020 with a past medical history of hypertension, coronary disease, diabetes, and end-stage renal disease on dialysis brought to the emergency room after apparently she had seizure type activity at her assisted living facility.  She was admitted and underwent continuous EEG monitoring followed by neurology.  Her mentation did not improve and she underwent a tracheostomy on 2/2/2020.  A PEG tube was placed 2/15/2020.  He has had a persistent, severe encephalopathy. End-stage renal disease, continuing dialysis per family preference.      Interval Problem Update  Patient is lying in bed, unarousable, unresponsive and not currently tracking with her eyes.  She is medicated for pain, decubitus wound dressing change in process.  Wound showing very little to no granulation, somewhat worsened per wound care team assessment. Ethics consult pending.  Palliative following.     Consultants/Specialty  Critical care, signed off  Neurology, signed off  Infectious disease, signed off  Palliative care, following  Nephrology, following    Code Status  Full code per family, ethics subcommittee evaluating    Disposition  Declined by long term care facilities, family unwilling to take patient home. Family does not agree to hospice.    Review of Systems  Review of Systems   Unable to perform ROS: Medical condition        Physical Exam  Temp:  [35.9 °C (96.7 °F)-37 °C (98.6 °F)] 35.9 °C (96.7 °F)  Pulse:  [81-88] 84  Resp:  [16-18] 18  BP: (127-142)/(61-70) 140/65  SpO2:  [95 %-98 %] 97 %    Physical Exam  Vitals signs and nursing note reviewed.   Constitutional:       Comments: Chronically ill appearing   HENT:      Mouth/Throat:      Mouth: Mucous membranes are dry.   Neck:      Comments: Right tunneled dialysis cath  trach  Cardiovascular:      Rate and Rhythm: Normal rate and  regular rhythm.   Pulmonary:      Effort: Pulmonary effort is normal.      Breath sounds: Normal breath sounds.      Comments: Poor air movement  Abdominal:      General: There is distension.      Tenderness: There is no abdominal tenderness.      Comments: PEG tube   Genitourinary:     Comments: River cath  Rectal tube  Musculoskeletal:      Right lower leg: No edema.      Left lower leg: No edema.      Comments: Poor muscle tone  Wound VAC right hip   Skin:     General: Skin is warm and dry.             Comments: Stage IV decubitus ulcer   Neurological:      Comments: She no longer follows blinking command   Psychiatric:      Comments: She is nonverbal       Fluids    Intake/Output Summary (Last 24 hours) at 3/19/2020 1835  Last data filed at 3/19/2020 1715  Gross per 24 hour   Intake 1440 ml   Output 950 ml   Net 490 ml       Laboratory                      Assessment/Plan  * Status epilepticus (HCC)- (present on admission)  Assessment & Plan  On admission, she was intubated for airway protection, now trach since 2/2/20. No evidence of seizure activity.  Appears now to have a severe, intractable encephalopathy  - continuing regimen of vimpat, topamax, depakote  - repeat EEG showed no seizures  Stable.  Continue antiseizure medications, seizure precautions.  No more seizures    Acute respiratory failure with hypoxia (HCC)- (present on admission)  Assessment & Plan   on trach/T-piece,  pulmonology following  Acute on chronic condition now    Goals of care, counseling/discussion- (present on admission)  Assessment & Plan  Overall neurologic and physical prognosis is quite poor given her severe, unrelenting encephalopathy and dialysis, she remains unchanged   family meeting with no subsequent change of plan of care.  DNR, DNI as appropriate and comfort care would be an appropriate next step such as cessation of dialysis.  Family meeting was had on 3/12/2020, as noted above  Ethics committee consultation to provide  physician backup for initiation of comfort care specifically withdrawal of dialysis and comfort care measures    Severe protein-calorie malnutrition (HCC)- (present on admission)  Assessment & Plan  Continue nutrition via tube feeding.  Dietitian consulted     Cerebrovascular accident (CVA) due to embolism of right middle cerebral artery (HCC)- (present on admission)  Assessment & Plan  Very poor prognosis.  Severe debility.  Continue aspirin and statin  Pain management    End stage renal failure on dialysis ([facility])- (present on admission)  Assessment & Plan  Dialysis via a right-sided tunneled catheter  Continue hemodialysis Monday Wednesday Friday    Normocytic anemia- (present on admission)  Assessment & Plan  Likely anemia of chronic kidney disease.  FOBT is negative.   - transfuse if drops below 7  Hemoglobin stable    Hypertension- (present on admission)  Assessment & Plan  She was on Coreg 25 mg twice a day, Cardura 6 mg daily, hydralazine 100 mg 3 times daily  Blood pressure is been low and all BP meds have been stopped  500 mL IV bolus was ordered on 3/11/2020 due to hypotension  Systolic blood pressure now averaging 130s  Continue to hold her blood pressure medicines    Type 2 diabetes mellitus, with long-term current use of insulin ([facility])- (present on admission)  Assessment & Plan  A1C 7.5%, blood sugars have been ~170. Was taking insulin at home.   Continue sliding scale insulin.  Continue tube feeding.   Blood sugar is stable.    Leukocytosis  Assessment & Plan  No other signs of infection, probably reactive  White blood cell count remains high  chest x-ray is unchanged, procalcitonin is slightly elevated but patient is end-stage renal disease patient has no fever, UA positive for leukocyte esterase but no other signs of infection.   C. difficile negative     Pressure injury of sacrum, coccyx, stage 3 ([facility])- (present on admission)  Assessment & Plan  He has a substantial right hip decubitus ulcer with a  "wound VAC followed by wound care, poor tissue integrity and minimal if any wound healing.  Given her ability to move spontaneously, diabetes, dialysis, and poor condition, she is extremely high risk of developing further decubitus ulcers    Dysphagia as late effect of cerebrovascular accident (CVA)- (present on admission)  Assessment & Plan  PEG tube in place with tube feeding    Gout- (present on admission)  Assessment & Plan  On allopurinol via PEG tube    TB lung, latent- (present on admission)  Assessment & Plan  Quantiferon + and will have to rule out active TB. Chest CT found positive left-sided consolidation with pleural effusion.  - pending sputum cx/ AFB stain to rule out active disease before initiating  - s/p thora on 2/25; f/u pathology negative  - ID consulted, signed off  - AFB stain negative x3; remove isolation  - Per ID, we will \"treat for LTBI with  mg PO daily and B6 25 mg PO daily for 9 months if AFB cxs all negative at 6 weeks\"  Completed antibiotics treatment.    Hypokalemia  Assessment & Plan  Patient is end-stage renal disease on hemodialysis, potassium improved.  resolved       VTE prophylaxis: Heparin    Becky Soni A.P.R.N.    "

## 2020-03-21 NOTE — PROGRESS NOTES
[facility] Medicine Daily Progress Note    Date of Service  3/21/2020    Chief Complaint  altered mental status    Hospital Course    75 y.o. female admitted 1/16/2020 with a past medical history of hypertension, coronary disease, diabetes, and end-stage renal disease on dialysis brought to the emergency room after apparently she had seizure type activity at her assisted living facility.  She was admitted and underwent continuous EEG monitoring followed by neurology.  Her mentation did not improve and she underwent a tracheostomy on 2/2/2020.  A PEG tube was placed 2/15/2020.  He has had a persistent, severe encephalopathy. End-stage renal disease, continuing dialysis per family preference.  Wound showing very little to no granulation, somewhat worsened per wound care team assessment.  Ethics meeting regarding CODE STATUS: Full CODE STATUS was determined to be inappropriate to the standard of care with almost certain significant possibility of harm to the patient and highly unlikely to provide any benefit. This very difficult decision was made with careful and thorough deliberation.  The patient's CODE STATUS was changed to DNAR/DNI.  Ethics committee representative to communicate status change to family.        Interval Problem Update  Patient is lying in bed, awake and tracking with her eyes. She does not appear to respond to any commands, even blinking.  No change in physical assessment.  Ethics committee considering comfort care status in consultation with nephrology.    Consultants/Specialty  Critical care, signed off  Neurology, signed off  Infectious disease, signed off  Palliative care, following  Nephrology, following    Code Status  Full code per family, ethics subcommittee following    Disposition  Declined by long term care facilities. Family unwilling to take patient home given level of patient care needs. Family does not agree to hospice.    Review of Systems  Review of Systems   Unable to perform ROS:  Medical condition        Physical Exam  Temp:  [36.3 °C (97.4 °F)-37.1 °C (98.7 °F)] 36.7 °C (98.1 °F)  Pulse:  [72-90] 84  Resp:  [16-19] 17  BP: (132-150)/(40-64) 143/64  SpO2:  [97 %-100 %] 97 %    Physical Exam  Vitals signs and nursing note reviewed.   Constitutional:       Comments: Chronically ill appearing   HENT:      Mouth/Throat:      Mouth: Mucous membranes are dry.   Neck:      Comments: Right tunneled dialysis cath  trach  Cardiovascular:      Rate and Rhythm: Normal rate and regular rhythm.   Pulmonary:      Effort: Pulmonary effort is normal.      Breath sounds: Normal breath sounds.      Comments: Poor air movement  Abdominal:      General: There is distension.      Tenderness: There is no abdominal tenderness.      Comments: PEG tube   Genitourinary:     Comments: River cath  Rectal tube  Musculoskeletal:      Right lower leg: No edema.      Left lower leg: No edema.      Comments: Poor muscle tone  Wound VAC right hip   Skin:     General: Skin is warm and dry.             Comments: Stage IV decubitus ulcer   Neurological:      Comments: She no longer follows blinking command   Psychiatric:      Comments: She is nonverbal     All systems reviewed and exam is unchanged from yesterday.    Fluids    Intake/Output Summary (Last 24 hours) at 3/21/2020 1622  Last data filed at 3/21/2020 1400  Gross per 24 hour   Intake 360 ml   Output 450 ml   Net -90 ml       Laboratory  Recent Labs     03/21/20  0610   WBC 14.4*   RBC 2.64*   HEMOGLOBIN 8.4*   HEMATOCRIT 26.2*   MCV 99.2*   MCH 31.8   MCHC 32.1*   RDW 57.7*   PLATELETCT 252   MPV 8.9*     Recent Labs     03/21/20  0610   SODIUM 131*   POTASSIUM 4.0   CHLORIDE 92*   CO2 28   GLUCOSE 152*   BUN 73*   CREATININE 1.35   CALCIUM 8.9                 Assessment/Plan  * Status epilepticus (HCC)- (present on admission)  Assessment & Plan  On admission, she was intubated for airway protection, now trach since 2/2/20. No evidence of seizure activity.  Appears now  to have a severe, intractable encephalopathy  - continuing regimen of vimpat, topamax, depakote  - repeat EEG showed no seizures  Stable.  Continue antiseizure medications, seizure precautions.  No more seizures    Acute respiratory failure with hypoxia (HCC)- (present on admission)  Assessment & Plan   on trach/T-piece,  pulmonology following  Acute on chronic condition now    Goals of care, counseling/discussion- (present on admission)  Assessment & Plan  Overall neurologic and physical prognosis is quite poor given her severe, unrelenting encephalopathy and dialysis, she remains unchanged   family meeting 3/12/2020 with no subsequent change of plan of care.  Patient changed to DNR, DNI CODE STATUS per ethics committee meeting 3/20 as appropriate.  Ethics committee to relay this status change to family.  comfort care would be an appropriate next step such as cessation of dialysis.  Nephrology has also documented this opinion.  Ethics committee to confer with nephrology, appreciate any additional recommendations.  Ethics committee consultation to provide physician backup for initiation of comfort care specifically withdrawal of dialysis and comfort care measures    Severe protein-calorie malnutrition (HCC)- (present on admission)  Assessment & Plan  Continue nutrition via tube feeding.  Dietitian consulted     Cerebrovascular accident (CVA) due to embolism of right middle cerebral artery (HCC)- (present on admission)  Assessment & Plan  Very poor prognosis.  Severe debility.  Continue aspirin and statin  Pain management    End stage renal failure on dialysis (HCC)- (present on admission)  Assessment & Plan  Dialysis via a right-sided tunneled catheter  hemodialysis Monday Wednesday Friday    Normocytic anemia- (present on admission)  Assessment & Plan  Likely anemia of chronic kidney disease.  FOBT is negative.   - transfuse if drops below 7  Hemoglobin stable    Hypertension- (present on admission)  Assessment &  "Plan  She was on Coreg 25 mg twice a day, Cardura 6 mg daily, hydralazine 100 mg 3 times daily  Blood pressure is been low and all BP meds have been stopped  500 mL IV bolus was ordered on 3/11/2020 due to hypotension  Systolic blood pressure now averaging 130s  Continue to hold her blood pressure medicines    Type 2 diabetes mellitus, with long-term current use of insulin ([facility])- (present on admission)  Assessment & Plan  A1C 7.5%, blood sugars have been ~170. Was taking insulin at home.   Continue sliding scale insulin.  Continue tube feeding.   Blood sugar is stable.    Leukocytosis  Assessment & Plan  No other signs of infection, probably reactive  White blood cell count remains high  chest x-ray is unchanged, procalcitonin is slightly elevated but patient is end-stage renal disease patient has no fever, UA positive for leukocyte esterase but no other signs of infection.   C. difficile negative     Pressure injury of sacrum, coccyx, stage 3 ([facility])- (present on admission)  Assessment & Plan  He has a substantial right hip decubitus ulcer with a wound VAC followed by wound care, poor tissue integrity and minimal if any wound healing.  Given her ability to move spontaneously, diabetes, dialysis, and poor condition, she is extremely high risk of developing further decubitus ulcers    Dysphagia as late effect of cerebrovascular accident (CVA)- (present on admission)  Assessment & Plan  PEG tube in place with tube feeding    Gout- (present on admission)  Assessment & Plan  On allopurinol via PEG tube    TB lung, latent- (present on admission)  Assessment & Plan  Quantiferon + , not active TB. Chest CT found positive left-sided consolidation with pleural effusion.  - sputum cx/ AFB stain negative  - s/p thora on 2/25; f/u pathology negative  - ID consulted, signed off  - AFB stain negative x3; remove isolation  - Per ID, we will \"treat for LTBI with  mg PO daily and B6 25 mg PO daily for 9 months if AFB cxs all " "negative at 6 weeks\"  Completed antibiotics treatment.    Hypokalemia  Assessment & Plan  Patient is end-stage renal disease on hemodialysis, potassium improved.  resolved       VTE prophylaxis: SHAHIDA Delgado.    "

## 2020-03-21 NOTE — PROGRESS NOTES
2 RN skin check completed with Demi RN.   Devices in place: trach with velcro tie and t-piece, right arm PICC, right HD catheter, PEG tube, bowel management system, hernandez catheter, wound vac to coccyx, heel float boots, mepilex, SCDs.  Skin assessed under devices: yes, LOPEZ under wound vac.  Confirmed pressure ulcers found: sacrum   New potential pressure ulcers noted on: N/A.   Wound consult placed: N/A  The following interventions in place: bilateral mepilex to heels, mepilex over wound vac dressing, heel float boots, low airloss mattress, q2 turns with barrier cream PRN, BMS, hernandez catheter, inner cannula change and suction PRN.     Skin assessment:   -scattered bruises to BLE  -heels pink and blanching, intact  -PEG tube site with small amount of redness, gauze dressing  -wound to sacrum with wound vac and mepilex over top  -skin under trach collar pink and blanching, intact  Rectum is pink,red and blanching

## 2020-03-21 NOTE — WOUND TEAM
[facility]  Inpatient Services  Initial Wound and Skin Care Evaluation    Admission Date: 1/16/2020     Last order of IP CONSULT TO WOUND CARE was found on 3/12/2020 from Hospital Encounter on 1/16/2020     HPI, PMH, SH: Reviewed    Unit where seen by Wound Team: [id number]     WOUND CONSULT/FOLLOW UP RELATED TO:  scheduled NPWT dsg change    Self Report / Pain Level:  Obtunded, no s/sx of pain during site care.       OBJECTIVE:  Pt on NUBIA bed, bilateral moon boots in place. Dressing to sacrum intact.    WOUND TYPE, LOCATION, CHARACTERISTICS (Pressure Injuries: location, stage, POA or date identified)       Pressure Injury 01/16/20 Sacrum;Coccyx stage 4 3/5/2020 (Active)   Wound Image    3/19/2020 11:00 AM   Pressure Injury Stage 4    State of Healing Tunneling    Site Assessment White;Red;Yellow;Drainage    Periwound Assessment Red;Purple;Brown;Fragile;Bleeding    Margins Defined edges    Wound Length (cm) 5.5 cm    Wound Width (cm) 3.8 cm    Wound Depth (cm) 1.5 cm    Wound Surface Area (cm^2) 20.9 cm^2    Wound Volume (cm^3) 31.35 cm^3    Tunneling (cm) 3.5 cm    Undermining (cm) 2.5 cm    Closure Secondary intention    Drainage Amount Small    Drainage Description Serosanguineous    Non-staged Wound Description Partial thickness    Treatments Cleansed;Site care;Offloading    Wound Cleansing Approved Wound Cleanser    Periwound Protectant Drape;Paste Ring;Stoma Powder;Hydrofiber    Dressing Options Hydrofiber Silver;Wound Vac    Dressing Cleansing/Solutions Normal Saline    Dressing Changed Changed    Dressing Status Clean;Dry;Intact    Dressing Change/Treatment Frequency Tuesday, Thursday, Saturday, and As Needed    NEXT Dressing Change/Treatment Date 03/24/20    NEXT Weekly Photo (Inpatient Only) 03/26/20    Wound Odor None    Exposed Structures Ligament    WOUND NURSE ONLY - Tissue Type and Percentage Yellow, brown, red            Negative Pressure Wound Therapy 03/07/20 Pressure injury: stage 4  (Active)   NPWT Pump Mode / Pressure Setting 125 mmHg;Continuous    Dressing Type Black Foam (Veraflo);Small    Number of Foam Pieces Used 1    Canister Changed No    NEXT Dressing Change/Treatment Date 03/24/20    VAC VeraFlo Irrigant Normal Saline    VAC VeraFlo Soak Time (mins) 5    VAC VeraFlo Instill Volume (ml) 6    VAC VeraFlo - Therapy Time (hrs) 1.5    VAC VeraFlo Pressure (mm/Hg) Continuous;125 mmHg      Vascular:    EDOUARD:   No results found.      Lab Values:    Lab Results   Component Value Date/Time    WBC 14.4 (H) 03/21/2020 06:10 AM    RBC 2.64 (L) 03/21/2020 06:10 AM    HEMOGLOBIN 8.4 (L) 03/21/2020 06:10 AM    HEMATOCRIT 26.2 (L) 03/21/2020 06:10 AM    CREACTPROT 7.65 (H) 03/16/2020 09:42 PM    SEDRATEWES 68 (H) 08/08/2019 12:48 PM    HBA1C 7.5 (H) 02/07/2019 01:20 PM          Culture:   Obtained N/A,   Culture Results show:  No results found for this or any previous visit (from the past 720 hour(s)).      INTERVENTIONS BY WOUND TEAM:  Patient and chart reviewed. Sacral dressing still in place, no leaks present. Pt turned to left side with assistance from Dyllan Nurse apprentice. Dressing was removed from sacrum. Cleansed wound and suleman-wound with wound cleanser and gauze, some bleeding present on periwound.  Wound bed has no granular tissue, with  ligaments, eschar present. Periwound was crusted with no sting barrier and stoma powder. Hidrofiber silver applied to parts of periwound that were bleeding/denuded. hydrofiber silver secured with drape. Then paste ring was applied on periwound close to edges. One piece of black foam applied into wound bed, filling undermining areas as well. Drape and trac pad to foam. Mepilex placed under tubing to reduce pressure and friction of the tube on the skin. Veraflo turned back on, test cycle completed, see settings in LDA, no leaks present, adequate seal. Patient suctioned and repositioned.    Interdisciplinary consultation: Patient, Roseanne HAILE, Dyllan nurse  .    EVALUATION: Patient is elderly woman with multiple interval problems and appears chronically ill. Sacral wound has no granular tissue in the wound bed, it is questionable if this patient will be able to form and support vascular tissue in the area. Hydrofiber silver and hydrocolloid used on the periwound to help absorb drainage and keep periwound dry so that the drape and seal will stick and stay, also helps with decreasing infection. Continuing veraflo therapy to help clean up the wound bed and decreasing the bacterial load with hopes of decreasing the size of the wound. Ethics involved, patient possibly to be placed on comfort care per APN, APN visualized wound with wound RN's and agreed the patient's possibility of healing the wound is very low. Wound team to continue to follow and change veraflo vac according to schedule 3x/week.     Goals: Steady decrease in wound area and depth weekly.    NURSING PLAN OF CARE ORDERS (X):    Dressing changes: See Dressing Care orders: X  Skin care: See Skin Care orders: X  Rectal tube care: See Rectal Tube Care orders:   Other orders:      WOUND TEAM PLAN OF CARE:   Dressing changes by wound team:          Follow up 1-2 times weekly:               Follow up 3 times weekly:                NPWT change 3 times weekly:  X - sacral veraflo   Follow up as needed:       Other (explain):     Anticipated discharge plans: Will need extensive wound care for sacrum if discharges.  LTACH:        SNF/Rehab:                  Home Care:           Outpatient Wound Center:            Self Care:

## 2020-03-21 NOTE — PROGRESS NOTES
"Pulmonary Progress Note    Date of admission  1/16/2020    Chief Complaint  74 y.o. female admitted 1/16/2020 with status epelipticus    Hospital Course    \"Ms. Beltran is a 74 y.o. female admitted to [facility] on 1/14/20 with seizures.She was at a SNF.She had been previously hospitalized at [facility] and diaysis was initiated.  She was doing very poorly then and palliative care was discussed with the family,but they declined.She had very poor functional status and required Jimena lift to get into the dialysis chair.She was found to have  a CVA on MRI at [facility].Her seizures were not controlled and it was felt she was in status epilepticus.  She was getting HD at [facility].Last HD was on 1/13/20.She was seen by Dr Carcamo at [facility].Creatinine was 1.8 and dialysis was held. Neurology Manito that the patient needed continuous EEG monitoring and he was transferred to [facility]. Patient intubated due to seizures and was eventually transitioned to tracheostomy on 2/2/2020 and off the vent on 2/5/2020. Family at this time does not want to change CODE status. Patient remains unresponsive. LTAC being pursued. PEG tube in place. \"      Interval Problem Update  Chart review from the past 24 hours includes imaging, laboratory studies, vital signs and notes available.  Pertinent data for today's visit includes   Afebrile  Stable 28% T-piece  Code status changed to DNR/DNI  Family considering comfort care    Review of Systems  Review of Systems   Unable to perform ROS: Patient nonverbal      Vital Signs for last 24 hours   Temp:  [36.3 °C (97.4 °F)-37.1 °C (98.7 °F)] 36.7 °C (98.1 °F)  Pulse:  [72-90] 84  Resp:  [16-19] 17  BP: (132-150)/(40-64) 143/64  SpO2:  [97 %-100 %] 97 %     Physical Exam   Physical Exam  HENT:      Head: Normocephalic and atraumatic.      Mouth/Throat:      Mouth: Mucous membranes are moist.   Eyes:      Extraocular Movements: Extraocular movements intact.      Pupils: Pupils are equal, round, and reactive to " light.   Neck:      Musculoskeletal: Neck supple.      Comments: 6.0 Shiley DCT cuff deflated stoma site clean- collar loose and was tightened  Cardiovascular:      Rate and Rhythm: Normal rate.   Pulmonary:      Effort: Pulmonary effort is normal. No respiratory distress.      Breath sounds: No wheezing.   Abdominal:      General: Abdomen is flat.      Comments: PEG tube clean, tolerating tube feeds at goal  Rectal Tube   Genitourinary:     Comments: + hernandez draining opaque yellow urine  Musculoskeletal:      Right lower leg: No edema.      Left lower leg: No edema.   Skin:     General: Skin is warm and dry.      Comments: Right PICC line subclavian insertion site clean dry and intact, dressing clean, no erythema   Neurological:      Mental Status: She is alert.      Comments: Non verbal  Turning to voice  Lifting eyebrows     Not agitated    Medications  Current Facility-Administered Medications   Medication Dose Route Frequency Provider Last Rate Last Dose   • oxyCODONE (ROXICODONE) oral solution 5 mg  5 mg Enteral Tube Q3HRS PRN Becky Soni A.P.R.NUli   5 mg at 03/19/20 0901   • epoetin (Retacrit) injection (Dialysis Use Only) 4,000 Units  4,000 Units Subcutaneous MO, WE + FR Aubree Irving M.D.   4,000 Units at 03/20/20 0951   • ascorbic acid tablet 500 mg  500 mg Enteral Tube DAILY Yaakov Pringle M.D.   500 mg at 03/21/20 0538   • therapeutic multivitamin-minerals (THERAGRAN-M) tablet 1 Tab  1 Tab Enteral Tube DAILY Yaakov Pringle M.D.   1 Tab at 03/21/20 0538   • Valproate Sodium (DEPAKENE) oral solution 500 mg  500 mg Enteral Tube Q12HRS Aubree Irving M.D.   500 mg at 03/21/20 0538   • insulin regular (HUMULIN R) injection 1-6 Units  1-6 Units Subcutaneous Q6HRS Judy Roque M.D.   1 Units at 03/21/20 1150    And   • glucose 4 g chewable tablet 16 g  16 g Oral Q15 MIN PRN Judy Roque M.D.        And   • DEXTROSE 10% BOLUS 250 mL  250 mL Intravenous Q15 MIN PRN Judy Roque  M.D.       • heparin injection 5,000 Units  5,000 Units Subcutaneous Q12HRS Aubree Irving M.D.   5,000 Units at 03/21/20 0538   • aspirin (ASA) chewable tab 81 mg  81 mg Enteral Tube DAILY Aubree Irving M.D.   81 mg at 03/21/20 0538   • omeprazole (FIRST-OMEPRAZOLE) 2 mg/mL oral susp 40 mg  40 mg Enteral Tube Q12HRS Aubree Irving M.D.   40 mg at 03/21/20 0545   • albumin human 25% solution 25 g  25 g Intravenous ACUTE DIALYSIS PRN Aubree Irving M.D. 150 mL/hr at 03/13/20 0845 12.5 g at 03/13/20 0845   • lacosamide (VIMPAT) tablet 300 mg  300 mg Enteral Tube BID Aubree Irving M.D.   300 mg at 03/21/20 0808   • carboxymethylcellulose (REFRESH TEARS) 0.5 % ophthalmic drops 1 Drop  1 Drop Both Eyes Q2HRS PRN Aubree Irving M.D.   1 Drop at 03/19/20 0501   • folic acid (FOLVITE) tablet 1 mg  1 mg Enteral Tube DAILY Aubree Irving M.D.   1 mg at 03/21/20 0538   • allopurinol (ZYLOPRIM) tablet 100 mg  100 mg Enteral Tube DAILY Aubree Irving M.D.   100 mg at 03/21/20 0538   • nystatin (MYCOSTATIN) powder   Topical BID Aubree Irving M.D.       • hydrALAZINE (APRESOLINE) injection 10 mg  10 mg Intravenous Q4HRS PRN Judy Roque M.D.   10 mg at 02/10/20 0424   • topiramate (TOPAMAX) tablet 200 mg  200 mg Enteral Tube Q12HRS Aubree Irving M.D.   200 mg at 03/21/20 0538   • atorvastatin (LIPITOR) tablet 40 mg  40 mg Enteral Tube DAILY Aubree Irving M.D.   40 mg at 03/21/20 0538   • heparin injection 3,300 Units  3,300 Units Intracatheter PRN Aubree Irving M.D.   3,300 Units at 03/20/20 1016   • Respiratory Care per Protocol   Nebulization Continuous RT Aubree Irving M.D.       • ipratropium-albuterol (DUONEB) nebulizer solution  3 mL Nebulization Q2HRS PRN (RT) Aubree Irving M.D.       • Pharmacy Consult: Enteral tube insertion - review meds/change route/product selection  1 Each Other PHARMACY TO DOSE Aubree Irving M.D.       • labetalol (NORMODYNE/TRANDATE) injection 10 mg  10 mg Intravenous Q4HRS PRN Judy Roque,  M.D.   10 mg at 02/10/20 0026   • acetaminophen (TYLENOL) tablet 650 mg  650 mg Enteral Tube Q6HRS PRN Aubree Irving M.D.   650 mg at 03/18/20 1615       Fluids    Intake/Output Summary (Last 24 hours) at 3/21/2020 1602  Last data filed at 3/21/2020 1000  Gross per 24 hour   Intake 330 ml   Output 450 ml   Net -120 ml       Laboratory          Recent Labs     03/21/20  0610   SODIUM 131*   POTASSIUM 4.0   CHLORIDE 92*   CO2 28   BUN 73*   CREATININE 1.35   PHOSPHORUS 3.0   CALCIUM 8.9     Recent Labs     03/21/20  0610   GLUCOSE 152*     Recent Labs     03/21/20  0610   WBC 14.4*   NEUTSPOLYS 65.40   LYMPHOCYTES 18.20*   MONOCYTES 12.10   EOSINOPHILS 1.70   BASOPHILS 0.40     Recent Labs     03/21/20  0610   RBC 2.64*   HEMOGLOBIN 8.4*   HEMATOCRIT 26.2*   PLATELETCT 252       Imaging  3/4/2020  cxr personally reviewed and shows tracheostomy tube, HD catheter and PICC line  Continued perihilar, left greater than right basilar opacities    Assessment/Plan    Acute respiratory failure with hypoxia (HCC)- (present on admission)  Assessment & Plan  Intubated date: 1/14/2020 s/p trach 2/2 Dr Devine  Downsized to 6 cuffed shiley 3/3  Minimal secretions  No vocalizing  No progression although stable  Code status appropriate  Continued GoC discussions with family    I have performed a physical exam and reviewed and updated ROS and Plan today (3/21/2020). In review of last note, there are no changes except as documented above.     This note was generated using voice recognition software which has a chance of producing errors of grammar and content.  I have made every reasonable attempt to find and correct any errors, but it should be expected that some may not be found prior to finalization of this note.  __________  Oilver Hanson MD  Pulmonary and Critical Care Medicine  [facility]

## 2020-03-21 NOTE — PROGRESS NOTES
No acute events overnight. Repositioned patient q2. No signs of distress. Remains non verbal, does not respond to any commands. Tube feeds tolerated; Remain at goal rate 40mL/hr with q4 water flushes. PICC line flushing well, labs drawn this morning.  Safety precautions maintained. Will continue to monitor and report to the oncoming nurse.

## 2020-03-21 NOTE — CARE PLAN
Problem: Venous Thromboembolism (VTW)/Deep Vein Thrombosis (DVT) Prevention:  Goal: Patient will participate in Venous Thrombosis (VTE)/Deep Vein Thrombosis (DVT)Prevention Measures  Outcome: PROGRESSING AS EXPECTED  Note: Verified patient has scds on for VTE prophylaxis     Problem: Respiratory:  Goal: Respiratory status will improve  Outcome: PROGRESSING AS EXPECTED  Note: Patient is trached. Spo2 above 95. Suctioning will be performed as needed.

## 2020-03-21 NOTE — CONSULTS
ETHICS CONSULT NOTE  Patient Name: Cassandra Pond  MRN: [id number]  DATE OF SERVICE: 03/20/2020    ETHICAL ISSUE:   An ethics consult was requested for this patient regarding beneficence and non-maleficence and concerns regarding the [facility]- care decisions.     REASON FOR ADMISSION AND MEDICAL INDICATIONS:   Ms. Guzmán is a 75 year old female admitted on 01/16/20 for seizures. Prior medical history is hemodialysis dependent ESRD, arthritis, chronic diastolic heart failure, CVA, type 2 diabetes, GERD, HTN, ICH, COPD, heart valve disease, RALF and seizures. The patient currently lives at an assisted living facility where she began to become frail, requiring a hoist to lift patient in and out of the dialysis chair for treatment. While at the assisted living facility, the patient had a witnessed seizure, EMS was called and transported her to [facility]. Upon arrival patient was intubated for airway protection and placed on propofol and keppra for seizure control and admitted to the ICU. MRI showed acute right parietal lacunar infarct with overall brain parenchymal loss. Patient was then loaded with keppra and continued with propofol. Despite these medications patient continued to have seizures. Patient was then transferred to [facility] ED for further critical care management. Patient presented with a Stage 3 coccyx wound. Per chart review, earliest documentation of this wound is in 2016.     Throughout hospital stay while at [facility], patient’s neurologic status had minimal improvement, not following commands but did respond to noxious stimuli. Palliative Care consulted on 1/17/20. On 1/31/20 per Dr. Uriarte’s note neurology, nephrology and critical care team feel prognosis is poor however family remains optimistic. Patient had a percutaneous trach done on 02/02/20. Hospital case management began discharge planning on 02/03/20, at that time Belen refused to make LTACH choice as she did not feel patient was  ready for discharge. Hospital case management again address LTACH needs and appropriateness with Belen on 02/06/20, Belen declined referral as she felt patient was not medically stable despite updates from the physician. Per Dr. Guzman’s note, on 2/10/20, patient remains unresponsive, 4L O2 over trach. Patient had PEG tube placed 02/15/20. Referrals for LTACH - [facility] sent, patient declined due to patient’s needs. Hospital care management addressed hospice, Belen has declined hospice as an option for patient. Discharge to home with home health services and family support offered, Belen declined due to patient’s needs exceed her ability to care for patient at home.     PT and OT evaluated patient on 03/04/20, PT noted that patient is not appropriate for skilled PT at this time due to not following commands or not demonstrating voluntary movement. OT noted that patient is not following commands, localizing to sound and is not appropriate for skilled OT at this time. RT attempted trach capping on 03/07/20, patient did not tolerate capping with increased work of breath, respiratory rate and heart rate, trial stopped.      On 03/08/20, per Nurse Duque’s note family began refusing medications, such as hydralazine and heparin during night time medication pass. They questioned about the appropriateness of current medications and inquired about patient’s PICC line. 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     Palliative Care and clinical team has had several discussions with Belen regarding goals of care throughout hospital admission. Another care conference was held on 03/12/20, Dr. Leggett discussed clinical picture, prognosis, treatment and recommendations. Belen expressed that she feels patient is improving. LTACH referrals sent again, patient declined due to no discharge plan.     DISCUSSIONS  WITH MEDICAL TEAM:   An ethics subcommittee was held with Tami Alicea, Johnny Bird Sally, and this writer. Clinical team members presented were ESPERANZA Rliey, Eduardo PC director, and Dominik MATA. Eduardo provided updates on discussions with family. Belen expressed that patient’s values are to be at home, be able to interact with family, to cook, eat and sing. Patient at this point is not able to do any of those things Belen identified as values for patient. When presented with this, Belen states she feels patient is improving but slowly. Eduardo encouraged Belen to discuss this with other family members, educated Belen on her role as a surrogate decision maker to make decisions that are in line with patient’s values and choices.     ESPERANZA Pena explained that patient’s coccyx wound is not healing and has poor tissue integrity for healing. Currently patient remains obtunded and unresponsive, patient does intermittently opens eyes but does not track nor does she follow commands. Clinical team providing trach care routinely, unable to cap and patient’s primary source of nutrition is PEG tube. Hemodialysis is continued with every MWF. It appears clinically that patient is not improving, rather it appears that patient is getting worse. Up to this point in patient’s admission patient’s mentation has not improved, her wound continues to get worse despite diligence with wound care, and all LTACH facilities have declined due to patient’s lack of discharge plan and patient’s lack of participation with therapies.     Additionally, patient remains full code. ESPERANZA Pena reports that if patient were to experience a cardiac arrest, any efforts of resuscitation would not provide any medical benefit nor aid in long term recovery or survival.     Discussed case with Dr. Jackson, Nephrologist, regarding patient’s continued hemodialysis. Dr. Jackson has seen patient several times within the last year, once during  a time patient was hospitalized at another facility in [place] about 3 months prior to this admission, and at that time he had a discussion regarding the lack of benefit patient is receiving from dialysis, it causing more pain and suffering, and recommended stopping dialysis and focusing on patient’s comfort with hospice. Belen at that time declined. During this admission, along with documentation by his colleagues, Dr. Jackson does not feel patient is receiving any medical benefit from dialysis, that it is only causing more pain and suffering and prolonging patient’s death. He recommends stopping dialysis and focusing on patient’s comfort at this time.     CODE STATUS AT THE TIME OF ETHICS CONSULTATION:  Full Code    PATIENT’S DECISION MAKING CAPACITY:  Per medical team patient lacks decisional capacity.     ADVANCE DIRECTIVE/POLST FORM:  Power of  completed on 09/22/2017    HEALTH CARE DECISION MAKER:  LUPE AngelicaEdna Jimenez      SOCIAL HISTORY/LIVING SITUATION PRIOR TO HOSPITALIZATION:  Patient was living at an assisted living facility in [place]. Patient had a significant functional decline which required a kala lift to transfer her.     CONTEXTUAL ISSUES:  Patient currently has Medicare and Medi-[employer] insurance which limits which skilled nursing facilities will accept her for admission.     RECOMMENDATIONS:   1. Agree with clinical team to transition patient to DNAR/DNI. Any resuscitative efforts would be more burdensome than beneficial and is outside the standard of care for a patient in this clinical condition.   2. Agree with clinical team with no escalation of care due to patient’s poor prognosis and the potential to cause further pain and suffering without meaningful medical benefit that would aid in patient’s long term recovery and survival.   3. Have another care conference with Palliative Care, medical provider, nephrology and risk management to address concerns regarding Belen’s care  choices that appear to be inconsistent with patient’s values and choices and prognosis.  4. Hospital case management to assist with changing patient’s Medi-Dev to Medicaid to increase potential facilities for admission.  5. Recommend Hospital  services address any potential spiritual needs of the family and provide additional support for the family.         Thank you for involving us in the care of this patient. Please let me know if you have any other questions or concerns.      Respectfully submitted,    Penelope Calderon RN, BSN, MS-HCE, [facility]  Office [phone]  [place] [place] [employer]

## 2020-03-21 NOTE — CARE PLAN
Problem: Safety  Goal: Will remain free from injury  Outcome: PROGRESSING AS EXPECTED     Problem: Venous Thromboembolism (VTW)/Deep Vein Thrombosis (DVT) Prevention:  Goal: Patient will participate in Venous Thrombosis (VTE)/Deep Vein Thrombosis (DVT)Prevention Measures  Outcome: PROGRESSING AS EXPECTED     Problem: Bowel/Gastric:  Goal: Normal bowel function is maintained or improved  Outcome: PROGRESSING AS EXPECTED     Problem: Skin Integrity  Goal: Risk for impaired skin integrity will decrease  Outcome: PROGRESSING AS EXPECTED

## 2020-03-22 NOTE — CARE PLAN
Problem: Bronchopulmonary Hygiene:  Goal: Increase mobilization of retained secretions  Outcome: PROGRESSING AS EXPECTED     Problem: Oxygenation:  Goal: Maintain adequate oxygenation dependent on patient condition  Outcome: PROGRESSING AS EXPECTED   6.0 S cuffed, but deflated  4L 28%

## 2020-03-22 NOTE — PROGRESS NOTES
No acute events overnight. Pt remains non verbal, trached on 4L. Pt's daughter called x3, update given. Q2 Repositioning. BMS and hernandez care done.Tube feeds tolerated at goal rate - 40mL/hr with q4 flushes. Safety  Precautions maintained. Will continue to monitor and report to the oncoming nurse.

## 2020-03-22 NOTE — PROGRESS NOTES
2 RN skin check completed with Senait HAILE.   Devices in place: trach with velcro tie and t-piece, right arm PICC, right HD catheter, PEG tube, bowel management system, hernandez catheter, wound vac to coccyx, heel float boots, mepilex, SCDs.  Skin assessed under devices: yes, LOPEZ under wound vac.  Confirmed pressure ulcers found: sacrum   New potential pressure ulcers noted on: N/A.   Wound consult placed: N/A  The following interventions in place: bilateral mepilex to heels, mepilex over wound vac dressing, heel float boots, low airloss mattress, q2 turns with barrier cream PRN, BMS, hernandez catheter, inner cannula change and suction PRN.     Skin assessment:   -scattered bruises to BLE, R lower medial leg has some discoloration  -heels pink and blanching, intact  -PEG tube site with small amount of redness and dry serosanguineous drainage, gauze dressing  -wound to sacrum with wound vac and mepilex over top  -skin under trach collar pink and blanching, intact  Rectum is pink,red and blanching

## 2020-03-22 NOTE — PROGRESS NOTES
2 RN skin check complete with MIC Hernandez.   Devices in place PICC, HD line, hernandez, BMS, trache, peg tube, SCDs, wound vac, mepilex, float boots.  Skin assessed under devices yes.  Confirmed pressure ulcers found on sacrum.  New potential pressure ulcers noted on N/A. Wound consult placed; wound care following.  '  The following interventions in place 2 RN skin check, q2 repositioning, mepilex, wound vac, hernandez, BMS, barrier cream, nystatin, heel float boots.    Skin Assessment -   Chest -left side - pink/scabbed  Abdomen - peg tube site - red/drainage  RLE - discoloration  Sacrum - wound vac in place   Heels - boggy/blanching

## 2020-03-22 NOTE — CARE PLAN
Problem: Discharge Barriers/Planning  Goal: Patient's continuum of care needs will be met  Outcome: PROGRESSING AS EXPECTED  Intervention: Assess potential discharge barriers on admission and throughout hospital stay  Note: Pt is currently pending acceptance to a long term care facility.     Problem: Skin Integrity  Goal: Risk for impaired skin integrity will decrease  Outcome: PROGRESSING AS EXPECTED  Intervention: Assess risk factors for impaired skin integrity and/or pressure ulcers  Note: Q2h turns in place. Additional precautions in place such as barrier cream, heal float boots, mepilex, and pillows to support positioning.

## 2020-03-23 NOTE — CARE PLAN
Problem: Oxygenation:  Goal: Maintain adequate oxygenation dependent on patient condition  Outcome: PROGRESSING AS EXPECTED     Problem: Bronchopulmonary Hygiene:  Goal: Increase mobilization of retained secretions  Outcome: PROGRESSING AS EXPECTED   6.0 S cuffed but deflated  4L 28%

## 2020-03-23 NOTE — CARE PLAN
Problem: Discharge Barriers/Planning  Goal: Patient's continuum of care needs will be met  Outcome: PROGRESSING AS EXPECTED     Problem: Respiratory:  Goal: Respiratory status will improve  Outcome: PROGRESSING AS EXPECTED     Problem: Skin Integrity  Goal: Risk for impaired skin integrity will decrease  Outcome: PROGRESSING AS EXPECTED

## 2020-03-23 NOTE — PROGRESS NOTES
2RN skin check completed with MIC Duque.   Devices in place: right PICC, right chest HD catheter, hernandez, BMS, trach with velcro tie and T-piece, PEG tube, SCDs, wound vac, heel mepilex, heel float boots.  Skin assessed under devices: yes.  Confirmed pressure ulcers found on: sacrum.  New potential pressure ulcers noted on: N/A.  Wound consult placed: N/A, wound care following.  The following interventions in place: 2RN skin check, q2 hour repositioning, bilateral heel mepilex, wound vac, hernandez, BMS, barrier cream PRN, nystatin per MAR, heel float boots, low airloss mattress.     Skin assessment:   -left chest, pink/scabbed  -abdomen, PEG tube site, red/scant drainage  -trach site with minimal sputum, pink  -BLE, scattered bruises, RLE discoloration  -sacrum, wound vac in place   -rectum, reddened, hemorrhoids  -bilateral heels, boggy/pink, blanching

## 2020-03-23 NOTE — PROGRESS NOTES
2 RN skin check complete with MIC Christensen.   Devices in place PICC, HD line, hernandez, BMS, trache with velcro tie, peg tube, SCDs, wound vac, mepilex, float boots.  Skin assessed under devices yes.  Confirmed pressure ulcers found on sacrum.  New potential pressure ulcers noted on N/A. Wound consult placed; wound care following.  '  The following interventions in place 2 RN skin check, q2 repositioning, mepilex, wound vac, hernandez, BMS, barrier cream, nystatin, heel float boots.     Skin Assessment -   Chest -left side - pink/scabbed  Abdomen - peg tube site - red/drainage  BLE - scattered bruises/RLE discoloration  Sacrum - wound vac in place   Rectum - red  Heels - boggy/blanching

## 2020-03-23 NOTE — PROGRESS NOTES
HEMODIALYSIS NOTES:     HD today x 3 hours per Dr. Jackson . Initiated at 0720 and ended at 1020. Patient tolerated treatment. See paper flowsheet for details.    UF Net: 1000 mL    R permacath intact and patent with good flow during dialysis. No s/sx of infection, no redness nor bleeding noted on the CVC site. CVC dressing clean, dry and intact. Blood returned and CVC port flushed with NS and Heparin 1000 units/mL used  to lock catheter given per designated amount. CVC port clamped and capped.     Report given to KIKO Wan RN.

## 2020-03-23 NOTE — PROGRESS NOTES
Received patient back from HD.  VSS.  Patient has trach with t-piece, 4L 28%.  No sputum assessed, so no suction or trach care completed at this time.  Zero residual with tube feeding, given 2 packs of nutrisource fiber and water flush.  Patient remains non-verbal but is tracking with her eyes.  Patient did open mouth and allow oral care from CNA.  HD catheter dressing c/d/i, PICC dressing c/d/i, hernandez catheter draining, rectal tube draining with minimal leakage, PEG tube site with gauze dressing c/d/i.  Aspiration and fall precautions in place.  Will continue to monitor closely.

## 2020-03-23 NOTE — PROGRESS NOTES
No acute events overnight. Remains non verbal. Q2 repositioning. Tolerating tube feeds at goal rate. Pt left for dialysis at 0650. Safety precautions maintained. Report given to oncoming nurse.

## 2020-03-23 NOTE — DISCHARGE PLANNING
Medical Social Work  PC to Belen, [phone]: ESPERANZA talked to Belen about patient applying for [place] Medicaid and why, better chance of placing her mother in a skilled in [facility].  Belen stated she likes this idea and is in agreement with ESPERANZA.  ESPERANZA stated he will attempt to arrange a phone time for her to talk to a representative.    PC to PFA, SW told due to the current situation [place] Medicaid is requesting all new applications be completed on line

## 2020-03-23 NOTE — CARE PLAN
Problem: Bowel/Gastric:  Goal: Normal bowel function is maintained or improved  Outcome: PROGRESSING SLOWER THAN EXPECTED  Note: BMS still in use.       Problem: Skin Integrity  Goal: Risk for impaired skin integrity will decrease  Outcome: PROGRESSING SLOWER THAN EXPECTED  Note: Wound vac in place.  Wound team following.

## 2020-03-23 NOTE — PROGRESS NOTES
Pharmacy Pharmacotherapy Consult for LOS >30 days    Admit Date: 1/16/2020      Medications were reviewed for appropriateness and ongoing need.     Current Facility-Administered Medications   Medication Dose Route Frequency Provider Last Rate Last Dose   • insulin regular (HUMULIN R) injection 1-6 Units  1-6 Units Subcutaneous Q6HRS Jenna Singh M.D.        And   • DEXTROSE 10% BOLUS 250 mL  250 mL Intravenous Q15 MIN PRN Jenna Singh M.D.       • oxyCODONE (ROXICODONE) oral solution 5 mg  5 mg Enteral Tube Q3HRS PRN Jenna Singh M.D.   5 mg at 03/19/20 0901   • epoetin (Retacrit) injection (Dialysis Use Only) 4,000 Units  4,000 Units Subcutaneous MO, WE + FR Jenna Singh M.D.   4,000 Units at 03/23/20 0949   • ascorbic acid tablet 500 mg  500 mg Enteral Tube DAILY Jenna Singh M.D.   500 mg at 03/23/20 0529   • therapeutic multivitamin-minerals (THERAGRAN-M) tablet 1 Tab  1 Tab Enteral Tube DAILY Jenna Singh M.D.   1 Tab at 03/23/20 0529   • Valproate Sodium (DEPAKENE) oral solution 500 mg  500 mg Enteral Tube Q12HRS Jenna Singh M.D.   500 mg at 03/23/20 0528   • heparin injection 5,000 Units  5,000 Units Subcutaneous Q12HRS Aubree Irving M.D.   5,000 Units at 03/23/20 0529   • aspirin (ASA) chewable tab 81 mg  81 mg Enteral Tube DAILY Aubree Irving M.D.   81 mg at 03/23/20 0528   • omeprazole (FIRST-OMEPRAZOLE) 2 mg/mL oral susp 40 mg  40 mg Enteral Tube Q12HRS Aubree Irving M.D.   40 mg at 03/23/20 0528   • albumin human 25% solution 25 g  25 g Intravenous ACUTE DIALYSIS PRN Aubree Irving M.D. 150 mL/hr at 03/13/20 0845 12.5 g at 03/13/20 0845   • lacosamide (VIMPAT) tablet 300 mg  300 mg Enteral Tube BID Aubree Irving M.D.   300 mg at 03/23/20 0528   • carboxymethylcellulose (REFRESH TEARS) 0.5 % ophthalmic drops 1 Drop  1 Drop Both Eyes Q2HRS PRN Aubree Irving M.D.   1 Drop at 03/19/20 0509   • folic acid (FOLVITE) tablet 1 mg  1 mg Enteral Tube DAILY Aubree Irving M.D.   1 mg at 03/23/20 0542    • allopurinol (ZYLOPRIM) tablet 100 mg  100 mg Enteral Tube DAILY Aubree Irving M.D.   100 mg at 03/23/20 0529   • nystatin (MYCOSTATIN) powder   Topical BID Aubree Irving M.D.       • hydrALAZINE (APRESOLINE) injection 10 mg  10 mg Intravenous Q4HRS PRN Jenna Singh M.D.   10 mg at 03/22/20 1618   • topiramate (TOPAMAX) tablet 200 mg  200 mg Enteral Tube Q12HRS Aubree Irving M.D.   200 mg at 03/23/20 0528   • atorvastatin (LIPITOR) tablet 40 mg  40 mg Enteral Tube DAILY Aubree Irving M.D.   40 mg at 03/23/20 0529   • heparin injection 3,300 Units  3,300 Units Intracatheter PRN Aubree Irving M.D.   3,300 Units at 03/23/20 1022   • Respiratory Care per Protocol   Nebulization Continuous RT Aubree Irving M.D.       • Pharmacy Consult: Enteral tube insertion - review meds/change route/product selection  1 Each Other PHARMACY TO DOSE Aubree Irving M.D.       • labetalol (NORMODYNE/TRANDATE) injection 10 mg  10 mg Intravenous Q4HRS PRN Jenna Singh M.D.   10 mg at 02/10/20 0026   • acetaminophen (TYLENOL) tablet 650 mg  650 mg Enteral Tube Q6HRS PRN Aubree Irving M.D.   650 mg at 03/18/20 1615       Recommendations:  1. Continue all meds with the exception of Duoneb due to lack of use  2. Consider scheduling an anti-hypertensive rather that utilizing rescue therapy with IV hydralazine. Could schedule an ACE-I in light of pt's ESRD and hx CVA, OR can utilize PRN hydralaine  3. Consider DC of nystleigh Olmos, PharmD, BCPS, BCIDP

## 2020-03-24 NOTE — PROGRESS NOTES
Pt. Received awake, non verbal with eyes tracking and does not follow commands. Pt. Does withdraw on painful stimuli. With trache to T-piece @ 28% FiO2, HD cath on R upper chest and double lumen PICC on KARYN both flushing, PEG tube noted hooked to Impact peptide @40ml/hr which is goal. Wound vac in place. Ensured fall prec in place, NUBIA mattress in place with skin checks every shift. Trache care and oral care. Needs attended.

## 2020-03-24 NOTE — PROGRESS NOTES
[facility] Medicine Daily Progress Note    Date of Service  3/23/2020    Chief Complaint  altered mental status    Hospital Course    75 y.o. female admitted 1/16/2020 with a past medical history of hypertension, coronary disease, diabetes, and end-stage renal disease on dialysis brought to the emergency room after apparently she had seizure type activity at her assisted living facility.  She was admitted and underwent continuous EEG monitoring followed by neurology.  Her mentation did not improve and she underwent a tracheostomy on 2/2/2020.  A PEG tube was placed 2/15/2020.  He has had a persistent, severe encephalopathy. End-stage renal disease, continuing dialysis per family preference.  Wound showing very little to no granulation, somewhat worsened per wound care team assessment.  Ethics meeting regarding CODE STATUS: Full CODE STATUS was determined to be inappropriate to the standard of care with almost certain significant possibility of harm to the patient and highly unlikely to provide any benefit. This very difficult decision was made with careful and thorough deliberation.  The patient's CODE STATUS was changed to DNAR/DNI.          Interval Problem Update  Patient is lying in bed, awake and tracking with her eyes. She does not appear to respond to any commands, even blinking.  Again no change in physical assessment.  Ethics committee considering comfort care status in consultation with nephrology. Ethics committee representative to communicate status change to family in next meeting on 3/24 or 3/25.  Dialysis scheduled Monday, Wednesday, Friday.    Consultants/Specialty  Critical care, signed off  Neurology, signed off  Infectious disease, signed off  Palliative care, following  Nephrology, following  Ethics, following    Code Status  Full code per family, ethics subcommittee following    Disposition  Declined by long term care facilities. Family unwilling to take patient home given level of patient care  needs. Family does not agree to hospice.    Review of Systems  Review of Systems   Unable to perform ROS: Medical condition        Physical Exam  Temp:  [36.2 °C (97.2 °F)-37.1 °C (98.8 °F)] 36.2 °C (97.2 °F)  Pulse:  [70-98] 88  Resp:  [16-18] 18  BP: (107-157)/(62-74) 107/74  SpO2:  [94 %-100 %] 99 %    Physical Exam  Vitals signs and nursing note reviewed.   Constitutional:       Comments: Chronically ill appearing   HENT:      Mouth/Throat:      Mouth: Mucous membranes are dry.   Neck:      Comments: Right tunneled dialysis cath  trach  Cardiovascular:      Rate and Rhythm: Normal rate and regular rhythm.   Pulmonary:      Effort: Pulmonary effort is normal.      Breath sounds: Normal breath sounds.      Comments: Poor air movement  Abdominal:      General: There is distension.      Tenderness: There is no abdominal tenderness.      Comments: PEG tube   Genitourinary:     Comments: River cath  Rectal tube  Musculoskeletal:      Right lower leg: No edema.      Left lower leg: No edema.      Comments: Poor muscle tone  Wound VAC right hip   Skin:     General: Skin is warm and dry.             Comments: Stage IV decubitus ulcer   Neurological:      Comments: She no longer follows blinking command   Psychiatric:      Comments: She is nonverbal     All systems reviewed and exam is unchanged from yesterday.    Fluids    Intake/Output Summary (Last 24 hours) at 3/23/2020 1736  Last data filed at 3/23/2020 1715  Gross per 24 hour   Intake 3845 ml   Output 3320 ml   Net 525 ml       Laboratory  Recent Labs     03/21/20  0610   WBC 14.4*   RBC 2.64*   HEMOGLOBIN 8.4*   HEMATOCRIT 26.2*   MCV 99.2*   MCH 31.8   MCHC 32.1*   RDW 57.7*   PLATELETCT 252   MPV 8.9*     Recent Labs     03/21/20  0610 03/23/20  0219   SODIUM 131* 130*   POTASSIUM 4.0 4.2   CHLORIDE 92* 90*   CO2 28 26   GLUCOSE 152* 160*   BUN 73* 108*   CREATININE 1.35 1.82*   CALCIUM 8.9 9.2                 Assessment/Plan  * Status epilepticus (HCC)- (present  on admission)  Assessment & Plan  On admission, she was intubated for airway protection, now trach since 2/2/20. No evidence of seizure activity.  Appears now to have a severe, intractable encephalopathy  - continuing regimen of vimpat, topamax, depakote  - repeat EEG showed no seizures  Stable.  Continue antiseizure medications, seizure precautions.  No more seizures    Acute respiratory failure with hypoxia (HCC)- (present on admission)  Assessment & Plan   on trach/T-piece,  pulmonology following  Acute on chronic condition now    Goals of care, counseling/discussion- (present on admission)  Assessment & Plan  Overall neurologic and physical prognosis is quite poor given her severe, unrelenting encephalopathy and dialysis, she remains unchanged   family meeting 3/12/2020 with no subsequent change of plan of care.  Patient changed to DNR, DNI CODE STATUS per ethics committee meeting 3/20 as appropriate.  Ethics committee to relay this status change to family in meeting 3/24 or 3/25.  comfort care would be an appropriate next step such as cessation of dialysis.  Nephrology has also documented this opinion.  Ethics committee to confer with nephrology, appreciate any additional recommendations.  Ethics committee consultation to provide physician backup for initiation of comfort care specifically withdrawal of dialysis and comfort care measures    Pressure injury of sacrum, coccyx, stage 3 (HCC)- (present on admission)  Assessment & Plan  He has a substantial right hip decubitus ulcer with a wound VAC followed by wound care, poor tissue integrity and minimal if any wound healing.  Given her ability to move spontaneously, diabetes, dialysis, and poor condition, she is extremely high risk of developing further decubitus ulcers    Severe protein-calorie malnutrition (HCC)- (present on admission)  Assessment & Plan  Continue nutrition via tube feeding.  Dietitian consulted.    Cerebrovascular accident (CVA) due to  embolism of right middle cerebral artery (HCC)- (present on admission)  Assessment & Plan  Very poor prognosis.  Severe debility.  Continue aspirin and statin  Pain management    End stage renal failure on dialysis ([facility])- (present on admission)  Assessment & Plan  Dialysis via a right-sided tunneled catheter  hemodialysis Monday Wednesday Friday    Hypertension- (present on admission)  Assessment & Plan  She was on Coreg 25 mg twice a day, Cardura 6 mg daily, hydralazine 100 mg 3 times daily  Blood pressure is been low and all BP meds have been stopped  500 mL IV bolus was ordered on 3/11/2020 due to hypotension  Systolic blood pressure now averaging 130s  Continue to hold her blood pressure medicines    Type 2 diabetes mellitus, with long-term current use of insulin ([facility])- (present on admission)  Assessment & Plan  A1C 7.5%, blood sugars have been ~170. Was taking insulin at home.   Continue sliding scale insulin.  Continue tube feeding.   Blood sugar is stable.    Leukocytosis  Assessment & Plan  No other signs of infection, probably reactive  White blood cell count remains high  chest x-ray is unchanged, procalcitonin is slightly elevated but patient is end-stage renal disease patient has no fever, UA positive for leukocyte esterase but no other signs of infection.   C. 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  FOBT is negative.   - transfuse if drops below 7  Hemoglobin stable    Gout- (present on admission)  Assessment & Plan  On allopurinol via PEG tube    TB lung, latent- (present on admission)  Assessment & Plan  Quantiferon + , not active TB. Chest CT found positive left-sided consolidation with pleural effusion.  - sputum cx/ AFB stain negative  - s/p thora on 2/25; f/u pathology  "negative  - ID consulted, signed off  - AFB stain negative x3; remove isolation  - Per ID, we will \"treat for LTBI with  mg PO daily and B6 25 mg PO daily for 9 months if AFB cxs all negative at 6 weeks\"  Completed antibiotics treatment.    Hypokalemia  Assessment & Plan  Patient is end-stage renal disease on hemodialysis, potassium improved.  resolved       VTE prophylaxis: Heparin    SHAHIDA Riley.    "

## 2020-03-24 NOTE — CARE PLAN
Problem: Pain Management  Goal: Pain level will decrease to patient's comfort goal  Outcome: PROGRESSING AS EXPECTED  Note: Premedicated with PRN Oxycodone prior to wound vac change.     Problem: Discharge Barriers/Planning  Goal: Patient's continuum of care needs will be met  Outcome: PROGRESSING SLOWER THAN EXPECTED  Note: Ethics involved.  Placement issues.

## 2020-03-24 NOTE — PROGRESS NOTES
[facility] Medicine Daily Progress Note    Date of Service  3/24/2020    Chief Complaint  altered mental status    Hospital Course    75 y.o. female admitted 1/16/2020 with a past medical history of hypertension, coronary disease, diabetes, and end-stage renal disease on dialysis brought to the emergency room after apparently she had seizure type activity at her assisted living facility.  She was admitted and underwent continuous EEG monitoring followed by neurology.  Her mentation did not improve and she underwent a tracheostomy on 2/2/2020.  A PEG tube was placed 2/15/2020.  He has had a persistent, severe encephalopathy. End-stage renal disease, continuing dialysis per family preference.  Wound showing very little to no granulation, somewhat worsened per wound care team assessment.  Ethics meeting regarding CODE STATUS: Full CODE STATUS was determined to be inappropriate to the standard of care with almost certain significant possibility of harm to the patient and highly unlikely to provide any benefit. This very difficult decision was made with careful and thorough deliberation.  The patient's CODE STATUS was changed to DNAR/DNI.          Interval Problem Update  Patient is lying in bed, awake and tracking with her eyes. She does not appear to respond to any commands, even blinking.  Again no change in physical assessment.  Ethics committee considering comfort care status in consultation with nephrology. Ethics committee representative to communicate status change to family in next meeting on 3/24 or 3/25.  Dialysis scheduled Monday, Wednesday, Friday.    3/24: seen this morning, sleeping, not following commands, poor prognosis  Palliative to have CC meeting tomorrow with her family    Consultants/Specialty  Critical care, signed off  Neurology, signed off  Infectious disease, signed off  Palliative care, following  Nephrology, following  Ethics, following    Code Status  Full code per family, ethics subcommittee  following    Disposition  Declined by long term care facilities. Family unwilling to take patient home given level of patient care needs. Family does not agree to hospice.    Review of Systems  Review of Systems   Unable to perform ROS: Medical condition        Physical Exam  Temp:  [36.1 °C (97 °F)-36.3 °C (97.4 °F)] 36.3 °C (97.4 °F)  Pulse:  [70-95] 87  Resp:  [16-20] 20  BP: ()/(47-74) 109/59  SpO2:  [94 %-99 %] 99 %    Physical Exam  Vitals signs and nursing note reviewed.   Constitutional:       Comments: Chronically ill appearing   HENT:      Mouth/Throat:      Mouth: Mucous membranes are dry.   Neck:      Comments: Right tunneled dialysis cath  trach  Cardiovascular:      Rate and Rhythm: Normal rate and regular rhythm.   Pulmonary:      Effort: Pulmonary effort is normal.      Breath sounds: Normal breath sounds.      Comments: Poor air movement  Abdominal:      General: There is distension.      Tenderness: There is no abdominal tenderness.      Comments: PEG tube   Genitourinary:     Comments: River cath  Rectal tube  Musculoskeletal:      Right lower leg: No edema.      Left lower leg: No edema.      Comments: Poor muscle tone  Wound VAC right hip   Skin:     General: Skin is warm and dry.             Comments: Stage IV decubitus ulcer   Neurological:      Comments: She no longer follows blinking command   Psychiatric:      Comments: She is nonverbal     All systems reviewed and exam is unchanged from yesterday.    Fluids    Intake/Output Summary (Last 24 hours) at 3/24/2020 1243  Last data filed at 3/24/2020 1000  Gross per 24 hour   Intake 1070 ml   Output 1940 ml   Net -870 ml       Laboratory      Recent Labs     03/23/20  0219   SODIUM 130*   POTASSIUM 4.2   CHLORIDE 90*   CO2 26   GLUCOSE 160*   *   CREATININE 1.82*   CALCIUM 9.2                 Assessment/Plan  * Status epilepticus (HCC)- (present on admission)  Assessment & Plan  On admission, she was intubated for airway protection,  now trach since 2/2/20. No evidence of seizure activity.  Appears now to have a severe, intractable encephalopathy  - continuing regimen of vimpat, topamax, depakote  - repeat EEG showed no seizures  Stable.  Continue antiseizure medications, seizure precautions.  No more seizures    Acute respiratory failure with hypoxia (HCC)- (present on admission)  Assessment & Plan   on trach/T-piece,  pulmonology following  Acute on chronic condition now    Goals of care, counseling/discussion- (present on admission)  Assessment & Plan  Overall neurologic and physical prognosis is quite poor given her severe, unrelenting encephalopathy and dialysis, she remains unchanged   family meeting 3/12/2020 with no subsequent change of plan of care.  Patient changed to DNR, DNI CODE STATUS per ethics committee meeting 3/20 as appropriate.  Ethics committee to relay this status change to family in meeting 3/24 or 3/25.palliative and CC meeting  comfort care would be an appropriate next step such as cessation of dialysis.  Nephrology has also documented this opinion.  Ethics committee to confer with nephrology, appreciate any additional recommendations.  Ethics committee consultation to provide physician backup for initiation of comfort care specifically withdrawal of dialysis and comfort care measures    Pressure injury of sacrum, coccyx, stage 3 (HCC)- (present on admission)  Assessment & Plan  He has a substantial right hip decubitus ulcer with a wound VAC followed by wound care, poor tissue integrity and minimal if any wound healing.  Given her ability to move spontaneously, diabetes, dialysis, and poor condition, she is extremely high risk of developing further decubitus ulcers    Severe protein-calorie malnutrition (HCC)- (present on admission)  Assessment & Plan  Continue nutrition via tube feeding.  Dietitian consulted.    Cerebrovascular accident (CVA) due to embolism of right middle cerebral artery (HCC)- (present on  admission)  Assessment & Plan  Very poor prognosis.  Severe debility.  Continue aspirin and statin  Pain management    End stage renal failure on dialysis ([facility])- (present on admission)  Assessment & Plan  Dialysis via a right-sided tunneled catheter  hemodialysis Monday Wednesday Friday    Hypertension- (present on admission)  Assessment & Plan  She was on Coreg 25 mg twice a day, Cardura 6 mg daily, hydralazine 100 mg 3 times daily  Blood pressure is been low and all BP meds have been stopped  500 mL IV bolus was ordered on 3/11/2020 due to hypotension  Systolic blood pressure now averaging 130s  Continue to hold her blood pressure medicines    Type 2 diabetes mellitus, with long-term current use of insulin ([facility])- (present on admission)  Assessment & Plan  A1C 7.5%, blood sugars have been ~170. Was taking insulin at home.   Continue sliding scale insulin.  Continue tube feeding.   Blood sugar is stable.    Leukocytosis  Assessment & Plan  No other signs of infection, probably reactive  White blood cell count remains high  chest x-ray is unchanged, procalcitonin is slightly elevated but patient is end-stage renal disease patient has no fever, UA positive for leukocyte esterase but no other signs of infection.   C. 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  FOBT is negative.   - transfuse if drops below 7  Hemoglobin stable    Gout- (present on admission)  Assessment & Plan  On allopurinol via PEG tube    TB lung, latent- (present on admission)  Assessment & Plan  Quantiferon + , not active TB. Chest CT found positive left-sided consolidation with pleural effusion.  - sputum cx/ AFB stain negative  - s/p thora on 2/25; f/u pathology negative  - ID consulted, signed off  - AFB stain negative x3;  "remove isolation  - Per ID, we will \"treat for LTBI with  mg PO daily and B6 25 mg PO daily for 9 months if AFB cxs all negative at 6 weeks\"  Completed antibiotics treatment.    Hypokalemia  Assessment & Plan  Patient is end-stage renal disease on hemodialysis, potassium improved.  resolved       VTE prophylaxis: Heparin    Jenna Singh M.D.    "

## 2020-03-24 NOTE — PROGRESS NOTES
0445 Received call from pt. Daughter Belen, updated about pt. Latest BP, pt. Daughter was very concern and wanted to address this to the MD. Called for MD awaiting call back.     0500 received another call from pt. Daughter, still waiting for MD to call back to update    0515 2nd paged sent out, received call from MD. Updated regarding latest BP. Per MD if MAP is not less than 65 just monitor. Updated pt. Daughter about this information, pt. Daughter wanted to be updated later for a repeat BP.     0600 Updated pt. Daughter about latest /57. Received thank you.

## 2020-03-24 NOTE — WOUND TEAM
[facility]  Inpatient Services  Wound and Skin Care Progress Note      HPI, PMH, SH: Reviewed    Unit where seen by Wound Team: [id number]/02     WOUND CONSULT RELATED TO:  Scheduled Negative Pressure Wound Therapy (NPWT) dressing change    Self Report / Pain Level:  Patient frowned when turned       OBJECTIVE:  Dressing intact. Odor detected from the door of room    WOUND TYPE, LOCATION, CHARACTERISTICS (Pressure Injuries: location, stage, POA or date identified)       Pressure Injury 01/16/20 Sacrum;Coccyx stage 4 3/5/2020 (Active)   Wound Image    3/19/2020 11:00 AM   Pressure Injury Stage 4 3/24/2020  9:00 AM   State of Healing Undermining;Non-healing 3/24/2020  9:00 AM   Site Assessment Tan;Yellow;Red;Boggy;Fragile;Non-healing;Slough;Undermining 3/24/2020  9:00 AM   Periwound Assessment Purple;Red;Black;Denuded;Fragile 3/24/2020  9:00 AM   Margins Unattached edges;Defined edges 3/24/2020  9:00 AM   Wound Length (cm) 5.5 cm 3/19/2020 11:00 AM   Wound Width (cm) 3.8 cm 3/19/2020 11:00 AM   Wound Depth (cm) 1.5 cm 3/19/2020 11:00 AM   Wound Surface Area (cm^2) 20.9 cm^2 3/19/2020 11:00 AM   Wound Volume (cm^3) 31.35 cm^3 3/19/2020 11:00 AM   Tunneling (cm) 3.5 cm 3/5/2020  6:00 PM   Undermining (cm) 2.5 cm 3/19/2020 11:00 AM   Closure Secondary intention 3/24/2020  9:00 AM   Drainage Amount Moderate 3/24/2020  9:00 AM   Drainage Description Jenkins;Serosanguineous 3/24/2020  9:00 AM   Non-staged Wound Description Not applicable 3/24/2020  9:00 AM   Treatments Cleansed;Site care 3/24/2020  9:00 AM   Wound Cleansing Approved Wound Cleanser 3/24/2020  9:00 AM   Periwound Protectant Skin Protectant Wipes to Periwound;Stoma Powder;Hydrofiber;Hydrocolloid;Paste Ring 3/24/2020  9:00 AM   Dressing Options Wound Vac 3/24/2020  9:00 AM   Dressing Cleansing/Solutions Normal Saline 3/24/2020  9:00 AM   Dressing Changed Changed 3/24/2020  9:00 AM   Dressing Status Clean;Dry;Intact 3/24/2020  9:00 AM   Dressing  Change/Treatment Frequency Tuesday, Thursday, Saturday, and As Needed 3/24/2020  9:00 AM   NEXT Dressing Change/Treatment Date 03/26/20 3/24/2020  9:00 AM   NEXT Weekly Photo (Inpatient Only) 03/26/20 3/24/2020  9:00 AM   Wound Odor Strong 3/24/2020  9:00 AM   Pulses N/A 3/24/2020  9:00 AM   Exposed Structures None 3/24/2020  9:00 AM   WOUND NURSE ONLY - Tissue Type and Percentage 98% slough, 2% red 3/24/2020  9:00 AM           Negative Pressure Wound Therapy 03/07/20 Pressure injury: stage 4 (Active)   NPWT Pump Mode / Pressure Setting Ulta 3/24/2020  9:00 AM   Dressing Type Black Foam (Veraflo) 3/24/2020  9:00 AM   Number of Foam Pieces Used 1 3/24/2020  9:00 AM   Canister Changed No 3/24/2020  9:00 AM   Output (mL) 75 mL 3/19/2020  4:00 PM   NEXT Dressing Change/Treatment Date 03/26/20 3/24/2020  9:00 AM   VAC VeraFlo Irrigant Normal Saline 3/24/2020  9:00 AM   VAC VeraFlo Soak Time (mins) 5 3/24/2020  9:00 AM   VAC VeraFlo Instill Volume (ml) 6 3/24/2020  9:00 AM   VAC VeraFlo - Therapy Time (hrs) 1.5 3/24/2020  9:00 AM   VAC VeraFlo Pressure (mm/Hg) Continuous;125 mmHg 3/24/2020  9:00 AM       Lab Values:    Lab Results   Component Value Date/Time    WBC 14.4 (H) 03/21/2020 06:10 AM    RBC 2.64 (L) 03/21/2020 06:10 AM    HEMOGLOBIN 8.4 (L) 03/21/2020 06:10 AM    HEMATOCRIT 26.2 (L) 03/21/2020 06:10 AM        Results from last 7 days   Lab Units 03/23/20  0219   C REACTIVE PROTEIN 4596 mg/dL 4.86*             Lab Results   Component Value Date/Time    HBA1C 7.5 (H) 02/07/2019 01:20 PM             INTERVENTIONS BY WOUND TEAM:  Removed dressing. Irrigated wound with wound cleanser then wiped the wound bed with several pieces of gauze 4 x 4s. Cleaned the periwound with gauze 4 x 4s and wound cleanser, then patted it dry. Paste ring applied to immediate periwound. Hydrofiber silver applied over denuded areas then this was sealed in place with hydrocolloid thing. 1 piece of Veraflo black foam placed into wound bed.  Dressing sealed with Veraflo vac drape. Hole cut for trac pad, trac pad placed, dressing seal achieved. Previous Veraflo settings maintained. Repositioned patient.     Interdisciplinary consultation: Patient, Bedside RN, [facility] with Wound Team    EVALUATION: wound is declining. veraflo to maintain optimal moisture needed for wound healing and to keep dressing changes at minimum    Goals: Steady decrease in wound area and depth weekly.    NURSING PLAN OF CARE ORDERS (X):  Dressing changes: See Dressing Care orders: X  Skin care: See Skin Care orders: X  Rectal tube care: See Rectal Tube Care orders:        Other orders:                           WOUND TEAM PLAN OF CARE (X):   Dressing changes by wound team:          Follow up 1-2 times weekly:               Follow up 3 times weekly:                NPWT change 3 times weekly:   X  Follow up as needed:       Other (explain):

## 2020-03-24 NOTE — CARE PLAN
Problem: Fluid Volume:  Goal: Will maintain balanced intake and output  Outcome: PROGRESSING AS EXPECTED  I and O monitoring done, free water flushes on PEG tube for intake.     Problem: Respiratory:  Goal: Respiratory status will improve  Outcome: PROGRESSING AS EXPECTED  Trache to t-piece, RT monitoring. Changed inner cannula and suctioned PRN     Problem: Skin Integrity  Goal: Risk for impaired skin integrity will decrease  Outcome: PROGRESSING AS EXPECTED  2 RN skin check done, wound vac intact. Q2 turns in place. NUBIA mattress in use.      Problem: Safety  Goal: Will remain free from injury  Outcome: PROGRESSING AS EXPECTED  Goal: Will remain free from falls  Outcome: PROGRESSING AS EXPECTED  Bed frame alarm in place, side rails up

## 2020-03-24 NOTE — PROGRESS NOTES
"Pulmonary Progress Note    Date of admission  1/16/2020    Chief Complaint  74 y.o. female admitted 1/16/2020 with status epelipticus    Hospital Course    \"Ms. Beltran is a 74 y.o. female admitted to [facility] on 1/14/20 with seizures.She was at a SNF.She had been previously hospitalized at [facility] and diaysis was initiated.  She was doing very poorly then and palliative care was discussed with the family,but they declined.She had very poor functional status and required Jimena lift to get into the dialysis chair.She was found to have  a CVA on MRI at [facility].Her seizures were not controlled and it was felt she was in status epilepticus.  She was getting HD at [facility].Last HD was on 1/13/20.She was seen by Dr Carcamo at [facility].Creatinine was 1.8 and dialysis was held. Neurology [place] that the patient needed continuous EEG monitoring and he was transferred to [facility]. Patient intubated due to seizures and was eventually transitioned to tracheostomy on 2/2/2020 and off the vent on 2/5/2020. Family at this time does not want to change CODE status. Patient remains unresponsive. LTAC being pursued. PEG tube in place. \"      Interval Problem Update  Chart review from the past 24 hours includes imaging, laboratory studies, vital signs and notes available.  Pertinent data for today's visit includes   Pt has been afebrile  T-piece 4LPM  Minimal secretions, clear yellow  Patient  Borderline hypotensive early this AM, resolved.  Still minimally responsive  Family considering comfort care    Review of Systems  Review of Systems   Unable to perform ROS: Patient nonverbal      Vital Signs for last 24 hours   Temp:  [36.1 °C (97 °F)-36.3 °C (97.4 °F)] 36.3 °C (97.4 °F)  Pulse:  [70-95] 87  Resp:  [16-20] 20  BP: ()/(47-74) 109/59  SpO2:  [94 %-99 %] 99 %     Physical Exam   Physical Exam  HENT:      Head: Normocephalic and atraumatic.      Mouth/Throat:      Mouth: Mucous membranes are moist.   Eyes:      Extraocular " Movements: Extraocular movements intact.      Pupils: Pupils are equal, round, and reactive to light.   Neck:      Musculoskeletal: Neck supple.      Comments: 6.0 Shiley DCT cuff deflated stoma site clean- collar loose and was tightened  Cardiovascular:      Rate and Rhythm: Normal rate.   Pulmonary:      Effort: Pulmonary effort is normal. No respiratory distress.      Breath sounds: No wheezing.   Abdominal:      General: Abdomen is flat.      Comments: PEG tube clean, tolerating tube feeds at goal  Rectal Tube   Genitourinary:     Comments: + hernandez draining opaque yellow urine  Musculoskeletal:      Right lower leg: No edema.      Left lower leg: No edema.   Skin:     General: Skin is warm and dry.      Comments: Right PICC line subclavian insertion site clean dry and intact, dressing clean, no erythema   Neurological:      Mental Status: She is alert.      Comments: Non verbal  Difficult to elicit response, slight eye opening on sternal rub  Corneal reflex intact     Not agitated    Medications  Current Facility-Administered Medications   Medication Dose Route Frequency Provider Last Rate Last Dose   • insulin regular (HUMULIN R) injection 1-6 Units  1-6 Units Subcutaneous Q6HRS Jenna Singh M.D.   1 Units at 03/24/20 1143    And   • DEXTROSE 10% BOLUS 250 mL  250 mL Intravenous Q15 MIN PRN Jenna Singh M.D.       • lisinopril (PRINIVIL) tablet 5 mg  5 mg Oral Q DAY Jenna Singh M.D.   Stopped at 03/24/20 0800   • oxyCODONE (ROXICODONE) oral solution 5 mg  5 mg Enteral Tube Q3HRS PRN Jenna Singh M.D.   5 mg at 03/24/20 0813   • epoetin (Retacrit) injection (Dialysis Use Only) 4,000 Units  4,000 Units Subcutaneous MO, WE + FR Jenna Singh M.D.   4,000 Units at 03/23/20 0949   • ascorbic acid tablet 500 mg  500 mg Enteral Tube DAILY Jenna Singh M.D.   500 mg at 03/24/20 0538   • therapeutic multivitamin-minerals (THERAGRAN-M) tablet 1 Tab  1 Tab Enteral Tube DAILY Jenna Singh M.D.   1 Tab at  03/24/20 0538   • Valproate Sodium (DEPAKENE) oral solution 500 mg  500 mg Enteral Tube Q12HRS Jenna Singh M.D.   500 mg at 03/24/20 0540   • heparin injection 5,000 Units  5,000 Units Subcutaneous Q12HRS Aubree Irving M.D.   5,000 Units at 03/24/20 0538   • aspirin (ASA) chewable tab 81 mg  81 mg Enteral Tube DAILY Aubree Irving M.D.   81 mg at 03/24/20 0538   • omeprazole (FIRST-OMEPRAZOLE) 2 mg/mL oral susp 40 mg  40 mg Enteral Tube Q12HRS Aubree Irving M.D.   40 mg at 03/24/20 0537   • albumin human 25% solution 25 g  25 g Intravenous ACUTE DIALYSIS PRN Aubree Irving M.D. 150 mL/hr at 03/13/20 0845 12.5 g at 03/13/20 0845   • lacosamide (VIMPAT) tablet 300 mg  300 mg Enteral Tube BID Aubree Irving M.D.   300 mg at 03/24/20 1055   • carboxymethylcellulose (REFRESH TEARS) 0.5 % ophthalmic drops 1 Drop  1 Drop Both Eyes Q2HRS PRN Aubree Irving M.D.   1 Drop at 03/19/20 0501   • folic acid (FOLVITE) tablet 1 mg  1 mg Enteral Tube DAILY Aubree Irving M.D.   1 mg at 03/24/20 0537   • allopurinol (ZYLOPRIM) tablet 100 mg  100 mg Enteral Tube DAILY Aubree Irving M.D.   100 mg at 03/24/20 0538   • hydrALAZINE (APRESOLINE) injection 10 mg  10 mg Intravenous Q4HRS PRN Jenna Singh M.D.   10 mg at 03/22/20 1618   • topiramate (TOPAMAX) tablet 200 mg  200 mg Enteral Tube Q12HRS Aubree Irving M.D.   200 mg at 03/24/20 0538   • atorvastatin (LIPITOR) tablet 40 mg  40 mg Enteral Tube DAILY Aubree Irving M.D.   40 mg at 03/24/20 0538   • heparin injection 3,300 Units  3,300 Units Intracatheter PRN Aubree Irving M.D.   3,300 Units at 03/23/20 1022   • Respiratory Care per Protocol   Nebulization Continuous RT Aubree Irving M.D.       • Pharmacy Consult: Enteral tube insertion - review meds/change route/product selection  1 Each Other PHARMACY TO DOSE Aubree Irvnig M.D.       • labetalol (NORMODYNE/TRANDATE) injection 10 mg  10 mg Intravenous Q4HRS PRN Jenna Singh M.D.   10 mg at 02/10/20 0026   • acetaminophen (TYLENOL)  tablet 650 mg  650 mg Enteral Tube Q6HRS PRN Aubree Irving M.D.   650 mg at 03/18/20 1615       Fluids    Intake/Output Summary (Last 24 hours) at 3/24/2020 1206  Last data filed at 3/24/2020 1000  Gross per 24 hour   Intake 1070 ml   Output 1940 ml   Net -870 ml       Laboratory          Recent Labs     03/23/20  0219   SODIUM 130*   POTASSIUM 4.2   CHLORIDE 90*   CO2 26   *   CREATININE 1.82*   CALCIUM 9.2     Recent Labs     03/23/20 0219   ALTSGPT 22   ASTSGOT 33   ALKPHOSPHAT 124*   TBILIRUBIN 0.2   PREALBUMIN 18.7   GLUCOSE 160*     Recent Labs     03/23/20 0219   ASTSGOT 33   ALTSGPT 22   ALKPHOSPHAT 124*   TBILIRUBIN 0.2     No results for input(s): RBC, HEMOGLOBIN, HEMATOCRIT, PLATELETCT, PROTHROMBTM, APTT, INR, IRON, FERRITIN, TOTIRONBC in the last 72 hours.    Imaging  DX-CHEST-PORTABLE (1 VIEW)   Final Result      1.  Unchanged perihilar and LEFT greater than RIGHT basilar opacities compatible with atelectasis. Superimposed airspace disease is possible particularly in the LEFT lower lobe.   2.  Persistently enlarged cardiac silhouette      DX-CHEST-LIMITED (1 VIEW)   Final Result         No significant interval change.      DX-CHEST-PORTABLE (1 VIEW)   Final Result      1.  Apparent improvement of LEFT pleural effusion and basilar consolidation.   2.  No pneumothorax.   3.  Supportive tubing as described above.      US-THORACENTESIS PUNCTURE LEFT   Final Result      1. Ultrasound guided left sided diagnostic and therapeutic thoracentesis.      2. 400 mL of fluid withdrawn.      CT-CHEST (THORAX) W/O   Final Result      1.  Bilateral atelectasis/consolidation, left greater than right.      2.  Moderate left and small right pleural effusion.      3.  Atherosclerotic vascular calcification.      4.  Ascites within the upper abdomen.            US-ABDOMEN LTD (SOFT TISSUE)   Final Result         1. Trace free fluid in the pelvis. No abdominal ascites.      DX-ABDOMEN FOR TUBE PLACEMENT   Final  Result         Feeding tube with tip projecting over the expected area of the stomach.      [id number] VIEW   Final Result      Feeding tube tip overlies the gastric antrum.      DX-CHEST-PORTABLE (1 VIEW)   Final Result         1.  Pulmonary edema and/or infiltrates are identified, which are stable since the prior exam.   2.  Cardiomegaly   3.  Atherosclerosis      DX-CHEST-PORTABLE (1 VIEW)   Final Result         1.  Pulmonary edema and/or infiltrates are identified, which are stable since the prior exam.   2.  Cardiomegaly   3.  Atherosclerosis      DX-CHEST-PORTABLE (1 VIEW)   Final Result      1.  Unchanged left lower lobe atelectasis or pneumonia.      2.  Clear right lung.      DX-CHEST-PORTABLE (1 VIEW)   Final Result      Unchanged LEFT basilar atelectasis and/or airspace disease      DX-CHEST-PORTABLE (1 VIEW)   Final Result      Left basilar atelectasis, hilar and cardiac silhouette enlargement as before      DX-CHEST-PORTABLE (1 VIEW)   Final Result      Interval removal of a left subclavian central line. Stable patchy bilateral infiltrates.      IR-PICC LINE PLACEMENT W/ GUIDANCE > AGE 5   Final Result                  Ultrasound-guided PICC placement performed by qualified nursing staff as    above.          DX-CHEST-PORTABLE (1 VIEW)   Final Result      1.  Multiple support devices present.      2.  Patchy bilateral atelectasis.      DX-CHEST-PORTABLE (1 VIEW)   Final Result      Stable chest with retrocardiac opacity from atelectasis and/or pleural fluid favored over consolidation      DX-CHEST-PORTABLE (1 VIEW)   Final Result      Stable chest with retrocardiac opacity from atelectasis and/or pleural fluid favored over consolidation      DX-CHEST-PORTABLE (1 VIEW)   Final Result         No significant change from prior.               DX-CHEST-PORTABLE (1 VIEW)   Final Result         1. Stable lines and tubes..   2. Stable retrocardiac and left perihilar atelectasis versus consolidation. Suspected  small left pleural effusion.            DX-CHEST-PORTABLE (1 VIEW)   Final Result         1. Stable lines and tubes..   2. Stable retrocardiac atelectasis versus consolidation with increased left perihilar opacity. Suspected trace left pleural effusion.         [id number] VIEW   Final Result      1.  Enteric tube has been placed and the tip projects over the stomach.      2.  Pre-existing feeding tube tip projects at the gastroduodenal junction      DX-CHEST-PORTABLE (1 VIEW)   Final Result         1. Lines and tubes as above.   2. Stable retrocardiac atelectasis versus consolidation. Suspected trace left pleural effusion.         MR-BRAIN-WITH & W/O   Final Result      1.  Moderate cerebral atrophy.   2.  Evidence of intraventricular hemorrhage, indeterminate age. Possibly chronic intraventricular hemosiderin deposition.   3.  Extensive encephalomalacic change with hemosiderin deposition involving the right corona radiata, basal ganglia, posterior thalamus, subthalamic region, bordering the right cerebral peduncle. Associated ex vacuo dilatation of the body of the right    lateral ventricle. No change.   4.  Additional foci of old microhemorrhage most consistent with old hypertensive microhemorrhage or amyloid angiopathy in the left basal ganglia, left thalamus, and midline upper ventral abel.   5.  Punctate focus of acute infarction in the right parietal deep white matter. No change.   6.  Advanced supratentorial white matter disease most consistent with microvascular ischemic change.   7.  Encephalomalacic changes in the abel and right cerebral peduncle consistent with old infarction.   8.  Partially empty sella.   9.  Overall, no new findings and no significant change from 1/14/2020.      DX-CHEST-PORTABLE (1 VIEW)   Final Result         1. Stable lines and tubes.   2. Unchanged retrocardiac atelectasis versus consolidation.   3. Stable trace left pleural effusion.         OUTSIDE IMAGES-DX CHEST   Final  Result      OUTSIDE IMAGES-US VASCULAR   Final Result      OUTSIDE IMAGES-MR BRAIN   Final Result      OUTSIDE IMAGES-DX CHEST   Final Result      OUTSIDE IMAGES-CT HEAD   Final Result      EC-ECHOCARDIOGRAM COMPLETE W/O CONT   Final Result      DX-CHEST-FOR LINE PLACEMENT Perform procedure in: Patient's Room   Final Result         1.  Retrocardiac opacity concerning for infiltrate, stable.   2.  Trace left pleural effusion, stable   3.  Cardiomegaly   4.  Atherosclerosis   5.  Perihilar interstitial prominence and bronchial wall cuffing, appearance suggests changes of underlying bronchial inflammation, consider bronchitis.      DX-ABDOMEN FOR TUBE PLACEMENT   Final Result         1.  Nonspecific bowel gas pattern.   2.  Dobbhoff tube tip overlying the expected location of the pylorus or first duodenal segment.   3.  Left lung base atelectasis and/or small effusion      DX-CHEST-PORTABLE (1 VIEW)   Final Result         1.  Retrocardiac opacity concerning for infiltrate.   2.  Trace left pleural effusion, stable   3.  Cardiomegaly   4.  Atherosclerosis      [id number] FILM X-RAY   Final Result      OUTSIDE IMAGES-DX CHEST   Final Result          Assessment/Plan    Acute respiratory failure with hypoxia (HCC)- (present on admission)  Assessment & Plan  Intubated date: 1/14/2020 s/p trach 2/2 Dr Devine  Downsized to 6 cuffed shiley 3/3  Minimal secretions  No vocalizing  No progression although stable  Code status appropriate  Continued [facility] discussions with family    I have performed a physical exam and reviewed and updated ROS and Plan today (3/24/2020). In review of last note, there are no changes except as documented above.

## 2020-03-24 NOTE — PROGRESS NOTES
2 RN skin check completed with Saida HAILE.   Devices in place KARYN PICC, R chest HD cath, hernandez catheter, BMS, trache, peg tube, SCDs, wound vac  Skin assessed under devices yes.  Confirmed pressure ulcers found on sacrum.  New potential pressure ulcers noted on N/A. Wound consult placed; wound care following.    Skin assessment:  -bilateral heels boggy but blanching  -RLE with some discoloration  -labia has swelling nted  -bilateral elbows pink and blanching  -PEG tube site has some scabbing around the tube  -LOPEZ sacral area, wound vac in place. mepilexes around the area  -scab on CARLOS A chest area  -skin around trache intact, checked skin under trache collar intact no issues.    The following interventions in place   -2 RN skin check  -Q2 turns and NUBIA mattress in place  -preventive mepilexes  -heel float boots on  -moisturizer  -trache care and dressing change  -BMS, Hernandez catheter keeping lower area dry and intact.

## 2020-03-25 NOTE — PROGRESS NOTES
VSS.  Patient has trach with t-piece, 4L 28%.  Thick green sputum today, suctioned multiple times this shift by nursing and RT.  Inner cannula changed at 1130 d/t mucous plug blocking inner cannula.  RT notified of thick mucous plug.  Wound vac changed earlier in shift, and patient was premedicated with PRN oxycodone.  No residual with tube feeding, given 2 packs of nutrisource fiber and water flush twice so far today.  Patient remains non-verbal but is tracking with her eyes.  HD catheter dressing c/d/i, PICC dressing c/d/i, hernandez catheter draining, rectal tube draining with minimal leakage (flushed per protocol), PEG tube site with gauze dressing c/d/i.  Aspiration and fall precautions in place.  Will continue to monitor closely.

## 2020-03-25 NOTE — PROGRESS NOTES
0832: Vimpat not in stock.  Medication requested through MAR messaging.    1110: Vimpat still not stocked.  Clinical pharmacist, Isamar, contacted for assistance in obtaining medication as it was originally scheduled to be given at 0600.  Pharmacist will follow-up and make sure medication gets delivered.  Per pharmacist, next dose can be given 8 hours after first one.

## 2020-03-25 NOTE — PROGRESS NOTES
Patient leaving unit now for HD.  Switched t-piece to trach mask on 4L NC.  Trach care completed at 1215.  Patient suctioned and inner cannula and dressing changed.

## 2020-03-25 NOTE — PROGRESS NOTES
2RN skin check completed with MIC Duque.   Devices in place: right PICC, right chest HD catheter, hernandez, BMS, trach with velcro tie and T-piece, PEG tube, SCDs, wound vac, heel mepilex, heel float boots.  Skin assessed under devices: yes.  Confirmed pressure ulcers found on: sacrum.  New potential pressure ulcers noted on: N/A.  Wound consult placed: N/A, wound care following.  The following interventions in place: 2RN skin check, q2 hour repositioning, bilateral heel mepilex, wound vac, hernandez, BMS, barrier cream PRN, heel float boots, low airloss mattress.     Skin assessment:   -left chest, pink/scabbed  -abdomen, PEG tube site, red/scant drainage  -trach site with minimal sputum, pink  -BLE, scattered bruises, RLE discoloration  -sacrum, wound vac in place   -rectum, reddened, hemorrhoids  -bilateral heels, boggy/pink, blanching

## 2020-03-25 NOTE — PROGRESS NOTES
[facility] Medicine Daily Progress Note    Date of Service  3/25/2020    Chief Complaint  altered mental status    Hospital Course    75 y.o. female admitted 1/16/2020 with a past medical history of hypertension, coronary disease, diabetes, and end-stage renal disease on dialysis brought to the emergency room after apparently she had seizure type activity at her assisted living facility.  She was admitted and underwent continuous EEG monitoring followed by neurology.  Her mentation did not improve and she underwent a tracheostomy on 2/2/2020.  A PEG tube was placed 2/15/2020.  He has had a persistent, severe encephalopathy. End-stage renal disease, continuing dialysis per family preference.  Wound showing very little to no granulation, somewhat worsened per wound care team assessment.  Ethics meeting regarding CODE STATUS: Full CODE STATUS was determined to be inappropriate to the standard of care with almost certain significant possibility of harm to the patient and highly unlikely to provide any benefit. This very difficult decision was made with careful and thorough deliberation.  The patient's CODE STATUS was changed to DNAR/DNI.          Interval Problem Update  Patient is lying in bed, awake and tracking with her eyes. She does not appear to respond to any commands, even blinking.  Again no change in physical assessment.  Ethics committee considering comfort care status in consultation with nephrology. Ethics committee representative to communicate status change to family in next meeting on 3/24 or 3/25.  Dialysis scheduled Monday, Wednesday, Friday.    3/24: seen this morning, sleeping, not following commands, poor prognosis  Palliative to have CC meeting tomorrow with her family    3/25: same as before, she is not AAO at all, dnr.dni but still getting dialysis  BP better this morning    Ethics to meet with family to discuss CC and GOC    Consultants/Specialty  Critical care, signed off  Neurology, signed  off  Infectious disease, signed off  Palliative care, following  Nephrology, following  Ethics, following    Code Status  Full code per family, ethics subcommittee following    Disposition  Declined by long term care facilities. Family unwilling to take patient home given level of patient care needs. Family does not agree to hospice.    Review of Systems  Review of Systems   Unable to perform ROS: Medical condition        Physical Exam  Temp:  [36.3 °C (97.4 °F)-37.1 °C (98.8 °F)] 37 °C (98.6 °F)  Pulse:  [79-92] 79  Resp:  [14-20] 16  BP: (109-141)/(43-59) 116/43  SpO2:  [94 %-99 %] 94 %    Physical Exam  Vitals signs and nursing note reviewed.   Constitutional:       Comments: Chronically ill appearing   HENT:      Mouth/Throat:      Mouth: Mucous membranes are dry.   Neck:      Comments: Right tunneled dialysis cath  trach  Cardiovascular:      Rate and Rhythm: Normal rate and regular rhythm.   Pulmonary:      Effort: Pulmonary effort is normal.      Breath sounds: Normal breath sounds.      Comments: Poor air movement  Abdominal:      General: There is distension.      Tenderness: There is no abdominal tenderness.      Comments: PEG tube   Genitourinary:     Comments: River cath  Rectal tube  Musculoskeletal:      Right lower leg: No edema.      Left lower leg: No edema.      Comments: Poor muscle tone  Wound VAC right hip   Skin:     General: Skin is warm and dry.             Comments: Stage IV decubitus ulcer   Neurological:      Comments: She no longer follows blinking command   Psychiatric:      Comments: She is nonverbal     All systems reviewed and exam is unchanged from yesterday.    Fluids    Intake/Output Summary (Last 24 hours) at 3/25/2020 0916  Last data filed at 3/25/2020 0750  Gross per 24 hour   Intake 1603 ml   Output 0 ml   Net 1603 ml       Laboratory      Recent Labs     03/23/20  0219   SODIUM 130*   POTASSIUM 4.2   CHLORIDE 90*   CO2 26   GLUCOSE 160*   *   CREATININE 1.82*   CALCIUM  9.2                 Assessment/Plan  * Status epilepticus (HCC)- (present on admission)  Assessment & Plan  On admission, she was intubated for airway protection, now trach since 2/2/20. No evidence of seizure activity.  Appears now to have a severe, intractable encephalopathy  - continuing regimen of vimpat, topamax, depakote  - repeat EEG showed no seizures  Stable.  Continue antiseizure medications, seizure precautions.  No more seizures    Acute respiratory failure with hypoxia (HCC)- (present on admission)  Assessment & Plan   on trach/T-piece,  pulmonology following  Acute on chronic condition now    Goals of care, counseling/discussion- (present on admission)  Assessment & Plan  Overall neurologic and physical prognosis is quite poor given her severe, unrelenting encephalopathy and dialysis, she remains unchanged   family meeting 3/12/2020 with no subsequent change of plan of care.  Patient changed to DNR, DNI CODE STATUS per ethics committee meeting 3/20 as appropriate.  Ethics committee to relay this status change to family in meeting 3/25.palliative and CC meeting  comfort care would be an appropriate next step such as cessation of dialysis.  Nephrology has also documented this opinion.  Ethics committee to confer with nephrology, appreciate any additional recommendations.  Ethics committee consultation to provide physician backup for initiation of comfort care specifically withdrawal of dialysis and comfort care measures    Pressure injury of sacrum, coccyx, stage 3 (HCC)- (present on admission)  Assessment & Plan  He has a substantial right hip decubitus ulcer with a wound VAC followed by wound care, poor tissue integrity and minimal if any wound healing.  Given her ability to move spontaneously, diabetes, dialysis, and poor condition, she is extremely high risk of developing further decubitus ulcers    Severe protein-calorie malnutrition (HCC)- (present on admission)  Assessment & Plan  Continue  nutrition via tube feeding.  Dietitian consulted.    Cerebrovascular accident (CVA) due to embolism of right middle cerebral artery (HCC)- (present on admission)  Assessment & Plan  Very poor prognosis.  Severe debility.  Continue aspirin and statin  Pain management    End stage renal failure on dialysis ([facility])- (present on admission)  Assessment & Plan  Dialysis via a right-sided tunneled catheter  hemodialysis Monday Wednesday Friday    Hypertension- (present on admission)  Assessment & Plan  She was on Coreg 25 mg twice a day, Cardura 6 mg daily, hydralazine 100 mg 3 times daily  Blood pressure is been low and all BP meds have been stopped  500 mL IV bolus was ordered on 3/11/2020 due to hypotension  Systolic blood pressure now averaging 130s  Continue to hold her blood pressure medicines    Type 2 diabetes mellitus, with long-term current use of insulin ([facility])- (present on admission)  Assessment & Plan  A1C 7.5%, blood sugars have been ~170. Was taking insulin at home.   Continue sliding scale insulin.  Continue tube feeding.   Blood sugar is stable.    Leukocytosis  Assessment & Plan  No other signs of infection, probably reactive  White blood cell count remains high  chest x-ray is unchanged, procalcitonin is slightly elevated but patient is end-stage renal disease patient has no fever, UA positive for leukocyte esterase but no other signs of infection.   C. 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  FOBT is negative.   - transfuse if drops below 7  Hemoglobin stable    Gout- (present on admission)  Assessment & Plan  On allopurinol via PEG tube    TB lung, latent- (present on admission)  Assessment & Plan  Quantiferon + , not active TB. Chest CT found positive left-sided consolidation with  "pleural effusion.  - sputum cx/ AFB stain negative  - s/p thora on 2/25; f/u pathology negative  - ID consulted, signed off  - AFB stain negative x3; remove isolation  - Per ID, we will \"treat for LTBI with  mg PO daily and B6 25 mg PO daily for 9 months if AFB cxs all negative at 6 weeks\"  Completed antibiotics treatment.    Hypokalemia  Assessment & Plan  Patient is end-stage renal disease on hemodialysis, potassium improved.  resolved       VTE prophylaxis: Heparin    Jenna Singh M.D.      ADDENDUM:  Ethics meeting moved to Friday 3/27/20 as patients dtr is unable to make it today or tomorrow  "

## 2020-03-25 NOTE — PROGRESS NOTES
Pt non-verbal but tracks with eyes. Patient has trach with t-piece, 4L O2.  Wound vac in place running at 125 mmHg. Dressing on sacrum CDI. HD catheter dressing and PICC dressing CDI, hernandez catheter draining, rectal tube draining and flushed per protocol, PEG tube site with gauze dressing CDI. Given 2 packs nutrisource fiber and flushed feeding tube. Also flushed after medication. Suctioned one time so far this shift. Aspiration and fall precautions in place. 2RN skin check completed and Q2 turns in place. Pt currently resting quietly.

## 2020-03-25 NOTE — PROGRESS NOTES
2RN Skin Check    2RN skin check completed with MIC Hines.   Devices in place: right PICC, right chest HD catheter, hernandez, BMS, trach with velcro tie and T-piece, PEG tube, SCDs, wound vac, heel float boots.  Skin assessed under devices: yes.  Confirmed pressure ulcers found on: sacrum.  New potential pressure ulcers noted on: N/A.  Wound consult placed: N/A, wound care following.      The following interventions in place: 2RN skin check,Q2 turns, bilateral heel mepilex, wound vac, hernandez, BMS, barrier cream, heel float boots, low airloss mattress.     Skin assessment:     Trach site with minimal sputum, pink.  Abdomen, PEG tube site, red with scant drainage.  PICC in right upper arm. Dressing CDI.  HD catheter right chest. Dressing CDI.  RUE swelling, pitting, pink, blanching.  BLE scattered bruises.   RLE discoloration/bruise on shin.  Sacrum has wound vac in place and dressing CDI.  Rectum, reddened.  Bilateral heels boggy, pink, blanching.  Mepilex and heel float boots in place.

## 2020-03-25 NOTE — PROGRESS NOTES
2RN skin check completed with MIC Cotter.   Devices in place: right PICC, right chest HD catheter, hernandez, BMS, trach with velcro tie and T-piece, PEG tube, SCDs, wound vac, heel mepilex, heel float boots.  Skin assessed under devices: yes.  Confirmed pressure ulcers found on: sacrum.  New potential pressure ulcers noted on: N/A.  Wound consult placed: N/A, wound care following.  The following interventions in place: 2RN skin check, q2 hour repositioning with pillow support, bilateral heel mepilex, sacral wound vac, hernandez, BMS, barrier cream PRN, heel float boots, low airloss mattress.     Skin assessment:   -left chest, pink/scabbed  -abdomen, PEG tube site, red/scant drainage  -trach site with minimal sputum, pink  -BLE, scattered bruises, RLE discoloration  -sacrum, wound vac in place   -rectum, reddened, hemorrhoids  -bilateral heels, boggy/pink, blanching  -right arm PICC dressing c/d/i  -right hand with pitting edema, forearm with non-pitting edema

## 2020-03-25 NOTE — PROGRESS NOTES
"ETHICS CONSULT NOTE  Patient Name: Cassandra Pond  MRN: [id number]  DATE OF SERVICE: 03/25/2020    Received calls from the Bedside RN and ESPERANZA Lehman, appreciate updates.    Called Belen, introduced self and role on clinical team. Belen reports her sciatica is preventing her from being able to sit up and she is unable to find a ride to [place] \"on such short notice\" for today's meetings. She requested to reschedule the meeting.  Belen reports she will be available for a Friday (3/27/20) meeting at 1100.    This author updated Dr. Singh, Dr. Jackson, Dominik MATA, Chula TREJO RN, and Tami, Risk Management via tiger text.     Thank you for involving us in the care of this patient. Please let me know if you have any other questions or concerns.      Respectfully submitted,    Penelope Calderon, RN, BSN, MS-HCE, [facility]  Office [phone]  Via Tiger Text                        "

## 2020-03-25 NOTE — PROGRESS NOTES
HEMODIALYSIS NOTES:     HD today x 3 hours per Dr. Jackson . Initiated at 1315 and ended at 1615. Patient tolerated treatment. See paper flowsheet for details.    UF Net: 1900 mL    R permacath intact and patent with good flow during dialysis. No s/sx of infection, no redness nor bleeding noted on the CVC site. CVC dressing clean, dry and intact. Blood returned and CVC port flushed with NS and Heparin 1000 units/mL used  to lock catheter given per designated amount. CVC port clamped and capped.     Report given to Chris HAILE.

## 2020-03-25 NOTE — CARE PLAN
Problem: Respiratory:  Goal: Respiratory status will improve  Outcome: PROGRESSING AS EXPECTED  Note: No changes to trach or oxygen settings.     Problem: Mobility  Goal: Risk for activity intolerance will decrease  Outcome: PROGRESSING SLOWER THAN EXPECTED  Note: Patient unable to move.  Q2 hour turns.

## 2020-03-25 NOTE — PROGRESS NOTES
[facility] -  PROGRESS NOTE               Author: Alex Jackson M.D. Date & Time: 3/25/2020  8:18 AM     HPI:  74 y.o. female admitted to [facility] on 1/14/20 with seizures.She was at a SNF.She had been previously hospitalized at [facility] and diaysis was initiated.  She was doing very poorly then and palliative care was discussed with the family,but they declined.She had very poor functional status and required Jimena lift to get into the dialysis chair.She was found to have  a CVA on MRI at [facility].Her seizures were not controlled and it was felt she was in status epilepticus.  She was getting HD at [facility].Last HD was on 1/13/20.She was seen by Dr Carcamo at [facility].Creatinine was 1.8 and dialysis was held. Neurology [place] that the patient needed continuous EEG monitoring and he was transferred to [facility]    DAILY NEPHROLOGY SUMMARY:  Please see note from 1/31 for prior summaries  Please see note from 2/29 for prior summaries  3/02 - No new events.No interaction.For HD today.  3/04 - No new developments.For HD today.No interaction.  3/06 - No new developments.Seen on dialysis.  3/07 - NO overnight renal events, No HD today (Sat).  Trache with T piece in place.   3/09 - NAEO, no changes  3/11 - NAEO, hypotensive and receiving IVF bolus today  3/13 - NAEO, SOD, BP low, UF off today  3/16: Seen on dialysis, no interaction  3/18: seen on dialysis, no recent changes or updates  3/20: no updated regarding GOC, seen on HD  3/23: seen on Dialysis, ethics committee recommending cessation of dialysis and initiation of comfort measures, family conference pending.  3/25: No acute events, ethics meeting with family postponed till Friday at 11 AM, seen on dialysis    REVIEW OF SYSTEMS:    Review of Systems   Unable to perform ROS: Acuity of condition     PAST SURGICAL HISTORY:  Reviewed and unchanged since admission  SOCIAL HISTORY:  Reviewed and unchanged since admission  FAMILY HISTORY: Reviewed and  "unchanged since admission  CURRENT MEDICATIONS: Reviewed since admission to present  IMAGING STUDIES: Reviewed since admission to present  LABORATORY STUDIES: Reviewed since admission to present    PHYSICAL EXAM:  VS:  /43   Pulse 79   Temp 37 °C (98.6 °F) (Temporal)   Resp 16   Ht 1.651 m (5' 5\")   Wt 54.5 kg (120 lb 2.4 oz)   SpO2 94%   BMI 19.99 kg/m²    GENERAL: Ill-appearing  CV: RRR  RESP: Trach in place, coarse breath sounds bilaterally  GI: Soft  MSK: No obvious joint deformities   SKIN: No concerning rashes  NEURO: Diffuse weakness    Physical ExamFluids:  In: 1125 [Other:30; Enteral:220]  Out: 0     LABS:  Recent Results (from the past 24 hour(s))   ACCU-CHEK GLUCOSE    Collection Time: 03/24/20 11:40 AM   Result Value Ref Range    Glucose - Accu-Ck 174 (H) 65 - 99 mg/dL   ACCU-CHEK GLUCOSE    Collection Time: 03/24/20  5:59 PM   Result Value Ref Range    Glucose - Accu-Ck 165 (H) 65 - 99 mg/dL   ACCU-CHEK GLUCOSE    Collection Time: 03/25/20 12:12 AM   Result Value Ref Range    Glucose - Accu-Ck 156 (H) 65 - 99 mg/dL   ACCU-CHEK GLUCOSE    Collection Time: 03/25/20  6:01 AM   Result Value Ref Range    Glucose - Accu-Ck 191 (H) 65 - 99 mg/dL       (click the triangle to expand results)    IMPRESSION:  # ESRD   MWF iHD as OP, but D/C from clinic with prolonged hospital stay # Status epilepticus   Now resolved, but mentally unresponsive  # S/P acute lacunar infarct              Hx of previous CVA with significant deficits.  # HTN--BP variable often with intradialytic hypotension  # DM II              Management per primary svc  # Acute Hypoxic Respiratory Failure              +Trach and T-piece  # Hx of dysphagia  # Anemia of CKD.Ferritin previously significantly elevated. CAT with HD.  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              PTH 26.2 , Vit D 53 , Phos  2.5   # CAD  # DLD  # Gout,on Allopurinol  # Hx of PEG tube  # Hx of RALF  # Hx of UTI  # COPD  # Edema/Anasarca--improved  # " Hypophosphatemia, improved   # Hyponatremia, improved   # Prognosis poor              Family expectations and goals for patient are not in line with prognosis   Comfort care recommended by multiple services  # Stage 3 decubitus ulcer  # TB rule out, + TB quant and abnormal CXR/CT, ID on board and managing  # Leukocytosis      PLAN:   -MWF iHD for now  -Very poor prognosis, recommend comfort care, ethics committee discussing recommendations of change to comfort measures with family on Friday at 11 AM, will try to be present for the meeting  -Continue Epogen with HD  -Enteral feedings  -No dietary protein restrictions  -Follow labs    All prior notes form other doctors and RN staff were reviewed from admission to current day to help me make my clinical decisions    Thank you    Alex Jackson MD

## 2020-03-26 NOTE — PROGRESS NOTES
Received patient back from HD.  VSS.  T-piece placed back on trach.  Suctioned.  Patient turned to left side.  Medications given per MAR.  Tube feeding bag and tubing changed.

## 2020-03-26 NOTE — PROGRESS NOTES
[facility] -  PROGRESS NOTE               Author: Alex Jackson M.D. Date & Time: 3/26/2020  9:45 AM     HPI:  74 y.o. female admitted to [facility] on 1/14/20 with seizures.She was at a SNF.She had been previously hospitalized at [facility] and diaysis was initiated.  She was doing very poorly then and palliative care was discussed with the family,but they declined.She had very poor functional status and required Jimena lift to get into the dialysis chair.She was found to have  a CVA on MRI at [facility].Her seizures were not controlled and it was felt she was in status epilepticus.  She was getting HD at [facility].Last HD was on 1/13/20.She was seen by Dr Carcamo at [facility].Creatinine was 1.8 and dialysis was held. Neurology [place] that the patient needed continuous EEG monitoring and [place] [facility]    DAILY NEPHROLOGY SUMMARY:  Please see note from 1/31 for prior summaries  Please see note from 2/29 for prior summaries  3/02 - No new events.No interaction.For HD today.  3/04 - No new developments.For HD today.No interaction.  3/06 - No new developments.Seen on dialysis.  3/07 - NO overnight renal events, No HD today (Sat).  Trache with T piece in place.   3/09 - NAEO, no changes  3/11 - NAEO, hypotensive and receiving IVF bolus today  3/13 - NAEO, SOD, BP low, UF off today  3/16: Seen on dialysis, no interaction  3/18: seen on dialysis, no recent changes or updates  3/20: no updated regarding GOC, seen on HD  3/23: seen on Dialysis, ethics committee recommending cessation of dialysis and initiation of comfort measures, family conference pending.  3/25: No acute events, ethics meeting with family postponed till Friday at 11 AM, seen on dialysis  3/26: No acute events, tolerated dialysis, resting comfortably in bed    REVIEW OF SYSTEMS:    Review of Systems   Unable to perform ROS: Acuity of condition     PAST SURGICAL HISTORY:  Reviewed and unchanged since admission  SOCIAL HISTORY:   "Reviewed and unchanged since admission  FAMILY HISTORY: Reviewed and unchanged since admission  CURRENT MEDICATIONS: Reviewed since admission to present  IMAGING STUDIES: Reviewed since admission to present  LABORATORY STUDIES: Reviewed since admission to present    PHYSICAL EXAM:  VS:  /55   Pulse 83   Temp 36.2 °C (97.2 °F) (Temporal)   Resp 18   Ht 1.651 m (5' 5\")   Wt 54.5 kg (120 lb 2.4 oz)   SpO2 99%   BMI 19.99 kg/m²    GENERAL: Ill-appearing  CV: No edema  RESP: Trach in place, coarse breath sounds bilaterally  GI: Soft  MSK: No obvious joint deformities   SKIN: No concerning rashes  NEURO: Diffuse weakness    Physical ExamFluids:  In: 1645 [Dialysis:500; Enteral:190]  Out: 3225     LABS:  Recent Results (from the past 24 hour(s))   ACCU-CHEK GLUCOSE    Collection Time: 03/25/20 11:45 AM   Result Value Ref Range    Glucose - Accu-Ck 191 (H) 65 - 99 mg/dL   ACCU-CHEK GLUCOSE    Collection Time: 03/25/20  6:01 PM   Result Value Ref Range    Glucose - Accu-Ck 193 (H) 65 - 99 mg/dL   ACCU-CHEK GLUCOSE    Collection Time: 03/25/20 11:36 PM   Result Value Ref Range    Glucose - Accu-Ck 148 (H) 65 - 99 mg/dL   ACCU-CHEK GLUCOSE    Collection Time: 03/26/20  5:56 AM   Result Value Ref Range    Glucose - Accu-Ck 212 (H) 65 - 99 mg/dL   Renal Function Panel    Collection Time: 03/26/20  6:40 AM   Result Value Ref Range    Sodium 135 135 - 145 mmol/L    Potassium 3.9 3.6 - 5.5 mmol/L    Chloride 94 (L) 96 - 112 mmol/L    Co2 28 20 - 33 mmol/L    Glucose 246 (H) 65 - 99 mg/dL    Creatinine 1.29 0.50 - 1.40 mg/dL    Bun 64 (H) 8 - 22 mg/dL    Calcium 8.9 8.5 - 10.5 mg/dL    Phosphorus 2.5 2.5 - 4.5 mg/dL    Albumin 2.4 (L) 3.2 - 4.9 g/dL   Basic Metabolic Panel    Collection Time: 03/26/20  6:40 AM   Result Value Ref Range    Anion Gap 13.0 7.0 - 16.0   ESTIMATED GFR    Collection Time: 03/26/20  6:40 AM   Result Value Ref Range    GFR If  49 (A) >60 mL/min/1.73 m 2    GFR If Non  " American 40 (A) >60 mL/min/1.73 m 2       (click the triangle to expand results)    IMPRESSION:  # ESRD: normally MWF but D/Cd from clinic with prolonged hospital stay  # Goals of care: Family expectations and goals for patient are not in line with prognosis.  Nephrology along with many other services continue to recommend comfort measures with ethics consult placed.  Pending family meeting on Friday 3/27 to discuss further  # Anemia of CKD.Ferritin previously significantly elevated. CAT with HD.  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  # HTN--BP variable often with intradialytic hypotension  # DM II: Management per primary svc  # Chronic Hypoxic Respiratory Failure:Trach and T-piece  # CAD  # DLD  # Gout,on Allopurinol  # Hx of PEG tube  # Hx of RALF  # Hx of UTI  # COPD  # TB rule out, + TB quant and abnormal CXR/CT, ID on board and managing    PLAN:   -MWF iHD for now  -Very poor prognosis, recommend comfort care, ethics involved with family  on Friday at 11 AM, will try to be present for the meeting  -Continue Epogen with HD  -Enteral feedings  -No dietary protein restrictions  -Follow labs    All prior notes form other doctors and RN staff were reviewed from admission to current day to help me make my clinical decisions    Thank you    Alex Jackson MD

## 2020-03-26 NOTE — PROGRESS NOTES
[facility] Medicine Daily Progress Note    Date of Service  3/26/2020    Chief Complaint  altered mental status    Hospital Course    75 y.o. female admitted 1/16/2020 with a past medical history of hypertension, coronary disease, diabetes, and end-stage renal disease on dialysis brought to the emergency room after apparently she had seizure type activity at her assisted living facility.  She was admitted and underwent continuous EEG monitoring followed by neurology.  Her mentation did not improve and she underwent a tracheostomy on 2/2/2020.  A PEG tube was placed 2/15/2020.  He has had a persistent, severe encephalopathy. End-stage renal disease, continuing dialysis per family preference.  Wound showing very little to no granulation, somewhat worsened per wound care team assessment.  Ethics meeting regarding CODE STATUS: Full CODE STATUS was determined to be inappropriate to the standard of care with almost certain significant possibility of harm to the patient and highly unlikely to provide any benefit. This very difficult decision was made with careful and thorough deliberation.  The patient's CODE STATUS was changed to DNAR/DNI.          Interval Problem Update  Patient is lying in bed, awake and tracking with her eyes. She does not appear to respond to any commands, even blinking.  Again no change in physical assessment.  Ethics committee considering comfort care status in consultation with nephrology. Ethics committee representative to communicate status change to family in next meeting on 3/24 or 3/25.  Dialysis scheduled Monday, Wednesday, Friday.    3/24: seen this morning, sleeping, not following commands, poor prognosis  Palliative to have CC meeting tomorrow with her family    3/25: same as before, she is not AAO at all, dnr.dni but still getting dialysis  BP better this morning    Ethics to meet with family to discuss CC and GOC    3/26: no acute events overnight, no change in clinical status, remains in  guarded condition with a poor prognosis. Ethics meeting tomorrow at 11am    Consultants/Specialty  Critical care, signed off  Neurology, signed off  Infectious disease, signed off  Palliative care, following  Nephrology, following  Ethics, following    Code Status  Full code per family, ethics subcommittee following    Disposition  Declined by long term care facilities. Family unwilling to take patient home given level of patient care needs. Family does not agree to hospice.    Review of Systems  Review of Systems   Unable to perform ROS: Medical condition        Physical Exam  Temp:  [36.2 °C (97.1 °F)-36.8 °C (98.3 °F)] 36.2 °C (97.2 °F)  Pulse:  [71-88] 83  Resp:  [16-18] 18  BP: (115-160)/(55-76) 115/55  SpO2:  [95 %-100 %] 99 %    Physical Exam  Vitals signs and nursing note reviewed.   Constitutional:       Comments: Chronically ill appearing   HENT:      Mouth/Throat:      Mouth: Mucous membranes are dry.   Neck:      Comments: Right tunneled dialysis cath  trach  Cardiovascular:      Rate and Rhythm: Normal rate and regular rhythm.   Pulmonary:      Effort: Pulmonary effort is normal.      Breath sounds: Normal breath sounds.      Comments: Poor air movement  Abdominal:      General: There is distension.      Tenderness: There is no abdominal tenderness.      Comments: PEG tube   Genitourinary:     Comments: River cath  Rectal tube  Musculoskeletal:      Right lower leg: No edema.      Left lower leg: No edema.      Comments: Poor muscle tone  Wound VAC right hip   Skin:     General: Skin is warm and dry.             Comments: Stage IV decubitus ulcer   Neurological:      Comments: She no longer follows blinking command   Psychiatric:      Comments: She is nonverbal     All systems reviewed and exam is unchanged from yesterday.    Fluids    Intake/Output Summary (Last 24 hours) at 3/26/2020 1034  Last data filed at 3/26/2020 0600  Gross per 24 hour   Intake 1037 ml   Output 3225 ml   Net -2188 ml              Laboratory      Recent Labs     03/26/20  0640   SODIUM 135   POTASSIUM 3.9   CHLORIDE 94*   CO2 28   GLUCOSE 246*   BUN 64*   CREATININE 1.29   CALCIUM 8.9                 Assessment/Plan  * Status epilepticus (HCC)- (present on admission)  Assessment & Plan  On admission, she was intubated for airway protection, now trach since 2/2/20. No evidence of seizure activity.  Appears now to have a severe, intractable encephalopathy  - continuing regimen of vimpat, topamax, depakote  - repeat EEG showed no seizures  Stable.  Continue antiseizure medications, seizure precautions.  No more seizures    Acute respiratory failure with hypoxia (HCC)- (present on admission)  Assessment & Plan   on trach/T-piece,  pulmonology following  Acute on chronic condition now    Goals of care, counseling/discussion- (present on admission)  Assessment & Plan  Overall neurologic and physical prognosis is quite poor given her severe, unrelenting encephalopathy and dialysis, she remains unchanged   family meeting 3/12/2020 with no subsequent change of plan of care.  Patient changed to DNR, DNI CODE STATUS per ethics committee meeting 3/20 as appropriate.  Ethics committee to relay this status change to family in meeting 3/25.palliative and CC meeting  comfort care would be an appropriate next step such as cessation of dialysis.  Nephrology has also documented this opinion.  Ethics committee to confer with nephrology, appreciate any additional recommendations.  Ethics committee consultation to provide physician backup for initiation of comfort care specifically withdrawal of dialysis and comfort care measures    Pressure injury of sacrum, coccyx, stage 3 (HCC)- (present on admission)  Assessment & Plan  He has a substantial right hip decubitus ulcer with a wound VAC followed by wound care, poor tissue integrity and minimal if any wound healing.  Given her ability to move spontaneously, diabetes, dialysis, and poor condition, she is  extremely high risk of developing further decubitus ulcers    Severe protein-calorie malnutrition (HCC)- (present on admission)  Assessment & Plan  Continue nutrition via tube feeding.  Dietitian consulted.    Cerebrovascular accident (CVA) due to embolism of right middle cerebral artery (HCC)- (present on admission)  Assessment & Plan  Very poor prognosis.  Severe debility.  Continue aspirin and statin  Pain management    End stage renal failure on dialysis ([facility])- (present on admission)  Assessment & Plan  Dialysis via a right-sided tunneled catheter  hemodialysis Monday Wednesday Friday    Hypertension- (present on admission)  Assessment & Plan  She was on Coreg 25 mg twice a day, Cardura 6 mg daily, hydralazine 100 mg 3 times daily  Blood pressure is been low and all BP meds have been stopped  500 mL IV bolus was ordered on 3/11/2020 due to hypotension  Systolic blood pressure now averaging 130s  Continue to hold her blood pressure medicines    Type 2 diabetes mellitus, with long-term current use of insulin ([facility])- (present on admission)  Assessment & Plan  A1C 7.5%, blood sugars have been ~170. Was taking insulin at home.   Continue sliding scale insulin.  Continue tube feeding.   Blood sugar is stable.    Leukocytosis  Assessment & Plan  No other signs of infection, probably reactive  White blood cell count remains high  chest x-ray is unchanged, procalcitonin is slightly elevated but patient is end-stage renal disease patient has no fever, UA positive for leukocyte esterase but no other signs of infection.   C. 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  FOBT is negative.   - transfuse if drops below 7  Hemoglobin stable    Gout- (present on admission)  Assessment & Plan  On allopurinol via  "PEG tube    TB lung, latent- (present on admission)  Assessment & Plan  Quantiferon + , not active TB. Chest CT found positive left-sided consolidation with pleural effusion.  - sputum cx/ AFB stain negative  - s/p thora on 2/25; f/u pathology negative  - ID consulted, signed off  - AFB stain negative x3; remove isolation  - Per ID, we will \"treat for LTBI with  mg PO daily and B6 25 mg PO daily for 9 months if AFB cxs all negative at 6 weeks\"  Completed antibiotics treatment.    Hypokalemia  Assessment & Plan  Patient is end-stage renal disease on hemodialysis, potassium improved.  resolved       VTE prophylaxis: Heparin    Jenna Singh M.D.      Seen and examined, no changes in my plan, ethics meeting tomorrow at 11am  "

## 2020-03-26 NOTE — PROGRESS NOTES
No acute events overnight. Remains nonverbal. Daughter called; update given. Repositioning q2. Tube feeds continued at goal rate - 40mL/hr with q4 water flushes. Safety precautions maintained. Will continue to monitor and report to the oncoming nurse.

## 2020-03-26 NOTE — CARE PLAN
Problem: Safety  Goal: Will remain free from injury  Outcome: PROGRESSING AS EXPECTED     Problem: Bowel/Gastric:  Goal: Normal bowel function is maintained or improved  Outcome: PROGRESSING AS EXPECTED     Problem: Skin Integrity  Goal: Risk for impaired skin integrity will decrease  Outcome: PROGRESSING AS EXPECTED

## 2020-03-26 NOTE — PROGRESS NOTES
Patient's daughter, Belen, is aware that ethics/comfort care meeting is scheduled for this Friday 3/27/20 at 1100 AM.

## 2020-03-26 NOTE — PROGRESS NOTES
2 RN skin check complete with MIC Hines.   Devices in place PICC, HD line, hernandez, BMS, trache with velcro tie, peg tube, SCDs, wound vac, mepilex, float boots.  Skin assessed under devices yes.  Confirmed pressure ulcers found on sacrum.  New potential pressure ulcers noted on N/A. Wound consult placed; wound care following.  '  The following interventions in place 2 RN skin check, q2 repositioning, mepilex, wound vac, hernandez, BMS, barrier cream, nystatin, heel float boots.     Skin Assessment -   Chest -left side - pink/scabbed  Abdomen - peg tube site - crusty/red/scant drainage  Left forearm - bruising    BLE - scattered bruises/RLE discoloration  Sacrum - wound vac in place   Rectum - red  Heels - boggy/blanching/pink

## 2020-03-27 NOTE — WOUND TEAM
[facility]  Inpatient Services  Wound and Skin Care Progress Note      HPI, PMH, SH: Reviewed    Unit where seen by Wound Team: S629/02     WOUND CONSULT RELATED TO:  Scheduled Negative Pressure Wound Therapy (NPWT) dressing change    Self Report / Pain Level:  Patient frowned when turned       OBJECTIVE:  Dressing intact. Vac alarming blockage. Odor detected from the door of room    WOUND TYPE, LOCATION, CHARACTERISTICS (Pressure Injuries: location, stage, POA or date identified)  Skin Risk/Reinier   Sensory Perception: Very Limited  Moisture: Occasionally Moist  Activity: Bedfast  Mobility: Completely Immobile  Nutrition: Adequate  Friction and Shear: Problem  Total Score: 11  Skin Breakdown Risk: 10-12 High Risk for Skin Breakdown    Sensory Interventions  Bed Types: Low Air Loss Mattress  Skin Preventative Measures: Pillows in Use for Support / Positioning  Skin Preventative Dressing: Mepilex  Moisturizers: Barrier Paste, Barrier Wipes  PT / OT Involved in Care: Physical Therapy Involved  Activity : Bed  Patient Turns / Repositioning: Left Side  Patient is Receiving Nutrition: Tube Feeding Nutrition  Nutrition Consult Ordered: No, Consult has Already been Placed  Vitamin Therapy in Use: Yes  Friction Interventions: Draw Sheet / Pad Used for Repositioning       Pressure Injury 01/16/20 Sacrum;Coccyx stage 4 3/5/2020 (Active)   Wound Image    3/19/2020 11:00 AM   Pressure Injury Stage 4 3/26/2020  5:00 PM   State of Healing Undermining;Non-healing 3/26/2020  5:00 PM   Site Assessment Red;Tan;Yellow;Boggy;Undermining;Non-healing;Fragile 3/26/2020  5:00 PM   Periwound Assessment Red;Denuded;Fragile;Dark edges;Hyperpigmented 3/26/2020  5:00 PM   Margins Unattached edges 3/26/2020  5:00 PM   Wound Length (cm) 6.2 cm 3/26/2020  5:00 PM   Wound Width (cm) 2.9 cm 3/26/2020  5:00 PM   Wound Depth (cm) 1.2 cm 3/26/2020  5:00 PM   Wound Surface Area (cm^2) 17.98 cm^2 3/26/2020  5:00 PM   Wound Volume (cm^3)  21.58 cm^3 3/26/2020  5:00 PM   Tunneling (cm) 3.5 cm 3/5/2020  6:00 PM   Undermining (cm) 2.8 cm 3/26/2020  5:00 PM   Closure Secondary intention 3/26/2020  5:00 PM   Drainage Amount Small 3/26/2020  5:00 PM   Drainage Description Jenkins;Serosanguineous 3/26/2020  5:00 PM   Non-staged Wound Description Not applicable 3/26/2020  5:00 PM   Treatments Cleansed;Site care 3/26/2020  5:00 PM   Wound Cleansing Approved Wound Cleanser 3/26/2020  5:00 PM   Periwound Protectant Skin Protectant Wipes to Periwound;Stoma Powder;Hydrofiber;Hydrocolloid;Paste Ring 3/26/2020  5:00 PM   Dressing Options Wound Vac 3/26/2020  5:00 PM   Dressing Cleansing/Solutions Normal Saline 3/26/2020  5:00 PM   Dressing Changed Changed 3/26/2020  5:00 PM   Dressing Status Clean;Dry;Intact 3/26/2020  5:00 PM   Dressing Change/Treatment Frequency Tuesday, Thursday, Saturday, and As Needed 3/26/2020  5:00 PM   NEXT Dressing Change/Treatment Date 03/28/20 3/26/2020  5:00 PM   NEXT Weekly Photo (Inpatient Only) 04/02/20 3/26/2020  5:00 PM   Wound Odor Strong 3/26/2020  5:00 PM   Pulses N/A 3/26/2020  5:00 PM   Exposed Structures None 3/24/2020  9:00 AM   WOUND NURSE ONLY - Tissue Type and Percentage 98%slough or connective tissue, 2% red 3/26/2020  5:00 PM           Negative Pressure Wound Therapy 03/07/20 Pressure injury: stage 4 (Active)   NPWT Pump Mode / Pressure Setting Ulta 3/26/2020  5:00 PM   Dressing Type Black Foam (Veraflo);Small 3/26/2020  5:00 PM   Number of Foam Pieces Used 2 3/26/2020  5:00 PM   Canister Changed No 3/26/2020  5:00 PM   Output (mL) 75 mL 3/19/2020  4:00 PM   NEXT Dressing Change/Treatment Date 03/28/20 3/26/2020  5:00 PM   VAC VeraFlo Irrigant Normal Saline 3/26/2020  5:00 PM   VAC VeraFlo Soak Time (mins) 5 3/26/2020  5:00 PM   VAC VeraFlo Instill Volume (ml) 8 3/26/2020  5:00 PM   VAC VeraFlo - Therapy Time (hrs) 1.5 3/26/2020  5:00 PM   VAC VeraFlo Pressure (mm/Hg) Continuous;125 mmHg 3/26/2020  5:00 PM   WOUND NURSE  ONLY - Time Spent with Patient (mins) 50 3/26/2020  5:00 PM                         Lab Values:    Lab Results   Component Value Date/Time    WBC 14.4 (H) 03/21/2020 06:10 AM    RBC 2.64 (L) 03/21/2020 06:10 AM    HEMOGLOBIN 8.4 (L) 03/21/2020 06:10 AM    HEMATOCRIT 26.2 (L) 03/21/2020 06:10 AM        Results from last 7 days   Lab Units 03/23/20  0219   C REACTIVE PROTEIN 4596 mg/dL 4.86*             Lab Results   Component Value Date/Time    HBA1C 7.5 (H) 02/07/2019 01:20 PM             INTERVENTIONS BY WOUND TEAM:  Removed dressing. Irrigated wound with wound cleanser then wiped the wound bed with several pieces of gauze 4 x 4s. Cleaned the periwound with gauze 4 x 4s and wound cleanser, then patted it dry. Paste ring applied to immediate periwound. Hydrofiber silver applied over denuded areas then this was sealed in place with hydrocolloid thing. 1 piece of Veraflo black foam placed into wound bed. Dressing sealed with Veraflo vac drape. Hole cut for trac pad, trac pad placed, dressing seal achieved. Veraflo settings at 8 mL NS for 5 minute dwell Q 1.5 hours. Repositioned patient.     Interdisciplinary consultation: Patient, Bedside RN    EVALUATION: wound is declining. veraflo to maintain optimal moisture needed for wound healing and to keep dressing changes at minimum    Goals: Steady decrease in wound area and depth weekly.    NURSING PLAN OF CARE ORDERS (X):  Dressing changes: See Dressing Care orders: X  Skin care: See Skin Care orders: X  Rectal tube care: See Rectal Tube Care orders:        Other orders:                           WOUND TEAM PLAN OF CARE (X):   Dressing changes by wound team:          Follow up 1-2 times weekly:               Follow up 3 times weekly:                NPWT change 3 times weekly:   X  Follow up as needed:       Other (explain):

## 2020-03-27 NOTE — PROGRESS NOTES
2 RN skin check completed with MIC Christensen.   Devices in place: Trache, PICC, HD catheter, peg tube, hernandez catheter, rectal tube, wound vac, SCDs, heel float boots, mepilex, foam dressings for protection.  Skin assessed under devices: Yes.  Confirmed pressure ulcers found on coccyx.  New potential pressure ulcers noted on: N/A. Wound team following, wound vac in place.     The following interventions in place: 2 RN skin check q shift, q2 turns, heel float boots, NUBIA mattress, mepilex, foam dressing, hernandez catheter, rectal tube, wound vac.     Redness on bilateral ears, blanching.  Redness under trache collar. Dressing in place, CDI.  Redness on left chest, pink, scabbed.  Redness on elbows, blanching.  Bruising on BUE.  Redness, scab/dried blood on PEG tube site. Kept clean and dry.  Redness on groin area, blanching.  Wound vac on coccyx area, CDI.  Discoloration on lower leg.  Heels pink, boggy, blanching. Mepilex in place.

## 2020-03-27 NOTE — CARE PLAN
Problem: Safety  Goal: Will remain free from injury  Outcome: PROGRESSING AS EXPECTED  Goal: Will remain free from falls  Outcome: PROGRESSING AS EXPECTED     Problem: Bowel/Gastric:  Goal: Normal bowel function is maintained or improved  Outcome: PROGRESSING AS EXPECTED     Problem: Communication  Goal: The ability to communicate needs accurately and effectively will improve  Outcome: PROGRESSING SLOWER THAN EXPECTED

## 2020-03-27 NOTE — DISCHARGE PLANNING
Received Choice form at [phone]  Agency/Facility Name: [facility], [facility], [facility], [place], [facility]   Referral sent per Choice form @ [phone]

## 2020-03-27 NOTE — PROGRESS NOTES
[facility] -  PROGRESS NOTE               Author: Alex Jackson M.D. Date & Time: 3/27/2020  7:46 AM     HPI:  74 y.o. female admitted to [facility] on 1/14/20 with seizures.She was at a SNF.She had been previously hospitalized at [facility] and diaysis was initiated.  She was doing very poorly then and palliative care was discussed with the family,but they declined.She had very poor functional status and required Jimena lift to get into the dialysis chair.She was found to have  a CVA on MRI at [facility].Her seizures were not controlled and it was felt she was in status epilepticus.  She was getting HD at [facility].Last HD was on 1/13/20.She was seen by Dr Carcamo at [facility].Creatinine was 1.8 and dialysis was held. Neurology [place] that the patient needed continuous EEG monitoring and he was transferred to [facility]    DAILY NEPHROLOGY SUMMARY:  Please see note from 1/31 for prior summaries  Please see note from 2/29 for prior summaries  3/02 - No new events.No interaction.For HD today.  3/04 - No new developments.For HD today.No interaction.  3/06 - No new developments.Seen on dialysis.  3/07 - NO overnight renal events, No HD today (Sat).  Trache with T piece in place.   3/09 - NAEO, no changes  3/11 - NAEO, hypotensive and receiving IVF bolus today  3/13 - NAEO, SOD, BP low, UF off today  3/16: Seen on dialysis, no interaction  3/18: seen on dialysis, no recent changes or updates  3/20: no updated regarding GOC, seen on HD  3/23: seen on Dialysis, ethics committee recommending cessation of dialysis and initiation of comfort measures, family conference pending.  3/25: No acute events, ethics meeting with family postponed till Friday at 11 AM, seen on dialysis  3/26: No acute events, tolerated dialysis, resting comfortably in bed  3/27: no acute events, seen on dialysis, pending family meeting with ethics panel today at 11 AM    REVIEW OF SYSTEMS:    Review of Systems   Unable to perform ROS: Acuity of  "condition     PAST SURGICAL HISTORY:  Reviewed and unchanged since admission  SOCIAL HISTORY:  Reviewed and unchanged since admission  FAMILY HISTORY: Reviewed and unchanged since admission  CURRENT MEDICATIONS: Reviewed since admission to present  IMAGING STUDIES: Reviewed since admission to present  LABORATORY STUDIES: Reviewed since admission to present    PHYSICAL EXAM:  VS:  /77   Pulse 88   Temp 36.6 °C (97.8 °F) (Temporal)   Resp 18   Ht 1.651 m (5' 5\")   Wt 54.5 kg (120 lb 2.4 oz)   SpO2 98%   BMI 19.99 kg/m²    GENERAL: Ill-appearing  CV: No edema  RESP: Trach in place, coarse breath sounds bilaterally  GI: Soft  MSK: No obvious joint deformities   SKIN: No concerning rashes  NEURO: Diffuse weakness    Physical ExamFluids:  In: 230 [Other:30; Enteral:200]  Out: -     LABS:  Recent Results (from the past 24 hour(s))   ACCU-CHEK GLUCOSE    Collection Time: 03/26/20 12:24 PM   Result Value Ref Range    Glucose - Accu-Ck 192 (H) 65 - 99 mg/dL   ACCU-CHEK GLUCOSE    Collection Time: 03/26/20  6:05 PM   Result Value Ref Range    Glucose - Accu-Ck 220 (H) 65 - 99 mg/dL   ACCU-CHEK GLUCOSE    Collection Time: 03/26/20 11:02 PM   Result Value Ref Range    Glucose - Accu-Ck 136 (H) 65 - 99 mg/dL   ACCU-CHEK GLUCOSE    Collection Time: 03/27/20  5:40 AM   Result Value Ref Range    Glucose - Accu-Ck 164 (H) 65 - 99 mg/dL       (click the triangle to expand results)    IMPRESSION:  # ESRD: normally MWF but D/Cd from clinic with prolonged hospital stay  # Goals of care: Family expectations and goals for patient are not in line with prognosis.  Nephrology along with many other services continue to recommend comfort measures with ethics consult placed.  Pending family meeting on Friday 3/27 to discuss further  # Anemia of CKD.Ferritin previously significantly elevated. CAT with HD.  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  # HTN--BP variable often with intradialytic hypotension  # DM II: Management per primary svc  # Chronic Hypoxic Respiratory Failure:Trach and T-piece  # CAD  # DLD  # Gout,on Allopurinol  # Hx of PEG tube  # Hx of RALF  # Hx of UTI  # COPD  # TB rule out, + TB quant and abnormal CXR/CT, ID on board and managing    PLAN:   -MWF iHD for now  -Very poor prognosis, recommend comfort care, ethics meeting today at 11AM with family  -Continue Epogen with HD  -Enteral feedings  -No dietary protein restrictions  -Follow labs    All prior notes form other doctors and RN staff were reviewed from admission to current day to help me make my clinical decisions    Thank you    Alex Jackson MD

## 2020-03-27 NOTE — DIETARY
"Nutrition Support Weekly Update: Day 71 of admit.  Cassandra Guzmán is a 75 y.o. female with admitting DX of Seizures, Ventilatory respiratory failure, Acute renal failure. Tube feeding initiated on 1/17. Current TF via PEG is Impact Peptide 1.5 @ 40 mL/hr, providing 1440 kcal, 90 gm protein, 134 gm CHO, and 739 mL of free water per day. Pt is also receiving Nutrisource Fiber packets 6x/day, providing an additional 18 gm fiber and 90 kcal, for a total of 1530 kcal/day.      Assessment:  Wt 2/22: 54.5 kg via bed scale - no updated wt.     Evaluation:   1. TF running @ goal  2. Pt continues to receive dialysis MWF  3. Pt with trach on t-piece.   4. Per nephrology note 3/27 \"pending family meeting with ethics panel today at 11 AM\"  5. Labs 3/26: Glucose 246, BUN 64, POC glucose 164, 136, 220  6. Meds: zyloprim, ascorbic acid, folvite, humulin R, omeprazole, MVI, topamax, depakene  7. Skin: Per wound team note 3/26: pressure injury sacrum;coccyx stage 4 with negative pressure wound therapy  8. Last BM: 3/26  9. No changes to current feedings - see RD note 2/21 documenting family declining bolus feeds or a more standard formula     Malnutrition Risk: Unable to determine, no updated wt.      Recommendations/Plan:  1. Continue TF formula and rate   2. Fluids per MD  3. Please obtain a measured weight.      RD following    "

## 2020-03-27 NOTE — PROGRESS NOTES
Patient off unit to Dialysis with transport. Chart and epoetin sent with pt per Dialysis RN request. Charge RN notified.

## 2020-03-27 NOTE — PROGRESS NOTES
2 RN skin check completed Demi RN.   Devices in place: Trach, PICC, HD catheter, wound vac, hernandez, peg tube, rectal tube.  Skin assessed under devices: yes  Confirmed pressure ulcers found: sacrum  New potential pressure ulcers noted: n/a  Wound consult placed:  n/a.     Skin assessment:  Left chest pink/scabbed, peg site scab/dried blood - new dressing applied, bruising on left forearm and left foot, sacrum wound vac in place, heels pink/blanching/boggy, bilateral ears pink,blanching.     The following interventions in place: low air loss mattress in place, Q2hr turns, heel float boots, barrier cream, wound vac in place, mepilex in place.

## 2020-03-27 NOTE — PROGRESS NOTES
Meeting with the ethics committee, hospitalist and case management today. Discussed futility of care that current interventions are causing pain and suffering. Based on the medical expertise of many nephrology providers and other services, decision made not to offer dialysis going forward. Daughter did not take the news well and continues to request that we continue to dialyze. We will sign-off at this point in time. Please feel free to contact us should further questions arise

## 2020-03-27 NOTE — PROCEDURES
Meeting with the ethics committee, hospitalist and case management today. Discussed futility of care that current interventions are causing pain and suffering. Based on the medical expertise of many nephrology providers and other services, decision made not to offer dialysis going forward. Daughter did not take the news well and continues to request that we continue to dialyze.

## 2020-03-27 NOTE — PROGRESS NOTES
[facility] Medicine Daily Progress Note    Date of Service  3/27/2020    Chief Complaint  altered mental status    Hospital Course    75 y.o. female admitted 1/16/2020 with a past medical history of hypertension, coronary disease, diabetes, and end-stage renal disease on dialysis brought to the emergency room after apparently she had seizure type activity at her assisted living facility.  She was admitted and underwent continuous EEG monitoring followed by neurology.  Her mentation did not improve and she underwent a tracheostomy on 2/2/2020.  A PEG tube was placed 2/15/2020.  He has had a persistent, severe encephalopathy. End-stage renal disease, continuing dialysis per family preference.  Wound showing very little to no granulation, somewhat worsened per wound care team assessment.  Ethics meeting regarding CODE STATUS: Full CODE STATUS was determined to be inappropriate to the standard of care with almost certain significant possibility of harm to the patient and highly unlikely to provide any benefit. This very difficult decision was made with careful and thorough deliberation.  The patient's CODE STATUS was changed to DNAR/DNI.          Interval Problem Update  Patient is lying in bed, awake and tracking with her eyes. She does not appear to respond to any commands, even blinking.  Again no change in physical assessment.  Ethics committee considering comfort care status in consultation with nephrology. Ethics committee representative to communicate status change to family in next meeting on 3/24 or 3/25.  Dialysis scheduled Monday, Wednesday, Friday.    3/24: seen this morning, sleeping, not following commands, poor prognosis  Palliative to have CC meeting tomorrow with her family    3/25: same as before, she is not AAO at all, dnr.dni but still getting dialysis  BP better this morning    Ethics to meet with family to discuss CC and GOC    3/26: no acute events overnight, no change in clinical status, remains in  guarded condition with a poor prognosis. Ethics meeting tomorrow at 11am    3/27: met with daughter and son of the patient today with the ethics team and SW, explained patients poor prognosis and the realistic expectations that patient will not improve clinical given the multiorgan failures, neurologic encephalopathy and hx of strokes    Consultants/Specialty  Critical care, signed off  Neurology, signed off  Infectious disease, signed off  Palliative care, following  Nephrology, following  Ethics, following    Code Status  Full code per family, ethics subcommittee following    Disposition  cc    Review of Systems  Review of Systems   Unable to perform ROS: Medical condition        Physical Exam  Temp:  [36.4 °C (97.6 °F)-36.8 °C (98.2 °F)] 36.6 °C (97.8 °F)  Pulse:  [81-88] 86  Resp:  [16-18] 18  BP: (140-149)/(65-77) 149/77  SpO2:  [97 %-100 %] 98 %    Physical Exam  Vitals signs and nursing note reviewed.   Constitutional:       Comments: Chronically ill appearing   HENT:      Mouth/Throat:      Mouth: Mucous membranes are dry.   Neck:      Comments: Right tunneled dialysis cath  trach  Cardiovascular:      Rate and Rhythm: Normal rate and regular rhythm.   Pulmonary:      Effort: Pulmonary effort is normal.      Breath sounds: Normal breath sounds.      Comments: Poor air movement  Abdominal:      General: There is distension.      Tenderness: There is no abdominal tenderness.      Comments: PEG tube   Genitourinary:     Comments: River cath  Rectal tube  Musculoskeletal:      Right lower leg: No edema.      Left lower leg: No edema.      Comments: Poor muscle tone  Wound VAC right hip   Skin:     General: Skin is warm and dry.             Comments: Stage IV decubitus ulcer   Neurological:      Comments: She no longer follows blinking command   Psychiatric:      Comments: She is nonverbal     All systems reviewed and exam is unchanged from yesterday.    Fluids    Intake/Output Summary (Last 24 hours) at  3/27/2020 1212  Last data filed at 3/27/2020 1100  Gross per 24 hour   Intake 850 ml   Output 1500 ml   Net -650 ml       Laboratory      Recent Labs     03/26/20  0640   SODIUM 135   POTASSIUM 3.9   CHLORIDE 94*   CO2 28   GLUCOSE 246*   BUN 64*   CREATININE 1.29   CALCIUM 8.9                 Assessment/Plan  * Status epilepticus (HCC)- (present on admission)  Assessment & Plan  On admission, she was intubated for airway protection, now trach since 2/2/20. No evidence of seizure activity.  Appears now to have a severe, intractable encephalopathy  - continuing regimen of vimpat, topamax, depakote  - repeat EEG showed no seizures  Stable.  Continue antiseizure medications, seizure precautions.  No more seizures    Acute respiratory failure with hypoxia (HCC)- (present on admission)  Assessment & Plan   on trach/T-piece,  pulmonology following  Acute on chronic condition now    Goals of care, counseling/discussion- (present on admission)  Assessment & Plan  Overall neurologic and physical prognosis is quite poor given her severe, unrelenting encephalopathy and dialysis, she remains unchanged   family meeting 3/12/2020 with no subsequent change of plan of care.  Patient changed to DNR, DNI CODE STATUS per ethics committee meeting 3/20 as appropriate.  Had a prolonged meeting with family and ethics team where I spent >45mins trying to explain the family about patients grave prognosis as above.   Daughter at this time still believes that there is hope and that HD should be continued, Nephrology Team was also present during the meeting and has clearly told patients daughter that at this time we feel like continuing HD will be causing her more harm than good and because of these reasons we will no longer continue HD    With the ethics, palliative care and nephrology team , decision was made to place patient at Comfort care measures. All team agreed and understood    Pressure injury of sacrum, coccyx, stage 3 (HCC)-  (present on admission)  Assessment & Plan  He has a substantial right hip decubitus ulcer with a wound VAC followed by wound care, poor tissue integrity and minimal if any wound healing.  Given her ability to move spontaneously, diabetes, dialysis, and poor condition, she is extremely high risk of developing further decubitus ulcers    Severe protein-calorie malnutrition (HCC)- (present on admission)  Assessment & Plan  Continue nutrition via tube feeding.  Dietitian consulted.    Cerebrovascular accident (CVA) due to embolism of right middle cerebral artery (HCC)- (present on admission)  Assessment & Plan  Very poor prognosis.  Severe debility.  Continue aspirin and statin  Pain management    End stage renal failure on dialysis (HCC)- (present on admission)  Assessment & Plan  Dialysis via a right-sided tunneled catheter  No more HD    Hypertension- (present on admission)  Assessment & Plan  She was on Coreg 25 mg twice a day, Cardura 6 mg daily, hydralazine 100 mg 3 times daily  Blood pressure is been low and all BP meds have been stopped  500 mL IV bolus was ordered on 3/11/2020 due to hypotension  Systolic blood pressure now averaging 130s  Continue to hold her blood pressure medicines    Type 2 diabetes mellitus, with long-term current use of insulin ([facility])- (present on admission)  Assessment & Plan  A1C 7.5%, blood sugars have been ~170. Was taking insulin at home.   Continue sliding scale insulin.  Continue tube feeding.   Blood sugar is stable.    Leukocytosis  Assessment & Plan  No other signs of infection, probably reactive  White blood cell count remains high  chest x-ray is unchanged, procalcitonin is slightly elevated but patient is end-stage renal disease patient has no fever, UA positive for leukocyte esterase but no other signs of infection.   C. 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  FOBT is negative.   - transfuse if drops below 7  Hemoglobin stable    Gout- (present on admission)  Assessment & Plan  On allopurinol via PEG tube    TB lung, latent- (present on admission)  Assessment & Plan  Quantiferon + , not active TB. Chest CT found positive left-sided consolidation with pleural effusion.  - sputum cx/ AFB stain negative  - s/p thora on 2/25; f/u pathology negative  - ID consulted, signed off  - AFB stain negative x3; remove isolation  - Per ID, we will \"treat for LTBI with  mg PO daily and B6 25 mg PO daily for 9 months if AFB cxs all negative at 6 weeks\"  Completed antibiotics treatment.    Hypokalemia  Assessment & Plan  Patient is end-stage renal disease on hemodialysis, potassium improved.  resolved       VTE prophylaxis: Heparin    Jenna Singh M.D.      Seen and examined, patient is now on CC measures only  "

## 2020-03-27 NOTE — CARE PLAN
Problem: Oxygenation:  Goal: Maintain adequate oxygenation dependent on patient condition  Intervention: Manage oxygen therapy by monitoring pulse oximetry and/or ABG values  Note: 4L 28%     6.0 Cuffed placed 3/3     Problem: Oxygenation:  Goal: Maintain adequate oxygenation dependent on patient condition  Intervention: Manage oxygen therapy by monitoring pulse oximetry and/or ABG values  Note: 4L 28%     6.0 Cuffed placed 3/3

## 2020-03-27 NOTE — PROGRESS NOTES
[facility] Progress Note       HD ordered by Dr. Jackson. Treatment started at 0656 and ended at 0956.    Net UF removed: 1000mL.     Pt tolerated treatment well with no issues. See paper flow sheet for details. CVC patent, locked with Heparin 1,000 units. No s/s of infection present. Dressing in place, CDI. Report given to MIC Isaacs.

## 2020-03-27 NOTE — PROGRESS NOTES
"Pulmonary Progress Note    Date of admission  1/16/2020    Chief Complaint  74 y.o. female admitted 1/16/2020 with status epelipticus    Hospital Course    \"Ms. Beltran is a 74 y.o. female admitted to [facility] on 1/14/20 with seizures.She was at a SNF.She had been previously hospitalized at [facility] and diaysis was initiated.  She was doing very poorly then and palliative care was discussed with the family,but they declined.She had very poor functional status and required Jimena lift to get into the dialysis chair.She was found to have  a CVA on MRI at [facility].Her seizures were not controlled and it was felt she was in status epilepticus.  She was getting HD at [facility].Last HD was on 1/13/20.She was seen by Dr Carcamo at [facility].Creatinine was 1.8 and dialysis was held. Neurology [place] that the patient needed continuous EEG monitoring and he was transferred to [facility]. Patient intubated due to seizures and was eventually transitioned to tracheostomy on 2/2/2020 and off the vent on 2/5/2020. Family at this time does not want to change CODE status. Patient remains unresponsive. LTAC being pursued. PEG tube in place. \"      Interval Problem Update  Chart review from the past 24 hours includes imaging, laboratory studies, vital signs and notes available.  Pertinent data for today's visit includes   Pt has been afebrile  T-piece 4LPM  Minimal secretions, clear yellow  Patient is more alert today with some tracking of movements, though non-verbal and does not follow commands  Tolerating HD    Review of Systems  Review of Systems   Unable to perform ROS: Patient nonverbal      Vital Signs for last 24 hours   Temp:  [36.4 °C (97.6 °F)-36.8 °C (98.2 °F)] 36.6 °C (97.8 °F)  Pulse:  [81-88] 86  Resp:  [16-18] 18  BP: (140-149)/(65-77) 149/77  SpO2:  [97 %-100 %] 98 %     Physical Exam   Physical Exam  HENT:      Head: Normocephalic and atraumatic.      Mouth/Throat:      Mouth: Mucous membranes are moist.   Eyes:      " Extraocular Movements: Extraocular movements intact.      Pupils: Pupils are equal, round, and reactive to light.   Neck:      Musculoskeletal: Neck supple.      Comments: 6.0 Shiley DCT cuff deflated stoma site clean- collar loose and was tightened  Cardiovascular:      Rate and Rhythm: Normal rate.   Pulmonary:      Effort: Pulmonary effort is normal. No respiratory distress.      Breath sounds: No wheezing.   Abdominal:      General: Abdomen is flat.      Comments: PEG tube clean, tolerating tube feeds at goal  Rectal Tube   Genitourinary:     Comments: + hernandez draining opaque yellow urine  Musculoskeletal:      Right lower leg: No edema.      Left lower leg: No edema.   Skin:     General: Skin is warm and dry.      Comments: Right PICC line subclavian insertion site clean dry and intact, dressing clean, no erythema   Neurological:      Mental Status: She is alert.      Comments: Non verbal  More alert, tracking     Not agitated    Medications  Current Facility-Administered Medications   Medication Dose Route Frequency Provider Last Rate Last Dose   • MD ALERT...adult comfort care   Other PRN Jenna Singh M.D.       • atropine 1 % ophthalmic solution 2 Drop  2 Drop Sublingual Q4HRS PRN Jenna Singh M.D.       • scopolamine (TRANSDERM-SCOP) patch 1 Patch  1 Patch Transdermal Q72HRS Jenna Singh M.D.       • artificial tears ophthalmic solution 2 Drop  2 Drop Both Eyes Q6HRS PRN Jenna Singh M.D.       • LORazepam (ATIVAN) 2 MG/ML oral conc 1 mg  1 mg Sublingual Q HOUR PRN Jenna Singh M.D.        Or   • LORazepam (ATIVAN) injection 1 mg  1 mg Intravenous Q HOUR PRN Jenna Singh M.D.       • ondansetron (ZOFRAN ODT) dispertab 8 mg  8 mg Oral Q8HRS PRN Jenna Singh M.D.        Or   • ondansetron (ZOFRAN) syringe/vial injection 8 mg  8 mg Intravenous Q8HRS PRN Jenna Singh M.D.       • morphine (ROXANOL) 20 MG/ML oral conc 10 mg  10 mg Oral Q HOUR PRN Jenna Singh M.D.       • morphine (pf) 10 mg/mL  injection 10 mg  10 mg Intravenous Q HOUR PRN Jenna Singh M.D.       • docusate sodium 100mg/10mL (COLACE) solution 100 mg  100 mg Enteral Tube BID Jenna Singh M.D.       • oxyCODONE (ROXICODONE) oral solution 5 mg  5 mg Enteral Tube Q3HRS PRN Jenna Singh M.D.   5 mg at 03/24/20 0813   • albumin human 25% solution 25 g  25 g Intravenous ACUTE DIALYSIS PRN Aubree Irving M.D. 150 mL/hr at 03/13/20 0845 12.5 g at 03/13/20 0845   • carboxymethylcellulose (REFRESH TEARS) 0.5 % ophthalmic drops 1 Drop  1 Drop Both Eyes Q2HRS PRN Aubree Irving M.D.   1 Drop at 03/19/20 0501   • hydrALAZINE (APRESOLINE) injection 10 mg  10 mg Intravenous Q4HRS PRN Jenna Singh M.D.   10 mg at 03/22/20 1618   • heparin injection 3,300 Units  3,300 Units Intracatheter PRN Aubree Irving M.D.   3,300 Units at 03/27/20 0956   • Respiratory Care per Protocol   Nebulization Continuous RT Aubree Irving M.D.       • labetalol (NORMODYNE/TRANDATE) injection 10 mg  10 mg Intravenous Q4HRS PRN Jenna Singh M.D.   10 mg at 02/10/20 0026   • acetaminophen (TYLENOL) tablet 650 mg  650 mg Enteral Tube Q6HRS PRN Aubree Irving M.D.   650 mg at 03/18/20 1615       Fluids    Intake/Output Summary (Last 24 hours) at 3/27/2020 1257  Last data filed at 3/27/2020 1100  Gross per 24 hour   Intake 850 ml   Output 1500 ml   Net -650 ml       Laboratory          Recent Labs     03/26/20  0640   SODIUM 135   POTASSIUM 3.9   CHLORIDE 94*   CO2 28   BUN 64*   CREATININE 1.29   PHOSPHORUS 2.5   CALCIUM 8.9     Recent Labs     03/26/20  0640   GLUCOSE 246*         No results for input(s): RBC, HEMOGLOBIN, HEMATOCRIT, PLATELETCT, PROTHROMBTM, APTT, INR, IRON, FERRITIN, TOTIRONBC in the last 72 hours.    Imaging  DX-CHEST-PORTABLE (1 VIEW)   Final Result      1.  Unchanged perihilar and LEFT greater than RIGHT basilar opacities compatible with atelectasis. Superimposed airspace disease is possible particularly in the LEFT lower lobe.   2.  Persistently enlarged  cardiac silhouette      DX-CHEST-LIMITED (1 VIEW)   Final Result         No significant interval change.      DX-CHEST-PORTABLE (1 VIEW)   Final Result      1.  Apparent improvement of LEFT pleural effusion and basilar consolidation.   2.  No pneumothorax.   3.  Supportive tubing as described above.      US-THORACENTESIS PUNCTURE LEFT   Final Result      1. Ultrasound guided left sided diagnostic and therapeutic thoracentesis.      2. 400 mL of fluid withdrawn.      CT-CHEST (THORAX) W/O   Final Result      1.  Bilateral atelectasis/consolidation, left greater than right.      2.  Moderate left and small right pleural effusion.      3.  Atherosclerotic vascular calcification.      4.  Ascites within the upper abdomen.            US-ABDOMEN LTD (SOFT TISSUE)   Final Result         1. Trace free fluid in the pelvis. No abdominal ascites.      DX-ABDOMEN FOR TUBE PLACEMENT   Final Result         Feeding tube with tip projecting over the expected area of the stomach.      [id number] VIEW   Final Result      Feeding tube tip overlies the gastric antrum.      DX-CHEST-PORTABLE (1 VIEW)   Final Result         1.  Pulmonary edema and/or infiltrates are identified, which are stable since the prior exam.   2.  Cardiomegaly   3.  Atherosclerosis      DX-CHEST-PORTABLE (1 VIEW)   Final Result         1.  Pulmonary edema and/or infiltrates are identified, which are stable since the prior exam.   2.  Cardiomegaly   3.  Atherosclerosis      DX-CHEST-PORTABLE (1 VIEW)   Final Result      1.  Unchanged left lower lobe atelectasis or pneumonia.      2.  Clear right lung.      DX-CHEST-PORTABLE (1 VIEW)   Final Result      Unchanged LEFT basilar atelectasis and/or airspace disease      DX-CHEST-PORTABLE (1 VIEW)   Final Result      Left basilar atelectasis, hilar and cardiac silhouette enlargement as before      DX-CHEST-PORTABLE (1 VIEW)   Final Result      Interval removal of a left subclavian central line. Stable patchy bilateral  infiltrates.      IR-PICC LINE PLACEMENT W/ GUIDANCE > AGE 5   Final Result                  Ultrasound-guided PICC placement performed by qualified nursing staff as    above.          DX-CHEST-PORTABLE (1 VIEW)   Final Result      1.  Multiple support devices present.      2.  Patchy bilateral atelectasis.      DX-CHEST-PORTABLE (1 VIEW)   Final Result      Stable chest with retrocardiac opacity from atelectasis and/or pleural fluid favored over consolidation      DX-CHEST-PORTABLE (1 VIEW)   Final Result      Stable chest with retrocardiac opacity from atelectasis and/or pleural fluid favored over consolidation      DX-CHEST-PORTABLE (1 VIEW)   Final Result         No significant change from prior.               DX-CHEST-PORTABLE (1 VIEW)   Final Result         1. Stable lines and tubes..   2. Stable retrocardiac and left perihilar atelectasis versus consolidation. Suspected small left pleural effusion.            DX-CHEST-PORTABLE (1 VIEW)   Final Result         1. Stable lines and tubes..   2. Stable retrocardiac atelectasis versus consolidation with increased left perihilar opacity. Suspected trace left pleural effusion.         BO-KFFANCI-9 VIEW   Final Result      1.  Enteric tube has been placed and the tip projects over the stomach.      2.  Pre-existing feeding tube tip projects at the gastroduodenal junction      DX-CHEST-PORTABLE (1 VIEW)   Final Result         1. Lines and tubes as above.   2. Stable retrocardiac atelectasis versus consolidation. Suspected trace left pleural effusion.         MR-BRAIN-WITH & W/O   Final Result      1.  Moderate cerebral atrophy.   2.  Evidence of intraventricular hemorrhage, indeterminate age. Possibly chronic intraventricular hemosiderin deposition.   3.  Extensive encephalomalacic change with hemosiderin deposition involving the right corona radiata, basal ganglia, posterior thalamus, subthalamic region, bordering the right cerebral peduncle. Associated ex vacuo  dilatation of the body of the right    lateral ventricle. No change.   4.  Additional foci of old microhemorrhage most consistent with old hypertensive microhemorrhage or amyloid angiopathy in the left basal ganglia, left thalamus, and midline upper ventral abel.   5.  Punctate focus of acute infarction in the right parietal deep white matter. No change.   6.  Advanced supratentorial white matter disease most consistent with microvascular ischemic change.   7.  Encephalomalacic changes in the abel and right cerebral peduncle consistent with old infarction.   8.  Partially empty sella.   9.  Overall, no new findings and no significant change from 1/14/2020.      DX-CHEST-PORTABLE (1 VIEW)   Final Result         1. Stable lines and tubes.   2. Unchanged retrocardiac atelectasis versus consolidation.   3. Stable trace left pleural effusion.         OUTSIDE IMAGES-DX CHEST   Final Result      OUTSIDE IMAGES-US VASCULAR   Final Result      OUTSIDE IMAGES-MR BRAIN   Final Result      OUTSIDE IMAGES-DX CHEST   Final Result      OUTSIDE IMAGES-CT HEAD   Final Result      EC-ECHOCARDIOGRAM COMPLETE W/O CONT   Final Result      DX-CHEST-FOR LINE PLACEMENT Perform procedure in: Patient's Room   Final Result         1.  Retrocardiac opacity concerning for infiltrate, stable.   2.  Trace left pleural effusion, stable   3.  Cardiomegaly   4.  Atherosclerosis   5.  Perihilar interstitial prominence and bronchial wall cuffing, appearance suggests changes of underlying bronchial inflammation, consider bronchitis.      DX-ABDOMEN FOR TUBE PLACEMENT   Final Result         1.  Nonspecific bowel gas pattern.   2.  Dobbhoff tube tip overlying the expected location of the pylorus or first duodenal segment.   3.  Left lung base atelectasis and/or small effusion      DX-CHEST-PORTABLE (1 VIEW)   Final Result         1.  Retrocardiac opacity concerning for infiltrate.   2.  Trace left pleural effusion, stable   3.  Cardiomegaly   4.   Atherosclerosis      [id number] FILM X-RAY   Final Result      OUTSIDE IMAGES-DX CHEST   Final Result          Assessment/Plan    Acute respiratory failure with hypoxia (HCC)- (present on admission)  Assessment & Plan  Intubated date: 1/14/2020 s/p trach 2/2 Dr Devine  Downsized to 6 cuffed shiley 3/3  Minimal secretions  No vocalizing  No progression although stable  Code status appropriate  Continued Go[facility] discussions with family  Ethics on board, family meeting upcoming    I have performed a physical exam and reviewed and updated ROS and Plan today (3/27/2020). In review of last note, there are no changes except as documented above.

## 2020-03-27 NOTE — PROGRESS NOTES
Pt non-verbal, does not follow commands, VSS. Tube feeding at goal, tolerating well with Q4hr water flushes and nutrisource. Rectal tube in place. River putting out adequate amounts of urine. Q2hr turns and repositioning. Wound care changed dressing this afternoon. Trach in place, suctioning as needed. Bed locked in lowest position, bed alarm in place, safety maintained. Hourly rounding in place, will continue to monitor.

## 2020-03-28 NOTE — PROGRESS NOTES
Patient is nonverbal,no s/s of distress and pain,has a trach with in line suctioning,suction more than once,trach care and peg tube care done,rectal tube patent  and irrigated,wound vac  and rectal tube patent ,repositioned patient every 2 hours as possible,family member been calling few times for same update.

## 2020-03-28 NOTE — PROGRESS NOTES
2 RN skin check completed with MIC Ingram.   Devices in place: Trache, PICC, HD catheter, peg tube, hernandez catheter, rectal tube, wound vac, SCDs, heel float boots, mepilex, foam dressings for protection.  Skin assessed under devices: Yes.  Confirmed pressure ulcers found on coccyx.  New potential pressure ulcers noted on: N/A. Wound team following, wound vac in place.     The following interventions in place: 2 RN skin check q shift, q2 turns, heel float boots, NUBIA mattress, mepilex, foam dressing, hernandez catheter, rectal tube, wound vac and scds.     Redness on bilateral ears, blanching.  Redness under trache collar. Dressing in place, CDI.  Redness on left chest,dry. pink, scabbed.  Redness on elbows, blanching.  Bruising on abdomen and left upper extremities  Redness, scab/dry blood PEG tube site.clean with NS and new dressing in placed  Wound vac on coccyx area, CDI.  Discoloration on lower leg.  Heels pink, boggy, blanching. Mepilex in place.

## 2020-03-28 NOTE — CARE PLAN
Problem: Safety  Goal: Will remain free from injury  Outcome: PROGRESSING AS EXPECTED     Problem: Infection  Goal: Will remain free from infection  Outcome: PROGRESSING AS EXPECTED     Problem: Venous Thromboembolism (VTW)/Deep Vein Thrombosis (DVT) Prevention:  Goal: Patient will participate in Venous Thrombosis (VTE)/Deep Vein Thrombosis (DVT)Prevention Measures  Outcome: PROGRESSING AS EXPECTED     Problem: Pain Management  Goal: Pain level will decrease to patient's comfort goal  Outcome: PROGRESSING AS EXPECTED     Problem: Safety - Medical Restraint  Goal: Remains free of injury from restraints (Restraint for Interference with Medical Device)  Description: INTERVENTIONS:  1. Determine that other, less restrictive measures have been tried or would not be effective before applying the restraint  2. Evaluate the patient's condition at the time of restraint application  3. Inform patient/family regarding the reason for restraint  4. Q2H: Monitor safety, psychosocial status, comfort, nutrition and hydration  Outcome: PROGRESSING AS EXPECTED     Problem: Respiratory:  Goal: Respiratory status will improve  Outcome: PROGRESSING AS EXPECTED     Problem: Psychosocial Needs:  Goal: Level of anxiety will decrease  Outcome: PROGRESSING AS EXPECTED     Problem: Communication  Goal: The ability to communicate needs accurately and effectively will improve  Outcome: PROGRESSING SLOWER THAN EXPECTED     Problem: Bowel/Gastric:  Goal: Normal bowel function is maintained or improved  Outcome: PROGRESSING SLOWER THAN EXPECTED     Problem: Knowledge Deficit  Goal: Knowledge of disease process/condition, treatment plan, diagnostic tests, and medications will improve  Outcome: PROGRESSING SLOWER THAN EXPECTED  Goal: Knowledge of the prescribed therapeutic regimen will improve  Outcome: PROGRESSING SLOWER THAN EXPECTED     Problem: Discharge Barriers/Planning  Goal: Patient's continuum of care needs will be met  Outcome:  PROGRESSING SLOWER THAN EXPECTED     Problem: Fluid Volume:  Goal: Will maintain balanced intake and output  Outcome: PROGRESSING SLOWER THAN EXPECTED     Problem: Urinary Elimination:  Goal: Ability to reestablish a normal urinary elimination pattern will improve  Outcome: PROGRESSING SLOWER THAN EXPECTED     Problem: Skin Integrity  Goal: Risk for impaired skin integrity will decrease  Outcome: PROGRESSING SLOWER THAN EXPECTED     Problem: Mobility  Goal: Risk for activity intolerance will decrease  Outcome: PROGRESSING SLOWER THAN EXPECTED

## 2020-03-28 NOTE — PROGRESS NOTES
[facility] Medicine Daily Progress Note    Date of Service  3/28/2020    Chief Complaint  altered mental status     Hospital Course    75 y.o. female admitted 1/16/2020 with a past medical history of hypertension, coronary disease, diabetes, and end-stage renal disease on dialysis brought to the emergency room after apparently she had seizure type activity at her assisted living facility.  She was admitted and underwent continuous EEG monitoring followed by neurology.  Her mentation did not improve and she underwent a tracheostomy on 2/2/2020.  A PEG tube was placed 2/15/2020.  He has had a persistent, severe encephalopathy. End-stage renal disease, continuing dialysis per family preference.  Wound showing very little to no granulation, somewhat worsened per wound care team assessment.  Ethics meeting regarding CODE STATUS: Full CODE STATUS was determined to be inappropriate to the standard of care with almost certain significant possibility of harm to the patient and highly unlikely to provide any benefit. This very difficult decision was made with careful and thorough deliberation.  The patient's CODE STATUS was changed to DNAR/DNI.           Interval Problem Update  Patient in laying in bed, appear comfortable, continue CC measures  Patient is nonverbal     Consultants/Specialty  Critical care, signed off  Neurology, signed off  Infectious disease, signed off  Palliative care, following  Nephrology, following  Ethics, following     Code Status  cc     Disposition  cc  Review of Systems  Review of Systems   Unable to perform ROS: Acuity of condition        Physical Exam  Temp:  [36.1 °C (97 °F)-37 °C (98.6 °F)] 36.1 °C (97 °F)  Pulse:  [72-88] 84  Resp:  [15-20] 16  BP: (137-160)/(43-74) 160/74  SpO2:  [97 %-100 %] 99 %    Physical Exam  HENT:      Head: Normocephalic and atraumatic.      Mouth/Throat:      Mouth: Mucous membranes are moist.   Eyes:      Extraocular Movements: Extraocular movements intact.       Pupils: Pupils are equal, round, and reactive to light.   Neck:      Musculoskeletal: Neck supple.      Comments: 6.0 Shiley DCT cuff deflated stoma site clean- collar loose and was tightened  Cardiovascular:      Rate and Rhythm: Normal rate.   Pulmonary:      Effort: Pulmonary effort is normal. No respiratory distress.      Breath sounds: No wheezing.   Abdominal:      General: Abdomen is flat.      Comments: PEG tube clean, tolerating tube feeds at goal  Rectal Tube   Genitourinary:     Comments: + hernandez draining opaque yellow urine  Musculoskeletal:      Right lower leg: No edema.      Left lower leg: No edema.   Skin:     General: Skin is warm and dry.      Comments: Right PICC line subclavian insertion site clean dry and intact, dressing clean, no erythema   Neurological:      Mental Status: She is alert.      Comments: Non verbal           Fluids    Intake/Output Summary (Last 24 hours) at 3/28/2020 1115  Last data filed at 3/28/2020 0839  Gross per 24 hour   Intake 260 ml   Output 1270 ml   Net -1010 ml       Laboratory      Recent Labs     03/26/20  0640   SODIUM 135   POTASSIUM 3.9   CHLORIDE 94*   CO2 28   GLUCOSE 246*   BUN 64*   CREATININE 1.29   CALCIUM 8.9                   Imaging  DX-CHEST-PORTABLE (1 VIEW)   Final Result      1.  Unchanged perihilar and LEFT greater than RIGHT basilar opacities compatible with atelectasis. Superimposed airspace disease is possible particularly in the LEFT lower lobe.   2.  Persistently enlarged cardiac silhouette      DX-CHEST-LIMITED (1 VIEW)   Final Result         No significant interval change.      DX-CHEST-PORTABLE (1 VIEW)   Final Result      1.  Apparent improvement of LEFT pleural effusion and basilar consolidation.   2.  No pneumothorax.   3.  Supportive tubing as described above.      US-THORACENTESIS PUNCTURE LEFT   Final Result      1. Ultrasound guided left sided diagnostic and therapeutic thoracentesis.      2. 400 mL of fluid withdrawn.      CT-CHEST  (THORAX) W/O   Final Result      1.  Bilateral atelectasis/consolidation, left greater than right.      2.  Moderate left and small right pleural effusion.      3.  Atherosclerotic vascular calcification.      4.  Ascites within the upper abdomen.            US-ABDOMEN LTD (SOFT TISSUE)   Final Result         1. Trace free fluid in the pelvis. No abdominal ascites.      DX-ABDOMEN FOR TUBE PLACEMENT   Final Result         Feeding tube with tip projecting over the expected area of the stomach.      [id number] VIEW   Final Result      Feeding tube tip overlies the gastric antrum.      DX-CHEST-PORTABLE (1 VIEW)   Final Result         1.  Pulmonary edema and/or infiltrates are identified, which are stable since the prior exam.   2.  Cardiomegaly   3.  Atherosclerosis      DX-CHEST-PORTABLE (1 VIEW)   Final Result         1.  Pulmonary edema and/or infiltrates are identified, which are stable since the prior exam.   2.  Cardiomegaly   3.  Atherosclerosis      DX-CHEST-PORTABLE (1 VIEW)   Final Result      1.  Unchanged left lower lobe atelectasis or pneumonia.      2.  Clear right lung.      DX-CHEST-PORTABLE (1 VIEW)   Final Result      Unchanged LEFT basilar atelectasis and/or airspace disease      DX-CHEST-PORTABLE (1 VIEW)   Final Result      Left basilar atelectasis, hilar and cardiac silhouette enlargement as before      DX-CHEST-PORTABLE (1 VIEW)   Final Result      Interval removal of a left subclavian central line. Stable patchy bilateral infiltrates.      IR-PICC LINE PLACEMENT W/ GUIDANCE > AGE 5   Final Result                  Ultrasound-guided PICC placement performed by qualified nursing staff as    above.          DX-CHEST-PORTABLE (1 VIEW)   Final Result      1.  Multiple support devices present.      2.  Patchy bilateral atelectasis.      DX-CHEST-PORTABLE (1 VIEW)   Final Result      Stable chest with retrocardiac opacity from atelectasis and/or pleural fluid favored over consolidation       DX-CHEST-PORTABLE (1 VIEW)   Final Result      Stable chest with retrocardiac opacity from atelectasis and/or pleural fluid favored over consolidation      DX-CHEST-PORTABLE (1 VIEW)   Final Result         No significant change from prior.               DX-CHEST-PORTABLE (1 VIEW)   Final Result         1. Stable lines and tubes..   2. Stable retrocardiac and left perihilar atelectasis versus consolidation. Suspected small left pleural effusion.            DX-CHEST-PORTABLE (1 VIEW)   Final Result         1. Stable lines and tubes..   2. Stable retrocardiac atelectasis versus consolidation with increased left perihilar opacity. Suspected trace left pleural effusion.         [id number] VIEW   Final Result      1.  Enteric tube has been placed and the tip projects over the stomach.      2.  Pre-existing feeding tube tip projects at the gastroduodenal junction      DX-CHEST-PORTABLE (1 VIEW)   Final Result         1. Lines and tubes as above.   2. Stable retrocardiac atelectasis versus consolidation. Suspected trace left pleural effusion.         MR-BRAIN-WITH & W/O   Final Result      1.  Moderate cerebral atrophy.   2.  Evidence of intraventricular hemorrhage, indeterminate age. Possibly chronic intraventricular hemosiderin deposition.   3.  Extensive encephalomalacic change with hemosiderin deposition involving the right corona radiata, basal ganglia, posterior thalamus, subthalamic region, bordering the right cerebral peduncle. Associated ex vacuo dilatation of the body of the right    lateral ventricle. No change.   4.  Additional foci of old microhemorrhage most consistent with old hypertensive microhemorrhage or amyloid angiopathy in the left basal ganglia, left thalamus, and midline upper ventral abel.   5.  Punctate focus of acute infarction in the right parietal deep white matter. No change.   6.  Advanced supratentorial white matter disease most consistent with microvascular ischemic change.   7.   Encephalomalacic changes in the abel and right cerebral peduncle consistent with old infarction.   8.  Partially empty sella.   9.  Overall, no new findings and no significant change from 1/14/2020.      DX-CHEST-PORTABLE (1 VIEW)   Final Result         1. Stable lines and tubes.   2. Unchanged retrocardiac atelectasis versus consolidation.   3. Stable trace left pleural effusion.         OUTSIDE IMAGES-DX CHEST   Final Result      OUTSIDE IMAGES-US VASCULAR   Final Result      OUTSIDE IMAGES-MR BRAIN   Final Result      OUTSIDE IMAGES-DX CHEST   Final Result      OUTSIDE IMAGES-CT HEAD   Final Result      EC-ECHOCARDIOGRAM COMPLETE W/O CONT   Final Result      DX-CHEST-FOR LINE PLACEMENT Perform procedure in: Patient's Room   Final Result         1.  Retrocardiac opacity concerning for infiltrate, stable.   2.  Trace left pleural effusion, stable   3.  Cardiomegaly   4.  Atherosclerosis   5.  Perihilar interstitial prominence and bronchial wall cuffing, appearance suggests changes of underlying bronchial inflammation, consider bronchitis.      DX-ABDOMEN FOR TUBE PLACEMENT   Final Result         1.  Nonspecific bowel gas pattern.   2.  Dobbhoff tube tip overlying the expected location of the pylorus or first duodenal segment.   3.  Left lung base atelectasis and/or small effusion      DX-CHEST-PORTABLE (1 VIEW)   Final Result         1.  Retrocardiac opacity concerning for infiltrate.   2.  Trace left pleural effusion, stable   3.  Cardiomegaly   4.  Atherosclerosis      [id number] FILM X-RAY   Final Result      OUTSIDE IMAGES-DX CHEST   Final Result           Assessment/Plan  * Goals of care, counseling/discussion- (present on admission)  Assessment & Plan  Overall neurologic and physical prognosis is quite poor given her severe, unrelenting encephalopathy and dialysis, she remains unchanged   family meeting 3/12/2020 with no subsequent change of plan of care.  Patient changed to DNR, DNI CODE STATUS per  ethics committee meeting 3/20 as appropriate.  Had a prolonged meeting with family and ethics team where I spent >45mins trying to explain the family about patients grave prognosis as above.   Daughter at this time still believes that there is hope and that HD should be continued, Nephrology Team was also present during the meeting and has clearly told patients daughter that at this time we feel like continuing HD will be causing her more harm than good and because of these reasons we will no longer continue HD     With the ethics, palliative care and nephrology team , decision was made to place patient at [facility] care measures. All team agreed and understood    Acute respiratory failure with hypoxia (HCC)- (present on admission)  Assessment & Plan   on trach/T-piece,  pulmonology following  Acute on chronic condition now    Pressure injury of sacrum, coccyx, stage 3 (HCC)- (present on admission)  Assessment & Plan  sHe has a substantial right hip decubitus ulcer with a wound VAC followed by wound care, poor tissue integrity and minimal if any wound healing.  Given her inability to move spontaneously, diabetes, dialysis, and poor condition, she is extremely high risk of developing further decubitus ulcers    Severe protein-calorie malnutrition (HCC)- (present on admission)  Assessment & Plan  Continue nutrition via tube feeding.  Dietitian consulted.    Cerebrovascular accident (CVA) due to embolism of right middle cerebral artery (HCC)- (present on admission)  Assessment & Plan  Very poor prognosis.  Severe debility.  Continue aspirin and statin  Pain management    End stage renal failure on dialysis (HCC)- (present on admission)  Assessment & Plan  CC    Hypertension- (present on admission)  Assessment & Plan  She was on Coreg 25 mg twice a day, Cardura 6 mg daily, hydralazine 100 mg 3 times daily  Blood pressure is been low and all BP meds have been stopped  500 mL IV bolus was ordered on 3/11/2020 due to  "hypotension  Systolic blood pressure now averaging 130s  Continue to hold her blood pressure medicines    Type 2 diabetes mellitus, with long-term current use of insulin (HCC)- (present on admission)  Assessment & Plan  A1C 7.5%, blood sugars have been ~170. Was taking insulin at home.   Continue sliding scale insulin.  Continue tube feeding.   Blood sugar is stable.    Leukocytosis  Assessment & Plan  No other signs of infection, probably reactive  White blood cell count remains high  chest x-ray is unchanged, procalcitonin is slightly elevated but patient is end-stage renal disease patient has no fever, UA positive for leukocyte esterase but no other signs of infection.   C. difficile negative     Status epilepticus (HCC)- (present on admission)  Assessment & Plan  On admission, she was intubated for airway protection, now trach since 2/2/20. No evidence of seizure activity.  Appears now to have a severe, intractable encephalopathy  - continuing regimen of vimpat, topamax, depakote  - repeat EEG showed no seizures  Stable.  Continue antiseizure medications, seizure precautions.  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  FOBT is negative.   - transfuse if drops below 7  Hemoglobin stable    Gout- (present on admission)  Assessment & Plan  On allopurinol via PEG tube    TB lung, latent- (present on admission)  Assessment & Plan  Quantiferon + , not active TB. Chest CT found positive left-sided consolidation with pleural effusion.  - sputum cx/ AFB stain negative  - s/p thora on 2/25; f/u pathology negative  - ID consulted, signed off  - AFB stain negative x3; remove isolation  - Per ID, we will \"treat for LTBI with  mg PO daily and B6 25 mg PO daily for 9 months if AFB cxs all negative at " "6 weeks\"  Completed antibiotics treatment.    Hypokalemia  Assessment & Plan  Patient is end-stage renal disease on hemodialysis, potassium improved.  resolved       VTE prophylaxis: CC      "

## 2020-03-29 NOTE — PROGRESS NOTES
[facility] Medicine Daily Progress Note    Date of Service  3/29/2020    Chief Complaint  altered mental status     Hospital Course    75 y.o. female admitted 1/16/2020 with a past medical history of hypertension, coronary disease, diabetes, and end-stage renal disease on dialysis brought to the emergency room after apparently she had seizure type activity at her assisted living facility.  She was admitted and underwent continuous EEG monitoring followed by neurology.  Her mentation did not improve and she underwent a tracheostomy on 2/2/2020.  A PEG tube was placed 2/15/2020.  He has had a persistent, severe encephalopathy. End-stage renal disease, continuing dialysis per family preference.  Wound showing very little to no granulation, somewhat worsened per wound care team assessment.  Ethics meeting regarding CODE STATUS: Full CODE STATUS was determined to be inappropriate to the standard of care with almost certain significant possibility of harm to the patient and highly unlikely to provide any benefit. This very difficult decision was made with careful and thorough deliberation.  The patient's CODE STATUS was changed to DNAR/DNI.           Interval Problem Update  Patient in laying in bed, appear comfortable, continue CC measures  Patient is nonverbal     Continues to be nonverbal. Vitals noted  Cc measures continued  No acute changes  Consultants/Specialty  Critical care, signed off  Neurology, signed off  Infectious disease, signed off  Palliative care, following  Nephrology, following  Ethics, following     Code Status  cc     Disposition  cc  Review of Systems  Review of Systems   Unable to perform ROS: Acuity of condition        Physical Exam  Temp:  [36.6 °C (97.9 °F)-37 °C (98.6 °F)] 36.6 °C (97.9 °F)  Pulse:  [77-88] 80  Resp:  [15-18] 18  BP: (136-158)/(71-74) 136/71  SpO2:  [97 %-99 %] 97 %    Physical Exam  HENT:      Head: Normocephalic and atraumatic.      Mouth/Throat:      Mouth: Mucous membranes  are moist.   Eyes:      Extraocular Movements: Extraocular movements intact.      Pupils: Pupils are equal, round, and reactive to light.   Neck:      Musculoskeletal: Neck supple.      Comments: 6.0 Shiley DCT cuff deflated stoma site clean- collar loose and was tightened  Cardiovascular:      Rate and Rhythm: Normal rate.   Pulmonary:      Effort: Pulmonary effort is normal. No respiratory distress.      Breath sounds: No wheezing.   Abdominal:      General: Abdomen is flat.      Comments: PEG tube clean, tolerating tube feeds at goal  Rectal Tube   Genitourinary:     Comments: + hernandez draining opaque yellow urine  Musculoskeletal:      Right lower leg: No edema.      Left lower leg: No edema.   Skin:     General: Skin is warm and dry.      Comments: Right PICC line subclavian insertion site clean dry and intact, dressing clean, no erythema   Neurological:      Mental Status: She is alert.      Comments: Non verbal           Fluids    Intake/Output Summary (Last 24 hours) at 3/29/2020 1031  Last data filed at 3/29/2020 1000  Gross per 24 hour   Intake 3126 ml   Output 650 ml   Net 2476 ml       Laboratory                        Imaging  DX-CHEST-PORTABLE (1 VIEW)   Final Result      1.  Unchanged perihilar and LEFT greater than RIGHT basilar opacities compatible with atelectasis. Superimposed airspace disease is possible particularly in the LEFT lower lobe.   2.  Persistently enlarged cardiac silhouette      DX-CHEST-LIMITED (1 VIEW)   Final Result         No significant interval change.      DX-CHEST-PORTABLE (1 VIEW)   Final Result      1.  Apparent improvement of LEFT pleural effusion and basilar consolidation.   2.  No pneumothorax.   3.  Supportive tubing as described above.      US-THORACENTESIS PUNCTURE LEFT   Final Result      1. Ultrasound guided left sided diagnostic and therapeutic thoracentesis.      2. 400 mL of fluid withdrawn.      CT-CHEST (THORAX) W/O   Final Result      1.  Bilateral  atelectasis/consolidation, left greater than right.      2.  Moderate left and small right pleural effusion.      3.  Atherosclerotic vascular calcification.      4.  Ascites within the upper abdomen.            US-ABDOMEN LTD (SOFT TISSUE)   Final Result         1. Trace free fluid in the pelvis. No abdominal ascites.      DX-ABDOMEN FOR TUBE PLACEMENT   Final Result         Feeding tube with tip projecting over the expected area of the stomach.      CT-[id number] VIEW   Final Result      Feeding tube tip overlies the gastric antrum.      DX-CHEST-PORTABLE (1 VIEW)   Final Result         1.  Pulmonary edema and/or infiltrates are identified, which are stable since the prior exam.   2.  Cardiomegaly   3.  Atherosclerosis      DX-CHEST-PORTABLE (1 VIEW)   Final Result         1.  Pulmonary edema and/or infiltrates are identified, which are stable since the prior exam.   2.  Cardiomegaly   3.  Atherosclerosis      DX-CHEST-PORTABLE (1 VIEW)   Final Result      1.  Unchanged left lower lobe atelectasis or pneumonia.      2.  Clear right lung.      DX-CHEST-PORTABLE (1 VIEW)   Final Result      Unchanged LEFT basilar atelectasis and/or airspace disease      DX-CHEST-PORTABLE (1 VIEW)   Final Result      Left basilar atelectasis, hilar and cardiac silhouette enlargement as before      DX-CHEST-PORTABLE (1 VIEW)   Final Result      Interval removal of a left subclavian central line. Stable patchy bilateral infiltrates.      IR-PICC LINE PLACEMENT W/ GUIDANCE > AGE 5   Final Result                  Ultrasound-guided PICC placement performed by qualified nursing staff as    above.          DX-CHEST-PORTABLE (1 VIEW)   Final Result      1.  Multiple support devices present.      2.  Patchy bilateral atelectasis.      DX-CHEST-PORTABLE (1 VIEW)   Final Result      Stable chest with retrocardiac opacity from atelectasis and/or pleural fluid favored over consolidation      DX-CHEST-PORTABLE (1 VIEW)   Final Result      Stable  chest with retrocardiac opacity from atelectasis and/or pleural fluid favored over consolidation      DX-CHEST-PORTABLE (1 VIEW)   Final Result         No significant change from prior.               DX-CHEST-PORTABLE (1 VIEW)   Final Result         1. Stable lines and tubes..   2. Stable retrocardiac and left perihilar atelectasis versus consolidation. Suspected small left pleural effusion.            DX-CHEST-PORTABLE (1 VIEW)   Final Result         1. Stable lines and tubes..   2. Stable retrocardiac atelectasis versus consolidation with increased left perihilar opacity. Suspected trace left pleural effusion.         PA-IRUNETP-7 VIEW   Final Result      1.  Enteric tube has been placed and the tip projects over the stomach.      2.  Pre-existing feeding tube tip projects at the gastroduodenal junction      DX-CHEST-PORTABLE (1 VIEW)   Final Result         1. Lines and tubes as above.   2. Stable retrocardiac atelectasis versus consolidation. Suspected trace left pleural effusion.         MR-BRAIN-WITH & W/O   Final Result      1.  Moderate cerebral atrophy.   2.  Evidence of intraventricular hemorrhage, indeterminate age. Possibly chronic intraventricular hemosiderin deposition.   3.  Extensive encephalomalacic change with hemosiderin deposition involving the right corona radiata, basal ganglia, posterior thalamus, subthalamic region, bordering the right cerebral peduncle. Associated ex vacuo dilatation of the body of the right    lateral ventricle. No change.   4.  Additional foci of old microhemorrhage most consistent with old hypertensive microhemorrhage or amyloid angiopathy in the left basal ganglia, left thalamus, and midline upper ventral abel.   5.  Punctate focus of acute infarction in the right parietal deep white matter. No change.   6.  Advanced supratentorial white matter disease most consistent with microvascular ischemic change.   7.  Encephalomalacic changes in the abel and right cerebral  peduncle consistent with old infarction.   8.  Partially empty sella.   9.  Overall, no new findings and no significant change from 1/14/2020.      DX-CHEST-PORTABLE (1 VIEW)   Final Result         1. Stable lines and tubes.   2. Unchanged retrocardiac atelectasis versus consolidation.   3. Stable trace left pleural effusion.         OUTSIDE IMAGES-DX CHEST   Final Result      OUTSIDE IMAGES-US VASCULAR   Final Result      OUTSIDE IMAGES-MR BRAIN   Final Result      OUTSIDE IMAGES-DX CHEST   Final Result      OUTSIDE IMAGES-CT HEAD   Final Result      EC-ECHOCARDIOGRAM COMPLETE W/O CONT   Final Result      DX-CHEST-FOR LINE PLACEMENT Perform procedure in: Patient's Room   Final Result         1.  Retrocardiac opacity concerning for infiltrate, stable.   2.  Trace left pleural effusion, stable   3.  Cardiomegaly   4.  Atherosclerosis   5.  Perihilar interstitial prominence and bronchial wall cuffing, appearance suggests changes of underlying bronchial inflammation, consider bronchitis.      DX-ABDOMEN FOR TUBE PLACEMENT   Final Result         1.  Nonspecific bowel gas pattern.   2.  Dobbhoff tube tip overlying the expected location of the pylorus or first duodenal segment.   3.  Left lung base atelectasis and/or small effusion      DX-CHEST-PORTABLE (1 VIEW)   Final Result         1.  Retrocardiac opacity concerning for infiltrate.   2.  Trace left pleural effusion, stable   3.  Cardiomegaly   4.  Atherosclerosis      [id number] FILM X-RAY   Final Result      OUTSIDE IMAGES-DX CHEST   Final Result           Assessment/Plan  * Goals of care, counseling/discussion- (present on admission)  Assessment & Plan  Overall neurologic and physical prognosis is quite poor given her severe, unrelenting encephalopathy and dialysis, she remains unchanged   family meeting 3/12/2020 with no subsequent change of plan of care.  Patient changed to DNR, DNI CODE STATUS per ethics committee meeting 3/20 as appropriate.  Had a  prolonged meeting with family and ethics team on 3/27/20 to explain the family about patients grave prognosis as above.   Daughter at this time still believes that there is hope and that HD should be continued, Nephrology Team was also present during the meeting and has clearly told patients daughter that at this time we feel like continuing HD will be causing her more harm than good and because of these reasons we will no longer continue HD     With the ethics, palliative care and nephrology team , decision was made to place patient at Comfort care measures. All team agreed and understood    Acute respiratory failure with hypoxia (HCC)- (present on admission)  Assessment & Plan   on trach/T-piece,  pulmonology following  Acute on chronic condition now    Pressure injury of sacrum, coccyx, stage 3 (HCC)- (present on admission)  Assessment & Plan  sHe has a substantial right hip decubitus ulcer with a wound VAC followed by wound care, poor tissue integrity and minimal if any wound healing.  Given her inability to move spontaneously, diabetes, dialysis, and poor condition, she is extremely high risk of developing further decubitus ulcers    Severe protein-calorie malnutrition (HCC)- (present on admission)  Assessment & Plan  Continue nutrition via tube feeding.  Dietitian consulted.    Cerebrovascular accident (CVA) due to embolism of right middle cerebral artery (HCC)- (present on admission)  Assessment & Plan  Very poor prognosis.  Severe debility.  Continue aspirin and statin  Pain management    End stage renal failure on dialysis (HCC)- (present on admission)  Assessment & Plan  CC    Hypertension- (present on admission)  Assessment & Plan  She was on Coreg 25 mg twice a day, Cardura 6 mg daily, hydralazine 100 mg 3 times daily  Blood pressure is been low and all BP meds have been stopped  500 mL IV bolus was ordered on 3/11/2020 due to hypotension  Systolic blood pressure now averaging 130s  Continue to hold her  "blood pressure medicines    Type 2 diabetes mellitus, with long-term current use of insulin (HCC)- (present on admission)  Assessment & Plan  A1C 7.5%, blood sugars have been ~170. Was taking insulin at home.   Continue sliding scale insulin.  Continue tube feeding.   Blood sugar is stable.    Leukocytosis  Assessment & Plan  No other signs of infection, probably reactive  White blood cell count remains high  chest x-ray is unchanged, procalcitonin is slightly elevated but patient is end-stage renal disease patient has no fever, UA positive for leukocyte esterase but no other signs of infection.   C. difficile negative     Status epilepticus (HCC)- (present on admission)  Assessment & Plan  On admission, she was intubated for airway protection, now trach since 2/2/20. No evidence of seizure activity.  Appears now to have a severe, intractable encephalopathy  - continuing regimen of vimpat, topamax, depakote  - repeat EEG showed no seizures  Stable.  Continue antiseizure medications, seizure precautions.  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  FOBT is negative.   - transfuse if drops below 7  Hemoglobin stable    Gout- (present on admission)  Assessment & Plan  On allopurinol via PEG tube    TB lung, latent- (present on admission)  Assessment & Plan  Quantiferon + , not active TB. Chest CT found positive left-sided consolidation with pleural effusion.  - sputum cx/ AFB stain negative  - s/p thora on 2/25; f/u pathology negative  - ID consulted, signed off  - AFB stain negative x3; remove isolation  - Per ID, we will \"treat for LTBI with  mg PO daily and B6 25 mg PO daily for 9 months if AFB cxs all negative at 6 weeks\"  Completed antibiotics treatment.    Hypokalemia  Assessment & " Plan  Patient is end-stage renal disease on hemodialysis, potassium improved.  resolved       VTE prophylaxis: CC

## 2020-03-29 NOTE — CARE PLAN
Problem: Oxygenation:  Goal: Maintain adequate oxygenation dependent on patient condition  Intervention: Manage oxygen therapy by monitoring pulse oximetry and/or ABG values  Note: Aerosol 4L 28% T piece      Problem: Oxygenation:  Goal: Maintain adequate oxygenation dependent on patient condition  Intervention: Manage oxygen therapy by monitoring pulse oximetry and/or ABG values  Note: Aerosol 4L 28% T piece

## 2020-03-29 NOTE — WOUND TEAM
[facility]  Inpatient Services  Wound and Skin Care Progress Note    Admission Date:  1/16/2020   HPI, PMH, SH: Reviewed  Unit where seen by Wound Team: [id number]    WOUND TEAM FOLLOW UP: VAC change    Patient nonverbal. Trached.    Self Report / Pain Level:no s/s of distress      OBJECTIVE:     WOUND TYPE, LOCATION, CHARACTERISTICS (Pressure ulcers: location, stage, POA or date identified)      Pressure Injury 01/16/20 Sacrum;Coccyx stage 4 3/5/2020 (Active)   Wound Image    3/19/2020   Pressure Injury Stage 4    State of Healing Non-healing    Site Assessment Yellow    Periwound Assessment Denuded    Margins Defined edges    Wound Length (cm) 6.2 cm 3/26/2020   Wound Width (cm) 2.9 cm    Wound Depth (cm) 1.2 cm    Wound Surface Area (cm^2) 17.98 cm^2    Wound Volume (cm^3) 21.58 cm^3    Tunneling (cm) 3.5 cm    Undermining (cm) 2.8 cm    Drainage Amount Scant    Drainage Description Serosanguineous    Treatments Cleansed    Wound Cleansing Approved Wound Cleanser    Periwound Protectant Skin Protectant Wipes to Periwound;Drape    Dressing Options Wound Vac    Dressing Changed Changed    Dressing Change/Treatment Frequency As Needed    NEXT Weekly Photo (Inpatient Only) 04/02/20 3/28/2020  5:00 PM   Wound Odor None 3/28/2020  5:00 PM   Exposed Structures Bone 3/28/2020  5:00 PM   WOUND NURSE ONLY - Tissue Type and Percentage 95 yellow 5 red 3/28/2020  5:00 PM     Negative Pressure Wound Therapy 03/07/20 Pressure injury: stage 4 (Active)   NPWT Pump Mode / Pressure Setting 125 mmHg;Continuous    Dressing Type Black Foam (Regular)    Number of Foam Pieces Used 3    Canister Changed No       Lab Values:    Lab Results   Component Value Date/Time    WBC 14.4 (H) 03/21/2020 06:10 AM    RBC 2.64 (L) 03/21/2020 06:10 AM    HEMOGLOBIN 8.4 (L) 03/21/2020 06:10 AM    HEMATOCRIT 26.2 (L) 03/21/2020 06:10 AM      Results from last 7 days   Lab Units 03/23/20  0219   C REACTIVE PROTEIN 4596 mg/dL 4.86*       Lab  Results   Component Value Date/Time    HBA1C 7.5 (H) 02/07/2019 01:20 PM         INTERVENTIONS BY WOUND TEAM: Turned patient to L side with CNA. Removed previous VAC. Cleaned wound with wound cleanser. Skin prep to periwound skin. One black foam to wound bed, second black foam to track to R hip area, third black foam for TRAC pad. Resumed VAC at 125 mmHg continuous.    Interdisciplinary consultation: RNSAVANA     EVALUATION:patient now comfort care. Discontinued Veraflo due to healing no longer goal. Continued VAC to manage drainage and for comfort. Patient no longer to receive dialysis    Factors afftecting wound healing: immobile, elevated A1C, ESRD, altered mental status  Goals:  Slow steady decrease in wound area and depth weekly    NURSING PLAN OF CARE:    Dressing changes: Continue previous Dressing Maintenance orders:   x     See new Dressing Maintenance orders:       Skin care: See Skin Care orders:        Rectal tube care: See Rectal Tube Care orders:    x  Other orders:           WOUND TEAM PLAN OF CARE (X):   NPWT change 3 x week:     TBD  Dressing changes:      Follow up as needed:    Other:    Anticipated discharge plans (X):  SNF:           Home Care:           Outpatient Wound Center:            Self Care:            Other: TBD

## 2020-03-29 NOTE — PROGRESS NOTES
2 RN skin check completed with MIC Duque.   Devices in place: trach with foam dressing underneath and t-piece, right arm PICC, right chest HD catheter, PEG tube, hernandez catheter with stat lock, bowel management system, wound vac to sacrum, SCDs, heel float boots, bilateral heel mepilex.  Skin assessed under devices: yes.  Confirmed pressure ulcers found on: sacrum/coccyx.  New potential pressure ulcers noted on: N/A.   Wound consult placed: N/A, already following.  The following interventions in place: q2 hour turn assist with pillow support, low airloss mattress, heel float boots, bilateral heel mepilex for pressure ulcer prevention, hernandez catheter and BMS, foam dressing under trach.    Skin assessment:  -bilateral elbows pink/blanching, intact  -trach site pink, intact, foam dressing for pressure relief  -redness/scab to left chest  -PEG tube site with crusting and scab  -scattered bruises to abdomen and left upper extremity  -right hand with pitting edema  -discoloration to right lower leg  -heels boggy and pink/slow to gretta, intact, dry skin  -wound vac to sacrum/coccyx  -hemorrhoids

## 2020-03-29 NOTE — CARE PLAN
Problem: Pain Management  Goal: Pain level will decrease to patient's comfort goal  Outcome: PROGRESSING AS EXPECTED  Note: PAINAD 0.     Problem: Respiratory:  Goal: Respiratory status will improve  Outcome: PROGRESSING AS EXPECTED  Note: Oxygen saturation stable on current regimen.

## 2020-03-29 NOTE — PROGRESS NOTES
Assumed care of pt from day RN. Pt is non-verbal, daughter at bedside. No s/s of distress or pain. Pt has trach in place- trach care done, suctioned 4 times during shift, peg tube running Peptide 1.5 @40 ml/hr, 2 packs Nutrisource fiber given, BMS in place- irrigated, hernandez in place- hernandez care done, wound vac in place. Q2 turns performed. Hourly rounding in place.

## 2020-03-29 NOTE — PROGRESS NOTES
VSS on 4L via t-piece.  Patient is alert with eyes open, but is non-verbal.  Unable to move.  Patient has all of the following: trach, PEG, hernandez, bowel management system, and wound vac.  BMS changed today by wound care nurse.  Patient is comfort care status.  Will continue to monitor patient closely and provide comfort measures.

## 2020-03-30 NOTE — CARE PLAN
Problem: Oxygenation:  Goal: Maintain adequate oxygenation dependent on patient condition  Outcome: PROGRESSING AS EXPECTED     Problem: Bronchopulmonary Hygiene:  Goal: Increase mobilization of retained secretions  Outcome: PROGRESSING AS EXPECTED     4L 28% Trach T-piece

## 2020-03-30 NOTE — PROGRESS NOTES
2 RN skin check completed with MIC Lewis  Devices in place Trach, PICC, HD cath, PEG tube, Hernandez, rectal tube, Wound Vac, SCDs, Heel protectors  Skin assessed under devices yes  Confirmed pressure ulcers found on Sacrum  New potential pressure ulcers noted on n/a. Wound consult placed yes, for PEG tube site - dehissed.  The following interventions in place [id number], Heel protectors, low air loss mattress, bilateral heel mepilexes, [place], hernandez, rectal tube, SCDs    Trach site - trach observed to be protruding slightly, charge nurse evaluated as well. Dressing in place, ties changed at beginning of shift, inner cannula changed.   L chest - red, scabbed, blanchable  R chest - HD cath drsg - c/d/i  Bilateral elbows - pink, blanching  Gen abdominal bruising  PEG tube site - redness, dehiscence, bloody drainage - cleansed, new guaze applied.   Sacrum - W[place] @ -125, c/d/i  Bilateral heels - pale, boggy, blanching. Mepilex and heel protectors in place.

## 2020-03-30 NOTE — PROGRESS NOTES
Report received. Assumed care of pt at 1500. Pt non verbal. No signs of pain. Suctioned tach upon taken over care. Flushed rectal tube. Q2 turns. Pt did have episode of emesis. Pt cleaned up and suctioned. No signs of distress after episode.Daughter at bedside. Bed Low and locked.

## 2020-03-30 NOTE — CARE PLAN
Problem: Infection  Goal: Will remain free from infection  Outcome: PROGRESSING AS EXPECTED     Problem: Respiratory:  Goal: Respiratory status will improve  Outcome: PROGRESSING AS EXPECTED     Problem: Urinary Elimination:  Goal: Ability to reestablish a normal urinary elimination pattern will improve  Outcome: PROGRESSING AS EXPECTED     Problem: Skin Integrity  Goal: Risk for impaired skin integrity will decrease  Outcome: PROGRESSING SLOWER THAN EXPECTED

## 2020-03-30 NOTE — CARE PLAN
Problem: Bowel/Gastric:  Goal: Normal bowel function is maintained or improved  Outcome: PROGRESSING AS EXPECTED  Note: Still with diarrhea.  BMS in place.  Changed 3/29/20 by wound care nurse.  Comfort measures.     Problem: Urinary Elimination:  Goal: Ability to reestablish a normal urinary elimination pattern will improve  Outcome: PROGRESSING AS EXPECTED  Note: River catheter.  Comfort measures.

## 2020-03-30 NOTE — PROGRESS NOTES
2 RN skin check completed with MIC Duque.   Devices in place: trach with foam dressing underneath and t-piece, right arm PICC, right chest HD catheter, PEG tube, hernandez catheter with stat lock, bowel management system, wound vac to sacrum, SCDs, heel float boots, bilateral heel mepilex.  Skin assessed under devices: yes.  Confirmed pressure ulcers found on: sacrum/coccyx.  New potential pressure ulcers noted on: N/A.   Wound consult placed: already placed, awaiting evaluation of PEG site.  The following interventions in place: q2 hour turn assist with pillow support, low airloss mattress, heel float boots, bilateral heel mepilex for pressure ulcer prevention, hernandez catheter and BMS, foam dressing under trach.     Skin assessment:  -bilateral elbows pink/blanching, intact  -trach site pink, intact, foam dressing for pressure relief  -redness/scab to left chest  -PEG tube site with redness, possible dehiscence, scant amount of bloody drainage  -scattered bruises to abdomen and left upper extremity  -right hand with pitting edema  -discoloration to right lower leg  -heels boggy and pink/slow to gretta, intact, dry skin  -wound vac to sacrum/coccyx  -hemorrhoids

## 2020-03-30 NOTE — PROGRESS NOTES
VSS on 4L via t-piece.  Patient is drowsy, opening eyes less today, non-verbal.  Unable to move self.  q2 hour turn assist with pillow support.  Patient has all of the following: trach, PEG, hernandez, bowel management system, and wound vac. PEG tube site with more pink tissue showing than compared to yesterday.  Awaiting wound care evaluataion.  No episodes of emesis.  No residual on tube feed check.  Patient is comfort care status.  Will continue to monitor patient closely and provide comfort measures.

## 2020-03-30 NOTE — PROGRESS NOTES
Pt non verbal. Pictures taken of pt's PEG tube site, small amounts of dehiscence noted. MD Crooks notified, ordered to place wound care consult and have day team assess. Pt had a bout of emesis at beginning of shift. Pt monitored for s/s of nausea. Spoke with daughter on phone, updated about pt. Q2 turns maintained. Q4 flushes with PEG, rectal tube flushed, inner cannula replaced, pt suctioned PRN, WV intact @ - 125. Will continue to monitor

## 2020-03-30 NOTE — PROGRESS NOTES
[facility] Medicine Daily Progress Note    Date of Service  3/30/2020    Chief Complaint  altered mental status     Hospital Course    75 y.o. female admitted 1/16/2020 with a past medical history of hypertension, coronary disease, diabetes, and end-stage renal disease on dialysis brought to the emergency room after apparently she had seizure type activity at her assisted living facility.  She was admitted and underwent continuous EEG monitoring followed by neurology.  Her mentation did not improve and she underwent a tracheostomy on 2/2/2020.  A PEG tube was placed 2/15/2020.  He has had a persistent, severe encephalopathy. End-stage renal disease, continuing dialysis per family preference.  Wound showing very little to no granulation, somewhat worsened per wound care team assessment.  Ethics meeting regarding CODE STATUS: Full CODE STATUS was determined to be inappropriate to the standard of care with almost certain significant possibility of harm to the patient and highly unlikely to provide any benefit. This very difficult decision was made with careful and thorough deliberation.  The patient's CODE STATUS was changed to DNAR/DNI.           Interval Problem Update  Nonverbal  Cc  Eyes are open however not following commands  Vitals stable  Stop lab draw  Multiorgan failure  No more HD for ESRD  Consultants/Specialty  Critical care, signed off  Neurology, signed off  Infectious disease, signed off  Palliative care, following  Nephrology, following  Ethics, following     Code Status  cc     Disposition  cc  Review of Systems  Review of Systems   Unable to perform ROS: Acuity of condition        Physical Exam  Temp:  [36.2 °C (97.2 °F)-37.1 °C (98.7 °F)] 36.2 °C (97.2 °F)  Pulse:  [75-88] 87  Resp:  [15-20] 16  BP: (143-160)/(70-83) 149/75  SpO2:  [97 %-99 %] 98 %    Physical Exam  HENT:      Head: Normocephalic and atraumatic.      Mouth/Throat:      Mouth: Mucous membranes are moist.   Eyes:      Extraocular  Movements: Extraocular movements intact.      Pupils: Pupils are equal, round, and reactive to light.   Neck:      Musculoskeletal: Neck supple.      Comments: 6.0 Shiley DCT cuff deflated stoma site clean- collar loose and was tightened  Cardiovascular:      Rate and Rhythm: Normal rate.   Pulmonary:      Effort: Pulmonary effort is normal. No respiratory distress.      Breath sounds: No wheezing.   Abdominal:      General: Abdomen is flat.      Comments: PEG tube clean, tolerating tube feeds at goal  Rectal Tube   Genitourinary:     Comments: + hernandez draining opaque yellow urine  Musculoskeletal:      Right lower leg: No edema.      Left lower leg: No edema.   Skin:     General: Skin is warm and dry.      Comments: Right PICC line subclavian insertion site clean dry and intact, dressing clean, no erythema   Neurological:      Mental Status: She is alert.      Comments: Non verbal           Fluids    Intake/Output Summary (Last 24 hours) at 3/30/2020 1115  Last data filed at 3/30/2020 0900  Gross per 24 hour   Intake 1305 ml   Output 1000 ml   Net 305 ml       Laboratory      Recent Labs     03/30/20  0300   SODIUM 130*   POTASSIUM 4.7   CHLORIDE 90*   CO2 26   GLUCOSE 162*   *   CREATININE 1.73*   CALCIUM 9.1                   Imaging  DX-CHEST-PORTABLE (1 VIEW)   Final Result      1.  Unchanged perihilar and LEFT greater than RIGHT basilar opacities compatible with atelectasis. Superimposed airspace disease is possible particularly in the LEFT lower lobe.   2.  Persistently enlarged cardiac silhouette      DX-CHEST-LIMITED (1 VIEW)   Final Result         No significant interval change.      DX-CHEST-PORTABLE (1 VIEW)   Final Result      1.  Apparent improvement of LEFT pleural effusion and basilar consolidation.   2.  No pneumothorax.   3.  Supportive tubing as described above.      US-THORACENTESIS PUNCTURE LEFT   Final Result      1. Ultrasound guided left sided diagnostic and therapeutic thoracentesis.       2. 400 mL of fluid withdrawn.      CT-CHEST (THORAX) W/O   Final Result      1.  Bilateral atelectasis/consolidation, left greater than right.      2.  Moderate left and small right pleural effusion.      3.  Atherosclerotic vascular calcification.      4.  Ascites within the upper abdomen.            US-ABDOMEN LTD (SOFT TISSUE)   Final Result         1. Trace free fluid in the pelvis. No abdominal ascites.      DX-ABDOMEN FOR TUBE PLACEMENT   Final Result         Feeding tube with tip projecting over the expected area of the stomach.      XI-[id number] VIEW   Final Result      Feeding tube tip overlies the gastric antrum.      DX-CHEST-PORTABLE (1 VIEW)   Final Result         1.  Pulmonary edema and/or infiltrates are identified, which are stable since the prior exam.   2.  Cardiomegaly   3.  Atherosclerosis      DX-CHEST-PORTABLE (1 VIEW)   Final Result         1.  Pulmonary edema and/or infiltrates are identified, which are stable since the prior exam.   2.  Cardiomegaly   3.  Atherosclerosis      DX-CHEST-PORTABLE (1 VIEW)   Final Result      1.  Unchanged left lower lobe atelectasis or pneumonia.      2.  Clear right lung.      DX-CHEST-PORTABLE (1 VIEW)   Final Result      Unchanged LEFT basilar atelectasis and/or airspace disease      DX-CHEST-PORTABLE (1 VIEW)   Final Result      Left basilar atelectasis, hilar and cardiac silhouette enlargement as before      DX-CHEST-PORTABLE (1 VIEW)   Final Result      Interval removal of a left subclavian central line. Stable patchy bilateral infiltrates.      IR-PICC LINE PLACEMENT W/ GUIDANCE > AGE 5   Final Result                  Ultrasound-guided PICC placement performed by qualified nursing staff as    above.          DX-CHEST-PORTABLE (1 VIEW)   Final Result      1.  Multiple support devices present.      2.  Patchy bilateral atelectasis.      DX-CHEST-PORTABLE (1 VIEW)   Final Result      Stable chest with retrocardiac opacity from atelectasis and/or  pleural fluid favored over consolidation      DX-CHEST-PORTABLE (1 VIEW)   Final Result      Stable chest with retrocardiac opacity from atelectasis and/or pleural fluid favored over consolidation      DX-CHEST-PORTABLE (1 VIEW)   Final Result         No significant change from prior.               DX-CHEST-PORTABLE (1 VIEW)   Final Result         1. Stable lines and tubes..   2. Stable retrocardiac and left perihilar atelectasis versus consolidation. Suspected small left pleural effusion.            DX-CHEST-PORTABLE (1 VIEW)   Final Result         1. Stable lines and tubes..   2. Stable retrocardiac atelectasis versus consolidation with increased left perihilar opacity. Suspected trace left pleural effusion.         [id number]-3 VIEW   Final Result      1.  Enteric tube has been placed and the tip projects over the stomach.      2.  Pre-existing feeding tube tip projects at the gastroduodenal junction      DX-CHEST-PORTABLE (1 VIEW)   Final Result         1. Lines and tubes as above.   2. Stable retrocardiac atelectasis versus consolidation. Suspected trace left pleural effusion.         MR-BRAIN-WITH & W/O   Final Result      1.  Moderate cerebral atrophy.   2.  Evidence of intraventricular hemorrhage, indeterminate age. Possibly chronic intraventricular hemosiderin deposition.   3.  Extensive encephalomalacic change with hemosiderin deposition involving the right corona radiata, basal ganglia, posterior thalamus, subthalamic region, bordering the right cerebral peduncle. Associated ex vacuo dilatation of the body of the right    lateral ventricle. No change.   4.  Additional foci of old microhemorrhage most consistent with old hypertensive microhemorrhage or amyloid angiopathy in the left basal ganglia, left thalamus, and midline upper ventral abel.   5.  Punctate focus of acute infarction in the right parietal deep white matter. No change.   6.  Advanced supratentorial white matter disease most consistent with  microvascular ischemic change.   7.  Encephalomalacic changes in the abel and right cerebral peduncle consistent with old infarction.   8.  Partially empty sella.   9.  Overall, no new findings and no significant change from 1/14/2020.      DX-CHEST-PORTABLE (1 VIEW)   Final Result         1. Stable lines and tubes.   2. Unchanged retrocardiac atelectasis versus consolidation.   3. Stable trace left pleural effusion.         OUTSIDE IMAGES-DX CHEST   Final Result      OUTSIDE IMAGES-US VASCULAR   Final Result      OUTSIDE IMAGES-MR BRAIN   Final Result      OUTSIDE IMAGES-DX CHEST   Final Result      OUTSIDE IMAGES-CT HEAD   Final Result      EC-ECHOCARDIOGRAM COMPLETE W/O CONT   Final Result      DX-CHEST-FOR LINE PLACEMENT Perform procedure in: Patient's Room   Final Result         1.  Retrocardiac opacity concerning for infiltrate, stable.   2.  Trace left pleural effusion, stable   3.  Cardiomegaly   4.  Atherosclerosis   5.  Perihilar interstitial prominence and bronchial wall cuffing, appearance suggests changes of underlying bronchial inflammation, consider bronchitis.      DX-ABDOMEN FOR TUBE PLACEMENT   Final Result         1.  Nonspecific bowel gas pattern.   2.  Dobbhoff tube tip overlying the expected location of the pylorus or first duodenal segment.   3.  Left lung base atelectasis and/or small effusion      DX-CHEST-PORTABLE (1 VIEW)   Final Result         1.  Retrocardiac opacity concerning for infiltrate.   2.  Trace left pleural effusion, stable   3.  Cardiomegaly   4.  Atherosclerosis      [id number] FILM X-RAY   Final Result      OUTSIDE IMAGES-DX CHEST   Final Result           Assessment/Plan  * Goals of care, counseling/discussion- (present on admission)  Assessment & Plan  Overall neurologic and physical prognosis is quite poor given her severe, unrelenting encephalopathy and dialysis, she remains unchanged   family meeting 3/12/2020 with no subsequent change of plan of care.  Patient  changed to DNR, DNI CODE STATUS per ethics committee meeting 3/20 as appropriate.  Had a prolonged meeting with family and ethics team on 3/27/20 to explain the family about patients grave prognosis as above.   Daughter at this time still believes that there is hope and that HD should be continued, Nephrology Team was also present during the meeting and has clearly told patients daughter that at this time we feel like continuing HD will be causing her more harm than good and because of these reasons we will no longer continue HD     With the ethics, palliative care and nephrology team , decision was made to place patient at Comfort care measures. All team agreed and understood    Acute respiratory failure with hypoxia (HCC)- (present on admission)  Assessment & Plan   on trach/T-piece,  pulmonology following  Acute on chronic condition now    Pressure injury of sacrum, coccyx, stage 3 (HCC)- (present on admission)  Assessment & Plan  sHe has a substantial right hip decubitus ulcer with a wound VAC followed by wound care, poor tissue integrity and minimal if any wound healing.  Given her inability to move spontaneously, diabetes, dialysis, and poor condition, she is extremely high risk of developing further decubitus ulcers    Severe protein-calorie malnutrition (HCC)- (present on admission)  Assessment & Plan  Continue nutrition via tube feeding.  Dietitian consulted.    Cerebrovascular accident (CVA) due to embolism of right middle cerebral artery (HCC)- (present on admission)  Assessment & Plan  Very poor prognosis.  Severe debility.  Continue aspirin and statin  Pain management    End stage renal failure on dialysis (HCC)- (present on admission)  Assessment & Plan  CC    Hypertension- (present on admission)  Assessment & Plan  She was on Coreg 25 mg twice a day, Cardura 6 mg daily, hydralazine 100 mg 3 times daily  Blood pressure is been low and all BP meds have been stopped  500 mL IV bolus was ordered on  "3/11/2020 due to hypotension  Systolic blood pressure now averaging 130s  Continue to hold her blood pressure medicines    Type 2 diabetes mellitus, with long-term current use of insulin (HCC)- (present on admission)  Assessment & Plan  A1C 7.5%, blood sugars have been ~170. Was taking insulin at home.   Continue sliding scale insulin.  Continue tube feeding.   Blood sugar is stable.    Leukocytosis  Assessment & Plan  No other signs of infection, probably reactive  White blood cell count remains high  chest x-ray is unchanged, procalcitonin is slightly elevated but patient is end-stage renal disease patient has no fever, UA positive for leukocyte esterase but no other signs of infection.   C. difficile negative     Status epilepticus (HCC)- (present on admission)  Assessment & Plan  On admission, she was intubated for airway protection, now trach since 2/2/20. No evidence of seizure activity.  Appears now to have a severe, intractable encephalopathy  - continuing regimen of vimpat, topamax, depakote  - repeat EEG showed no seizures  Stable.  Continue antiseizure medications, seizure precautions.  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  FOBT is negative.   - transfuse if drops below 7  Hemoglobin stable    Gout- (present on admission)  Assessment & Plan  On allopurinol via PEG tube    TB lung, latent- (present on admission)  Assessment & Plan  Quantiferon + , not active TB. Chest CT found positive left-sided consolidation with pleural effusion.  - sputum cx/ AFB stain negative  - s/p thora on 2/25; f/u pathology negative  - ID consulted, signed off  - AFB stain negative x3; remove isolation  - Per ID, we will \"treat for LTBI with  mg PO daily and B6 25 mg PO daily for 9 months if AFB cxs " "all negative at 6 weeks\"  Completed antibiotics treatment.    Hypokalemia  Assessment & Plan  Patient is end-stage renal disease on hemodialysis, potassium improved.  resolved       VTE prophylaxis: CC      "

## 2020-03-31 NOTE — CARE PLAN
Problem: Respiratory:  Goal: Respiratory status will improve  Outcome: PROGRESSING AS EXPECTED   Trache to t piece on 28%FiO2 tolerating well with sats at 99%    Problem: Skin Integrity  Goal: Risk for impaired skin integrity will decrease  Outcome: PROGRESSING AS EXPECTED  2RN skin check done, continous Q2 hr turning done.      Problem: Venous Thromboembolism (VTW)/Deep Vein Thrombosis (DVT) Prevention:  Goal: Patient will participate in Venous Thrombosis (VTE)/Deep Vein Thrombosis (DVT)Prevention Measures  Outcome: PROGRESSING AS EXPECTED  SCD's in place.

## 2020-03-31 NOTE — WOUND TEAM
"[facility]  Inpatient Services   Wound and Skin Care Progress Note    HPI, PMH, SH: Reviewed    Unit where seen by Wound Team: S635/[id number]     WOUND CONSULT RELATED TO:  Scheduled Negative Pressure Wound Therapy (NPWT) dressing change and new consult for \"dehiscence to PEG tube site.\"     Self Report / Pain Level:  No indications of pain.       OBJECTIVE: patient on low air loss bed, heel float boots on bilaterally. Patient turned with turning wedge and pillows used to position patient.      WOUND TYPE, LOCATION, CHARACTERISTICS (Pressure Injuries: location, stage, POA or date identified)       Pressure Injury 01/16/20 Sacrum;Coccyx stage 4 3/5/2020 (Active)   Wound Image           Pressure Injury Stage 4 3/31/2020 10:00 AM   State of Healing Non-healing 3/31/2020 10:00 AM   Site Assessment Boggy;Yellow;Red;Drainage 3/31/2020 10:00 AM   Periwound Assessment Denuded;Dark edges;Purple;Red;Fragile 3/31/2020 10:00 AM   Margins Unattached edges;Defined edges 3/31/2020 10:00 AM   Wound Length (cm) 6.2 cm 3/26/2020  5:00 PM   Wound Width (cm) 2.9 cm 3/26/2020  5:00 PM   Wound Depth (cm) 1.2 cm 3/26/2020  5:00 PM   Wound Surface Area (cm^2) 17.98 cm^2 3/26/2020  5:00 PM   Wound Volume (cm^3) 21.58 cm^3 3/26/2020  5:00 PM   Tunneling (cm) 3.5 cm 3/5/2020  6:00 PM   Undermining (cm) 2.8 cm 3/26/2020  5:00 PM   Closure Secondary intention 3/31/2020 10:00 AM   Drainage Amount Moderate 3/31/2020 10:00 AM   Drainage Description Serosanguineous 3/31/2020 10:00 AM   Non-staged Wound Description Not applicable 3/31/2020 10:00 AM   Treatments Cleansed;Site care 3/31/2020 10:00 AM   Wound Cleansing Approved Wound Cleanser 3/31/2020 10:00 AM   Periwound Protectant Benzoin;Drape;Hydrofiber 3/31/2020 10:00 AM   Dressing Options Wound Vac 3/31/2020 10:00 AM   Dressing Cleansing/Solutions Not Applicable 3/31/2020 10:00 AM   Dressing Changed Changed 3/31/2020 10:00 AM   Dressing Status Clean;Dry;Intact 3/31/2020 10:00 AM   "   Dressing Change/Treatment Frequency By Wound Team Only 3/31/2020 10:00 AM   NEXT Dressing Change/Treatment Date 04/04/20 3/31/2020 10:00 AM   NEXT Weekly Photo (Inpatient Only) 04/04/20 3/31/2020 10:00 AM   Wound Odor Mild 3/31/2020 10:00 AM   Pulses N/A 3/31/2020 10:00 AM   Exposed Structures Connective tissue;Bone 3/31/2020 10:00 AM   WOUND NURSE ONLY - Tissue Type and Percentage 95% yellow, 5% red 3/31/2020 10:00 AM           Negative Pressure Wound Therapy 03/07/20 Pressure injury: stage 4 (Active)   NPWT Pump Mode / Pressure Setting Ulta;Continuous;125 mmHg 3/31/2020 10:00 AM   Dressing Type Black Foam (Regular);Medium 3/31/2020 10:00 AM   Number of Foam Pieces Used 3 3/31/2020 10:00 AM   Canister Changed No 3/31/2020 10:00 AM   Output (mL) 0 mL 3/30/2020  4:00 AM   NEXT Dressing Change/Treatment Date 04/04/20 3/31/2020 10:00 AM   VAC VeraFlo Irrigant     VAC VeraFlo Soak Time (mins)     VAC VeraFlo Instill Volume (ml)     VAC VeraFlo - Therapy Time (hrs)     VAC VeraFlo Pressure (mm/Hg)            Lab Values:    Lab Results   Component Value Date/Time    WBC 14.4 (H) 03/21/2020 06:10 AM    RBC 2.64 (L) 03/21/2020 06:10 AM    HEMOGLOBIN 8.4 (L) 03/21/2020 06:10 AM    HEMATOCRIT 26.2 (L) 03/21/2020 06:10 AM        Results from last 7 days   Lab Units 03/30/20  0300   C REACTIVE PROTEIN 4596 mg/dL 4.13*             Lab Results   Component Value Date/Time    HBA1C 7.5 (H) 02/07/2019 01:20 PM         INTERVENTIONS BY WOUND TEAM: Assessed PEG tube area. It is not dehisced. There is an area of friable hypergranular tissue at 9360-3251. Area cleaned with NS and gauze. Used silver nitrate to area which will help decrease hypergranular tissue. Did this because it is so friable and bleeding. Placed fenestrated gauze around PEG tube and secured it with hypafix tape. Patient turned for sacral NPWT dressing change. Dressing removed. Wound cleaned with wound cleanser and gauze 4 x 4s, same to periwound. Applied benzoin to  all areas where drape to be placed. hydrofiber silver to denuded areas of periwound. 1 half thickness piece of black foam placed into wound bed, 1 strip for bridge over to right lateral hip, and 1 piece for button. This was all draped. Trac pad placed over button, dressing seal achieved at 125 mmHg continuous. Patient repositioned with pillow under left hip.     Interdisciplinary consultation: Patient, Bedside RN     EVALUATION: NPWT for comfort: change 2 x a week. Silver nitrate to decrease friable bleeding tissue for comfort    Goals: Steady decrease in wound area and depth weekly.    NURSING PLAN OF CARE ORDERS (X):  Dressing changes: See Dressing Care orders: X  Skin care: See Skin Care orders: X  Rectal tube care: See Rectal Tube Care orders:        Other orders:                             WOUND TEAM PLAN OF CARE (X):   Dressing changes by wound team:          Follow up 1-2 times weekly:     X for NPWT dressing changes Tuesday and saturday          Follow up 3 times weekly:                NPWT change 3 times weekly:     Follow up as needed:       Other (explain):

## 2020-03-31 NOTE — PROGRESS NOTES
2 RN skin check completed with MIC Duque.   Devices in place: trach with gauze underneath and t-piece, right arm PICC, right chest HD catheter, PEG tube, hernandez catheter with stat lock, bowel management system, wound vac to sacrum, SCDs, heel float boots, bilateral heel mepilex.  Skin assessed under devices: yes.  Confirmed pressure ulcers found on: sacrum/coccyx.  New potential pressure ulcers noted on: N/A.   Wound consult placed: already placed, awaiting evaluation of PEG site.  The following interventions in place: q2 hour turn assist with pillow support, low airloss mattress, heel float boots, bilateral heel mepilex for pressure ulcer prevention, hernandez catheter and BMS, foam dressing under trach.     Skin assessment:  -bilateral elbows pink/blanching, intact  -trach site pink, intact, foam dressing for pressure relief  -redness/scab to left chest  -PEG tube site with redness and small amount of bleeding, hypergranular tissue per wound care nurse  -scattered bruises to abdomen and left upper extremity  -right hand with pitting edema  -discoloration to right lower leg  -heels boggy and pink/slow to gretta, intact, dry skin  -wound vac to sacrum/coccyx  -hemorrhoids

## 2020-03-31 NOTE — CARE PLAN
Problem: Pain Management  Goal: Pain level will decrease to patient's comfort goal  Outcome: PROGRESSING AS EXPECTED  Note: Facial grimacing this AM.  Medicated with PRN oxycodone.     Problem: Skin Integrity  Goal: Risk for impaired skin integrity will decrease  Outcome: PROGRESSING AS EXPECTED  Note: Per wound care, PEG site not dehisced.  Hypergranular tissue.

## 2020-03-31 NOTE — PROGRESS NOTES
Discussed orders with Dr. Blevins.  Tube feeding to continue at this time.  MD to discuss with consulting physicians regarding discontinuation of tube feed as this is the patient's only form of nutrition.  Also discussed patient's HTN this AM.  PRN medications discontinued.  Received telephone order for hydralazine 25mg per PEG TID scheduled.

## 2020-03-31 NOTE — PROGRESS NOTES
VSS on 4L via t-piece.  Patient grimacing this AM.  Given PRN oxycodone for PAINAD 3.  Patient remains non-verbal.  Unable to move self.  q2 hour turn assist with pillow support.  Patient has all of the following: trach, PEG, hernandez, bowel management system, and wound vac.  Wound vac changed today by wound care team.  PEG site evaluated.  Hypergranular tissue per wound care nurse, not dehiscence.  Dressing reapplied by wound care nurse.  Topical agent used causes site to look black in color.  No episodes of emesis.  No residual on tube feed check.  Patient is comfort care status.  Will continue to monitor patient closely and provide comfort measures.

## 2020-03-31 NOTE — PROGRESS NOTES
[facility] Medicine Daily Progress Note    Date of Service  3/31/2020    Chief Complaint  75 y.o. female admitted 1/16/2020 with AMS    Hospital Course    75 y.o. female admitted 1/16/2020 with a past medical history of hypertension, coronary disease, diabetes, and end-stage renal disease on dialysis brought to the emergency room after apparently she had seizure type activity at her assisted living facility.  She was admitted and underwent continuous EEG monitoring followed by neurology.  Her mentation did not improve and she underwent a tracheostomy on 2/2/2020.  A PEG tube was placed 2/15/2020.  He has had a persistent, severe encephalopathy. End-stage renal disease, continuing dialysis per family preference.  Wound showing very little to no granulation, somewhat worsened per wound care team assessment.  Ethics meeting regarding CODE STATUS: Full CODE STATUS was determined to be inappropriate to the standard of care with almost certain significant possibility of harm to the patient and highly unlikely to provide any benefit. This very difficult decision was made with careful and thorough deliberation.  The patient's CODE STATUS was changed to DNAR/DNI.      Interval Problem Update  COMFORT CARE ONLY    Consultants/Specialty  Critical care, signed off  Neurology, signed off  Infectious disease, signed off  Palliative care, following  Nephrology, following  Ethics, following    Code Status  COMFORT CARE    Disposition  pending    Review of Systems  Review of Systems   Unable to perform ROS: Acuity of condition        Physical Exam  Temp:  [36.6 °C (97.9 °F)-37.2 °C (99 °F)] 36.8 °C (98.2 °F)  Pulse:  [72-93] 89  Resp:  [16-19] 18  BP: (129-172)/(63-94) 133/69  SpO2:  [97 %-99 %] 98 %    Physical Exam  Constitutional:       Appearance: She is ill-appearing. She is not diaphoretic.   HENT:      Head: Normocephalic and atraumatic.      Nose: Nose normal.      Mouth/Throat:      Mouth: Mucous membranes are dry.   Eyes:       Extraocular Movements: Extraocular movements intact.      Pupils: Pupils are equal, round, and reactive to light.   Neck:      Musculoskeletal: Normal range of motion and neck supple.   Cardiovascular:      Rate and Rhythm: Normal rate and regular rhythm.      Pulses: Normal pulses.      Heart sounds: Normal heart sounds.   Pulmonary:      Effort: Pulmonary effort is normal.      Breath sounds: Normal breath sounds.   Abdominal:      Palpations: Abdomen is soft.   Genitourinary:     Comments: Rectal tube is noted  Musculoskeletal:      Comments: Bed bound   Skin:     General: Skin is warm and dry.   Neurological:      Mental Status: She is disoriented.         Fluids    Intake/Output Summary (Last 24 hours) at 3/31/2020 1250  Last data filed at 3/31/2020 0900  Gross per 24 hour   Intake 1660 ml   Output 300 ml   Net 1360 ml       Laboratory      Recent Labs     03/30/20  0300   SODIUM 130*   POTASSIUM 4.7   CHLORIDE 90*   CO2 26   GLUCOSE 162*   *   CREATININE 1.73*   CALCIUM 9.1                   Imaging       Assessment/Plan  * Goals of care, counseling/discussion- (present on admission)  Assessment & Plan  Overall neurologic and physical prognosis is quite poor given her severe, unrelenting encephalopathy and dialysis, she remains unchanged   family meeting 3/12/2020 with no subsequent change of plan of care.  Patient changed to DNR, DNI CODE STATUS per ethics committee meeting 3/20 as appropriate.  Had a prolonged meeting with family and ethics team on 3/27/20 to explain the family about patients grave prognosis as above.   Daughter at this time still believes that there is hope and that HD should be continued, Nephrology Team was also present during the meeting and has clearly told patients daughter that at this time we feel like continuing HD will be causing her more harm than good and because of these reasons we will no longer continue HD     With the ethics, palliative care and nephrology team ,  decision was made to place patient at Comfort care measures. All team agreed and understood    As per dr Singh pt is comfort care only now.    Acute respiratory failure with hypoxia (HCC)- (present on admission)  Assessment & Plan   on trach/T-piece,  pulmonology following  Acute on chronic condition now    Pressure injury of sacrum, coccyx, stage 3 (HCC)- (present on admission)  Assessment & Plan  sHe has a substantial right hip decubitus ulcer with a wound VAC followed by wound care, poor tissue integrity and minimal if any wound healing.  Given her inability to move spontaneously, diabetes, dialysis, and poor condition, she is extremely high risk of developing further decubitus ulcers    Severe protein-calorie malnutrition (HCC)- (present on admission)  Assessment & Plan  Continue nutrition via tube feeding.  Dietitian consulted.    Cerebrovascular accident (CVA) due to embolism of right middle cerebral artery (HCC)- (present on admission)  Assessment & Plan  Very poor prognosis.  Severe debility.  Continue aspirin and statin  Pain management    End stage renal failure on dialysis (HCC)- (present on admission)  Assessment & Plan  CC    Hypertension- (present on admission)  Assessment & Plan  She was on Coreg 25 mg twice a day, Cardura 6 mg daily, hydralazine 100 mg 3 times daily  Blood pressure is been low and all BP meds have been stopped  500 mL IV bolus was ordered on 3/11/2020 due to hypotension  Systolic blood pressure now averaging 130s  Continue to hold her blood pressure medicines    Type 2 diabetes mellitus, with long-term current use of insulin ([facility])- (present on admission)  Assessment & Plan  A1C 7.5%, blood sugars have been ~170. Was taking insulin at home.   Continue sliding scale insulin.  Continue tube feeding.   Blood sugar is stable.    Leukocytosis  Assessment & Plan  No other signs of infection, probably reactive  White blood cell count remains high  chest x-ray is unchanged, procalcitonin is  "slightly elevated but patient is end-stage renal disease patient has no fever, UA positive for leukocyte esterase but no other signs of infection.   C. difficile negative     Status epilepticus (HCC)- (present on admission)  Assessment & Plan  On admission, she was intubated for airway protection, now trach since 2/2/20. No evidence of seizure activity.  Appears now to have a severe, intractable encephalopathy  - continuing regimen of vimpat, topamax, depakote  - repeat EEG showed no seizures  Stable.  Continue antiseizure medications, seizure precautions.  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  FOBT is negative.   - transfuse if drops below 7  Hemoglobin stable    Gout- (present on admission)  Assessment & Plan  On allopurinol via PEG tube    TB lung, latent- (present on admission)  Assessment & Plan  Quantiferon + , not active TB. Chest CT found positive left-sided consolidation with pleural effusion.  - sputum cx/ AFB stain negative  - s/p thora on 2/25; f/u pathology negative  - ID consulted, signed off  - AFB stain negative x3; remove isolation  - Per ID, we will \"treat for LTBI with  mg PO daily and B6 25 mg PO daily for 9 months if AFB cxs all negative at 6 weeks\"  Completed antibiotics treatment.    Hypokalemia  Assessment & Plan  Patient is end-stage renal disease on hemodialysis, potassium improved.  resolved       VTE prophylaxis: comfort care only      "

## 2020-03-31 NOTE — PROGRESS NOTES
Pt. Received awake, non verbal with eyes tracking and does not follow commands. Pt. Does withdraw on painful stimuli. With trache to T-piece @ 28% FiO2, HD cath on R upper chest and double lumen PICC on KARYN both flushing, PEG tube noted hooked to Impact peptide @40ml/hr which is goal. Wound vac in place. Ensured fall prec in place, NUBIA mattress in place with skin checks every shift. Trache care and oral care. Pt. On comfort care status. Needs attended

## 2020-03-31 NOTE — PROGRESS NOTES
2 RN skin check completed with Tamie HAILE.   Devices in place KARYN PICC, R chest HD cath, hernandez catheter, BMS, trache to T-piece, peg tube, SCDs, wound vac, prevalon boots  Skin assessed under devices yes.  Confirmed pressure ulcers found on sacrum.  New potential pressure ulcers noted on N/A. Wound consult placed; wound care following.     Skin assessment:  -bilateral heels boggy but blanching  -RLE with some discoloration  -labia has some redness nted  -bilateral elbows pink and blanching  -PEG tube site has some scabbing around the tube, gauze dressing in place no bleeding  -LOPEZ sacral area, wound vac in place. mepilexes around the area  -scab on CARLOS A chest area  -scattered bruises on UE and some on abdomen  -pitting edema on R hand  -hemorrhoids noted  -skin around trache intact, checked skin under trache collar intact no issues.     The following interventions in place   -2 RN skin check  -Q2 turns and NUBIA mattress in place  -preventive mepilexes  -heel float boots on  -moisturizer  -trache care and dressing change on trache site  -BMS, Hernandez catheter keeping lower area dry and intact.

## 2020-04-01 NOTE — PROGRESS NOTES
ETHICS PROGRESS NOTE  Patient Name: Cassandra Guzmán  MRN: [id number]  Date:  03/27/2020    ETHICAL ISSUE:   Beneficence and non-maleficence.      Please see initial consult note on 03/20/2020. The following note is from a care conference held with patient’s family members, the patient’s care team and clinical ethics.     DISCUSSIONS WITH MEDICAL/CARE TEAM:   Discussed with Dr. Singh and Dr. Jackson. Both are in agreement that continued treatment is not providing any meaningful benefit. At this point the patient is unlikely to experience any clinical improvement, has a poor prognosis and continued treatment is causing significant pain and suffering without a reasonable chance of recovery.     DISCUSSIONS WITH AGENTS/SURROGATES/FAMILY:  A meeting was held with this author, Dominik MATA, Dr. Jackson, Dr. Singh and Tami Henderson/Risk Jackson and Catracho Glover and Roman. Dr. Jackson discussed patient’s long history on dialysis and discussions with other nephrologists in the same practice. Per Dr. Jackson and his colleagues they feel that continued hemodialysis at this point is more harmful than beneficial. He referred to the extensive conversations with Belen in the past regarding recommendations for comfort focused treatment. Dr. Jackson feels the patient is no longer achieving the expected goals of hemodialysis.  Hemodialysis should provide quality of life for the patient and not just quantity. He does not feel the patient will ever recover to the point of regaining the life she had before. Therefore, Dr. Brandt recommends that hemodialysis be stopped and patient’s comfort be the focus of treatment.     Dr. Singh discussed patient’s clinical course up to this point. Discussed patient’s lack of engagement with clinical team, mutliorgan system failure, and unlikelihood of having meaningful recovery. Discussed at length the patient’s wound, lack of healing and being a source of infection, and the related pain and suffering.  Discussed the difficulty in managing patient’s pain and keeping her alert and breathing. Dr. Singh also recommends that the primary focus for the patient at this time be comfort focused care.     Belen expressed that she needs to keep the aggressive treatment going, including dialysis as she feels patient is improving. Belen referenced patient’s improvement with dialysis prior with her stroke. Dr. Singh explained that the patient’s current clinical condition has changed drastically and the patient does not appear to be improving. Dr. Jackson verbalized agreement, he also explained the difference between the patient’s condition prior to this current hospitalization and her current clinical status.     Belen again requested to continue dialysis, this author discussed supporting Belen with referrals to any facility and any physician that will accept the transfer of the patient. ESPERANZA Lehman discussed patient’s insurance and restrictions for placement, including patient’s skilled limited days left. Reviewed that patient has been declined by LTACH in the [facility].  The referrals could be expanded to [facility] and the Hospital Care Management team is continuing to explore [place] Medicaid for the patient. This author showed the most recent picture of patient’s coccyx wound as Belen reports she hasn’t seen it in “awhile”.     Later after discussion this author received a call from Belen.     Belen requested to continue dialysis and keep patient Full code until her family from the [facility] can come “sometime in April”. Due to the travel restrictions patient’s other children do not know when they could get a plane to fly to [place] to see patient. This author offered video conferences. Belen declined stating “It’s just not the same. They want to be with her”.     Called and spoke with Dr. Jackson, he feels that continuing dialysis, even one or two more times is continuing to cause the patient significant  suffering and he does not recommend continuing dialysis.  This author relayed this information to Belen.     RECOMMENDATIONS:   1. Agree with clinical team’s plan to initiate a model of care focused on the patient’s comfort. As a result of the patient’s lack of meaningful recovery and the patient’s poor prognosis, it is appropriate to provide care that support’s the patient’s comfort and dignity.   a. It is the medical opinion of the physicians that continued treatment only serves to increase patient’s pain and suffering without providing meaningful medical benefit. Continued treatment, including hemodialysis, places a significant burden upon the patient without any benefit.   b. Bioethical considerations of beneficence and non-maleficence require that care be provided that benefits the patient while avoiding harm. In this case, the burden of treatment should not outweigh the benefits as there is risk of causing harm upon the patient. It appears that continued treatment with hemodialysis and resuscitative efforts would be causing harm to the patient.    2. Hospital Care Management to continue to work with Belen and send referrals to any facility identified by Belen.   3. Recommend consulting hospice to help support the family with anticipatory grief and bereavement resources if they accept services.       Thank you for involving us in the care of this patient. Please let me know if you have any other questions or concerns.      Respectfully submitted,    Penelope Calderon RN, BSN, MS-HCE, [facility]  Office [phone]  [employer] [place] [employer]

## 2020-04-01 NOTE — CARE PLAN
Problem: Communication  Goal: The ability to communicate needs accurately and effectively will improve  Outcome: PROGRESSING AS EXPECTED  Intervention: Educate patient and significant other/support system about the plan of care, procedures, treatments, medications and allow for questions  Note: Pt educated on today's treatment and plan of care.       Problem: Infection  Goal: Will remain free from infection  Outcome: PROGRESSING AS EXPECTED  Intervention: Implement standard precautions and perform hand washing before and after patient contact  Note: Standard precautions implemented. Foaming in and out of room.

## 2020-04-01 NOTE — PROGRESS NOTES
2 RN Skin Check    2 RN skin check complete.   Devices in place: SCDs and Trach.  Skin assessed under devices: yes.  Confirmed pressure ulcers found on: Sacrum.  New potential pressure ulcers noted on NA. Wound consult placed No.  The following interventions in place Pillows, Barrier cream, Heel float boots and air bed.

## 2020-04-01 NOTE — PROGRESS NOTES
[facility] Medicine Daily Progress Note    Date of Service  4/1/2020    Chief Complaint  75 y.o. female admitted 1/16/2020 with AMS    Hospital Course    75 y.o. female admitted 1/16/2020 with a past medical history of hypertension, coronary disease, diabetes, and end-stage renal disease on dialysis brought to the emergency room after apparently she had seizure type activity at her assisted living facility.  She was admitted and underwent continuous EEG monitoring followed by neurology.  Her mentation did not improve and she underwent a tracheostomy on 2/2/2020.  A PEG tube was placed 2/15/2020.  He has had a persistent, severe encephalopathy. End-stage renal disease, continuing dialysis per family preference.  Wound showing very little to no granulation, somewhat worsened per wound care team assessment.  Ethics meeting regarding CODE STATUS: Full CODE STATUS was determined to be inappropriate to the standard of care with almost certain significant possibility of harm to the patient and highly unlikely to provide any benefit. This very difficult decision was made with careful and thorough deliberation.  The patient's CODE STATUS was changed to DNAR/DNI.      Interval Problem Update  COMFORT CARE ONLY    Consultants/Specialty  Critical care, signed off  Neurology, signed off  Infectious disease, signed off  Palliative care, following  Nephrology, following  Ethics, following    Code Status  COMFORT CARE    Disposition  pending    Review of Systems  Review of Systems   Unable to perform ROS: Acuity of condition        Physical Exam  Temp:  [36.6 °C (97.8 °F)] 36.6 °C (97.8 °F)  Pulse:  [72-89] 80  Resp:  [16-18] 16  BP: (104-119)/(51-62) 119/62  SpO2:  [97 %-99 %] 98 %    Physical Exam  Constitutional:       General: She is not in acute distress.     Appearance: She is ill-appearing.   HENT:      Head: Normocephalic and atraumatic.      Nose: No congestion or rhinorrhea.      Mouth/Throat:      Mouth: Mucous membranes  are dry.   Eyes:      Conjunctiva/sclera: Conjunctivae normal.   Neck:      Comments: Trach in place  Cardiovascular:      Rate and Rhythm: Normal rate and regular rhythm.      Heart sounds: No murmur.   Pulmonary:      Effort: Pulmonary effort is normal.      Breath sounds: Normal breath sounds.   Abdominal:      General: There is no distension.      Tenderness: There is no abdominal tenderness.   Genitourinary:     Comments: Rectal tube is noted  Musculoskeletal:      Comments: Bed bound   Skin:     Coloration: Skin is not jaundiced or pale.   Neurological:      Mental Status: She is disoriented.         Fluids    Intake/Output Summary (Last 24 hours) at 4/1/2020 1037  Last data filed at 4/1/2020 0959  Gross per 24 hour   Intake 1005 ml   Output 710 ml   Net 295 ml       Laboratory      Recent Labs     03/30/20  0300   SODIUM 130*   POTASSIUM 4.7   CHLORIDE 90*   CO2 26   GLUCOSE 162*   *   CREATININE 1.73*   CALCIUM 9.1                   Imaging       Assessment/Plan  * Goals of care, counseling/discussion- (present on admission)  Assessment & Plan  Overall neurologic and physical prognosis is quite poor given her severe, unrelenting encephalopathy and dialysis, she remains unchanged   family meeting 3/12/2020 with no subsequent change of plan of care.  Patient changed to DNR, DNI CODE STATUS per ethics committee meeting 3/20 as appropriate.  Had a prolonged meeting with family and ethics team on 3/27/20 to explain the family about patients grave prognosis as above.   Daughter at this time still believes that there is hope and that HD should be continued, Nephrology Team was also present during the meeting and has clearly told patients daughter that at this time we feel like continuing HD will be causing her more harm than good and because of these reasons we will no longer continue HD     With the ethics, palliative care and nephrology team , decision was made to place patient at Comfort care measures.  All team agreed and understood    As per dr Singh pt is comfort care only now.    D/w palliative nurse and will continue with tube feeding and rectal tube since they are not causing discomfort    Acute respiratory failure with hypoxia ([facility])- (present on admission)  Assessment & Plan   on trach/T-piece,  pulmonology following  Acute on chronic condition now    Pressure injury of sacrum, coccyx, stage 3 (HCC)- (present on admission)  Assessment & Plan  sHe has a substantial right hip decubitus ulcer with a wound VAC followed by wound care, poor tissue integrity and minimal if any wound healing.  Given her inability to move spontaneously, diabetes, dialysis, and poor condition, she is extremely high risk of developing further decubitus ulcers    Severe protein-calorie malnutrition ([facility])- (present on admission)  Assessment & Plan  Continue nutrition via tube feeding.  Dietitian consulted.    Cerebrovascular accident (CVA) due to embolism of right middle cerebral artery ([facility])- (present on admission)  Assessment & Plan  Very poor prognosis.  Severe debility.  Continue aspirin and statin  Pain management    End stage renal failure on dialysis ([facility])- (present on admission)  Assessment & Plan  CC    Hypertension- (present on admission)  Assessment & Plan  She was on Coreg 25 mg twice a day, Cardura 6 mg daily, hydralazine 100 mg 3 times daily  Blood pressure is been low and all BP meds have been stopped  500 mL IV bolus was ordered on 3/11/2020 due to hypotension  Systolic blood pressure now averaging 130s  Continue to hold her blood pressure medicines    Type 2 diabetes mellitus, with long-term current use of insulin ([facility])- (present on admission)  Assessment & Plan  A1C 7.5%, blood sugars have been ~170. Was taking insulin at home.   Continue sliding scale insulin.  Continue tube feeding.   Blood sugar is stable.    Leukocytosis  Assessment & Plan  No other signs of infection, probably reactive  White blood cell count  "remains high  chest x-ray is unchanged, procalcitonin is slightly elevated but patient is end-stage renal disease patient has no fever, UA positive for leukocyte esterase but no other signs of infection.   C. difficile negative     Status epilepticus (HCC)- (present on admission)  Assessment & Plan  On admission, she was intubated for airway protection, now trach since 2/2/20. No evidence of seizure activity.  Appears now to have a severe, intractable encephalopathy  - continuing regimen of vimpat, topamax, depakote  - repeat EEG showed no seizures  Stable.  Continue antiseizure medications, seizure precautions.  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  FOBT is negative.   - transfuse if drops below 7  Hemoglobin stable    Gout- (present on admission)  Assessment & Plan  On allopurinol via PEG tube    TB lung, latent- (present on admission)  Assessment & Plan  Quantiferon + , not active TB. Chest CT found positive left-sided consolidation with pleural effusion.  - sputum cx/ AFB stain negative  - s/p thora on 2/25; f/u pathology negative  - ID consulted, signed off  - AFB stain negative x3; remove isolation  - Per ID, we will \"treat for LTBI with  mg PO daily and B6 25 mg PO daily for 9 months if AFB cxs all negative at 6 weeks\"  Completed antibiotics treatment.    Hypokalemia  Assessment & Plan  Patient is end-stage renal disease on hemodialysis, potassium improved.  resolved       VTE prophylaxis: comfort care only      "

## 2020-04-01 NOTE — DISCHARGE PLANNING
Agency/Facility Name: [facility]  Spoke To: Gabi  Outcome: Left vm for intake.     Agency/Facility Name: [facility]  Spoke To: Brittany  Outcome: No available beds.    Agency/Facility Name: [facility]  Spoke To: Luann  Outcome: [facility] resent referral at their request.

## 2020-04-02 NOTE — PROGRESS NOTES
2 RN skin check complete with MIC Alvarez  Devices in place: trach, RUE PICC, R chest HD catheter, L trunk PEG tube, hernandez catheter with stat lock, BMS, wound vac at sacrum, SCDS, heel float boots, bilateral heel mepilex  Skin assessed under devices: yes  Confirmed pressure ulcers found: sacrum , with wound vac  New potential pressure ulcers found: n/a  The following interventions in place: Q2 turns, 2 Rn skin check, pillow repositioning, low airloss mattress, heel float boots, mepilex on bilateral heels, hernandez catheter, BMS, linen changes, trach care    Notes:   -bilateral elbows pink, blanching, intact  -PEG site clean, dry, intact  -scattered bruising on trunk, BLE, BUE  -right hand edema  -bilateral heels boggy, pink, slow to gretta, intact  -wound vac in place at sacrum

## 2020-04-02 NOTE — PROGRESS NOTES
Comfort Care non verbal female,wound vac,trach,hernandez,bowel management system  and PEG in place,bruising to abdomen and UE noted,family at bedside,will continue to monitor.

## 2020-04-02 NOTE — PROGRESS NOTES
[facility] Medicine Daily Progress Note    Date of Service  4/2/2020    Chief Complaint  75 y.o. female admitted 1/16/2020 with AMS    Hospital Course    75 y.o. female admitted 1/16/2020 with a past medical history of hypertension, coronary disease, diabetes, and end-stage renal disease on dialysis brought to the emergency room after apparently she had seizure type activity at her assisted living facility.  She was admitted and underwent continuous EEG monitoring followed by neurology.  Her mentation did not improve and she underwent a tracheostomy on 2/2/2020.  A PEG tube was placed 2/15/2020.  He has had a persistent, severe encephalopathy. End-stage renal disease, continuing dialysis per family preference.  Wound showing very little to no granulation, somewhat worsened per wound care team assessment.  Ethics meeting regarding CODE STATUS: Full CODE STATUS was determined to be inappropriate to the standard of care with almost certain significant possibility of harm to the patient and highly unlikely to provide any benefit. This very difficult decision was made with careful and thorough deliberation.  The patient's CODE STATUS was changed to DNAR/DNI.      Interval Problem Update  COMFORT CARE ONLY    Consultants/Specialty  Critical care, signed off  Neurology, signed off  Infectious disease, signed off  Palliative care, following  Nephrology, following  Ethics, following    Code Status  COMFORT CARE    Disposition  pending    Review of Systems  Review of Systems   Unable to perform ROS: Acuity of condition        Physical Exam  Temp:  [36.2 °C (97.2 °F)-36.9 °C (98.4 °F)] 36.2 °C (97.2 °F)  Pulse:  [70-84] 77  Resp:  [16-19] 16  BP: (133-148)/(52-69) 145/57  SpO2:  [95 %-99 %] 98 %    Physical Exam  Constitutional:       General: She is not in acute distress.     Appearance: She is not toxic-appearing.   HENT:      Head: Normocephalic and atraumatic.      Nose: Nose normal.      Mouth/Throat:      Mouth: Mucous  membranes are dry.   Eyes:      Extraocular Movements: Extraocular movements intact.      Pupils: Pupils are equal, round, and reactive to light.   Neck:      Musculoskeletal: Neck supple.      Comments: Trach in place  Cardiovascular:      Rate and Rhythm: Normal rate and regular rhythm.      Pulses: Normal pulses.      Heart sounds: Normal heart sounds.   Pulmonary:      Effort: No respiratory distress.      Breath sounds: No rales.   Abdominal:      General: Abdomen is flat.      Palpations: Abdomen is soft.   Genitourinary:     Comments: Rectal tube is noted  Musculoskeletal:      Comments: Bed bound   Skin:     General: Skin is warm and dry.   Neurological:      Mental Status: She is disoriented.         Fluids    Intake/Output Summary (Last 24 hours) at 4/2/2020 1034  Last data filed at 4/2/2020 0800  Gross per 24 hour   Intake 90 ml   Output 300 ml   Net -210 ml       Laboratory                        Imaging       Assessment/Plan  * Goals of care, counseling/discussion- (present on admission)  Assessment & Plan  Overall neurologic and physical prognosis is quite poor given her severe, unrelenting encephalopathy and dialysis, she remains unchanged   family meeting 3/12/2020 with no subsequent change of plan of care.  Patient changed to DNR, DNI CODE STATUS per ethics committee meeting 3/20 as appropriate.  Had a prolonged meeting with family and ethics team on 3/27/20 to explain the family about patients grave prognosis as above.   Daughter at this time still believes that there is hope and that HD should be continued, Nephrology Team was also present during the meeting and has clearly told patients daughter that at this time we feel like continuing HD will be causing her more harm than good and because of these reasons we will no longer continue HD     With the ethics, palliative care and nephrology team , decision was made to place patient at Comfort care measures. All team agreed and understood    As per  dr Singh pt is comfort care only now.    D/w palliative nurse and will continue with tube feeding and rectal tube since they are not causing discomfort  And d/w palliative nurse on 4/2 and no change in treatment and as PER Dominik/ the family wants dialysis to resume until family memebers can get here from the  [facility]    Acute respiratory failure with hypoxia (HCC)- (present on admission)  Assessment & Plan   on trach/T-piece,  pulmonology following  Acute on chronic condition now    Pressure injury of sacrum, coccyx, stage 3 (HCC)- (present on admission)  Assessment & Plan  sHe has a substantial right hip decubitus ulcer with a wound VAC followed by wound care, poor tissue integrity and minimal if any wound healing.  Given her inability to move spontaneously, diabetes, dialysis, and poor condition, she is extremely high risk of developing further decubitus ulcers    Severe protein-calorie malnutrition (HCC)- (present on admission)  Assessment & Plan  Continue nutrition via tube feeding.  Dietitian consulted.    Cerebrovascular accident (CVA) due to embolism of right middle cerebral artery (HCC)- (present on admission)  Assessment & Plan  Very poor prognosis.  Severe debility.  Continue aspirin and statin  Pain management    End stage renal failure on dialysis (HCC)- (present on admission)  Assessment & Plan  CC    Hypertension- (present on admission)  Assessment & Plan  She was on Coreg 25 mg twice a day, Cardura 6 mg daily, hydralazine 100 mg 3 times daily  Blood pressure is been low and all BP meds have been stopped  500 mL IV bolus was ordered on 3/11/2020 due to hypotension  Systolic blood pressure now averaging 130s  Continue to hold her blood pressure medicines    Type 2 diabetes mellitus, with long-term current use of insulin ([facility])- (present on admission)  Assessment & Plan  A1C 7.5%, blood sugars have been ~170. Was taking insulin at home.   Continue sliding scale insulin.  Continue tube  "feeding.   Blood sugar is stable.    Leukocytosis  Assessment & Plan  No other signs of infection, probably reactive  White blood cell count remains high  chest x-ray is unchanged, procalcitonin is slightly elevated but patient is end-stage renal disease patient has no fever, UA positive for leukocyte esterase but no other signs of infection.   C. difficile negative     Status epilepticus (HCC)- (present on admission)  Assessment & Plan  On admission, she was intubated for airway protection, now trach since 2/2/20. No evidence of seizure activity.  Appears now to have a severe, intractable encephalopathy  - continuing regimen of vimpat, topamax, depakote  - repeat EEG showed no seizures  Stable.  Continue antiseizure medications, seizure precautions.  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  FOBT is negative.   - transfuse if drops below 7  Hemoglobin stable    Gout- (present on admission)  Assessment & Plan  On allopurinol via PEG tube    TB lung, latent- (present on admission)  Assessment & Plan  Quantiferon + , not active TB. Chest CT found positive left-sided consolidation with pleural effusion.  - sputum cx/ AFB stain negative  - s/p thora on 2/25; f/u pathology negative  - ID consulted, signed off  - AFB stain negative x3; remove isolation  - Per ID, we will \"treat for LTBI with  mg PO daily and B6 25 mg PO daily for 9 months if AFB cxs all negative at 6 weeks\"  Completed antibiotics treatment.    Hypokalemia  Assessment & Plan  Patient is end-stage renal disease on hemodialysis, potassium improved.  resolved       VTE prophylaxis: comfort care only      "

## 2020-04-02 NOTE — PROGRESS NOTES
Received report and assumed care of pt. Assessment complete. Pt remains non-verbal. No signs of discomfort. Trach in place to T-piece @ 28% FiO2. Rectal tube in place. Wound vac in place. PEG in place running impact peptide at goal of 40ml/hr. All pt needs met at this time. Safety precautions and hourly rounding in place.

## 2020-04-02 NOTE — CARE PLAN
Problem: Communication  Goal: The ability to communicate needs accurately and effectively will improve  Outcome: PROGRESSING AS EXPECTED  Note: Encouraged pt to voice feelings     Problem: Safety  Goal: Will remain free from injury  Outcome: PROGRESSING AS EXPECTED  Note: Bed locked and in lowest position.

## 2020-04-03 NOTE — PROGRESS NOTES
I certify that the pt  Requires continued  Medically necessary hospital services for the   Treatment of  Renal failure and end of life. And will remain in the hospital for the next few days.  Discharge plan is anticipated for the next week.

## 2020-04-03 NOTE — DIETARY
"Nutrition Support Weekly Update: Day 78 of admit.  Cassandra Guzmán is a 75 y.o. female with admitting DX of Seizures, Ventilatory respiratory failure, Acute renal failure, Respiratory failure requiring intubation.. Tube feeding initiated on 1/17. Current TF via PEG is Impact Peptide 1.5 @ 40 mL/hr, providing 1440 kcal, 90 gm protein, 134 gm CHO, and 739 mL of free water per day. Pt is also receiving Nutrisource Fiber packets 6x/day, providing an additional 18 gm fiber and 90 kcal, for a total of 1530 kcal/day.      Assessment:  Wt 2/22: 54.5 kg via bed scale - no updated weight.     Evaluation:   1. TF running @ goal.   2. Pt was transitioned to comfort care. Per MD note \"D/w palliative nurse and will continue with tube feeding and rectal tube since they are not causing discomfort\"  3. Pt has trach.   4. Meds: depakote sprinkle, colace, vimpat, topamax, roxicodone (prn)  5. Last BM: 4/2  6. Current feeding remains appropriate. See RD note 2/21 documenting family declining bolus feeds or a more standard formula     Malnutrition Risk: No criteria noted at this time     Recommendations/Plan:  1. Continue TF formula and rate   2. Fluids per MD  3. Monitor weight.      RD following  "

## 2020-04-03 NOTE — PROGRESS NOTES
Patient CC, suctioned multiple times throughout shift, rectal tube flushed according to order, rectal tube producing good output, rectal tube bag changed,  hernandez cath, no residual from tube feeds, tube feeds at 40 ml/r, flushed q4 hours with sterile water, patient comfortable most of shift, q2 turns, oral care performed, tylenol for minimal pain

## 2020-04-03 NOTE — PROGRESS NOTES
Family at bedside at beginning of shift. Updated on plan of care and medications. Pt verbalized understanding. Q2H turns in place. Trach, BMS, PEG, wound vac and hernandez in place. 2 RN skin check completed. Fall precautions in place. Call bell within reach. Hourly rounding completed. Will continue to monitor throughout shift.

## 2020-04-03 NOTE — PROGRESS NOTES
Before giving pt AM medications. This nurse heard pt groaning from outside of room. PRN oxy given. Will continue to monitor pain.

## 2020-04-03 NOTE — CARE PLAN
Problem: Safety  Goal: Will remain free from injury  Outcome: PROGRESSING AS EXPECTED    Bed in locked and lowest position. Bed alarm on. Nonskid socks in place. Will continue to monitor.     Problem: Knowledge Deficit  Goal: Knowledge of disease process/condition, treatment plan, diagnostic tests, and medications will improve  Outcome: PROGRESSING AS EXPECTED    Updated family on plan of care and medications.

## 2020-04-03 NOTE — PROGRESS NOTES
[facility] Medicine Daily Progress Note    Date of Service  4/3/2020    Chief Complaint  75 y.o. female admitted 1/16/2020 with AMS    Hospital Course    75 y.o. female admitted 1/16/2020 with a past medical history of hypertension, coronary disease, diabetes, and end-stage renal disease on dialysis brought to the emergency room after apparently she had seizure type activity at her assisted living facility.  She was admitted and underwent continuous EEG monitoring followed by neurology.  Her mentation did not improve and she underwent a tracheostomy on 2/2/2020.  A PEG tube was placed 2/15/2020.  He has had a persistent, severe encephalopathy. End-stage renal disease, continuing dialysis per family preference.  Wound showing very little to no granulation, somewhat worsened per wound care team assessment.  Ethics meeting regarding CODE STATUS: Full CODE STATUS was determined to be inappropriate to the standard of care with almost certain significant possibility of harm to the patient and highly unlikely to provide any benefit. This very difficult decision was made with careful and thorough deliberation.  The patient's CODE STATUS was changed to DNAR/DNI.      Interval Problem Update  COMFORT CARE ONLY    Consultants/Specialty  Critical care, signed off  Neurology, signed off  Infectious disease, signed off  Palliative care, following  Nephrology, following  Ethics, following    Code Status  COMFORT CARE    Disposition  pending    Review of Systems  Review of Systems   Unable to perform ROS: Acuity of condition        Physical Exam  Temp:  [36.8 °C (98.2 °F)-37.1 °C (98.8 °F)] 37.1 °C (98.8 °F)  Pulse:  [76-89] 76  Resp:  [14-20] 14  BP: (135-141)/(69-76) 135/76  SpO2:  [96 %-99 %] 96 %    Physical Exam  Constitutional:       Appearance: She is not toxic-appearing or diaphoretic.   HENT:      Head: Normocephalic and atraumatic.      Nose: No congestion or rhinorrhea.      Mouth/Throat:      Mouth: Mucous membranes are  dry.   Eyes:      Conjunctiva/sclera: Conjunctivae normal.   Neck:      Musculoskeletal: No neck rigidity.      Comments: Trach in place  Cardiovascular:      Rate and Rhythm: Normal rate and regular rhythm.      Heart sounds: No murmur. No friction rub.   Pulmonary:      Breath sounds: No wheezing or rales.   Abdominal:      General: Bowel sounds are normal.      Palpations: Abdomen is soft.   Genitourinary:     Comments: Rectal tube is noted  Musculoskeletal:      Comments: Bed bound   Skin:     General: Skin is warm and dry.   Neurological:      Mental Status: She is disoriented.         Fluids    Intake/Output Summary (Last 24 hours) at 4/3/2020 0956  Last data filed at 4/3/2020 0317  Gross per 24 hour   Intake 65 ml   Output 725 ml   Net -660 ml       Laboratory                        Imaging       Assessment/Plan  * Goals of care, counseling/discussion- (present on admission)  Assessment & Plan  Overall neurologic and physical prognosis is quite poor given her severe, unrelenting encephalopathy and dialysis, she remains unchanged   family meeting 3/12/2020 with no subsequent change of plan of care.  Patient changed to DNR, DNI CODE STATUS per ethics committee meeting 3/20 as appropriate.  Had a prolonged meeting with family and ethics team on 3/27/20 to explain the family about patients grave prognosis as above.   Daughter at this time still believes that there is hope and that HD should be continued, Nephrology Team was also present during the meeting and has clearly told patients daughter that at this time we feel like continuing HD will be causing her more harm than good and because of these reasons we will no longer continue HD     With the ethics, palliative care and nephrology team , decision was made to place patient at Comfort care measures. All team agreed and understood    As per dr Singh pt is comfort care only now.    D/w palliative nurse and will continue with tube feeding and rectal tube since  they are not causing discomfort  And d/w palliative nurse on 4/2 and no change in treatment and as PER Dominik/ the family wants dialysis to resume until family memebers can get here from the  [facility].  Resting in bed and in no acute distress    Acute respiratory failure with hypoxia (HCC)- (present on admission)  Assessment & Plan   on trach/T-piece,  pulmonology following  Acute on chronic condition now    Pressure injury of sacrum, coccyx, stage 3 (HCC)- (present on admission)  Assessment & Plan  sHe has a substantial right hip decubitus ulcer with a wound VAC followed by wound care, poor tissue integrity and minimal if any wound healing.  Given her inability to move spontaneously, diabetes, dialysis, and poor condition, she is extremely high risk of developing further decubitus ulcers    Severe protein-calorie malnutrition (HCC)- (present on admission)  Assessment & Plan  Continue nutrition via tube feeding.  Dietitian consulted.    Cerebrovascular accident (CVA) due to embolism of right middle cerebral artery (HCC)- (present on admission)  Assessment & Plan  Very poor prognosis.  Severe debility.  Continue aspirin and statin  Pain management    End stage renal failure on dialysis (HCC)- (present on admission)  Assessment & Plan  CC    Hypertension- (present on admission)  Assessment & Plan  She was on Coreg 25 mg twice a day, Cardura 6 mg daily, hydralazine 100 mg 3 times daily  Blood pressure is been low and all BP meds have been stopped  500 mL IV bolus was ordered on 3/11/2020 due to hypotension  Systolic blood pressure now averaging 130s  Continue to hold her blood pressure medicines    Type 2 diabetes mellitus, with long-term current use of insulin ([facility])- (present on admission)  Assessment & Plan  A1C 7.5%, blood sugars have been ~170. Was taking insulin at home.   Continue sliding scale insulin.  Continue tube feeding.   Blood sugar is stable.    Leukocytosis  Assessment & Plan  No other  "signs of infection, probably reactive  White blood cell count remains high  chest x-ray is unchanged, procalcitonin is slightly elevated but patient is end-stage renal disease patient has no fever, UA positive for leukocyte esterase but no other signs of infection.   C. difficile negative     Status epilepticus (HCC)- (present on admission)  Assessment & Plan  On admission, she was intubated for airway protection, now trach since 2/2/20. No evidence of seizure activity.  Appears now to have a severe, intractable encephalopathy  - continuing regimen of vimpat, topamax, depakote  - repeat EEG showed no seizures  Stable.  Continue antiseizure medications, seizure precautions.  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  FOBT is negative.   - transfuse if drops below 7  Hemoglobin stable    Gout- (present on admission)  Assessment & Plan  On allopurinol via PEG tube    TB lung, latent- (present on admission)  Assessment & Plan  Quantiferon + , not active TB. Chest CT found positive left-sided consolidation with pleural effusion.  - sputum cx/ AFB stain negative  - s/p thora on 2/25; f/u pathology negative  - ID consulted, signed off  - AFB stain negative x3; remove isolation  - Per ID, we will \"treat for LTBI with  mg PO daily and B6 25 mg PO daily for 9 months if AFB cxs all negative at 6 weeks\"  Completed antibiotics treatment.    Hypokalemia  Assessment & Plan  Patient is end-stage renal disease on hemodialysis, potassium improved.  resolved       VTE prophylaxis: comfort care only      "

## 2020-04-03 NOTE — CARE PLAN
Problem: Safety  Goal: Will remain free from injury  Outcome: PROGRESSING AS EXPECTED  Goal: Will remain free from falls  Outcome: PROGRESSING AS EXPECTED  Note: Patient bed alarm on, bed in low position, call light in reach, and nonslip socks on.     Problem: Pain Management  Goal: Pain level will decrease to patient's comfort goal  Outcome: PROGRESSING AS EXPECTED  Flowsheets (Taken 4/3/2020 1104)  Non Verbal Scale: Calm  Pain Rating Scale (NPRS): 0  Note: Patient pain assessment frequently, medication to be given as needed.

## 2020-04-03 NOTE — PROGRESS NOTES
2 RN skin check completed with Funmilayo HAILE.  Devices in place BMS, wound vac, River, trach, HD cath and PEG tube.  Skin assessed under devices yes.  Confirmed pressure ulcers found on sacrum.  The following interventions in place: mepliex on B/L heels. Q2H turns. Pillows in place for repositioning. Heel boots. NUBIA mattress.    B/L heels pink, blanching and boggy. B/L elbows pink and blanching. PEG tube site has scabbing around tube. Gauze in place. Scattered bruising. Wound vac to sacral area. Pitting edema to right hand.

## 2020-04-04 NOTE — PROGRESS NOTES
Pt resting in bed throughout shift. Pt resting comfortably in bed. Pt did not appear to be in pain. Family at bedside at beginning of shift. Updated family on plan of care and medications. Tube feed running at 40 ml/hr at goal. Suctioned pt as needed. Q2H turns in place. 2 RN skin check completed. Fall precautions in place. Call bell within reach. Hourly rounding completed. Will continue to monitor.

## 2020-04-04 NOTE — PROGRESS NOTES
2 RN skin check completed with Funmilayo HAILE.  Devices in place BMS, wound vac, River, trach, HD cath and PEG tube.  Skin assessed under devices yes.  Confirmed pressure ulcers found on sacrum.  The following interventions in place: mepliex on B/L heels. Q2H turns. Pillows in place for repositioning. Heel boots. NUBIA mattress.     B/L heels pink, blanching and boggy. B/L elbows pink and blanching. PEG tube site has scabbing around tube. Gauze in place. Scattered bruising. Wound vac to sacral area. Open area under wound vac. Z guard applied. Pitting edema to right hand.

## 2020-04-04 NOTE — PROGRESS NOTES
[facility] Medicine Daily Progress Note    Date of Service  4/4/2020    Chief Complaint  75 y.o. female admitted 1/16/2020 with AMS    Hospital Course    75 y.o. female admitted 1/16/2020 with a past medical history of hypertension, coronary disease, diabetes, and end-stage renal disease on dialysis brought to the emergency room after apparently she had seizure type activity at her assisted living facility.  She was admitted and underwent continuous EEG monitoring followed by neurology.  Her mentation did not improve and she underwent a tracheostomy on 2/2/2020.  A PEG tube was placed 2/15/2020.  He has had a persistent, severe encephalopathy. End-stage renal disease, continuing dialysis per family preference.  Wound showing very little to no granulation, somewhat worsened per wound care team assessment.  Ethics meeting regarding CODE STATUS: Full CODE STATUS was determined to be inappropriate to the standard of care with almost certain significant possibility of harm to the patient and highly unlikely to provide any benefit. This very difficult decision was made with careful and thorough deliberation.  The patient's CODE STATUS was changed to DNAR/DNI.      Interval Problem Update  COMFORT CARE ONLY    Consultants/Specialty  Critical care, signed off  Neurology, signed off  Infectious disease, signed off  Palliative care, following  Nephrology, following  Ethics, following    Code Status  COMFORT CARE    Disposition  pending    Review of Systems  Review of Systems   Unable to perform ROS: Acuity of condition        Physical Exam  Temp:  [36.6 °C (97.8 °F)-36.8 °C (98.2 °F)] 36.8 °C (98.2 °F)  Pulse:  [65-85] 77  Resp:  [12-18] 14  BP: (107-121)/(59-71) 118/60  SpO2:  [95 %-97 %] 97 %    Physical Exam  Constitutional:       General: She is not in acute distress.     Appearance: She is not diaphoretic.   HENT:      Head: Normocephalic and atraumatic.      Nose: Nose normal.      Mouth/Throat:      Mouth: Mucous  membranes are dry.   Eyes:      Extraocular Movements: Extraocular movements intact.      Pupils: Pupils are equal, round, and reactive to light.   Neck:      Musculoskeletal: Normal range of motion and neck supple.      Comments: Trach in place  Cardiovascular:      Rate and Rhythm: Normal rate and regular rhythm.      Pulses: Normal pulses.      Heart sounds: Normal heart sounds.   Pulmonary:      Breath sounds: No wheezing or rhonchi.   Abdominal:      General: There is no distension.      Palpations: Abdomen is soft.      Tenderness: There is no abdominal tenderness.   Genitourinary:     Comments: Rectal tube is noted  Musculoskeletal: Normal range of motion.      Comments: Bed bound   Skin:     General: Skin is warm.      Coloration: Skin is not jaundiced.   Neurological:      Mental Status: She is disoriented.         Fluids    Intake/Output Summary (Last 24 hours) at 4/4/2020 0748  Last data filed at 4/4/2020 0620  Gross per 24 hour   Intake 630 ml   Output 1125 ml   Net -495 ml       Laboratory                        Imaging       Assessment/Plan  * Goals of care, counseling/discussion- (present on admission)  Assessment & Plan  Overall neurologic and physical prognosis is quite poor given her severe, unrelenting encephalopathy and dialysis, she remains unchanged   family meeting 3/12/2020 with no subsequent change of plan of care.  Patient changed to DNR, DNI CODE STATUS per ethics committee meeting 3/20 as appropriate.  Had a prolonged meeting with family and ethics team on 3/27/20 to explain the family about patients grave prognosis as above.   Daughter at this time still believes that there is hope and that HD should be continued, Nephrology Team was also present during the meeting and has clearly told patients daughter that at this time we feel like continuing HD will be causing her more harm than good and because of these reasons we will no longer continue HD     With the ethics, palliative care and  nephrology team , decision was made to place patient at Comfort care measures. All team agreed and understood    As per dr Singh pt is comfort care only now.    D/w palliative nurse and will continue with tube feeding and rectal tube since they are not causing discomfort  And d/w palliative nurse on 4/2 and no change in treatment and as PER Dominik/ the family wants dialysis to resume until family memebers can get here from the  [facility].  In no acute distress    Acute respiratory failure with hypoxia (HCC)- (present on admission)  Assessment & Plan   on trach/T-piece,  pulmonology following  Acute on chronic condition now    Pressure injury of sacrum, coccyx, stage 3 (HCC)- (present on admission)  Assessment & Plan  sHe has a substantial right hip decubitus ulcer with a wound VAC followed by wound care, poor tissue integrity and minimal if any wound healing.  Given her inability to move spontaneously, diabetes, dialysis, and poor condition, she is extremely high risk of developing further decubitus ulcers    Severe protein-calorie malnutrition (HCC)- (present on admission)  Assessment & Plan  Continue nutrition via tube feeding.  Dietitian consulted.    Cerebrovascular accident (CVA) due to embolism of right middle cerebral artery (HCC)- (present on admission)  Assessment & Plan  Very poor prognosis.  Severe debility.  Continue aspirin and statin  Pain management    End stage renal failure on dialysis (HCC)- (present on admission)  Assessment & Plan  CC    Hypertension- (present on admission)  Assessment & Plan  She was on Coreg 25 mg twice a day, Cardura 6 mg daily, hydralazine 100 mg 3 times daily  Blood pressure is been low and all BP meds have been stopped  500 mL IV bolus was ordered on 3/11/2020 due to hypotension  Systolic blood pressure now averaging 130s  Continue to hold her blood pressure medicines    Type 2 diabetes mellitus, with long-term current use of insulin (HCC)- (present on  "admission)  Assessment & Plan  A1C 7.5%, blood sugars have been ~170. Was taking insulin at home.   Continue sliding scale insulin.  Continue tube feeding.   Blood sugar is stable.    Leukocytosis  Assessment & Plan  No other signs of infection, probably reactive  White blood cell count remains high  chest x-ray is unchanged, procalcitonin is slightly elevated but patient is end-stage renal disease patient has no fever, UA positive for leukocyte esterase but no other signs of infection.   C. difficile negative     Status epilepticus (HCC)- (present on admission)  Assessment & Plan  On admission, she was intubated for airway protection, now trach since 2/2/20. No evidence of seizure activity.  Appears now to have a severe, intractable encephalopathy  - continuing regimen of vimpat, topamax, depakote  - repeat EEG showed no seizures  Stable.  Continue antiseizure medications, seizure precautions.  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  FOBT is negative.   - transfuse if drops below 7  Hemoglobin stable    Gout- (present on admission)  Assessment & Plan  On allopurinol via PEG tube    TB lung, latent- (present on admission)  Assessment & Plan  Quantiferon + , not active TB. Chest CT found positive left-sided consolidation with pleural effusion.  - sputum cx/ AFB stain negative  - s/p thora on 2/25; f/u pathology negative  - ID consulted, signed off  - AFB stain negative x3; remove isolation  - Per ID, we will \"treat for LTBI with  mg PO daily and B6 25 mg PO daily for 9 months if AFB cxs all negative at 6 weeks\"  Completed antibiotics treatment.    Hypokalemia  Assessment & Plan  Patient is end-stage renal disease on hemodialysis, potassium improved.  resolved       VTE prophylaxis: " comfort care only

## 2020-04-04 NOTE — FLOWSHEET NOTE
04/04/20 0307   Vital Signs   Pulse 85   Respiration 16   Pulse Oximetry 95 %   Chest Exam   Work Of Breathing / Effort Mild   Breath Sounds   RUL Breath Sounds Clear After Suction   RML Breath Sounds Clear After Suction;Diminished   RLL Breath Sounds Diminished   DEREK Breath Sounds Clear After Suction   LLL Breath Sounds Diminished   Secretions   Cough Productive   How Sputum Obtained Tracheal;Suction   Sputum Amount Moderate   Sputum Color Tan;Yellow   Sputum Consistency Thick   Airway Trach Tracheostomy 6.0   Placement Date/Time: 03/03/20 1601   Airway Type: Trach  Brand: Alexx  Style: Cuffed  Airway Location: Tracheostomy  Airway Size: 6.0  Inserted In: Unit  Inserted by: Respiratory care practitioner   Site Assessment Intact   Airway Tube Secured Velcro attachment   Extra Tracheostomy Tube at Bedside Yes   Oxygen   O2 (LPM) 4   FiO2% 28 %   O2 Delivery Device T-Piece   Aerosols   $ Aerosol Delivery Device Heated T-Piece   Aerosol Temperature 35 °C (95 °F)

## 2020-04-04 NOTE — PROGRESS NOTES
2 RN skin check completed with Corby HAILE    Devices in place: BMS, wound vac, River, trach, HD cath, PICC and PEG tube.  Skin assessed under devices: Yes.  Confirmed pressure ulcers: Sacrum.  The following interventions in place: Mepliex on bilat heels and elbows. Q2 hour turns. Pillows in place for positioning, Heel float boots, NUBIA mattress.     Bilat heels pink, blanching and boggy.   Bilat elbows pink and blanching.   PEG tube site has scabbing around tube, gauze in place.   Generalized bruising.   Wound vac to sacral area.  Pitting edema to RUE.

## 2020-04-04 NOTE — PROGRESS NOTES
Pt cont. On CMO. Pt is obtunded, opening eyes spontaneously but will not follow commands even with just eyes. Pt has no response to any stimuli. VS WNL/baseline, patient skilled for daily nursing care and observation on my shift. Pt cont with trach @ 4L via T tube; Pt has port to right side dsg C/D/I. Pt has double lumen PICC, no adverse s/s noted, dsg C/D/I. Pt cont with hernandez and BMS, no adverse s/s noted. Pt's wound vac in place, barrier cream applied to excoriated skin around wound vac. Pt cont on tube feed at goal rate 40mL/hour. No adverse s/s noted. Pt turned C1eyasd. Skin protection measures in place. Call bell within reach, Safety maintained on my shift.

## 2020-04-04 NOTE — CARE PLAN
Problem: Safety  Goal: Will remain free from injury  Outcome: PROGRESSING AS EXPECTED    Bed in locked and lowest position. Nonskid socks in place. Will continue to monitor.     Problem: Knowledge Deficit  Goal: Knowledge of disease process/condition, treatment plan, diagnostic tests, and medications will improve  Outcome: PROGRESSING AS EXPECTED    Updated family on plan of care and medications.

## 2020-04-04 NOTE — PROGRESS NOTES
2 RN skin check completed with Israel HAILE.  Devices in place BMS, wound vac, River, trach, HD cath and PEG tube.  Skin assessed under devices yes.  Confirmed pressure ulcers- sacrum.  The following interventions in place: mepliex on B/L heels. Q2H turns. Pillows in place for repositioning, Heel float boots, NUBIA mattress.    B/L heels pink, blanching and boggy. B/L elbows pink and blanching. PEG tube site has scabbing around tube. Gauze in place. Generalized bruising. Wound vac to sacral area, pitting edema to right hand.

## 2020-04-04 NOTE — PROGRESS NOTES
Pt A&Ox0. VSS. Assumed care for pt @ 0000. TF @ goal rate 40ml/hr running through PEG tube. Pt shows no s/s pain/discomfort. Turned Q2hr, frequent suctioning done. Rectal tube flushed per protocol. River catheter in place, yellow urine. Oral care performed. Hourly rounding done. Currently resting in bed, locked in lowest position.

## 2020-04-05 NOTE — PROGRESS NOTES
Pt A&Ox0. VSS. Pt obtunded, opens eyes spontaneously, does not follow commands. Occasionally with track with eyes in room. Trach care done, on 4L O2, FiO2 @ 28%. Dialysis cath dressing and PICC line dressing changed per protocol. No s/s infection noted. New dressings CDI. River cath in place, maida urine noted. BMS in place, flushed per protocol. Wound vac in place on sacrum, barrier cream applied to excoriated skin under vac near rectum. Tube feeds continue, Impact Peptide 1.5 @ goal rate 40ml/hr. Pt turned Q2hrs and PRN as needed. All skin protection measures in place. Mepilex to bony prominences, NUBIA mattress, boots in place. SCDs on. Pt currently resting in bed @ this time, locked in lowest position. Call light in reach. Will continue to monitor.

## 2020-04-05 NOTE — PROGRESS NOTES
Pt cont. On CMO. Pt is obtunded, opening eyes spontaneously but will not follow commands even with just eyes. Pt has no response to any stimuli. VS WNL/baseline, patient skilled for daily nursing care and observation on my shift. Pt cont with trach @ 4L via T tube; Pt has port to right side dsg C/D/I. Pt has double lumen PICC, no adverse s/s noted, dsg C/D/I. Pt cont with hernandez and BMS, no adverse s/s noted. Pt's wound vac in place, barrier cream applied to excoriated skin around wound vac. Pt cont on tube feed at goal rate 40mL/hour. No adverse s/s noted. Pt turned [id number]. Skin protection measures in place. Call bell within reach, Safety maintained on my shift.

## 2020-04-05 NOTE — PROGRESS NOTES
2 RN Skin Check    2 RN skin check complete with Amparo HAILE.   Devices in place: SCDs and BMS, Sacral wound vac, River cath, trach, HD cath, PICC, PEG tube.  Skin assessed under devices: yes.  Confirmed pressure ulcers found on: Sacrum.  New potential pressure ulcers noted on N/A. Wound consult placed No.  The following interventions in place Pillows, Mepilex, Lotion, Barrier cream, Heel float boots, Waffle bed overlay and Q2hr turns, 2RN skin checks, NUBIA mattress.      Bilat heels pink, blanchable, mepilex for protection, boots in place.   Elbows pink, blanchable, mepilex in place for protection.  PEG tube site has scabbing, cleaned with NS, gauze padding replaced.  Scattered bruises noted to BUE and BLE extremities, lower abdomen.  Wound vac to sacral area, small open area noted below wound vac towards rectum. Z guard paste applied.   Right hand 2+ pitting edema, elevated on pillow.

## 2020-04-05 NOTE — WOUND TEAM
[facility]  Inpatient Services   Wound and Skin Care Progress Note    HPI, PMH, SH: Reviewed    Unit where seen by Wound Team: [id number]     WOUND CONSULT RELATED TO:  Scheduled VAC dressing change      Self Report / Pain Level:  Tylenol by RN. Pt tense with turns and dressing change.    OBJECTIVE: patient on low air loss bed, heel float boots on bilaterally. Patient turned with pillows used to position patient.      WOUND TYPE, LOCATION, CHARACTERISTICS (Pressure Injuries: location, stage, POA or date identified)    Pressure Injury 01/16/20 Sacrum;Coccyx stage 4 3/5/2020 (Active)   Wound Image   4/4/2020    Pressure Injury Stage 4 4/4/2020   State of Healing Non-healing;Undermining    Site Assessment White;Pink;Slough;Non-healing    Periwound Assessment Denuded;Purple;Fragile    Margins Defined edges;Unattached edges    Wound Length (cm) 5.5 cm    Wound Width (cm) 3.5 cm    Wound Depth (cm) 2.1 cm    Wound Surface Area (cm^2) 19.25 cm^2    Wound Volume (cm^3) 40.42 cm^3    Tunneling (cm) 3.5 cm    Undermining (cm) 3 cm    Closure Secondary intention    Drainage Amount Moderate    Drainage Description Serosanguineous    Non-staged Wound Description Not applicable    Treatments Cleansed;Site care    Wound Cleansing Approved Wound Cleanser    Periwound Protectant Barrier Paste;Benzoin;Drape    Dressing Options Wound Vac    Dressing Cleansing/Solutions Not Applicable    Dressing Changed Changed    Dressing Status Dry;Clean;Intact    Dressing Change/Treatment Frequency By Wound Team Only    NEXT Dressing Change/Treatment Date 04/07/20    NEXT Weekly Photo (Inpatient Only) 04/11/20    Wound Odor Mild    Pulses N/A    Exposed Structures Connective tissue;Bone    WOUND NURSE ONLY - Tissue Type and Percentage 90% yellow, 10% red       Negative Pressure Wound Therapy 03/07/20 Pressure injury: stage 4 (Active)   NPWT Pump Mode / Pressure Setting Ulta;Continuous;125 mmHg 4/4/2020     Dressing Type Medium;Black  Foam (Regular)    Number of Foam Pieces Used 3    Canister Changed Yes    NEXT Dressing Change/Treatment Date 04/07/20       Lab Values:    Lab Results   Component Value Date/Time    WBC 14.4 (H) 03/21/2020 06:10 AM    RBC 2.64 (L) 03/21/2020 06:10 AM    HEMOGLOBIN 8.4 (L) 03/21/2020 06:10 AM    HEMATOCRIT 26.2 (L) 03/21/2020 06:10 AM        Results from last 7 days   Lab Units 03/30/20  0300   C REACTIVE PROTEIN 4596 mg/dL 4.13*           Lab Results   Component Value Date/Time    HBA1C 7.5 (H) 02/07/2019 01:20 PM         INTERVENTIONS BY WOUND TEAM:  Patient turned to L side for sacral vac dressing change. Dressing removed. Wound and suleman wound cleaned with wound cleanser and gauze. Applied benzoin to all areas where drape to be placed. hydrofiber silver to denuded areas of periwound, paste ring to fragile suleman wound areas. 1 piece of black foam placed into wound bed, 1 strip for bridge over to right lateral hip, and 1 piece for button. This was all draped. Trac pad placed over button, dressing seal achieved at 125 mmHg continuous. Patient repositioned with pillow under left hip, heel float boots in place.     Interdisciplinary consultation: Patient, Bedside RN (Penelope)    EVALUATION: NPWT for comfort: change 2 x a week.    Goals: Steady decrease in wound area and depth weekly.    NURSING PLAN OF CARE ORDERS (X):  Dressing changes: See Dressing Care orders: X  Skin care: See Skin Care orders: X  Rectal tube care: See Rectal Tube Care orders:        Other orders:     WOUND TEAM PLAN OF CARE (X):   Dressing changes by wound team:          Follow up 1-2 times weekly:     X for NPWT dressing changes Tuesday and saturday          Follow up 3 times weekly:                NPWT change 3 times weekly:     Follow up as needed:       Other (explain):

## 2020-04-05 NOTE — FLOWSHEET NOTE
04/05/20 0315   Vital Signs   Pulse 74   Respiration 16   Pulse Oximetry 96 %   Chest Exam   Work Of Breathing / Effort Mild   Breath Sounds   RUL Breath Sounds Clear After Suction   RML Breath Sounds Clear After Suction;Diminished   RLL Breath Sounds Diminished   DEREK Breath Sounds Clear After Suction   LLL Breath Sounds Diminished   Secretions   Cough Productive;Moist   How Sputum Obtained Tracheal;Suction   Sputum Amount Small   Sputum Color Yellow;White   Sputum Consistency Thick   Airway Trach Tracheostomy 6.0   Placement Date/Time: 03/03/20 1601   Airway Type: Trach  Brand: Alexx  Style: Cuffed  Airway Location: Tracheostomy  Airway Size: 6.0  Inserted In: Unit  Inserted by: Respiratory care practitioner   Site Assessment Intact   Airway Tube Secured Velcro attachment   Cuffless No   Cuff Pressure cmH2O (R.C.)   (deflated)   Extra Tracheostomy Tube at Bedside Yes   Oxygen   O2 (LPM) 4   FiO2% 28 %   O2 Delivery Device T-Piece   Aerosols   $ Aerosol Delivery Device Heated T-Piece   Aerosol Temperature 35 °C (95 °F)

## 2020-04-05 NOTE — PROGRESS NOTES
[facility] Medicine Daily Progress Note    Date of Service  4/5/2020    Chief Complaint  75 y.o. female admitted 1/16/2020 with AMS    Hospital Course    75 y.o. female admitted 1/16/2020 with a past medical history of hypertension, coronary disease, diabetes, and end-stage renal disease on dialysis brought to the emergency room after apparently she had seizure type activity at her assisted living facility.  She was admitted and underwent continuous EEG monitoring followed by neurology.  Her mentation did not improve and she underwent a tracheostomy on 2/2/2020.  A PEG tube was placed 2/15/2020.  He has had a persistent, severe encephalopathy. End-stage renal disease, continuing dialysis per family preference.  Wound showing very little to no granulation, somewhat worsened per wound care team assessment.  Ethics meeting regarding CODE STATUS: Full CODE STATUS was determined to be inappropriate to the standard of care with almost certain significant possibility of harm to the patient and highly unlikely to provide any benefit. This very difficult decision was made with careful and thorough deliberation.  The patient's CODE STATUS was changed to DNAR/DNI.      Interval Problem Update  COMFORT CARE ONLY    Consultants/Specialty  Critical care, signed off  Neurology, signed off  Infectious disease, signed off  Palliative care, following  Nephrology, following  Ethics, following    Code Status  COMFORT CARE    Disposition  pending    Review of Systems  Review of Systems   Unable to perform ROS: Acuity of condition        Physical Exam  Temp:  [35.9 °C (96.6 °F)-36.3 °C (97.3 °F)] 35.9 °C (96.6 °F)  Pulse:  [48-84] 68  Resp:  [14-16] 14  BP: (127-162)/(48-72) 129/48  SpO2:  [96 %-99 %] 99 %    Physical Exam  Constitutional:       Appearance: She is not toxic-appearing or diaphoretic.   HENT:      Head: Normocephalic and atraumatic.      Nose: No congestion or rhinorrhea.      Mouth/Throat:      Mouth: Mucous membranes are  dry.   Eyes:      Conjunctiva/sclera: Conjunctivae normal.   Neck:      Musculoskeletal: No neck rigidity.      Comments: Trach in place  Cardiovascular:      Rate and Rhythm: Normal rate and regular rhythm.      Heart sounds: No friction rub.   Pulmonary:      Effort: No respiratory distress.      Breath sounds: No rhonchi.   Abdominal:      General: Bowel sounds are normal.      Palpations: Abdomen is soft.   Genitourinary:     Comments: Rectal tube is noted  Musculoskeletal: Normal range of motion.      Comments: Bed bound   Skin:     General: Skin is dry.      Coloration: Skin is not jaundiced.   Neurological:      Mental Status: She is disoriented.         Fluids    Intake/Output Summary (Last 24 hours) at 4/5/2020 0900  Last data filed at 4/5/2020 0725  Gross per 24 hour   Intake 990 ml   Output 1000 ml   Net -10 ml       Laboratory                        Imaging       Assessment/Plan  * Goals of care, counseling/discussion- (present on admission)  Assessment & Plan  Overall neurologic and physical prognosis is quite poor given her severe, unrelenting encephalopathy and dialysis, she remains unchanged   family meeting 3/12/2020 with no subsequent change of plan of care.  Patient changed to DNR, DNI CODE STATUS per ethics committee meeting 3/20 as appropriate.  Had a prolonged meeting with family and ethics team on 3/27/20 to explain the family about patients grave prognosis as above.   Daughter at this time still believes that there is hope and that HD should be continued, Nephrology Team was also present during the meeting and has clearly told patients daughter that at this time we feel like continuing HD will be causing her more harm than good and because of these reasons we will no longer continue HD     With the ethics, palliative care and nephrology team , decision was made to place patient at Comfort care measures. All team agreed and understood    As per dr Singh pt is comfort care only now.    D/w  palliative nurse and will continue with tube feeding and rectal tube since they are not causing discomfort  And d/w palliative nurse on 4/2 and no change in treatment and as PER Dominik/ the family wants dialysis to resume until family memebers can get here from the  [facility].  Pt appears to be comfortable in bed.    Acute respiratory failure with hypoxia (HCC)- (present on admission)  Assessment & Plan   on trach/T-piece,  pulmonology following  Acute on chronic condition now    Pressure injury of sacrum, coccyx, stage 3 (HCC)- (present on admission)  Assessment & Plan  sHe has a substantial right hip decubitus ulcer with a wound VAC followed by wound care, poor tissue integrity and minimal if any wound healing.  Given her inability to move spontaneously, diabetes, dialysis, and poor condition, she is extremely high risk of developing further decubitus ulcers    Severe protein-calorie malnutrition (HCC)- (present on admission)  Assessment & Plan  Continue nutrition via tube feeding.  Dietitian consulted.    Cerebrovascular accident (CVA) due to embolism of right middle cerebral artery (HCC)- (present on admission)  Assessment & Plan  Very poor prognosis.  Severe debility.  Continue aspirin and statin  Pain management    End stage renal failure on dialysis (HCC)- (present on admission)  Assessment & Plan  CC    Hypertension- (present on admission)  Assessment & Plan  She was on Coreg 25 mg twice a day, Cardura 6 mg daily, hydralazine 100 mg 3 times daily  Blood pressure is been low and all BP meds have been stopped  500 mL IV bolus was ordered on 3/11/2020 due to hypotension  Systolic blood pressure now averaging 130s  Continue to hold her blood pressure medicines    Type 2 diabetes mellitus, with long-term current use of insulin ([facility])- (present on admission)  Assessment & Plan  A1C 7.5%, blood sugars have been ~170. Was taking insulin at home.   Continue sliding scale insulin.  Continue tube feeding.  "  Blood sugar is stable.    Leukocytosis  Assessment & Plan  No other signs of infection, probably reactive  White blood cell count remains high  chest x-ray is unchanged, procalcitonin is slightly elevated but patient is end-stage renal disease patient has no fever, UA positive for leukocyte esterase but no other signs of infection.   C. difficile negative     Status epilepticus (HCC)- (present on admission)  Assessment & Plan  On admission, she was intubated for airway protection, now trach since 2/2/20. No evidence of seizure activity.  Appears now to have a severe, intractable encephalopathy  - continuing regimen of vimpat, topamax, depakote  - repeat EEG showed no seizures  Stable.  Continue antiseizure medications, seizure precautions.  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  FOBT is negative.   - transfuse if drops below 7  Hemoglobin stable    Gout- (present on admission)  Assessment & Plan  On allopurinol via PEG tube    TB lung, latent- (present on admission)  Assessment & Plan  Quantiferon + , not active TB. Chest CT found positive left-sided consolidation with pleural effusion.  - sputum cx/ AFB stain negative  - s/p thora on 2/25; f/u pathology negative  - ID consulted, signed off  - AFB stain negative x3; remove isolation  - Per ID, we will \"treat for LTBI with  mg PO daily and B6 25 mg PO daily for 9 months if AFB cxs all negative at 6 weeks\"  Completed antibiotics treatment.    Hypokalemia  Assessment & Plan  Patient is end-stage renal disease on hemodialysis, potassium improved.  resolved       VTE prophylaxis: comfort care only      "

## 2020-04-06 NOTE — PROGRESS NOTES
2 RN Skin Check    2 RN skin check complete with Christy HAILE.   Devices in place: SCDs and PICC line, PEG tube, Trach, Boots, HD cath, River cath, BMS, wound vac.  Skin assessed under devices: yes.  Confirmed pressure ulcers found on: Sacrum.  New potential pressure ulcers noted on N/A. Wound consult placed No.  The following interventions in place Pillows, Mepilex, Lotion, Barrier cream, Heel float boots, Waffle bed overlay and NUBIA mattress, Q2hr turns, 2 RN skin check.    Bilat heels pink, blanchable, mepilex for protection, boots in place.  Elbows pink, blanchable, mepilex in place for protection.  PEG tube site scabbing, cleansed with NS, gauze padding replaced.  Scattered bruises noted to BUE and BLE, lower abdomen.  Scab noted on left upper chest.  Wound vac to sacral area, small open area noted below wound vac near rectum. Z guard paste applied.  Right hand 2+ pitting edema, elevated on pillow.

## 2020-04-06 NOTE — PROGRESS NOTES
[facility] Medicine Daily Progress Note    Date of Service  4/6/2020    Chief Complaint  75 y.o. female admitted 1/16/2020 with AMS    Hospital Course    75 y.o. female admitted 1/16/2020 with a past medical history of hypertension, coronary disease, diabetes, and end-stage renal disease on dialysis brought to the emergency room after apparently she had seizure type activity at her assisted living facility.  She was admitted and underwent continuous EEG monitoring followed by neurology.  Her mentation did not improve and she underwent a tracheostomy on 2/2/2020.  A PEG tube was placed 2/15/2020.  He has had a persistent, severe encephalopathy. End-stage renal disease, continuing dialysis per family preference.  Wound showing very little to no granulation, somewhat worsened per wound care team assessment.  Ethics meeting regarding CODE STATUS: Full CODE STATUS was determined to be inappropriate to the standard of care with almost certain significant possibility of harm to the patient and highly unlikely to provide any benefit. This very difficult decision was made with careful and thorough deliberation.  The patient's CODE STATUS was changed to DNAR/DNI.      Interval Problem Update  COMFORT CARE ONLY    Consultants/Specialty  Critical care, signed off  Neurology, signed off  Infectious disease, signed off  Palliative care, following  Nephrology, following  Ethics, following    Code Status  COMFORT CARE    Disposition  pending    Review of Systems  Review of Systems   Unable to perform ROS: Acuity of condition        Physical Exam  Temp:  [36.2 °C (97.2 °F)-36.6 °C (97.8 °F)] 36.2 °C (97.2 °F)  Pulse:  [63-84] 78  Resp:  [16-20] 16  BP: (113-154)/(48-59) 132/50  SpO2:  [96 %-100 %] 99 %    Physical Exam  Constitutional:       General: She is not in acute distress.     Appearance: She is not toxic-appearing.   HENT:      Head: Normocephalic and atraumatic.      Nose: Nose normal.      Mouth/Throat:      Mouth: Mucous  membranes are dry.   Eyes:      Conjunctiva/sclera: Conjunctivae normal.      Pupils: Pupils are equal, round, and reactive to light.   Neck:      Musculoskeletal: Normal range of motion.      Comments: Trach in place  Cardiovascular:      Rate and Rhythm: Normal rate and regular rhythm.      Pulses: Normal pulses.      Heart sounds: Normal heart sounds. No friction rub.   Pulmonary:      Effort: No respiratory distress.      Breath sounds: No stridor.   Abdominal:      General: Bowel sounds are normal.      Palpations: Abdomen is soft.   Genitourinary:     Comments: Rectal tube is noted  Musculoskeletal: Normal range of motion.      Comments: Bed bound   Skin:     General: Skin is warm and dry.   Neurological:      Mental Status: She is disoriented.         Fluids    Intake/Output Summary (Last 24 hours) at 4/6/2020 1150  Last data filed at 4/6/2020 0915  Gross per 24 hour   Intake 1470 ml   Output 150 ml   Net 1320 ml       Laboratory                        Imaging       Assessment/Plan  * Goals of care, counseling/discussion- (present on admission)  Assessment & Plan  Overall neurologic and physical prognosis is quite poor given her severe, unrelenting encephalopathy and dialysis, she remains unchanged   family meeting 3/12/2020 with no subsequent change of plan of care.  Patient changed to DNR, DNI CODE STATUS per ethics committee meeting 3/20 as appropriate.  Had a prolonged meeting with family and ethics team on 3/27/20 to explain the family about patients grave prognosis as above.   Daughter at this time still believes that there is hope and that HD should be continued, Nephrology Team was also present during the meeting and has clearly told patients daughter that at this time we feel like continuing HD will be causing her more harm than good and because of these reasons we will no longer continue HD     With the ethics, palliative care and nephrology team , decision was made to place patient at Comfort care  measures. All team agreed and understood    As per dr Singh pt is comfort care only now.    D/w palliative nurse and will continue with tube feeding and rectal tube since they are not causing discomfort  And d/w palliative nurse on 4/2 and no change in treatment and as PER Dominik/ the family wants dialysis to resume until family memebers can get here from the  [facility].  In no acute distress    Acute respiratory failure with hypoxia (HCC)- (present on admission)  Assessment & Plan   on trach/T-piece,  pulmonology following  Acute on chronic condition now    Pressure injury of sacrum, coccyx, stage 3 (HCC)- (present on admission)  Assessment & Plan  sHe has a substantial right hip decubitus ulcer with a wound VAC followed by wound care, poor tissue integrity and minimal if any wound healing.  Given her inability to move spontaneously, diabetes, dialysis, and poor condition, she is extremely high risk of developing further decubitus ulcers    Severe protein-calorie malnutrition (HCC)- (present on admission)  Assessment & Plan  Continue nutrition via tube feeding.  Dietitian consulted.    Cerebrovascular accident (CVA) due to embolism of right middle cerebral artery (HCC)- (present on admission)  Assessment & Plan  Very poor prognosis.  Severe debility.  Continue aspirin and statin  Pain management    End stage renal failure on dialysis (HCC)- (present on admission)  Assessment & Plan  CC    Hypertension- (present on admission)  Assessment & Plan  She was on Coreg 25 mg twice a day, Cardura 6 mg daily, hydralazine 100 mg 3 times daily  Blood pressure is been low and all BP meds have been stopped  500 mL IV bolus was ordered on 3/11/2020 due to hypotension  Systolic blood pressure now averaging 130s  Continue to hold her blood pressure medicines    Type 2 diabetes mellitus, with long-term current use of insulin (HCC)- (present on admission)  Assessment & Plan  A1C 7.5%, blood sugars have been ~170. Was  "taking insulin at home.   Continue sliding scale insulin.  Continue tube feeding.   Blood sugar is stable.    Leukocytosis  Assessment & Plan  No other signs of infection, probably reactive  White blood cell count remains high  chest x-ray is unchanged, procalcitonin is slightly elevated but patient is end-stage renal disease patient has no fever, UA positive for leukocyte esterase but no other signs of infection.   C. difficile negative     Status epilepticus (HCC)- (present on admission)  Assessment & Plan  On admission, she was intubated for airway protection, now trach since 2/2/20. No evidence of seizure activity.  Appears now to have a severe, intractable encephalopathy  - continuing regimen of vimpat, topamax, depakote  - repeat EEG showed no seizures  Stable.  Continue antiseizure medications, seizure precautions.  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  FOBT is negative.   - transfuse if drops below 7  Hemoglobin stable    Gout- (present on admission)  Assessment & Plan  On allopurinol via PEG tube    TB lung, latent- (present on admission)  Assessment & Plan  Quantiferon + , not active TB. Chest CT found positive left-sided consolidation with pleural effusion.  - sputum cx/ AFB stain negative  - s/p thora on 2/25; f/u pathology negative  - ID consulted, signed off  - AFB stain negative x3; remove isolation  - Per ID, we will \"treat for LTBI with  mg PO daily and B6 25 mg PO daily for 9 months if AFB cxs all negative at 6 weeks\"  Completed antibiotics treatment.    Hypokalemia  Assessment & Plan  Patient is end-stage renal disease on hemodialysis, potassium improved.  resolved       VTE prophylaxis: comfort care only      "

## 2020-04-06 NOTE — CARE PLAN
Problem: Communication  Goal: The ability to communicate needs accurately and effectively will improve  Outcome: NOT MET  Pt unable to communicate verbally or nonverbally.     Problem: Venous Thromboembolism (VTW)/Deep Vein Thrombosis (DVT) Prevention:  Goal: Patient will participate in Venous Thrombosis (VTE)/Deep Vein Thrombosis (DVT)Prevention Measures  Outcome: PROGRESSING AS EXPECTED   Pt cont with DVT prophylaxis, no adverse s/s noted on my shift.    Pt cont. On CMO. Pt is obtunded, opening eyes spontaneously but will not follow commands even with just eyes. Pt has no response to any stimuli. VS WNL/baseline, patient skilled for daily nursing care and observation on my shift. Pt cont with trach @ 4L via T tube; Pt has port to right side dsg C/D/I. Pt has double lumen PICC, no adverse s/s noted, dsg C/D/I. Pt cont with hernandez and BMS, no adverse s/s noted. Pt's wound vac in place, barrier cream applied to excoriated skin around wound vac. Pt cont on tube feed at goal rate 40mL/hour. No adverse s/s noted. Pt turned [id number]. Skin protection measures in place. Family updated. Call bell within reach, Safety maintained on my shift.

## 2020-04-06 NOTE — PROGRESS NOTES
2 RN skin check completed with Avtar RN     Devices in place: BMS, wound vac, River, trach, HD cath, PICC and PEG tube.  Skin assessed under devices: Yes.  Confirmed pressure ulcers: Sacrum.  The following interventions in place: Mepliex on bilat heels and elbows. Q2 hour turns. Pillows in place for positioning, Heel float boots, NUBIA mattress.     Bilat heels pink, blanching and boggy.   Bilat elbows pink and blanching.   PEG tube site has scabbing around tube, gauze in place.   Generalized bruising.   Wound vac to sacral area.  Pitting edema to RUE.

## 2020-04-06 NOTE — PROGRESS NOTES
Patient's health care agent Belen has requested that dialysis be provided to patient so that  additional review of the goals of care and the plan of care can be conducted.

## 2020-04-06 NOTE — CARE PLAN
Problem: Respiratory:  Goal: Respiratory status will improve  Outcome: PROGRESSING SLOWER THAN EXPECTED     Problem: Mobility  Goal: Risk for activity intolerance will decrease  Outcome: PROGRESSING SLOWER THAN EXPECTED

## 2020-04-07 NOTE — PROGRESS NOTES
[facility] Medicine Daily Progress Note    Date of Service  4/7/2020    Chief Complaint  75 y.o. female admitted 1/16/2020 with AMS    Hospital Course    75 y.o. female admitted 1/16/2020 with a past medical history of hypertension, coronary disease, diabetes, and end-stage renal disease on dialysis brought to the emergency room after apparently she had seizure type activity at her assisted living facility.  She was admitted and underwent continuous EEG monitoring followed by neurology.  Her mentation did not improve and she underwent a tracheostomy on 2/2/2020.  A PEG tube was placed 2/15/2020.  He has had a persistent, severe encephalopathy. End-stage renal disease, continuing dialysis per family preference.  Wound showing very little to no granulation, somewhat worsened per wound care team assessment.  Ethics meeting regarding CODE STATUS: Full CODE STATUS was determined to be inappropriate to the standard of care with almost certain significant possibility of harm to the patient and highly unlikely to provide any benefit. This very difficult decision was made with careful and thorough deliberation.  The patient's CODE STATUS was changed to DNAR/DNI.      Interval Problem Update  Non responsive, + Tracheostomy present, rectal tube and River +     Consultants/Specialty  Critical care, signed off  Neurology, signed off  Infectious disease, signed off  Palliative care, following  Nephrology, following  Ethics, following    Code Status  COMFORT CARE    Disposition  pending    Review of Systems  Review of Systems   Unable to perform ROS: Acuity of condition        Physical Exam  Temp:  [36.2 °C (97.2 °F)-36.4 °C (97.5 °F)] 36.2 °C (97.2 °F)  Pulse:  [63-79] 75  Resp:  [14-16] 16  BP: (103-121)/(47) 103/47  SpO2:  [93 %-99 %] 96 %    Physical Exam  Constitutional:       Appearance: She is ill-appearing. She is not diaphoretic.      Comments: Sleeping     HENT:      Head: Normocephalic and atraumatic.      Nose: Nose  normal.      Mouth/Throat:      Mouth: Mucous membranes are dry.   Eyes:      Conjunctiva/sclera: Conjunctivae normal.      Pupils: Pupils are equal, round, and reactive to light.   Neck:      Musculoskeletal: Normal range of motion.      Comments: Trach in place  Cardiovascular:      Rate and Rhythm: Normal rate and regular rhythm.      Pulses: Normal pulses.      Heart sounds: Normal heart sounds. No friction rub.   Pulmonary:      Effort: No respiratory distress.      Breath sounds: No stridor.   Abdominal:      General: Bowel sounds are normal.      Palpations: Abdomen is soft.   Genitourinary:     Comments: Rectal tube is noted  Musculoskeletal: Normal range of motion.      Comments: Bed bound   Skin:     General: Skin is warm and dry.   Neurological:      Mental Status: She is disoriented.         Fluids    Intake/Output Summary (Last 24 hours) at 4/7/2020 1222  Last data filed at 4/7/2020 0530  Gross per 24 hour   Intake 480 ml   Output 550 ml   Net -70 ml            Assessment/Plan  * Goals of care, counseling/discussion- (present on admission)  Assessment & Plan  Overall neurologic and physical prognosis is quite poor given her severe, unrelenting encephalopathy and dialysis, she remains unchanged   family meeting 3/12/2020 with no subsequent change of plan of care.  Patient changed to DNR, DNI CODE STATUS per ethics committee meeting 3/20 as appropriate.  Had a prolonged meeting with family and ethics team on 3/27/20 to explain the family about patients grave prognosis as above.   Daughter at this time still believes that there is hope and that HD should be continued, Nephrology Team was also present during the meeting and has clearly told patients daughter that at this time we feel like continuing HD will be causing her more harm than good and because of these reasons we will no longer continue HD     With the ethics, palliative care and nephrology team , decision was made to place patient at Comfort care  measures. All team agreed and understood    As per dr Singh pt is comfort care only now.    D/w palliative nurse and will continue with tube feeding and rectal tube since they are not causing discomfort  And d/w palliative nurse on 4/2 and no change in treatment and as PER Dominik/ the family wants dialysis to resume until family memebers can get here from the  [facility].  In no acute distress    Acute respiratory failure with hypoxia (HCC)- (present on admission)  Assessment & Plan   on trach/T-piece,  pulmonology following  Acute on chronic condition now    Pressure injury of sacrum, coccyx, stage 3 (HCC)- (present on admission)  Assessment & Plan  sHe has a substantial right hip decubitus ulcer with a wound VAC followed by wound care, poor tissue integrity and minimal if any wound healing.  Given her inability to move spontaneously, diabetes, dialysis, and poor condition, she is extremely high risk of developing further decubitus ulcers    Severe protein-calorie malnutrition (HCC)- (present on admission)  Assessment & Plan  Continue nutrition via tube feeding.  Dietitian consulted.    Cerebrovascular accident (CVA) due to embolism of right middle cerebral artery (HCC)- (present on admission)  Assessment & Plan  Very poor prognosis.  Severe debility.  Continue aspirin and statin  Pain management    End stage renal failure on dialysis (HCC)- (present on admission)  Assessment & Plan  CC    Hypertension- (present on admission)  Assessment & Plan  She was on Coreg 25 mg twice a day, Cardura 6 mg daily, hydralazine 100 mg 3 times daily  Blood pressure is been low and all BP meds have been stopped  500 mL IV bolus was ordered on 3/11/2020 due to hypotension  Systolic blood pressure now averaging 130s  Continue to hold her blood pressure medicines    Type 2 diabetes mellitus, with long-term current use of insulin (HCC)- (present on admission)  Assessment & Plan  A1C 7.5%, blood sugars have been ~170. Was  "taking insulin at home.   Continue sliding scale insulin.  Continue tube feeding.   Blood sugar is stable.    Leukocytosis  Assessment & Plan  No other signs of infection, probably reactive  White blood cell count remains high  chest x-ray is unchanged, procalcitonin is slightly elevated but patient is end-stage renal disease patient has no fever, UA positive for leukocyte esterase but no other signs of infection.   C. difficile negative     Status epilepticus (HCC)- (present on admission)  Assessment & Plan  On admission, she was intubated for airway protection, now trach since 2/2/20. No evidence of seizure activity.  Appears now to have a severe, intractable encephalopathy  - continuing regimen of vimpat, topamax, depakote  - repeat EEG showed no seizures  Stable.  Continue antiseizure medications, seizure precautions.  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  FOBT is negative.   - transfuse if drops below 7  Hemoglobin stable    Gout- (present on admission)  Assessment & Plan  On allopurinol via PEG tube    TB lung, latent- (present on admission)  Assessment & Plan  Quantiferon + , not active TB. Chest CT found positive left-sided consolidation with pleural effusion.  - sputum cx/ AFB stain negative  - s/p thora on 2/25; f/u pathology negative  - ID consulted, signed off  - AFB stain negative x3; remove isolation  - Per ID, we will \"treat for LTBI with  mg PO daily and B6 25 mg PO daily for 9 months if AFB cxs all negative at 6 weeks\"  Completed antibiotics treatment.    Hypokalemia  Assessment & Plan  Patient is end-stage renal disease on hemodialysis, potassium improved.  resolved       VTE prophylaxis: comfort care only      "

## 2020-04-07 NOTE — PROGRESS NOTES
Comfort care. LOPEZ orientation. Nonverbal. Opens eyes, minimal response to verbal or physical stimulation. Family came to bed side. Trach, 4L O2 through T piece. Suctioning PRN,  in place. PICC flushed with + blood return to both lumens. David valve changed. Tube feed equipment changed. BMS flushed per orders. River draining to gravity. Q2 hr turns. Nonverbal pain scale used to determine pain level. Furrowed brow and tense body language, medicated with 5mg IV morphine per MAR. Pt resting with relaxed body language after administration. All needs met at this time. Bed locked & in low position.

## 2020-04-07 NOTE — CARE PLAN
Problem: Safety  Goal: Will remain free from falls  Outcome: PROGRESSING AS EXPECTED     Problem: Bowel/Gastric:  Goal: Will not experience complications related to bowel motility  Outcome: PROGRESSING AS EXPECTED  Intervention: Assess baseline bowel pattern  Note: BMS in place.

## 2020-04-07 NOTE — WOUND TEAM
[facility]  Inpatient Services  Wound and Skin Care Progress Note      HPI, PMH, SH: Reviewed    Unit where seen by Wound Team: [id number]     WOUND CONSULT RELATED TO:  Scheduled Negative Pressure Wound Therapy (NPWT) dressing change     Self Report / Pain Level:  Patient premedicated with IV pain med. Would shake a little during change but that could have been mild coughing not pain.       OBJECTIVE:  Dressing dislodged but wound vac not alarming.    WOUND TYPE, LOCATION, CHARACTERISTICS (Pressure Injuries: location, stage, POA or date identified)       Pressure Injury 01/16/20 Sacrum;Coccyx stage 4 3/5/2020 (Active)   Wound Image   4/4/2020  5:00 PM   Pressure Injury Stage 4 4/7/2020 10:00 AM   State of Healing Non-healing;Undermining 4/7/2020 10:00 AM   Site Assessment Boggy;Drainage;Non-healing;Slough;Undermining;Fragile;Yellow;Tan;Red;Dark edges 4/7/2020 10:00 AM   Periwound Assessment Red;Purple;Dark edges;Fragile;Denuded 4/7/2020 10:00 AM   Margins Unattached edges 4/7/2020 10:00 AM   Wound Length (cm) 5.5 cm 4/4/2020  5:00 PM   Wound Width (cm) 3.5 cm 4/4/2020  5:00 PM   Wound Depth (cm) 2.1 cm 4/4/2020  5:00 PM   Wound Surface Area (cm^2) 19.25 cm^2 4/4/2020  5:00 PM   Wound Volume (cm^3) 40.42 cm^3 4/4/2020  5:00 PM   Tunneling (cm) 3.5 cm 3/5/2020  6:00 PM   Undermining (cm) 3 cm 4/4/2020  5:00 PM   Closure Secondary intention 4/7/2020 10:00 AM   Drainage Amount Moderate 4/7/2020 10:00 AM   Drainage Description Serosanguineous;Tan;Yellow 4/7/2020 10:00 AM   Non-staged Wound Description Not applicable 4/7/2020 10:00 AM   Treatments Cleansed;Site care 4/7/2020 10:00 AM   Wound Cleansing Approved Wound Cleanser 4/7/2020 10:00 AM   Periwound Protectant Skin Protectant Wipes to Periwound;Stoma Powder;Paste Ring;Hydrofiber 4/7/2020 10:00 AM   Dressing Options Wound Vac 4/7/2020 10:00 AM   Dressing Cleansing/Solutions Normal Saline 4/7/2020 10:00 AM   Dressing Changed Changed 4/7/2020 10:00 AM    "  Dressing Status Clean;Dry;Intact 4/7/2020 10:00 AM   Dressing Change/Treatment Frequency By Wound Team Only 4/7/2020 10:00 AM   NEXT Dressing Change/Treatment Date 04/11/20 4/7/2020 10:00 AM   NEXT Weekly Photo (Inpatient Only) 04/11/20 4/7/2020 10:00 AM   Wound Odor Strong 4/7/2020 10:00 AM   Pulses N/A 4/7/2020 10:00 AM   Exposed Structures Connective tissue 4/7/2020 10:00 AM   WOUND NURSE ONLY - Tissue Type and Percentage 90% tan/yellow, 10% red 4/7/2020 10:00 AM           Negative Pressure Wound Therapy 03/07/20 Pressure injury: stage 4 (Active)   NPWT Pump Mode / Pressure Setting Ulta 4/7/2020 10:00 AM   Dressing Type Black Foam (Veraflo) 4/7/2020 10:00 AM   Number of Foam Pieces Used 1 4/7/2020 10:00 AM   Canister Changed No 4/7/2020 10:00 AM   Output (mL) 60 mL 4/1/2020  6:00 AM   NEXT Dressing Change/Treatment Date 04/11/20 4/7/2020 10:00 AM   VAC VeraFlo Irrigant Normal Saline 4/7/2020 10:00 AM   VAC VeraFlo Soak Time (mins) 3 4/7/2020 10:00 AM   VAC VeraFlo Instill Volume (ml) 10 4/7/2020 10:00 AM   VAC VeraFlo - Therapy Time (hrs) 2 4/7/2020 10:00 AM   VAC VeraFlo Pressure (mm/Hg) Continuous;125 mmHg 4/7/2020 10:00 AM          Lab Values:    Lab Results   Component Value Date/Time    WBC 14.4 (H) 03/21/2020 06:10 AM    RBC 2.64 (L) 03/21/2020 06:10 AM    HEMOGLOBIN 8.4 (L) 03/21/2020 06:10 AM    HEMATOCRIT 26.2 (L) 03/21/2020 06:10 AM                    Lab Results   Component Value Date/Time    HBA1C 7.5 (H) 02/07/2019 01:20 PM           INTERVENTIONS BY WOUND TEAM:  Educated RN about the Dressing Care order pertaining to maintenance of wound vac. Old dressing removed. Wound irrigated with wound cleanser. Cleaned periwound with wound cleanser. Crusted periwound 2x with stoma powder and No Sting. Applied 4\" paste ring around wound. 1 piece of Aquacel Ag applied over denuded area at 0500. 1 piece of Veraflo black foam in wound bed, this was draped, then hole cut for trac pad. Trac pad placed. Dressing " seal Achieved. Veraflo settings at 10mL NS for3 minute dwell Q 2 hours. Sacral mepilex fenestrated and placed around vac tubing/over dressing, then one over right hip. Vac tubing floated on top of the sacral mepilex using strips of drape. Updated Dressing Care order to reflect that Veraflo wound vac now in use. Soiled dri rocco removed from under patient. Patient repoitioned with pillow under right hip, heel float boots on bilaterally, HOB at 30 degrees.    Interdisciplinary consultation: Patient, Bedside RN, Wound Team tech    EVALUATION: wound vac for comfort: changing only 2 x a week. Manages drainage.    Goals: manage drainage and keep dressing changes to a minimum.    NURSING PLAN OF CARE ORDERS (X):  Dressing changes: See Dressing Care orders: X  Skin care: See Skin Care orders: X  Rectal tube care: See Rectal Tube Care orders:        Other orders:                             WOUND TEAM PLAN OF CARE (X):   Dressing changes by wound team:          Follow up 1-2 times weekly:   X 2 x a week on Tuesdays and Saturdays for NPWT (wound vac) dressing changes            Follow up 3 times weekly:                NPWT change 3 times weekly:     Follow up as needed:       Other (explain):

## 2020-04-07 NOTE — PROGRESS NOTES
Received pt in bed, LOPEZ orientation. Pt opened her eyes and seemed to be in pain when turned, but  then went back to sleep, but not able to be aroused by calling name.Pt non verbal. Wound care nurse notified about bleeding sacral ulcer dressing. Provided IV Morphine 10 mg/ml via PICC line  before dressing change by wound care nurse.  Picc line flushed prior to and after IV medication. Pt on G-tube running on impact peptide 1.5, flushed before and after before giving medication. Pt on Trach on 4L O2 via T-piece, on . On FC draining via gravity with yellow colored urine. On Rectal tube, flushed with 45ml. Pt is 2 rn skin check, q2 turns done. Needs provided. Bed locked and placed in lowest position. Hourly checks done. Resting at this time.

## 2020-04-07 NOTE — PROGRESS NOTES
2 RN skin check completed with Penelope HAILE     Devices in place: BMS, wound vac, River, trach, HD cath, PICC and PEG tube.  Skin assessed under devices: Yes.  Confirmed pressure ulcers: Sacrum.  The following interventions in place: Mepliex on bilat heels and elbows. Q2 hour turns. Pillows in place for positioning, Heel float boots, NUBIA mattress.     Bilat heels pink, blanching and boggy.   Bilat elbows pink and blanching.   PEG tube site has scabbing around tube, gauze in place.   Generalized bruising.   Wound vac to sacral area.  Pitting edema to RUE.

## 2020-04-08 NOTE — CARE PLAN
Problem: Pain Management  Goal: Pain level will decrease to patient's comfort goal  Outcome: PROGRESSING AS EXPECTED  Flowsheets  Taken 3/26/2020 0840 by Roxi Goldberg R.N.  Comfort Goal: Comfort at Rest  Taken 4/7/2020 0800 by Shania Tavares R.N.  Non Verbal Scale:   Calm   Sleeping  Pain Rating Scale (NPRS): 0  Taken 4/4/2020 2000 by Jean-Pierre Sanches R.N.  Sy-Hall Scale: 0   Taken 2/9/2020 0800 by Pasha Galeana R.N.  CPOT Total Score: 0  Note: Pt experiences pain upon turning manifested by screaming and wincing due to open wound in her lower back. Explained to daughter about provided pain medication as needed, needs reinforcement. Monitored for s/e. Will continue to monitor     Problem: Skin Integrity  Goal: Risk for impaired skin integrity will decrease  Outcome: PROGRESSING AS EXPECTED  Intervention: Implement precautions to protect skin integrity in collaboration with the interdisciplinary team  Flowsheets  Taken 4/7/2020 1630 by Michael Alex  Skin Preventative Measures: Pillows in Use for Support / Positioning  Patient Turns / Repositioning: Right Side  Assistance / Tolerance for Turning/Repositioning: Assistance of Two or More  Taken 4/6/2020 2121 by Eve Granados R.N.  Bed Types: Low Air Loss Mattress  Nutrition Consult Ordered: No, Consult has Already been Placed  Taken 4/7/2020 0900 by Shania Tavares R.N.  Friction Interventions: Draw Sheet / Pad Used for Repositioning  Moisturizers: Barrier Cream  Containment Devices: Bowel Management System  Protocols: Appropriate Wound Protocols in Place  Patient is Receiving Nutrition: Tube Feeding Nutrition  Vitamin Therapy in Use: No  Activity: Bed  Taken 4/3/2020 0700 by Alverto Loya R.N.  PT / OT Involved in Care: Physical Therapy Involved  Note: Pt is in CC, bed bound and obtounded. Pt has a wound vac on her sacrum and open wound distal to the wound on the sacrum. Worked with Wound care nurse. Q2 turns as tolerated. Kept pt dry and  clean. Will continue to monitor

## 2020-04-08 NOTE — PROGRESS NOTES
2 RN skin check complete with Tammy HAILE.  Devices in place: SCD, Boots, River cath, Peg tube, PICC line KARYN, HD cath, Wound VAC,   Skin assessed under devices: yes  Confirmed pressure ulcers found on: Sacrum.  New potential pressure ulcers noted on N/A.  Wound consult placed: yes  The following interventions in place: 2 RN skin check, q2 turns as tolerated, mepilex, barrier cream, waffle bed overlay, NUBIA mattress     Pillows, Mepilex, Lotion, Barrier cream, Heel float boots, Waffle bed overlay and NUBIA mattress, Q2hr turns, 2 RN skin check.     Bilateral heels pink and blanching, placed mepilex and heel boots in place  BLE have scatted bruises  Lower abdomen: scattered bruises  PEG Tube site: with clean, dry and intact dressing  Elbows: pink and blanching; mepilex for protection  Wound vac: Sacral area; distal to wound vac dressing is an open area with some bleeding. Wound nurse aware and did dressing

## 2020-04-08 NOTE — PROGRESS NOTES
Patient is bed bound and non-verbal, LOPEZ orientation. Patient will  open her eyes from time to time but not in response to any stimuli. Pt has a PEG tube running Impact Peptide 1.5 at goal of 40 mL/hr. Tube flushed with FWW of 30 mL q4h per MD orders. 2 packets of Nutrisource Fiber dissolved in 7 ounces of water also administered per order. Rectal tube in place and flushed with 45 mL. PICC line flushed easily and blood return noted. 2030 Patient O2 dropped to 85 and pt was suctioned with continuous suction for two passes with daughter present. Pt O2 increased and began to fluctuate between 82 and 92.   2200 Pt O2 decreased to 82 and patient was once again suctioned with continuous suction for several passes by RT with bedside RN and daughter present. Patient O2 increased to 94. Pt rates continue to fluctuate once again.  Approx 2230 Pt daughter leaves to go home  Approx 2350 Pt O2 decreased to 88. RN went to attend to another patient.  0005 RN and Charge RN returned to room so RN could suction patient. Patient was found unresponsive. No note of chest rise or fall. No response to voice nor sternal rub. RN and Charge RN both auscultated for heart beat and found none. Both RN and Charge RN felt for carotid pulse and found none. Both RN and Charge RN looked for pupil response and found none.   0015 time of death was called  0020 p/c to family to notify of patient passing. Daughter became hysterical and could not speak. Son said they would call back.  0045 Dr. Estella Wilburn notified of pt passing  0137 p/c to coroners office spoke with Lou Larios  0149 p/c to [facility] spoke with Gemma Leo  0300 Family arrives  0710 Family leaves  0730 body is prepared and traction called, patient upper dentures are placed in a pink denture cup with her name attached and cup placed in bag with body.  0800 Transport picks up body and transports off unit.

## 2020-04-21 LAB
MYCOBACTERIUM SPEC CULT: NORMAL
RHODAMINE-AURAMINE STN SPEC: NORMAL
SIGNIFICANT IND 70042: NORMAL
SITE SITE: NORMAL
SOURCE SOURCE: NORMAL

## 2020-04-23 LAB
MYCOBACTERIUM SPEC CULT: NORMAL
MYCOBACTERIUM SPEC CULT: NORMAL
RHODAMINE-AURAMINE STN SPEC: NORMAL
RHODAMINE-AURAMINE STN SPEC: NORMAL
SIGNIFICANT IND 70042: NORMAL
SIGNIFICANT IND 70042: NORMAL
SITE SITE: NORMAL
SITE SITE: NORMAL
SOURCE SOURCE: NORMAL
SOURCE SOURCE: NORMAL

## 2020-06-23 NOTE — ASSESSMENT & PLAN NOTE
2 different times one episode coffee ground   Started on protonix  Will check lft, lipase and KUB  Serial monitor hg   GERARDO KHAN  31y  Female [id number]    HPI: Pt is a 32yo  s/p uncomplicated  on 2020, now PPD#11 presenting with worsening vaginal bleeding. Pt states had bleeding consistent with the end of her period since delivery which was decreasing in amount since discharge. Today, however, she went to the restroom and noticed passage of "plum-sized" clot with heavier vaginal bleeding and pelvic cramping. She denies soaking through pads. Reports she had headache at that time and took Tylenol which improved her symptoms. Bleeding since that episode has been mild. She denies abdominal pain, chest pain, shortness of breath, fevers, chills, nausea, vomiting, diarrhea.         Name of GYN Physician: Dr. Posadas    POB:   , with  and uncomplicated postpartum course    Pgyn: Denies fibroids, cysts, endometriosis, STI's. Hx abnormal pap smears s/p colposcopy >5 years ago with normal pap smears since.     PMH: denies  PSH: denies  Social: anxiety  Allergies: dairy products (Hives; Pruritus)  Meds: Xanax PRN      Vital Signs Last 24 Hrs  T(C): 36.7 (2020 21:28), Max: 36.7 (2020 21:28)  T(F): 98 (2020 21:28), Max: 98 (2020 21:28)  HR: 82 (2020 21:28) (82 - 82)  BP: 130/85 (2020 21:28) (130/85 - 130/85)  BP(mean): --  RR: 18 (2020 21:28) (18 - 18)  SpO2: 97% (2020 21:28) (97% - 97%)    Physical Exam:   General: sitting comftorably in bed, NAD   CV: RR S1S2 no m/r/g  Lungs: CTA b/l, good air flow b/l   Abd: Soft, non-tender, non-distended. +BS  : Pt not wearing pad,  External labia wnl, minimal bleeding on external genitalia. Speculum exam with small amounts of blood in vault, minimal bleeding coming from cervical os.  Bimanual exam with no cervical motion tenderness, uterus wnl, adnexa non palpable b/l.  Cervix closed  Ext: non-tender b/l, no edema     LABS:                              12.2   10.19 )-----------( 285      ( 2020 22:04 )             37.7         137  |  104  |  11  ----------------------------<  85  3.9   |  21<L>  |  0.67    Ca    8.8      2020 22:04    TPro  6.8  /  Alb  4.0  /  TBili  0.5  /  DBili  x   /  AST  13  /  ALT  12  /  AlkPhos  83      I&O's Detail    PT/INR - ( 2020 22:04 )   PT: 12.2 sec;   INR: 1.07 ratio         PTT - ( 2020 22:04 )  PTT:30.0 sec      RADIOLOGY & ADDITIONAL STUDIES:    < from: US Pelvis Complete (20 @ 22:57) >  EXAM:  US PELVIC COMPLETE                          EXAM:  US DPLX ABD PELV LTD                            PROCEDURE DATE:  2020            INTERPRETATION:  CLINICAL INFORMATION: Postpartum day 11. Vaginal bleeding.    COMPARISON: None available.    TECHNIQUE:   Transabdominal pelvic sonogram only. Color and Spectral Doppler was performed.     FINDINGS:    Uterus: Heterogenous in appearance, measuring 16.5 x 6.0 x 8.6 cm.  Endometrium: 1.3 cm in thickness containing heterogeneous material.Low resistance high velocity arterial flow is noted within the endometrium posteriorly.     Right ovary: 2.3 x 1.8 x 2.0 cm. Within normal limits. Normal arterial and venous waveforms.  Left ovary: 3.1 x 1.3 x 3.8 cm. Within normal limits. Normal arterial and venous waveforms.    Fluid: None.    IMPRESSION:    Vascularity within the endometrium suspicious for retained products of conception. Correlation with beta hCG is recommended.                ELIZABETH SALVADOR M.D., RADIOLOGY RESIDENT  This document has been electronically signed.  CARLOS SR M.D., ATTENDING RADIOLOGIST  This document has been electronically signed. 2020 11:19PM       < end of copied text >

## 2020-09-01 NOTE — PROGRESS NOTES
[facility] Medicine Daily Progress Note    Date of Service  3/22/2020    Chief Complaint  altered mental status    Hospital Course    75 y.o. female admitted 1/16/2020 with a past medical history of hypertension, coronary disease, diabetes, and end-stage renal disease on dialysis brought to the emergency room after apparently she had seizure type activity at her assisted living facility.  She was admitted and underwent continuous EEG monitoring followed by neurology.  Her mentation did not improve and she underwent a tracheostomy on 2/2/2020.  A PEG tube was placed 2/15/2020.  He has had a persistent, severe encephalopathy. End-stage renal disease, continuing dialysis per family preference.  Wound showing very little to no granulation, somewhat worsened per wound care team assessment.  Ethics meeting regarding CODE STATUS: Full CODE STATUS was determined to be inappropriate to the standard of care with almost certain significant possibility of harm to the patient and highly unlikely to provide any benefit. This very difficult decision was made with careful and thorough deliberation.  The patient's CODE STATUS was changed to DNAR/DNI.  Ethics committee representative to communicate status change to family.        Interval Problem Update  Patient is lying in bed, awake and tracking with her eyes. She does not appear to respond to any commands, even blinking.  Again no change in physical assessment.  Ethics committee considering comfort care status in consultation with nephrology.  Dialysis scheduled Monday, Wednesday, Friday.    Consultants/Specialty  Critical care, signed off  Neurology, signed off  Infectious disease, signed off  Palliative care, following  Nephrology, following  Ethics, following    Code Status  Full code per family, ethics subcommittee following    Disposition  Declined by long term care facilities. Family unwilling to take patient home given level of patient care needs. Family does not agree to  hospice.    Review of Systems  Review of Systems   Unable to perform ROS: Medical condition        Physical Exam  Temp:  [36.3 °C (97.3 °F)-36.6 °C (97.8 °F)] 36.3 °C (97.3 °F)  Pulse:  [76-92] 76  Resp:  [14-19] 19  BP: (146-150)/(59-66) 150/61  SpO2:  [97 %-100 %] 98 %    Physical Exam  Vitals signs and nursing note reviewed.   Constitutional:       Comments: Chronically ill appearing   HENT:      Mouth/Throat:      Mouth: Mucous membranes are dry.   Neck:      Comments: Right tunneled dialysis cath  trach  Cardiovascular:      Rate and Rhythm: Normal rate and regular rhythm.   Pulmonary:      Effort: Pulmonary effort is normal.      Breath sounds: Normal breath sounds.      Comments: Poor air movement  Abdominal:      General: There is distension.      Tenderness: There is no abdominal tenderness.      Comments: PEG tube   Genitourinary:     Comments: River cath  Rectal tube  Musculoskeletal:      Right lower leg: No edema.      Left lower leg: No edema.      Comments: Poor muscle tone  Wound VAC right hip   Skin:     General: Skin is warm and dry.             Comments: Stage IV decubitus ulcer   Neurological:      Comments: She no longer follows blinking command   Psychiatric:      Comments: She is nonverbal     All systems reviewed and exam is unchanged from yesterday.    Fluids    Intake/Output Summary (Last 24 hours) at 3/22/2020 1506  Last data filed at 3/22/2020 1400  Gross per 24 hour   Intake 90 ml   Output 1430 ml   Net -1340 ml       Laboratory  Recent Labs     03/21/20  0610   WBC 14.4*   RBC 2.64*   HEMOGLOBIN 8.4*   HEMATOCRIT 26.2*   MCV 99.2*   MCH 31.8   MCHC 32.1*   RDW 57.7*   PLATELETCT 252   MPV 8.9*     Recent Labs     03/21/20  0610   SODIUM 131*   POTASSIUM 4.0   CHLORIDE 92*   CO2 28   GLUCOSE 152*   BUN 73*   CREATININE 1.35   CALCIUM 8.9                 Assessment/Plan  * Status epilepticus (HCC)- (present on admission)  Assessment & Plan  On admission, she was intubated for airway  protection, now trach since 2/2/20. No evidence of seizure activity.  Appears now to have a severe, intractable encephalopathy  - continuing regimen of vimpat, topamax, depakote  - repeat EEG showed no seizures  Stable.  Continue antiseizure medications, seizure precautions.  No more seizures    Acute respiratory failure with hypoxia (HCC)- (present on admission)  Assessment & Plan   on trach/T-piece,  pulmonology following  Acute on chronic condition now    Goals of care, counseling/discussion- (present on admission)  Assessment & Plan  Overall neurologic and physical prognosis is quite poor given her severe, unrelenting encephalopathy and dialysis, she remains unchanged   family meeting 3/12/2020 with no subsequent change of plan of care.  Patient changed to DNR, DNI CODE STATUS per ethics committee meeting 3/20 as appropriate.  Ethics committee to relay this status change to family.  comfort care would be an appropriate next step such as cessation of dialysis.  Nephrology has also documented this opinion.  Ethics committee to confer with nephrology, appreciate any additional recommendations.  Ethics committee consultation to provide physician backup for initiation of comfort care specifically withdrawal of dialysis and comfort care measures    Pressure injury of sacrum, coccyx, stage 3 (HCC)- (present on admission)  Assessment & Plan  He has a substantial right hip decubitus ulcer with a wound VAC followed by wound care, poor tissue integrity and minimal if any wound healing.  Given her ability to move spontaneously, diabetes, dialysis, and poor condition, she is extremely high risk of developing further decubitus ulcers    Severe protein-calorie malnutrition (HCC)- (present on admission)  Assessment & Plan  Continue nutrition via tube feeding.  Dietitian consulted.    Cerebrovascular accident (CVA) due to embolism of right middle cerebral artery (HCC)- (present on admission)  Assessment & Plan  Very poor  prognosis.  Severe debility.  Continue aspirin and statin  Pain management    End stage renal failure on dialysis ([facility])- (present on admission)  Assessment & Plan  Dialysis via a right-sided tunneled catheter  hemodialysis Monday Wednesday Friday    Hypertension- (present on admission)  Assessment & Plan  She was on Coreg 25 mg twice a day, Cardura 6 mg daily, hydralazine 100 mg 3 times daily  Blood pressure is been low and all BP meds have been stopped  500 mL IV bolus was ordered on 3/11/2020 due to hypotension  Systolic blood pressure now averaging 130s  Continue to hold her blood pressure medicines    Type 2 diabetes mellitus, with long-term current use of insulin ([facility])- (present on admission)  Assessment & Plan  A1C 7.5%, blood sugars have been ~170. Was taking insulin at home.   Continue sliding scale insulin.  Continue tube feeding.   Blood sugar is stable.    Leukocytosis  Assessment & Plan  No other signs of infection, probably reactive  White blood cell count remains high  chest x-ray is unchanged, procalcitonin is slightly elevated but patient is end-stage renal disease patient has no fever, UA positive for leukocyte esterase but no other signs of infection.   C. 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  FOBT is negative.   - transfuse if drops below 7  Hemoglobin stable    Gout- (present on admission)  Assessment & Plan  On allopurinol via PEG tube    TB lung, latent- (present on admission)  Assessment & Plan  Quantiferon + , not active TB. Chest CT found positive left-sided consolidation with pleural effusion.  - sputum cx/ AFB stain negative  - s/p thora on 2/25; f/u pathology negative  - ID consulted, signed off  - AFB stain negative x3; remove isolation  - Per ID, we will  "\"treat for LTBI with  mg PO daily and B6 25 mg PO daily for 9 months if AFB cxs all negative at 6 weeks\"  Completed antibiotics treatment.    Hypokalemia  Assessment & Plan  Patient is end-stage renal disease on hemodialysis, potassium improved.  resolved       VTE prophylaxis: Heparin    SHAHIDA Riley.    " Verbal/written post procedure instructions were given to patient/caregiver

## 2020-10-28 NOTE — CARE PLAN
Ventilator Daily Summary    Vent Day # 18    Ventilator settings changed this shift: none    Weaning trials: sbt as tolerated    Respiratory Procedures: none at this time     Plan: Continue current ventilator settings and wean mechanical ventilation as tolerated per physician orders.      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  (SEE MICROSCOPIC DESCRIPTION FOR SYNOPTIC REPORT)\par \par       -    TUMOR IS PRESENT IN THE PARATUBAL TISSUE AND ON THE OVARIAN\par            SURFACE.\par \par       -    SCATTERED FOCI SUSPICIOUS OF STROMAL MICROINVASION, LARGEST\par            FOCUS MEASURING 4 MM. (SLIDE A4)\par \par       -    PATHOLOGIC STAGE (pTNM):  pT(m)2b pNX\par    ONE FOCUS SUGGESTIVE OF LOW GRADE SEROUS CARCINOMA. (0.6 MM;\par            SLIDE A7)\par \par There were no post operative complications.\par She C/o hot flashes.\par C/O pain in the abdomen, which is diffuse.\par No weight loss.\par No vaginal bleeding\par No fever.\par Operative report was reviewed. No residual disease noted.\par \par The pathology slides had been sent for 2nd opinion and result is noted below. No invasion was identified.\par \par Patient also had a liver biopsy which demonstrated normal liver parenchyma which is noted below.  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 Patient needs evaluation with EUS +/- MRCP for evaluation of pancreatic cancer and regular follow up with dermatology  [de-identified] :  Report CR- received from [facility], [address] [place] [address] by Moy Briscoe on 2/8/2018 with the following    diagnosis:     FINAL DIAGNOSIS:    OVARY, RIGHT, SALPINGO-OOPHORECTOMY:          - SEROUS BORDERLINE TUMOR WITH NONINVASIVE IMPLANTS.    Comment:     I agree with your interpretation on this case of serous borderline    tumor. There are areas of increased proliferation but nothing I    would call serous carcinoma. There are noninvasive implants of the    fallopian tube and on the surface of the ovary. The sections from    the pelvic side wall and omentum also show noninvasive implants of    serous borderline tumor.\par \par Cytopathology Report       Final Diagnosis\par  LIVER LESION, LIVER LOBE, CT-GUIDED NEEDLE BIOPSY AND IMPRINT:\par  - NO MALIGNANT CELLS IDENTIFIED\par . - LIVER PARENCHYMA TISSUE WITH MACROVESICULAR AND MICROVESICULAR STEATOSIS ( 50% OF THE TISSUE). \par - IMMUNOHISTOCHEMICAL AND SPECIAL STAINS PENDING.\par  [de-identified] : C/O Pain in left shoulder and left breast as before.\par She had a PET scan and she is scheduled for liver biopsy later this week.\par \par 3/26/18\par Patient here for follow up complains of symptoms including hot flashes and abdominal pain in the RUQ at the side of liver biopsy. Patient denies any changes in bowel habits, vaginal bleeding. She had a patient had a liver biopsy which was normal showing normal liver parenchyma. 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 Patient was told to inform family especially children to undergo genetic testing as well.  Patient was told to follow up with Dr Weinberg of GI for EUS of the pancreas and consideration of a MRCP in the future. The patient was also recommended to see a dermatologist for full skin evaluation. \par \par 7/5/18\par She has been drinking heavy ( 10 shots of tequila  each weekend).\par She has an appointment with GI next week.\par Has not seen dermatology.\par Denies abdominal pain, no vaginal bleeding\par \par 10/4/18:\par Had headaches and had MRI brain few weeks ago.\par C/o dizziness and blurry vision. \par On keppra started by neurology for possible convulsions, started a week ago. She says that she doesn't feel good on Keppra. \par Denies fever, nausea, vomiting, chest pain, SOB, abdominal pain, bowel and bladder problems.\par Due for EGD and EUS on 10/10/18\par Due for mammo in Oct 2018. \par Due for VEEG. \par \par 2/13/19:\par Patient here for follow up, feeling well, no new complaints.  Patient denies abdominal pain or bloating, denies vaginal bleeding or discharge.  She had a CT scan which was normal and she is scheduled with GYN ONC on Friday.  She saw dermatologist as well as GI.  EUS was scheduled and apparently cancelled after the patient was found to have food in her stomach.  She states its now scheduled for end of March.  Patient also being followed for pseudoseizures by neurology.\par \par 6/3/19:\par Doing well. No major complaints.\par Denies fever, nausea, vomiting, chest pain, SOB, abdominal pain, bowel and bladder problems.\par Seen by Dermatology and gynonc. \par Pt had an EGD and EUS done in March 2019  that revealed atrophic gastritis and EUS revealed normal pancreas and normal sized PD\par Due for mammo this week. \par \par \par 12/16/19\par Pt is here for follow up.\par States she is going for surgery for cecal diverticulitis.\par No vaginal bleeding.\par Has been following with GI for EUS for pancreatic ca screening.\par \par 05/28/2020\par ALFIE MOLSEY a 45 year F is here today for follow up of ovarian cancer and melanoma pancreatic cancer syndrome. \par Was referred to Dermatology for full skin evaluation; last seen in 11/2019, diagnosed with seborrheic keratosis of nose/buttock with benign non-dysplastic nevi\par Last EUD, EUS 3/29 normal pancreas. Next f/up with GI next week.\par As per pt all of her f/up appt were postponed due to COVID.\par Colorectal surgery for cecal diverticulitis was  postponed. \par Denies vaginal bleeding, reports chronic intermittent abdominal pain. 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 \par She had her colonoscopy 7/2020 and endoscopy 6/2020 with Dr. Cabrera- no suspicious findings.\par Patient's family did not go for genetic testing yet and patient did not have genetic counseling either. She did not follow up with Dr Weinberg of GI for EUS of the pancreas and consideration of a MRCP in the future. The patient was also recommended to see a dermatologist for full skin evaluation which she sees on a regular basis..\par She had robotic resection done by Dr. Borja on 7/20/2020 for diverticulitis which she tolerated well.\par

## 2022-01-07 NOTE — PROGRESS NOTES
Hospital Medicine Daily Progress Note    Date of Service  3/8/2020    Chief Complaint  74 y.o. female admitted 1/16/2020 with altered mental status    Hospital Course    Ms. Beltran is a 74 y.o. female admitted to [facility] on 1/14/20 with seizures.She was at a SNF.She had been previously hospitalized at [facility] and diaysis was initiated.  She was doing very poorly then and palliative care was discussed with the family,but they declined.She had very poor functional status and required Jimena lift to get into the dialysis chair.She was found to have  a CVA on MRI at [facility].Her seizures were not controlled and it was felt she was in status epilepticus.  She was getting HD at [facility].Last HD was on 1/13/20.She was seen by Dr Carcamo at [facility].Creatinine was 1.8 and dialysis was held. Neurology [place] that the patient needed continuous EEG monitoring and he was transferred to [facility] [place] [place]. Patient intubated due to seizures and was eventually transitioned to tracheostomy on 2/2/2020 and off the vent on 2/5/2020. Family at this time does not want to change CODE status. Patient remains unresponsive. LTAC being pursued. PEG tube in place.         Interval Problem Update  Patient is resting in bed daughter is at bedside, patient remained stable, does not document pain not in distress, does not follow commands, having some watery diarrhea, since she is having diarrhea and leukocytosis we will check for C. difficile, blood pressure is soft will continue close monitoring.    Consultants/Specialty  Palliative care signed off  Critical care - signed off  Neurology - signed off  Nephrology  ID signed off  Pulmonology    Code Status  FULL    Disposition  TBD probably need long-term care placement      Review of Systems  Review of Systems   Unable to perform ROS: Medical condition       Physical Exam  Temp:  [36.4 °C (97.6 °F)-37.3 °C (99.1 °F)] 36.9 °C (98.4 °F)  Pulse:  [75-88] 75  Resp:  [16-19] 17  BP: ()/(37-61) 95/37  SpO2:   Pt called, informed of recommendations, voiced understanding, and appt scheduled. [94 %-100 %] 99 %    Physical Exam  Vitals signs reviewed.   Constitutional:       General: She is not in acute distress.     Appearance: She is not ill-appearing.   HENT:      Head: Normocephalic.      Nose: Nose normal. No rhinorrhea.      Mouth/Throat:      Mouth: Mucous membranes are dry.   Eyes:      General:         Right eye: No discharge.         Left eye: No discharge.      Pupils: Pupils are equal, round, and reactive to light.   Neck:      Musculoskeletal: Normal range of motion.      Comments: Trach in place  Cardiovascular:      Rate and Rhythm: Normal rate and regular rhythm.      Pulses: Normal pulses.      Comments: Tunneled HD catheter  Pulmonary:      Effort: Pulmonary effort is normal. No respiratory distress.   Abdominal:      General: Bowel sounds are normal. There is no distension.      Palpations: Abdomen is soft.      Tenderness: There is no abdominal tenderness.      Comments: PEG tube in place.   Genitourinary:     Comments: River and rectal tube in place  Skin:     General: Skin is warm.      Comments: Swelling of right hand; slightly improved   Neurological:      Mental Status: She is alert.      Motor: Weakness (diffuse) present.   Psychiatric:         Attention and Perception: She is inattentive.         Behavior: Behavior is not agitated.     Patient seen and examined today on 3/2, unchanged from 3/1.     Fluids  No intake or output data in the 24 hours ending 03/08/20 1127    Laboratory  Recent Labs     03/06/20  0235 03/07/20  0450 03/08/20  0420   WBC 15.9* 14.9* 15.6*   RBC 2.57* 2.63* 2.65*   HEMOGLOBIN 8.1* 8.4* 8.2*   HEMATOCRIT 25.8* 26.3* 26.9*   .4* 100.0* 101.5*   MCH 31.5 31.9 30.9   MCHC 31.4* 31.9* 30.5*   RDW 58.7* 59.7* 60.3*   PLATELETCT 193 202 208   MPV 9.1 9.0 9.2     Recent Labs     03/07/20  0450 03/08/20  0420   SODIUM 135 134*   POTASSIUM 3.5* 3.6   CHLORIDE 98 98   CO2 29 29   GLUCOSE 236* 224*   BUN 48* 77*   CREATININE 1.15 1.55*   CALCIUM 8.6 8.8                    Imaging  DX-CHEST-PORTABLE (1 VIEW)   Final Result      1.  Unchanged perihilar and LEFT greater than RIGHT basilar opacities compatible with atelectasis. Superimposed airspace disease is possible particularly in the LEFT lower lobe.   2.  Persistently enlarged cardiac silhouette      DX-CHEST-LIMITED (1 VIEW)   Final Result         No significant interval change.      DX-CHEST-PORTABLE (1 VIEW)   Final Result      1.  Apparent improvement of LEFT pleural effusion and basilar consolidation.   2.  No pneumothorax.   3.  Supportive tubing as described above.      US-THORACENTESIS PUNCTURE LEFT   Final Result      1. Ultrasound guided left sided diagnostic and therapeutic thoracentesis.      2. 400 mL of fluid withdrawn.      CT-CHEST (THORAX) W/O   Final Result      1.  Bilateral atelectasis/consolidation, left greater than right.      2.  Moderate left and small right pleural effusion.      3.  Atherosclerotic vascular calcification.      4.  Ascites within the upper abdomen.            US-ABDOMEN LTD (SOFT TISSUE)   Final Result         1. Trace free fluid in the pelvis. No abdominal ascites.      DX-ABDOMEN FOR TUBE PLACEMENT   Final Result         Feeding tube with tip projecting over the expected area of the stomach.      [id number] VIEW   Final Result      Feeding tube tip overlies the gastric antrum.      DX-CHEST-PORTABLE (1 VIEW)   Final Result         1.  Pulmonary edema and/or infiltrates are identified, which are stable since the prior exam.   2.  Cardiomegaly   3.  Atherosclerosis      DX-CHEST-PORTABLE (1 VIEW)   Final Result         1.  Pulmonary edema and/or infiltrates are identified, which are stable since the prior exam.   2.  Cardiomegaly   3.  Atherosclerosis      DX-CHEST-PORTABLE (1 VIEW)   Final Result      1.  Unchanged left lower lobe atelectasis or pneumonia.      2.  Clear right lung.      DX-CHEST-PORTABLE (1 VIEW)   Final Result      Unchanged LEFT basilar atelectasis  and/or airspace disease      DX-CHEST-PORTABLE (1 VIEW)   Final Result      Left basilar atelectasis, hilar and cardiac silhouette enlargement as before      DX-CHEST-PORTABLE (1 VIEW)   Final Result      Interval removal of a left subclavian central line. Stable patchy bilateral infiltrates.      IR-PICC LINE PLACEMENT W/ GUIDANCE > AGE 5   Final Result                  Ultrasound-guided PICC placement performed by qualified nursing staff as    above.          DX-CHEST-PORTABLE (1 VIEW)   Final Result      1.  Multiple support devices present.      2.  Patchy bilateral atelectasis.      DX-CHEST-PORTABLE (1 VIEW)   Final Result      Stable chest with retrocardiac opacity from atelectasis and/or pleural fluid favored over consolidation      DX-CHEST-PORTABLE (1 VIEW)   Final Result      Stable chest with retrocardiac opacity from atelectasis and/or pleural fluid favored over consolidation      DX-CHEST-PORTABLE (1 VIEW)   Final Result         No significant change from prior.               DX-CHEST-PORTABLE (1 VIEW)   Final Result         1. Stable lines and tubes..   2. Stable retrocardiac and left perihilar atelectasis versus consolidation. Suspected small left pleural effusion.            DX-CHEST-PORTABLE (1 VIEW)   Final Result         1. Stable lines and tubes..   2. Stable retrocardiac atelectasis versus consolidation with increased left perihilar opacity. Suspected trace left pleural effusion.         [id number] VIEW   Final Result      1.  Enteric tube has been placed and the tip projects over the stomach.      2.  Pre-existing feeding tube tip projects at the gastroduodenal junction      DX-CHEST-PORTABLE (1 VIEW)   Final Result         1. Lines and tubes as above.   2. Stable retrocardiac atelectasis versus consolidation. Suspected trace left pleural effusion.         MR-BRAIN-WITH & W/O   Final Result      1.  Moderate cerebral atrophy.   2.  Evidence of intraventricular hemorrhage, indeterminate age.  Possibly chronic intraventricular hemosiderin deposition.   3.  Extensive encephalomalacic change with hemosiderin deposition involving the right corona radiata, basal ganglia, posterior thalamus, subthalamic region, bordering the right cerebral peduncle. Associated ex vacuo dilatation of the body of the right    lateral ventricle. No change.   4.  Additional foci of old microhemorrhage most consistent with old hypertensive microhemorrhage or amyloid angiopathy in the left basal ganglia, left thalamus, and midline upper ventral abel.   5.  Punctate focus of acute infarction in the right parietal deep white matter. No change.   6.  Advanced supratentorial white matter disease most consistent with microvascular ischemic change.   7.  Encephalomalacic changes in the abel and right cerebral peduncle consistent with old infarction.   8.  Partially empty sella.   9.  Overall, no new findings and no significant change from 1/14/2020.      DX-CHEST-PORTABLE (1 VIEW)   Final Result         1. Stable lines and tubes.   2. Unchanged retrocardiac atelectasis versus consolidation.   3. Stable trace left pleural effusion.         OUTSIDE IMAGES-DX CHEST   Final Result      OUTSIDE IMAGES-US VASCULAR   Final Result      OUTSIDE IMAGES-MR BRAIN   Final Result      OUTSIDE IMAGES-DX CHEST   Final Result      OUTSIDE IMAGES-CT HEAD   Final Result      EC-ECHOCARDIOGRAM COMPLETE W/O CONT   Final Result      DX-CHEST-FOR LINE PLACEMENT Perform procedure in: Patient's Room   Final Result         1.  Retrocardiac opacity concerning for infiltrate, stable.   2.  Trace left pleural effusion, stable   3.  Cardiomegaly   4.  Atherosclerosis   5.  Perihilar interstitial prominence and bronchial wall cuffing, appearance suggests changes of underlying bronchial inflammation, consider bronchitis.      DX-ABDOMEN FOR TUBE PLACEMENT   Final Result         1.  Nonspecific bowel gas pattern.   2.  Dobbhoff tube tip overlying the expected location of  "the pylorus or first duodenal segment.   3.  Left lung base atelectasis and/or small effusion      DX-CHEST-PORTABLE (1 VIEW)   Final Result         1.  Retrocardiac opacity concerning for infiltrate.   2.  Trace left pleural effusion, stable   3.  Cardiomegaly   4.  Atherosclerosis      IO-[id number] FILM X-RAY   Final Result      OUTSIDE IMAGES-DX CHEST   Final Result           Assessment/Plan  * Status epilepticus (HCC)- (present on admission)  Assessment & Plan  On admission, she was intubated for airway protection, now trach since 2/2/20. No evidence of seizure activity. Neurology gave the opinion that likelihood of patient returning to baseline was low, and that the likelihood of the patient returning to full independent function was essentially 0. Very poor prognosis, comfort care recommended.  - neurology evaluated, appreciate recs  - continuing regimen of vimpat, topamax, depakote  - repeat EEG showed no seizures  Stable.  Continue antiseizure medications, seizure precautions.  No more seizures    TB lung, latent- (present on admission)  Assessment & Plan  Quantiferon + and will have to rule out active TB. Chest CT found positive left-sided consolidation with pleural effusion.  - pending sputum cx/ AFB stain to rule out active disease before initiating  - s/p thora on 2/25; f/u pathology   - ID following, appreciate recs  - AFB stain negative x3; remove isolation  - Per ID, we will \"treat for LTBI with  mg PO daily and B6 25 mg PO daily for 9 months if AFB cxs all negative at 6 weeks\"  Completed antibiotics treatment.    Acute respiratory failure with hypoxia (HCC)- (present on admission)  Assessment & Plan   on trach/T-piece, down size to 6 cuffed shiley by pulmonology      Hypokalemia  Assessment & Plan  Patient is end-stage renal disease on hemodialysis, potassium improved.    Severe protein-calorie malnutrition (HCC)- (present on admission)  Assessment & Plan  Continue nutrition via tube " feeding.  Dietitian consulted     Cerebrovascular accident (CVA) due to embolism of right middle cerebral artery (HCC)- (present on admission)  Assessment & Plan  Very poor prognosis.  Continue aspirin and statin, PT OT evaluation    End stage renal failure on dialysis (HCC)- (present on admission)  Assessment & Plan  Poor prognosis per nephrology, recommending comfort care.   Continue hemodialysis Monday Wednesday Friday    Normocytic anemia- (present on admission)  Assessment & Plan  Likely anemia of chronic kidney disease.  FOBT is negative.   - transfuse if drops below 7  Hemoglobin stable    Hypertension- (present on admission)  Assessment & Plan  Improved, continue Coreg, hydralazine, stopped lisinopril due to low blood pressure  Continue monitoring  Stable .    Type 2 diabetes mellitus, with long-term current use of insulin ([facility])- (present on admission)  Assessment & Plan  A1C 7.5%, blood sugars have been ~170. Was taking insulin at home.   Continue sliding scale insulin.  Continue tube feeding.   Blood sugar is stable  .    Leukocytosis  Assessment & Plan  No other signs of infection, probably reactive, leukocytosis trending down, chest x-ray is unchanged, procalcitonin is slightly elevated but patient is end-stage renal disease patient has no fever, UA positive for leukocyte esterase but no other signs of infection.   Leukocytosis stable, patient having some diarrhea, will check for C. difficile.    Goals of care, counseling/discussion- (present on admission)  Assessment & Plan  Family continue wanting full code, daughter wants to give more time to see if patient able to recover,  PT OT recommended long-term care placement patient is close to baseline requiring max assistance for ADLs.  Discussed with SW today. Patient requires LTC placement.   Discussed with daughter today.    Pressure injury of sacrum, coccyx, stage 3 (HCC)- (present on admission)  Assessment & Plan  - Wound care  Needs to continue  aggressive pressure offloading strategies  Changes in position every 2 hours  Added Vit C and MTV   Discussed with wound care on 3/5/2020.     Dysphagia as late effect of cerebrovascular accident (CVA)- (present on admission)  Assessment & Plan  PEG tube in place    Gout- (present on admission)  Assessment & Plan  On allopurinol via PEG tube       VTE prophylaxis: SCDs    Case and plan of care discussed with nurse staff  Case and plan of care discussed during multidisciplinary rounds  Poor prognosis.

## 2022-03-08 NOTE — PROGRESS NOTES
Assumed care; plan of care reviewed with the pt; bedside shift report received; bed alarm on; sr up x2.   [FreeTextEntry3] : I have fully participated in the care of this patient. I have reviewed all pertinent clinical information, including history, physical exam, plan and the note and I agree.
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
## 2024-01-22 NOTE — CARE PLAN
Problem: Pain Management  Goal: Pain level will decrease to patient's comfort goal  Outcome: PROGRESSING AS EXPECTED     Problem: Discharge Barriers/Planning  Goal: Patient's continuum of care needs will be met  Outcome: PROGRESSING SLOWER THAN EXPECTED  Note:   Despite education and multiple family meetings, family still does not want to choose a LTACH at this time      No

## 2024-10-18 NOTE — WOUND TEAM
Endoscopy Case End Note:    0800:  Procedure scope was pre-cleaned, per protocol, at bedside by Sheela Hernandez.      0802:  Report received from anesthesia - SALLY Celaya.  See anesthesia flowsheet for intra-procedure vital signs and events.     [facility]  Inpatient Services  Wound and Skin Care Progress note    Admission Date: 1/16/2020     Consult Date: 01/24/2020  HPI, PMH, SH: Reviewed    Unit where seen by Wound Team: [id number]     WOUND CONSULT RELATED TO:  Coccyx/sacrum      Self Report / Pain Level:  Discomfort with turning        OBJECTIVE:  In bed, had BM, mepilex soiled, wound bleeding     WOUND TYPE, LOCATION, CHARACTERISTICS (Pressure Injuries: location, stage, POA or date identified)     Pressure Injury 01/16/20 Sacrum;Coccyx stage 3 POA  (Active)   Pressure Injury Stage 3      State of Healing Non-healing    Site Assessment Red;Light purple    Lucina-wound Assessment Scar tissue;Denuded    Margins Attached edges    Wound Length (cm) 15cm    Wound Width (cm) 9 cm    Wound Depth (cm) 0.2 cm    Wound Surface Area (cm^2) 135 cm^2    Tunneling 0 cm    Undermining 0 cm    Closure None    Drainage Amount small    Drainage Description Serosanguineous    Treatments Cleansed;Site care    Cleansing Normal Saline Irrigation    Periwound Protectant Not Applicable    Dressing Options Stoma powder, barrier paste, viscopaste strips, barrier paste, antifungal ordered    Dressing Cleansing/Solutions Not Applicable    Dressing Changed changed    Dressing Status Clean;Dry;Intact    Dressing Change Frequency PRN    NEXT Dressing Change  01/26/20    NEXT Weekly Photo (Inpatient Only) 02/5/20    Odor None     Exposed Structures None     Tissue Type and Percentage 100% red /pink      Vascular:    Not vascular related     Lab Values:    Lab Results   Component Value Date/Time    WBC 11.2 (H) 01/26/2020 03:39 AM    RBC 2.45 (L) 01/26/2020 03:39 AM    HEMOGLOBIN 8.4 (L) 01/26/2020 03:39 AM    HEMATOCRIT 26.2 (L) 01/26/2020 03:39 AM        Results from last 7 days   Lab Units 01/21/20  0530   C REACTIVE PROTEIN 4596 mg/dL 7.23*     Lab Results   Component Value Date/Time    HBA1C 7.5 (H) 02/07/2019 01:20 PM      Culture:   Not indicated at this time          INTERVENTIONS BY WOUND TEAM:  Patient turned to left side, previous dressing removed. Incontinent episode cleaned. Cleaned wound with NS and gauze. Stoma powder to wound, blotted barrier paste to area, covered with viscopaste strips, applied more barrier paste. Antifungal ordered. RN and CNA finished with linens and repositioning.     Interdisciplinary consultation: Patient, family,  RN    EVALUATION: patient seen for concern RUE. BUE with multiple areas of bruising. There is tear to LUE with adhesive foam drsg. RN requested that sacrococcygeal area be assessed. Pt's St 3 pressure injury bleeding, stoma powder for bleeding and try to dry wound bed for barrier paste to be able to stick.     Goals: Steady decrease in wound area and depth weekly.    NURSING PLAN OF CARE ORDERS (X):  Dressing changes: See Dressing Care orders: X  Skin care: See Skin Care orders: X  Rectal tube care: See Rectal Tube Care orders:        Other orders:                           WOUND TEAM PLAN OF CARE (X):   Dressing changes by wound team:          Follow up  weekly:             X  Follow up 3 times weekly:                NPWT change 3 times weekly:     Follow up as needed:       Other (explain):     Anticipated discharge plans (X):   LTACH:        SNF/Rehab:                X will need ongoing wound care post DC  Home Care:           Outpatient Wound Center:            Self Care:            Other:

## (undated) DEVICE — ELECTRODE 850 FOAM ADHESIVE - HYDROGEL RADIOTRNSPRNT (50/PK)

## (undated) DEVICE — FILM CASSETTE ENDO

## (undated) DEVICE — KIT CUSTOM PROCEDURE SINGLE FOR ENDO  (15/CA)

## (undated) DEVICE — BITE BLOCK ADULT 60FR (100EA/CA)